# Patient Record
Sex: MALE | Race: WHITE | NOT HISPANIC OR LATINO | Employment: OTHER | URBAN - METROPOLITAN AREA
[De-identification: names, ages, dates, MRNs, and addresses within clinical notes are randomized per-mention and may not be internally consistent; named-entity substitution may affect disease eponyms.]

---

## 2017-01-26 ENCOUNTER — COMMUNICATION - HEALTHEAST (OUTPATIENT)
Dept: LAB | Facility: CLINIC | Age: 57
End: 2017-01-26

## 2017-01-26 DIAGNOSIS — M1A.0791 IDIOPATHIC CHRONIC GOUT OF FOOT WITH TOPHUS, UNSPECIFIED LATERALITY: ICD-10-CM

## 2017-02-10 ENCOUNTER — COMMUNICATION - HEALTHEAST (OUTPATIENT)
Dept: RHEUMATOLOGY | Facility: CLINIC | Age: 57
End: 2017-02-10

## 2017-02-10 DIAGNOSIS — M1A.0791 IDIOPATHIC CHRONIC GOUT OF FOOT WITH TOPHUS, UNSPECIFIED LATERALITY: ICD-10-CM

## 2017-02-17 ENCOUNTER — COMMUNICATION - HEALTHEAST (OUTPATIENT)
Dept: RHEUMATOLOGY | Facility: CLINIC | Age: 57
End: 2017-02-17

## 2017-02-17 DIAGNOSIS — M10.9 GOUT: ICD-10-CM

## 2017-02-24 ENCOUNTER — AMBULATORY - HEALTHEAST (OUTPATIENT)
Dept: LAB | Facility: CLINIC | Age: 57
End: 2017-02-24

## 2017-02-24 DIAGNOSIS — M1A.0791 IDIOPATHIC CHRONIC GOUT OF FOOT WITH TOPHUS, UNSPECIFIED LATERALITY: ICD-10-CM

## 2017-02-24 LAB
ALT SERPL W P-5'-P-CCNC: 35 U/L (ref 0–45)
CREAT SERPL-MCNC: 0.94 MG/DL (ref 0.7–1.3)
GFR SERPL CREATININE-BSD FRML MDRD: >60 ML/MIN/1.73M2

## 2017-02-28 ENCOUNTER — OFFICE VISIT - HEALTHEAST (OUTPATIENT)
Dept: RHEUMATOLOGY | Facility: CLINIC | Age: 57
End: 2017-02-28

## 2017-02-28 DIAGNOSIS — M1A.0791 IDIOPATHIC CHRONIC GOUT OF FOOT WITH TOPHUS, UNSPECIFIED LATERALITY: ICD-10-CM

## 2017-02-28 DIAGNOSIS — Z79.899 HIGH RISK MEDICATION USE: ICD-10-CM

## 2017-04-20 ENCOUNTER — COMMUNICATION - HEALTHEAST (OUTPATIENT)
Dept: RHEUMATOLOGY | Facility: CLINIC | Age: 57
End: 2017-04-20

## 2017-04-20 DIAGNOSIS — M10.9 GOUT: ICD-10-CM

## 2017-05-18 ENCOUNTER — OFFICE VISIT - HEALTHEAST (OUTPATIENT)
Dept: FAMILY MEDICINE | Facility: CLINIC | Age: 57
End: 2017-05-18

## 2017-05-18 DIAGNOSIS — I65.21 ATHEROSCLEROSIS OF RIGHT CAROTID ARTERY: ICD-10-CM

## 2017-05-18 DIAGNOSIS — I63.9 STROKE (H): ICD-10-CM

## 2017-05-18 DIAGNOSIS — E03.9 HYPOTHYROIDISM: ICD-10-CM

## 2017-05-18 DIAGNOSIS — M1A.0791 IDIOPATHIC CHRONIC GOUT OF FOOT WITH TOPHUS, UNSPECIFIED LATERALITY: ICD-10-CM

## 2017-05-18 DIAGNOSIS — Z12.5 SCREENING PSA (PROSTATE SPECIFIC ANTIGEN): ICD-10-CM

## 2017-05-18 DIAGNOSIS — E78.5 HYPERLIPIDEMIA: ICD-10-CM

## 2017-05-18 ASSESSMENT — MIFFLIN-ST. JEOR: SCORE: 1946.58

## 2017-05-26 ENCOUNTER — AMBULATORY - HEALTHEAST (OUTPATIENT)
Dept: LAB | Facility: CLINIC | Age: 57
End: 2017-05-26

## 2017-05-26 ENCOUNTER — AMBULATORY - HEALTHEAST (OUTPATIENT)
Dept: NURSING | Facility: CLINIC | Age: 57
End: 2017-05-26

## 2017-05-26 DIAGNOSIS — Z01.30 BLOOD PRESSURE CHECK: ICD-10-CM

## 2017-05-26 DIAGNOSIS — Z12.5 SCREENING PSA (PROSTATE SPECIFIC ANTIGEN): ICD-10-CM

## 2017-05-26 DIAGNOSIS — M1A.0791 IDIOPATHIC CHRONIC GOUT OF FOOT WITH TOPHUS, UNSPECIFIED LATERALITY: ICD-10-CM

## 2017-05-26 DIAGNOSIS — E03.9 HYPOTHYROIDISM: ICD-10-CM

## 2017-05-26 DIAGNOSIS — E78.5 HYPERLIPIDEMIA: ICD-10-CM

## 2017-05-26 LAB
CHOLEST SERPL-MCNC: 128 MG/DL
FASTING STATUS PATIENT QL REPORTED: YES
HDLC SERPL-MCNC: 29 MG/DL
LDLC SERPL CALC-MCNC: 49 MG/DL
PSA SERPL-MCNC: 1.6 NG/ML (ref 0–3.5)
TRIGL SERPL-MCNC: 248 MG/DL

## 2017-05-29 ENCOUNTER — COMMUNICATION - HEALTHEAST (OUTPATIENT)
Dept: FAMILY MEDICINE | Facility: CLINIC | Age: 57
End: 2017-05-29

## 2017-05-30 ENCOUNTER — AMBULATORY - HEALTHEAST (OUTPATIENT)
Dept: FAMILY MEDICINE | Facility: CLINIC | Age: 57
End: 2017-05-30

## 2017-05-30 DIAGNOSIS — R97.20 RISING PSA LEVEL: ICD-10-CM

## 2017-06-02 ENCOUNTER — AMBULATORY - HEALTHEAST (OUTPATIENT)
Dept: NURSING | Facility: CLINIC | Age: 57
End: 2017-06-02

## 2017-06-02 DIAGNOSIS — R03.0 BLOOD PRESSURE ELEVATED WITHOUT HISTORY OF HTN: ICD-10-CM

## 2017-06-05 ENCOUNTER — COMMUNICATION - HEALTHEAST (OUTPATIENT)
Dept: FAMILY MEDICINE | Facility: CLINIC | Age: 57
End: 2017-06-05

## 2017-06-11 ENCOUNTER — COMMUNICATION - HEALTHEAST (OUTPATIENT)
Dept: FAMILY MEDICINE | Facility: CLINIC | Age: 57
End: 2017-06-11

## 2017-06-30 ENCOUNTER — COMMUNICATION - HEALTHEAST (OUTPATIENT)
Dept: FAMILY MEDICINE | Facility: CLINIC | Age: 57
End: 2017-06-30

## 2017-08-01 ENCOUNTER — OFFICE VISIT - HEALTHEAST (OUTPATIENT)
Dept: FAMILY MEDICINE | Facility: CLINIC | Age: 57
End: 2017-08-01

## 2017-08-01 DIAGNOSIS — I10 HTN (HYPERTENSION): ICD-10-CM

## 2017-08-08 ENCOUNTER — AMBULATORY - HEALTHEAST (OUTPATIENT)
Dept: NURSING | Facility: CLINIC | Age: 57
End: 2017-08-08

## 2017-08-08 DIAGNOSIS — I10 HTN (HYPERTENSION): ICD-10-CM

## 2017-08-15 ENCOUNTER — COMMUNICATION - HEALTHEAST (OUTPATIENT)
Dept: FAMILY MEDICINE | Facility: CLINIC | Age: 57
End: 2017-08-15

## 2017-08-17 ENCOUNTER — COMMUNICATION - HEALTHEAST (OUTPATIENT)
Dept: LAB | Facility: CLINIC | Age: 57
End: 2017-08-17

## 2017-08-17 DIAGNOSIS — M1A.0791 IDIOPATHIC CHRONIC GOUT OF FOOT WITH TOPHUS, UNSPECIFIED LATERALITY: ICD-10-CM

## 2017-08-22 ENCOUNTER — AMBULATORY - HEALTHEAST (OUTPATIENT)
Dept: LAB | Facility: CLINIC | Age: 57
End: 2017-08-22

## 2017-08-22 ENCOUNTER — AMBULATORY - HEALTHEAST (OUTPATIENT)
Dept: FAMILY MEDICINE | Facility: CLINIC | Age: 57
End: 2017-08-22

## 2017-08-22 DIAGNOSIS — M1A.0791 IDIOPATHIC CHRONIC GOUT OF FOOT WITH TOPHUS, UNSPECIFIED LATERALITY: ICD-10-CM

## 2017-08-22 DIAGNOSIS — I10 HTN (HYPERTENSION): ICD-10-CM

## 2017-08-22 LAB
ALT SERPL W P-5'-P-CCNC: 35 U/L (ref 0–45)
CREAT SERPL-MCNC: 1.17 MG/DL (ref 0.7–1.3)
GFR SERPL CREATININE-BSD FRML MDRD: >60 ML/MIN/1.73M2

## 2017-08-23 ENCOUNTER — COMMUNICATION - HEALTHEAST (OUTPATIENT)
Dept: FAMILY MEDICINE | Facility: CLINIC | Age: 57
End: 2017-08-23

## 2017-08-28 ENCOUNTER — COMMUNICATION - HEALTHEAST (OUTPATIENT)
Dept: FAMILY MEDICINE | Facility: CLINIC | Age: 57
End: 2017-08-28

## 2017-08-29 ENCOUNTER — OFFICE VISIT - HEALTHEAST (OUTPATIENT)
Dept: RHEUMATOLOGY | Facility: CLINIC | Age: 57
End: 2017-08-29

## 2017-08-29 DIAGNOSIS — Z79.899 HIGH RISK MEDICATION USE: ICD-10-CM

## 2017-08-29 DIAGNOSIS — M1A.0791 IDIOPATHIC CHRONIC GOUT OF FOOT WITH TOPHUS, UNSPECIFIED LATERALITY: ICD-10-CM

## 2017-08-29 DIAGNOSIS — M77.8 RIGHT SHOULDER TENDINITIS: ICD-10-CM

## 2017-08-29 ASSESSMENT — MIFFLIN-ST. JEOR: SCORE: 1952.02

## 2017-09-15 ENCOUNTER — RECORDS - HEALTHEAST (OUTPATIENT)
Dept: ADMINISTRATIVE | Facility: OTHER | Age: 57
End: 2017-09-15

## 2017-09-15 ENCOUNTER — COMMUNICATION - HEALTHEAST (OUTPATIENT)
Dept: FAMILY MEDICINE | Facility: CLINIC | Age: 57
End: 2017-09-15

## 2017-09-15 ENCOUNTER — OFFICE VISIT - HEALTHEAST (OUTPATIENT)
Dept: FAMILY MEDICINE | Facility: CLINIC | Age: 57
End: 2017-09-15

## 2017-09-15 DIAGNOSIS — Z00.00 HEALTH CARE MAINTENANCE: ICD-10-CM

## 2017-09-15 DIAGNOSIS — M25.511 RIGHT SHOULDER PAIN: ICD-10-CM

## 2017-09-15 DIAGNOSIS — R20.2 ARM PARESTHESIA, RIGHT: ICD-10-CM

## 2017-09-17 ENCOUNTER — COMMUNICATION - HEALTHEAST (OUTPATIENT)
Dept: FAMILY MEDICINE | Facility: CLINIC | Age: 57
End: 2017-09-17

## 2017-09-23 ENCOUNTER — COMMUNICATION - HEALTHEAST (OUTPATIENT)
Dept: FAMILY MEDICINE | Facility: CLINIC | Age: 57
End: 2017-09-23

## 2017-09-23 DIAGNOSIS — I10 HTN (HYPERTENSION): ICD-10-CM

## 2017-09-26 ENCOUNTER — COMMUNICATION - HEALTHEAST (OUTPATIENT)
Dept: FAMILY MEDICINE | Facility: CLINIC | Age: 57
End: 2017-09-26

## 2017-09-26 ENCOUNTER — AMBULATORY - HEALTHEAST (OUTPATIENT)
Dept: FAMILY MEDICINE | Facility: CLINIC | Age: 57
End: 2017-09-26

## 2017-09-26 ENCOUNTER — HOSPITAL ENCOUNTER (OUTPATIENT)
Dept: MRI IMAGING | Facility: HOSPITAL | Age: 57
Discharge: HOME | End: 2017-09-26
Attending: FAMILY MEDICINE

## 2017-09-26 DIAGNOSIS — R20.2 ARM PARESTHESIA, RIGHT: ICD-10-CM

## 2017-09-26 DIAGNOSIS — M54.12 CERVICAL RADICULOPATHY: ICD-10-CM

## 2017-10-03 ENCOUNTER — OFFICE VISIT - HEALTHEAST (OUTPATIENT)
Dept: NEUROSURGERY | Facility: CLINIC | Age: 57
End: 2017-10-03

## 2017-10-03 DIAGNOSIS — M50.20 HERNIATED CERVICAL DISC: ICD-10-CM

## 2017-10-03 ASSESSMENT — MIFFLIN-ST. JEOR: SCORE: 1944.31

## 2017-10-19 ENCOUNTER — HOSPITAL ENCOUNTER (OUTPATIENT)
Dept: PHYSICAL MEDICINE AND REHAB | Facility: CLINIC | Age: 57
Discharge: HOME | End: 2017-10-19
Attending: NEUROLOGICAL SURGERY

## 2017-10-19 DIAGNOSIS — M50.20 CERVICAL DISC HERNIATION: ICD-10-CM

## 2017-10-19 DIAGNOSIS — M54.12 CERVICAL RADICULITIS: ICD-10-CM

## 2017-10-19 DIAGNOSIS — M50.20 HERNIATED CERVICAL DISC: ICD-10-CM

## 2017-10-19 ASSESSMENT — MIFFLIN-ST. JEOR: SCORE: 1939.78

## 2017-10-30 ENCOUNTER — OFFICE VISIT - HEALTHEAST (OUTPATIENT)
Dept: PHYSICAL THERAPY | Facility: REHABILITATION | Age: 57
End: 2017-10-30

## 2017-10-30 DIAGNOSIS — M25.60 JOINT STIFFNESS OF SPINE: ICD-10-CM

## 2017-10-30 DIAGNOSIS — M62.81 MUSCLE WEAKNESS (GENERALIZED): ICD-10-CM

## 2017-10-30 DIAGNOSIS — R29.3 POOR POSTURE: ICD-10-CM

## 2017-10-30 DIAGNOSIS — M54.12 CERVICAL RADICULITIS: ICD-10-CM

## 2017-11-01 ENCOUNTER — OFFICE VISIT - HEALTHEAST (OUTPATIENT)
Dept: PHYSICAL THERAPY | Facility: REHABILITATION | Age: 57
End: 2017-11-01

## 2017-11-01 DIAGNOSIS — M54.12 CERVICAL RADICULITIS: ICD-10-CM

## 2017-11-01 DIAGNOSIS — R29.3 POOR POSTURE: ICD-10-CM

## 2017-11-01 DIAGNOSIS — M25.60 JOINT STIFFNESS OF SPINE: ICD-10-CM

## 2017-11-01 DIAGNOSIS — M62.81 MUSCLE WEAKNESS (GENERALIZED): ICD-10-CM

## 2017-11-06 ENCOUNTER — OFFICE VISIT - HEALTHEAST (OUTPATIENT)
Dept: PHYSICAL THERAPY | Facility: REHABILITATION | Age: 57
End: 2017-11-06

## 2017-11-06 DIAGNOSIS — M25.60 JOINT STIFFNESS OF SPINE: ICD-10-CM

## 2017-11-06 DIAGNOSIS — M62.81 MUSCLE WEAKNESS (GENERALIZED): ICD-10-CM

## 2017-11-06 DIAGNOSIS — R29.3 POOR POSTURE: ICD-10-CM

## 2017-11-06 DIAGNOSIS — M54.12 CERVICAL RADICULITIS: ICD-10-CM

## 2017-11-09 ENCOUNTER — OFFICE VISIT - HEALTHEAST (OUTPATIENT)
Dept: PHYSICAL THERAPY | Facility: REHABILITATION | Age: 57
End: 2017-11-09

## 2017-11-09 DIAGNOSIS — M54.12 CERVICAL RADICULITIS: ICD-10-CM

## 2017-11-09 DIAGNOSIS — M62.81 MUSCLE WEAKNESS (GENERALIZED): ICD-10-CM

## 2017-11-09 DIAGNOSIS — R29.3 POOR POSTURE: ICD-10-CM

## 2017-11-09 DIAGNOSIS — M25.60 JOINT STIFFNESS OF SPINE: ICD-10-CM

## 2017-11-13 ENCOUNTER — OFFICE VISIT - HEALTHEAST (OUTPATIENT)
Dept: PHYSICAL THERAPY | Facility: REHABILITATION | Age: 57
End: 2017-11-13

## 2017-11-13 DIAGNOSIS — M54.12 CERVICAL RADICULITIS: ICD-10-CM

## 2017-11-13 DIAGNOSIS — M62.81 MUSCLE WEAKNESS (GENERALIZED): ICD-10-CM

## 2017-11-13 DIAGNOSIS — R29.3 POOR POSTURE: ICD-10-CM

## 2017-11-13 DIAGNOSIS — M25.60 JOINT STIFFNESS OF SPINE: ICD-10-CM

## 2017-11-14 ENCOUNTER — RECORDS - HEALTHEAST (OUTPATIENT)
Dept: ADMINISTRATIVE | Facility: OTHER | Age: 57
End: 2017-11-14

## 2017-11-14 ENCOUNTER — OFFICE VISIT - HEALTHEAST (OUTPATIENT)
Dept: NEUROSURGERY | Facility: CLINIC | Age: 57
End: 2017-11-14

## 2017-11-14 DIAGNOSIS — M54.12 CERVICAL RADICULOPATHY AT C8: ICD-10-CM

## 2017-11-14 ASSESSMENT — MIFFLIN-ST. JEOR: SCORE: 1939.78

## 2017-11-27 ENCOUNTER — COMMUNICATION - HEALTHEAST (OUTPATIENT)
Dept: FAMILY MEDICINE | Facility: CLINIC | Age: 57
End: 2017-11-27

## 2017-11-28 ENCOUNTER — COMMUNICATION - HEALTHEAST (OUTPATIENT)
Dept: NEUROSURGERY | Facility: CLINIC | Age: 57
End: 2017-11-28

## 2017-11-28 ENCOUNTER — AMBULATORY - HEALTHEAST (OUTPATIENT)
Dept: LAB | Facility: CLINIC | Age: 57
End: 2017-11-28

## 2017-11-28 DIAGNOSIS — M54.12 CERVICAL RADICULOPATHY: ICD-10-CM

## 2017-12-05 ENCOUNTER — COMMUNICATION - HEALTHEAST (OUTPATIENT)
Dept: FAMILY MEDICINE | Facility: CLINIC | Age: 57
End: 2017-12-05

## 2017-12-05 ENCOUNTER — OFFICE VISIT - HEALTHEAST (OUTPATIENT)
Dept: FAMILY MEDICINE | Facility: CLINIC | Age: 57
End: 2017-12-05

## 2017-12-05 DIAGNOSIS — M1A.0791 IDIOPATHIC CHRONIC GOUT OF FOOT WITH TOPHUS, UNSPECIFIED LATERALITY: ICD-10-CM

## 2017-12-05 DIAGNOSIS — I63.9 STROKE (H): ICD-10-CM

## 2017-12-05 DIAGNOSIS — Z01.818 PRE-OP EXAM: ICD-10-CM

## 2017-12-05 DIAGNOSIS — I65.21 STENOSIS OF RIGHT CAROTID ARTERY: ICD-10-CM

## 2017-12-05 DIAGNOSIS — I10 HTN (HYPERTENSION): ICD-10-CM

## 2017-12-05 DIAGNOSIS — M54.12 CERVICAL RADICULOPATHY: ICD-10-CM

## 2017-12-05 DIAGNOSIS — E03.9 HYPOTHYROIDISM: ICD-10-CM

## 2017-12-05 LAB
ATRIAL RATE - MUSE: 70 BPM
DIASTOLIC BLOOD PRESSURE - MUSE: NORMAL MMHG
INTERPRETATION ECG - MUSE: NORMAL
P AXIS - MUSE: 60 DEGREES
PR INTERVAL - MUSE: 162 MS
QRS DURATION - MUSE: 86 MS
QT - MUSE: 402 MS
QTC - MUSE: 434 MS
R AXIS - MUSE: 0 DEGREES
SYSTOLIC BLOOD PRESSURE - MUSE: NORMAL MMHG
T AXIS - MUSE: 27 DEGREES
VENTRICULAR RATE- MUSE: 70 BPM

## 2017-12-05 ASSESSMENT — MIFFLIN-ST. JEOR: SCORE: 1949.3

## 2017-12-06 ENCOUNTER — RECORDS - HEALTHEAST (OUTPATIENT)
Dept: ADMINISTRATIVE | Facility: OTHER | Age: 57
End: 2017-12-06

## 2017-12-06 ENCOUNTER — COMMUNICATION - HEALTHEAST (OUTPATIENT)
Dept: NEUROSURGERY | Facility: CLINIC | Age: 57
End: 2017-12-06

## 2017-12-06 ENCOUNTER — OFFICE VISIT - HEALTHEAST (OUTPATIENT)
Dept: NEUROSURGERY | Facility: CLINIC | Age: 57
End: 2017-12-06

## 2017-12-06 DIAGNOSIS — Z01.818 PRE-OP EVALUATION: ICD-10-CM

## 2017-12-07 ENCOUNTER — COMMUNICATION - HEALTHEAST (OUTPATIENT)
Dept: NEUROSURGERY | Facility: CLINIC | Age: 57
End: 2017-12-07

## 2017-12-07 ENCOUNTER — AMBULATORY - HEALTHEAST (OUTPATIENT)
Dept: NEUROSURGERY | Facility: CLINIC | Age: 57
End: 2017-12-07

## 2017-12-07 DIAGNOSIS — Z01.818 PRE-OP EVALUATION: ICD-10-CM

## 2017-12-08 ENCOUNTER — COMMUNICATION - HEALTHEAST (OUTPATIENT)
Dept: NEUROSURGERY | Facility: CLINIC | Age: 57
End: 2017-12-08

## 2017-12-13 ENCOUNTER — AMBULATORY - HEALTHEAST (OUTPATIENT)
Dept: LAB | Facility: HOSPITAL | Age: 57
End: 2017-12-13

## 2017-12-13 DIAGNOSIS — Z01.818 PRE-OP EVALUATION: ICD-10-CM

## 2017-12-14 ENCOUNTER — SURGERY - HEALTHEAST (OUTPATIENT)
Dept: SURGERY | Facility: CLINIC | Age: 57
End: 2017-12-14

## 2017-12-14 ENCOUNTER — ANESTHESIA - HEALTHEAST (OUTPATIENT)
Dept: SURGERY | Facility: CLINIC | Age: 57
End: 2017-12-14

## 2017-12-14 ASSESSMENT — MIFFLIN-ST. JEOR: SCORE: 1948.85

## 2017-12-15 ENCOUNTER — COMMUNICATION - HEALTHEAST (OUTPATIENT)
Dept: NEUROSURGERY | Facility: CLINIC | Age: 57
End: 2017-12-15

## 2017-12-15 DIAGNOSIS — M54.12 CERVICAL RADICULOPATHY: ICD-10-CM

## 2017-12-21 ENCOUNTER — AMBULATORY - HEALTHEAST (OUTPATIENT)
Dept: NEUROSURGERY | Facility: CLINIC | Age: 57
End: 2017-12-21

## 2017-12-21 DIAGNOSIS — Z51.89 VISIT FOR WOUND CHECK: ICD-10-CM

## 2018-01-06 ENCOUNTER — COMMUNICATION - HEALTHEAST (OUTPATIENT)
Dept: FAMILY MEDICINE | Facility: CLINIC | Age: 58
End: 2018-01-06

## 2018-01-06 DIAGNOSIS — I10 HTN (HYPERTENSION): ICD-10-CM

## 2018-01-09 ENCOUNTER — AMBULATORY - HEALTHEAST (OUTPATIENT)
Dept: RHEUMATOLOGY | Facility: CLINIC | Age: 58
End: 2018-01-09

## 2018-01-09 DIAGNOSIS — M77.8 RIGHT SHOULDER TENDINITIS: ICD-10-CM

## 2018-01-09 DIAGNOSIS — M77.01 MEDIAL EPICONDYLITIS OF RIGHT ELBOW: ICD-10-CM

## 2018-01-12 ENCOUNTER — COMMUNICATION - HEALTHEAST (OUTPATIENT)
Dept: RHEUMATOLOGY | Facility: CLINIC | Age: 58
End: 2018-01-12

## 2018-01-12 DIAGNOSIS — M1A.0791 IDIOPATHIC CHRONIC GOUT OF FOOT WITH TOPHUS, UNSPECIFIED LATERALITY: ICD-10-CM

## 2018-01-23 ENCOUNTER — HOSPITAL ENCOUNTER (OUTPATIENT)
Dept: RADIOLOGY | Facility: CLINIC | Age: 58
Discharge: HOME | End: 2018-01-23
Attending: NEUROLOGICAL SURGERY

## 2018-01-23 ENCOUNTER — OFFICE VISIT - HEALTHEAST (OUTPATIENT)
Dept: NEUROSURGERY | Facility: CLINIC | Age: 58
End: 2018-01-23

## 2018-01-23 DIAGNOSIS — M54.12 CERVICAL RADICULOPATHY: ICD-10-CM

## 2018-01-29 ENCOUNTER — OFFICE VISIT - HEALTHEAST (OUTPATIENT)
Dept: PHYSICAL THERAPY | Facility: REHABILITATION | Age: 58
End: 2018-01-29

## 2018-01-29 DIAGNOSIS — R29.3 POOR POSTURE: ICD-10-CM

## 2018-01-29 DIAGNOSIS — M54.2 NECK PAIN, ACUTE: ICD-10-CM

## 2018-01-29 DIAGNOSIS — M62.81 MUSCLE WEAKNESS (GENERALIZED): ICD-10-CM

## 2018-01-29 DIAGNOSIS — Z98.890 HX OF NECK SURGERY: ICD-10-CM

## 2018-02-08 ENCOUNTER — OFFICE VISIT - HEALTHEAST (OUTPATIENT)
Dept: PHYSICAL THERAPY | Facility: REHABILITATION | Age: 58
End: 2018-02-08

## 2018-02-08 DIAGNOSIS — I65.21 ATHEROSCLEROSIS OF RIGHT CAROTID ARTERY: ICD-10-CM

## 2018-02-08 DIAGNOSIS — M54.2 NECK PAIN, ACUTE: ICD-10-CM

## 2018-02-08 DIAGNOSIS — M62.81 MUSCLE WEAKNESS (GENERALIZED): ICD-10-CM

## 2018-02-08 DIAGNOSIS — R29.3 POOR POSTURE: ICD-10-CM

## 2018-02-08 DIAGNOSIS — Z98.890 HX OF NECK SURGERY: ICD-10-CM

## 2018-02-21 ENCOUNTER — AMBULATORY - HEALTHEAST (OUTPATIENT)
Dept: LAB | Facility: CLINIC | Age: 58
End: 2018-02-21

## 2018-02-21 DIAGNOSIS — M77.01 MEDIAL EPICONDYLITIS OF RIGHT ELBOW: ICD-10-CM

## 2018-02-21 DIAGNOSIS — M77.8 RIGHT SHOULDER TENDINITIS: ICD-10-CM

## 2018-02-21 DIAGNOSIS — R97.20 RISING PSA LEVEL: ICD-10-CM

## 2018-02-21 LAB
ALBUMIN SERPL-MCNC: 3.8 G/DL (ref 3.5–5)
ALT SERPL W P-5'-P-CCNC: 25 U/L (ref 0–45)
CREAT SERPL-MCNC: 0.95 MG/DL (ref 0.7–1.3)
ERYTHROCYTE [DISTWIDTH] IN BLOOD BY AUTOMATED COUNT: 11.9 % (ref 11–14.5)
GFR SERPL CREATININE-BSD FRML MDRD: >60 ML/MIN/1.73M2
HCT VFR BLD AUTO: 43.9 % (ref 40–54)
HGB BLD-MCNC: 15.2 G/DL (ref 14–18)
MCH RBC QN AUTO: 33.9 PG (ref 27–34)
MCHC RBC AUTO-ENTMCNC: 34.5 G/DL (ref 32–36)
MCV RBC AUTO: 98 FL (ref 80–100)
PLATELET # BLD AUTO: 153 THOU/UL (ref 140–440)
PMV BLD AUTO: 7.8 FL (ref 7–10)
PSA SERPL-MCNC: 1.4 NG/ML (ref 0–4.5)
RBC # BLD AUTO: 4.47 MILL/UL (ref 4.4–6.2)
URATE SERPL-MCNC: 5.3 MG/DL (ref 3–8)
WBC: 6.1 THOU/UL (ref 4–11)

## 2018-02-22 ENCOUNTER — COMMUNICATION - HEALTHEAST (OUTPATIENT)
Dept: FAMILY MEDICINE | Facility: CLINIC | Age: 58
End: 2018-02-22

## 2018-02-28 ENCOUNTER — OFFICE VISIT - HEALTHEAST (OUTPATIENT)
Dept: RHEUMATOLOGY | Facility: CLINIC | Age: 58
End: 2018-02-28

## 2018-02-28 DIAGNOSIS — M54.12 CERVICAL RADICULOPATHY: ICD-10-CM

## 2018-02-28 DIAGNOSIS — Z79.899 HIGH RISK MEDICATION USE: ICD-10-CM

## 2018-02-28 DIAGNOSIS — M1A.0791 IDIOPATHIC CHRONIC GOUT OF FOOT WITH TOPHUS, UNSPECIFIED LATERALITY: ICD-10-CM

## 2018-02-28 ASSESSMENT — MIFFLIN-ST. JEOR: SCORE: 1959.74

## 2018-03-08 ENCOUNTER — OFFICE VISIT - HEALTHEAST (OUTPATIENT)
Dept: PHYSICAL THERAPY | Facility: REHABILITATION | Age: 58
End: 2018-03-08

## 2018-03-08 DIAGNOSIS — Z98.890 HX OF NECK SURGERY: ICD-10-CM

## 2018-03-08 DIAGNOSIS — M62.81 MUSCLE WEAKNESS (GENERALIZED): ICD-10-CM

## 2018-03-08 DIAGNOSIS — R29.3 POOR POSTURE: ICD-10-CM

## 2018-03-08 DIAGNOSIS — M54.2 NECK PAIN, ACUTE: ICD-10-CM

## 2018-06-12 ENCOUNTER — OFFICE VISIT - HEALTHEAST (OUTPATIENT)
Dept: FAMILY MEDICINE | Facility: CLINIC | Age: 58
End: 2018-06-12

## 2018-06-12 DIAGNOSIS — R21 RASH: ICD-10-CM

## 2018-06-12 LAB — KOH PREPARATION: NORMAL

## 2018-07-24 ENCOUNTER — OFFICE VISIT - HEALTHEAST (OUTPATIENT)
Dept: FAMILY MEDICINE | Facility: CLINIC | Age: 58
End: 2018-07-24

## 2018-07-24 DIAGNOSIS — T14.8XXA MUSCLE STRAIN: ICD-10-CM

## 2018-10-16 ENCOUNTER — OFFICE VISIT - HEALTHEAST (OUTPATIENT)
Dept: FAMILY MEDICINE | Facility: CLINIC | Age: 58
End: 2018-10-16

## 2018-10-16 DIAGNOSIS — Z86.73 HISTORY OF TIA (TRANSIENT ISCHEMIC ATTACK) AND STROKE: ICD-10-CM

## 2018-10-16 DIAGNOSIS — I10 ESSENTIAL HYPERTENSION: ICD-10-CM

## 2018-10-16 DIAGNOSIS — Z00.00 ANNUAL PHYSICAL EXAM: ICD-10-CM

## 2018-10-16 DIAGNOSIS — R07.89 ATYPICAL CHEST PAIN: ICD-10-CM

## 2018-10-16 DIAGNOSIS — F41.9 ANXIETY: ICD-10-CM

## 2018-10-16 DIAGNOSIS — E78.1 HIGH BLOOD TRIGLYCERIDES: ICD-10-CM

## 2018-10-16 LAB
ALBUMIN SERPL-MCNC: 3.9 G/DL (ref 3.5–5)
ALP SERPL-CCNC: 82 U/L (ref 45–120)
ALT SERPL W P-5'-P-CCNC: 29 U/L (ref 0–45)
ANION GAP SERPL CALCULATED.3IONS-SCNC: 11 MMOL/L (ref 5–18)
AST SERPL W P-5'-P-CCNC: 21 U/L (ref 0–40)
ATRIAL RATE - MUSE: 56 BPM
BILIRUB SERPL-MCNC: 0.8 MG/DL (ref 0–1)
BUN SERPL-MCNC: 20 MG/DL (ref 8–22)
CALCIUM SERPL-MCNC: 8.8 MG/DL (ref 8.5–10.5)
CHLORIDE BLD-SCNC: 105 MMOL/L (ref 98–107)
CHOLEST SERPL-MCNC: 140 MG/DL
CO2 SERPL-SCNC: 26 MMOL/L (ref 22–31)
CREAT SERPL-MCNC: 0.92 MG/DL (ref 0.7–1.3)
DIASTOLIC BLOOD PRESSURE - MUSE: NORMAL MMHG
FASTING STATUS PATIENT QL REPORTED: YES
GFR SERPL CREATININE-BSD FRML MDRD: >60 ML/MIN/1.73M2
GLUCOSE BLD-MCNC: 122 MG/DL (ref 70–125)
HDLC SERPL-MCNC: 26 MG/DL
INTERPRETATION ECG - MUSE: NORMAL
LDLC SERPL CALC-MCNC: 38 MG/DL
LDLC SERPL CALC-MCNC: ABNORMAL MG/DL
P AXIS - MUSE: 46 DEGREES
POTASSIUM BLD-SCNC: 4.2 MMOL/L (ref 3.5–5)
PR INTERVAL - MUSE: 174 MS
PROT SERPL-MCNC: 6.6 G/DL (ref 6–8)
PSA SERPL-MCNC: 1.7 NG/ML (ref 0–4.5)
QRS DURATION - MUSE: 86 MS
QT - MUSE: 422 MS
QTC - MUSE: 407 MS
R AXIS - MUSE: 0 DEGREES
SODIUM SERPL-SCNC: 142 MMOL/L (ref 136–145)
SYSTOLIC BLOOD PRESSURE - MUSE: NORMAL MMHG
T AXIS - MUSE: 29 DEGREES
TRIGL SERPL-MCNC: 563 MG/DL
URATE SERPL-MCNC: 4.7 MG/DL (ref 3–8)
VENTRICULAR RATE- MUSE: 56 BPM

## 2018-10-16 ASSESSMENT — MIFFLIN-ST. JEOR: SCORE: 1980.55

## 2018-10-18 ENCOUNTER — AMBULATORY - HEALTHEAST (OUTPATIENT)
Dept: LAB | Facility: CLINIC | Age: 58
End: 2018-10-18

## 2018-10-18 DIAGNOSIS — E78.1 HIGH BLOOD TRIGLYCERIDES: ICD-10-CM

## 2018-10-18 LAB — HBA1C MFR BLD: 6.4 % (ref 3.5–6)

## 2018-11-06 ENCOUNTER — OFFICE VISIT - HEALTHEAST (OUTPATIENT)
Dept: CARDIOLOGY | Facility: CLINIC | Age: 58
End: 2018-11-06

## 2018-11-06 DIAGNOSIS — R07.2 PRECORDIAL PAIN: ICD-10-CM

## 2018-11-06 ASSESSMENT — MIFFLIN-ST. JEOR: SCORE: 1957.92

## 2018-11-09 ENCOUNTER — OFFICE VISIT - HEALTHEAST (OUTPATIENT)
Dept: BEHAVIORAL HEALTH | Facility: HOSPITAL | Age: 58
End: 2018-11-09

## 2018-11-09 DIAGNOSIS — F41.1 ANXIETY STATE: ICD-10-CM

## 2018-11-13 ENCOUNTER — HOSPITAL ENCOUNTER (OUTPATIENT)
Dept: CARDIOLOGY | Facility: HOSPITAL | Age: 58
Discharge: HOME | End: 2018-11-13
Attending: INTERNAL MEDICINE

## 2018-11-13 DIAGNOSIS — R07.2 PRECORDIAL PAIN: ICD-10-CM

## 2018-11-13 LAB
CV STRESS CURRENT BP HE: NORMAL
CV STRESS CURRENT HR HE: 106
CV STRESS CURRENT HR HE: 107
CV STRESS CURRENT HR HE: 107
CV STRESS CURRENT HR HE: 111
CV STRESS CURRENT HR HE: 112
CV STRESS CURRENT HR HE: 117
CV STRESS CURRENT HR HE: 124
CV STRESS CURRENT HR HE: 126
CV STRESS CURRENT HR HE: 131
CV STRESS CURRENT HR HE: 136
CV STRESS CURRENT HR HE: 136
CV STRESS CURRENT HR HE: 144
CV STRESS CURRENT HR HE: 145
CV STRESS CURRENT HR HE: 146
CV STRESS CURRENT HR HE: 146
CV STRESS CURRENT HR HE: 65
CV STRESS CURRENT HR HE: 66
CV STRESS CURRENT HR HE: 87
CV STRESS CURRENT HR HE: 88
CV STRESS CURRENT HR HE: 88
CV STRESS CURRENT HR HE: 91
CV STRESS CURRENT HR HE: 92
CV STRESS CURRENT HR HE: 92
CV STRESS CURRENT HR HE: 93
CV STRESS CURRENT HR HE: 93
CV STRESS CURRENT HR HE: 94
CV STRESS CURRENT HR HE: 95
CV STRESS DEVIATION TIME HE: NORMAL
CV STRESS ECHO PERCENT HR HE: NORMAL
CV STRESS EXERCISE STAGE HE: NORMAL
CV STRESS FINAL RESTING BP HE: NORMAL
CV STRESS FINAL RESTING HR HE: 91
CV STRESS MAX HR HE: 151
CV STRESS MAX TREADMILL GRADE HE: 14
CV STRESS MAX TREADMILL SPEED HE: 3.4
CV STRESS PEAK DIA BP HE: NORMAL
CV STRESS PEAK SYS BP HE: NORMAL
CV STRESS PHASE HE: NORMAL
CV STRESS PROTOCOL HE: NORMAL
CV STRESS RESTING PT POSITION HE: NORMAL
CV STRESS RESTING PT POSITION HE: NORMAL
CV STRESS ST DEVIATION AMOUNT HE: NORMAL
CV STRESS ST DEVIATION ELEVATION HE: NORMAL
CV STRESS ST EVELATION AMOUNT HE: NORMAL
CV STRESS TEST TYPE HE: NORMAL
CV STRESS TOTAL STAGE TIME MIN 1 HE: NORMAL
STRESS ECHO BASELINE BP: NORMAL
STRESS ECHO BASELINE HR: 65
STRESS ECHO CALCULATED PERCENT HR: 94 %
STRESS ECHO LAST STRESS BP: NORMAL
STRESS ECHO LAST STRESS HR: 146
STRESS ECHO POST ESTIMATED WORKLOAD: 10.3
STRESS ECHO POST EXERCISE DUR MIN: 9
STRESS ECHO POST EXERCISE DUR SEC: 0
STRESS ECHO TARGET HR: 136

## 2018-11-15 ENCOUNTER — OFFICE VISIT - HEALTHEAST (OUTPATIENT)
Dept: BEHAVIORAL HEALTH | Facility: HOSPITAL | Age: 58
End: 2018-11-15

## 2018-11-15 DIAGNOSIS — F41.1 ANXIETY STATE: ICD-10-CM

## 2019-01-01 ENCOUNTER — COMMUNICATION - HEALTHEAST (OUTPATIENT)
Dept: FAMILY MEDICINE | Facility: CLINIC | Age: 59
End: 2019-01-01

## 2019-01-01 DIAGNOSIS — I10 HTN (HYPERTENSION): ICD-10-CM

## 2019-01-04 ENCOUNTER — OFFICE VISIT - HEALTHEAST (OUTPATIENT)
Dept: RHEUMATOLOGY | Facility: CLINIC | Age: 59
End: 2019-01-04

## 2019-01-04 DIAGNOSIS — M1A.0791 IDIOPATHIC CHRONIC GOUT OF FOOT WITH TOPHUS, UNSPECIFIED LATERALITY: ICD-10-CM

## 2019-01-04 DIAGNOSIS — I10 ESSENTIAL HYPERTENSION: ICD-10-CM

## 2019-01-04 ASSESSMENT — MIFFLIN-ST. JEOR: SCORE: 1948.85

## 2019-04-02 ENCOUNTER — COMMUNICATION - HEALTHEAST (OUTPATIENT)
Dept: FAMILY MEDICINE | Facility: CLINIC | Age: 59
End: 2019-04-02

## 2019-04-02 DIAGNOSIS — I10 HTN (HYPERTENSION): ICD-10-CM

## 2019-05-06 ENCOUNTER — COMMUNICATION - HEALTHEAST (OUTPATIENT)
Dept: FAMILY MEDICINE | Facility: CLINIC | Age: 59
End: 2019-05-06

## 2019-06-28 ENCOUNTER — COMMUNICATION - HEALTHEAST (OUTPATIENT)
Dept: LAB | Facility: CLINIC | Age: 59
End: 2019-06-28

## 2019-07-02 ENCOUNTER — COMMUNICATION - HEALTHEAST (OUTPATIENT)
Dept: LAB | Facility: CLINIC | Age: 59
End: 2019-07-02

## 2019-07-02 DIAGNOSIS — M10.9 ACUTE GOUTY ARTHRITIS: ICD-10-CM

## 2019-07-02 DIAGNOSIS — M1A.00X1: ICD-10-CM

## 2019-07-02 DIAGNOSIS — M1A.00X0 IDIOPATHIC CHRONIC GOUT WITHOUT TOPHUS, UNSPECIFIED SITE: ICD-10-CM

## 2019-07-09 ENCOUNTER — AMBULATORY - HEALTHEAST (OUTPATIENT)
Dept: LAB | Facility: CLINIC | Age: 59
End: 2019-07-09

## 2019-07-09 DIAGNOSIS — M1A.00X1: ICD-10-CM

## 2019-07-09 LAB
ALBUMIN SERPL-MCNC: 4.2 G/DL (ref 3.5–5)
ALT SERPL W P-5'-P-CCNC: 44 U/L (ref 0–45)
CREAT SERPL-MCNC: 0.97 MG/DL (ref 0.7–1.3)
ERYTHROCYTE [DISTWIDTH] IN BLOOD BY AUTOMATED COUNT: 11.3 % (ref 11–14.5)
GFR SERPL CREATININE-BSD FRML MDRD: >60 ML/MIN/1.73M2
HCT VFR BLD AUTO: 44.6 % (ref 40–54)
HGB BLD-MCNC: 15.3 G/DL (ref 14–18)
MCH RBC QN AUTO: 34 PG (ref 27–34)
MCHC RBC AUTO-ENTMCNC: 34.4 G/DL (ref 32–36)
MCV RBC AUTO: 99 FL (ref 80–100)
PLATELET # BLD AUTO: 179 THOU/UL (ref 140–440)
PMV BLD AUTO: 7.3 FL (ref 7–10)
RBC # BLD AUTO: 4.5 MILL/UL (ref 4.4–6.2)
URATE SERPL-MCNC: 5 MG/DL (ref 3–8)
WBC: 5.3 THOU/UL (ref 4–11)

## 2019-09-28 ENCOUNTER — COMMUNICATION - HEALTHEAST (OUTPATIENT)
Dept: FAMILY MEDICINE | Facility: CLINIC | Age: 59
End: 2019-09-28

## 2019-09-28 DIAGNOSIS — I10 HTN (HYPERTENSION): ICD-10-CM

## 2019-11-08 ENCOUNTER — COMMUNICATION - HEALTHEAST (OUTPATIENT)
Dept: FAMILY MEDICINE | Facility: CLINIC | Age: 59
End: 2019-11-08

## 2019-11-08 ENCOUNTER — OFFICE VISIT - HEALTHEAST (OUTPATIENT)
Dept: FAMILY MEDICINE | Facility: CLINIC | Age: 59
End: 2019-11-08

## 2019-11-08 DIAGNOSIS — I10 ESSENTIAL HYPERTENSION: ICD-10-CM

## 2019-11-08 DIAGNOSIS — Z12.5 SCREENING PSA (PROSTATE SPECIFIC ANTIGEN): ICD-10-CM

## 2019-11-08 DIAGNOSIS — Z12.11 SPECIAL SCREENING FOR MALIGNANT NEOPLASMS, COLON: ICD-10-CM

## 2019-11-08 DIAGNOSIS — Z86.73 HISTORY OF TIA (TRANSIENT ISCHEMIC ATTACK) AND STROKE: ICD-10-CM

## 2019-11-08 DIAGNOSIS — Z23 FLU VACCINE NEED: ICD-10-CM

## 2019-11-08 DIAGNOSIS — E03.9 ACQUIRED HYPOTHYROIDISM: ICD-10-CM

## 2019-11-08 DIAGNOSIS — E11.9 TYPE 2 DIABETES MELLITUS WITHOUT COMPLICATION, WITHOUT LONG-TERM CURRENT USE OF INSULIN (H): ICD-10-CM

## 2019-11-08 DIAGNOSIS — E78.5 HYPERLIPIDEMIA, UNSPECIFIED HYPERLIPIDEMIA TYPE: ICD-10-CM

## 2019-11-08 DIAGNOSIS — R35.89 POLYURIA: ICD-10-CM

## 2019-11-08 DIAGNOSIS — R63.1 POLYDIPSIA: ICD-10-CM

## 2019-11-08 DIAGNOSIS — M1A.0791 IDIOPATHIC CHRONIC GOUT OF FOOT WITH TOPHUS, UNSPECIFIED LATERALITY: ICD-10-CM

## 2019-11-08 DIAGNOSIS — Z00.00 HEALTH CARE MAINTENANCE: ICD-10-CM

## 2019-11-08 LAB
ALBUMIN SERPL-MCNC: 4.3 G/DL (ref 3.5–5)
ALP SERPL-CCNC: 117 U/L (ref 45–120)
ALT SERPL W P-5'-P-CCNC: 29 U/L (ref 0–45)
ANION GAP SERPL CALCULATED.3IONS-SCNC: 13 MMOL/L (ref 5–18)
AST SERPL W P-5'-P-CCNC: 20 U/L (ref 0–40)
BILIRUB SERPL-MCNC: 1 MG/DL (ref 0–1)
BUN SERPL-MCNC: 15 MG/DL (ref 8–22)
CALCIUM SERPL-MCNC: 9.6 MG/DL (ref 8.5–10.5)
CHLORIDE BLD-SCNC: 98 MMOL/L (ref 98–107)
CHOLEST SERPL-MCNC: 183 MG/DL
CO2 SERPL-SCNC: 27 MMOL/L (ref 22–31)
CREAT SERPL-MCNC: 1.22 MG/DL (ref 0.7–1.3)
FASTING STATUS PATIENT QL REPORTED: YES
GFR SERPL CREATININE-BSD FRML MDRD: 60 ML/MIN/1.73M2
GLUCOSE BLD-MCNC: 345 MG/DL (ref 70–125)
HBA1C MFR BLD: 11.7 % (ref 3.5–6)
HDLC SERPL-MCNC: 24 MG/DL
LDLC SERPL CALC-MCNC: 36 MG/DL
LDLC SERPL CALC-MCNC: ABNORMAL MG/DL
POTASSIUM BLD-SCNC: 3.4 MMOL/L (ref 3.5–5)
PROT SERPL-MCNC: 7.3 G/DL (ref 6–8)
PSA SERPL-MCNC: 2.1 NG/ML (ref 0–4.5)
SODIUM SERPL-SCNC: 138 MMOL/L (ref 136–145)
T4 FREE SERPL-MCNC: 1 NG/DL (ref 0.7–1.8)
TRIGL SERPL-MCNC: 842 MG/DL
TSH SERPL DL<=0.005 MIU/L-ACNC: 8.19 UIU/ML (ref 0.3–5)
URATE SERPL-MCNC: 4 MG/DL (ref 3–8)

## 2019-11-08 ASSESSMENT — MIFFLIN-ST. JEOR: SCORE: 1920.73

## 2019-11-11 ENCOUNTER — AMBULATORY - HEALTHEAST (OUTPATIENT)
Dept: NURSING | Facility: CLINIC | Age: 59
End: 2019-11-11

## 2019-11-11 DIAGNOSIS — I10 ESSENTIAL HYPERTENSION: ICD-10-CM

## 2019-11-11 LAB
25(OH)D3 SERPL-MCNC: 10 NG/ML (ref 30–80)
25(OH)D3 SERPL-MCNC: 10 NG/ML (ref 30–80)

## 2019-11-12 ENCOUNTER — COMMUNICATION - HEALTHEAST (OUTPATIENT)
Dept: FAMILY MEDICINE | Facility: CLINIC | Age: 59
End: 2019-11-12

## 2019-11-18 ENCOUNTER — AMBULATORY - HEALTHEAST (OUTPATIENT)
Dept: EDUCATION SERVICES | Facility: CLINIC | Age: 59
End: 2019-11-18

## 2019-11-18 DIAGNOSIS — E11.9 TYPE 2 DIABETES MELLITUS WITHOUT COMPLICATION, WITHOUT LONG-TERM CURRENT USE OF INSULIN (H): ICD-10-CM

## 2019-11-22 ENCOUNTER — OFFICE VISIT - HEALTHEAST (OUTPATIENT)
Dept: FAMILY MEDICINE | Facility: CLINIC | Age: 59
End: 2019-11-22

## 2019-11-22 DIAGNOSIS — E56.9 VITAMIN DEFICIENCY: ICD-10-CM

## 2019-11-22 DIAGNOSIS — E11.9 TYPE 2 DIABETES MELLITUS WITHOUT COMPLICATION, WITHOUT LONG-TERM CURRENT USE OF INSULIN (H): ICD-10-CM

## 2019-11-22 DIAGNOSIS — E03.9 ACQUIRED HYPOTHYROIDISM: ICD-10-CM

## 2019-11-22 DIAGNOSIS — I10 ESSENTIAL HYPERTENSION: ICD-10-CM

## 2019-11-22 DIAGNOSIS — E87.5 HYPERKALEMIA: ICD-10-CM

## 2019-11-22 LAB
ANION GAP SERPL CALCULATED.3IONS-SCNC: 7 MMOL/L (ref 5–18)
BUN SERPL-MCNC: 17 MG/DL (ref 8–22)
CALCIUM SERPL-MCNC: 9.6 MG/DL (ref 8.5–10.5)
CHLORIDE BLD-SCNC: 104 MMOL/L (ref 98–107)
CO2 SERPL-SCNC: 29 MMOL/L (ref 22–31)
CREAT SERPL-MCNC: 1.08 MG/DL (ref 0.7–1.3)
GFR SERPL CREATININE-BSD FRML MDRD: >60 ML/MIN/1.73M2
GLUCOSE BLD-MCNC: 188 MG/DL (ref 70–125)
POTASSIUM BLD-SCNC: 4.1 MMOL/L (ref 3.5–5)
SODIUM SERPL-SCNC: 140 MMOL/L (ref 136–145)

## 2019-11-24 ENCOUNTER — COMMUNICATION - HEALTHEAST (OUTPATIENT)
Dept: FAMILY MEDICINE | Facility: CLINIC | Age: 59
End: 2019-11-24

## 2019-11-29 ENCOUNTER — COMMUNICATION - HEALTHEAST (OUTPATIENT)
Dept: FAMILY MEDICINE | Facility: CLINIC | Age: 59
End: 2019-11-29

## 2019-11-29 ENCOUNTER — AMBULATORY - HEALTHEAST (OUTPATIENT)
Dept: LAB | Facility: CLINIC | Age: 59
End: 2019-11-29

## 2019-11-29 ENCOUNTER — AMBULATORY - HEALTHEAST (OUTPATIENT)
Dept: FAMILY MEDICINE | Facility: CLINIC | Age: 59
End: 2019-11-29

## 2019-11-29 ENCOUNTER — AMBULATORY - HEALTHEAST (OUTPATIENT)
Dept: NURSING | Facility: CLINIC | Age: 59
End: 2019-11-29

## 2019-11-29 DIAGNOSIS — E11.9 TYPE 2 DIABETES MELLITUS WITHOUT COMPLICATION, WITHOUT LONG-TERM CURRENT USE OF INSULIN (H): ICD-10-CM

## 2019-11-29 DIAGNOSIS — I10 ESSENTIAL HYPERTENSION: ICD-10-CM

## 2019-11-29 LAB
CREAT UR-MCNC: 195.5 MG/DL
MICROALBUMIN UR-MCNC: 1.67 MG/DL (ref 0–1.99)
MICROALBUMIN/CREAT UR: 8.5 MG/G

## 2019-12-03 ENCOUNTER — RECORDS - HEALTHEAST (OUTPATIENT)
Dept: ADMINISTRATIVE | Facility: OTHER | Age: 59
End: 2019-12-03

## 2019-12-03 LAB — RETINOPATHY: NEGATIVE

## 2019-12-11 ENCOUNTER — RECORDS - HEALTHEAST (OUTPATIENT)
Dept: HEALTH INFORMATION MANAGEMENT | Facility: CLINIC | Age: 59
End: 2019-12-11

## 2019-12-13 ENCOUNTER — OFFICE VISIT - HEALTHEAST (OUTPATIENT)
Dept: FAMILY MEDICINE | Facility: CLINIC | Age: 59
End: 2019-12-13

## 2019-12-13 DIAGNOSIS — E11.9 TYPE 2 DIABETES MELLITUS WITHOUT COMPLICATION, WITHOUT LONG-TERM CURRENT USE OF INSULIN (H): ICD-10-CM

## 2019-12-13 DIAGNOSIS — E78.5 HYPERLIPIDEMIA, UNSPECIFIED HYPERLIPIDEMIA TYPE: ICD-10-CM

## 2019-12-13 DIAGNOSIS — E03.9 ACQUIRED HYPOTHYROIDISM: ICD-10-CM

## 2019-12-16 ENCOUNTER — AMBULATORY - HEALTHEAST (OUTPATIENT)
Dept: EDUCATION SERVICES | Facility: CLINIC | Age: 59
End: 2019-12-16

## 2019-12-16 DIAGNOSIS — E11.9 DIABETES MELLITUS TYPE 2, CONTROLLED (H): ICD-10-CM

## 2019-12-30 ENCOUNTER — COMMUNICATION - HEALTHEAST (OUTPATIENT)
Dept: FAMILY MEDICINE | Facility: CLINIC | Age: 59
End: 2019-12-30

## 2019-12-30 DIAGNOSIS — E11.9 DIABETES MELLITUS TYPE 2, CONTROLLED (H): ICD-10-CM

## 2020-01-03 ENCOUNTER — AMBULATORY - HEALTHEAST (OUTPATIENT)
Dept: LAB | Facility: CLINIC | Age: 60
End: 2020-01-03

## 2020-01-03 DIAGNOSIS — E11.9 TYPE 2 DIABETES MELLITUS WITHOUT COMPLICATION, WITHOUT LONG-TERM CURRENT USE OF INSULIN (H): ICD-10-CM

## 2020-01-03 DIAGNOSIS — E78.5 HYPERLIPIDEMIA, UNSPECIFIED HYPERLIPIDEMIA TYPE: ICD-10-CM

## 2020-01-03 DIAGNOSIS — E03.9 ACQUIRED HYPOTHYROIDISM: ICD-10-CM

## 2020-01-03 LAB
ALBUMIN SERPL-MCNC: 4.2 G/DL (ref 3.5–5)
ALP SERPL-CCNC: 79 U/L (ref 45–120)
ALT SERPL W P-5'-P-CCNC: 27 U/L (ref 0–45)
ANION GAP SERPL CALCULATED.3IONS-SCNC: 8 MMOL/L (ref 5–18)
AST SERPL W P-5'-P-CCNC: 20 U/L (ref 0–40)
BILIRUB SERPL-MCNC: 0.5 MG/DL (ref 0–1)
BUN SERPL-MCNC: 16 MG/DL (ref 8–22)
CALCIUM SERPL-MCNC: 9.7 MG/DL (ref 8.5–10.5)
CHLORIDE BLD-SCNC: 106 MMOL/L (ref 98–107)
CHOLEST SERPL-MCNC: 127 MG/DL
CO2 SERPL-SCNC: 28 MMOL/L (ref 22–31)
CREAT SERPL-MCNC: 0.94 MG/DL (ref 0.7–1.3)
FASTING STATUS PATIENT QL REPORTED: ABNORMAL
GFR SERPL CREATININE-BSD FRML MDRD: >60 ML/MIN/1.73M2
GLUCOSE BLD-MCNC: 110 MG/DL (ref 70–125)
HBA1C MFR BLD: 6.9 % (ref 3.5–6)
HDLC SERPL-MCNC: 25 MG/DL
LDLC SERPL CALC-MCNC: ABNORMAL MG/DL
POTASSIUM BLD-SCNC: 4.1 MMOL/L (ref 3.5–5)
PROT SERPL-MCNC: 6.9 G/DL (ref 6–8)
SODIUM SERPL-SCNC: 142 MMOL/L (ref 136–145)
TRIGL SERPL-MCNC: 427 MG/DL
TSH SERPL DL<=0.005 MIU/L-ACNC: 2.45 UIU/ML (ref 0.3–5)

## 2020-01-04 ENCOUNTER — COMMUNICATION - HEALTHEAST (OUTPATIENT)
Dept: FAMILY MEDICINE | Facility: CLINIC | Age: 60
End: 2020-01-04

## 2020-01-06 ENCOUNTER — OFFICE VISIT - HEALTHEAST (OUTPATIENT)
Dept: RHEUMATOLOGY | Facility: CLINIC | Age: 60
End: 2020-01-06

## 2020-01-06 ENCOUNTER — COMMUNICATION - HEALTHEAST (OUTPATIENT)
Dept: FAMILY MEDICINE | Facility: CLINIC | Age: 60
End: 2020-01-06

## 2020-01-06 ENCOUNTER — COMMUNICATION - HEALTHEAST (OUTPATIENT)
Dept: RHEUMATOLOGY | Facility: CLINIC | Age: 60
End: 2020-01-06

## 2020-01-06 DIAGNOSIS — E11.9 DIABETES MELLITUS TYPE 2, CONTROLLED (H): ICD-10-CM

## 2020-01-06 DIAGNOSIS — G89.29 CHRONIC RIGHT SHOULDER PAIN: ICD-10-CM

## 2020-01-06 DIAGNOSIS — M1A.0791 IDIOPATHIC CHRONIC GOUT OF FOOT WITH TOPHUS, UNSPECIFIED LATERALITY: ICD-10-CM

## 2020-01-06 DIAGNOSIS — M25.511 CHRONIC RIGHT SHOULDER PAIN: ICD-10-CM

## 2020-01-06 DIAGNOSIS — Z79.899 HIGH RISK MEDICATION USE: ICD-10-CM

## 2020-01-06 LAB
ERYTHROCYTE [DISTWIDTH] IN BLOOD BY AUTOMATED COUNT: 11 % (ref 11–14.5)
HCT VFR BLD AUTO: 45.6 % (ref 40–54)
HGB BLD-MCNC: 15.6 G/DL (ref 14–18)
MCH RBC QN AUTO: 34.1 PG (ref 27–34)
MCHC RBC AUTO-ENTMCNC: 34.1 G/DL (ref 32–36)
MCV RBC AUTO: 100 FL (ref 80–100)
PLATELET # BLD AUTO: 169 THOU/UL (ref 140–440)
PMV BLD AUTO: 7.4 FL (ref 7–10)
RBC # BLD AUTO: 4.56 MILL/UL (ref 4.4–6.2)
WBC: 6.5 THOU/UL (ref 4–11)

## 2020-01-06 ASSESSMENT — MIFFLIN-ST. JEOR: SCORE: 1906.21

## 2020-01-07 ENCOUNTER — AMBULATORY - HEALTHEAST (OUTPATIENT)
Dept: RHEUMATOLOGY | Facility: CLINIC | Age: 60
End: 2020-01-07

## 2020-01-07 DIAGNOSIS — M1A.9XX0 CHRONIC GOUT: ICD-10-CM

## 2020-01-15 ENCOUNTER — COMMUNICATION - HEALTHEAST (OUTPATIENT)
Dept: FAMILY MEDICINE | Facility: CLINIC | Age: 60
End: 2020-01-15

## 2020-01-15 DIAGNOSIS — I10 ESSENTIAL HYPERTENSION: ICD-10-CM

## 2020-01-22 ENCOUNTER — RECORDS - HEALTHEAST (OUTPATIENT)
Dept: ADMINISTRATIVE | Facility: OTHER | Age: 60
End: 2020-01-22

## 2020-01-23 ENCOUNTER — RECORDS - HEALTHEAST (OUTPATIENT)
Dept: ADMINISTRATIVE | Facility: OTHER | Age: 60
End: 2020-01-23

## 2020-02-15 ENCOUNTER — COMMUNICATION - HEALTHEAST (OUTPATIENT)
Dept: FAMILY MEDICINE | Facility: CLINIC | Age: 60
End: 2020-02-15

## 2020-02-15 DIAGNOSIS — E11.9 DIABETES MELLITUS TYPE 2, CONTROLLED (H): ICD-10-CM

## 2020-06-27 ENCOUNTER — COMMUNICATION - HEALTHEAST (OUTPATIENT)
Dept: FAMILY MEDICINE | Facility: CLINIC | Age: 60
End: 2020-06-27

## 2020-06-27 DIAGNOSIS — I10 HTN (HYPERTENSION): ICD-10-CM

## 2020-07-02 ENCOUNTER — COMMUNICATION - HEALTHEAST (OUTPATIENT)
Dept: FAMILY MEDICINE | Facility: CLINIC | Age: 60
End: 2020-07-02

## 2020-07-02 ENCOUNTER — AMBULATORY - HEALTHEAST (OUTPATIENT)
Dept: FAMILY MEDICINE | Facility: CLINIC | Age: 60
End: 2020-07-02

## 2020-07-02 DIAGNOSIS — M1A.0791 IDIOPATHIC CHRONIC GOUT OF FOOT WITH TOPHUS, UNSPECIFIED LATERALITY: ICD-10-CM

## 2020-07-02 DIAGNOSIS — E11.9 TYPE 2 DIABETES MELLITUS WITHOUT COMPLICATION, WITHOUT LONG-TERM CURRENT USE OF INSULIN (H): ICD-10-CM

## 2020-07-05 ENCOUNTER — COMMUNICATION - HEALTHEAST (OUTPATIENT)
Dept: RHEUMATOLOGY | Facility: CLINIC | Age: 60
End: 2020-07-05

## 2020-07-05 DIAGNOSIS — M1A.0791 IDIOPATHIC CHRONIC GOUT OF FOOT WITH TOPHUS, UNSPECIFIED LATERALITY: ICD-10-CM

## 2020-07-07 ENCOUNTER — AMBULATORY - HEALTHEAST (OUTPATIENT)
Dept: LAB | Facility: CLINIC | Age: 60
End: 2020-07-07

## 2020-07-07 DIAGNOSIS — E11.9 TYPE 2 DIABETES MELLITUS WITHOUT COMPLICATION, WITHOUT LONG-TERM CURRENT USE OF INSULIN (H): ICD-10-CM

## 2020-07-08 ENCOUNTER — COMMUNICATION - HEALTHEAST (OUTPATIENT)
Dept: FAMILY MEDICINE | Facility: CLINIC | Age: 60
End: 2020-07-08

## 2020-07-08 LAB — HBA1C MFR BLD: 5.7 %

## 2020-07-22 ENCOUNTER — AMBULATORY - HEALTHEAST (OUTPATIENT)
Dept: LAB | Facility: CLINIC | Age: 60
End: 2020-07-22

## 2020-07-22 DIAGNOSIS — M1A.9XX0 CHRONIC GOUT: ICD-10-CM

## 2020-07-22 LAB
ALBUMIN SERPL-MCNC: 4.2 G/DL (ref 3.5–5)
ALT SERPL W P-5'-P-CCNC: 31 U/L (ref 0–45)
CREAT SERPL-MCNC: 0.85 MG/DL (ref 0.7–1.3)
ERYTHROCYTE [DISTWIDTH] IN BLOOD BY AUTOMATED COUNT: 11.3 % (ref 11–14.5)
GFR SERPL CREATININE-BSD FRML MDRD: >60 ML/MIN/1.73M2
HCT VFR BLD AUTO: 42.3 % (ref 40–54)
HGB BLD-MCNC: 14.4 G/DL (ref 14–18)
MCH RBC QN AUTO: 33.3 PG (ref 27–34)
MCHC RBC AUTO-ENTMCNC: 34.1 G/DL (ref 32–36)
MCV RBC AUTO: 98 FL (ref 80–100)
PLATELET # BLD AUTO: 186 THOU/UL (ref 140–440)
PMV BLD AUTO: 7.6 FL (ref 7–10)
RBC # BLD AUTO: 4.33 MILL/UL (ref 4.4–6.2)
URATE SERPL-MCNC: 4.5 MG/DL (ref 3–8)
WBC: 4.9 THOU/UL (ref 4–11)

## 2020-09-11 ENCOUNTER — COMMUNICATION - HEALTHEAST (OUTPATIENT)
Dept: FAMILY MEDICINE | Facility: CLINIC | Age: 60
End: 2020-09-11

## 2020-09-11 DIAGNOSIS — E11.9 TYPE 2 DIABETES MELLITUS WITHOUT COMPLICATION, WITHOUT LONG-TERM CURRENT USE OF INSULIN (H): ICD-10-CM

## 2020-10-06 ENCOUNTER — COMMUNICATION - HEALTHEAST (OUTPATIENT)
Dept: FAMILY MEDICINE | Facility: CLINIC | Age: 60
End: 2020-10-06

## 2020-10-07 ENCOUNTER — OFFICE VISIT - HEALTHEAST (OUTPATIENT)
Dept: FAMILY MEDICINE | Facility: CLINIC | Age: 60
End: 2020-10-07

## 2020-10-07 DIAGNOSIS — I65.22 STENOSIS OF LEFT INTERNAL CAROTID ARTERY: ICD-10-CM

## 2020-10-07 DIAGNOSIS — E11.9 TYPE 2 DIABETES MELLITUS WITHOUT COMPLICATION, WITHOUT LONG-TERM CURRENT USE OF INSULIN (H): ICD-10-CM

## 2020-10-07 DIAGNOSIS — I10 SEVERE HYPERTENSION: ICD-10-CM

## 2020-10-07 DIAGNOSIS — I10 ESSENTIAL HYPERTENSION: ICD-10-CM

## 2020-10-07 DIAGNOSIS — M79.629 PAIN IN AXILLA, UNSPECIFIED LATERALITY: ICD-10-CM

## 2020-10-13 ENCOUNTER — COMMUNICATION - HEALTHEAST (OUTPATIENT)
Dept: FAMILY MEDICINE | Facility: CLINIC | Age: 60
End: 2020-10-13

## 2020-10-15 ENCOUNTER — COMMUNICATION - HEALTHEAST (OUTPATIENT)
Dept: FAMILY MEDICINE | Facility: CLINIC | Age: 60
End: 2020-10-15

## 2020-10-16 ENCOUNTER — AMBULATORY - HEALTHEAST (OUTPATIENT)
Dept: CARE COORDINATION | Facility: CLINIC | Age: 60
End: 2020-10-16

## 2020-10-16 ENCOUNTER — COMMUNICATION - HEALTHEAST (OUTPATIENT)
Dept: FAMILY MEDICINE | Facility: CLINIC | Age: 60
End: 2020-10-16

## 2020-10-16 ENCOUNTER — COMMUNICATION - HEALTHEAST (OUTPATIENT)
Dept: SCHEDULING | Facility: CLINIC | Age: 60
End: 2020-10-16

## 2020-10-16 DIAGNOSIS — U07.1 2019 NOVEL CORONAVIRUS DISEASE (COVID-19): ICD-10-CM

## 2020-10-19 ENCOUNTER — COMMUNICATION - HEALTHEAST (OUTPATIENT)
Dept: FAMILY MEDICINE | Facility: CLINIC | Age: 60
End: 2020-10-19

## 2020-10-19 ENCOUNTER — COMMUNICATION - HEALTHEAST (OUTPATIENT)
Dept: NURSING | Facility: CLINIC | Age: 60
End: 2020-10-19

## 2020-10-20 ENCOUNTER — COMMUNICATION - HEALTHEAST (OUTPATIENT)
Dept: SCHEDULING | Facility: CLINIC | Age: 60
End: 2020-10-20

## 2020-10-21 ENCOUNTER — OFFICE VISIT - HEALTHEAST (OUTPATIENT)
Dept: FAMILY MEDICINE | Facility: CLINIC | Age: 60
End: 2020-10-21

## 2020-10-21 DIAGNOSIS — I10 ESSENTIAL HYPERTENSION: ICD-10-CM

## 2020-10-21 DIAGNOSIS — I63.9 ACUTE CVA (CEREBROVASCULAR ACCIDENT) (H): ICD-10-CM

## 2020-10-21 DIAGNOSIS — U07.1 2019 NOVEL CORONAVIRUS DISEASE (COVID-19): ICD-10-CM

## 2020-10-26 ENCOUNTER — COMMUNICATION - HEALTHEAST (OUTPATIENT)
Dept: FAMILY MEDICINE | Facility: CLINIC | Age: 60
End: 2020-10-26

## 2020-11-02 ENCOUNTER — OFFICE VISIT - HEALTHEAST (OUTPATIENT)
Dept: FAMILY MEDICINE | Facility: CLINIC | Age: 60
End: 2020-11-02

## 2020-11-02 ENCOUNTER — AMBULATORY - HEALTHEAST (OUTPATIENT)
Dept: LAB | Facility: CLINIC | Age: 60
End: 2020-11-02

## 2020-11-02 DIAGNOSIS — I65.22 STENOSIS OF LEFT INTERNAL CAROTID ARTERY: ICD-10-CM

## 2020-11-02 DIAGNOSIS — E78.5 HYPERLIPIDEMIA, UNSPECIFIED HYPERLIPIDEMIA TYPE: ICD-10-CM

## 2020-11-02 DIAGNOSIS — I10 ESSENTIAL HYPERTENSION: ICD-10-CM

## 2020-11-02 DIAGNOSIS — I63.9 ACUTE CVA (CEREBROVASCULAR ACCIDENT) (H): ICD-10-CM

## 2020-11-02 DIAGNOSIS — E03.9 ACQUIRED HYPOTHYROIDISM: ICD-10-CM

## 2020-11-02 DIAGNOSIS — E11.9 TYPE 2 DIABETES MELLITUS WITHOUT COMPLICATION, WITHOUT LONG-TERM CURRENT USE OF INSULIN (H): ICD-10-CM

## 2020-11-02 DIAGNOSIS — Z12.5 SCREENING PSA (PROSTATE SPECIFIC ANTIGEN): ICD-10-CM

## 2020-11-02 LAB
CREAT UR-MCNC: 170.3 MG/DL
MICROALBUMIN UR-MCNC: 1.51 MG/DL (ref 0–1.99)
MICROALBUMIN/CREAT UR: 8.9 MG/G
PSA SERPL-MCNC: 2 NG/ML (ref 0–4.5)
TSH SERPL DL<=0.005 MIU/L-ACNC: 3.91 UIU/ML (ref 0.3–5)

## 2020-11-02 ASSESSMENT — MIFFLIN-ST. JEOR: SCORE: 1862.67

## 2020-11-03 ENCOUNTER — OFFICE VISIT - HEALTHEAST (OUTPATIENT)
Dept: PHYSICAL THERAPY | Facility: REHABILITATION | Age: 60
End: 2020-11-03

## 2020-11-03 ENCOUNTER — COMMUNICATION - HEALTHEAST (OUTPATIENT)
Dept: FAMILY MEDICINE | Facility: CLINIC | Age: 60
End: 2020-11-03

## 2020-11-03 DIAGNOSIS — R53.1 RIGHT SIDED WEAKNESS: ICD-10-CM

## 2020-11-03 DIAGNOSIS — R26.81 GAIT INSTABILITY: ICD-10-CM

## 2020-11-05 ENCOUNTER — COMMUNICATION - HEALTHEAST (OUTPATIENT)
Dept: PHYSICAL THERAPY | Facility: REHABILITATION | Age: 60
End: 2020-11-05

## 2020-11-09 ENCOUNTER — OFFICE VISIT - HEALTHEAST (OUTPATIENT)
Dept: SPEECH THERAPY | Facility: REHABILITATION | Age: 60
End: 2020-11-09

## 2020-11-09 DIAGNOSIS — I69.322 DYSARTHRIA, POST-STROKE: ICD-10-CM

## 2020-11-09 DIAGNOSIS — I69.320 APHASIA, POST-STROKE: ICD-10-CM

## 2020-11-12 PROBLEM — I65.22 STENOSIS OF LEFT INTERNAL CAROTID ARTERY: Status: ACTIVE | Noted: 2020-10-07

## 2020-11-12 PROBLEM — M54.12 CERVICAL RADICULOPATHY: Status: ACTIVE | Noted: 2018-01-23

## 2020-11-12 PROBLEM — R03.0 ELEVATED BLOOD PRESSURE READING WITHOUT DIAGNOSIS OF HYPERTENSION: Status: ACTIVE | Noted: 2020-11-12

## 2020-11-12 PROBLEM — E56.9 VITAMIN DEFICIENCY: Status: ACTIVE | Noted: 2019-11-22

## 2020-11-12 PROBLEM — R07.2 PRECORDIAL PAIN: Status: ACTIVE | Noted: 2018-11-06

## 2020-11-12 PROBLEM — M25.619: Status: ACTIVE | Noted: 2020-11-12

## 2020-11-12 PROBLEM — Z86.0100 HISTORY OF COLONIC POLYPS: Status: ACTIVE | Noted: 2020-01-28

## 2020-11-12 PROBLEM — N48.6 PEYRONIE'S DISEASE: Status: ACTIVE | Noted: 2020-11-12

## 2020-11-12 PROBLEM — Z79.899 HIGH RISK MEDICATION USE: Status: ACTIVE | Noted: 2017-02-28

## 2020-11-12 PROBLEM — M77.8 RIGHT SHOULDER TENDINITIS: Status: ACTIVE | Noted: 2017-08-29

## 2020-11-12 PROBLEM — M25.579 PAIN IN JOINT INVOLVING ANKLE AND FOOT: Status: ACTIVE | Noted: 2020-11-12

## 2020-11-12 PROBLEM — M25.549 ARTHRALGIA OF HAND: Status: ACTIVE | Noted: 2020-11-12

## 2020-11-12 PROBLEM — R53.1 RIGHT SIDED WEAKNESS: Status: ACTIVE | Noted: 2020-10-14

## 2020-11-12 PROBLEM — E78.5 HYPERLIPIDEMIA: Status: ACTIVE | Noted: 2018-10-17

## 2020-11-16 ENCOUNTER — VIRTUAL VISIT (OUTPATIENT)
Dept: NEUROLOGY | Facility: CLINIC | Age: 60
End: 2020-11-16
Payer: OTHER GOVERNMENT

## 2020-11-16 ENCOUNTER — RECORDS - HEALTHEAST (OUTPATIENT)
Dept: ADMINISTRATIVE | Facility: OTHER | Age: 60
End: 2020-11-16

## 2020-11-16 VITALS — WEIGHT: 227 LBS | BODY MASS INDEX: 30.75 KG/M2 | HEIGHT: 72 IN

## 2020-11-16 DIAGNOSIS — Z86.73 HISTORY OF COMPLETED STROKE: Primary | ICD-10-CM

## 2020-11-16 DIAGNOSIS — R53.1 RIGHT SIDED WEAKNESS: ICD-10-CM

## 2020-11-16 PROCEDURE — 99214 OFFICE O/P EST MOD 30 MIN: CPT | Mod: 95 | Performed by: PSYCHIATRY & NEUROLOGY

## 2020-11-16 RX ORDER — ROSUVASTATIN CALCIUM 10 MG/1
0.5 TABLET, COATED ORAL DAILY
COMMUNITY
Start: 2020-06-04 | End: 2021-11-03

## 2020-11-16 RX ORDER — CLOPIDOGREL BISULFATE 75 MG/1
75 TABLET ORAL DAILY
COMMUNITY
Start: 2020-10-21 | End: 2021-02-09

## 2020-11-16 RX ORDER — METFORMIN HCL 500 MG
500 TABLET, EXTENDED RELEASE 24 HR ORAL 2 TIMES DAILY WITH MEALS
COMMUNITY
Start: 2019-11-22 | End: 2022-09-21

## 2020-11-16 RX ORDER — LEVOTHYROXINE SODIUM 125 UG/1
125 TABLET ORAL
COMMUNITY

## 2020-11-16 RX ORDER — HYDROCHLOROTHIAZIDE 12.5 MG/1
1 TABLET ORAL DAILY
COMMUNITY
Start: 2020-06-29 | End: 2021-02-09

## 2020-11-16 RX ORDER — ALLOPURINOL 300 MG/1
450 TABLET ORAL
COMMUNITY
End: 2021-08-03

## 2020-11-16 RX ORDER — LISINOPRIL 20 MG/1
1 TABLET ORAL DAILY
COMMUNITY
Start: 2020-10-22 | End: 2022-03-01

## 2020-11-16 ASSESSMENT — MIFFLIN-ST. JEOR: SCORE: 1867.67

## 2020-11-16 NOTE — LETTER
11/16/2020         RE: Raghavendra Kiser  [address] [address]        Dear Colleague,    Thank you for referring your patient, Raghavendra Kiser, to the [facility] NEUROLOGY CLINIC [place]. Please see a copy of my visit note below.    [facility] Neurology  [place]    Raghavendra Kiser MRN# [id number]   Age: 60 year old YOB: 1960               Assessment and Plan:   Assessment:   Small vessel stroke causing right-sided weakness 1 month ago  Clinically improving with therapies        Plan:     As below, he will continue aspirin and Plavix daily for a full 90 days and then stop Plavix and continue aspirin 325 mg daily.  I encouraged him to continue with physical therapy and speech therapy.  I gave him the okay to start driving around his neighborhood, familiar areas to see how it goes.  He works in the [employer] for [employer], he can resume work when he feels confident in his voice.             Chief Complaint/HPI:     I saw Mauri for a video follow-up visit today.  I met him about 1 month ago at [facility] when he presented with right-sided weakness and discoordination.  MRI showed a small left corona radiata stroke.  He was already taking full aspirin daily.  In the hospital, carotids were fine, telemetry showed no atrial fibrillation, echocardiogram was unremarkable, LDL was less than 70.  He continues on the same cholesterol-lowering agent.  We switched him to aspirin 81 mg and Plavix 75 mg daily, he will continue that for a full 90 days and then switch back to aspirin 325 mg daily.  He asks about blood pressure control, driving and work.            Past Medical History:    has a past medical history of Diabetes (H), History of stroke without residual deficits, Hyperlipidemia, and Hypertension.          Past Surgical History:    has no past surgical history on file.          Social History:     Social History     Tobacco Use     Smoking  "status: Former Smoker     Packs/day: 1.50     Years: 20.00     Pack years: 30.00     Types: Cigarettes     Smokeless tobacco: Never Used     Tobacco comment: quit in 1993   Substance Use Topics     Alcohol use: Yes     Comment: socially              Family History:     Family History   Problem Relation Age of Onset     Stomach Cancer Mother      Lung Cancer Mother      Throat cancer Father      Lung Cancer Father                 Allergies:     Allergies   Allergen Reactions     Cats      Other reaction(s): ITCHING,WATERING EYES             Medications:     Current Outpatient Medications:      allopurinol (ZYLOPRIM) 300 MG tablet, Take 450 mg by mouth, Disp: , Rfl:      aspirin (ASA) 81 MG EC tablet, Take 81 mg by mouth daily, Disp: , Rfl:      Cholecalciferol 100 MCG (4000 UT) CAPS, Take 4,000 Units by mouth daily, Disp: , Rfl:      clopidogrel (PLAVIX) 75 MG tablet, Take 75 mg by mouth daily, Disp: , Rfl:      hydrochlorothiazide (HYDRODIURIL) 12.5 MG tablet, Take 1 tablet by mouth daily, Disp: , Rfl:      levothyroxine (SYNTHROID/LEVOTHROID) 125 MCG tablet, Take 125 mcg by mouth daily, Disp: , Rfl:      lisinopril (ZESTRIL) 20 MG tablet, Take 1 tablet by mouth daily, Disp: , Rfl:      metFORMIN (GLUCOPHAGE-XR) 500 MG 24 hr tablet, Take 2 tablets by mouth daily, Disp: , Rfl:      rosuvastatin (CRESTOR) 10 MG tablet, Take 0.5 tablets by mouth daily, Disp: , Rfl:            Review of Systems:   No difficulty breathing or swallowing            Physical Exam:   Awake and alert with no aphasia no dysarthria  Speech is clear and coherent  Cranial nerves are fine  I do not see any focal or lateralized weakness or coordination difficulties in the arms    Voice actually sounds quite good to me during our conversation today.    The patient has been notified of following:     \"This video visit will be conducted via a call between you and your physician/provider. We have found that certain health care needs can be provided " "without the need for an in-person physical exam.  This service lets us provide the care you need with a video conversation.  If a prescription is necessary we can send it directly to your pharmacy.  If lab work is needed we can place an order for that and you can then stop by our lab to have the test done at a later time.    Video visits are billed at different rates depending on your insurance coverage.  Please reach out to your insurance provider with any questions.    If during the course of the call the physician/provider feels a video visit is not appropriate, you will not be charged for this service.\"    Patient has given verbal consent for Video visit? Yes      Video-Visit Details    Type of service:  Video Visit    Video Start Time: 1:09 PM  Video End Time: 1:25 PM    Originating Location (pt. Location): Home  Distant Location (provider location):  [facility] NEUROLOGY [place]   Platform used for Video Visit: [employer]    Results from stroke work-up at [facility] reviewed today.      Mamadou Knutson MD            Again, thank you for allowing me to participate in the care of your patient.        Sincerely,        Mamadou Knutson MD    "

## 2020-11-16 NOTE — PROGRESS NOTES
[facility] Neurology  [place]    Raghavendra Kiser MRN# [id number]   Age: 60 year old YOB: 1960               Assessment and Plan:   Assessment:   Small vessel stroke causing right-sided weakness 1 month ago  Clinically improving with therapies        Plan:     As below, he will continue aspirin and Plavix daily for a full 90 days and then stop Plavix and continue aspirin 325 mg daily.  I encouraged him to continue with physical therapy and speech therapy.  I gave him the okay to start driving around his neighborhood, familiar areas to see how it goes.  He works in the [employer], he can resume work when he feels confident in his voice.             Chief Complaint/HPI:     I saw Mauri for a video follow-up visit today.  I met him about 1 month ago at [facility] when he presented with right-sided weakness and discoordination.  MRI showed a small left corona radiata stroke.  He was already taking full aspirin daily.  In the hospital, carotids were fine, telemetry showed no atrial fibrillation, echocardiogram was unremarkable, LDL was less than 70.  He continues on the same cholesterol-lowering agent.  We switched him to aspirin 81 mg and Plavix 75 mg daily, he will continue that for a full 90 days and then switch back to aspirin 325 mg daily.  He asks about blood pressure control, driving and work.            Past Medical History:    has a past medical history of Diabetes (H), History of stroke without residual deficits, Hyperlipidemia, and Hypertension.          Past Surgical History:    has no past surgical history on file.          Social History:     Social History     Tobacco Use     Smoking status: Former Smoker     Packs/day: 1.50     Years: 20.00     Pack years: 30.00     Types: Cigarettes     Smokeless tobacco: Never Used     Tobacco comment: quit in 1993   Substance Use Topics     Alcohol use: Yes     Comment: socially              Family History:     Family  "History   Problem Relation Age of Onset     Stomach Cancer Mother      Lung Cancer Mother      Throat cancer Father      Lung Cancer Father                 Allergies:     Allergies   Allergen Reactions     Cats      Other reaction(s): ITCHING,WATERING EYES             Medications:     Current Outpatient Medications:      allopurinol (ZYLOPRIM) 300 MG tablet, Take 450 mg by mouth, Disp: , Rfl:      aspirin (ASA) 81 MG EC tablet, Take 81 mg by mouth daily, Disp: , Rfl:      Cholecalciferol 100 MCG (4000 UT) CAPS, Take 4,000 Units by mouth daily, Disp: , Rfl:      clopidogrel (PLAVIX) 75 MG tablet, Take 75 mg by mouth daily, Disp: , Rfl:      hydrochlorothiazide (HYDRODIURIL) 12.5 MG tablet, Take 1 tablet by mouth daily, Disp: , Rfl:      levothyroxine (SYNTHROID/LEVOTHROID) 125 MCG tablet, Take 125 mcg by mouth daily, Disp: , Rfl:      lisinopril (ZESTRIL) 20 MG tablet, Take 1 tablet by mouth daily, Disp: , Rfl:      metFORMIN (GLUCOPHAGE-XR) 500 MG 24 hr tablet, Take 2 tablets by mouth daily, Disp: , Rfl:      rosuvastatin (CRESTOR) 10 MG tablet, Take 0.5 tablets by mouth daily, Disp: , Rfl:            Review of Systems:   No difficulty breathing or swallowing            Physical Exam:   Awake and alert with no aphasia no dysarthria  Speech is clear and coherent  Cranial nerves are fine  I do not see any focal or lateralized weakness or coordination difficulties in the arms    Voice actually sounds quite good to me during our conversation today.    The patient has been notified of following:     \"This video visit will be conducted via a call between you and your physician/provider. We have found that certain health care needs can be provided without the need for an in-person physical exam.  This service lets us provide the care you need with a video conversation.  If a prescription is necessary we can send it directly to your pharmacy.  If lab work is needed we can place an order for that and you can then stop by our " "lab to have the test done at a later time.    Video visits are billed at different rates depending on your insurance coverage.  Please reach out to your insurance provider with any questions.    If during the course of the call the physician/provider feels a video visit is not appropriate, you will not be charged for this service.\"    Patient has given verbal consent for Video visit? Yes      Video-Visit Details    Type of service:  Video Visit    Video Start Time: 1:09 PM  Video End Time: 1:25 PM    Originating Location (pt. Location): Home  Distant Location (provider location):  [facility] NEUROLOGY [place]   Platform used for Video Visit: VSporto    Results from stroke work-up at [facility] reviewed today.      Mamadou Knutson MD        "

## 2020-11-16 NOTE — PATIENT INSTRUCTIONS
"  Patient Education   Taking Aspirin Every Day  The basics  Taking aspirin every day can lower your risk of heart attack and stroke. Ask your doctor about taking aspirin if you:    Are a man age 45 or older    Are a woman age 55 or older    Smoke    Have high blood pressure, high cholesterol or diabetes    Have a family history of heart disease    Have already had a heart attack or stroke  For most people, aspirin is safe. But it's not right for everyone. Talk to your doctor before you start taking aspirin every day.  The benefits  Aspirin can reduce your risk of heart attack or stroke. It can:    Improve the flow of blood to the heart and brain    Help keep your arteries open if you have had a stroke or angioplasty  If you have already had a heart attack or stroke, daily aspirin can lower your risk of having another one.  Take action!  Your doctor can help you decide if aspirin is the right choice for you. Talk to your doctor about:    Your risk of heart attack    What kind of aspirin to take    How much to take    How often to take it  Be sure to tell your doctor about all the other medicines that you take, including vitamins.   For informational purposes only. Not to replace the advice of your health care provider. From \"Talk with Your Doctor about Taking Aspirin Every Day,\" by the U.S. Dept. of Health and Human Services (www.healthfinder.gov). [employer] 212223 - Rev 03/16.       "

## 2020-11-16 NOTE — NURSING NOTE
Chief Complaint   Patient presents with     Hospital F/U     10/14/2020 stroke and was seen at [facility]      Video visit smart phone [phone]  Zander Basilio CMA on 11/16/2020 at 1:01 PM

## 2020-11-18 ENCOUNTER — COMMUNICATION - HEALTHEAST (OUTPATIENT)
Dept: FAMILY MEDICINE | Facility: CLINIC | Age: 60
End: 2020-11-18

## 2020-11-19 ENCOUNTER — OFFICE VISIT - HEALTHEAST (OUTPATIENT)
Dept: PHYSICAL THERAPY | Facility: REHABILITATION | Age: 60
End: 2020-11-19

## 2020-11-19 DIAGNOSIS — R26.81 GAIT INSTABILITY: ICD-10-CM

## 2020-11-19 DIAGNOSIS — R53.1 RIGHT SIDED WEAKNESS: ICD-10-CM

## 2020-11-25 ENCOUNTER — OFFICE VISIT - HEALTHEAST (OUTPATIENT)
Dept: PHYSICAL THERAPY | Facility: REHABILITATION | Age: 60
End: 2020-11-25

## 2020-11-25 DIAGNOSIS — R26.81 GAIT INSTABILITY: ICD-10-CM

## 2020-11-25 DIAGNOSIS — R53.1 RIGHT SIDED WEAKNESS: ICD-10-CM

## 2020-12-01 ENCOUNTER — OFFICE VISIT - HEALTHEAST (OUTPATIENT)
Dept: PHYSICAL THERAPY | Facility: REHABILITATION | Age: 60
End: 2020-12-01

## 2020-12-01 DIAGNOSIS — R53.1 RIGHT SIDED WEAKNESS: ICD-10-CM

## 2020-12-01 DIAGNOSIS — R26.81 GAIT INSTABILITY: ICD-10-CM

## 2020-12-02 ENCOUNTER — OFFICE VISIT - HEALTHEAST (OUTPATIENT)
Dept: SPEECH THERAPY | Facility: REHABILITATION | Age: 60
End: 2020-12-02

## 2020-12-02 DIAGNOSIS — I69.320 APHASIA, POST-STROKE: ICD-10-CM

## 2020-12-02 DIAGNOSIS — I69.319 COGNITIVE DEFICIT DUE TO RECENT STROKE: ICD-10-CM

## 2020-12-03 ENCOUNTER — COMMUNICATION - HEALTHEAST (OUTPATIENT)
Dept: FAMILY MEDICINE | Facility: CLINIC | Age: 60
End: 2020-12-03

## 2020-12-07 ENCOUNTER — OFFICE VISIT - HEALTHEAST (OUTPATIENT)
Dept: SPEECH THERAPY | Facility: REHABILITATION | Age: 60
End: 2020-12-07

## 2020-12-07 DIAGNOSIS — I69.320 APHASIA, POST-STROKE: ICD-10-CM

## 2020-12-07 DIAGNOSIS — I69.319 COGNITIVE DEFICIT DUE TO RECENT STROKE: ICD-10-CM

## 2020-12-07 DIAGNOSIS — I69.322 DYSARTHRIA, POST-STROKE: ICD-10-CM

## 2020-12-11 ENCOUNTER — COMMUNICATION - HEALTHEAST (OUTPATIENT)
Dept: FAMILY MEDICINE | Facility: CLINIC | Age: 60
End: 2020-12-11

## 2020-12-11 DIAGNOSIS — E11.9 TYPE 2 DIABETES MELLITUS WITHOUT COMPLICATION, WITHOUT LONG-TERM CURRENT USE OF INSULIN (H): ICD-10-CM

## 2020-12-16 ENCOUNTER — OFFICE VISIT - HEALTHEAST (OUTPATIENT)
Dept: SPEECH THERAPY | Facility: REHABILITATION | Age: 60
End: 2020-12-16

## 2020-12-16 DIAGNOSIS — I69.319 COGNITIVE DEFICIT DUE TO RECENT STROKE: ICD-10-CM

## 2020-12-22 ENCOUNTER — COMMUNICATION - HEALTHEAST (OUTPATIENT)
Dept: FAMILY MEDICINE | Facility: CLINIC | Age: 60
End: 2020-12-22

## 2020-12-22 DIAGNOSIS — I10 ESSENTIAL HYPERTENSION: ICD-10-CM

## 2020-12-24 ENCOUNTER — OFFICE VISIT - HEALTHEAST (OUTPATIENT)
Dept: FAMILY MEDICINE | Facility: CLINIC | Age: 60
End: 2020-12-24

## 2020-12-24 DIAGNOSIS — I10 ESSENTIAL HYPERTENSION: ICD-10-CM

## 2020-12-24 DIAGNOSIS — E11.9 TYPE 2 DIABETES MELLITUS WITHOUT COMPLICATION, WITHOUT LONG-TERM CURRENT USE OF INSULIN (H): ICD-10-CM

## 2020-12-24 LAB
ANION GAP SERPL CALCULATED.3IONS-SCNC: 9 MMOL/L (ref 5–18)
BUN SERPL-MCNC: 15 MG/DL (ref 8–22)
CALCIUM SERPL-MCNC: 9.3 MG/DL (ref 8.5–10.5)
CHLORIDE BLD-SCNC: 107 MMOL/L (ref 98–107)
CO2 SERPL-SCNC: 25 MMOL/L (ref 22–31)
CREAT SERPL-MCNC: 0.83 MG/DL (ref 0.7–1.3)
GFR SERPL CREATININE-BSD FRML MDRD: >60 ML/MIN/1.73M2
GLUCOSE BLD-MCNC: 164 MG/DL (ref 70–125)
HBA1C MFR BLD: 5.6 %
POTASSIUM BLD-SCNC: 3.9 MMOL/L (ref 3.5–5)
SODIUM SERPL-SCNC: 141 MMOL/L (ref 136–145)

## 2020-12-29 ENCOUNTER — COMMUNICATION - HEALTHEAST (OUTPATIENT)
Dept: FAMILY MEDICINE | Facility: CLINIC | Age: 60
End: 2020-12-29

## 2021-01-01 ENCOUNTER — COMMUNICATION - HEALTHEAST (OUTPATIENT)
Dept: RHEUMATOLOGY | Facility: CLINIC | Age: 61
End: 2021-01-01

## 2021-01-02 ENCOUNTER — HOME CARE/HOSPICE - HEALTHEAST (OUTPATIENT)
Dept: HOME HEALTH SERVICES | Facility: HOME HEALTH | Age: 61
End: 2021-01-02

## 2021-01-04 ENCOUNTER — COMMUNICATION - HEALTHEAST (OUTPATIENT)
Dept: FAMILY MEDICINE | Facility: CLINIC | Age: 61
End: 2021-01-04

## 2021-01-04 ENCOUNTER — OFFICE VISIT - HEALTHEAST (OUTPATIENT)
Dept: RHEUMATOLOGY | Facility: CLINIC | Age: 61
End: 2021-01-04

## 2021-01-04 DIAGNOSIS — M1A.0791 IDIOPATHIC CHRONIC GOUT OF FOOT WITH TOPHUS, UNSPECIFIED LATERALITY: ICD-10-CM

## 2021-01-04 DIAGNOSIS — I10 ESSENTIAL HYPERTENSION: ICD-10-CM

## 2021-01-04 DIAGNOSIS — I63.9 ACUTE CVA (CEREBROVASCULAR ACCIDENT) (H): ICD-10-CM

## 2021-01-05 ENCOUNTER — COMMUNICATION - HEALTHEAST (OUTPATIENT)
Dept: FAMILY MEDICINE | Facility: CLINIC | Age: 61
End: 2021-01-05

## 2021-01-05 ENCOUNTER — OFFICE VISIT - HEALTHEAST (OUTPATIENT)
Dept: SPEECH THERAPY | Facility: REHABILITATION | Age: 61
End: 2021-01-05

## 2021-01-05 ENCOUNTER — AMBULATORY - HEALTHEAST (OUTPATIENT)
Dept: FAMILY MEDICINE | Facility: CLINIC | Age: 61
End: 2021-01-05

## 2021-01-05 DIAGNOSIS — I69.319 COGNITIVE DEFICIT DUE TO RECENT STROKE: ICD-10-CM

## 2021-01-05 DIAGNOSIS — I63.9 ACUTE CVA (CEREBROVASCULAR ACCIDENT) (H): ICD-10-CM

## 2021-01-05 DIAGNOSIS — I69.320 APHASIA, POST-STROKE: ICD-10-CM

## 2021-01-05 DIAGNOSIS — I69.322 DYSARTHRIA, POST-STROKE: ICD-10-CM

## 2021-01-06 ENCOUNTER — COMMUNICATION - HEALTHEAST (OUTPATIENT)
Dept: FAMILY MEDICINE | Facility: CLINIC | Age: 61
End: 2021-01-06

## 2021-01-06 ENCOUNTER — OFFICE VISIT - HEALTHEAST (OUTPATIENT)
Dept: PHARMACY | Facility: CLINIC | Age: 61
End: 2021-01-06

## 2021-01-06 DIAGNOSIS — I10 ESSENTIAL HYPERTENSION: ICD-10-CM

## 2021-01-06 DIAGNOSIS — E56.9 VITAMIN DEFICIENCY: ICD-10-CM

## 2021-01-06 DIAGNOSIS — E03.9 ACQUIRED HYPOTHYROIDISM: ICD-10-CM

## 2021-01-06 DIAGNOSIS — I63.9 ACUTE CVA (CEREBROVASCULAR ACCIDENT) (H): ICD-10-CM

## 2021-01-06 DIAGNOSIS — M1A.0791 IDIOPATHIC CHRONIC GOUT OF FOOT WITH TOPHUS, UNSPECIFIED LATERALITY: ICD-10-CM

## 2021-01-06 DIAGNOSIS — E87.6 HYPOKALEMIA: ICD-10-CM

## 2021-01-06 DIAGNOSIS — E11.9 TYPE 2 DIABETES MELLITUS WITHOUT COMPLICATION, WITHOUT LONG-TERM CURRENT USE OF INSULIN (H): ICD-10-CM

## 2021-01-06 PROCEDURE — 99605 MTMS BY PHARM NP 15 MIN: CPT | Performed by: PHARMACIST

## 2021-01-06 PROCEDURE — 99607 MTMS BY PHARM ADDL 15 MIN: CPT | Performed by: PHARMACIST

## 2021-01-07 ENCOUNTER — OFFICE VISIT - HEALTHEAST (OUTPATIENT)
Dept: PHYSICAL THERAPY | Facility: REHABILITATION | Age: 61
End: 2021-01-07

## 2021-01-07 ENCOUNTER — OFFICE VISIT - HEALTHEAST (OUTPATIENT)
Dept: OCCUPATIONAL THERAPY | Facility: REHABILITATION | Age: 61
End: 2021-01-07

## 2021-01-07 DIAGNOSIS — I63.9 INCOORDINATION DUE TO ACUTE STROKE (H): ICD-10-CM

## 2021-01-07 DIAGNOSIS — Z78.9 IMPAIRED INSTRUMENTAL ACTIVITIES OF DAILY LIVING: ICD-10-CM

## 2021-01-07 DIAGNOSIS — R27.9 INCOORDINATION DUE TO ACUTE STROKE (H): ICD-10-CM

## 2021-01-07 DIAGNOSIS — Z78.9 DECREASED ACTIVITIES OF DAILY LIVING (ADL): ICD-10-CM

## 2021-01-07 DIAGNOSIS — R53.1 RIGHT SIDED WEAKNESS: ICD-10-CM

## 2021-01-07 DIAGNOSIS — R29.898 RIGHT ARM WEAKNESS: ICD-10-CM

## 2021-01-07 DIAGNOSIS — R26.81 GAIT INSTABILITY: ICD-10-CM

## 2021-01-08 ENCOUNTER — OFFICE VISIT - HEALTHEAST (OUTPATIENT)
Dept: FAMILY MEDICINE | Facility: CLINIC | Age: 61
End: 2021-01-08

## 2021-01-08 DIAGNOSIS — I10 ESSENTIAL HYPERTENSION: ICD-10-CM

## 2021-01-08 DIAGNOSIS — Z09 HOSPITAL DISCHARGE FOLLOW-UP: ICD-10-CM

## 2021-01-08 DIAGNOSIS — Z79.899 MEDICATION MANAGEMENT: ICD-10-CM

## 2021-01-08 DIAGNOSIS — G93.40 ACUTE ENCEPHALOPATHY: ICD-10-CM

## 2021-01-08 DIAGNOSIS — I63.9 ACUTE CVA (CEREBROVASCULAR ACCIDENT) (H): ICD-10-CM

## 2021-01-08 LAB
ALBUMIN SERPL-MCNC: 4.1 G/DL (ref 3.5–5)
ALP SERPL-CCNC: 83 U/L (ref 45–120)
ALT SERPL W P-5'-P-CCNC: 45 U/L (ref 0–45)
ANION GAP SERPL CALCULATED.3IONS-SCNC: 10 MMOL/L (ref 5–18)
AST SERPL W P-5'-P-CCNC: 26 U/L (ref 0–40)
BILIRUB SERPL-MCNC: 0.5 MG/DL (ref 0–1)
BUN SERPL-MCNC: 38 MG/DL (ref 8–22)
CALCIUM SERPL-MCNC: 9.8 MG/DL (ref 8.5–10.5)
CHLORIDE BLD-SCNC: 108 MMOL/L (ref 98–107)
CO2 SERPL-SCNC: 21 MMOL/L (ref 22–31)
CREAT SERPL-MCNC: 1.25 MG/DL (ref 0.7–1.3)
ERYTHROCYTE [DISTWIDTH] IN BLOOD BY AUTOMATED COUNT: 12.7 % (ref 11–14.5)
GFR SERPL CREATININE-BSD FRML MDRD: 59 ML/MIN/1.73M2
GLUCOSE BLD-MCNC: 108 MG/DL (ref 70–125)
HCT VFR BLD AUTO: 45.7 % (ref 40–54)
HGB BLD-MCNC: 15.1 G/DL (ref 14–18)
MAGNESIUM SERPL-MCNC: 2.6 MG/DL (ref 1.8–2.6)
MCH RBC QN AUTO: 33 PG (ref 27–34)
MCHC RBC AUTO-ENTMCNC: 33 G/DL (ref 32–36)
MCV RBC AUTO: 100 FL (ref 80–100)
PLATELET # BLD AUTO: 366 THOU/UL (ref 140–440)
PMV BLD AUTO: 7 FL (ref 7–10)
POTASSIUM BLD-SCNC: 5 MMOL/L (ref 3.5–5)
PROT SERPL-MCNC: 7.3 G/DL (ref 6–8)
RBC # BLD AUTO: 4.57 MILL/UL (ref 4.4–6.2)
SODIUM SERPL-SCNC: 139 MMOL/L (ref 136–145)
WBC: 7.8 THOU/UL (ref 4–11)

## 2021-01-11 ENCOUNTER — OFFICE VISIT - HEALTHEAST (OUTPATIENT)
Dept: SPEECH THERAPY | Facility: REHABILITATION | Age: 61
End: 2021-01-11

## 2021-01-11 DIAGNOSIS — I69.319 COGNITIVE DEFICIT DUE TO RECENT STROKE: ICD-10-CM

## 2021-01-11 DIAGNOSIS — I69.320 APHASIA, POST-STROKE: ICD-10-CM

## 2021-01-13 ENCOUNTER — OFFICE VISIT - HEALTHEAST (OUTPATIENT)
Dept: SPEECH THERAPY | Facility: REHABILITATION | Age: 61
End: 2021-01-13

## 2021-01-13 DIAGNOSIS — I69.320 APHASIA, POST-STROKE: ICD-10-CM

## 2021-01-13 DIAGNOSIS — I69.319 COGNITIVE DEFICIT DUE TO RECENT STROKE: ICD-10-CM

## 2021-01-14 ENCOUNTER — COMMUNICATION - HEALTHEAST (OUTPATIENT)
Dept: FAMILY MEDICINE | Facility: CLINIC | Age: 61
End: 2021-01-14

## 2021-01-19 ENCOUNTER — OFFICE VISIT - HEALTHEAST (OUTPATIENT)
Dept: OCCUPATIONAL THERAPY | Facility: REHABILITATION | Age: 61
End: 2021-01-19

## 2021-01-19 DIAGNOSIS — R27.9 INCOORDINATION DUE TO ACUTE STROKE (H): ICD-10-CM

## 2021-01-19 DIAGNOSIS — Z78.9 IMPAIRED INSTRUMENTAL ACTIVITIES OF DAILY LIVING: ICD-10-CM

## 2021-01-19 DIAGNOSIS — I63.9 INCOORDINATION DUE TO ACUTE STROKE (H): ICD-10-CM

## 2021-01-19 DIAGNOSIS — Z78.9 DECREASED ACTIVITIES OF DAILY LIVING (ADL): ICD-10-CM

## 2021-01-19 DIAGNOSIS — R29.898 RIGHT ARM WEAKNESS: ICD-10-CM

## 2021-01-20 ENCOUNTER — OFFICE VISIT - HEALTHEAST (OUTPATIENT)
Dept: SPEECH THERAPY | Facility: REHABILITATION | Age: 61
End: 2021-01-20

## 2021-01-20 DIAGNOSIS — I69.319 COGNITIVE DEFICIT DUE TO RECENT STROKE: ICD-10-CM

## 2021-01-21 ENCOUNTER — OFFICE VISIT - HEALTHEAST (OUTPATIENT)
Dept: FAMILY MEDICINE | Facility: CLINIC | Age: 61
End: 2021-01-21

## 2021-01-21 DIAGNOSIS — I95.1 ORTHOSTATIC HYPOTENSION: ICD-10-CM

## 2021-01-21 DIAGNOSIS — R42 LIGHT HEADED: ICD-10-CM

## 2021-01-21 DIAGNOSIS — I10 ESSENTIAL HYPERTENSION: ICD-10-CM

## 2021-01-21 LAB
BASOPHILS # BLD AUTO: 0 THOU/UL (ref 0–0.2)
BASOPHILS NFR BLD AUTO: 1 % (ref 0–2)
EOSINOPHIL # BLD AUTO: 0.2 THOU/UL (ref 0–0.4)
EOSINOPHIL NFR BLD AUTO: 3 % (ref 0–6)
ERYTHROCYTE [DISTWIDTH] IN BLOOD BY AUTOMATED COUNT: 12.3 % (ref 11–14.5)
HCT VFR BLD AUTO: 42.7 % (ref 40–54)
HGB BLD-MCNC: 14.5 G/DL (ref 14–18)
LYMPHOCYTES # BLD AUTO: 1.6 THOU/UL (ref 0.8–4.4)
LYMPHOCYTES NFR BLD AUTO: 23 % (ref 20–40)
MCH RBC QN AUTO: 33.6 PG (ref 27–34)
MCHC RBC AUTO-ENTMCNC: 34 G/DL (ref 32–36)
MCV RBC AUTO: 99 FL (ref 80–100)
MONOCYTES # BLD AUTO: 0.4 THOU/UL (ref 0–0.9)
MONOCYTES NFR BLD AUTO: 6 % (ref 2–10)
NEUTROPHILS # BLD AUTO: 4.6 THOU/UL (ref 2–7.7)
NEUTROPHILS NFR BLD AUTO: 68 % (ref 50–70)
PLATELET # BLD AUTO: 182 THOU/UL (ref 140–440)
PMV BLD AUTO: 7 FL (ref 7–10)
RBC # BLD AUTO: 4.31 MILL/UL (ref 4.4–6.2)
WBC: 6.8 THOU/UL (ref 4–11)

## 2021-01-22 ENCOUNTER — COMMUNICATION - HEALTHEAST (OUTPATIENT)
Dept: FAMILY MEDICINE | Facility: CLINIC | Age: 61
End: 2021-01-22

## 2021-01-25 ENCOUNTER — OFFICE VISIT - HEALTHEAST (OUTPATIENT)
Dept: SPEECH THERAPY | Facility: REHABILITATION | Age: 61
End: 2021-01-25

## 2021-01-25 DIAGNOSIS — I69.319 COGNITIVE DEFICIT DUE TO RECENT STROKE: ICD-10-CM

## 2021-01-27 ENCOUNTER — OFFICE VISIT - HEALTHEAST (OUTPATIENT)
Dept: SPEECH THERAPY | Facility: REHABILITATION | Age: 61
End: 2021-01-27

## 2021-01-27 DIAGNOSIS — I69.319 COGNITIVE DEFICIT DUE TO RECENT STROKE: ICD-10-CM

## 2021-02-01 ENCOUNTER — OFFICE VISIT - HEALTHEAST (OUTPATIENT)
Dept: SPEECH THERAPY | Facility: REHABILITATION | Age: 61
End: 2021-02-01

## 2021-02-01 DIAGNOSIS — I69.319 COGNITIVE DEFICIT DUE TO RECENT STROKE: ICD-10-CM

## 2021-02-02 ENCOUNTER — OFFICE VISIT - HEALTHEAST (OUTPATIENT)
Dept: OCCUPATIONAL THERAPY | Facility: REHABILITATION | Age: 61
End: 2021-02-02

## 2021-02-02 DIAGNOSIS — R27.9 INCOORDINATION DUE TO ACUTE STROKE (H): ICD-10-CM

## 2021-02-02 DIAGNOSIS — R29.898 RIGHT ARM WEAKNESS: ICD-10-CM

## 2021-02-02 DIAGNOSIS — I63.9 INCOORDINATION DUE TO ACUTE STROKE (H): ICD-10-CM

## 2021-02-02 DIAGNOSIS — Z78.9 DECREASED ACTIVITIES OF DAILY LIVING (ADL): ICD-10-CM

## 2021-02-02 DIAGNOSIS — Z78.9 IMPAIRED INSTRUMENTAL ACTIVITIES OF DAILY LIVING: ICD-10-CM

## 2021-02-03 ENCOUNTER — OFFICE VISIT - HEALTHEAST (OUTPATIENT)
Dept: SPEECH THERAPY | Facility: REHABILITATION | Age: 61
End: 2021-02-03

## 2021-02-03 DIAGNOSIS — I69.319 COGNITIVE DEFICIT DUE TO RECENT STROKE: ICD-10-CM

## 2021-02-08 ENCOUNTER — OFFICE VISIT - HEALTHEAST (OUTPATIENT)
Dept: SPEECH THERAPY | Facility: REHABILITATION | Age: 61
End: 2021-02-08

## 2021-02-08 DIAGNOSIS — I69.319 COGNITIVE DEFICIT DUE TO RECENT STROKE: ICD-10-CM

## 2021-02-08 PROBLEM — G93.40 ACUTE ENCEPHALOPATHY: Status: ACTIVE | Noted: 2020-12-25

## 2021-02-08 PROBLEM — R41.82 AMS (ALTERED MENTAL STATUS): Status: ACTIVE | Noted: 2020-12-25

## 2021-02-08 PROBLEM — R41.89 COGNITIVE AND BEHAVIORAL CHANGES: Status: ACTIVE | Noted: 2021-02-08

## 2021-02-08 PROBLEM — R46.89 COGNITIVE AND BEHAVIORAL CHANGES: Status: ACTIVE | Noted: 2021-02-08

## 2021-02-08 PROBLEM — G47.9 SLEEP DIFFICULTIES: Status: ACTIVE | Noted: 2021-02-08

## 2021-02-08 PROBLEM — J96.01 ACUTE RESPIRATORY FAILURE WITH HYPOXIA (H): Status: ACTIVE | Noted: 2021-02-08

## 2021-02-09 ENCOUNTER — RECORDS - HEALTHEAST (OUTPATIENT)
Dept: ADMINISTRATIVE | Facility: OTHER | Age: 61
End: 2021-02-09

## 2021-02-09 ENCOUNTER — COMMUNICATION - HEALTHEAST (OUTPATIENT)
Dept: FAMILY MEDICINE | Facility: CLINIC | Age: 61
End: 2021-02-09

## 2021-02-09 ENCOUNTER — VIRTUAL VISIT (OUTPATIENT)
Dept: NEUROLOGY | Facility: CLINIC | Age: 61
End: 2021-02-09
Payer: OTHER GOVERNMENT

## 2021-02-09 ENCOUNTER — OFFICE VISIT - HEALTHEAST (OUTPATIENT)
Dept: FAMILY MEDICINE | Facility: CLINIC | Age: 61
End: 2021-02-09

## 2021-02-09 VITALS — WEIGHT: 227 LBS | HEIGHT: 72 IN | BODY MASS INDEX: 30.75 KG/M2

## 2021-02-09 DIAGNOSIS — I10 ESSENTIAL HYPERTENSION: ICD-10-CM

## 2021-02-09 DIAGNOSIS — Z86.73 HISTORY OF COMPLETED STROKE: Primary | ICD-10-CM

## 2021-02-09 PROCEDURE — 99214 OFFICE O/P EST MOD 30 MIN: CPT | Mod: 95 | Performed by: PSYCHIATRY & NEUROLOGY

## 2021-02-09 RX ORDER — ASPIRIN 81 MG/1
81 TABLET, CHEWABLE ORAL DAILY
Status: ON HOLD | COMMUNITY
End: 2022-03-28

## 2021-02-09 RX ORDER — AMLODIPINE BESYLATE 5 MG/1
5 TABLET ORAL DAILY
COMMUNITY
Start: 2021-02-09 | End: 2021-08-06

## 2021-02-09 RX ORDER — CLOPIDOGREL BISULFATE 75 MG/1
75 TABLET ORAL DAILY
Qty: 90 TABLET | Refills: 3 | Status: SHIPPED | OUTPATIENT
Start: 2021-02-09 | End: 2021-05-18

## 2021-02-09 RX ORDER — DIVALPROEX SODIUM 500 MG/1
500 TABLET, DELAYED RELEASE ORAL DAILY
COMMUNITY
End: 2021-11-03

## 2021-02-09 ASSESSMENT — MIFFLIN-ST. JEOR: SCORE: 1862.67

## 2021-02-09 NOTE — LETTER
2/9/2021         RE: Raghavendra Kiser  [address] [address]        Dear Colleague,    Thank you for referring your patient, Raghavendra Kiser, to the [facility] NEUROLOGY CLINIC [place]. Please see a copy of my visit note below.    [facility] Neurology  [place]    Raghavendra Kiser MRN# [id number]   Age: 61 year old YOB: 1960               Assessment and Plan:   Assessment:   History of ischemic strokes        Plan:     Given that his second stroke occurred while on dual antiplatelet therapy we should continue that rather than revert to monotherapy.  I will renew the Plavix prescription for the coming year, hopefully that can be continued by his primary care physician as long as things are stable.  If he notices problems after he stops the divalproex he or his wife can give us a call and I can renew that prescription as well.  There is no clear indication for full anticoagulation on his work-up.             Chief Complaint/HPI:     I saw Mauri accompanied by his wife for a video visit today.  He was admitted to [facility] early on Bobo Day of 2020 with an acute confusional state.  He was agitated and had difficulty speaking.  MRI demonstrated some small areas of ischemia in the left MCA territory.  I did see him on one occasion on December 28, but he was still intubated and sedated at that time.  He was scheduled to see me for follow-up today.  He asks several questions.  He asks what caused the stroke.  He asks about some disability paperwork excusing him from returning to work as he plans to retire in April anyway.  He asks about driving and about drinking an occasional beer.  He asks about having a heart monitor that was mentioned by another physician.  His wife asks if he had the clot Buster drug when he came into the hospital.  He also asks about Depakote that was started while he was in the hospital.  I answered his questions to  the best of my ability by extensively reviewing test results from his hospitalization and hospital notes from my partners as well as notes by the hospitalists.  Also, and Occupational Therapy note from February 2.    It looks like he presented with acute confusion, and it was not immediately clear that this was due to stroke.  He continued to have agitation and a trial of Depakote was started in the hospital, that was continued after discharge.  He was monitored with telemetry throughout his hospitalization, I do not see recommendation for outpatient cardiac monitoring.            Past Medical History:    has a past medical history of Diabetes (H), History of stroke without residual deficits, Hyperlipidemia, and Hypertension.          Past Surgical History:    has no past surgical history on file.          Social History:     Social History     Tobacco Use     Smoking status: Former Smoker     Packs/day: 1.50     Years: 20.00     Pack years: 30.00     Types: Cigarettes     Smokeless tobacco: Never Used     Tobacco comment: quit in 1993   Substance Use Topics     Alcohol use: Yes     Comment: socially              Family History:     Family History   Problem Relation Age of Onset     Stomach Cancer Mother      Lung Cancer Mother      Throat cancer Father      Lung Cancer Father                 Allergies:     Allergies   Allergen Reactions     Cats      Other reaction(s): ITCHING,WATERING EYES             Medications:     Current Outpatient Medications:      allopurinol (ZYLOPRIM) 300 MG tablet, Take 450 mg by mouth, Disp: , Rfl:      amLODIPine (NORVASC) 5 MG tablet, Take 5 mg by mouth daily, Disp: , Rfl:      aspirin (ASA) 81 MG chewable tablet, Take 81 mg by mouth daily, Disp: , Rfl:      Cholecalciferol 100 MCG (4000 UT) CAPS, Take 4,000 Units by mouth daily, Disp: , Rfl:      clopidogrel (PLAVIX) 75 MG tablet, Take 75 mg by mouth daily, Disp: , Rfl:      divalproex sodium delayed-release (DEPAKOTE) 500 MG   tablet, Take 500 mg by mouth daily, Disp: , Rfl:      levothyroxine (SYNTHROID/LEVOTHROID) 125 MCG tablet, Take 125 mcg by mouth daily, Disp: , Rfl:      lisinopril (ZESTRIL) 20 MG tablet, Take 1 tablet by mouth daily, Disp: , Rfl:      metFORMIN (GLUCOPHAGE-XR) 500 MG 24 hr tablet, Take 2 tablets by mouth daily, Disp: , Rfl:      rosuvastatin (CRESTOR) 10 MG tablet, Take 0.5 tablets by mouth daily, Disp: , Rfl:               Physical Exam:   Awake and alert with no dysarthria  Speech is halting at times with some word finding difficulty  Rare paraphasic error  Cranial nerves are fine  I do not see any focal or lateralized weakness or coordination difficulties in the arms               Video-Visit Details    Type of service:  Video Visit    Video Start Time: 11:00 AM  Video End Time: 11:27 AM    Originating Location (pt. Location): Home  Distant Location (provider location):  [facility]   Platform used for Video Visit: [facility]            Mamadou Knutson MD             Again, thank you for allowing me to participate in the care of your patient.        Sincerely,        Mamadou Knutson MD

## 2021-02-09 NOTE — PROGRESS NOTES
[facility] Neurology  [place]    Raghavendra Kiser MRN# [id number]   Age: 61 year old YOB: 1960               Assessment and Plan:   Assessment:   History of ischemic strokes        Plan:     Given that his second stroke occurred while on dual antiplatelet therapy we should continue that rather than revert to monotherapy.  I will renew the Plavix prescription for the coming year, hopefully that can be continued by his primary care physician as long as things are stable.  If he notices problems after he stops the divalproex he or his wife can give us a call and I can renew that prescription as well.  There is no clear indication for full anticoagulation on his work-up.             Chief Complaint/HPI:     I saw Mauri accompanied by his wife for a video visit today.  He was admitted to [facility] early on Bobo Day of 2020 with an acute confusional state.  He was agitated and had difficulty speaking.  MRI demonstrated some small areas of ischemia in the left MCA territory.  I did see him on one occasion on December 28, but he was still intubated and sedated at that time.  He was scheduled to see me for follow-up today.  He asks several questions.  He asks what caused the stroke.  He asks about some disability paperwork excusing him from returning to work as he plans to retire in April anyway.  He asks about driving and about drinking an occasional beer.  He asks about having a heart monitor that was mentioned by another physician.  His wife asks if he had the clot Buster drug when he came into the hospital.  He also asks about Depakote that was started while he was in the hospital.  I answered his questions to the best of my ability by extensively reviewing test results from his hospitalization and hospital notes from my partners as well as notes by the hospitalists.  Also, and Occupational Therapy note from February 2.    It looks like he presented with acute confusion, and it was  not immediately clear that this was due to stroke.  He continued to have agitation and a trial of Depakote was started in the hospital, that was continued after discharge.  He was monitored with telemetry throughout his hospitalization, I do not see recommendation for outpatient cardiac monitoring.            Past Medical History:    has a past medical history of Diabetes (H), History of stroke without residual deficits, Hyperlipidemia, and Hypertension.          Past Surgical History:    has no past surgical history on file.          Social History:     Social History     Tobacco Use     Smoking status: Former Smoker     Packs/day: 1.50     Years: 20.00     Pack years: 30.00     Types: Cigarettes     Smokeless tobacco: Never Used     Tobacco comment: quit in 1993   Substance Use Topics     Alcohol use: Yes     Comment: socially              Family History:     Family History   Problem Relation Age of Onset     Stomach Cancer Mother      Lung Cancer Mother      Throat cancer Father      Lung Cancer Father                 Allergies:     Allergies   Allergen Reactions     Cats      Other reaction(s): ITCHING,WATERING EYES             Medications:     Current Outpatient Medications:      allopurinol (ZYLOPRIM) 300 MG tablet, Take 450 mg by mouth, Disp: , Rfl:      amLODIPine (NORVASC) 5 MG tablet, Take 5 mg by mouth daily, Disp: , Rfl:      aspirin (ASA) 81 MG chewable tablet, Take 81 mg by mouth daily, Disp: , Rfl:      Cholecalciferol 100 MCG (4000 UT) CAPS, Take 4,000 Units by mouth daily, Disp: , Rfl:      clopidogrel (PLAVIX) 75 MG tablet, Take 75 mg by mouth daily, Disp: , Rfl:      divalproex sodium delayed-release (DEPAKOTE) 500 MG DR tablet, Take 500 mg by mouth daily, Disp: , Rfl:      levothyroxine (SYNTHROID/LEVOTHROID) 125 MCG tablet, Take 125 mcg by mouth daily, Disp: , Rfl:      lisinopril (ZESTRIL) 20 MG tablet, Take 1 tablet by mouth daily, Disp: , Rfl:      metFORMIN (GLUCOPHAGE-XR) 500 MG 24 hr  tablet, Take 2 tablets by mouth daily, Disp: , Rfl:      rosuvastatin (CRESTOR) 10 MG tablet, Take 0.5 tablets by mouth daily, Disp: , Rfl:               Physical Exam:   Awake and alert with no dysarthria  Speech is halting at times with some word finding difficulty  Rare paraphasic error  Cranial nerves are fine  I do not see any focal or lateralized weakness or coordination difficulties in the arms               Video-Visit Details    Type of service:  Video Visit    Video Start Time: 11:00 AM  Video End Time: 11:27 AM    Originating Location (pt. Location): Home  Distant Location (provider location):  [facility] NEUROLOGY [place]   Platform used for Video Visit: Ethan Knutson MD

## 2021-02-09 NOTE — NURSING NOTE
Chief Complaint   Patient presents with     Hospital F/U     Post hospital visit 12/25/2020 at [facility]      Video Visit AW touch Point [phone]  Zander Baislio CMA on 2/9/2021 at 10:48 AM

## 2021-02-11 ENCOUNTER — COMMUNICATION - HEALTHEAST (OUTPATIENT)
Dept: FAMILY MEDICINE | Facility: CLINIC | Age: 61
End: 2021-02-11

## 2021-02-12 ENCOUNTER — COMMUNICATION - HEALTHEAST (OUTPATIENT)
Dept: FAMILY MEDICINE | Facility: CLINIC | Age: 61
End: 2021-02-12

## 2021-02-12 DIAGNOSIS — E11.9 TYPE 2 DIABETES MELLITUS WITHOUT COMPLICATION, WITHOUT LONG-TERM CURRENT USE OF INSULIN (H): ICD-10-CM

## 2021-02-15 ENCOUNTER — OFFICE VISIT - HEALTHEAST (OUTPATIENT)
Dept: SPEECH THERAPY | Facility: REHABILITATION | Age: 61
End: 2021-02-15

## 2021-02-15 ENCOUNTER — COMMUNICATION - HEALTHEAST (OUTPATIENT)
Dept: ADMINISTRATIVE | Facility: CLINIC | Age: 61
End: 2021-02-15

## 2021-02-15 DIAGNOSIS — I69.319 COGNITIVE DEFICIT DUE TO RECENT STROKE: ICD-10-CM

## 2021-02-22 ENCOUNTER — OFFICE VISIT - HEALTHEAST (OUTPATIENT)
Dept: SPEECH THERAPY | Facility: REHABILITATION | Age: 61
End: 2021-02-22

## 2021-02-22 DIAGNOSIS — I69.319 COGNITIVE DEFICIT DUE TO RECENT STROKE: ICD-10-CM

## 2021-02-24 NOTE — TELEPHONE ENCOUNTER
allopurinol (ZYLOPRIM) 300 MG tablet  Last Written Prescription Date:  ?  Last Fill Quantity: ?,   # refills: ?  Last Office Visit : 2/9/2021  Future Office visit:  None    Routing refill request to provider for review/approval because:  Medication is reported/historical      Yoly Gil RN  Central Triage Red Flags/Med Refills

## 2021-02-26 ENCOUNTER — COMMUNICATION - HEALTHEAST (OUTPATIENT)
Dept: RHEUMATOLOGY | Facility: CLINIC | Age: 61
End: 2021-02-26

## 2021-02-26 DIAGNOSIS — M1A.0791 IDIOPATHIC CHRONIC GOUT OF FOOT WITH TOPHUS, UNSPECIFIED LATERALITY: ICD-10-CM

## 2021-03-02 ENCOUNTER — COMMUNICATION - HEALTHEAST (OUTPATIENT)
Dept: ADMINISTRATIVE | Facility: CLINIC | Age: 61
End: 2021-03-02

## 2021-05-18 DIAGNOSIS — Z86.73 HISTORY OF COMPLETED STROKE: ICD-10-CM

## 2021-05-18 RX ORDER — CLOPIDOGREL BISULFATE 75 MG/1
75 TABLET ORAL DAILY
Qty: 90 TABLET | Refills: 3 | Status: ON HOLD | OUTPATIENT
Start: 2021-05-18 | End: 2022-04-01

## 2021-05-18 NOTE — TELEPHONE ENCOUNTER
Request for Plavix to go to [facility]  Medication T'd for review and signature  Rody Glez CMA on 5/18/2021 at 11:20 AM

## 2021-05-20 ENCOUNTER — COMMUNICATION - HEALTHEAST (OUTPATIENT)
Dept: FAMILY MEDICINE | Facility: CLINIC | Age: 61
End: 2021-05-20

## 2021-05-20 DIAGNOSIS — E11.9 DIABETES MELLITUS TYPE 2, CONTROLLED (H): ICD-10-CM

## 2021-05-26 VITALS — HEART RATE: 61 BPM | DIASTOLIC BLOOD PRESSURE: 79 MMHG | SYSTOLIC BLOOD PRESSURE: 115 MMHG

## 2021-05-26 VITALS — HEART RATE: 82 BPM | DIASTOLIC BLOOD PRESSURE: 85 MMHG | SYSTOLIC BLOOD PRESSURE: 126 MMHG

## 2021-05-27 ENCOUNTER — COMMUNICATION - HEALTHEAST (OUTPATIENT)
Dept: RHEUMATOLOGY | Facility: CLINIC | Age: 61
End: 2021-05-27

## 2021-05-27 DIAGNOSIS — M1A.0791 IDIOPATHIC CHRONIC GOUT OF FOOT WITH TOPHUS, UNSPECIFIED LATERALITY: ICD-10-CM

## 2021-05-27 NOTE — TELEPHONE ENCOUNTER
Refill Approved    Rx renewed per Medication Renewal Policy. Medication was last renewed on 1/1/19.    Trish Granda, [facility] Triage/Med Refill 4/3/2019     Requested Prescriptions   Pending Prescriptions Disp Refills     hydroCHLOROthiazide (HYDRODIURIL) 12.5 MG tablet [Pharmacy Med Name: HYDROCHLOROTHIAZIDE TABS 12.5MG] 90 tablet 0     Sig: TAKE 1 TABLET DAILY. NO FURTHER REFILLS UNTIL SEEN IN CLINIC. CALL 24/7 TO MAKE APPOINTMENT.    Diuretics/Combination Diuretics Refill Protocol  Passed - 4/2/2019  2:53 AM       Passed - Visit with PCP or prescribing provider visit in past 12 months    Last office visit with prescriber/PCP: 9/15/2017 Luis Daniel Maciel MD OR same dept: 6/12/2018 Karen Sorensen MD OR same specialty: 6/12/2018 Karen Sorensen MD  Last physical: 12/5/2017 Last MTM visit: Visit date not found   Next visit within 3 mo: Visit date not found  Next physical within 3 mo: Visit date not found  Prescriber OR PCP: Luis Daniel Maciel MD  Last diagnosis associated with med order: 1. HTN (hypertension)  - hydroCHLOROthiazide (HYDRODIURIL) 12.5 MG tablet [Pharmacy Med Name: HYDROCHLOROTHIAZIDE TABS 12.5MG]; TAKE 1 TABLET DAILY. NO FURTHER REFILLS UNTIL SEEN IN CLINIC. CALL 24/7 TO MAKE APPOINTMENT.  Dispense: 90 tablet; Refill: 0    If protocol passes may refill for 12 months if within 3 months of last provider visit (or a total of 15 months).            Passed - Serum Potassium in past 12 months     Lab Results   Component Value Date    Potassium 4.2 10/16/2018            Passed - Serum Sodium in past 12 months     Lab Results   Component Value Date    Sodium 142 10/16/2018            Passed - Blood pressure on file in past 12 months    BP Readings from Last 1 Encounters:   01/04/19 128/80            Passed - Serum Creatinine in past 12 months     Creatinine   Date Value Ref Range Status   10/16/2018 0.92 0.70 - 1.30 mg/dL Final

## 2021-05-28 ENCOUNTER — COMMUNICATION - HEALTHEAST (OUTPATIENT)
Dept: FAMILY MEDICINE | Facility: CLINIC | Age: 61
End: 2021-05-28

## 2021-05-28 ENCOUNTER — AMBULATORY - HEALTHEAST (OUTPATIENT)
Dept: LAB | Facility: CLINIC | Age: 61
End: 2021-05-28

## 2021-05-28 DIAGNOSIS — G93.40 ACUTE ENCEPHALOPATHY: ICD-10-CM

## 2021-05-28 DIAGNOSIS — E11.9 TYPE 2 DIABETES MELLITUS WITHOUT COMPLICATION, WITHOUT LONG-TERM CURRENT USE OF INSULIN (H): ICD-10-CM

## 2021-05-28 DIAGNOSIS — M1A.0791 IDIOPATHIC CHRONIC GOUT OF FOOT WITH TOPHUS, UNSPECIFIED LATERALITY: ICD-10-CM

## 2021-05-28 DIAGNOSIS — I10 ESSENTIAL HYPERTENSION: ICD-10-CM

## 2021-05-28 LAB
ALBUMIN SERPL-MCNC: 4 G/DL (ref 3.5–5)
ALT SERPL W P-5'-P-CCNC: 18 U/L (ref 0–45)
CREAT SERPL-MCNC: 1.01 MG/DL (ref 0.7–1.3)
ERYTHROCYTE [DISTWIDTH] IN BLOOD BY AUTOMATED COUNT: 12.8 % (ref 11–14.5)
GFR SERPL CREATININE-BSD FRML MDRD: >60 ML/MIN/1.73M2
HBA1C MFR BLD: 5.7 %
HCT VFR BLD AUTO: 41.5 % (ref 40–54)
HGB BLD-MCNC: 14.2 G/DL (ref 14–18)
MCH RBC QN AUTO: 32.5 PG (ref 27–34)
MCHC RBC AUTO-ENTMCNC: 34.2 G/DL (ref 32–36)
MCV RBC AUTO: 95 FL (ref 80–100)
PLATELET # BLD AUTO: 159 THOU/UL (ref 140–440)
PMV BLD AUTO: 9.2 FL (ref 7–10)
RBC # BLD AUTO: 4.37 MILL/UL (ref 4.4–6.2)
URATE SERPL-MCNC: 4.2 MG/DL (ref 3–8)
WBC: 5.9 THOU/UL (ref 4–11)

## 2021-05-28 RX ORDER — ALLOPURINOL 300 MG/1
450 TABLET ORAL
OUTPATIENT
Start: 2021-05-28

## 2021-05-28 NOTE — TELEPHONE ENCOUNTER
physician not at this facility-  Called and spoke w/pharm- they had received rx from requested MD yesterday- RF not needed.

## 2021-05-30 VITALS — WEIGHT: 245.5 LBS | HEIGHT: 72 IN | BODY MASS INDEX: 33.25 KG/M2

## 2021-05-30 VITALS — WEIGHT: 247.8 LBS | BODY MASS INDEX: 33.84 KG/M2

## 2021-05-30 NOTE — TELEPHONE ENCOUNTER
Patient sees Rheumatology Dr. Potts.  Last appt 01/04/2019 advised to return for labs in 6 mo.    Copy and paste from ov note    Return for follow-up here in 12 months with labs every 6 months.

## 2021-05-30 NOTE — TELEPHONE ENCOUNTER
No known follow up labs per chart review through primary care. Is patient aware of what should be checked? We have no orders available and Dr. Maciel is out this week. Could check with rheumatology

## 2021-05-31 ENCOUNTER — COMMUNICATION - HEALTHEAST (OUTPATIENT)
Dept: FAMILY MEDICINE | Facility: CLINIC | Age: 61
End: 2021-05-31

## 2021-05-31 VITALS — BODY MASS INDEX: 33.05 KG/M2 | WEIGHT: 244 LBS | HEIGHT: 72 IN

## 2021-05-31 VITALS — BODY MASS INDEX: 33.18 KG/M2 | WEIGHT: 245 LBS | HEIGHT: 72 IN

## 2021-05-31 VITALS — BODY MASS INDEX: 33.7 KG/M2 | WEIGHT: 248.5 LBS

## 2021-05-31 VITALS — BODY MASS INDEX: 33.57 KG/M2 | WEIGHT: 247.5 LBS

## 2021-05-31 VITALS — HEIGHT: 72 IN | BODY MASS INDEX: 33.41 KG/M2 | WEIGHT: 246.7 LBS

## 2021-05-31 VITALS — BODY MASS INDEX: 33.32 KG/M2 | WEIGHT: 246 LBS | HEIGHT: 72 IN

## 2021-05-31 VITALS — WEIGHT: 246.1 LBS | HEIGHT: 72 IN | BODY MASS INDEX: 33.33 KG/M2

## 2021-05-31 VITALS — BODY MASS INDEX: 33.64 KG/M2 | WEIGHT: 248.4 LBS | HEIGHT: 72 IN

## 2021-06-01 VITALS — BODY MASS INDEX: 33.63 KG/M2 | WEIGHT: 248 LBS

## 2021-06-01 VITALS — BODY MASS INDEX: 34.13 KG/M2 | HEIGHT: 72 IN | WEIGHT: 252 LBS

## 2021-06-01 VITALS — BODY MASS INDEX: 33.74 KG/M2 | WEIGHT: 248.8 LBS

## 2021-06-01 NOTE — TELEPHONE ENCOUNTER
Refill Approved    Rx renewed per Medication Renewal Policy. Medication was last renewed on 4/3/19.    Alcira Ring, [facility] Triage/Med Refill 9/28/2019     Requested Prescriptions   Pending Prescriptions Disp Refills     hydroCHLOROthiazide (HYDRODIURIL) 12.5 MG tablet [Pharmacy Med Name: HYDROCHLOROTHIAZIDE TABS 12.5MG] 90 tablet 4     Sig: TAKE 1 TABLET DAILY. NO FURTHER REFILLS UNTIL SEEN IN CLINIC. CALL 24/7 TO MAKE APPOINTMENT.       Diuretics/Combination Diuretics Refill Protocol  Passed - 9/28/2019  2:27 AM        Passed - Visit with PCP or prescribing provider visit in past 12 months     Last office visit with prescriber/PCP: 9/15/2017 Luis Daniel Maciel MD OR same dept: Visit date not found OR same specialty: 6/12/2018 Karen Sorensen MD  Last physical: 12/5/2017 Last MTM visit: Visit date not found   Next visit within 3 mo: Visit date not found  Next physical within 3 mo: Visit date not found  Prescriber OR PCP: Luis Daniel Maciel MD  Last diagnosis associated with med order: 1. HTN (hypertension)  - hydroCHLOROthiazide (HYDRODIURIL) 12.5 MG tablet [Pharmacy Med Name: HYDROCHLOROTHIAZIDE TABS 12.5MG]; TAKE 1 TABLET DAILY. NO FURTHER REFILLS UNTIL SEEN IN CLINIC. CALL 24/7 TO MAKE APPOINTMENT.  Dispense: 90 tablet; Refill: 4    If protocol passes may refill for 12 months if within 3 months of last provider visit (or a total of 15 months).             Passed - Serum Potassium in past 12 months      Lab Results   Component Value Date    Potassium 4.2 10/16/2018             Passed - Serum Sodium in past 12 months      Lab Results   Component Value Date    Sodium 142 10/16/2018             Passed - Blood pressure on file in past 12 months     BP Readings from Last 1 Encounters:   01/04/19 128/80             Passed - Serum Creatinine in past 12 months      Creatinine   Date Value Ref Range Status   07/09/2019 0.97 0.70 - 1.30 mg/dL Final

## 2021-06-02 VITALS — HEIGHT: 72 IN | WEIGHT: 248 LBS | BODY MASS INDEX: 33.59 KG/M2

## 2021-06-02 VITALS — HEIGHT: 72 IN | WEIGHT: 246 LBS | BODY MASS INDEX: 33.32 KG/M2

## 2021-06-03 VITALS
RESPIRATION RATE: 20 BRPM | TEMPERATURE: 97.6 F | SYSTOLIC BLOOD PRESSURE: 149 MMHG | HEART RATE: 79 BPM | DIASTOLIC BLOOD PRESSURE: 111 MMHG | BODY MASS INDEX: 32.48 KG/M2 | WEIGHT: 239.8 LBS | HEIGHT: 72 IN

## 2021-06-03 VITALS
SYSTOLIC BLOOD PRESSURE: 133 MMHG | TEMPERATURE: 97 F | HEART RATE: 72 BPM | DIASTOLIC BLOOD PRESSURE: 82 MMHG | WEIGHT: 241.6 LBS | RESPIRATION RATE: 20 BRPM | BODY MASS INDEX: 32.77 KG/M2

## 2021-06-03 VITALS
BODY MASS INDEX: 33.12 KG/M2 | WEIGHT: 244.2 LBS | SYSTOLIC BLOOD PRESSURE: 136 MMHG | DIASTOLIC BLOOD PRESSURE: 85 MMHG | TEMPERATURE: 96.3 F | RESPIRATION RATE: 16 BRPM | HEART RATE: 75 BPM

## 2021-06-03 VITALS
BODY MASS INDEX: 32.05 KG/M2 | SYSTOLIC BLOOD PRESSURE: 124 MMHG | HEART RATE: 64 BPM | DIASTOLIC BLOOD PRESSURE: 74 MMHG | HEIGHT: 72 IN | WEIGHT: 236.6 LBS

## 2021-06-03 NOTE — PROGRESS NOTES
HPI:  Blood sugars running < 140.  Has seen diabetic ed.  Eye exam coming up.   Taking metformin XR 1000 mg twice daily.    MEDS: REVIEWED  ALLERGIES: REVIEWED  PMH: REVIEWED  PSH: REVIEWED    SMOKING: non-smoker    EXAM:  /85   Pulse 75   Temp (!) 96.3  F (35.7  C) (Oral)   Resp 16   Wt (!) 244 lb 3.2 oz (110.8 kg)   BMI 33.12 kg/m    GEN: ALERT, ORIENTED TIMES THREE, NAD  LUNGS: CTA  COR: RRR WITHOUT MURMUR  FEET:  MF TESTING: NL              SKIN EXAM:  NL              VASCULAR:   R DP  PULSE: +                                      L DP  PULSE: +                                      R PT  PULSE: -                                      L PT  PULSE: -    LABS:    Lab Results   Component Value Date    HGBA1C 11.7 (H) 11/08/2019     Lab Results   Component Value Date    CHOL 183 11/08/2019    CHOL 140 10/16/2018    CHOL 128 05/26/2017     Lab Results   Component Value Date    HDL 24 (L) 11/08/2019    HDL 26 (L) 10/16/2018    HDL 29 (L) 05/26/2017     Lab Results   Component Value Date    LDLCALC  11/08/2019      Comment:      Invalid, Triglycerides >400    LDLCALC  10/16/2018      Comment:      Invalid, Triglycerides >400    LDLCALC 49 05/26/2017     Lab Results   Component Value Date    TRIG 842 (H) 11/08/2019    TRIG 563 (H) 10/16/2018    TRIG 248 (H) 05/26/2017     No components found for: CHOLHDL    Chemistry        Component Value Date/Time     11/08/2019 1222    K 3.4 (L) 11/08/2019 1222    CL 98 11/08/2019 1222    CO2 27 11/08/2019 1222    BUN 15 11/08/2019 1222    CREATININE 1.22 11/08/2019 1222     (H) 11/08/2019 1222        Component Value Date/Time    CALCIUM 9.6 11/08/2019 1222    ALKPHOS 117 11/08/2019 1222    AST 20 11/08/2019 1222    ALT 29 11/08/2019 1222    BILITOT 1.0 11/08/2019 1222        Lab Results   Component Value Date    TSH 8.19 (H) 11/08/2019         IMP:    1. Essential hypertension,  Controlled on hctz, plan to switch to a preferable ACE lisinopril  (PRINIVIL,ZESTRIL) 10 MG tablet   2. Type 2 diabetes mellitus without complication, without long-term current use of insulin (H) , MARKEDLY IMPROVED CONTROL OF DAILY BS's Microalbumin, Random Urine    metFORMIN (GLUCOPHAGE XR) 500 MG 24 hr tablet   3. Hypokalemia, mild Basic Metabolic Panel   4. Acquired hypothyroidism, reheck in 3 months, if TSH continues elevated then increase levothyroxine  CANCELED: Thyroid Cascade   5. Vitamin D deficiency         PLAN:        STOP HYDROCHLOROTHIAZIDE    START LISINOPRIL 10 MG  Taken 1/2 pill daily for the first week.  Then if able to tolerate we will increase to 10 mg daily.  NURSE BP CHECK IN ONE WEEK  FOLLOW UP IN 3-4 WEEKS.    CHECK FASTING LIPID WITH NEXT A1C AND THYROID CASCADE IN 3 MONTHS.    +++++++++++++++++++++++++++++++++++++++++++++++++++++++++++++++++++  YOUR VITAMIN D LEVEL IS LOW.    THIS CAN RESULT IN POOR CALCIUM ABSORPTION AND ULTIMATELY IN OSTEOPOROSIS.    PLEASE BEGIN VITAMIN D (OVER THE COUNTER) AT 4000 UNITS DAILY FOR 3  MONTH(S)  AND THEN 2000 UNITS DAILY THEREAFTER.    YOU ALSO SHOULD BE GETTING 1200 TO 1500 MG OF CALCIUM DAILY, THE EQUIVALENT OF 4 SERVINGS FROM THE [employer].    IF YOUR VITAMIN D LEVEL WAS VERY LOW (<20) WE MAY WANT TO CONSIDER A RECHECK LEVEL IN 3 MONTHS.       DIABETES TIPS  Weight control and exercise are important.  Try to check your blood glucose every day.  Fasting blood sugar should be in the range of .  In case blood sugar is too low you should have quick access to a source of sugar such as orange juice or a candy bar.  If low blood sugars are occurring frequently let us know as a  medication adjustment  Is neessary.

## 2021-06-03 NOTE — PROGRESS NOTES
Assessment:      Healthy male exam.    Encounter Diagnoses   Name Primary?     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      History of TIA (transient ischemic attack) and stroke      Screening PSA (prostate specific antigen)      Flu vaccine need      Special screening for malignant neoplasms, colon      Health care maintenance      Polydipsia      Polyuria      Type 2 diabetes mellitus without complication, without long-term current use of insulin (H), NEW DX, UNCONTROLLED, METFORMIN INITIATED ALONG WITH DIABETIC DIET.          Plan:       All questions answered.       Check with insurance on Shingrix coverage    Fasting labs    Flu vaccine    Schedule colonoscopy    Start metformin  mg two times a day for 5 days, then 1000 mg am/500mg pm for 5 days, then 1000 mg twice daily    Referral to Diabetic Ed    Yearly eye exam    Follow up in 2 weeks    BP check in one week.    Nature of diabetes, treatment and follow up reviewed with the pt    At next visit consider stopping HCTZ and starting LISINOPRIL.       Subjective:      Raghavendra Kiser is a 59 y.o. male who presents for an annual exam. The patient reports that there is not domestic violence in his life.     Healthy Habits:   Regular Exercise: Yes  Sunscreen Use: Yes  Healthy Diet: Yes  Dental Visits Regularly: Yes  Seat Belt: Yes  Sexually active: Yes  Monthly Self Testicular Exams:  Yes  Hemoccults: N/A  Flex Sig: N/A  Colonoscopy: Yes and is due as last had at age 48  Lipid Profile: Yes  Glucose Screen: Yes  Prevention of Osteoporosis: Yes  Last Dexa: N/A  Guns at Home:  No      Immunization History   Administered Date(s) Administered     INFLUENZA,RECOMBINANT,INJ,PF QUADRIVALENT 18+YRS 10/16/2018     Influenza,  seasonal,quad inj 6-35 mos 10/20/2014     Influenza,seasonal,quad inj =/> 6months 09/14/2015, 09/15/2017     Tdap 09/14/2015     Immunization status: up to date and documented.    No exam data present    Current Outpatient Medications   Medication Sig Dispense Refill     allopurinol (ZYLOPRIM) 300 MG tablet Take 300 mg by mouth alternating with 450 mg by mouth every other day 135 tablet 3     aspirin 325 MG tablet Take 1 tablet (325 mg total) by mouth daily. Hold x 7 days  0     hydroCHLOROthiazide (HYDRODIURIL) 12.5 MG tablet TAKE 1 TABLET DAILY. NO FURTHER REFILLS UNTIL SEEN IN CLINIC. CALL 24/7 TO MAKE APPOINTMENT. 90 tablet 0     levothyroxine (SYNTHROID, LEVOTHROID) 125 MCG tablet Take 125 mcg by mouth Daily at 6:00 am.        rosuvastatin (CRESTOR) 10 MG tablet Take 5 mg by mouth bedtime.        metFORMIN (GLUCOPHAGE XR) 500 MG 24 hr tablet Then after 5 days increase to two tablet am and one in pm.  And after 5 more days increase to two tablets orally twice daily. 120 tablet 1     No current facility-administered medications for this visit.      Past Medical History:   Diagnosis Date     Atherosclerosis of right carotid artery- ulcerated plaque      Carotid stenosis, bilateral      Cervical radiculopathy      Chronic gout      Fracture of right humerus      Ganglion cyst      High cholesterol      Humerus fracture     Right     Hypertension      Hypothyroidism      Medial epicondylitis      Shoulder tendinitis, right      Stroke (H)      Superficial venous thrombosis of arm     right lateral antecubital fossa     TIA (transient ischemic attack) 5/16/15     Tinnitus      Past Surgical History:   Procedure Laterality Date     CAROTID ENDARTERECTOMY       OK THROMBOENDARTECTMY NECK,NECK INCIS Right 6/8/2015    Procedure: Right Carotid Endarterectomy with Impulse Monitoring;  Surgeon: Marcellus Toth MD;  Location: [facility];  Service: General     TONSILLECTOMY       Patient has no known allergies.  Family  History   Problem Relation Age of Onset     Cancer Mother         Lung     Cancer Father         Lung     Cancer Sister         Llung cancer     Heart disease Brother          of a heart attack.     No Medical Problems Son      No Medical Problems Sister      No Medical Problems Sister      No Medical Problems Sister      No Medical Problems Sister      Diabetes Brother      No Medical Problems Brother      Social History     Socioeconomic History     Marital status:      Spouse name: Not on file     Number of children: Not on file     Years of education: Not on file     Highest education level: Not on file   Occupational History     Not on file   Social Needs     Financial resource strain: Not on file     Food insecurity:     Worry: Not on file     Inability: Not on file     Transportation needs:     Medical: Not on file     Non-medical: Not on file   Tobacco Use     Smoking status: Former Smoker     Packs/day: 2.00     Years: 20.00     Pack years: 40.00     Last attempt to quit: 2/15/1993     Years since quittin.7     Smokeless tobacco: Never Used   Substance and Sexual Activity     Alcohol use: Yes     Alcohol/week: 0.0 standard drinks     Comment: rarely     Drug use: No     Sexual activity: Yes     Partners: Female   Lifestyle     Physical activity:     Days per week: Not on file     Minutes per session: Not on file     Stress: Not on file   Relationships     Social connections:     Talks on phone: Not on file     Gets together: Not on file     Attends Islam service: Not on file     Active member of club or organization: Not on file     Attends meetings of clubs or organizations: Not on file     Relationship status: Not on file     Intimate partner violence:     Fear of current or ex partner: Not on file     Emotionally abused: Not on file     Physically abused: Not on file     Forced sexual activity: Not on file   Other Topics Concern     Not on file   Social History Narrative    The  patient lives with family.    He is      He has 1 son   Who is 23-year-old.  Son is currently in halfway  due to recurrent drug use problem.    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    Maynor Lanza MD  10/16/2018           Review of Systems  General:  dry mouth and more freqeunt urination  Eyes: Denies problem  Ears/Nose/Throat: Denies problem  Cardiovascular: Denies problem  Respiratory:  Denies problem  Gastrointestinal:  Denies problem  Genitourinary: get up 1-2 times at night over the past 2 weeks since increased thirst and since drinking more water  Musculoskeletal:  Denies problem  Skin: Denies problem  Neurologic: Denies problem  Psychiatric: Denies problem  Endocrine: Denies problem  Heme/Lymphatic: Denies problem   Allergic/Immunologic: Denies problem        Objective:     Vitals:    11/08/19 1151 11/08/19 1213   BP: (!) 148/96 (!) 149/111   Pulse: 79    Resp: 20    Temp: 97.6  F (36.4  C)    TempSrc: Oral    Weight: (!) 239 lb 12.8 oz (108.8 kg)    Height: 6' (1.829 m)      Body mass index is 32.52 kg/m .    Physical  General Appearance: Alert, cooperative, no distress, appears stated age  Head: Normocephalic, without obvious abnormality, atraumatic  Eyes: PERRL, conjunctiva/corneas clear, EOM's intact  Ears: Normal TM's and external ear canals, both ears  Nose: Nares normal, septum midline,mucosa normal, no drainage  Throat: Lips, mucosa, and tongue normal; teeth and gums normal  Neck: Supple, symmetrical, trachea midline, no adenopathy;  thyroid: not enlarged, symmetric, no tenderness/mass/nodules; no carotid bruit or JVD  Back: Symmetric, no curvature, ROM normal, no CVA tenderness  Lungs: Clear to auscultation bilaterally, respirations unlabored  Heart: Regular rate and rhythm, S1 and S2 normal, no murmur, rub, or gallop,  Abdomen: Soft, non-tender, bowel sounds active all four quadrants,  no masses, no organomegaly  Genitourinary: Penis normal. Right testis is descended. Left  testis is descended.   PROSTATE SMOOTH SYMMETRIC WITHOUT NODULARITY, TX, OR FIRMNESS  Musculoskeletal: restless legs  Extremities: Extremities normal, atraumatic, no cyanosis or edema  Skin: Skin color, texture, turgor normal, no rashes or lesions  Lymph nodes: Cervical, supraclavicular, and axillary nodes normal  Neurologic: He is alert. He has normal reflexes.   Psychiatric: He has a normal mood and affect.       Results for orders placed or performed in visit on 10/16/18   Comprehensive Metabolic Panel   Result Value Ref Range    Sodium 142 136 - 145 mmol/L    Potassium 4.2 3.5 - 5.0 mmol/L    Chloride 105 98 - 107 mmol/L    CO2 26 22 - 31 mmol/L    Anion Gap, Calculation 11 5 - 18 mmol/L    Glucose 122 70 - 125 mg/dL    BUN 20 8 - 22 mg/dL    Creatinine 0.92 0.70 - 1.30 mg/dL    GFR MDRD Af Amer >60 >60 mL/min/1.73m2    GFR MDRD Non Af Amer >60 >60 mL/min/1.73m2    Bilirubin, Total 0.8 0.0 - 1.0 mg/dL    Calcium 8.8 8.5 - 10.5 mg/dL    Protein, Total 6.6 6.0 - 8.0 g/dL    Albumin 3.9 3.5 - 5.0 g/dL    Alkaline Phosphatase 82 45 - 120 U/L    AST 21 0 - 40 U/L    ALT 29 0 - 45 U/L     Lab Results   Component Value Date    HGBA1C 11.7 (H) 11/08/2019     Lab Results   Component Value Date    PSA 1.7 10/16/2018    PSA 1.4 02/21/2018    PSA 1.6 05/26/2017     Lab Results   Component Value Date    CHOL 140 10/16/2018    CHOL 128 05/26/2017    CHOL 124 10/26/2015     Lab Results   Component Value Date    HDL 26 (L) 10/16/2018    HDL 29 (L) 05/26/2017    HDL 30 (L) 10/26/2015     Lab Results   Component Value Date    LDLCALC  10/16/2018      Comment:      Invalid, Triglycerides >400    LDLCALC 49 05/26/2017    LDLCALC 55 10/26/2015     Lab Results   Component Value Date    TRIG 563 (H) 10/16/2018    TRIG 248 (H) 05/26/2017    TRIG 197 (H) 10/26/2015     No components found for: CHOLHDL  \            No results found for: [id number]

## 2021-06-03 NOTE — TELEPHONE ENCOUNTER
Spoke to patient and he said he is doing well.  Checked his blood sugar on Sunday and it was in the 400s, checked BG this AM and it was 172.  Pt states he is tolerating the Metformin, no concerns/side effects.  Scheduled to see diabetic educator 11/18/19 and follow up with Dr. Maciel 11/22/19.

## 2021-06-03 NOTE — TELEPHONE ENCOUNTER
Unable to reach patient to get him scheduled with provider. Left message for patient to call back so we can get him scheduled.

## 2021-06-03 NOTE — PROGRESS NOTES
Follow Up Blood Pressure Check    Raghavendra Kiser is a 59 y.o. male recommended to follow up for blood pressure check by Luis Daniel Maciel MD. Anihypertensive medications and adherence were verified: Yes.     Reason for visit: Elevated blood pressure    Medication change at last visit: PLAN:          STOP HYDROCHLOROTHIAZIDE     START LISINOPRIL 10 MG  Taken 1/2 pill daily for the first week.  Then if able to tolerate we will increase to 10 mg daily.  NURSE BP CHECK IN ONE WEEK  FOLLOW UP IN 3-4 WEEKS.     CHECK FASTING LIPID WITH NEXT A1C AND THYROID CASCADE IN 3 MONTHS.    Today is the last day of taking 1/2 tablet.  No side effects.  He will increase to a full tablet now.     Today's Vitals:   Vitals:    11/29/19 0807 11/29/19 0808   BP: (!) 149/93 115/79   Pulse:  61       Home blood pressure readings brought in today:     None    Lowest blood pressure today is less than 140/90 and they deny signs or symptoms of new onset  Phyllis Jay    Current Outpatient Medications   Medication Sig Dispense Refill     allopurinol (ZYLOPRIM) 300 MG tablet Take 300 mg by mouth alternating with 450 mg by mouth every other day 135 tablet 3     aspirin 325 MG tablet Take 1 tablet (325 mg total) by mouth daily. Hold x 7 days  0     blood glucose meter (GLUCOMETER) Use 1 each As Directed as needed. Dispense glucometer brand per patient's insurance at pharmacy discretion. 1 each 0     blood glucose test strips Use 1 each As Directed as needed. Dispense brand per patient's insurance at pharmacy discretion. 100 strip 3     generic lancets (FINGERSTIX LANCETS) Dispense brand per patient's insurance at pharmacy discretion. 100 each 3     levothyroxine (SYNTHROID, LEVOTHROID) 125 MCG tablet Take 125 mcg by mouth Daily at 6:00 am.        lisinopril (PRINIVIL,ZESTRIL) 10 MG tablet Take 1 tablet (10 mg total) by mouth daily. 30 tablet 2     metFORMIN (GLUCOPHAGE XR) 500 MG 24 hr tablet Take 2 tablets (1,000 mg total) by mouth 2  (two) times a day. 360 tablet 3     rosuvastatin (CRESTOR) 10 MG tablet Take 5 mg by mouth bedtime.        No current facility-administered medications for this visit.

## 2021-06-03 NOTE — PATIENT INSTRUCTIONS - HE
Goals for Diabetes Care:    1. Eat 3 balanced meals each day - Monitor carb intake and limit to 60-75grams per meal  This would be equal to 4 choices ~  1 choice = 15 grams    Do not wait longer than 4-5 hours to eat something  Snacks limit to no more than 15-30 grams of carbohydrates or 1-2 choices  Make sure you include protein source with each meal and at bedtime - this has been shown to help with blood glucose elevations    2. Check blood sugars at least 1-2 times each day   Blood Glucose Targets:   1. Fasting and before meal target is 80 - 130   2. 2 hours after a meal target is < 180  Always remember to bring meter and log book to all appointments.    3. Activity really helps improve blood sugars. Try to Incorporate 30 minutes activity into each day - does not need to be all at one time & walking counts!    4. Take diabetes medications as prescribed   -Metformin 500 mg three times daily. Increase to 4 daily tomorrow.    Follow up for A1C in 3 months and with your Doctor. Follow up with your Diabetic Educator as needed to assess BG targets and need for modifications to medications and/or lifestyle.    Call with any questions.  Thank you,  Mira Hines RDN, LD, CDE  Certified Diabetes Educator  [facility]  [phone]

## 2021-06-03 NOTE — TELEPHONE ENCOUNTER
----- Message from Luis Daniel Maciel MD sent at 11/12/2019  8:17 AM CST -----  Please see if pt has any questions about newly diagnosed Type 2 diabetes.  He should have a Diabetic Ed appt, a follow up appt with me in the next week or two.   Also how have his blood sugars been doing and is metformin tolerated so far.  thanks

## 2021-06-03 NOTE — PROGRESS NOTES
Follow Up Blood Pressure Check    Raghavendra Kiser is a 59 y.o. male recommended to follow up for blood pressure check by Luis Daniel Maciel MD. Anihypertensive medications and adherence were verified: Yes.     Reason for visit: Elevated BP at last OV on 11/08/19    Medication change at last visit: No medication changes at last visit.  Currently taking HCTZ 12.5 mg daily.    Today's Vitals:   Vitals:    11/11/19 1422 11/11/19 1430   BP: (!) 154/92 126/85   Patient Site: Left Arm Left Arm   Patient Position: Sitting Sitting   Cuff Size: Adult Large Adult Large   Pulse: 82 82         Lowest blood pressure today is less than 140/90 and they deny signs or symptoms of new onset: severe headache, fatigue, confusion, vision changes, chest pain, pounding in the chest, neck, ears, irregular heartbeat, difficulty breathing and blood in the urine.  Please inform patient of his/her blood pressure today.  If they are asymptomatic, the patient is to continue current medications.  This message will be routed to their provider, and they will be notified if a change in medication is recommended.      Current Outpatient Medications   Medication Sig Dispense Refill     allopurinol (ZYLOPRIM) 300 MG tablet Take 300 mg by mouth alternating with 450 mg by mouth every other day 135 tablet 3     aspirin 325 MG tablet Take 1 tablet (325 mg total) by mouth daily. Hold x 7 days  0     blood glucose meter (GLUCOMETER) Use 1 each As Directed as needed. Dispense glucometer brand per patient's insurance at pharmacy discretion. 1 each 0     blood glucose test strips Use 1 each As Directed as needed. Dispense brand per patient's insurance at pharmacy discretion. 100 strip 3     generic lancets (FINGERSTIX LANCETS) Dispense brand per patient's insurance at pharmacy discretion. 100 each 3     hydroCHLOROthiazide (HYDRODIURIL) 12.5 MG tablet TAKE 1 TABLET DAILY. NO FURTHER REFILLS UNTIL SEEN IN CLINIC. CALL 24/7 TO MAKE APPOINTMENT. 90 tablet 0      levothyroxine (SYNTHROID, LEVOTHROID) 125 MCG tablet Take 125 mcg by mouth Daily at 6:00 am.        metFORMIN (GLUCOPHAGE XR) 500 MG 24 hr tablet 1 tablet orally twice a day. After 5 days increase to 2 tabs am and 1 in pm.  And after 5 more days increase to 2 tabs orally twice daily. 120 tablet 1     rosuvastatin (CRESTOR) 10 MG tablet Take 5 mg by mouth bedtime.        No current facility-administered medications for this visit.

## 2021-06-03 NOTE — PROGRESS NOTES
Diabetes Care  Assessment:     Patient is here today for a consult and assessment regarding his diabetes management with  A1c presently not at ADA goal of <7.0 %. New diagnosis this year. Pre-diabetes last year with 6.4 A1C.    Patient's current meal plan:  Breakfast: Yogurt, coffee, fruit cup  Snack: fruit/vegetables  Lunch: sandwich, fruit, water  Snack: celery/peanut butter  Dinner: protein, vegetable, starch    Snack: not typically  Pt reports making big changes to diet since diagnosis. Checking BG 2-3 times daily    Current activity: None -  Hoping to start going to the gym again and walk 30-60 minutes  Discussed importance of checking BG. Patient has Freestyle glucose meter. Reviewed when to check BG, importance of keeping a log and when to call a provider.  Reviewed medications patient is taking. Tolerating Metformin well - on 1500 mg currently and increasing to 4 tabs tomorrow per discussion.  Provided education on diabetes diease, BG goals, A1C, signs/symptoms of hypo/hypoglycemia and how to treat. Also discussed risks of complications with poorly controlled diabetes. Patient is a non smoker.     Pt did bring meter or log book for review.  Blood Glucoses   FBS: 165, 191, 200, 201  Post meals: 183, 142, 120, 125, 184, 121, 118, 173  Before lunch/dinner: 158, 216, 206, 226, 173  Low Blood Sugars: none    Plan/Goals:   1. Monitor po intake limiting carbohydrates to 60-75 grams per meal.   2. Increasing activity to 30 minutes/day 5x weekly.  3. Monitor BG 1-2 times daily  4. Continue Metformin as prescribed    Subjective and Objective:      Raghavendra Kiser is referred by Dr. Maciel for Diabetes Education.     Lab Results   Component Value Date    HGBA1C 11.7 (H) 11/08/2019       Current diabetes medications:    1. Metformin 1500 mg/day     Goals       Activity      Increase activity to 30 minutes/day 5 days per week        Healthy Eating      Eat 3 meals daily aiming for 45-60 grams/meal and add 15 gram +  protein snack daily            Follow up:   CDE (certified diabetic educator) - plans to follow up in [place] as it is more convenient.      Education:     Monitoring   Provided information on how to check BG. Patient able to us glucometer with no issues. Encouraged pt to monitor BG 2x per day. Discussed keeping a log of BG and discussed when and who to call when BG are not in target range. Reviewed FBS goals of  mg/dL and 2 hr post meal BG goal of <180 mg/dL.   Meter (per above goals): Assessed, Discussed and Literature provided  Monitoring: Assessed, Discussed and Literature provided  BG goals: Assessed, Discussed and Literature provided    Nutrition Management/Healthy Eating  Healthy Eating: Reviewed carbohydrates, label reading, importance of making healthy choices, meal planning and eating out. Discussed 1 carbohydrate = 15 grams of carbohydrate on a food label. Discussed appropriate balance of carbohydrates at each meal. 60-75 grams discussed per meal each day. Snacks encouraged 15-30 grams of carbohydrates with protein.   Nutrition Management: Assessed, Discussed and Literature provided  Weight: Assessed, Discussed and Literature provided  Portions/Balance: Assessed, Discussed and Literature provided  Carb ID/Count: Assessed, Discussed and Literature provided  Label Reading: Assessed, Discussed and Literature provided  Heart Healthy Fats: Assessed, Discussed and Literature provided  Menu Planning: Assessed, Discussed and Literature provided  Dining Out: Assessed, Discussed and Literature provided    Being Active:  Reviewed importance of movement for management of diabetes. Discussed ways to increase activity. Pt discussed wanting to incorporate walking more often - has gym membership. Hopes to go 3 times per week to start.  Physical Activity: Assessed, Discussed and Literature provided    Taking Medication:  Discussed medications patient is on. Also discussed potential medications and progression of  the disease. Reviewed side effects of medications and scheduling them into their daily schedule.   Medications: Assessed, Discussed and Literature provided  Orals: Assessed, Discussed and Literature provided  Injected Medications: Not addressed  Storage/Exp:Not addressed   Site Rotation: Not addressed   Sites Assessed: no    Diabetes Disease Process: Assessed, Discussed and Literature provided    Acute Complications: Prevent, Detect, Treat/Reducing Risks:  Reducing Risks: Provided education on diabetes diease, BG goals, A1C, signs/symptoms of hypo/hypoglycemia and how to treat. Also discussed risks of complications with poorly controlled diabetes. Patient is a non smoker. Reviewed once yearly eye doctor visits, twice yearly dentist visits, foot care and risks of complications and how to decrease risks.   Pt reports some blurry vision- discussed eye appointment and likelihood of changing BG effecting vision.  Hypoglycemia: Assessed, Discussed and Literature provided  Hyperglycemia: Assessed, Discussed and Literature provided  Sick Days: Assessed, Discussed and Literature provided  Driving: Not addressed    Chronic Complications/Reducing Risks:  Foot Care:Assessed, Discussed and Literature provided  Skin Care: Assessed, Discussed and Literature provided  Eye: Assessed, Discussed and Literature provided  ABC: Assessed, Discussed and Literature provided  Teeth:Assessed, Discussed and Literature provided    Healthy Coping and Problem Solving:  Problem Solving: Reviewed learning from experiences and discussed importance of analyzing trouble areas and ways to evaluate and improve from those situations   Healthy Coping: Discussed importance of healthy hobbies, reviewed support groups and benefits of activity.   Goal Setting and Problem Solving: Assessed, Discussed and Literature provided  Barriers: Assessed, Discussed and Literature provided  Psychosocial Adjustments: Assessed, Discussed and Literature provided      Time  spent with the patient: 60 minutes for diabetes education and counseling.   Previous Education: no  Visit Type:DSMT  Hours Remaining: DSMT 9 and MNT 3      Thank you,  Mira Hines RDN, BRINDA, CDE  11/18/2019  Any diabetes medication dose changes were made via the CDE Protocol. A copy of this encounter was shared with the provider.

## 2021-06-03 NOTE — PATIENT INSTRUCTIONS - HE
Check with insurance on Shingrix coverage    Fasting labs    Flu vaccine    Schedule colonoscopy    Start metformin  mg two times a day for 5 days, then 1000 mg am/500mg pm for 5 days, then 1000 mg twice daily    Referral to Diabetic Ed    Yearly eye exam    Follow up in 2 weeks.     At next visit consider stopping HCTZ and starting LISINOPRIL.

## 2021-06-03 NOTE — PATIENT INSTRUCTIONS - HE
STOP HYDROCHLOROTHIAZIDE    START LISINOPRIL 10 MG  Taken 1/2 pill daily for the first week.  Then if able to tolerate we will increase to 10 mg daily.  NURSE BP CHECK IN ONE WEEK  FOLLOW UP IN 3-4 WEEKS.    CHECK FASTING LIPID WITH NEXT A1C AND THYROID CASCADE IN 3 MONTHS.    +++++++++++++++++++++++++++++++++++++++++++++++++++++++++++++++++  YOUR VITAMIN D LEVEL IS LOW.    THIS CAN RESULT IN POOR CALCIUM ABSORPTION AND ULTIMATELY IN OSTEOPOROSIS.    PLEASE BEGIN VITAMIN D (OVER THE COUNTER) AT 4000 UNITS DAILY FOR 3  MONTH(S)  AND THEN 2000 UNITS DAILY THEREAFTER.    YOU ALSO SHOULD BE GETTING 1200 TO 1500 MG OF CALCIUM DAILY, THE EQUIVALENT OF 4 SERVINGS FROM THE [facility] [employer].    IF YOUR VITAMIN D LEVEL WAS VERY LOW (<20) WE MAY WANT TO CONSIDER A RECHECK LEVEL IN 3 MONTHS.

## 2021-06-04 ENCOUNTER — COMMUNICATION - HEALTHEAST (OUTPATIENT)
Dept: FAMILY MEDICINE | Facility: CLINIC | Age: 61
End: 2021-06-04

## 2021-06-04 VITALS
WEIGHT: 235.25 LBS | RESPIRATION RATE: 16 BRPM | BODY MASS INDEX: 31.91 KG/M2 | DIASTOLIC BLOOD PRESSURE: 88 MMHG | HEART RATE: 58 BPM | SYSTOLIC BLOOD PRESSURE: 146 MMHG | TEMPERATURE: 97.2 F

## 2021-06-04 VITALS
TEMPERATURE: 96.2 F | HEART RATE: 74 BPM | DIASTOLIC BLOOD PRESSURE: 87 MMHG | WEIGHT: 227 LBS | RESPIRATION RATE: 16 BRPM | SYSTOLIC BLOOD PRESSURE: 142 MMHG | HEIGHT: 72 IN | BODY MASS INDEX: 30.75 KG/M2

## 2021-06-04 DIAGNOSIS — I10 ESSENTIAL HYPERTENSION: ICD-10-CM

## 2021-06-04 NOTE — PROGRESS NOTES
Diabetes Care Follow Up Visit    Assessment: Mauri is here for follow up on BG log and review of goals.   Patient arrived with his glucose log.   Reports checking BG 2 times daily  Blood Glucose Readings (mg/dL):  Fastin, 120, 135, 93, 90, 114, 112, 111, 127, 122, 122, 103, 104  After meals: 118, 86, 91, 91  Before Dinner: 100, 98, 87, 72, 90, 102, 78, 105, 109, 122, 90, 87, 87    Average overall BG based on Numbers above: 104 mg/dL    Low BG: two - 72, 78 mg/dL. Had skipped lunch and went to the Gym. Discussed treatment and how to prevent.  Pt reports having lost 10 lbs since diagnosis with lifestyle changes and adding activity.  Patient has been eating 3 meals a day and monitoring portions. He recently started going to the gym for 30-45 minutes 3x/day.   Recently had an eye exam with no issues. Pt has significant blurry vision at time of diagnosis and reports it has significantly improved.     Current meal plan.:  Breakfast: 3/4 cup cereal (shredded wheat or Kashi) plus 4 ounces of milk with 1 yogurt and a small fruit cup or banana  Lunch: sandwich on 2 slices of wheat bread with ham and cheese with an apple. Drinks water  Dinner: Roast beef, salad, green beans with potato (small amount).   Beverages: Water    Plan:   1. Monitor po intake limiting carbohydrates to 45-60 grams per meal.   2. Increasing activity to 30 minutes/day 5x weekly. Currently active 3 days/week  3. Monitor BG 1-2 times daily  4. Follow up with CDE in 6 to assess BG and adjust medications as needed.    Subjective and Objective:      Raghavendra Kiser is referred by Dr. Maciel for Diabetes Education.     Lab Results   Component Value Date    HGBA1C 11.7 (H) 2019     Hemoglobin A1C: 6.4% (10/18/18)    Current diabetes medications:    1. Metformin 2,000 mg daily (2 500 mg tabs BID)      Goals       Activity      Increase activity to 30 minutes/day 5 days per week        Healthy Eating      Eat 3 meals daily aiming for 45-60  grams/meal and add 15 gram + protein snack daily          Follow up:   Primary care visit for Diabetes check and A1C in February 2020  CDE (certified diabetic educator) in 6 months or before if questions/needs      Education:     Monitoring   Provided information on how to check BG.  Encouraged pt to monitor BG 1-2 per day. Discussed keeping a log of BG and discussed when and who to call when BG are not in target range. Reviewed FBS goals of  mg/dL and 2 hr post meal BG goal of <180 mg/dL.   Meter (per above goals): Discussed and Competent  Monitoring: Discussed and Competent  BG goals: Discussed and Competent    Nutrition Management/Healthy Eating  Healthy Eating: Reviewed carbohydrates, label reading, importance of making healthy choices, meal planning and eating out. Discussed 1 carbohydrate = 15 grams of carbohydrate on a food label. Discussed appropriate balance of carbohydrates at each meal. 45-60 grams discussed per meal each day. Snacks encouraged 15-30 grams of carbohydrates with protein.   Nutrition Management: Discussed and Competent  Weight: Discussed and Competent - has lost 10 lbs since diagnosis  Portions/Balance: Discussed and Competent  Carb ID/Count: Discussed and Competent  Label Reading: Discussed and Competent  Heart Healthy Fats: Discussed and Competent  Menu Planning: Discussed and Competent  Dining Out: Discussed and Competent    Being Active:  Reviewed importance of movement for management of diabetes. Discussed ways to increase activity. Pt discussed wanting to incorporate more days at the gym. Goal is 5 days/week. Currently going 3-4 days/week.  Physical Activity: Discussed and Competent    Taking Medication:  Discussed medications patient is on. Also discussed potential medications and progression of the disease. Reviewed side effects of medications and scheduling them into their daily schedule.   Medications: Discussed and Competent  Orals: Discussed and Competent  Injected  Medications: Not addressed   Storage/Exp:Not addressed   Site Rotation: Not addressed   Sites Assessed: no    Diabetes Disease Process: Discussed and Competent    Acute Complications: Prevent, Detect, Treat/Reducing Risks:  Reducing Risks: Provided education on diabetes diease, BG goals, A1C, signs/symptoms of hypo/hypoglycemia and how to treat. Also discussed risks of complications with poorly controlled diabetes. Patient is a non smoker. Reviewed once yearly eye doctor visits, twice yearly dentist visits, foot care and risks of complications and how to decrease risks.   Hypoglycemia: Discussed and Competent  Hyperglycemia: Discussed and Competent  Sick Days: Competent  Driving: Not addressed    Chronic Complications/Reducing Risks:  Foot Care:Competent  Skin Care: Competent  Eye: Competent  ABC: Competent  Teeth:Competent    Healthy Coping and Problem Solving:  Problem Solving: Reviewed learning from experiences and discussed importance of analyzing trouble areas and ways to evaluate and improve from those situations   Healthy Coping: Discussed importance of healthy hobbies, reviewed support groups and benefits of activity.   Goal Setting and Problem Solving: Discussed and Competent  Barriers: Discussed and Competent  Psychosocial Adjustments: Discussed and Competent      Time spent with the patient: 30 minutes for diabetes education and counseling.   Previous Education: yes  Visit Type:DSMT  Hours Remaining: DSMT 8.5 and MNT 3   Diagnosed Nov. 2019      Thank you,  BRINDA Auguste RDN, CDE  12/16/2019  Any diabetes medication dose changes were made via the CDE Protocol. A copy of this encounter was shared with the provider.

## 2021-06-04 NOTE — PROGRESS NOTES
HPI: feels pretty darn good.  Saw diab ed  Blood sugars have for the most part been < 120  Had a recent eye exam      MEDS: REVIEWED  ALLERGIES: REVIEWED  PMH: REVIEWED  PSH: REVIEWED    SMOKING:  none      EXAM:  /82 (Patient Site: Left Arm, Patient Position: Sitting, Cuff Size: Adult Large)   Pulse 72   Temp 97  F (36.1  C) (Oral)   Resp 20   Wt (!) 241 lb 9.6 oz (109.6 kg)   BMI 32.77 kg/m    GEN: ALERT, ORIENTED TIMES THREE, NAD  LUNGS: CTA  COR: RRR WITHOUT MURMUR  FEET:  Foot exam 11-22-19    LABS:    Lab Results   Component Value Date    HGBA1C 11.7 (H) 11/08/2019     Lab Results   Component Value Date    CHOL 183 11/08/2019    CHOL 140 10/16/2018    CHOL 128 05/26/2017     Lab Results   Component Value Date    HDL 24 (L) 11/08/2019    HDL 26 (L) 10/16/2018    HDL 29 (L) 05/26/2017     Lab Results   Component Value Date    LDLCALC  11/08/2019      Comment:      Invalid, Triglycerides >400    LDLCALC  10/16/2018      Comment:      Invalid, Triglycerides >400    LDLCALC 49 05/26/2017     Lab Results   Component Value Date    TRIG 842 (H) 11/08/2019    TRIG 563 (H) 10/16/2018    TRIG 248 (H) 05/26/2017     Lab Results   Component Value Date    TSH 8.19 (H) 11/08/2019       No components found for: CHOLHDL    Chemistry        Component Value Date/Time     11/22/2019 1050    K 4.1 11/22/2019 1050     11/22/2019 1050    CO2 29 11/22/2019 1050    BUN 17 11/22/2019 1050    CREATININE 1.08 11/22/2019 1050     (H) 11/22/2019 1050        Component Value Date/Time    CALCIUM 9.6 11/22/2019 1050    ALKPHOS 117 11/08/2019 1222    AST 20 11/08/2019 1222    ALT 29 11/08/2019 1222    BILITOT 1.0 11/08/2019 1222            IMP:    1. Hyperlipidemia, unspecified hyperlipidemia type  Comprehensive Metabolic Panel        OUT OF CONTROL WITH NEWLY DIAGNOSED DM.  RECHECK IN FEB 2020.  WILL INCREASE STATIN AT THAT POINT IF NECESSARY. Lipid Profile   2. Acquired hypothyroidism   SLIGHTLY ELEVATED TSH.   WILL RECHECK THYROID CASCADE IN FEB 2020 AND INCREASE LEVOTHYROXINE THEN IF NECESASRY Thyroid Cascade   3. Type 2 diabetes mellitus without complication, without long-term current use of insulin (H)   NEWLY DIAGNOSED TYPE 2 DM.  WELL CONTROLLED WITH DIET AND METFORMIN.   A1C DUE IN FEB 2020 Glycosylated Hemoglobin A1c       PLAN:      Next A1C on or after 2-22-20    Also in Feb check lipids, CMP, and thyroid cascade    Don't take metformin the night prior or the morning of the colonoscopy      DIABETES TIPS  Weight control and exercise are important.  Try to check your blood glucose every day.  Fasting blood sugar should be in the range of .  In case blood sugar is too low you should have quick access to a source of sugar such as orange juice or a candy bar.  If low blood sugars are occurring frequently let us know as a  medication adjustment  Is neessary.

## 2021-06-04 NOTE — PATIENT INSTRUCTIONS - HE
Goals for Diabetes Care:    1. Eat 3 balanced meals each day - Monitor carb intake and limit to 45-60 grams per meal  This would be equal to 4 choices ~  1 choice = 15 grams    Do not wait longer than 4-5 hours to eat something  Snacks limit to no more than 15-30 grams of carbohydrates or 1-2 choices  Make sure you include protein source with each meal and at bedtime - this has been shown to help with blood glucose elevations    2. Check blood sugars at least 1-2 times each day   Blood Glucose Targets:   1. Fasting and before meal target is 80 - 130   2. 2 hours after a meal target is < 180  Always remember to bring meter and log book to all appointments.    3. Activity really helps improve blood sugars. Try to Incorporate 30 minutes activity into each day - does not need to be all at one time & walking counts!    4. Take diabetes medications as prescribed   -Metformin 500 mg 2 tabs twice daily    Keep up the good work! Call to schedule in 6 months or before if needed.     Follow up with your Diabetic Educator as needed to assess BG targets and need for modifications to medications and/or lifestyle.    Call with any questions.  Thank you,  Mira Hines RDN, LD, CDE  Certified Diabetes Educator  [facility]  [phone]

## 2021-06-04 NOTE — TELEPHONE ENCOUNTER
Medication Question or Clarification  Who is calling: Pharmacy: Danny with [facility], [phone]  What medication are you calling about? (include dose and sig)   blood glucose test strips 100 strip 6 12/16/2019     Sig - Route: Use 1 each As Directed 2 (two) times a day. - Miscellaneous    Sent to pharmacy as: blood sugar diagnostic strips    Notes to Pharmacy: Freestyle Lite Test Strips    E-Prescribing Status: Transmission to pharmacy failed (12/16/2019  5:16 PM CST)      generic lancets 100 each 6 12/16/2019     Sig - Route: Use 1 each As Directed 2 (two) times a day. Dispense brand per patient's insurance at pharmacy discretion. - Miscellaneous    Sent to pharmacy as: lancets    Notes to Pharmacy: Freestyle Lancets    E-Prescribing Status: Transmission to pharmacy failed (12/16/2019  2:37 PM CST)        Who prescribed the medication?:   Luis Daniel Maciel MD  What is your question/concern?:   Above scripts failed to send.  Please re-send to local [facility].  Pharmacy:   [facility] #[id number]  Okay to leave a detailed message?: Yes  Site CMT - Please call the pharmacy to obtain any additional needed information.

## 2021-06-04 NOTE — PATIENT INSTRUCTIONS - HE
Next A1C on or after 2-22-20    Also in Feb check lipids, CMP, and thyroid cascade    Don't take metformin the night prior or the morning of the colonoscopy

## 2021-06-05 VITALS
HEART RATE: 66 BPM | BODY MASS INDEX: 30.81 KG/M2 | TEMPERATURE: 96.6 F | RESPIRATION RATE: 16 BRPM | WEIGHT: 227.2 LBS | SYSTOLIC BLOOD PRESSURE: 133 MMHG | DIASTOLIC BLOOD PRESSURE: 75 MMHG

## 2021-06-05 VITALS
TEMPERATURE: 96.6 F | HEART RATE: 76 BPM | WEIGHT: 223 LBS | SYSTOLIC BLOOD PRESSURE: 106 MMHG | RESPIRATION RATE: 16 BRPM | BODY MASS INDEX: 30.24 KG/M2 | DIASTOLIC BLOOD PRESSURE: 65 MMHG

## 2021-06-05 VITALS
WEIGHT: 236.8 LBS | BODY MASS INDEX: 32.12 KG/M2 | SYSTOLIC BLOOD PRESSURE: 152 MMHG | HEART RATE: 78 BPM | RESPIRATION RATE: 20 BRPM | OXYGEN SATURATION: 98 % | DIASTOLIC BLOOD PRESSURE: 95 MMHG

## 2021-06-05 VITALS
BODY MASS INDEX: 30.62 KG/M2 | DIASTOLIC BLOOD PRESSURE: 60 MMHG | SYSTOLIC BLOOD PRESSURE: 91 MMHG | TEMPERATURE: 96.7 F | HEART RATE: 76 BPM | RESPIRATION RATE: 16 BRPM | WEIGHT: 225.8 LBS

## 2021-06-05 NOTE — PROGRESS NOTES
ASSESSMENT AND PLAN:  Raghavendra Kiser 59 y.o. male is here for follow-up.  He has well controlled gout which is erosive clinical and ultrasonographic.  He is done great with allopurinol 300/250 mg alternate days serum urate within target range normal liver and kidney labs recently due for CBC to be done today.  He has noted pain in his right shoulder with features suggestive of tendinopathy, differential diagnosis management principles reviewed x-rays of the shoulders to be taken today.  Unless indicated otherwise we will meet here again in 12 months with labs today every 6 months.         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 is here for follow up of gout, which is diagnosed based on the clinical, ultrasonographic criteria, such as double contour sign.  He has had shoulder, neck pain.  He is on allopurinol 300/450 mg on alternate days.  He has not experienced any fever, rash mouth ulcers with this.  His uric acid is in target..  No recurrence of acute gout.  He has noted discomfort in his right shoulder this is where he had skiing accident nearly 10 years or so ago and had dislocation of the shoulder and what sounds like a fracture of the proximal humerus, it healed well more recently he has noted some discomfort with some of the activities, occasional nocturnal pain, more with activity, no radiation, no history of recurrent fall.  He is not typically woken up from sleep because of this.  The left shoulder does not trouble him.  He noted pain level to be 0/10.  He is able to do all his day-to-day activities without  difficulty.  He has not had stiffness in the morning.  No kidney stones.  Describes no fever or weight loss with elevation eye redness mild nausea cough is no rash.  His neck and shoulder pain have improved since he had disc surgery in 2017.Limitation on activities as noted in the MDHAQ scanned in the EMR.  Further historical information, including ROS as noted in the multidimensional health assessment questionnaire scanned in the EMR and in the assessment and plan section.        ALLERGIES:Patient has no known allergies.    PAST MEDICAL/ACTIVE PROBLEMS/MEDICATION/SOCIAL DATA  Past Medical History:   Diagnosis Date     Atherosclerosis of right carotid artery- ulcerated plaque      Carotid stenosis, bilateral      Cervical radiculopathy      Chronic gout      Fracture of right humerus      Ganglion cyst      High cholesterol      Humerus fracture     Right     Hypertension      Hypothyroidism      Medial epicondylitis      Shoulder tendinitis, right      Stroke (H)      Superficial venous thrombosis of arm     right lateral antecubital fossa     TIA (transient ischemic attack) 5/16/15     Tinnitus      Type 2 diabetes mellitus without complication, without long-term current use of insulin (H) 2019     Social History     Tobacco Use   Smoking Status Former Smoker     Packs/day: 2.00     Years: 20.00     Pack years: 40.00     Last attempt to quit: 2/15/1993     Years since quittin.9   Smokeless Tobacco Never Used     Patient Active Problem List   Diagnosis     Hypothyroidism     Hyperlipidemia     Chronic gout     TIA (transient ischemic attack)     Stroke (H)     Superficial venous thrombosis of arm - right lateral antecubital fossa     Atherosclerosis of right carotid artery - ulcerated plaque     Carotid stenosis     Medial epicondylitis     Ganglion cyst     High risk medication use     HTN (hypertension)     Right shoulder tendinitis     Cervical radiculopathy     History of TIA (transient  ischemic attack) and stroke     High blood triglycerides     Precordial pain     Type 2 diabetes mellitus without complication, without long-term current use of insulin (H)     Vitamin deficiency     Current Outpatient Medications   Medication Sig Dispense Refill     allopurinol (ZYLOPRIM) 300 MG tablet Take 300 mg by mouth alternating with 450 mg by mouth every other day 135 tablet 3     aspirin 325 MG tablet Take 1 tablet (325 mg total) by mouth daily. Hold x 7 days  0     blood glucose meter (GLUCOMETER) Use 1 each As Directed as needed. Dispense glucometer brand per patient's insurance at pharmacy discretion. 1 each 0     blood glucose test strips Use 1 each As Directed as needed. Dispense brand per patient's insurance at pharmacy discretion. 100 strip 3     blood glucose test strips Use 1 each As Directed 2 (two) times a day. 100 strip 6     cholecalciferol, vitamin D3, (VITAMIN D3) 4,000 unit cap Take 1 tablet by mouth daily.       generic lancets (FINGERSTIX LANCETS) Dispense brand per patient's insurance at pharmacy discretion. 100 each 3     generic lancets Use 1 each As Directed 2 (two) times a day. Dispense brand per patient's insurance at pharmacy discretion. 100 each 6     levothyroxine (SYNTHROID, LEVOTHROID) 125 MCG tablet Take 125 mcg by mouth Daily at 6:00 am.        lisinopril (PRINIVIL,ZESTRIL) 10 MG tablet Take 0.5 tablets (5 mg total) by mouth daily. 30 tablet 2     metFORMIN (GLUCOPHAGE XR) 500 MG 24 hr tablet Take 2 tablets (1,000 mg total) by mouth 2 (two) times a day. 360 tablet 3     rosuvastatin (CRESTOR) 10 MG tablet Take 5 mg by mouth bedtime.        No current facility-administered medications for this visit.      DETAILED EXAMINATION  01/06/20  :  Vitals:    01/06/20 1438   BP: 124/74   Pulse: 64   Weight: (!) 236 lb 9.6 oz (107.3 kg)   Height: 6' (1.829 m)     Alert oriented. Head including the face is examined for malar rash, heliotropes, scarring, lupus pernio. Eyes examined for  redness such as in episcleritis/scleritis, periorbital lesions.   Neck/ Face examined for parotid gland swelling, range of motion of neck.  Left upper and lower and right upper and lower extremities examined for tenderness, swelling, warmth of the appendicular joints, range of motion, edema, rash.  Some of the important findings included: He does not have crepitus in the shoulder on either side, he has noted no AC joint tenderness, there is no acutely inflamed joints amongst the palpable upper extremity ones.  Knees without effusion warmth or JLT.   He has [place] nodes.  LAB / IMAGING DATA:  ALT   Date Value Ref Range Status   01/03/2020 27 0 - 45 U/L Final   11/08/2019 29 0 - 45 U/L Final   07/09/2019 44 0 - 45 U/L Final     Albumin   Date Value Ref Range Status   01/03/2020 4.2 3.5 - 5.0 g/dL Final   11/08/2019 4.3 3.5 - 5.0 g/dL Final   07/09/2019 4.2 3.5 - 5.0 g/dL Final     Creatinine   Date Value Ref Range Status   01/03/2020 0.94 0.70 - 1.30 mg/dL Final   11/22/2019 1.08 0.70 - 1.30 mg/dL Final   11/08/2019 1.22 0.70 - 1.30 mg/dL Final       WBC   Date Value Ref Range Status   07/09/2019 5.3 4.0 - 11.0 thou/uL Final   02/21/2018 6.1 4.0 - 11.0 thou/uL Final   08/11/2015 4.1 4.0 - 11.0 thou/uL Final   06/09/2015 8.1 4.0 - 11.0 thou/uL Final     Hemoglobin   Date Value Ref Range Status   07/09/2019 15.3 14.0 - 18.0 g/dL Final   02/21/2018 15.2 14.0 - 18.0 g/dL Final   12/05/2017 15.7 14.0 - 18.0 g/dL Final     Platelets   Date Value Ref Range Status   07/09/2019 179 140 - 440 thou/uL Final   02/21/2018 153 140 - 440 thou/uL Final   12/05/2017 156 140 - 440 thou/uL Final       No results found for: RUSH

## 2021-06-05 NOTE — TELEPHONE ENCOUNTER
Refill Approved    Rx renewed per Medication Renewal Policy. Medication was last renewed on 12/30/19. (Pharmacy change).    Alcira Ring, [facility] Triage/Med Refill 1/6/2020     Requested Prescriptions   Pending Prescriptions Disp Refills     generic lancets 100 each 6     Sig: Use 1 each As Directed 2 (two) times a day. Dispense brand per patient's insurance at pharmacy discretion.       Diabetic Supplies Refill Protocol Passed - 1/6/2020  2:47 PM        Passed - Visit with PCP or prescribing provider visit in last 6 months     Last office visit with prescriber/PCP: 12/13/2019 Luis Daniel Maciel MD OR same dept: 12/13/2019 Luis Daniel Maciel MD OR same specialty: 12/13/2019 Luis Daniel Maciel MD  Last physical: 11/8/2019 Last MTM visit: Visit date not found   Next visit within 3 mo: Visit date not found  Next physical within 3 mo: Visit date not found  Prescriber OR PCP: Luis Daniel Maciel MD  Last diagnosis associated with med order: 1. Diabetes mellitus type 2, controlled (H)  - generic lancets; Use 1 each As Directed 2 (two) times a day. Dispense brand per patient's insurance at pharmacy discretion.  Dispense: 100 each; Refill: 6    If protocol passes may refill for 12 months if within 3 months of last provider visit (or a total of 15 months).             Passed - A1C in last 6 months     Hemoglobin A1c   Date Value Ref Range Status   01/03/2020 6.9 (H) 3.5 - 6.0 % Final

## 2021-06-05 NOTE — TELEPHONE ENCOUNTER
Medication Question or Clarification  Who is calling: fax  What medication are you calling about (include dose and sig)?:   (PRINIVIL,ZESTRIL) 10 MG tablet  5 mg, Oral, DAILY         Summary: Take 0.5 tablets (5 mg total) by mouth daily., Starting Fri 11/29/2019, No Print   Dose, Frequency: 5 mg, DAILY  Start: 11/29/2019  Ord/Sold: 11/29/2019 (O)  Report  Adh:   Taking:   Long-term:   Pharmacy: [facility]  [address] Dose History                        Who prescribed the medication?: Luis Daniel Maciel MD  What is your question/concern?: Last order for lisinopril is set to no print. [facility] asking [facility] 90 day supply.  Please advise.  Requested Pharmacy: [facility]  Okay to leave a detailed message?: Yes

## 2021-06-09 NOTE — TELEPHONE ENCOUNTER
RN cannot approve Refill Request    RN can NOT refill this medication medication not on med list.    Trish Granda, [facility] Triage/Med Refill 6/29/2020    Requested Prescriptions   Pending Prescriptions Disp Refills     hydroCHLOROthiazide (HYDRODIURIL) 12.5 MG tablet [Pharmacy Med Name: HYDROCHLOROTHIAZIDE TABS 12.5MG] 90 tablet 3     Sig: TAKE 1 TABLET DAILY (DUE FOR OFFICE VISIT PRIOR TO FURTHER REFILLS. NO FURTHER REFILLS UNTIL SEEN IN CLINIC. CALL 24/7 TO MAKE APPOINTMENT)       Diuretics/Combination Diuretics Refill Protocol  Passed - 6/27/2020  6:03 PM        Passed - Visit with PCP or prescribing provider visit in past 12 months     Last office visit with prescriber/PCP: 12/13/2019 Luis Daniel Maciel MD OR same dept: 12/13/2019 Luis Daniel Maciel MD OR same specialty: 12/13/2019 Luis Daniel Maciel MD  Last physical: 11/8/2019 Last MTM visit: Visit date not found   Next visit within 3 mo: Visit date not found  Next physical within 3 mo: Visit date not found  Prescriber OR PCP: Luis Daniel Maciel MD  Last diagnosis associated with med order: 1. HTN (hypertension)  - hydroCHLOROthiazide (HYDRODIURIL) 12.5 MG tablet [Pharmacy Med Name: HYDROCHLOROTHIAZIDE TABS 12.5MG]; TAKE 1 TABLET DAILY (DUE FOR OFFICE VISIT PRIOR TO FURTHER REFILLS. NO FURTHER REFILLS UNTIL SEEN IN CLINIC. CALL 24/7 TO MAKE APPOINTMENT)  Dispense: 90 tablet; Refill: 3    If protocol passes may refill for 12 months if within 3 months of last provider visit (or a total of 15 months).             Passed - Serum Potassium in past 12 months      Lab Results   Component Value Date    Potassium 4.1 01/03/2020             Passed - Serum Sodium in past 12 months      Lab Results   Component Value Date    Sodium 142 01/03/2020             Passed - Blood pressure on file in past 12 months     BP Readings from Last 1 Encounters:   01/06/20 124/74             Passed - Serum Creatinine in past 12 months      Creatinine   Date  Value Ref Range Status   01/03/2020 0.94 0.70 - 1.30 mg/dL Final

## 2021-06-09 NOTE — PROGRESS NOTES
ASSESSMENT AND PLAN:  Raghavendra Kiser 57 y.o. male  has erosive, gout and had double contour sign on the ultrasound.  Apart from minimal discomfort he has not had a typical attack of gout over the past sustained a period of time.  He is going to discontinue colchicine after he runs out of his current supply.  Continue allopurinol as now.  His recent labs reviewed serum uric acid was not done this will be requested rest of the labs are within normal range.  We again once again discussed the reason why to take colchicine and advised to taper down and down to 0 and that allopurinol is the long-term treatment for hyperuricemia in his case.  Follow-up in 6 months.  Labs just prior.    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 is here for follow up of gout.  He had an acute onset of pain in his metatarsophalangeal joint on the right side.  This is not several years.  He is tolerated his current medications well.  No side effects.  Recent labs reviewed.  He does not have associated kidney stones.  He has not had acute flareup during the past several months.   . Onset was sudden. The patient reports no acute gout attacks since last clinic visit.. His symptoms are stable.O Limitation on activities as noted in the MDHAQ scanned in the EMR.  Further historical information, including ROS as noted in the multidimensional health assessment questionnaire scanned in the EMR and in the assessment and plan section.  We will go ahead and refill his prednisone in case of an emergency need.  For now continue Colcrys and allopurinol as noted above.   Right elbow pain, medial side, no radiation, 6 month duration, worse with  activity, he does not Isha.  Over-the-counter measures such as bracing has helped.  No symptoms of the left side no history of discrete trauma he recalls.    ALLERGIES:Review of patient's allergies indicates no known allergies.    PAST MEDICAL/ACTIVE PROBLEMS/MEDICATION/SOCIAL DATA  Past Medical History:   Diagnosis Date     Carotid stenosis, bilateral      Chronic gout      High cholesterol      Humerus fracture     Right     Hypothyroidism      TIA (transient ischemic attack) 5/16/15     History   Smoking Status     Former Smoker     Packs/day: 2.00     Years: 20.00     Quit date: 2/15/1993   Smokeless Tobacco     Never Used     Patient Active Problem List   Diagnosis     Hypothyroidism     Hyperlipidemia     Chronic gout     TIA (transient ischemic attack)     Stroke     Superficial venous thrombosis of arm - right lateral antecubital fossa     Atherosclerosis of right carotid artery - ulcerated plaque     Carotid stenosis     Medial epicondylitis     Ganglion cyst     Current Outpatient Prescriptions   Medication Sig Dispense Refill     allopurinol (ZYLOPRIM) 300 MG tablet TAKE 1 TABLET DAILY 30 tablet 0     aspirin 325 MG tablet Take 325 mg by mouth daily.       colchicine (COLCRYS) 0.6 mg tablet Take 1 tablet (0.6 mg total) by mouth daily. 60 tablet 0     levothyroxine (SYNTHROID, LEVOTHROID) 125 MCG tablet Take 125 mcg by mouth daily.       rosuvastatin (CRESTOR) 10 MG tablet Take 5 mg by mouth bedtime.        No current facility-administered medications for this visit.      DETAILED EXAMINATION (six area) :  Vitals:    02/28/17 1254   BP: 130/84   Patient Site: Left Arm   Patient Position: Sitting   Cuff Size: Adult Regular   Pulse: 80   Weight: (!) 247 lb 12.8 oz (112.4 kg)     Alert oriented. Head including the face is examined for malar rash, heliotropes, scarring, lupus pernio. Eyes examined for redness such as in episcleritis/scleritis, periorbital lesions.   Neck examined  for lymph nodes, range of  motion Both upper and lower extremities (all four) examined for swollen, warm &/or  tender joints, range of motion, rash, muscle weakness, edema. The salient normal / abnormal findings are appended. No acute inflammatory changes in the palpable appendicular joints.  No identifiable tophi.    LAB / IMAGING DATA:  ALT   Date Value Ref Range Status   02/24/2017 35 0 - 45 U/L Final   08/08/2016 49 (H) 0 - 45 U/L Final   02/08/2016 35 0 - 45 U/L Final     ALBUMIN   Date Value Ref Range Status   02/24/2017 4.0 3.5 - 5.0 g/dL Final   08/08/2016 3.9 3.5 - 5.0 g/dL Final   02/08/2016 4.1 3.5 - 5.0 g/dL Final     CREATININE   Date Value Ref Range Status   02/24/2017 0.94 0.70 - 1.30 mg/dL Final   08/08/2016 0.85 0.70 - 1.30 mg/dL Final   02/08/2016 0.94 0.70 - 1.30 mg/dL Final       WBC   Date Value Ref Range Status   02/24/2017 5.4 4.0 - 11.0 thou/uL Final   08/08/2016 5.5 4.0 - 11.0 thou/uL Final   08/11/2015 4.1 4.0 - 11.0 thou/uL Final   06/09/2015 8.1 4.0 - 11.0 thou/uL Final     HEMOGLOBIN   Date Value Ref Range Status   02/24/2017 17.1 14.0 - 18.0 g/dL Final   08/08/2016 15.7 14.0 - 18.0 g/dL Final   02/08/2016 16.5 14.0 - 18.0 g/dL Final     PLATELETS   Date Value Ref Range Status   02/24/2017 168 140 - 440 thou/uL Final   08/08/2016 159 140 - 440 thou/uL Final   02/08/2016 188 140 - 440 thou/uL Final       No results found for: RUSH

## 2021-06-10 NOTE — PROGRESS NOTES
Assessment:      Healthy male exam.    Encounter Diagnoses   Name Primary?     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    Patient Instructions   Colonoscopy due in one year    Follow up for Fasting labs    Follow up for 3 BP checks,  1-2 weeks apart  Cut way back on salt intake    The following high BMI interventions were performed this visit: encouragement to exercise and dietary management education, guidance, and counseling         Subjective:      Raghavendra Kiser is a 57 y.o. male who presents for an annual exam. The patient reports that there is not domestic violence in his life.     Healthy Habits:   Regular Exercise: Yes  Sunscreen Use: Yes  Healthy Diet: Yes  Dental Visits Regularly: Yes  Seat Belt: Yes  Sexually active: Yes  Monthly Self Testicular Exams:  Yes  Hemoccults: N/A  Flex Sig: N/A  Colonoscopy: Yes; 4-30-08  Lipid Profile: Yes  Glucose Screen: Yes  Prevention of Osteoporosis: Yes  Last Dexa: N/A  Guns at Home:  No      Immunization History   Administered Date(s) Administered     Influenza, seasonal,quad inj 36+ mos 09/14/2015     Influenza, seasonal,quad inj 6-35 mos 10/20/2014     Tdap 09/14/2015     Immunization status: up to date and documented.    No exam data present    Current Outpatient Prescriptions   Medication Sig Dispense Refill     allopurinol (ZYLOPRIM) 300 MG tablet TAKE 1 TABLET DAILY 90 tablet 0     aspirin 325 MG tablet Take 325 mg by mouth daily.       levothyroxine (SYNTHROID, LEVOTHROID) 125 MCG tablet Take 125 mcg by mouth daily.       rosuvastatin (CRESTOR) 10 MG tablet Take 5 mg by mouth bedtime.        No current facility-administered medications for this visit.      Past Medical History:   Diagnosis Date     Carotid stenosis,  bilateral      Chronic gout      High cholesterol      Humerus fracture     Right     Hypothyroidism      TIA (transient ischemic attack) 5/16/15     Past Surgical History:   Procedure Laterality Date     CAROTID ENDARTERECTOMY       DE THROMBOENDARTECTMY NECK,NECK INCIS Right 2015    Procedure: Right Carotid Endarterectomy with Impulse Monitoring;  Surgeon: Marcellus Toth MD;  Location: [facility] [place];  Service: General     TONSILLECTOMY       Review of patient's allergies indicates no known allergies.  Family History   Problem Relation Age of Onset     Cancer Mother      Lung     Cancer Father      Lung     Cancer Sister      Llung cancer     Heart disease Brother       of a heart attack.     No Medical Problems Son      No Medical Problems Sister      No Medical Problems Sister      No Medical Problems Sister      No Medical Problems Sister      No Medical Problems Brother      No Medical Problems Brother      Social History     Social History     Marital status:      Spouse name: N/A     Number of children: N/A     Years of education: N/A     Occupational History     Not on file.     Social History Main Topics     Smoking status: Former Smoker     Packs/day: 2.00     Years: 20.00     Quit date: 2/15/1993     Smokeless tobacco: Never Used     Alcohol use 0.0 oz/week      Comment: rarely     Drug use: No     Sexual activity: Yes     Partners: Female     Other Topics Concern     Not on file     Social History Narrative    The patient lives with family.       Review of Systems  General:  Denies problem  Eyes: Denies problem  Ears/Nose/Throat: ringing in the ear  Cardiovascular: lightheaded periodically, every now and then feels like a flutter across the chest  Respiratory:  Denies problem  Gastrointestinal:  Denies problem  Genitourinary: Denies problem  Musculoskeletal:  backpain, restless legs  Skin: Denies problem  Neurologic: Denies problem  Psychiatric: Denies problem  Endocrine: Denies  problem  Heme/Lymphatic: Denies problem   Allergic/Immunologic: Denies problem        Objective:     Vitals:    05/18/17 1003   BP: 150/82   Pulse: 76   Resp: 18   Temp: 98.3  F (36.8  C)   TempSrc: Oral   Weight: (!) 245 lb 8 oz (111.4 kg)   Height: 6' (1.829 m)     Body mass index is 33.3 kg/(m^2).    Physical  General Appearance: Alert, cooperative, no distress, appears stated age  Head: Normocephalic, without obvious abnormality, atraumatic  Eyes: PERRL, conjunctiva/corneas clear, EOM's intact  Ears: Normal TM's and external ear canals, both ears  Nose: Nares normal, septum midline,mucosa normal, no drainage  Throat: Lips, mucosa, and tongue normal; teeth and gums normal  Neck: Supple, symmetrical, trachea midline, no adenopathy;  thyroid: not enlarged, symmetric, no tenderness/mass/nodules; no carotid bruit or JVD  Back: Symmetric, no curvature, ROM normal, no CVA tenderness  Lungs: Clear to auscultation bilaterally, respirations unlabored  Heart: Regular rate and rhythm, S1 and S2 normal, no murmur, rub, or gallop,  Abdomen: Soft, non-tender, bowel sounds active all four quadrants,  no masses, no organomegaly  Genitourinary: Penis normal. Right testis is descended. Left testis is descended.   PROSTATE SMOOTH SYMMETRIC WITHOUT NODULARITY, TX, OR FIRMNESS  Musculoskeletal: Normal range of motion. No joint swelling or deformity.   Extremities: Extremities normal, atraumatic, no cyanosis or edema  Skin: Skin color, texture, turgor normal, no rashes or lesions  Lymph nodes: Cervical, supraclavicular, and axillary nodes normal  Neurologic: He is alert. He has normal reflexes.   Psychiatric: He has a normal mood and affect.      Lab Results   Component Value Date    PSA 0.9 10/26/2015    PSA 1.2 10/24/2014     Lab Results   Component Value Date    TSH 1.04 10/26/2015     Lab Results   Component Value Date    CHOL 124 10/26/2015    CHOL 124 05/17/2015    CHOL 124 10/24/2014     Lab Results   Component Value Date     HDL 30 (L) 10/26/2015    HDL 23 (L) 05/17/2015    HDL 29 (L) 10/24/2014     Lab Results   Component Value Date    LDLCALC 55 10/26/2015    LDLCALC 56 05/17/2015    LDLCALC 73 10/24/2014     Lab Results   Component Value Date    TRIG 197 (H) 10/26/2015    TRIG 225 (H) 05/17/2015    TRIG 112 10/24/2014   No results found for this or any previous visit.      No components found for: CHOLHDL

## 2021-06-10 NOTE — PROGRESS NOTES
Patient in for blood pressure check today. His last OV with Dr Maciel was on  where he had a high bp reading. According to patient he states he is doing 3 different bp readings before considering medication therapy. Today is 2nd readng. Patient will be seen again in 1 week.    1st readin/84  2nd readin/ 92

## 2021-06-11 NOTE — TELEPHONE ENCOUNTER
Refill Approved    Rx renewed per Medication Renewal Policy. Medication was last renewed on 11/22/19, last OV 12/13/19.    Catrachita Nichols, [facility] Triage/Med Refill 9/12/2020     Requested Prescriptions   Pending Prescriptions Disp Refills     metFORMIN (GLUCOPHAGE-XR) 500 MG 24 hr tablet [Pharmacy Med Name: METFORMIN HCL ER TABS 500MG] 360 tablet 3     Sig: TAKE 2 TABLETS TWICE A DAY       Metformin Refill Protocol Failed - 9/11/2020  2:22 AM        Failed - Visit with PCP or prescribing provider visit in last 6 months or next 3 months     Last office visit with prescriber/PCP: Visit date not found OR same dept: 12/13/2019 Luis Daniel Maciel MD OR same specialty: 12/13/2019 Luis Daniel Maciel MD Last physical: Visit date not found Last MTM visit: Visit date not found         Next appt within 3 mo: Visit date not found  Next physical within 3 mo: Visit date not found  Prescriber OR PCP: Luis Daniel Maciel MD  Last diagnosis associated with med order: 1. Type 2 diabetes mellitus without complication, without long-term current use of insulin (H)  - metFORMIN (GLUCOPHAGE-XR) 500 MG 24 hr tablet [Pharmacy Med Name: METFORMIN HCL ER TABS 500MG]; TAKE 2 TABLETS TWICE A DAY  Dispense: 360 tablet; Refill: 3     If protocol passes may refill for 12 months if within 3 months of last provider visit (or a total of 15 months).           Passed - Blood pressure in last 12 months     BP Readings from Last 1 Encounters:   01/06/20 124/74             Passed - LFT or AST or ALT in last 12 months     Albumin   Date Value Ref Range Status   07/22/2020 4.2 3.5 - 5.0 g/dL Final     Bilirubin, Total   Date Value Ref Range Status   01/03/2020 0.5 0.0 - 1.0 mg/dL Final     Alkaline Phosphatase   Date Value Ref Range Status   01/03/2020 79 45 - 120 U/L Final     AST   Date Value Ref Range Status   01/03/2020 20 0 - 40 U/L Final     ALT   Date Value Ref Range Status   07/22/2020 31 0 - 45 U/L Final     Protein, Total    Date Value Ref Range Status   01/03/2020 6.9 6.0 - 8.0 g/dL Final                Passed - GFR or Serum Creatinine in last 6 months     GFR MDRD Non Af Amer   Date Value Ref Range Status   07/22/2020 >60 >60 mL/min/1.73m2 Final     GFR MDRD Af Amer   Date Value Ref Range Status   07/22/2020 >60 >60 mL/min/1.73m2 Final             Passed - A1C in last 6 months     Hemoglobin A1c   Date Value Ref Range Status   07/07/2020 5.7 (H) <=5.6 % Final     Comment:     Prediabetes:   HBA1c       5.7 to 6.4%        Diabetes:        HBA1c        >=6.5%   Patients with Hgb F >5%, total bilirubin >10.0 mg/dL, abnormal red cell turnover, severe renal or hepatic disease or malignancy should not have this A1C method used to diagnose or monitor diabetes.                   Passed - Microalbumin in last year      Microalbumin, Random Urine   Date Value Ref Range Status   11/29/2019 1.67 0.00 - 1.99 mg/dL Final

## 2021-06-11 NOTE — PROGRESS NOTES
Dr. Maciel requested patient monitor blood pressure for 3 weeks prior to starting patient on blood pressure meds.  Today is 3 weeks.   Patient blood pressure was 150/84 p 68.

## 2021-06-12 NOTE — PATIENT INSTRUCTIONS - HE
Check with insurance on Shingrix coverage    Colonoscopy due Jan 2023    Work forms completed (FMLA)  Follow up for PT. Speech Therapy and Neurology appts.    Schedule eye exam

## 2021-06-12 NOTE — PROGRESS NOTES
"Raghavendra Kiser is a 60 y.o. male who is being evaluated via a billable video visit.      The patient has been notified of following:     \"This video visit will be conducted via a call between you and your physician/provider. We have found that certain health care needs can be provided without the need for an in-person physical exam.  This service lets us provide the care you need with a video conversation.  If a prescription is necessary we can send it directly to your pharmacy.  If lab work is needed we can place an order for that and you can then stop by our lab to have the test done at a later time.    Video visits are billed at different rates depending on your insurance coverage. Please reach out to your insurance provider with any questions.    If during the course of the call the physician/provider feels a video visit is not appropriate, you will not be charged for this service.\"    Patient has given verbal consent to a Video visit? Yes  How would you like to obtain your AVS? AVS Preference: MyChart.  If dropped by the video visit, the video invitation should be sent to: Text to cell phone: [phone]  Will anyone else be joining your video visit? No        Video Start Time: 8:29 AM    Additional provider notes:     Hospital Follow-up Visit:    Hospital/Nursing Home/IP Rehab Facility: [facility]  Date of Admission: 10-14-20  Date of Discharge: 10-15-20  Reason(s) for Admission:   CVA    Was your hospitalization related to COVID-19? Yes   How are you feeling today? Better  In the past 24 hours have you had shortness of breath when speaking, walking, or climbing stairs? I don't have breathing problems  Do you have a cough? I don't have a cough  When is the last time you had a fever greater than 100?  No fever at all  Are you having any other symptoms? Diarrhea and Confusion   Do you have any other stressors you would like to discuss with your provider? OTHER: son and his " girlfriend are a source of stress      Problems taking medications regularly:  None  Medication changes since discharge: None  Problems adhering to non-medication therapy:  None    Summary of hospitalization:  [facility] hospital discharge summary reviewed  Diagnostic Tests/Treatments reviewed.  Follow up needed: follow up with Dr Knutson for follow up. awaiting a call back  Other Healthcare Providers Involved in Patient s Care:         Dr Knutson  Update since discharge: improved.      Post Discharge Medication Reconciliation: discharge medications reconciled, continue medications without change.  Plan of care communicated with patient and family                Video-Visit Details    Type of service:  Video Visit    HPI: S/P CVA. With slurred speech, right hand incoordination.  Still having word finding problems.  Everything I the right hand is weak and writes smaller  And looks like a 2 yr olds writing.  Speech therapy is on hold due to being Covid positive.  Home BP running high (169/99 this am, yesterday 162/89)  Taking lisinopril but was worried about side effects.    No cough.  No tobacco use.      Lab Results   Component Value Date    HGBA1C 5.5 10/15/2020     Lab Results   Component Value Date    TSH 2.45 01/03/2020     Lab Results   Component Value Date    WBC 4.2 10/15/2020    HGB 14.3 10/15/2020    HCT 41.6 10/15/2020    MCV 97 10/15/2020     (L) 10/15/2020     Results for orders placed or performed during the hospital encounter of 10/14/20   Comprehensive Metabolic Panel   Result Value Ref Range    Sodium 142 136 - 145 mmol/L    Potassium 3.8 3.5 - 5.0 mmol/L    Chloride 109 (H) 98 - 107 mmol/L    CO2 27 22 - 31 mmol/L    Anion Gap, Calculation 6 5 - 18 mmol/L    Glucose 106 70 - 125 mg/dL    BUN 14 8 - 22 mg/dL    Creatinine 0.80 0.70 - 1.30 mg/dL    GFR MDRD Af Amer >60 >60 mL/min/1.73m2    GFR MDRD Non Af Amer >60 >60 mL/min/1.73m2    Bilirubin, Total 0.6 0.0 - 1.0 mg/dL    Calcium 8.7 8.5 -  10.5 mg/dL    Protein, Total 6.7 6.0 - 8.0 g/dL    Albumin 3.6 3.5 - 5.0 g/dL    Alkaline Phosphatase 65 45 - 120 U/L    AST 25 0 - 40 U/L    ALT 27 0 - 45 U/L     Lab Results   Component Value Date    CHOL 91 10/15/2020    CHOL 127 01/03/2020    CHOL 183 11/08/2019     Lab Results   Component Value Date    HDL 20 (L) 10/15/2020    HDL 25 (L) 01/03/2020    HDL 24 (L) 11/08/2019     Lab Results   Component Value Date    LDLCALC 35 10/15/2020    LDLCALC  01/03/2020      Comment:      Invalid, Triglycerides >400    LDLCALC  11/08/2019      Comment:      Invalid, Triglycerides >400     Lab Results   Component Value Date    TRIG 182 (H) 10/15/2020    TRIG 427 (H) 01/03/2020    TRIG 842 (H) 11/08/2019     Encounter Diagnoses   Name Primary?     Acute CVA (cerebrovascular accident) (H), ONGOING SXS, NEEDS PT/OT/SPEECH      Essential hypertension, NOT IDEALLY CONTROLLED      2019 novel coronavirus disease (COVID-19)       PLAN:   Set up Speech Therapy    Set up OT and PT referrals    Increase lisinopril to 20 mg daily    Home quarantine for 10-14 days    Schedule follow up with Dr Knutson    Once no longer on quarantine then can consider ultrasound of the abdominal aorta and the renal arteries.     Follow up in  2-3 weeks (for a BP check and labs)     No components found for: CHOLHDL    Video End Time (time video stopped): 9:07 AM  Originating Location (pt. Location): Home    Distant Location (provider location):  [facility]     Platform used for Video Visit: Angeles Maciel MD

## 2021-06-12 NOTE — TELEPHONE ENCOUNTER
"Coronavirus (COVID-19) Notification    Caller Name (Patient, parent, daughter/sone, grandparent, etc)  Patient     Reason for call  Notify of Positive Coronavirus (COVID-19) lab results, assess symptoms,  review [facility] recommendations    Lab Result    Lab test:  2019-nCoV rRt-PCR or SARS-CoV-2 PCR    Oropharyngeal AND/OR nasopharyngeal swabs is POSITIVE for 2019-nCoV RNA/SARS-COV-2 PCR (COVID-19 virus)    RN Recommendations/Instructions per [facility] Coronavirus COVID-19 recommendations    Brief introduction script  Introduce self and then review script:  \"I am calling on behalf of [employer].  We were notified that your Coronavirus test (COVID-19) for was POSITIVE for the virus.  I have some information to relay to you but first I wanted to mention that the [place] Dept of Health will be contacting you shortly [it's possible MD already called Patient] to talk to you more about how you are feeling and other people you have had contact with who might now also have the virus.  Also, [facility] is Partnering with the [facility] for Covid-19 research, you may be contacted directly by research staff.\"    ssessment (Inquire about Patient's current symptoms)   Assessment   Current Symptoms at time of phone call: (if no symptoms, document No symptoms]  none    Symptom onset (if applicable)  N/A     If at time of call, Patients symptoms hare worsened, the Patient should contact 911 or have someone drive them to Emergency Dept promptly:      If Patient calling 911, inform 911 personal that you have tested positive for the Coronavirus (COVID-19).  Place mask on and await 911 to arrive.    If Emergency Dept, If possible, please have another adult drive you to the Emergency Dept but you need to wear mask when in contact with other people.      Review information with Patient    Your result was positive. This means you have COVID-19 (coronavirus).  We have sent you a letter that reviews the " information that I'll be reviewing with you now.    How can I protect others?    If you have symptoms: stay home and away from others (self-isolate) until:    You've had no fever--and no medicine that reduces fever--for 1 full day (24 hours). And      Your other symptoms have gotten better. For example, your cough or breathing has improved. And     At least 10 days have passed since your symptoms started. (If you ve been told by a doctor that you have a weak immune system, wait 20 days.)     If you don't have symptoms: Stay home and away from others (self-isolate) until at least 10 days have passed since your first positive COVID-19 test. (Date test collected).    During this time:    Stay in your own room, including for meals. Use your own bathroom if you can.    Stay away from others in your home. No hugging, kissing or shaking hands. No visitors.     Don't go to work, school or anywhere else.     Clean  high touch  surfaces often (doorknobs, counters, handles, etc.). Use a household cleaning spray or wipes. You'll find a full list on the EPA website at www.epa.gov/pesticide-registration/list-n-disinfectants-use-against-sars-cov-2.     Cover your mouth and nose with a mask, tissue or other face covering to avoid spreading germs.    Wash your hands and face often with soap and water.    Caregivers in these groups are at risk for severe illness due to COVID-19:  o People 65 years and older  o People who live in a nursing home or long-term care facility  o People with chronic disease (lung, heart, cancer, diabetes, kidney, liver, immunologic)  o People who have a weakened immune system, including those who:  - Are in cancer treatment  - Take medicine that weakens the immune system, such as corticosteroids  - Had a bone marrow or organ transplant  - Have an immune deficiency  - Have poorly controlled HIV or AIDS  - Are obese (body mass index of 40 or higher)  - Smoke regularly    Caregivers should wear gloves while  washing dishes, handling laundry and cleaning bedrooms and bathrooms.    Wash and dry laundry with special caution. Don't shake dirty laundry, and use the warmest water setting you can.    If you have a weakened immune system, ask your doctor about other actions you should take.    For more tips, go to www.cdc.gov/coronavirus/2019-ncov/downloads/10Things.pdf.    You should not go back to work until you meet the guidelines above for ending your home isolation. You don't need to be retested for COVID-19 before going back to work--studies show that you won't spread the virus if it's been at least 10 days since your symptoms started (or 20 days, if you have a weak immune system).    Employers: This document serves as formal notice of your employee's medical guidelines for going back to work. They must meet the above guidelines before going back to work in person.    How can I take care of myself?    1. Get lots of rest. Drink extra fluids (unless a doctor has told you not to).    2. Take Tylenol (acetaminophen) for fever or pain. If you have liver or kidney problems, ask your family doctor if it's okay to take Tylenol.     Take either:     650 mg (two 325 mg pills) every 4 to 6 hours, or     1,000 mg (two 500 mg pills) every 8 hours as needed.     Note: Don't take more than 3,000 mg in one day. Acetaminophen is found in many medicines (both prescribed and over-the-counter medicines). Read all labels to be sure you don't take too much.    For children, check the Tylenol bottle for the right dose (based on their age or weight).    3. If you have other health problems (like cancer, heart failure, an organ transplant or severe kidney disease): Call your specialty clinic if you don't feel better in the next 2 days.    4. Know when to call 911: Emergency warning signs include:    Trouble breathing or shortness of breath    Pain or pressure in the chest that doesn't go away    Feeling confused like you haven't felt before, or  not being able to wake up    Bluish-colored lips or face    5. Sign up for [employer]. We know it's scary to hear that you have COVID-19. We want to track your symptoms to make sure you're okay over the next 2 weeks. Please look for an email from [employer]--this is a free, online program that we'll use to keep in touch. To sign up, follow the link in the email. Learn more at [email or web address].    Where can I get more information?    [facility]: [email or web address]    Coronavirus Basics: www.[email or web address]./diseases/coronavirus/basics.html    What to Do If You're Sick: www.cdc.gov/coronavirus/2019-ncov/about/steps-when-sick.html    Ending Home Isolation: www.cdc.gov/coronavirus/2019-ncov/hcp/disposition-in-home-patients.html     Caring for Someone with COVID-19: www.cdc.gov/coronavirus/2019-ncov/if-you-are-sick/care-for-someone.html     [facility] clinical trials (COVID-19 research studies): [email or web address]-[email or web address]-trials     A Positive COVID-19 letter will be sent via [employer] or the Mail.  (Exception, no letters sent to Presurgerical/Preprocedure Patients)    [Name]  Sandee Hou RN  [facility] - [facility]  COVID19 Results Team RN  Ph# [phone]

## 2021-06-12 NOTE — PROGRESS NOTES
MALE PREVENTATIVE EXAM    Assessment and Plan:       1. Acute CVA (cerebrovascular accident) (H)  Stable, needs to start therapy and follow up with Neuro, FMLA forms completed    2. Essential hypertension  Borderline control, will await further Neuro follow up on BP goals    3. Stenosis of left internal carotid artery-50 % on MRA of the neck October 2020.  stable    4. Type 2 diabetes mellitus without complication, without long-term current use of insulin (H)  Well controlled  - Microalbumin, Random Urine; Future    5. Hyperlipidemia, unspecified hyperlipidemia type  Controlled on a statin    6. Acquired hypothyroidism    - Thyroid [place]    7. Screening PSA (prostate specific antigen)    - PSA, Annual Screen (Prostatic-Specific Antigen)    PLAN:   Check with insurance on Shingrix coverage    Colonoscopy due Jan 2023    Work forms completed (FMLA)  Follow up for PT. Speech Therapy and Neurology (Nov 16th)  appts     Schedule eye exam    Pt to discuss with Neurology blood pressure goals.       Next follow up:  No follow-ups on file.    Immunization Review  Adult Imm Review: No immunizations due today   non-smoker; alcohol very little    I discussed the following with the patient:   Adult Healthy Living: Importance of regular exercise  Healthy nutrition    I have had an Advance Directives discussion with the patient.    Subjective:   Chief Complaint: Raghavendra Kiser is an 60 y.o. male here for a preventative health visit.     HPI:   At home BP running 145/80 on lisinopril 20 mg daily    Healthy Habits  Are you taking a daily aspirin? Yes  Do you typically exercising at least 40 min, 3-4 times per week?  Yes  Do you usually eat at least 4 servings of fruit and vegetables a day, include whole grains and fiber and avoid regularly eating high fat foods? Yes  Have you had an eye exam in the past two years? Yes  Do you see a dentist twice per year? Yes  Do you have any concerns regarding sleep? YES; restless  legs    Safety Screen  If you own firearms, are they secured in a locked gun cabinet or with trigger locks? NO  Do you feel you are safe where you are living?: Yes (11/2/2020  8:15 AM)  Do you feel you are safe in your relationship(s)?: Yes (11/2/2020  8:15 AM)      Review of Systems:   Restless legs  Tinnitus  Slurred speech at times  No cough on the lisinopril  No myalgias on the stsatin     Please see above.  The rest of the review of systems are negative for all systems.     Cancer Screening     Patient has no health maintenance due at this time          Patient Care Team:  Luis Daniel Maciel MD as PCP - General (Family Medicine)  Luis Daniel Maciel MD as Assigned PCP        History     Reviewed By Date/Time Sections Reviewed    Abena Strong LPN 11/2/2020  8:14 AM Tobacco    Abena Strong LPN 11/2/2020  8:11 AM Tobacco            Objective:   Vital Signs:   Visit Vitals  /87   Pulse 74   Temp (!) 96.2  F (35.7  C) (Oral)   Resp 16   Ht 6' (1.829 m)   Wt (!) 227 lb (103 kg)   BMI 30.79 kg/m           PHYSICAL EXAM  /87   Pulse 74   Temp (!) 96.2  F (35.7  C) (Oral)   Resp 16   Ht 6' (1.829 m)   Wt (!) 227 lb (103 kg)   BMI 30.79 kg/m      General Appearance:    Alert, cooperative, no distress, appears stated age   Head:    Normocephalic, without obvious abnormality, atraumatic   Eyes:    PERRL, conjunctiva/corneas clear, EOM's intact, fundi     benign, both eyes        Ears:    Normal TM's and external ear canals, both ears   Nose:   Nares normal, septum midline, mucosa normal, no drainage    or sinus tenderness   Throat:   Lips, mucosa, and tongue normal; teeth and gums normal   Neck:   Supple, symmetrical, trachea midline, no adenopathy;        thyroid:  No enlargement/tenderness/nodules; no carotid    bruit or JVD   Back:     Symmetric, no curvature, ROM normal, no CVA tenderness   Lungs:     Clear to auscultation bilaterally, respirations unlabored   Chest wall:    No  tenderness or deformity   Heart:    Regular rate and rhythm, S1 and S2 normal, no murmur, rub    or gallop   Abdomen:     Soft, non-tender, bowel sounds active all four quadrants,     no masses, no organomegaly   Genitalia:    No exam   Rectal:    Pt declines exam after discussion;  Follow PSAs annually   Extremities:   Extremities normal, atraumatic, no cyanosis or edema       Skin:   Skin color, texture, turgor normal, no rashes or lesions   Lymph nodes:   Cervical, supraclavicular, and axillary nodes normal   FEET:  MF TESTING: NL              SKIN EXAM:  NL              VASCULAR:   R DP  PULSE: +                                      L DP  PULSE: +                                      R PT  PULSE: -                                      L PT  PULSE: -          Lab Results   Component Value Date    PSA 2.1 11/08/2019    PSA 1.7 10/16/2018    PSA 1.4 02/21/2018     Lab Results   Component Value Date    HGBA1C 5.5 10/15/2020     Lab Results   Component Value Date    TSH 2.45 01/03/2020     Results for orders placed or performed during the hospital encounter of 10/14/20   Comprehensive Metabolic Panel   Result Value Ref Range    Sodium 142 136 - 145 mmol/L    Potassium 3.8 3.5 - 5.0 mmol/L    Chloride 109 (H) 98 - 107 mmol/L    CO2 27 22 - 31 mmol/L    Anion Gap, Calculation 6 5 - 18 mmol/L    Glucose 106 70 - 125 mg/dL    BUN 14 8 - 22 mg/dL    Creatinine 0.80 0.70 - 1.30 mg/dL    GFR MDRD Af Amer >60 >60 mL/min/1.73m2    GFR MDRD Non Af Amer >60 >60 mL/min/1.73m2    Bilirubin, Total 0.6 0.0 - 1.0 mg/dL    Calcium 8.7 8.5 - 10.5 mg/dL    Protein, Total 6.7 6.0 - 8.0 g/dL    Albumin 3.6 3.5 - 5.0 g/dL    Alkaline Phosphatase 65 45 - 120 U/L    AST 25 0 - 40 U/L    ALT 27 0 - 45 U/L     Lab Results   Component Value Date    MICROALBUR 1.67 11/29/2019     Lab Results   Component Value Date    URICACID 4.5 07/22/2020     Lab Results   Component Value Date    CHOL 91 10/15/2020    CHOL 127 01/03/2020    CHOL 183  11/08/2019     Lab Results   Component Value Date    HDL 20 (L) 10/15/2020    HDL 25 (L) 01/03/2020    HDL 24 (L) 11/08/2019     Lab Results   Component Value Date    LDLCALC 35 10/15/2020    LDLCALC  01/03/2020      Comment:      Invalid, Triglycerides >400    LDLCALC  11/08/2019      Comment:      Invalid, Triglycerides >400     Lab Results   Component Value Date    TRIG 182 (H) 10/15/2020    TRIG 427 (H) 01/03/2020    TRIG 842 (H) 11/08/2019     No components found for: CHOLHDL            The ASCVD Risk score (Ishakalyan BERRY Jr., et al., 2013) failed to calculate for the following reasons:    The patient has a prior MI or stroke diagnosis        Additional Screenings Completed Today:

## 2021-06-12 NOTE — PROGRESS NOTES
ASSESSMENT AND PLAN:  Raghavendra Kiser 57 y.o. male is here for follow-up of gout, erosive.  He had double contour sign on the ultrasound examination the day he was seen here first with the episode.  He has not had recurrence over the past several months.  His serum urate has gone up a little since he was started on the diuretic for hypertension.  He is going to increase his allopurinol to 300/450 and alternate days.  He has noted discomfort in his right shoulder which has features more in line with the tendinopathy of the rotator cuff.  The options were discussed.  For now he is going to observe and if it does not get better and then you next few weeks may consider corticosteroid injection.  We discussed how intimately the cervical spine and shoulder area etiologies for pain can overlap. Follow-up in 6 months with labs just prior.    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  Dispense: 135 tablet; Refill: 1    Right shoulder tendinitis    High risk medication use             HISTORY OF PRESENTING ILLNESS:  Raghavendra Kiser 57 y.o. is here for follow up of gout.  He is doing great with allopurinol 300 mg daily.  Recently was started on antihypertensive, thiazide diuretic, associated with increased serum uric acid.  He has done so much better now as far as the gout is concerned.  He did notice pain in the shoulder.  This is on the right side.  This is gone over the past couple of weeks.  This is worse with certain movements but other movements he can perform without any difficulty.  There is a remote history of fall.  Recently underwent stretches to help his low back pain which has improved quite a bit of the care of a  in the gym.  He also noted numbness and tingling in the right upper extremity.  He had an acute onset of pain in his metatarsophalangeal  joint on the right side.  This is not several years.  He is tolerated his current medications well.  No side effects.  Recent labs reviewed.  He does not have associated kidney stones.  He has not had acute flareup during the past several months.   . Onset was sudden. The patient reports no acute gout attacks since last clinic visit.. His symptoms are stable.O Limitation on activities as noted in the MDHAQ scanned in the EMR.  Further historical information, including ROS as noted in the multidimensional health assessment questionnaire scanned in the EMR and in the assessment and plan section.  We will go ahead and refill his prednisone in case of an emergency need.  For now continue Colcrys and allopurinol as noted above.    ALLERGIES:Review of patient's allergies indicates no known allergies.    PAST MEDICAL/ACTIVE PROBLEMS/MEDICATION/SOCIAL DATA  Past Medical History:   Diagnosis Date     Carotid stenosis, bilateral      Chronic gout      High cholesterol      Humerus fracture     Right     Hypothyroidism      TIA (transient ischemic attack) 5/16/15     History   Smoking Status     Former Smoker     Packs/day: 2.00     Years: 20.00     Quit date: 2/15/1993   Smokeless Tobacco     Never Used     Patient Active Problem List   Diagnosis     Hypothyroidism     Hyperlipidemia     Chronic gout     TIA (transient ischemic attack)     Stroke     Superficial venous thrombosis of arm - right lateral antecubital fossa     Atherosclerosis of right carotid artery - ulcerated plaque     Carotid stenosis     Medial epicondylitis     Ganglion cyst     High risk medication use     HTN (hypertension)     Current Outpatient Prescriptions   Medication Sig Dispense Refill     allopurinol (ZYLOPRIM) 300 MG tablet TAKE 1 TABLET DAILY 90 tablet 0     aspirin 325 MG tablet Take 325 mg by mouth daily.       hydroCHLOROthiazide (HYDRODIURIL) 12.5 MG tablet Take 1 tablet (12.5 mg total) by mouth daily. 30 tablet 1     levothyroxine  (SYNTHROID, LEVOTHROID) 125 MCG tablet Take 125 mcg by mouth daily.       rosuvastatin (CRESTOR) 10 MG tablet Take 5 mg by mouth bedtime.        No current facility-administered medications for this visit.      DETAILED EXAMINATION (six area) :  Vitals:    08/29/17 1318   BP: 124/80   Patient Site: Left Arm   Patient Position: Sitting   Cuff Size: Adult Regular   Weight: (!) 246 lb 11.2 oz (111.9 kg)   Height: 6' (1.829 m)     Alert oriented. Head including the face is examined for malar rash, heliotropes, scarring, lupus pernio. Eyes examined for redness such as in episcleritis/scleritis, periorbital lesions.   Neck examined  for lymph nodes, range of motion Both upper and lower extremities (all four) examined for swollen, warm &/or  tender joints, range of motion, rash, muscle weakness, edema. The salient normal / abnormal findings are appended. No acute inflammatory changes in the palpable appendicular joints.  No identifiable tophi.    LAB / IMAGING DATA:  ALT   Date Value Ref Range Status   08/22/2017 35 0 - 45 U/L Final   05/26/2017 29 0 - 45 U/L Final   02/24/2017 35 0 - 45 U/L Final     Albumin   Date Value Ref Range Status   08/22/2017 4.0 3.5 - 5.0 g/dL Final   05/26/2017 4.1 3.5 - 5.0 g/dL Final   02/24/2017 4.0 3.5 - 5.0 g/dL Final     Creatinine   Date Value Ref Range Status   08/22/2017 1.17 0.70 - 1.30 mg/dL Final   08/22/2017 1.17 0.70 - 1.30 mg/dL Final   05/26/2017 1.05 0.70 - 1.30 mg/dL Final       WBC   Date Value Ref Range Status   08/22/2017 5.9 4.0 - 11.0 thou/uL Final   02/24/2017 5.4 4.0 - 11.0 thou/uL Final   08/11/2015 4.1 4.0 - 11.0 thou/uL Final   06/09/2015 8.1 4.0 - 11.0 thou/uL Final     Hemoglobin   Date Value Ref Range Status   08/22/2017 15.2 14.0 - 18.0 g/dL Final   02/24/2017 17.1 14.0 - 18.0 g/dL Final   08/08/2016 15.7 14.0 - 18.0 g/dL Final     Platelets   Date Value Ref Range Status   08/22/2017 155 140 - 440 thou/uL Final   02/24/2017 168 140 - 440 thou/uL Final   08/08/2016  159 140 - 440 thou/uL Final       No results found for: RUSH

## 2021-06-12 NOTE — PROGRESS NOTES
Clinic Care Coordination Contact  Community Health Worker Initial Outreach       Patient accepts CC: No, due to the patient alreayd having all of the needed appointments scheudled at thiis time. He is working with his wife regarding any concerns for the provider. The only thing that the patient has is the appointment that is scheduled with his PCP for being in person and if that appointment can be changed to virtual. CHW will route this encounter to the PCP's team regarding that question. The CHW was able to give the patient's family her contact information.

## 2021-06-12 NOTE — PROGRESS NOTES
[facility]   Balance Initial Evaluation      Raghavendra Kiser is a 60 y.o. male who is being seen via a billable video visit.  Patient has given verbal consent for video visit? Yes  Video Start Time: 9:30 AM  Telehealth Visit Details:  Type of service: Telehealth  Video End Time (time video stopped): 10:10 AM  Originating Location (Patient): Home  Additional Participants in Telehealth Visit: none  Distant Location (Provider Location): [facility]  Mode of Communication: Audio Visual    Kay Gregory, PT  11/3/2020      Patient Name: Raghavendra Kiser  Date of evaluation: 11/3/2020  Referral Diagnosis: Acute CVA (cerebrovascular accident) (H)  Referring provider: Luis Daniel Maciel,*  Visit Diagnosis:     ICD-10-CM    1. Gait instability  R26.81    2. Right sided weakness  R53.1        Assessment:        Raghavendra Kiser is a 60 y.o. male who presents to therapy today with chief complaints of weakness of the right side and instability following a CVA 3 weeks ago on 10/14/2020. Pt reported h/o 3 TIAs in the past wthout lingering effects. He also tested positive for COVOD-19 at the hospital. No other family members tested positive and he has been asymptomatic since the CVA.  Functional impairments include instability with walking, no falls, some stumbling at home, difficulty with coordination of dominate right hand and difficulty with speech.  Pt demo's signs and sx consistent with right sided deficits following a CVA. .   Impairments in  pain, posture, ROM, joint mobility, strength, motor function, coordination, gait/locomotion and balance  The POC is dynamic and will be modified on an ongoing basis.  Barriers to achieving goals as noted in the assessment section may affect outcome.  Prognosis to achieve goals is  good   Pt. is appropriate for skilled PT intervention as outlined in the Plan of Care (POC).  Pt. is a good candidate for skilled PT services to  improve pain levels and function.  Based on falls assessment, patient is at an increased risk for falling. Plan of care will address this risk with lower extremity strengthening and balance training.    Goals:  Pt. will be independent with home exercise program in : 6 weeks    Pt will: increase FGA score to >24/30 for decrease risk for falls in 8 weeks  Pt will: be able to heel walk for increas ein functional strength and improved balance to decrease risk for fallsin in 8 weeks      Patient's expectations/goals are realistic.    Barriers to Learning or Achieving Goals:  Chronicity of the problem.  Co-morbidities or other medical factors.  .       Plan / Patient Instructions:        Plan of Care:   Authorization / Certification Start Date: 11/03/20  Communication with: Referral Source  Patient Related Instruction: Nature of Condition;Treatment plan and rationale;Self Care instruction;Basis of treatment;Body mechanics;Posture;Precautions;Next steps;Expected outcome  Times per Week: 1  Number of Weeks: 8-10  Number of Visits: 10  Discharge Planning: when goals have been met or a plateau in progress has been achieved  Precautions / Restrictions : hx of TIA, CVA, COVID positve 10/14/2020 - no symptoms  Therapeutic Exercise: ROM;Stretching;Strengthening  Neuromuscular Reeducation: posture;core;balance/proprioception;stroke rehabilitation  Manual Therapy: soft tissue mobilization;myofascial release  Gait Training: .  Equipment: LearnSomethingd      POC and pathology of condition were reviewed with patient.  Pt. is in agreement with the Plan of Care  A Home Exercise Program (HEP) was initiated today.  Pt. was instructed in exercises by PT and patient was given a handout with detailed instructions.  Treatment techniques, plan of care, and goals were discussed with the patient.  The patient agrees to the plan as outlined.  The plan of care is dynamic and will be modified on an ongoing basis.    Plan for next visit: dynamic balance  testing (FGA, TUG, four square step test) begin LE strengthening and dynamic balance tasks.      Subjective:         Social information:   Living Situation:single family home, lives with others  and has assistance Yes   Equipment Available: None    Pain Ratin  Pain rating at best: 0  Pain rating at worst: 0  Pain description: weakness    Functional limitations are described as occurring with:   balance  walking .    Patient reports benefit from:  movement or exercise        Objective:      Note: Items left blank indicates the item was not performed or not indicated at the time of the evaluation.    Balance Examination  1. Gait instability     2. Right sided weakness       Involved side: Right    Assistive Device:  None    Lower Extremity Strength: good functional squat demonstrated, able to SL stance without hip drop noted, able to toe walker, unable to heel walk.     Lower Extremity ROM: not assessed due to video visit, appears functional     Sensation: denies numbness and tingling in the B LEs     Balance Testing:  Functional test Score / AD if used Previous Score from  Fall risk cutoff score Norms Observations   30 second sit<>stand 14 reps     <8 high fall risk  9-12 mod risk 16 reps     Timed up and go (TUG)  (seconds) Trial 1:   Trial 2:   Trial 3:   Avg:   >13 sec     Cognitive TUG (seconds)     >15 sec 9-10 seconds    Comfortable gait speed 10M     <1.0 m/s     Fast gait speed 10M          4-square step test     >15 sec     2 minute walk test          6 minute walk test                                MiniBest <23/28= falls risk  FGA < or equal to 22/30= high risk of falls  Chin: Fall risk <45    Chin Balance Scale:  1) Sitting to standing (4)   2) Standing unsupported (4)  3) Sitting with back unsupported but feet supported on floor (4)  4) Standing to sitting (4)  5) Transfer(4)  6) Standing unsupported with eyes closed (4)  7) Standing unsupported with feet together (4)  8) Reaching forward with  outstretched arm while standing (4)  9)  object from the floor from a standing position (4)  10) Turning to look behind left and right shoulder while standing (4)  11) Turn 360 degrees (2)  12) Place alternate foot on step/stool while standing unsupported (4)  13) Standing unsupported one foot in front (4)  14) Standing on one leg (4)    Total score: 54/56 (<45/56 falls risk)       Gait observation: unable to fully assessed due to video visit     Balance Assessment:    Rhomberg - Eyes Open:  >30  seconds  Rhomberg - Eyes Closed:  >30 seconds  Sharpened Rhomberg - Eyes Open:  R in front >30 seconds; L in front >30 seconds  Sharpened Rhomberg - Eyes Closed:  R in front >30 seconds; L in front >30 seconds increase in postural sway bilaterally   Chin:  Chin Score : 54   /56  Single Leg Stance:  >20  Seconds each side     Appt time: 9:30 AM - 10:10 AM    Treatment Today   11/3/2020  TREATMENT MINUTES COMMENTS   Evaluation 30  -Patient educated on pathology  -Discussed POC   Self-care/ Home management     Manual therapy     Neuromuscular Re-education     Therapeutic Activity     Therapeutic Exercises 10  -Demo/performance of HEP  Exercise #1: tandem stance eyes open and eyes closed x 3 reps each  Comment #1: sit to stands x 15 reps  Exercise #2: standing heel/toe raises x 15 reps     Gait training     Modality__________________                Total 40    Blank areas are intentional and mean the treatment did not include these items.     PT Evaluation Code: (Please list factors)  Patient History/Comorbidities: as above   Examination: as above   Clinical Presentation: stable   Clinical Decision Making: low     Patient History/  Comorbidities Examination  (body structures and functions, activity limitations, and/or participation restrictions) Clinical Presentation Clinical Decision Making (Complexity)   No documented Comorbidities or personal factors 1-2 Elements Stable and/or uncomplicated Low   1-2 documented  comorbidities or personal factor 3 Elements Evolving clinical presentation with changing characteristics Moderate   3-4 documented comorbidities or personal factors 4 or more Unstable and unpredictable High     Kay Gregory PT, DPT  11/3/2020  9:36 AM

## 2021-06-12 NOTE — PROGRESS NOTES
DIAGNOSIS:  1. HTN (hypertension)  hydroCHLOROthiazide (HYDRODIURIL) 12.5 MG tablet       PLAN:  Patient Instructions   Start hydrochlorothiazide 12.5 mg daily in the am    Eat plenty of veggies.    Nurse BP check in one week    appt in 3 weeks at which time we will recheck the potassium and kidney function.             HPI:   BP  CHECK.          Current Outpatient Prescriptions on File Prior to Visit   Medication Sig Dispense Refill     allopurinol (ZYLOPRIM) 300 MG tablet TAKE 1 TABLET DAILY 90 tablet 0     aspirin 325 MG tablet Take 325 mg by mouth daily.       levothyroxine (SYNTHROID, LEVOTHROID) 125 MCG tablet Take 125 mcg by mouth daily.       rosuvastatin (CRESTOR) 10 MG tablet Take 5 mg by mouth bedtime.        No current facility-administered medications on file prior to visit.        Pmh: reviewed  Psh: reviewed  Allergy:  reviewed      EXAM:    /90  Pulse 70  Temp 98.6  F (37  C) (Oral)   Resp 16  Wt (!) 247 lb 8 oz (112.3 kg)  BMI 33.57 kg/m2  GEN:   ALERT, NAD, ORIENTED TIMES THREER  NECK: SUPPLE WITHOUT ADENOPATHY OR THYROMEGALY  LUNGS: CTA  COR: RRR WITHOUT MURMUR  Skin:  No rash  EXT: WITH TRACE LE EDEMA OR SWELLING    No results found for this or any previous visit (from the past 168 hour(s)).       Results for orders placed or performed in visit on 05/26/17   Comprehensive Metabolic Panel   Result Value Ref Range    Sodium 142 136 - 145 mmol/L    Potassium 4.4 3.5 - 5.0 mmol/L    Chloride 107 98 - 107 mmol/L    CO2 27 22 - 31 mmol/L    Anion Gap, Calculation 8 5 - 18 mmol/L    Glucose 95 70 - 125 mg/dL    BUN 19 8 - 22 mg/dL    Creatinine 1.05 0.70 - 1.30 mg/dL    GFR MDRD Af Amer >60 >60 mL/min/1.73m2    GFR MDRD Non Af Amer >60 >60 mL/min/1.73m2    Bilirubin, Total 0.8 0.0 - 1.0 mg/dL    Calcium 9.2 8.5 - 10.5 mg/dL    Protein, Total 6.8 6.0 - 8.0 g/dL    Albumin 4.1 3.5 - 5.0 g/dL    Alkaline Phosphatase 79 45 - 120 U/L    AST 25 0 - 40 U/L    ALT 29 0 - 45 U/L

## 2021-06-12 NOTE — TELEPHONE ENCOUNTER
[place] paperwork to be filled out and they will pick it up next Monday when they come to clinic for appt.

## 2021-06-12 NOTE — PROGRESS NOTES
Assessment:   1. Stenosis of left internal carotid artery-50 % on MRA of the neck October 2020.     2. Severe hypertension  CTA Abdomen Pelvis   3. Essential hypertension  lisinopriL (PRINIVIL,ZESTRIL) 10 MG tablet   4. Pain in axilla, unspecified laterality  NM Exercise Stress Test   5. Type 2 diabetes mellitus without complication, without long-term current use of insulin (H)  NM Exercise Stress Test    CTA Abdomen Pelvis       Plan:   MR angiogram of the neck 2 days ago showed a 50% narrowing of the left internal carotid artery.  Dr. Reyna would recommend following this likely yearly with a neck MRA.  Please talk to Dr. Maciel about that at your physical.    Increase lisinopril to 10 mg daily.    Nurse apt for BP check in 2 weeks.  Bring your blood pressure in for the nurse appointment to compare your cuff to ours to see if they are similar.    Flu shot today.    Nuclear medicine stress test ordered.      Ordered CT angiogram of abdomen and pelvis to check for aneurysm as well as renal artery stenosis.    Physical set for November.    Continue to check blood pressure at home.  Once blood sugar blood pressure is well controlled on her medication I will be checking once every 1 to 2 weeks is a good idea.    Reviewed ER note and all imaging and test done.    Twenty-five minutes spent with this patient, at least 50% in coordination of care or counseling reguarding the topics discussed in note.      Subjective:  Chief Complaint   Patient presents with     Hypertension     pt seen at [facility] ER 10/05/20 for hypertension      Raghavendra Kiser, a 60 y.o. year old, comes in to clinic for ER follow-up for hypertension.     Patient was in his usual state of health 2 days ago when he started feeling disoriented.  He did check his blood pressure at home it was 192/108.  He did present to the emergency room.  He did also have associated vision change and headache.  He did not have any chest pain or pressure but has had  recurrent pain under his armpits and sometimes pain radiating down from the shoulder to the elbow.  He had shortness of breath a couple of weeks ago.  He wonders if that was related to anxiety.  He does have a history of TIAs.  When he presented to the emergency room he did not have any TIA symptoms such as facial droop slurred speech numbness or tingling or weakness of any extremities.  In the emergency room blood pressure was elevated 1 80-2 100/1 100s.  When he left the emergency room blood pressure was 165/92.  No bed changes were done.  He is only on 5 mg of lisinopril.  He does feel that he is starting to feel better over the last day or 2 but blood pressure home is still been elevated at 180/100.  He wonders why this sudden elevation of blood pressure.  Denies any redness swelling or warmth in the armpits.  He did have a MRI/MRA of the brain that was unremarkable.  He did have an MRA of the neck that showed a 50% stenosis of the left internal carotid artery.  He is currently on 325 mg aspirin a day.  He is also a diabetic.  He did have a normal stress echo 2018.  In the ER he had a normal EKG.      Current Outpatient Medications   Medication Sig     allopurinoL (ZYLOPRIM) 300 MG tablet TAKE 1 TABLET ALTERNATING WITH ONE AND ONE-HALF TABLETS EVERY OTHER DAY     aspirin 325 MG tablet Take 1 tablet (325 mg total) by mouth daily. Hold x 7 days     blood glucose meter (GLUCOMETER) Use 1 each As Directed as needed. Dispense glucometer brand per patient's insurance at pharmacy discretion.     blood glucose test strips Use 1 each As Directed 2 (two) times a day.     cholecalciferol, vitamin D3, (VITAMIN D3) 4,000 unit cap Take 1 tablet by mouth daily.     generic lancets (FINGERSTIX LANCETS) Dispense brand per patient's insurance at pharmacy discretion.     generic lancets Use 1 each As Directed 2 (two) times a day. Dispense brand per patient's insurance at pharmacy discretion.     levothyroxine (SYNTHROID,  LEVOTHROID) 125 MCG tablet Take 125 mcg by mouth Daily at 6:00 am.      metFORMIN (GLUCOPHAGE-XR) 500 MG 24 hr tablet Take 2 tablets (1,000 mg total) by mouth 2 (two) times a day.     rosuvastatin (CRESTOR) 10 MG tablet Take 5 mg by mouth bedtime.      lisinopriL (PRINIVIL,ZESTRIL) 10 MG tablet Take 1 tablet (10 mg total) by mouth daily.       Patient Active Problem List   Diagnosis     Hypothyroidism     Hyperlipidemia     Chronic gout     TIA (transient ischemic attack)     Stroke (H)     Superficial venous thrombosis of arm - right lateral antecubital fossa     Atherosclerosis of right carotid artery - ulcerated plaque     Carotid stenosis     Medial epicondylitis     Ganglion cyst     High risk medication use     HTN (hypertension)     Right shoulder tendinitis     Cervical radiculopathy     History of TIA (transient ischemic attack) and stroke     High blood triglycerides     Precordial pain     Type 2 diabetes mellitus without complication, without long-term current use of insulin (H)     Vitamin deficiency     History of colonic polyps     Stenosis of left internal carotid artery-50 % on MRA of the neck October 2020.              Objective:  /88 (Patient Site: Left Arm, Patient Position: Sitting, Cuff Size: Adult Large)   Pulse (!) 58   Temp 97.2  F (36.2  C) (Oral)   Resp 16   Wt (!) 235 lb 4 oz (106.7 kg)   BMI 31.91 kg/m    General: No apparent distress  Cardiovascular: Regular rate and rhythm without murmurs, rubs, or gallops  Lungs: Clear to auscultation bilaterally, no wheezes, crackles, or rhonchi

## 2021-06-12 NOTE — PATIENT INSTRUCTIONS - HE
MR angiogram of the neck 2 days ago showed a 50% narrowing of the left internal carotid artery.  Dr. Reyna would recommend following this likely yearly with a neck MRA.  Please talk to Dr. Maciel about that at your physical.    Increase lisinopril to 10 mg daily.    Nurse apt for BP check in 2 weeks.  Bring your blood pressure in for the nurse appointment to compare your cuff to ours to see if they are similar.    Flu shot today.    Nuclear medicine stress test ordered.      Ordered CT angiogram of abdomen and pelvis to check for aneurysm as well as renal artery stenosis.    Physical set for November.    Continue to check blood pressure at home.  Once blood sugar blood pressure is well controlled on her medication I will be checking once every 1 to 2 weeks is a good idea.

## 2021-06-12 NOTE — TELEPHONE ENCOUNTER
New Appointment Needed  What is the reason for the visit:    Inpatient/ED Follow Up Appt Request  At what hospital or facility were you [facility]   What is the reason you were seen?: acute CVA  What date were you admitted?: date: 10/14/20  What date were you discharged?: date: 10/15/2020  What was the recommended timeframe for your follow up appointment?: 3-5 days  Provider Preference: PCP only  How soon do you need to be seen?: within 3-5 days per hospital discharge  Waitlist offered?: No  Okay to leave a detailed message:  Yes - please contact patient directly on scheduling of this appointment

## 2021-06-12 NOTE — PROGRESS NOTES
"Raghavendra Kiser is a 60 y.o. male who is being seen via a billable video visit.  Patient has given verbal consent for video visit? Yes  Video Start Time: 1:05pm  Telehealth Visit Details:  Type of service: Telehealth  Video End Time (time video stopped): 1:55pm  Originating Location (Patient): Home  Additional Participants in Telehealth Visit: None  Distant Location (Provider Location): [facility]  Mode of Communication: Audio Visual  Olivia Cannon  11/9/2020        [facility]   Evaluation and Initial Plan of Care    Patient Name: Raghavendra Kiser  Date of evaluation: 11/9/2020  Referral Diagnosis: Aphasia [R47.01], Acute CVA [I63.9]  Referring provider: Dr. Harding  Visit Diagnosis:     ICD-10-CM    1. Dysarthria, post-stroke  I69.322    2. Aphasia, post-stroke  I69.320          Assessment:      Patient presents with at least mild expressive aphasia, word-finding difficulty and verbal fluency with getting thoughts out in conversation, as well as mild difficulties with motor speech and speech intelligibility. Pt has slow rate of speech and reports tongue \"feels thick.\" Simple cognition seems intact, but further assessment is warranted to further assess higher level cognition as well as reading comprehension and written expression. Limited on what tasks we were able to do today due to telehealth visit - will be having in-person visits in future.    Long Term Goals  Patient will demonstrate completion of moderate cognitive-linguistic tasks with >90% accuracy as foundation for functional skills needed for social interaction and participation in ADLs (e.g., making/receiving phone calls, conversing with medical providers, providing personal information, expressing complex thoughts/ideas/opinions in back and forth conversation, work demands for office job).    Short Term Goals  1) Patient will participate in further higher level cognitive assessment to determine if " any therapy goals necessary to support.  2) Patient will participate in further reading comprehension/written expression assessment to determine if any therapy goals necessary to support.  3) Patient will self-monitor speech intelligibility in conversation and utilize louder, exaggerated speech to maintain >95% speech intelligibility during conversation.  4) Patient will demonstrate independence with x3+ word finding strategies in structured tasks and conversation to improve communication success in conversation.  5) Patient will name 15+ items in 3-5 different abstract categories within a minute to improve verbal fluency/word-finding.  6) Patient will communicate mod-complex thoughts/ideas/opinions successfully during structured tasks and conversations with minimal instances of reformations/empty or vague content to improve communication success in conversation.    Patient goal:  To get his words out better and participate more in conversation successfully  Patient's expectations/goals are realistic.    Rehab potential: Good based on prior level of function, evaluation results, recent progress, motivation and cooperation and time post onset.     Plan / Patient Instructions:      Plan of Care:  Authorization / Certification Start Date: 11/09/20  Authorization / Certification End Date: 02/09/21  Authorization / Certification Number of Visits: 25  Times per Week: 2  Number of Weeks: 12  Number of Visits: 25      Plan:   Speech therapy 2 times a week for 12 weeks.    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 male who presents to therapy today with  "complaints of slower/thicker speech, hard time getting words out and explaining his thoughts to participate in conversation regularly. Patient reports talking less and doesn't \"want others to think he's stupid.\" Date of onset/duration onset of symptoms was CVA on 10/14/2020. Pt reports h/o 3 TIAs in the past without lingering effects. He had tested positive for Covid-19 at the hospital. No other family members tested positive and he has been asymptomatic. Now cleared to be off quarantine.     Patient presents as alert, cooperative, motivated and engaged during the session.  Patient reports no pain     Objective:      Examination    Oral motor function was mildly impaired. Mild R facial droop with reduced lingual lateralization bilaterally. Harder to do fully oral [facility] exam over telehealth session.  Motor speech was mildly impaired.   Speech intelligibility was approximately 90-95 % at the conversation level    Voice was soft in volume - patient reports still not at baseline although working on projecting voice more.    Auditory Comprehension was not impaired.  Patient follows complex directions with 100 % accuracy.  Patient answers  moderate yes/no questions with 100 % accuracy.  Patient answers  moderate open-ended questions with 100 % accuracy.  Patient can follow  moderate conversation.     Reading Comprehension/Written Expression: Not assessed due to difficulty with telehealth session. Patient is able to schedule in-person appointments and will further assess. Patient reports no difficulties with reading, but writing feels like he's \"in 5th grade,\" very slow, noting difficulties with spelling but also writing is hard due to decreased hand strength and ability to write. He has been working on writing ABCs and working on sizing them consistently to practice with his hand strength.     Verbal Expression: at least mild impairment.  Patient completes confrontation naming task with 100 % accuracy.  Patient  can form " "sentences in conversation.  Patient can form sentences to describe with 60-70 % accuracy.   -Confrontational naming on [place] Naming Test = 60/60 with one self correction (at first said \"easal\" vs palate for artist equipment). Minimal delays with thinking of the word and responding. x1 hard for him to pronounce a word with articulation (funnel).   -Generative naming: Able to name 11 animals in a minute, increased to 16 with cues/increased time, Able to name 15 foods in a minute with thinking of items in specific food categories to support. Noted some decreased verbal fluency.  -Note intermittent word finding difficulty during conversation and responses when explaining his thoughts.     Cognition: basic cognition fairly intact, but warrants assessment at more complex level.  Oriented x4  Memory: 5/5 immediate word recall, 3/5 5-minute delayed word recall with distractions (increased to 5/5 with category cues)  Problem Solving: math word problems 2/2  Attention: -Able to say 2-3 digits backwards 2/2, 4 digits backwards 0/1. -Able to count backwards from 20 to 1.  Patient reported he was a little disoriented after discharge from hospital and had decreased recall, but this has improved and isn't noticing overt concerns.      TREATMENT  -Instructed on word retrieval strategies including description (function, appearance, location, who uses, category of word), saying first letter/sound, saying associated word, substitution and use of hand gestures. Patient had good understanding and examples of what he's done lately, saying he's started trying this including gestures.  -Practiced description strategy more in detail with words pen/apple/car, and substitution strategy for words pretty/wealthy. Patient able to do strategy with min cues/recommendations to increase.  -Discussed oral reading 5-10 minutes a day to work on over-exaggerating, volume and going at a faster rate and still have intelligibility to work on speech output " and clarity. Patient is concerned of sounding dumb and taking less turns in conversation. Encouraged him to continue taking regular talking concerns and oral reading to work on concerns and improve speech.      Interventions Today  Interventions MINUTES   Speech - Language - Cognitive Evaluation 35   Speech - Language Treatment 15   Dysphagia    Assistive technology    Voice            Total 50          Olivia Cannon  11/9/2020

## 2021-06-12 NOTE — PATIENT INSTRUCTIONS - HE
Set up Speech Therapy    Set up OT and PT referrals    Increase lisinopril to 20 mg daily    Home quarantine for 10-14 days    Schedule follow up with Dr Knutson    Once no longer on quarantine then can consider ultrasound of the abdominal aorta and the renal arteries.     Follow up in  2-3 weeks (for a BP check and labs)

## 2021-06-13 NOTE — PROGRESS NOTES
[facility] Daily Progress     Patient Name: Raghavendra Kiser  Date: 11/6/2017  Visit #: 3  PTA visit #:  NA  Referral Diagnosis: Cervical radiculitis  Referring provider: Mohini De Jesus PA-C  Visit Diagnosis:     ICD-10-CM    1. Cervical radiculitis M54.12    2. Joint stiffness of spine M25.60    3. Poor posture R29.3    4. Muscle weakness (generalized) M62.81      Raghavendra Kiser is a 57 y.o. male who presents to therapy today with chief complaints of tingling in his R forearm and pain in his R shoulder blade. Onset date of sx was 8/2017.  Pt reported h/o hypothyroidism, hyperlipidemia, stroke, HTN, and a R humeral fx with dislocation in 2008.  Pain symptoms are a dull ache with tingling in the UE.  Functional impairments include using a mouse at work and being in a resting upright position.  Pt demo's signs and sx consistent with cervical/thoracic radiculitis with hypomobility throughout spine.     Precautions / Restrictions : None     Assessment:     HEP/POC compliance is  good .  The patient reports no significant improvements since last session.  He was able to tolerate treatment well today.  He is appropriate to continue with PT services at this time.    Goal Status:   Pt. will be independent with home exercise program in : 6 weeks  Pt will: be able to drive without tingling and pain; in 8 weeks  Pt will: be able to sit at his desk at work without tingling and pain; in 8 weeks    Plan / Patient Education:     Continue with initial plan of care.  Progress with home program as tolerated.  Check if traction was helpful.  Continue with thoracic mobs and progress scapular strengthening.    Subjective:     Pain Rating: 3  The patient reports that his neck and arm are no different.  He has been doing his exercises religiously.  His pain was fine until he started work yesterday; took today off because of his symptoms.    Objective:     Decreased R side bending ROM.    Appt time: 2:33PM -  2:58PM    Treatment Today     TREATMENT MINUTES COMMENTS   Evaluation     Self-care/ Home management     Manual therapy     Neuromuscular Re-education     Therapeutic Activity     Therapeutic Exercises 10 NUSTEP x 5 minutes WL 5.0; subjective measures taken  See flowsheet   Gait training     Modality__Mechanical traction__ 15 Saunder's cervical mechanical traction with 20# pressure x 10 minutes treatment and setup              Total 25    Blank areas are intentional and mean the treatment did not include these items.       Mariaelena Toth, PT, DPT  11/6/2017

## 2021-06-13 NOTE — PROGRESS NOTES
[facility] Daily Progress     Patient Name: Raghavendra Kiser  Date: 11/25/2020  Date of Initial Evaluation: 11/3/2020  Visit #: 3/10  PTA visit #:  -  Referral Diagnosis: Acute CVA (cerebrovascular accident) (H)  Referring provider: Luis Daniel Maciel,*  Visit Diagnosis:     ICD-10-CM    1. Gait instability  R26.81    2. Right sided weakness  R53.1      From Initial Evaluation:  Raghavendra Kiser is a 60 y.o. male who presents to therapy today with chief complaints of weakness of the right side and instability following a CVA 3 weeks ago on 10/14/2020. Pt reported h/o 3 TIAs in the past wthout lingering effects. He also tested positive for COVOD-19 at the hospital. No other family members tested positive and he has been asymptomatic since the CVA.  Functional impairments include instability with walking, no falls, some stumbling at home, difficulty with coordination of dominate right hand and difficulty with speech.  Pt demo's signs and sx consistent with right sided deficits following a CVA.    Assessment:     HEP/POC compliance is  good .     Patient seen for follow up. Able to advance dynamic exercise, needed CGA for BOSU balance exercises, improved postural corrections with hip hinges and shoulder taps on wall. Improved hand coordination overall added rapid alternating movements of fingers and foam for pinching strength.   Patient doing well and remains appropriate for skilled therapy at this time.     Goal Status:  Pt. will be independent with home exercise program in : 6 weeks    Pt will: increase FGA score to >24/30 for decrease risk for falls in 8 weeks  Pt will: be able to heel walk for increas ein functional strength and improved balance to decrease risk for fallsin in 8 weeks      Plan / Patient Education:     Continue with initial plan of care.     Plan for next visit: progress LE strengthening and dynamic balance tasks.     Subjective:     Pain Rating: None  He reports he  fell backwards when squatting down. Did not get hurt.   Balance is better slowly, strength about the same.       Objective:     4 square step test.  Trial 1: 11 seconds  Trial 2 :12 seconds      Functional test Score / AD if used Previous Score from  Fall risk cutoff score Norms Observations   30 second sit<>stand 15    <8 high fall risk  9-12 mod risk     Timed up and go (TUG)  (seconds) Trial 1: 8.1  Trial 2: 7.9  Trial 3: 8.0  Av.0  >13 sec     Cognitive TUG (seconds)     >15 sec 9-10 seconds    Comfortable gait speed 10M     <1.0 m/s     Fast gait speed 10M          4-square step test     >15 sec     2 minute walk test          6 minute walk test            FGA 26/30                   MiniBest <23/28= falls risk  FGA < or equal to 22/30= high risk of falls  Chin: Fall risk <45     Exercises: HEP   Exercise #1: tandem stance eyes open and eyes closed x 3 reps each  Comment #1: sit to stands instructed to find lower surface like bed or ottoman, add foam/pillow under feet  Exercise #2: standing heel/toe raises  Comment #2: braiding  Exercise #3: tandem walking  Comment #3: walking with horizontal head turns  Exercise #4: hip hinges  Comment #4: shoulder taps on wall  Exercise #5: dynamic steps on foam/pillow F/B/ lateral  Comment #5: Lateral band walks  Exercise #6: lunges      Treatment Today     TREATMENT MINUTES COMMENTS   Evaluation     Self-care/ Home management     Manual therapy 40 - Hip hinges 4 x 8 reps progressively increased distance from wall   - shoulder taps on wall x 15 reps, decreased base of support x 15 reps   - unilateral shoulder taps on wall alternating sides x 12 reps   - Dynamic Fwd steps standing on green foam alternating steps with SBA from therapist x 20 reps   - Dynamic lateral steps alternating directions x 20 reps   - dynamic backwards steps alternating feet x 20 reps     Walking in hallway   - braiding/grapevine length of lemon x 1 each way   - tandem walk   - backwards walking  -  walking with horizontal and vertical head turns  - high step marching    Neuromuscular Re-education     Therapeutic Activity     Therapeutic Exercises 20  Sit to stands from chair x 5 reps  Added green pad under feet x 5 reps   Added BOSU under feet with CGA from therapist x 10 reps   Sit to stands with green pad under left foot x 10 reps     Fwd lunges alternating sides onto BOSU x 12 reps   Given foam block for pinching and gripping exercises as well as alternating rapid movement finger exercises and ideas to continue to progress at home    Gait training     Modality__________________                Total 60     Blank areas are intentional and mean the treatment did not include these items.       Kay Gregory, PT, DPT, CLT  11/25/2020

## 2021-06-13 NOTE — PROGRESS NOTES
[facility] Daily Progress     Patient Name: Raghavendra Kiser  Date: 11/1/2017  Visit #: 2  PTA visit #:  NA  Referral Diagnosis: Cervical radiculitis  Referring provider: Mohini De Jesus PA-C  Visit Diagnosis:     ICD-10-CM    1. Cervical radiculitis M54.12    2. Joint stiffness of spine M25.60    3. Poor posture R29.3    4. Muscle weakness (generalized) M62.81      Raghavendra Kiser is a 57 y.o. male who presents to therapy today with chief complaints of tingling in his R forearm and pain in his R shoulder blade. Onset date of sx was 8/2017.  Pt reported h/o hypothyroidism, hyperlipidemia, stroke, HTN, and a R humeral fx with dislocation in 2008.  Pain symptoms are a dull ache with tingling in the UE.  Functional impairments include using a mouse at work and being in a resting upright position.  Pt demo's signs and sx consistent with cervical/thoracic radiculitis with hypomobility throughout spine.     Precautions / Restrictions : None     Assessment:     HEP/POC compliance is  good .  The patient demonstrates improvements in his overall pain level.  Mobility in his spine is improving.  He is progressing toward goals and appropriate to continue with PT services at this time.    Goal Status:  Pt. will be independent with home exercise program in : 6 weeks  Pt will: be able to drive without tingling and pain; in 8 weeks  Pt will: be able to sit at his desk at work without tingling and pain; in 8 weeks    Plan / Patient Education:     Continue with initial plan of care.  Progress with home program as tolerated.  Continue with thoracic mobs and progress scapular strengthening.    Subjective:     Pain Rating: 3  The patient reports that he went and worked out and did the foam roller.  Now when he looks up and turns his head to the R, he gets a shot of pain and numbness into his R arm.    Objective:     Decreased R side bending ROM.    Appt time: 2:31PM - 2:56PM    Treatment Today     TREATMENT MINUTES  COMMENTS   Evaluation     Self-care/ Home management     Manual therapy 15 Prone PA mobs to C7-T6 x 30 x 3 grade III   Neuromuscular Re-education     Therapeutic Activity     Therapeutic Exercises 10 NUSTEP x 5 minutes WL 5.0; subjective measures taken  See flowsheet   Gait training     Modality__________________                Total 25    Blank areas are intentional and mean the treatment did not include these items.       Mariaelena Toth, PT, DPT  11/1/2017

## 2021-06-13 NOTE — PROGRESS NOTES
[facility]   Speech Therapy Daily Progress     Patient Name: Raghavendra Kiser  Date: 12/2/2020  Visit #: 2  Referral Diagnosis: Aphasia [R47.01], Acute CVA [163.9]  Referring provider: Dr. Harding  Visit Diagnosis:     ICD-10-CM    1. Aphasia, post-stroke  I69.320    2. Cognitive deficit due to recent stroke  I69.319          Session 2 of 25    Assessment:   Patient is appropriate to continue with skilled speech therapy intervention, as indicated by initial plan of care. Patient scored with mild attention deficits on CLQT, other cognitive domains and overall score within normal limits. Borderline deficits with executive functions. CLQT assesses simple to moderate cognition and does not rule out further higher level cognitive deficits. Patient presents with at least mild cognitive deficits in area of attention. Added diagnosis of cognitive deficit due to recent stroke. Patient had difficulty with writing fluency task at paragraph level, especially formulating his thoughts and ideas, spelling/getting out the words efficiently. Patient did well with reading comprehension task at multi-paragraph level and doesn't seem to have needs in that area.    Goal Status:   Long Term Goal  Patient will demonstrate completion of moderate cognitive-linguistic tasks with >90% accuracy as foundation for functional skills needed for social interaction and participation in ADLs (e.g., making/receiving phone calls, conversing with medical providers, providing personal information, expressing complex thoughts/ideas/opinions in back and forth conversation, work demands for office job).     Short Term Goals  1) Patient will complete moderate attention, deductive reasoning and executive function tasks with 90% accuracy TAMMIE. (NEW)  2) Patient will complete moderate writing fluency tasks with minimal self-corrections, reformulations or delays with 90% accuracy TAMMIE. (NEW)  3) Patient will self-monitor speech intelligibility in  conversation and utilize louder, exaggerated speech to maintain >95% speech intelligibility during conversation.  4) Patient will demonstrate independence with x3+ word finding strategies in structured tasks and conversation to improve communication success in conversation.  5) Patient will name 15+ items in 3-5 different abstract categories within a minute to improve verbal fluency/word-finding.  6) Patient will communicate mod-complex thoughts/ideas/opinions successfully during structured tasks and conversations with minimal instances of reformations/empty or vague content to improve communication success in conversation.    MET GOALS  -Patient will participate in further higher level cognitive assessment to determine if any therapy goals necessary to support. -MET 20  -Patient will participate in further reading comprehension/written expression assessment to determine if any therapy goals necessary to support. -MET 20    Plan / Patient Education:     Continue with initial plan of care. Added/Revised goals slightly as above.    Subjective:     Patient presents as alert, cooperative, motivated and engaged during the session.  Pain Ratin    Patient arrived in good spirits. Things are going better, gradually. Practicing a lot with reading out loud as suggested, occasionally hard with specific words to say out loud (can read the word/but can't verbalize it). Still feels a thick tongue and speak doesn't sound right or at baseline.    Objective:     The Cognitive Linguistic Quick Test (CLQT) is an assessment instrument that provides an overall measure of cognitive-linguistic function and may be used to identify an individual's cognitive strengths and weaknesses.  It assess the relative status of five cognitive domains including attention, memory, language, executive functions, and visuospatial skills.    Subtest Scores:  Personal Facts 8   Symbol Cancellation  7   Confrontation Naming 10   Clock Drawing  13   Story Retelling 8   Symbol Trails  10   Generative Naming 6   Design Memory  6   Mazes  8   Design Generation  2       Cognitive Domain Score Severity Rating   Attention 155 Mild   Memory 170 WNL   Executive Functions 26 WNL   Language 32 WNL   Visuospatial Skills 84 WNL   Clock Drawing 13 WNL   Composite Severity Rating 3.8/4.0 WNL     Written Expression:   -Able to write at sentence level, formulating sentence give specific words to include with 90% accuracy (error: spelling).  -Able to write at paragraph level, with delay to formulate his thoughts/ideas, come up with a story line. Patient reported harder than normal spelling, noted 5+ self-corrections on spelling in his paragraph. Patient reported harder than normal with thinking of and writing occasional words and mixup of words while writing (paraphasic errors). Patient was writing with dominant hand that was affected by stroke/some weakness - some decrease legibility was due to that and some was due to spelling difficulty (legibility 75-80%). Difficulty with writing fluency during the task when observed.    Reading Comprehension:  -R/C of multi-paragraph newspaper article: 6/6 100% answering questions. 4-5 spelling errors on simple words.      Interventions Today   Interventions MINUTES   Speech - Language 20   Cognition 40   Dysphagia    Assistive technology    Voice            Total 60     Olivia Cannon  12/2/2020

## 2021-06-13 NOTE — PROGRESS NOTES
[facility] Rehabilitation Daily Progress     Patient Name: Raghavendra Kiser  Date: 11/19/2020  Date of Initial Evaluation: 11/3/2020  Visit #: 2/10  PTA visit #:  -  Referral Diagnosis: Acute CVA (cerebrovascular accident) (H)  Referring provider: Luis Daniel Maciel,*  Visit Diagnosis:     ICD-10-CM    1. Gait instability  R26.81    2. Right sided weakness  R53.1      From Initial Evaluation:  Raghavendra Kiser is a 60 y.o. male who presents to therapy today with chief complaints of weakness of the right side and instability following a CVA 3 weeks ago on 10/14/2020. Pt reported h/o 3 TIAs in the past wthout lingering effects. He also tested positive for COVOD-19 at the hospital. No other family members tested positive and he has been asymptomatic since the CVA.  Functional impairments include instability with walking, no falls, some stumbling at home, difficulty with coordination of dominate right hand and difficulty with speech.  Pt demo's signs and sx consistent with right sided deficits following a CVA.    Assessment:     HEP/POC compliance is  good .     Patient seen for 1st visit in clinic s/p CVA on 10/14/2020. PT tested dynamic balance today with slight balance impairments with FGA. Normal TUG and APTA score. The patient was given progression of balance and LE strengthening exercises today for HEP. The patient will benefit from continued PT in clinic for high level balance training to return to previous level of function.    Goal Status:  Pt. will be independent with home exercise program in : 6 weeks    Pt will: increase FGA score to >24/30 for decrease risk for falls in 8 weeks  Pt will: be able to heel walk for increas ein functional strength and improved balance to decrease risk for fallsin in 8 weeks      Plan / Patient Education:     Continue with initial plan of care.     Plan for next visit: progress LE strengthening and dynamic balance tasks.     Subjective:     Pain Rating: None      Hard been working on squeezing ball, [employer], walking the dog, right leg does drag a bit.     Objective:     4 square step test.  Trial 1: 11 seconds  Trial 2 :12 seconds      Functional test Score / AD if used Previous Score from  Fall risk cutoff score Norms Observations   30 second sit<>stand 15    <8 high fall risk  9-12 mod risk     Timed up and go (TUG)  (seconds) Trial 1: 8.1  Trial 2: 7.9  Trial 3: 8.0  Av.0  >13 sec     Cognitive TUG (seconds)     >15 sec 9-10 seconds    Comfortable gait speed 10M     <1.0 m/s     Fast gait speed 10M          4-square step test     >15 sec     2 minute walk test          6 minute walk test            FGA 26/30                   MiniBest <23/28= falls risk  FGA < or equal to 22/30= high risk of falls  Chin: Fall risk <45     Exercises:  Exercise #1: tandem stance eyes open and eyes closed x 3 reps each  Comment #1: sit to stands x 15 reps  Exercise #2: standing heel/toe raises x 15 reps  Comment #2: Crossovers  Exercise #3: Heel toe walk fwd/bwds  Comment #3: Gait over hurdles on foam circles fwd/lateral  Exercise #4: Lunges to bosu X 15  Comment #4: squats on bosu X 10  Exercise #5: cone taps while standing on foam x 10  Comment #5: Lateral band walks X 10 orange  Exercise #6: Nustep 4 minutes X level 4      Treatment Today     TREATMENT MINUTES COMMENTS   Evaluation     Self-care/ Home management     Manual therapy     Neuromuscular Re-education 40 See flowsheet and balance testing   Therapeutic Activity     Therapeutic Exercises 15 See flowsheet   Gait training     Modality__________________                Total 55    Blank areas are intentional and mean the treatment did not include these items.       Abilio BOONE, PT  2020

## 2021-06-13 NOTE — PROGRESS NOTES
[facility]   Speech Therapy Daily Progress     Patient Name: Raghavendra Kiser  Date: 12/16/2020  Visit #: 4  Referral Diagnosis: Aphasia [R47.01], Acute CVA [163.9]  Referring provider: Dr. Harding  Visit Diagnosis:     ICD-10-CM    1. Cognitive deficit due to recent stroke  I69.319          Session 4 of 25    Assessment:   Patient is appropriate to continue with skilled speech therapy intervention, as indicated by initial plan of care.   Administered [employer] Behavioural Memory Test - Third Edition (RBMT-3). Patient presented with average overall score in memory with normative data according to age group, however with mild-moderate deficits with route delayed recall, messages delayed recall and novel task delayed recall. Memory areas of most difficulty: names, spatial (delayed), novel learning (delayed). Memory areas of most strengths: visual memory, prospective memory, and orientation/date.    Goal Status:   Long Term Goal  Patient will demonstrate completion of moderate cognitive-linguistic tasks with >90% accuracy as foundation for functional skills needed for social interaction and participation in ADLs (e.g., making/receiving phone calls, conversing with medical providers, providing personal information, expressing complex thoughts/ideas/opinions in back and forth conversation, work demands for office job).     Short Term Goals  1) Patient will complete moderate attention, deductive reasoning and executive function tasks with 90% accuracy TAMMIE.  2) Patient will complete moderate writing fluency tasks with minimal self-corrections, reformulations or delays with 90% accuracy TAMMIE.  3) Patient will self-monitor speech intelligibility in conversation and utilize louder, exaggerated speech to maintain >95% speech intelligibility during conversation.  4) Patient will demonstrate independence with x3+ word finding strategies in structured tasks and conversation to improve communication success in  conversation.  5) Patient will name 15+ items in 3-5 different abstract categories within a minute to improve verbal fluency/word-finding.  6) Patient will communicate mod-complex thoughts/ideas/opinions successfully during structured tasks and conversations with minimal instances of reformations/empty or vague content to improve communication success in conversation.  7) Patient will complete moderate-complex memory tasks with 80% accuracy TAMMIE with use of memory strategies.  8) Patient will demonstrate use of 2-3 novel memory/cognitive compensatory strategies in therapy tasks and/or discuss use in home/work demands.    MET GOALS  -Patient will participate in further higher level cognitive assessment to determine if any therapy goals necessary to support. -MET 20  -Patient will participate in further reading comprehension/written expression assessment to determine if any therapy goals necessary to support. -MET 20    Plan / Patient Education:     Continue with initial plan of care.  Added memory goals to address high-level deficits.    Subjective:     Patient presents as alert, cooperative, motivated and engaged during the session.  Pain Ratin    Patient arrived in good spirits. Reports things have been continuing to improve. More fluent with practicing reading aloud. Tried to work on deductive puzzles, but they were frustrating and he got stuck. He didn't bring them back in, discussed it would be helpful to work on how he got stuck and the strategies for solving and/or compensatory strategies. Said his wife started teaching him sudoku, he's found it hard thus far.    Objective:     Administered [employer] Behavioural Memory Test - Third Edition (RBMT-3) to further assess higher level memory skills to further identify memory strengths/needs in areas of remembering names, prospective memory, verbal memory, visual memory, spatial memory, new learning and orientation/date.      Subtests Raw Score Scaled  Score Percentile Rank   First and Second Names - Delayed Recall 3 7 16   Belongings - Delayed Recall 8 12 75   Appointments - Delayed Recall 3 10 50   Picture Recognition - Delayed Recognition 15 11 63   Story - Immediate Recall 10 11 63   Story - Delayed Recall 7.5 10 50   Face Recognition - Delayed Recognition 14 13 84   Route - Immediate Recall 13 12 75   Route - Delayed Recall 6 5 5   Messages - Immediate Recall 6 11 63   Messages - Delayed Recall 4 6 9   Orientation and Date 14 12 75   Novel Task - Immediate Recall 29 8 25   Novel Task - Delayed Recall 6 5 5   Sum of Scaled Scores N/A 133 N/A     Sum of Scaled Scores 133   General Memory Index 95   Percentile Rank 37     Patient presented with average overall score in memory with normative data according to age group, however with mild-moderate deficits with route delayed recall, messages delayed recall and novel task delayed recall.  Memory areas of most difficulty: names, spatial (delayed), novel learning (delayed)  Memory areas of most strengths: visual memory, prospective memory, and orientation/date.        Interventions Today   Interventions MINUTES   Speech - Language 25   Cognition 35   Dysphagia    Assistive technology    Voice            Total 60     Olivia Cannon  12/16/2020

## 2021-06-13 NOTE — PROGRESS NOTES
[facility]   Speech Therapy Daily Progress     Patient Name: Raghavendra Kiser  Date: 12/7/2020  Visit #: 3  Referral Diagnosis: Aphasia [R47.01], Acute CVA [163.9]  Referring provider: Dr. Harding  Visit Diagnosis:     ICD-10-CM    1. Aphasia, post-stroke  I69.320    2. Cognitive deficit due to recent stroke  I69.319    3. Dysarthria, post-stroke  I69.322          Session 3 of 25    Assessment:   Patient is appropriate to continue with skilled speech therapy intervention, as indicated by initial plan of care. Patient demonstrated high motivation to work on deductive puzzles and improving ability during session (first introduced today) to use strategies such as crossing off/double-checking as well as elimination/deductive reasoning.    Goal Status:   Long Term Goal  Patient will demonstrate completion of moderate cognitive-linguistic tasks with >90% accuracy as foundation for functional skills needed for social interaction and participation in ADLs (e.g., making/receiving phone calls, conversing with medical providers, providing personal information, expressing complex thoughts/ideas/opinions in back and forth conversation, work demands for office job).     Short Term Goals  1) Patient will complete moderate attention, deductive reasoning and executive function tasks with 90% accuracy TAMMIE. (NEW)  2) Patient will complete moderate writing fluency tasks with minimal self-corrections, reformulations or delays with 90% accuracy TAMMIE. (NEW)  3) Patient will self-monitor speech intelligibility in conversation and utilize louder, exaggerated speech to maintain >95% speech intelligibility during conversation.  4) Patient will demonstrate independence with x3+ word finding strategies in structured tasks and conversation to improve communication success in conversation.  5) Patient will name 15+ items in 3-5 different abstract categories within a minute to improve verbal fluency/word-finding.  6) Patient will  communicate mod-complex thoughts/ideas/opinions successfully during structured tasks and conversations with minimal instances of reformations/empty or vague content to improve communication success in conversation.    MET GOALS  -Patient will participate in further higher level cognitive assessment to determine if any therapy goals necessary to support. -MET 20  -Patient will participate in further reading comprehension/written expression assessment to determine if any therapy goals necessary to support. -MET 20    Plan / Patient Education:     Continue with initial plan of care. Check on deductive puzzles done as homework.    Subjective:     Patient presents as alert, cooperative, motivated and engaged during the session.  Pain Ratin    Patient arrived in good spirits. Things slowly are improving. During [employer]il[employer] game yesterday, was struggling to find words to express emotion and yell at the TV - didn't come naturally.    Objective:     Verbal Expression:  -Naming 4th item in category (simple): 10/10 100% with occasional minimal delays  -Naming category 3 items were in (simple): 10/10 100% majority were fast and fluent  -Complex conversation of patient describing job duties/roles/demands: x3 word finding difficulties or said another word instead accidentally, vague description x2 instances. Overall able to describe several job duties in fluent manner and answer questions with further details.  -Word finding strategies: Able to use substitution x1 instance and description in x2 instance - with success 1/2 times with getting out the word and one more time able to communicate enough info to listener to understand what was meant. Min cues for use of strategies.    Cognition:  -Deductive Puzzles (simple level): 1st provided verbal instruction and moderate cues to introduce task and strategies. 2nd & 3rd min-mod cues provided. 4th patient able to do without cues.  -Min cues initially required for crossing  items off routinely.   -Other cues provided more for reasoning techniques, elimination with data given, if/then thinking and paying attention to other details given.      Interventions Today   Interventions MINUTES   Speech - Language 25   Cognition 35   Dysphagia    Assistive technology    Voice            Total 60     Olivia Cannon  12/7/2020

## 2021-06-13 NOTE — TELEPHONE ENCOUNTER
Refill Approved    Rx renewed per Medication Renewal Policy. Medication was last renewed on 9/12/20, last OV 11/2/20.    Catrachita Nichols, [facility] Triage/Med Refill 12/13/2020     Requested Prescriptions   Pending Prescriptions Disp Refills     metFORMIN (GLUCOPHAGE-XR) 500 MG 24 hr tablet [Pharmacy Med Name: METFORMIN HCL ER TABS 500MG] 360 tablet 3     Sig: TAKE 2 TABLETS TWICE A DAY       Metformin Refill Protocol Passed - 12/11/2020  1:13 AM        Passed - Blood pressure in last 12 months     BP Readings from Last 1 Encounters:   11/02/20 142/87             Passed - LFT or AST or ALT in last 12 months     Albumin   Date Value Ref Range Status   10/15/2020 3.6 3.5 - 5.0 g/dL Final     Bilirubin, Total   Date Value Ref Range Status   10/15/2020 0.6 0.0 - 1.0 mg/dL Final     Alkaline Phosphatase   Date Value Ref Range Status   10/15/2020 65 45 - 120 U/L Final     AST   Date Value Ref Range Status   10/15/2020 25 0 - 40 U/L Final     ALT   Date Value Ref Range Status   10/15/2020 27 0 - 45 U/L Final     Protein, Total   Date Value Ref Range Status   10/15/2020 6.7 6.0 - 8.0 g/dL Final                Passed - GFR or Serum Creatinine in last 6 months     GFR MDRD Non Af Amer   Date Value Ref Range Status   10/15/2020 >60 >60 mL/min/1.73m2 Final     GFR MDRD Af Amer   Date Value Ref Range Status   10/15/2020 >60 >60 mL/min/1.73m2 Final             Passed - Visit with PCP or prescribing provider visit in last 6 months or next 3 months     Last office visit with prescriber/PCP: Visit date not found OR same dept: 10/7/2020 Abena Reyna MD OR same specialty: 10/7/2020 Abena Reyna MD Last physical: 11/2/2020 Last MTM visit: Visit date not found         Next appt within 3 mo: Visit date not found  Next physical within 3 mo: Visit date not found  Prescriber OR PCP: Luis Daniel Maciel MD  Last diagnosis associated with med order: 1. Type 2 diabetes mellitus without complication, without long-term  current use of insulin (H)  - metFORMIN (GLUCOPHAGE-XR) 500 MG 24 hr tablet [Pharmacy Med Name: METFORMIN HCL ER TABS 500MG]; TAKE 2 TABLETS TWICE A DAY  Dispense: 360 tablet; Refill: 3     If protocol passes may refill for 12 months if within 3 months of last provider visit (or a total of 15 months).           Passed - A1C in last 6 months     Hemoglobin A1c   Date Value Ref Range Status   10/15/2020 5.5 <=5.6 % Final     Comment:     Prediabetes:   HBA1c       5.7 to 6.4%        Diabetes:        HBA1c        >=6.5%   Patients with Hgb F >5%, total bilirubin >10.0 mg/dL, abnormal red cell turnover, severe renal or hepatic disease or malignancy should not have this A1C method used to diagnose or monitor diabetes.                   Passed - Microalbumin in last year      Microalbumin, Random Urine   Date Value Ref Range Status   11/02/2020 1.51 0.00 - 1.99 mg/dL Final

## 2021-06-13 NOTE — PROGRESS NOTES
Neurosurgery consultation was requested by: Dr. Maciel for evaluation of cervical spinal stenosis  Pain: is absent in the neck  Radicular Pain is present: in the right shoulder blade and forearm - sharp shooting at times  Lhermitte sign: denies  Motor complaints: denies  Sensory complaints: itching on the top of right hand  Gait and balance issues: absent  Bowel or bladder issues: denies  Duration of SX is: for 1 months  The symptoms are worse with: ROM  The symptoms are better with: rest  Injury: denies  Severity is: mild  Patient has tried the following conservative measures: none  NDI score is : 12%  Kate Shelton, RN, CNRN

## 2021-06-13 NOTE — PROGRESS NOTES
Dear Dr. Maciel:  I had the pleasure of seeing Raghavendra Kiser consultation in my neurosurgery clinic for a right C7-T1 disc herniation.  As you well aware Raghavendra is a very pleasant 57-year-old with history of dislocated right humerus 8 years ago.  For about 3 weeks she has been having numbness and tingling in the dorsal aspect of his forearm and his first and second digits.  He denies any numbness or tingling in the middle of the night.  This does also cause him pain.  He has not had any steroid injections or physical therapy.  Have a cervical spine MRI which multilevel degenerative changes without any high-grade central stenosis.  He does have multilevel foraminal stenosis.  On the right C7-T1 he has a right foraminal extrusion causing moderate to severe narrowing.  Physical exam patient is very pleasant and appropriate  Strength is full throughout the upper and lower extremities and symmetric  Negative Delicia's bilaterally  Negative Tinel's sign over the carpal tunnel on the right  Negative Phalen's sign bilaterally    Assessment and plan:  Raghavendra is a very pleasant right-handed 57-year-old with new onset numbness and tingling in the right forearm first and second digits.  This does not correlate very well to a C8 radiculopathy.  I will send him for a intralaminar steroid injection at C7-T1 for both diagnostic and therapeutic purposes.  I will also refer him for physical therapy.  I will see him back in my clinic in 6 weeks.  If his numbness does not improve, I would recommend an EMG to better evaluate his radiculopathy in the level.  I did explain this to Raghavendra in detail. Thank you for giving me the opportunity to see this very pleasant patient.  If you have any questions about him or any othe patients please feel free to contact me.  Of note I spent greater than 30 minutes counseling the patient regarding his pathology and treatment plan.    Francine Lyman MD, FAANS

## 2021-06-13 NOTE — PROGRESS NOTES
ASSESSMENT: Raghavendra Kiser is a 57 y.o. male with past medical history significant for hypothyroidism, hyperlipidemia, stroke, hypertension who presents today for new patient evaluation of a 2 month history of right neck pain with radiation into the right upper extremity with associated numbness and tingling.  MRI of the cervical spine shows a right C7-T1 foraminal disc extrusion which results in moderate to severe foraminal stenosis.  The patient is neurologically intact on exam today.    NDI:  26  WHO 5: 21    PLAN:  A shared decision making model was used.  The patient's values and choices were respected.  The following represents what was discussed and decided upon by the physician assistant and the patient.      1.  DIAGNOSTIC TESTS: I reviewed the MRI cervical spine.  No further diagnostic tests were ordered.  He may need an EMG in the future.    2.  PHYSICAL THERAPY:  Discussed the importance of core strengthening, ROM, stretching exercises with the patient and how each of these entities is important in decreasing pain.  Explained to the patient that the purpose of physical therapy is to teach the patient a home exercise program.  These exercises need to be performed every day in order to decrease pain and prevent future occurrences of pain.   I placed an order for the patient begin physical therapy at the [place] location of [facility] rehab.    3.  MEDICATIONS: No changes are made to the patient's medications.  We briefly discussed a trial of gabapentin.  He would like to hold on that for now.  We could consider it if his symptoms fail to improve.    4.  INTERVENTIONS: Neurosurgery has ordered a C7-T1 interlaminar epidural steroid injection.  I agree with this recommendation.  He will have this injection later this afternoon with Dr. Pham.    5.  PATIENT EDUCATION: The patient was in agreement with the above plan.  All questions were answered    6.  FOLLOW-UP:   The patient is scheduled to follow back  up with Dr. Lyman next month.  I told the patient I am happy to see him back for a two-week post procedure follow-up visit in the meantime.  Otherwise, he can just follow up with Dr. Lyman.      SUBJECTIVE:  Raghavendra Kiser  Is a 57 y.o. male who presents today in consultation at the request of Dr. Lyman for new patient evaluation of neck pain with radiation into the right upper extremity with associated numbness and tingling.  Patient states that he began to have pain in August 2017.  He denies any specific injury or event to cause the pain.  It is getting progressively more severe.  Patient states that first the symptoms are intermittent but now they are constant.  He does not have any previous history of neck pain.    The patient complains of right-sided neck pain.  Pain radiates into the right shoulder blade.  He denies any pain in the upper arm, but he has pain which radiates from the extensor forearm into the dorsal hand.  He has numbness and tingling in the same distribution as his pain in the forearm which extends into the fingers.  He states that primarily the third, fourth, and fifth fingers are affected.  He states that he has an aching discomfort in those fingers.  His pain is present whenever he is upright.  It is alleviated with laying down.  He denies any weakness.  He denies any left-sided symptoms.  He denies any headaches.  He denies any difficulty with balance or fine motor activities.  He denies any loss of bowel or bladder control.  Denies any recent fevers, chills, or sweats.    The patient has not had any treatments for his neck.  He has not had physical therapy.  He does not go to the chiropractor.  He has never had any spine surgeries or spine injections.  The patient is not taking any medications for pain.  He tried ibuprofen but it was not helpful.  He prefers not to take pain relieving medications if possible.    Current Outpatient Prescriptions on File Prior to Encounter    Medication Sig Dispense Refill     allopurinol (ZYLOPRIM) 300 MG tablet Take 300 mg/450 mg on alternate days. 135 tablet 1     aspirin 325 MG tablet Take 325 mg by mouth daily.       hydroCHLOROthiazide (HYDRODIURIL) 12.5 MG tablet Take 1 tablet (12.5 mg total) by mouth daily. 90 tablet 1     levothyroxine (SYNTHROID, LEVOTHROID) 125 MCG tablet Take 125 mcg by mouth daily.       rosuvastatin (CRESTOR) 10 MG tablet Take 5 mg by mouth bedtime.          No Known Allergies    Past Medical History:   Diagnosis Date     Carotid stenosis, bilateral      Chronic gout      High cholesterol      Humerus fracture     Right     Hypothyroidism      TIA (transient ischemic attack) 5/16/15        Patient Active Problem List   Diagnosis     Hypothyroidism     Hyperlipidemia     Chronic gout     TIA (transient ischemic attack)     Stroke     Superficial venous thrombosis of arm - right lateral antecubital fossa     Atherosclerosis of right carotid artery - ulcerated plaque     Carotid stenosis     Medial epicondylitis     Ganglion cyst     High risk medication use     HTN (hypertension)     Right shoulder tendinitis       Past Surgical History:   Procedure Laterality Date     CAROTID ENDARTERECTOMY       [place] THROMBOENDARTECTMY NECK,NECK INCIS Right 2015    Procedure: Right Carotid Endarterectomy with Impulse Monitoring;  Surgeon: Marcellus Toth MD;  Location: [facility];  Service: General     TONSILLECTOMY         Family History   Problem Relation Age of Onset     Cancer Mother      Lung     Cancer Father      Lung     Cancer Sister      Llung cancer     Heart disease Brother       of a heart attack.     No Medical Problems Son      No Medical Problems Sister      No Medical Problems Sister      No Medical Problems Sister      No Medical Problems Sister      No Medical Problems Brother      No Medical Problems Brother      Social history: The patient is .  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  He drinks alcohol socially.  He is a former smoker.  He denies current tobacco use.  He denies.    ROS: Positive for back pain, joint pain.  Specifically negative for dysphagia, imbalance, fine motor skill difficulties, bowel/bladder dysfunction, fevers,chills, appetite changes, unexplained weight loss.   Otherwise 13 systems reviewed are negative.  Please see the patient's intake questionnaire from today for details.      OBJECTIVE:  PHYSICAL EXAMINATION:  CONSTITUTIONAL:  Vital signs as above.  No acute distress.  The patient is well nourished and well groomed.  PSYCHIATRIC:  The patient is awake, alert, oriented to person, place, time and answering questions appropriately with clear speech.    HEENT:  Normocephalic, atraumatic.  Sclera clear.    SKIN:  Skin over the face, bilateral upper extremities, and neck is clean, dry, intact without rashes.  LYMPH NODES:  No palpable or tender anterior/posterior cervical, submandibular, or supraclavicular lymph nodes.    MUSCLE STRENGTH:  5/5 strength for the bilateral shoulder abductors, elbow flexors/extensors, wrist extensors, finger flexors/abductors.  NEURO:  CN III-XII are grossly intact.  2/4 symmetric biceps, brachioradialis, triceps reflexes bilaterally.  Sensation to light touch is intact over bilateral upper extremities throughout.  Negative Perez's bilaterally.  Negative Tinel sign at the right wrist.  Negative Phalen sign at the right wrist.  VASCULAR:  2/4 radial pulses bilaterally.  Warm upper limbs bilaterally.  Capillary refill in the upper extremities is less than 1 second.  MUSCULOSKELETAL: Cervical range of motion is restricted with extension.  Positive Spurling sign on the right.    RESULTS: I reviewed the MRI cervical spine from [facility] dated September 26, 2017.  This shows multilevel degenerative changes.  There is no high-grade central canal stenosis.  There is multilevel mild spinal canal stenosis.  No spinal cord compression or signal  abnormality.  There is a right foraminal disc extrusion at C7-T1 causing moderate to severe right foraminal stenosis.  Multilevel advanced neural foraminal stenosis, severe at C5-6 and moderate to severe at C3-4, C4-5, and on the right at C6-7.  Please see report for further details.

## 2021-06-13 NOTE — PROGRESS NOTES
[facility] Daily Progress     Patient Name: Raghavendra Kiser  Date: 12/1/2020  Date of Initial Evaluation: 11/3/2020  Visit #: 4/10  PTA visit #:  -  Referral Diagnosis: Acute CVA (cerebrovascular accident) (H)  Referring provider: Luis Daniel Maciel,*  Visit Diagnosis:     ICD-10-CM    1. Gait instability  R26.81    2. Right sided weakness  R53.1      From Initial Evaluation:  Raghavendra Kiser is a 60 y.o. male who presents to therapy today with chief complaints of weakness of the right side and instability following a CVA 3 weeks ago on 10/14/2020. Pt reported h/o 3 TIAs in the past wthout lingering effects. He also tested positive for COVOD-19 at the hospital. No other family members tested positive and he has been asymptomatic since the CVA.  Functional impairments include instability with walking, no falls, some stumbling at home, difficulty with coordination of dominate right hand and difficulty with speech.  Pt demo's signs and sx consistent with right sided deficits following a CVA.    Assessment:   Hold chart, prgoressing well, met goals for PT. If he does not return to therapy in >30 days then he will be discharged with I HEP as below.    HEP/POC compliance is  good .     Patient seen for follow up. Able to advance dynamic exercise, needed CGA for BOSU balance exercises, improved postural corrections with hip hinges and shoulder taps on wall. Improved hand coordination overall added rapid alternating movements of fingers and foam for pinching strength.   Patient doing well and remains appropriate for skilled therapy at this time.     Goal Status:  Pt. will be independent with home exercise program in : 6 weeks    Pt will: increase FGA score to >24/30 for decrease risk for falls in 8 weeks  Pt will: be able to heel walk for increas ein functional strength and improved balance to decrease risk for fallsin in 8 weeks      Plan / Patient Education:     Hold chart x 30 days,  discontinue if does not return to therapy    Subjective:     Pain Rating: None  Doing well  Physically progressing well, no falls, no near falls this week  Exercises are going well  Some coordination issues continue between hands and feet when drumming  More concerned about cognitive and word finding issues for return to work.   At this time will trial at home with I HEP for PT and continue with speech therapy.       Objective:     4 square step test.  Trial 1: 11 seconds  Trial 2 :12 seconds      Functional test Score / AD if used Previous Score from  Fall risk cutoff score Norms Observations   30 second sit<>stand 15    <8 high fall risk  9-12 mod risk     Timed up and go (TUG)  (seconds) Trial 1: 8.1  Trial 2: 7.9  Trial 3: 8.0  Av.0  >13 sec     Cognitive TUG (seconds)     >15 sec 9-10 seconds    Comfortable gait speed 10M     <1.0 m/s     Fast gait speed 10M          4-square step test     >15 sec     2 minute walk test          6 minute walk test            FGA 26/30                   MiniBest <23/28= falls risk  FGA < or equal to 22/30= high risk of falls  Chin: Fall risk <45     Exercises: HEP   Exercise #1: tandem stance eyes open and eyes closed x 3 reps each  Comment #1: sit to stands instructed to find lower surface like bed or ottoman, add foam/pillow under feet  Exercise #2: standing heel/toe raises  Comment #2: braiding  Exercise #3: tandem walking  Comment #3: walking with horizontal head turns  Exercise #4: hip hinges  Comment #4: shoulder taps on wall  Exercise #5: dynamic steps on foam/pillow F/B/ lateral  Comment #5: Lateral band walks  Exercise #6: lunges      Treatment Today     TREATMENT MINUTES COMMENTS   Evaluation     Self-care/ Home management     Manual therapy 40 [id number] walking lunges x 6 reps each     - Dynamic Fwd steps standing on green foam alternating steps with SBA from therapist x 20 reps   - Dynamic lateral steps alternating directions x 20 reps   - dynamic backwards steps  alternating feet x 20 reps     Walking in // bars over small hurdles   - braiding/grapevine  X 4 reps each way   -forward steps alternating with backwards steps backwards walking x 6 reps each   - lateral steps over hurdles x 4 reps each way   Reviewed   - walking with horizontal and vertical head turns  - high step marching     - side stepping with L3 band 5 steps each way x 6 reps    Neuromuscular Re-education     Therapeutic Activity     Therapeutic Exercises 13  NuStep WL 7 x 8 min     Reviewed HEP from before   Gait training     Modality__________________                Total 53    Blank areas are intentional and mean the treatment did not include these items.       Kay Gregory, PT, DPT, CLT  12/1/2020

## 2021-06-13 NOTE — PROGRESS NOTES
DIAGNOSIS:  1. Right shoulder pain  XR Shoulder Right 2 or More VWS   2. Arm paresthesia, right  XR Cervical Spine 2 - 3 VWS    MR Cervical Spine Without Contrast   3. Health care maintenance  Influenza, Seasonal,Quad Inj, 36+ MOS       PLAN:  Patient Instructions   MRI OF THE C-SPINE    FOLLOW UP FOR PSA    FLU VACCINE TODAY            HPI:  Right shoulder pain for a couple weeks.  Feels with specific movements and otherwise is painfree.  Right arm goes numb up into the shoulder constant for about 3 weeks.  After awhile it becomes painful. By the end of the day the pain in the shoulder becomes like a knot.  Pain usually at a 3-4/10.   Can still do everything with the right arm.   No change in pain with neck movements.  No head or neck injury.  Did dislocate the right shoulder and fx of humerus about 8 years d/t skiing.    No cp or sob.           Current Outpatient Prescriptions on File Prior to Visit   Medication Sig Dispense Refill     allopurinol (ZYLOPRIM) 300 MG tablet Take 300 mg/450 mg on alternate days. 135 tablet 1     aspirin 325 MG tablet Take 325 mg by mouth daily.       hydroCHLOROthiazide (HYDRODIURIL) 12.5 MG tablet Take 1 tablet (12.5 mg total) by mouth daily. 30 tablet 1     levothyroxine (SYNTHROID, LEVOTHROID) 125 MCG tablet Take 125 mcg by mouth daily.       rosuvastatin (CRESTOR) 10 MG tablet Take 5 mg by mouth bedtime.        No current facility-administered medications on file prior to visit.        Pmh: reviewed  Psh: reviewed  Allergy:  reviewed      EXAM:    /82 (Patient Site: Right Arm, Patient Position: Sitting, Cuff Size: Adult Large)  Pulse 64  Temp 98.3  F (36.8  C) (Oral)   Resp 18  Wt (!) 248 lb 8 oz (112.7 kg)  BMI 33.7 kg/m2  GEN:   ALERT, NAD, ORIENTED TIMES THREE  NECK: SUPPLE WITHOUT ADENOPATHY OR THYROMEGALY  LUNGS: CTA  COR: RRR WITHOUT MURMUR  SKIN: NO RASH , ULCERS OR LESIONS NOTED  MS: full ROM of the shoulders without focal tx; strong hand grasp b/l  NEURO:   diffuesely diminished UE  DTR's  EXT: WITHOUT EDEMA OR SWELLING    No results found for this or any previous visit (from the past 168 hour(s)).

## 2021-06-13 NOTE — PROGRESS NOTES
[facility]   Cervical Thoracic Initial Evaluation    Patient Name: Raghavendra Kiser   Preferred Name: Mauri  Date of evaluation: 10/30/2017  Referral Diagnosis: Cervical radiculitis  Referring provider: Mohini De Jesus PA-C  Visit Diagnosis:     ICD-10-CM    1. Cervical radiculitis M54.12    2. Joint stiffness of spine M25.60    3. Poor posture R29.3    4. Muscle weakness (generalized) M62.81        Assessment:      Raghavendra Kiser is a 57 y.o. male who presents to therapy today with chief complaints of tingling in his R forearm and pain in his R shoulder blade. Onset date of sx was 8/2017.  Pt reported h/o hypothyroidism, hyperlipidemia, stroke, HTN, and a R humeral fx with dislocation in 2008.  Pain symptoms are a dull ache with tingling in the UE.  Functional impairments include using a mouse at work and being in a resting upright position.  Pt demo's signs and sx consistent with cervical/thoracic radiculitis with hypomobility throughout spine.   Pt. is appropriate for skilled PT intervention as outlined in the Plan of Care (POC).  Pt. is a good candidate for skilled PT services to improve pain levels and function.    Goals:  Pt. will be independent with home exercise program in : 6 weeks  Pt will: be able to drive without tingling and pain; in 8 weeks  Pt will: be able to sit at his desk at work without tingling and pain; in 8 weeks    Patient's expectations/goals are realistic.    Barriers to Learning or Achieving Goals:  No Barriers.       Plan / Patient Instructions:        Plan of Care:   Communication with: Referral Source  Patient Related Instruction: Nature of Condition;Treatment plan and rationale;Self Care instruction;Basis of treatment;Body mechanics;Posture  Times per Week: 1-2  Number of Weeks: 6-12  Number of Visits: 12  Precautions / Restrictions : None  Therapeutic Exercise: ROM;Stretching;Strengthening  Neuromuscular Reeducation: kinesio tape;posture;core  Manual Therapy: soft  tissue mobilization;myofascial release;joint mobilization;muscle energy;strain counterstrain  Modalities: electrical stimulation;ultrasound;traction    POC and pathology of condition were reviewed with patient.  Pt. is in agreement with the Plan of Care  A Home Exercise Program (HEP) was initiated today.  Pt. was instructed in exercises by PT and patient was given a handout with detailed instructions.    Plan for next visit: Thoracic mobs.  Progress scapular strengthening.     Subjective:       Fx humerus and dislocated shoulder in .  He received an injection on 10/19/17 with no improvements, possibly worse.  When it started, he would notice his arm would go numb when driving to work in the morning.  The pain is always there when he is in an upright position.  If he is reclined, the pain is less but doesn't go away.  He will be following up at the spine center on 17 with Dr. Lyman.  Starts as a light tingling in the forearm and moves to the hand.  Level of pain associated with it, then after a few minutes, he gets a severe pain between his shoulder blades and hangs with him all day.  Arm pain goes away when in the recliner and the pain in the shoulder blades improves but doesn't go away.    Social information:   Living Situation:single family home   Occupation:   Work Status:Working full time   Equipment Available: None    Pain Ratin  Pain rating at best: 2  Pain rating at worst: 6  Pain description: dull ache in the shoulder blade, tingling in the arm      Functional limitations are described as occurring with:   Using the mouse at work; can do everything but it's bothersome    Patient reports benefit from:  injection:   type cortisone date(s)10/19/17 not helpful, cold         Objective:      Note: Items left blank indicates the item was not performed or not indicated at the time of the evaluation.    Patient Outcome Measures :    Neck Disability Score in %: 10   Scores range from  0-100%, where a score of 0% represents minimal pain and maximal function. The minmal clinically important difference is a score reduction of 10%.    Cervical Thoracic Examination  1. Cervical radiculitis     2. Joint stiffness of spine     3. Poor posture     4. Muscle weakness (generalized)       Involved side: Right  Posture Observation:      General sitting posture is  fair.  Cervical:  Moderate forward head  Shoulder/Thoracic complex: Moderate bilateral scapular protraction     Cervical ROM:    Date: 10/30/17     *Indicate scale AROM AROM AROM   Cervical Flexion 49     Cervical Extension 35      Right Left Right Left Right Left   Cervical Sidebending 26 26 stress R       Cervical Rotation 60 59       Cervical Protraction WNL     Cervical Retraction WNL     Thoracic Flexion WNL     Thoracic Extension WNL     Thoracic Sidebending WNL WNL       Thoracic Rotation WNL WNL         Strength     Date: 10/30/17     Cervical Myotomes/5 Right Left Right Left Right Left   Cervical Flexion (C1-2) 5 5       Cervical Sidebending (C3) 5 5       Shoulder Elevation (C4) 5 5       Shoulder Abduction (C5) 5 5       Elbow Flexion (C6) 5 5       Elbow Extension (C7) 5 5       Wrist Flexion (C7) 5 5       Wrist Extension (C6) 5 5       Thumb abduction (C8) 5 5       Finger Abduction (T1) 5 5       No pain with ER/IR strength testing.    Sensation          Reflex Testing  Cervical Dermatomes Right Left UE Reflexes Right Left   Back of the Head (C2) WNL WNL Biceps (C5-6)     Supraclavicular Fossa (C3) WNL WNL Brachioradialis (C5-6)     AC Joint (C4) WNL WNL Triceps (C7-8)     Lateral Biceps (C5) WNL WNL Delicia s test     Palmar Thumb (C6) WNL WNL LE Reflexes     Palmar 3rd Finger (C7) WNL WNL Patellar (L3-4)     Palmar 5th Finger (C8) WNL WNL Achilles (S1-2)     Ulnar Forearm (T1) WNL WNL Babinski Response       UE Screen: Shoulder ROM WNL    Flexibility:   Cervical: Poor   Thoracic: Poor   Shoulder: Fair    Palpation:   Tenderness:  No palpatory tenderness   Tightness: upper back significant MF tightness and mm tightness    Passive Mobility-Joint Integrity: Significant hypomobility in cervical and thoracic spine, especially in thoracic spine.    Cervical Special Tests     Cervical Special Tests Right Left UE Nerve Mobility Right Left   Cervical compression - - Median nerve - -   Cervical distraction - - Ulnar nerve - -   Spurling s test + mild pain - Radial nerve     Shoulder abduction sign   Thoracic outlet     Deep neck flexor endurance test   Zayra     Upper cervical rotation   Adson s     Sharper-Ana Maria   Cervical rotation lateral flexion     Alar ligament test   Other:     Other: Caty-Sunny - - Other:       Appt time: 3:00PM - 3:50PM    Treatment Today     TREATMENT MINUTES COMMENTS   Evaluation 25 Low complexity cervical evaluation   Self-care/ Home management     Manual therapy 10 Prone PA mobs C7-T6 x 30 x 3 grade III   Neuromuscular Re-education     Therapeutic Activity     Therapeutic Exercises 15 Demo/performance of HEP  Patient educated on pathology  Discussed POC   Gait training     Modality__________________                Total 25    Blank areas are intentional and mean the treatment did not include these items.     PT Evaluation Code: (Please list factors)  Patient History/Comorbidities: hypothyroidism, hyperlipidemia, stroke, HTN, and a R humeral fx with dislocation in 2008  Examination: Significant hypomobility throughout cervical/thoracic spines, + Spurling's test R  Clinical Presentation: Stable  Clinical Decision Making: Low complexity    Patient History/  Comorbidities Examination  (body structures and functions, activity limitations, and/or participation restrictions) Clinical Presentation Clinical Decision Making (Complexity)   No documented Comorbidities or personal factors 1-2 Elements Stable and/or uncomplicated Low   1-2 documented comorbidities or personal factor 3 Elements Evolving clinical presentation with  changing characteristics Moderate   3-4 documented comorbidities or personal factors 4 or more Unstable and unpredictable High            Mariaelena Toth, PT, DPT  10/30/2017  4:05 PM

## 2021-06-14 NOTE — PROGRESS NOTES
Outpatient Physical Therapy Lymphedema Progress Note  [facility]     Patient Name: Demi Morataya  : 1959  MRN: [id number]  Today's Date: 2018        Visit Date: 2018    Visit Dx:    ICD-10-CM ICD-9-CM   1. 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 yes  -HR      Pre-Treatment Pain Level 3  -HR         Subjective Comments    Subjective Comments She had a fun weekend camping [place] [employer] [place] [employer] [place].   -HR         Manual Lymphatic Drainage    Manual Lymphatic Drainage initial sequence;opened regional lymph nodes;opened anastamoses;extremity treatment  -HR      Initial Sequence short neck;abdomen;diaphragmatic breathing  -HR      Supraclavicular left;right  -HR      Abdomen superficial;deep  -HR      Opened Regional Lymph Nodes axillary;inguinal  -HR      Inguinal left;right  -HR      Opened Anastamoses axillo-inguinal  -HR      Axillo-Inguinal left;right  -HR      Extremity Treatment MLD to full limb;extremity treatment focus on   left arm and trunk  -HR      Manual Lymphatic Drainage Comments In R sidelying, I noticed a small tick on the L scapula. Used alcohol and tweezers to remove it and clean the spot;. I also made a [place] in pen around where the tick had been so she and her  can monitor it. Very small deer tick that was still very flat, not engorged at all.   -HR         Compression/Skin Care    Compression/Skin Care wrapping location  -HR      Wrapping Location upper extremity  -HR      Wrapping Location UE left:;hand to axilla  -HR      Bandage Layers cotton liner;soft foam- 1/4 inch;short-stretch bandages (comment size/quantity)  -HR      Bandaging Technique circumferential/spiral;figure-eight;moderate compression;strong compression  -HR      Compression Garment Comments Had her try on a shoulder brace  DIAGNOSIS:  1. Essential hypertension  Basic Metabolic Panel    lisinopriL (PRINIVIL,ZESTRIL) 20 MG tablet       PLAN:     Increase lisinopril to 20 mg twice daily    Check BMP today    Nurse BP check in one week  Follow up appt in 3-4 weeks.     If not improved  Control then go back to lisinopril 20 mg daily and consider addition of amlodipine or metoprolol at next visit.            HPI:  Home BP monitor ?  running higher than our monitor.  No chest pain or shortness of breath.  Still drags right foot a bit since stroke.  Still doing speech therapy.        Current Outpatient Medications on File Prior to Visit   Medication Sig Dispense Refill     allopurinoL (ZYLOPRIM) 300 MG tablet Take 300 mg by mouth every other day. Alternate with 450 mg (1.5 tablet) every other day. Take at bedtime.       allopurinoL (ZYLOPRIM) 300 MG tablet Take 450 mg by mouth every other day. Alternate with 300 mg (1 tablet) every other day. Take at bedtime.       aspirin 81 MG EC tablet Take 1 tablet (81 mg total) by mouth daily.  0     blood glucose meter (GLUCOMETER) Use 1 each As Directed as needed. Dispense glucometer brand per patient's insurance at pharmacy discretion. 1 each 0     blood glucose test strips Use 1 each As Directed 2 (two) times a day. 100 strip 6     cholecalciferol, vitamin D3, (VITAMIN D3) 4,000 unit cap Take 4,000 Units by mouth daily.        clopidogreL (PLAVIX) 75 mg tablet Take 1 tablet (75 mg total) by mouth daily. 30 tablet 1     generic lancets (FINGERSTIX LANCETS) Dispense brand per patient's insurance at pharmacy discretion. 100 each 3     levothyroxine (SYNTHROID, LEVOTHROID) 125 MCG tablet Take 125 mcg by mouth Daily at 6:00 am.        metFORMIN (GLUCOPHAGE-XR) 500 MG 24 hr tablet Take 2 tablets (1,000 mg total) by mouth 2 (two) times a day. 360 tablet 3     rosuvastatin (CRESTOR) 10 MG tablet Take 5 mg by mouth bedtime.        [DISCONTINUED] lisinopriL (PRINIVIL,ZESTRIL) 20 MG tablet Take 1 tablet (20 mg  "that is soft, neoprene material that she really liked. Also had \"toe tubes\" which she placed on her fingers and got a comfortable light compression with.   -HR        User Key  (r) = Recorded By, (t) = Taken By, (c) = Cosigned By    Initials Name Provider Type    HR Kerline Rimma Good, PT, DPT, CLT-SALVADOR Physical Therapist                              PT Assessment/Plan     Row Name 06/04/18 1100          PT Assessment    Assessment Comments She was not as compliant with her wrapping over the weekend as they made a spur of the moment decision to go [place] the lake. She had a tick on her back so she is going to monitor the spot where I removed it. She is also going to look into ordering the shoulder brace and toe tubes to use to assist in keeping the wraps and sleeve up on the arm and give some compression to her fingers without using the gauze.  -HR        PT Plan    PT Plan Comments Cont POC.  -HR       User Key  (r) = Recorded By, (t) = Taken By, (c) = Cosigned By    Initials Name Provider Type    HR Kerline Rimma Good, PT, DPT, CLT-SALVADOR Physical Therapist                     Exercises     Row Name 06/04/18 1100             Subjective Comments    Subjective Comments She [place].   -HR         Subjective Pain    Able to rate subjective pain? yes  -HR      Pre-Treatment Pain Level 3  -HR        User Key  (r) = Recorded By, (t) = Taken By, (c) = Cosigned By    Initials Name Provider Type    HR Kerline Good, PT, DPT, CLT-SALVADOR Physical Therapist                              PT OP Goals     Row Name 06/04/18 1100          PT Short Term Goals    STG Date to Achieve 05/29/18  -HR     STG 1 Patient demonstrate decreased net edema of upper extremity >/= 10% for decreased risk of infection.   -HR     STG 1 Progress Ongoing  -HR     STG 2 Patient independent and compliant with self-massage techniques with spouse as needed for improved lymphatic drainage.  -HR     STG 2 " total) by mouth daily. 90 tablet 1     generic lancets Use 1 each As Directed 2 (two) times a day. Dispense brand per patient's insurance at pharmacy discretion. 200 each 3     No current facility-administered medications on file prior to visit.        Pmh: reviewed  Psh: reviewed  Allergy:  reviewed      EXAM:    BP (!) 152/95   Pulse 78   Resp 20   Wt (!) 236 lb 12.8 oz (107.4 kg)   SpO2 98%   BMI 32.12 kg/m     Wt Readings from Last 3 Encounters:   12/24/20 (!) 236 lb 12.8 oz (107.4 kg)   11/02/20 (!) 227 lb (103 kg)   10/14/20 (!) 227 lb (103 kg)      GEN:   ALERT, NAD, ORIENTED TIMES THREE  NECK: SUPPLE WITHOUT ADENOPATHY OR THYROMEGALY  LUNGS: CTA  COR: RRR WITHOUT MURMUR  SKIN: NO RASH , ULCERS OR LESIONS NOTED  EXT: WITHOUT EDEMA/SWELLING    No results found for this or any previous visit (from the past 168 hour(s)).       Lab Results   Component Value Date    HGBA1C 5.5 10/15/2020     Lab Results   Component Value Date    TSH 3.91 11/02/2020     Lab Results   Component Value Date    PSA 2.0 11/02/2020    PSA 2.1 11/08/2019    PSA 1.7 10/16/2018     Results for orders placed or performed during the hospital encounter of 10/14/20   Comprehensive Metabolic Panel   Result Value Ref Range    Sodium 142 136 - 145 mmol/L    Potassium 3.8 3.5 - 5.0 mmol/L    Chloride 109 (H) 98 - 107 mmol/L    CO2 27 22 - 31 mmol/L    Anion Gap, Calculation 6 5 - 18 mmol/L    Glucose 106 70 - 125 mg/dL    BUN 14 8 - 22 mg/dL    Creatinine 0.80 0.70 - 1.30 mg/dL    GFR MDRD Af Amer >60 >60 mL/min/1.73m2    GFR MDRD Non Af Amer >60 >60 mL/min/1.73m2    Bilirubin, Total 0.6 0.0 - 1.0 mg/dL    Calcium 8.7 8.5 - 10.5 mg/dL    Protein, Total 6.7 6.0 - 8.0 g/dL    Albumin 3.6 3.5 - 5.0 g/dL    Alkaline Phosphatase 65 45 - 120 U/L    AST 25 0 - 40 U/L    ALT 27 0 - 45 U/L        Progress Ongoing  -HR     STG 3 Patient independent and compliant with initial home exercise program focused on deep breathing, range of motion for decreased edema and decreased risk of infection.   -HR     STG 3 Progress Ongoing  -HR     STG 4 Patient independent and compliant with self-wrapping techniques of compression bandages with spouse for self-management of condition.   -HR     STG 4 Progress Ongoing  -HR        Long Term Goals    LTG Date to Achieve 06/26/18  -HR     LTG 1 Patient independent and compliant with compression garments as indicated for self-management of condition.   -HR     LTG 1 Progress Ongoing  -HR     LTG 2 Patient will have no s/s of celluliitis.   -HR     LTG 2 Progress Ongoing  -HR     LTG 2 Progress Comments No s/s infection but I did remove a tick from her L scapula.  -HR        Time Calculation    PT Goal Re-Cert Due Date 07/04/18  -HR       User Key  (r) = Recorded By, (t) = Taken By, (c) = Cosigned By    Initials Name Provider Type    HR Kerline Good, PT, DPT, YOANA Physical Therapist          Therapy Education  Education Details: Options for shoulder and finger support and compression  Given: Edema management  Program: Modified  How Provided: Demonstration, Verbal, Written  Provided to: Patient  Level of Understanding: Verbalized              Time Calculation:         Therapy Charges for Today     Code Description Service Date Service Provider Modifiers Qty    [id number] HC PT MANUAL THERAPY EA 15 MIN 6/4/2018 Kerline Good, PT, DPT, YOANA GP 5                    Kerline Good PT, DPSHIVA, YOANA  6/4/2018

## 2021-06-14 NOTE — PROGRESS NOTES
[facility]   Speech Therapy Daily Progress     Patient Name: Raghavendra Kiser  Date: 1/11/2021  Visit #: 6  Referral Diagnosis: Aphasia [R47.01], Acute CVA [163.9]  Referring provider: Luis Daniel Maciel,*  Visit Diagnosis:     ICD-10-CM    1. Aphasia, post-stroke  I69.320    2. Cognitive deficit due to recent stroke  I69.319          Session 6 of 25    Assessment:   Patient demonstrates understanding/independence with home program.  Patient is appropriate to continue with skilled speech therapy intervention, as indicated by initial plan of care.   Prior to recent stroke had been working on harder deductive puzzles, patient has recall of this and expressed he was glad we started with a simpler level today - he recognizes that the simple level would be easier for him and more manageable at this time.    Goal Status:   Long Term Goal  Patient will demonstrate completion of moderate cognitive-linguistic tasks with >90% accuracy as foundation for functional skills needed for social interaction and participation in ADLs (e.g., making/receiving phone calls, conversing with medical providers, providing personal information, expressing complex thoughts/ideas/opinions in back and forth conversation, work demands for office job).     Short Term Goals  1) Patient will complete moderate attention, deductive reasoning and executive function tasks with 90% accuracy TAMMIE. Goal appropriate to continue, may need to start at simple-moderate level.  2) Patient will complete moderate writing fluency tasks with minimal self-corrections, reformulations or delays with 90% accuracy TAMMIE. Goal appropriate to continue, had made progress prior and now is more of a concern area again. May need to start at simple-moderate level.  3) Patient will self-monitor speech intelligibility in conversation and utilize louder, exaggerated speech to maintain >95% speech intelligibility during conversation. Goal appropriate to continue, had  "made progress prior and now is more of a concern area again.  4) Patient will demonstrate independence with x3+ word finding strategies in structured tasks and conversation to improve communication success in conversation. Goal appropriate to continue, had made progress prior and now is more of a concern area again.  5) Patient will name 10-15 items in 3-5 different simple/common categories within a minute to improve verbal fluency/word-finding. Changed goal to be 10-15 items in simple categories rather than 15 abstract.  6) Patient will communicate mod-complex thoughts/ideas/opinions successfully during structured tasks and conversations with minimal instances of reformations/empty or vague content to improve communication success in conversation. Goal appropriate to continue, had made progress prior and now is more of a concern area again. May need to start at simple-moderate level.  7) Patient will complete moderate-complex memory tasks with 80% accuracy TAMMIE with use of memory strategies. Goal appropriate to continue, may need to start at simple-moderate level.  8) Patient will demonstrate use of 2-3 novel memory/cognitive compensatory strategies in therapy tasks and/or discuss use in home/work demands. Goal appropriate to continue.              Plan / Patient Education:     Continue with initial plan of care.  Progress with home program as tolerated.    Subjective:     Patient presents as cooperative, motivated and engaged during the session.  Pain Ratin    Patient arrived in good spirits, expressed he is working on acceptance. A lot to deal with right now including blood pressure issues and dealing with it being harder to talk since recent stroke. Reports silent reading is fine, including with novel he is finishing. Oral reading is very hard, he has a hard time pronouncing several words \"even ones that are only 6-8 letters or less.\" Frustrating. Issues with recall (e.g., where he left off in book). Slurred " "speech is going better, he has to really work at slowing down and thinking about what he is saying - then comes out fine. Reports repetition strategy to work on remembering words (e.g. Neurologist) we went over last time is helping - still able to get out that specific word better and can apply what he learned to other words/things he wants to recall to support.    Objective:     Verbal Expression:  -Naming category of 3 items: 15/15 TAMMIE  -Naming additional item in category (besides 3 words): 13/14 with delays intermittent min cues - some word finding difficulty  -Describing what people in different occupations do: Able to describe 3 or more details, with min cues fading to no cues - intermittent word finding difficulty  -Word finding strategies: Able to describe in 60% instances TAMMIE, increased with min-mod cues, sometimes this helped word retrieval and other times helped SLP guess (e.g. \"smaller than a lobster\", etc: for crayfish)  -Generative naming: able to name 7, 8, 12, 20 items with use of strategies (sena categorizing) and able to increase to 11, 15, 24, 29 with additional time and more cues on strategies (e.g., thinking of other areas he could name words like for occupations thinking of places and what people work in that type of place)    Deductive Puzzle (addressing reading comprehension + cognition/reasoning/problem solving/etc:  -Simple Level F:10- 100% accuracy with initial instruction/min cues quickly fading to no support  -Simple Level F:12- 100% TAMMIE for accuracy of logic/reasoning  -Minimal letter/spelling errors with writing words, migrographia - but patient aware and constantly attempting to write larger      Interventions Today   Interventions MINUTES   Speech - Language 25   Cognition 25   Dysphagia    Assistive technology    Voice            Total 50     Olivia Cnanon  1/11/2021    "

## 2021-06-14 NOTE — PROGRESS NOTES
Name: Raghavendra Kiser  : 1960   MRN: [id number]    Raghavendra Kiser is a 60 y.o. presenting to discuss the following:     CC:   Chief Complaint   Patient presents with     Hospital Visit Follow Up       Hospital Follow-up Visit:    Hospital/Nursing Home/IP Rehab Facility: [facility]  Date of Admission: 20  Date of Discharge: 21  Reason(s) for Admission: altered mental status, acute ischemic left MCA stroke, acute encephalopathy, acute respiratory failure with hypoxia, cognitive and behavioral changes, sleep difficulties     Was your hospitalization related to COVID-19? No - Patient states Dr. Knutson did not think it was correlated to prior COVID infection.   Problems taking medications regularly:  None  Medication changes since discharge: None  Problems adhering to non-medication therapy:  None    Summary of hospitalization:  [facility] hospital discharge summary reviewed  Diagnostic Tests/Treatments reviewed.  Follow up needed: CBC, CMP, magnesium, blood pressure follow up, possible holter monitor  Other Healthcare Providers Involved in Patient s Care:         PT, OT, and speech  Update since discharge: improved.      Post Discharge Medication Reconciliation: discharge medications reconciled, continue medications without change.  Plan of care communicated with patient and family (wife)              HPI:  Is feeling well today. Has noticed some speech problems. Is working with therapy. Notices some right sided hand weakness. All referrals went through and is set up.     Could come home because wife was able to be there. Wife is wondering if she can go back to work. Does think he is handling himself okay at home. Still required to help with transportation. Feels he is safe at home, able to feed himself, get dressed, no more hallucinations.     On depakote due to hallucinations. Working fine.     Statin - has always been on 5mg strength.     Persistent productive  cough, noticed after the breathing tube and self suctioning.     ROS:   See HPI above. Remaining 10 point ROS negative.     PMH:   Patient Active Problem List   Diagnosis     Hypothyroidism     Hyperlipidemia     Chronic gout     TIA (transient ischemic attack)     Acute ischemic left MCA stroke (H)     Superficial venous thrombosis of arm - right lateral antecubital fossa     Atherosclerosis of right carotid artery - ulcerated plaque     Carotid stenosis     Medial epicondylitis     Ganglion cyst     High risk medication use     HTN (hypertension)     Right shoulder tendinitis     Cervical radiculopathy     History of TIA (transient ischemic attack) and stroke     High blood triglycerides     Precordial pain     Type 2 diabetes mellitus without complication, without long-term current use of insulin (H)     Vitamin deficiency     History of colonic polyps     Stenosis of left internal carotid artery-50 % on MRA of the neck October 2020.     Right sided weakness     Acute CVA (cerebrovascular accident) (H)     AMS (altered mental status)     Acute encephalopathy     Acute respiratory failure with hypoxia (H)     Cognitive and behavioral changes     Sleep difficulties       Past Medical History:   Diagnosis Date     Atherosclerosis of right carotid artery- ulcerated plaque      Carotid stenosis, bilateral      Cervical radiculopathy      Chronic gout      Fracture of right humerus      Ganglion cyst      High cholesterol      Humerus fracture     Right     Hypertension      Hypothyroidism      Medial epicondylitis      Shoulder tendinitis, right      Stroke (H)      Superficial venous thrombosis of arm     right lateral antecubital fossa     TIA (transient ischemic attack) 5/16/15     Tinnitus      Type 2 diabetes mellitus without complication, without long-term current use of insulin (H) 11/22/2019       PSH:   Past Surgical History:   Procedure Laterality Date     CAROTID ENDARTERECTOMY       IR SPINAL PUNCTURE   12/25/2020     PICC INSERTION - TRIPLE LUMEN  12/26/2020          AR THROMBOENDARTECTMY NECK,NECK INCIS Right 6/8/2015    Procedure: Right Carotid Endarterectomy with Impulse Monitoring;  Surgeon: Marcellus Toth MD;  Location: [facility] [place];  Service: General     TONSILLECTOMY           MEDICATIONS:   Current Outpatient Medications on File Prior to Visit   Medication Sig Dispense Refill     allopurinoL (ZYLOPRIM) 300 MG tablet Take 450 mg by mouth every other day. Alternate with 300 mg (1 tablet) every other day. Take at bedtime.       allopurinoL (ZYLOPRIM) 300 MG tablet Take 1.5 tablets (450 mg total) by mouth daily. Alternate with 450 mg (1.5 tablet) every other day. Take at bedtime. 135 tablet 1     amLODIPine (NORVASC) 10 MG tablet Take 1 tablet (10 mg total) by mouth every morning. 30 tablet 0     aspirin 81 MG EC tablet Take 1 tablet (81 mg total) by mouth daily.  0     blood glucose meter (GLUCOMETER) Use 1 each As Directed as needed. Dispense glucometer brand per patient's insurance at pharmacy discretion. 1 each 0     blood glucose test strips Use 1 each As Directed 2 (two) times a day. 100 strip 6     chlorthalidone (HYGROTEN) 25 MG tablet Take 1 tablet (25 mg total) by mouth daily with breakfast. 30 tablet 0     cholecalciferol, vitamin D3, (VITAMIN D3) 4,000 unit cap Take 4,000 Units by mouth daily.        clopidogreL (PLAVIX) 75 mg tablet Take 1 tablet (75 mg total) by mouth daily. 30 tablet 0     divalproex (DEPAKOTE ER) 500 MG 24 hour tablet Take 1 tablet (500 mg total) by mouth at bedtime. 30 tablet 0     generic lancets (FINGERSTIX LANCETS) Dispense brand per patient's insurance at pharmacy discretion. 100 each 3     levothyroxine (SYNTHROID, LEVOTHROID) 125 MCG tablet Take 125 mcg by mouth Daily at 6:00 am.        lisinopriL (PRINIVIL,ZESTRIL) 20 MG tablet Take 1 tablet (20 mg total) by mouth 2 (two) times a day. 180 tablet 1     metFORMIN (GLUCOPHAGE-XR) 500 MG 24 hr tablet Take 1 tablet  (500 mg total) by mouth 2 (two) times a day. 360 tablet 3     potassium chloride (K-DUR,KLOR-CON) 20 MEQ tablet Take 1 tablet (20 mEq total) by mouth every morning for 6 days. 6 tablet 0     rosuvastatin (CRESTOR) 5 MG tablet Take 5 mg by mouth at bedtime.        No current facility-administered medications on file prior to visit.        ALLERGIES:  Allergies   Allergen Reactions     Cat Dander Itching       FAMHx:  Family History   Problem Relation Age of Onset     Cancer Mother         Lung     Cancer Father         Lung     Cancer Sister         Llung cancer     Heart disease Brother          of a heart attack.     Diabetes type I Brother      No Medical Problems Son      No Medical Problems Sister      No Medical Problems Sister      No Medical Problems Sister      No Medical Problems Sister      Diabetes Brother      No Medical Problems Brother        SOCIAL HISTORY:   Social History     Tobacco Use     Smoking status: Former Smoker     Packs/day: 2.00     Years: 20.00     Pack years: 40.00     Quit date: 2/15/1993     Years since quittin.9     Smokeless tobacco: Never Used   Substance Use Topics     Alcohol use: Yes     Alcohol/week: 0.0 standard drinks     Comment: rarely     Drug use: No         PHYSICAL EXAM:   /65 (Patient Site: Left Arm, Patient Position: Sitting, Cuff Size: Adult Large)   Pulse 76   Temp 96.6  F (35.9  C) (Oral)   Resp 16   Wt (!) 223 lb (101.2 kg)   BMI 30.24 kg/m     GENERAL: Mauri is a pleasant, overweight male, in no acute distress. He is accompanied by his wife today.   HEENT: Sclera white, no cervical lymphadenopathy, no thyromegaly.  HEART: Regular rate and rhythm, no murmurs.  LUNGS: Clear to auscultation bilaterally, unlabored.  NEURO: Speech intact during visit today, mild word finding difficulties, no gross deficits present.  Normal rapid alternating movements, normal finger-nose-finger test, no pronator drift.  Moving all extremities without  difficulty.    ASSESSMENT & PLAN:   Raghavendra Kiser is a 60 y.o. male presenting today for hospital follow up.    1. Hospital discharge follow-up  2. Acute CVA (cerebrovascular accident) (H)  3. Essential hypertension  4. Medication management  5. Acute encephalopathy  - clopidogreL (PLAVIX) 75 mg tablet; Take 1 tablet (75 mg total) by mouth daily.  Dispense: 60 tablet; Refill: 0  - amLODIPine (NORVASC) 10 MG tablet; Take 1 tablet (10 mg total) by mouth every morning.  Dispense: 90 tablet; Refill: 1  - chlorthalidone (HYGROTEN) 25 MG tablet; Take 1 tablet (25 mg total) by mouth daily with breakfast.  Dispense: 90 tablet; Refill: 1  - HM2(CBC w/o Differential)  - Comprehensive Metabolic Panel  - Magnesium  - divalproex (DEPAKOTE ER) 500 MG 24 hour tablet; Take 1 tablet (500 mg total) by mouth at bedtime.  Dispense: 90 tablet; Refill: 1    Reviewed hospital discharge summary.   Mauri has connected with the services he needs and feels his symptoms are improving.  Wife feels he is able to be safe at home without direct supervision and plans to start returning part-time to work next week.    Blood pressure is very well controlled.  We will continue current management.  We will recheck kidney function and electrolytes today.  He is just completing his potassium supplementation.  Discussed we may need to consider further potassium supplementation or close follow-up if he discontinues the potassium supplement to make sure his potassium level is not dropping.  He does have follow-up scheduled with Dr. Maciel in approximately 2 weeks, which would be a good time to recheck his level.    He also has close follow-up scheduled with neurology.  He asks about discontinuing Depakote.  Encouraged to continue this for now, reassess with neurology.  His hallucinations are well controlled at this point in time, hopefully were just transient with his acute encephalopathy in the hospital and would be reasonable to trial  discontinuation in the future, but will defer recommendation to neurology.    Regarding Holter monitor, I did not order this today, as reports being on the cardiac monitor for the duration of his hospitalization without identifying any abnormalities.  I will defer consideration for further cardiac monitoring to his PCP and neurology.    Did ask Mauri if he is ever been on a higher dose of statin medication.  He states he is always just taken 5 mg.  Given stroke, would consider increasing dose of his statin.  Continue dual antiplatelet therapy per neurology recommendations.    Post Discharge Medication Reconciliation Status: discharge medications reconciled, continue medications without change.  Requested refill of new medications and these were sent to the pharmacy.    Confirmed MyChart is an acceptable form of communication of lab results.    RTC: Follow-up with neurology and PCP as scheduled.    Cecile Naidu, DO

## 2021-06-14 NOTE — PROGRESS NOTES
Raghavendra is here to discuss results of interlaminar C7-T1 injection and PT. Pt had injection and had immediate 100% relief but only lasted a few hours. Raghavendra also tried PT but did not help. Pt states intensity of symptoms is progressing. He c/o right shoulder and forearm pain and some numbness in right hand.   NDI today is 28%  Lucía,CMA

## 2021-06-14 NOTE — PROGRESS NOTES
Patient updated by Harpal with lab results.  --------------------------  Alison Dotson,  Thanks again for coming into the clinic yesterday. It was nice to meet you.   Your lab results have returned.   1. Your magnesium level is normal.  2. Your potassium has been adequately supplemented. I agree with trial off the potassium supplement with recheck at your next appointment to ensure you don't dip too much.   3. Your creatinine slightly bumped with a correlated slight dip in your GFR (kidney filtration rate). I would recheck this at the same time we recheck potassium levels. We may need to slightly back off on one of the blood pressure medications.  The rest of your labs look good.  Cecile Naidu, DO

## 2021-06-14 NOTE — PROGRESS NOTES
Dear Dr. Maciel:  I had the pleasure of seeing Raghavendra Kiser in follow-up in my neurosurgery clinic for a C8 radiculopathy.  He did have a interlaminar C7-T1 epidural steroid injection which led to complete resolution of his symptoms for 2 hours.  He has tried and failed physical therapy.  His pain is now on the ulnar aspect of his forearm and fourth and fifth digits.  I discussed with him that he would be a good candidate for discectomy and total disc arthroplasty versus anterior cervical discectomy and fusion.  I would recommend disc arthroplasty since that would lower his chance of having adjacent segment degeneration and anatomically he has very mild facet arthropathy and would be a good candidate for disc arthroplasty.  He understood the risks and benefits and alternatives and wishes to proceed with total disc arthroplasty at C7-T1.   Thank you for giving me the opportunity to see this very pleasant patient.  If you have any questions about him or any othe patient's please feel free to contact me.  Of note I spent greater than 15 minutes counseling the patient regarding his pathology and treatment plan, greater than 50 percent of which was spent in direct face to face contact.    Francine Lyman MD, FAANS

## 2021-06-14 NOTE — TELEPHONE ENCOUNTER
**Created new encounter due to no access to note in patient's [employer] message    Reason for Call: Request for orders    Order or referral being requested: PT, OT and Speech    Date needed: as soon as possible - patient would like going on therapies as he had 2 strokes within the last two months.    Has the patient been seen by the PCP for this problem? YES and NO    Additional comments: Patient was discharged from the hospital and was told to reach out to the pcp to place orders. Patient would like to stay close to home - preferably [place] for therapies. Patient is wanting provider to place orders STAT so he can get this going.     Phone number Patient can be reached at:    Cell number on file:    Telephone Information:   Mobile [phone]       Best Time:  Any time    Can we leave a detailed message on this number?  Yes    Call taken on 1/5/2021 at 10:21 AM by Carey Zhang

## 2021-06-14 NOTE — TELEPHONE ENCOUNTER
The BPs look good.  I would recommend a clinic appt for evaluation today or tomorrow.  Please see if there is an available appt for the pt.  If sxs are becoming severe then I would suggest PETRA mahoney.

## 2021-06-14 NOTE — PROGRESS NOTES
Occupational Therapy Daily Progress     Patient Name: Raghavendra Kiser  Date: 1/19/2021  Visit #: 2  Referral Diagnosis: CVA  Referring provider: Luis Daniel Maciel,*  Visit Diagnosis:     ICD-10-CM    1. Right arm weakness  R29.898    2. Incoordination due to acute stroke (H)  I63.9     R27.9    3. Decreased activities of daily living (ADL)  Z78.9    4. Impaired instrumental activities of daily living  Z78.9        Assessment:     Patient is appropriate to continue with skilled occupational therapy intervention, as indicated by initial plan of care. Patient reports that his strength and coordination are improving.    Goal Status:  Patient will be independent with home exercise program in: 2 weeks  Patient will perform: meal preparation;with no difficulty;in 12 weeks  Patient will be able to  & pinch: for household chores;with less difficulty;in 12 weeks  Patient will be able to: lift;carry;for grocery shopping;in 12 weeks  Patient will improve hand/finger coordination for: writing;typing;with less difficulty;in 12 weeks      Plan / Patient Education:     Re-measure and review HEP. Discontinue OT after next visit.    Subjective:     Pain rating at rest: 0  Pain rating with activity: 0      Objective:     Treatment Today: Patient instructed on coordination exercises today including tennis ball bounce, leg slaps and several other exercise ideas. Patient had mild incoordination manipulating super ball, shuffle cards, tying shoelace.  TREATMENT MINUTES COMMENTS   Evaluation     Self-care/ Home management     Manual therapy     Neuromuscular Re-education 26    Therapeutic Exercises     Iontophoresis     Orthotic Fitting     Total 26    Blank areas are intentional and mean the treatment did not include these items.       Debora Salvador  1/19/2021  3:00 PM

## 2021-06-14 NOTE — PROGRESS NOTES
Per discussion with Dr. Lyman, the surgery is canceled due to patient is currently taking  mg.  Kate Shelton RN, CNRN

## 2021-06-14 NOTE — PROGRESS NOTES
MTM Transition of Care Encounter  Assessment & Plan                                                     Post Discharge Medication Reconciliation Status: discharge medications reconciled, continue medications without change    Acute encephalopathy/stroke: Patient is feeling much better since home. Reviewed indication of depakote and encouraged him to continue until he meets with neurology. If he remains on depakote long-term, would recommend monitoring a depakote level. Reviewed to continue DAPT x 3 months and statin -- last LDL very well controlled.     Hypertension: BP at home now at goal <130/90 mmHg. Will recheck at future appointments. Reviewed mechanism of action of his new BP medications.     Hypokalemia: Last K at goal. Encouraged him to complete 6 day course, then we will closely monitor his K when complete. Discussed that likely K will normalized since he is on a higher dose of lisinopril.     Type 2 Diabetes: Last A1c was very well controlled. Sounds like his fasting BG are well controlled and at goal  mg/dL. Encouraged him continue to check his BG at home and monitor for worsening in BG but will recheck his A1c in 3 months to ensure it remains <7%     Hypothyroidism:  Last TSH normal.    Gout: Last uric acid at goal. Symptoms well controlled. Continue current dose and follow up with rheum.     Vitamin D deficiency: Last Vitamin D level is low, but old value. Recommend rechecking in the future to ensure adequate supplementation.       Follow Up  As needed with MTM     Subjective & Objective                                                       Raghavendra Kiser is a 60 y.o. male called for a transitions of care visit. he was discharged from [facility] on 1/2/2021 for stroke.    Patient consented to a telehealth visit: Yes  Chief Complaint: Transitions of care. He has some questions about the timing of when to take his medications and what they are for. Also wonders how long to take K.   Medication  Adherence/Access: Has his medications in front of him. Take 7 pills AM, 3 pill evening, and 3 pills HS.     Acute encephalopathy/stroke: Presented to ED [facility] with altered mental status necessitating emergent intubation.  He remained on mechanical ventilator and required sedation and was extubated on 12/28/2020.  Thought that his acute encephalopathy was likely related to stroke.  Neurology consult.  Confusion and inability to comprehend language could be related to his parietal lobe stroke which could lead to receptive and expressive aphasia.  Lumbar puncture from 12/25/2020 showed no infective etiology.  EEG showed no evidence of seizure-like activity.  MRI brain on 12/25/2020 reported new scattered foci of acute/early subacute ischemic changes involving left precentral gyrus, left parietal rope, parieto-occipital junction.  Repeat MRI brain on 12/27/2020 demonstrated previously seen left MCA infarct, infarct burden is increased and no evidence of hemorrhagic transformation. Patient was reporting intermittent visual type hallucination.  Neurology recommended dual antiplatelet therapy with aspirin 81 mg daily and clopidogrel 75 mg daily for 3 months and to continue rosuvastatin 5 mg.  Thought that previous COVID-19 infection contributed to strokes.  Also recommended outpatient Holter monitor.  Depakote  mg nightly was started for encephalopathy/hallucination. Reports since home he has been feeling much better. Denies any more hallucinations. Confirms that he is taking depakote and wonders what it was for. No side effects. Denies significant bleeding.   Last Depakote level n/a  Last lipids checked 10/15/2020, LDL was 35  COVID-19 test positive on 10/14/2020.  Considered Covid recovered    Hypertension: Currently taking amlodipine 10 mg daily, chlorthalidone 25 mg daily, and lisinopril 20 mg twice daily.  Amlodipine and chlorthalidone started during admission.  Lisinopril increased by PCP to twice daily on  2020.  Patient does monitor BP at home mornings. Home BP =  125/73, 132/74 mmHg. No lightheadedness/dizziness or edema. Has been having increased urination in the morning, but it doesn't bother him since he does nothing all day right now.   BP Readings from Last 3 Encounters:   21 153/82   20 (!) 152/95   20 142/87       Hypokalemia: Patient was discharged on potassium 20 MEQ daily for 6 days. Is taking, but complains about the size of the pill. Wonders how long he needs to take.   Last K = 3.7 on 2021    Type 2 Diabetes: Currently taking metformin  mg twice daily.  Metformin dose was decreased at discharge.  Tests BG once daily fasting. Reports blood sugars the last few days: 94, 94.  Last A1c checked 5.6% on 2020.   Microalbumin checked 2020    Hypothyroidism: Currently taking levothyroxine 125 mcg.  Last TSH checked 2020 and was normal.    Gout: Continues allopurinol 300 mg/450 mg every other day.  Follows with rheumatology and last saw Dr. Potts 2021.  Per consult, no episodes over the past year and no active joint symptoms.  Last uric acid level = 4.5 on 2020    Vitamin D deficiency: Continues vitamin D 4000 units daily  Vitamin D, Total (25-Hydroxy)   Date Value Ref Range Status   2019 10.0 (L) 30.0 - 80.0 ng/mL Final           PMH: reviewed in EPIC   Allergies/ADRs: reviewed in EPIC   Alcohol: reviewed in EPIC  Tobacco:   Social History     Tobacco Use   Smoking Status Former Smoker     Packs/day: 2.00     Years: 20.00     Pack years: 40.00     Quit date: 2/15/1993     Years since quittin.9   Smokeless Tobacco Never Used     Recent Vitals:   BP Readings from Last 3 Encounters:   21 153/82   20 (!) 152/95   20 142/87      Wt Readings from Last 3 Encounters:   20 (!) 233 lb 6.4 oz (105.9 kg)   20 (!) 236 lb 12.8 oz (107.4 kg)   20 (!) 227 lb (103 kg)     ----------------    The patient declined an after  visit summary    I spent 17 minutes with this patient today;  . All changes were made via collaborative practice agreement with Luis Daniel Maciel MD. A copy of the visit note was provided to the patient's provider.     Betina King, Pharm.D., [facility]  Medication Therapy Management Pharmacist  [facility]    Telemedicine Visit Details    Type of service:  Telephone     Start Time: 2:35 PM  End Time (time video/phone call stopped): 2:52 PM    Originating Location (pt. Location): Home    Distant Location (provider location):  [place] MEDICATION THERAPY MANAGEMENT [facility]    Mode of Communication:   Telephone     Current Outpatient Medications   Medication Sig Dispense Refill     allopurinoL (ZYLOPRIM) 300 MG tablet Take 450 mg by mouth every other day. Alternate with 300 mg (1 tablet) every other day. Take at bedtime.       allopurinoL (ZYLOPRIM) 300 MG tablet Take 1.5 tablets (450 mg total) by mouth daily. Alternate with 450 mg (1.5 tablet) every other day. Take at bedtime. 135 tablet 1     amLODIPine (NORVASC) 10 MG tablet Take 1 tablet (10 mg total) by mouth every morning. 30 tablet 0     aspirin 81 MG EC tablet Take 1 tablet (81 mg total) by mouth daily.  0     blood glucose meter (GLUCOMETER) Use 1 each As Directed as needed. Dispense glucometer brand per patient's insurance at pharmacy discretion. 1 each 0     blood glucose test strips Use 1 each As Directed 2 (two) times a day. 100 strip 6     chlorthalidone (HYGROTEN) 25 MG tablet Take 1 tablet (25 mg total) by mouth daily with breakfast. 30 tablet 0     cholecalciferol, vitamin D3, (VITAMIN D3) 4,000 unit cap Take 4,000 Units by mouth daily.        clopidogreL (PLAVIX) 75 mg tablet Take 1 tablet (75 mg total) by mouth daily. For 90 days from 12/25/2020  0     divalproex (DEPAKOTE ER) 500 MG 24 hour tablet Take 1 tablet (500 mg total) by mouth at bedtime. 30 tablet 0     generic lancets (FINGERSTIX LANCETS)  Dispense brand per patient's insurance at pharmacy discretion. 100 each 3     levothyroxine (SYNTHROID, LEVOTHROID) 125 MCG tablet Take 125 mcg by mouth Daily at 6:00 am.        lisinopriL (PRINIVIL,ZESTRIL) 20 MG tablet Take 1 tablet (20 mg total) by mouth 2 (two) times a day. 180 tablet 1     metFORMIN (GLUCOPHAGE-XR) 500 MG 24 hr tablet Take 1 tablet (500 mg total) by mouth 2 (two) times a day. 360 tablet 3     potassium chloride (K-DUR,KLOR-CON) 20 MEQ tablet Take 1 tablet (20 mEq total) by mouth every morning for 6 days. 6 tablet 0     rosuvastatin (CRESTOR) 5 MG tablet Take 5 mg by mouth at bedtime.        No current facility-administered medications for this visit.         Medication Therapy Recommendations  No medication therapy recommendations to display

## 2021-06-14 NOTE — TELEPHONE ENCOUNTER
Left message for Tara asking if verbal orders are ok or if she is needing them entered into epic

## 2021-06-14 NOTE — PROGRESS NOTES
Discharge Summary  Patient Name: Raghavendra Kiser  Date: 3/24/2021  Referral Diagnosis: CVA  Referring provider: Luis Daniel Maciel,*  Visit Diagnosis:   1. Right arm weakness     2. Incoordination due to acute stroke (H)     3. Decreased activities of daily living (ADL)     4. Impaired instrumental activities of daily living         Goal status: met  Patient will be independent with home exercise program in: 2 weeks  Patient will perform: meal preparation;with no difficulty;in 12 weeks  Patient will be able to  & pinch: for household chores;with less difficulty;in 12 weeks  Patient will be able to: lift;carry;for grocery shopping;in 12 weeks  Patient will improve hand/finger coordination for: writing;typing;with less difficulty;in 12 weeks      Patient was seen for 3 visits between 1-7-21 and 2-2-21.    Therapy will be discontinued at this time.  The patient will need a new referral to resume.    Thank you for your referral.  Debora Salvador  3/24/2021   10:42 PM    Occupational Therapy Daily Progress     Patient Name: Raghavendra Kiser  Date: 2/2/2021  Visit #: 3  Referral Diagnosis: CVA  Referring provider: Luis Daniel Maciel,*  Visit Diagnosis:     ICD-10-CM    1. Right arm weakness  R29.898    2. Incoordination due to acute stroke (H)  I63.9     R27.9    3. Decreased activities of daily living (ADL)  Z78.9    4. Impaired instrumental activities of daily living  Z78.9        Assessment:     Patient will be seeing his neurologist on 2-9-21. Patient will be discussing RTW and return to driving at this visit. From an OT standpoint, patient is safe to return to work and driving. He has no impairment in vision or cognition. His strength and coordination are significantly improved(see below).  No need for ongoing OT as goals are met.    Goal Status: met  Patient will be independent with home exercise program in: 2 weeks  Patient will perform: meal preparation;with no difficulty;in 12 weeks  Patient  will be able to  & pinch: for household chores;with less difficulty;in 12 weeks  Patient will be able to: lift;carry;for grocery shopping;in 12 weeks  Patient will improve hand/finger coordination for: writing;typing;with less difficulty;in 12 weeks      Plan / Patient Education:     Discontinue OT. Patient has met goals.    Subjective:     Pain rating at rest: 0  Pain rating with activity: 0      Objective:        1-7-21 2-2-21   9 hole peg test 25.6 sec 22.4 sec    Strength 74# 88#   3 Point Pinch 18# 21#   Lateral Pinch 15# 20.5#       Treatment Today: Patient instructed on coordination exercises today including tweezers activities. He will continue HEP for another month and then gradually discontinue.   TREATMENT MINUTES COMMENTS   Evaluation     Self-care/ Home management     Manual therapy     Neuromuscular Re-education 26    Therapeutic Exercises     Iontophoresis     Orthotic Fitting     Total 26    Blank areas are intentional and mean the treatment did not include these items.       Debora Salvador  2/2/2021  3:05 PM

## 2021-06-14 NOTE — PROGRESS NOTES
Raghavendra Kiser is status post Left C7-T1 disc arthroplasty on 12/14/2017 by Dr. Lyman.  Preoperatively presented with C8 radiculopathy on the left including pain, numbness and weakness.  Today he is here with his SO for a wound check. He is very pleased with his outcome - his arm pain is gone, denies sensory or motor issues in UE. Takes Advil prn for a mild incisional discomfort.      Surgical wound WNL - CDI, no signs of infection or skin breakdown.  Incision well-healed: good skin approximation, no redness or visible/palpable edema, no tenderness to palpation.  PT. AF, denies fever, chills or sweats.  Pt. reports that the symptoms are improved from pre-op.

## 2021-06-14 NOTE — PROGRESS NOTES
[facility]   Speech Therapy Daily Progress     Patient Name: Raghavendra Kiser  Date: 1/20/2021  Visit #: 8  Referral Diagnosis: Aphasia [R47.01], Acute CVA [163.9]  Referring provider: Luis Daniel Maciel,*  Visit Diagnosis:   No diagnosis found.    Session 8 of 25    Assessment:   Patient demonstrates understanding/independence with home program.  Patient is appropriate to continue with skilled speech therapy intervention, as indicated by initial plan of care.   Patient aware of deficits and motivated to continue with progress and strategies.  Patient benefits from use of strategies including making notes, repetition, and double checking work during high level divided attention task, with mild impulsivity.    Goal Status:   Long Term Goal  Patient will demonstrate completion of moderate cognitive-linguistic tasks with >90% accuracy as foundation for functional skills needed for social interaction and participation in ADLs (e.g., making/receiving phone calls, conversing with medical providers, providing personal information, expressing complex thoughts/ideas/opinions in back and forth conversation, work demands for office job).     Short Term Goals  1) Patient will complete moderate attention, deductive reasoning and executive function tasks with 90% accuracy TAMMIE. Goal appropriate to continue, may need to start at simple-moderate level.  2) Patient will complete moderate writing fluency tasks with minimal self-corrections, reformulations or delays with 90% accuracy TAMMIE. Goal appropriate to continue, had made progress prior and now is more of a concern area again. May need to start at simple-moderate level.  3) Patient will self-monitor speech intelligibility in conversation and utilize louder, exaggerated speech to maintain >95% speech intelligibility during conversation. Goal appropriate to continue, had made progress prior and now is more of a concern area again.  4) Patient will demonstrate  "independence with x3+ word finding strategies in structured tasks and conversation to improve communication success in conversation. Goal appropriate to continue, had made progress prior and now is more of a concern area again.  5) Patient will name 10-15 items in 3-5 different simple/common categories within a minute to improve verbal fluency/word-finding. Changed goal to be 10-15 items in simple categories rather than 15 abstract.  6) Patient will communicate mod-complex thoughts/ideas/opinions successfully during structured tasks and conversations with minimal instances of reformations/empty or vague content to improve communication success in conversation. Goal appropriate to continue, had made progress prior and now is more of a concern area again. May need to start at simple-moderate level.  7) Patient will complete moderate-complex memory tasks with 80% accuracy TAMMIE with use of memory strategies. Goal appropriate to continue, may need to start at simple-moderate level.  8) Patient will demonstrate use of 2-3 novel memory/cognitive compensatory strategies in therapy tasks and/or discuss use in home/work demands. Goal appropriate to continue.              Plan / Patient Education:     Continue with initial plan of care.  Progress with home program as tolerated.    Subjective:     Patient presents as cooperative, motivated and engaged during the session.  Pain Ratin     Patient arrived to therapy in good spirits.  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  He stated the \"coordination wasn't there\" even with slower tempo songs.  Patient expressed that reading outloud has been slowly getting better.  He expressed plans to return to work on 20 when his sick leave is out.    Objective:     Home Practice:  -Patient returned x3 home practice activities.  x1 error noted on if/then " "statements.      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  -Provided list of memory/cognitive strategies with verbal emphasis on specific strategies to aid goal of returning to work including organizing work space, using \"to do\" lists- patient reports he does not typically do this but will try to make notes so he does not forget things, making notes of where he left off when interrupted, utilizing routines if able during tasks/daily set up, and planning day as much as possible.    High-level cognitive task:  -Patient taught novel task involving 8+ detailed steps that must be completed correctly and in the accurate order for task to be right to address memory, divided attention, and problem solving.  Patient shown task x1 with verbal/visual instruction at moderately slow rate. Patient able to take sequential and detailed yet concise notes; however, omitted final step, to aid in recall/recreating.  Patient noted to be mildly impulsive/disorganized on initial attempt to recreate task (e.g., counting too fast, placing piles in no order) and was unable to successfully recreate.  On second attempt, patient implemented strategies of increased organization, counting out loud to slow rate, and double checking counting to successfully complete task.  Recommended patient attempt at home for delayed recall, and to teach x1 additional person.    Interventions Today   Interventions MINUTES   Speech - Language 10   Cognition 42   Dysphagia    Assistive technology    Voice            Total 52     Regina Skinner MS, CCC-SLP  1/20/2021  "

## 2021-06-14 NOTE — PROGRESS NOTES
[facility]   Speech Therapy Daily Progress     Patient Name: Raghavendra Kisre  Date: 2/1/2021  Visit #: 11  Referral Diagnosis: Aphasia [R47.01], Acute CVA [163.9]  Referring provider: Luis Daniel Maciel,*  Visit Diagnosis:     ICD-10-CM    1. Cognitive deficit due to recent stroke  I69.319        Session 11 of 25    Assessment:   Patient demonstrates understanding/independence with home program.  Patient is appropriate to continue with skilled speech therapy intervention, as indicated by initial plan of care.   Patient continues to progress with applying strategies to complete moderate-high level tasks; however, continues to require double-checking work and increased attention to detail.    Goal Status:   Long Term Goal  Patient will demonstrate completion of moderate cognitive-linguistic tasks with >90% accuracy as foundation for functional skills needed for social interaction and participation in ADLs (e.g., making/receiving phone calls, conversing with medical providers, providing personal information, expressing complex thoughts/ideas/opinions in back and forth conversation, work demands for office job).     Short Term Goals  1) Patient will complete moderate attention, deductive reasoning and executive function tasks with 90% accuracy TAMMIE. Goal appropriate to continue, may need to start at simple-moderate level.  2) Patient will complete moderate writing fluency tasks with minimal self-corrections, reformulations or delays with 90% accuracy TAMMIE. Goal appropriate to continue, had made progress prior and now is more of a concern area again. May need to start at simple-moderate level.  3) Patient will self-monitor speech intelligibility in conversation and utilize louder, exaggerated speech to maintain >95% speech intelligibility during conversation. Goal appropriate to continue, had made progress prior and now is more of a concern area again.  4) Patient will demonstrate independence with x3+  "word finding strategies in structured tasks and conversation to improve communication success in conversation. Goal appropriate to continue, had made progress prior and now is more of a concern area again.  5) Patient will name 10-15 items in 3-5 different simple/common categories within a minute to improve verbal fluency/word-finding. Changed goal to be 10-15 items in simple categories rather than 15 abstract.  6) Patient will communicate mod-complex thoughts/ideas/opinions successfully during structured tasks and conversations with minimal instances of reformations/empty or vague content to improve communication success in conversation. Goal appropriate to continue, had made progress prior and now is more of a concern area again. May need to start at simple-moderate level.  7) Patient will complete moderate-complex memory tasks with 80% accuracy TAMMIE with use of memory strategies. Goal appropriate to continue, may need to start at simple-moderate level.  8) Patient will demonstrate use of 2-3 novel memory/cognitive compensatory strategies in therapy tasks and/or discuss use in home/work demands. Goal appropriate to continue.              Plan / Patient Education:     Continue with initial plan of care.  Progress with home program as tolerated.    Subjective:     Patient presents as cooperative, motivated and engaged during the session.  Pain Ratin     Patient arrived to therapy in good spirits.  He expressed that he had to complete some snf paperwork and had some difficulty \"keeping it straight\".  He noted this was more challenging that he suspected it would have been prior to his stroke, and that he had to take a break due to frustration.    Objective:     Speech intelligibility strategies:  -Patient reported that he had orally read the last three pages in his chapter book out loud for his wife, where he had previously been self-conscious.  He noted that \"it went alright\" and that he was able to recall " "strategies of slowing down.  Additionally, he noted that if he spoke too fast or slurred, he would repeat himself (e.g., \"What I meant to say was\").    High-level cognitive task:  -Patient completed high level divided attention, memory, and organizational task involving alternating between simple math problems and plotting on a grid.  Prior to completion, SLP discussed cognitive strategies to aid in completion (e.g., drawing/plotting progress to aid in location recall).  Patient able to implement strategies to successfully complete with 100% accuracy on initial trial, 50% accuracy on second trial, and 100% accuracy on third trial.  Noted slow, careful completion with self checking work without verbal cueing.    When unsuccessful with second trial, patient able to implement problem solving strategies of double checking his work.  Patient noted \"First I would check my math\".  He implemented an organization system of crossing of re-checked items to aid in visual organization.  He then checked his plotting with a different color pen and was able to identify x3 errors including x1 math error, x1 plotting error, and x1 tracing error.  On second attempt, patient able to solve with 100% accuracy.  He noted that it was likely related to attention to detail.     Interventions Today   Interventions MINUTES   Speech - Language 55   Cognition    Dysphagia    Assistive technology    Voice            Total 55     Regina Skinner MS, CCC-SLP  2/1/2021  "

## 2021-06-14 NOTE — PROGRESS NOTES
[facility]   Speech Therapy Daily Progress     Patient Name: Raghavendra Kiser  Date: 1/13/2021  Visit #: 7  Referral Diagnosis: Aphasia [R47.01], Acute CVA [163.9]  Referring provider: Luis Daniel Maciel,*  Visit Diagnosis:     ICD-10-CM    1. Aphasia, post-stroke  I69.320    2. Cognitive deficit due to recent stroke  I69.319        Session 7 of 25    Assessment:   Patient demonstrates understanding/independence with home program.  Patient is appropriate to continue with skilled speech therapy intervention, as indicated by initial plan of care.     Goal Status:   Long Term Goal  Patient will demonstrate completion of moderate cognitive-linguistic tasks with >90% accuracy as foundation for functional skills needed for social interaction and participation in ADLs (e.g., making/receiving phone calls, conversing with medical providers, providing personal information, expressing complex thoughts/ideas/opinions in back and forth conversation, work demands for office job).     Short Term Goals  1) Patient will complete moderate attention, deductive reasoning and executive function tasks with 90% accuracy TAMMIE. Goal appropriate to continue, may need to start at simple-moderate level.  2) Patient will complete moderate writing fluency tasks with minimal self-corrections, reformulations or delays with 90% accuracy TAMMIE. Goal appropriate to continue, had made progress prior and now is more of a concern area again. May need to start at simple-moderate level.  3) Patient will self-monitor speech intelligibility in conversation and utilize louder, exaggerated speech to maintain >95% speech intelligibility during conversation. Goal appropriate to continue, had made progress prior and now is more of a concern area again.  4) Patient will demonstrate independence with x3+ word finding strategies in structured tasks and conversation to improve communication success in conversation. Goal appropriate to continue, had  "made progress prior and now is more of a concern area again.  5) Patient will name 10-15 items in 3-5 different simple/common categories within a minute to improve verbal fluency/word-finding. Changed goal to be 10-15 items in simple categories rather than 15 abstract.  6) Patient will communicate mod-complex thoughts/ideas/opinions successfully during structured tasks and conversations with minimal instances of reformations/empty or vague content to improve communication success in conversation. Goal appropriate to continue, had made progress prior and now is more of a concern area again. May need to start at simple-moderate level.  7) Patient will complete moderate-complex memory tasks with 80% accuracy TAMMIE with use of memory strategies. Goal appropriate to continue, may need to start at simple-moderate level.  8) Patient will demonstrate use of 2-3 novel memory/cognitive compensatory strategies in therapy tasks and/or discuss use in home/work demands. Goal appropriate to continue.              Plan / Patient Education:     Continue with initial plan of care.  Progress with home program as tolerated.    Subjective:     Patient presents as cooperative, motivated and engaged during the session.  Pain Ratin     Patient arrived to therapy in good spirits.  He expressed that he had been \"struggling this week\" and has been reading his novel out loud with notable difficulty with recognizing words or \"spitting them out\" than after his first CVA.  Patient stated he is able to read silently at pre-stroke confidence.    Patient also noted difficulty with texting/typing and has been using voice recognition or \"keeping it short\"; however, expressed that he doesn't have to \"look for letters\" as much as before.  Patient also downloaded a typing program for further practice on the computer.     Objective:     Home Practice:  -Patient returned x4 home practice activities.  x1 error noted on simple-moderate sequencing task due " to impulsivity with reading complete sentence.  Patient able to correct given verbal cue, and expressed that he needs to slow down.  Discussed importance of self checking in structured tasks with emphasis on carryover to functional daily tasks.    Verbal Expression:  -Trisyllabic words-oral reading/recognition: 24/25 TAMMIE on initial attempt, x1 mild hesitation.  9/10 with meaningful sentence generation without word finding/sentence structure revisions.  -Multisyllabic words- oral reading/recognition: 23/25 TAMMIE.  9/10 with meaningful sentence generation without word finding/sentence structure revisions.  -Patient with mild dysarthria noted intermittently with multisyllabic words, improved with self correction or x1 cue to re-try.    Cognitive Task  -Patient completed moderate level connect the dots alphabetical task involving memory, problem solving, and alternating attention with 89% accuracy independently; however, errors may have been secondary to misinterpreting instructions.  SLP stopped patient after x2 errors and collaborated on strategies to improve effeciency, in which patient was able to identify x1 strategy to increase accuracy and decrease cognitive demand.  Patient implemented strategy on 1/2 task and was cued to complete on fully remaining task prior to returning to activity.  Notable increased accuracy/speed with task after implementing strategies.  Discussed functional carryover of strategy identification in daily life activities.    Interventions Today   Interventions MINUTES   Speech - Language 35   Cognition 25   Dysphagia    Assistive technology    Voice            Total 60     Regina Skinner MS, CCC-SLP  1/13/2021

## 2021-06-14 NOTE — PATIENT INSTRUCTIONS - HE
Increase lisinopril to 20 mg twice daily    Check BMP today    Nurse BP check in one week  Follow up appt in 3-4 weeks.     If not improved  Control then go back to lisinopril 20 mg daily and consider addition of amlodipine or metoprolol at next visit.

## 2021-06-14 NOTE — TELEPHONE ENCOUNTER
Reason for Call: Request for an orders    Order or referral being requested: PT, OT and Speech    Date needed: as soon as possible    Has the patient been seen by the PCP for this problem? YES    Additional comments:  Tara with [facility] called would like provider to place orders/add on therapy orders. Pt was discharged on Saturday.    Phone number to be reached at:  OK to call Tara if there's any questions - p) [phone]    Best Time:  Any time    Can we leave a detailed message on this number?  No    Call taken on 1/4/2021 at 12:25 PM by Carey Zhang

## 2021-06-14 NOTE — PROGRESS NOTES
[facility]   Speech Therapy Re-Evaluation + Treatment    Patient Name: Raghavendra Kiser  Date: 1/5/2021  Visit #: 5  Referral Diagnosis: Aphasia [R47.01], Acute CVA [163.9]  Referring provider: Luis Daniel Maciel,* (new order today due to new stroke and recent hospitalization) and Dr. Harding from prior  Visit Diagnosis:     ICD-10-CM    1. Cognitive deficit due to recent stroke  I69.319    2. Aphasia, post-stroke  I69.320    3. Dysarthria, post-stroke  I69.322          Session 5 of 25    Assessment:   Patient presents with at least moderate deficits in verbal expression, written expression, cognition as well as mild deficits in motor speech. Declined cognitive-linguistic performance from previous functioning due to new L MCA CVA. Deficits impact his ability to participate in functional daily activities (including being home alone, driving, going back to work) and effectively communicate his wants, needs and states of being across environments and communication partners. Direct 1:1 speech therapy services is further recommended and warranted, with plan of care extension and goal modifications below.    Goal Status:   Long Term Goal  Patient will demonstrate completion of moderate cognitive-linguistic tasks with >90% accuracy as foundation for functional skills needed for social interaction and participation in ADLs (e.g., making/receiving phone calls, conversing with medical providers, providing personal information, expressing complex thoughts/ideas/opinions in back and forth conversation, work demands for office job).     Short Term Goals  1) Patient will complete moderate attention, deductive reasoning and executive function tasks with 90% accuracy TAMMIE. Goal appropriate to continue, may need to start at simple-moderate level.  2) Patient will complete moderate writing fluency tasks with minimal self-corrections, reformulations or delays with 90% accuracy TAMMIE. Goal appropriate to continue, had  made progress prior and now is more of a concern area again. May need to start at simple-moderate level.  3) Patient will self-monitor speech intelligibility in conversation and utilize louder, exaggerated speech to maintain >95% speech intelligibility during conversation. Goal appropriate to continue, had made progress prior and now is more of a concern area again.  4) Patient will demonstrate independence with x3+ word finding strategies in structured tasks and conversation to improve communication success in conversation. Goal appropriate to continue, had made progress prior and now is more of a concern area again.  5) Patient will name 10-15 items in 3-5 different simple/common categories within a minute to improve verbal fluency/word-finding. Changed goal to be 10-15 items in simple categories rather than 15 abstract.  6) Patient will communicate mod-complex thoughts/ideas/opinions successfully during structured tasks and conversations with minimal instances of reformations/empty or vague content to improve communication success in conversation. Goal appropriate to continue, had made progress prior and now is more of a concern area again. May need to start at simple-moderate level.  7) Patient will complete moderate-complex memory tasks with 80% accuracy TAMMIE with use of memory strategies. Goal appropriate to continue, may need to start at simple-moderate level.  8) Patient will demonstrate use of 2-3 novel memory/cognitive compensatory strategies in therapy tasks and/or discuss use in home/work demands. Goal appropriate to continue.         Plan / Patient Education:     Plan:   Speech therapy 2 times a week for 10 weeks. Currently on session 5 of 25 recommended from previously recommended plan of care, with current plan of care until 2021. Plan to extend plan of care, closer to this date.    Subjective:     Patient presents as cooperative, motivated and engaged during the session.  Pain Ratin    Patient  presents to therapy today with complaints of increased difficulty with speaking/slurred speech, word-finding, explaining his train of thought, written expression and cognition. Recent hospitalization due to acute confusion/mental status changes, shortly requiring intubation, new L MCA CVA, scattered in nature. Had ST assessment in hospital especially for speech-language. Today completed re-evaluation for cognition and language to revise goals written prior to hospitalization.    Wife present throughout session and supported providing recent history and symptoms/concerns. Wife is able to be off work 2-3 weeks. 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    Objective:     Cognition:  -Completed SLUMS, patient scored 22/30 in the mild/moderate neurocognitive disorder range.  Difficulties included: only named 5 words in animal category given 1 minute, only able to recall 2/5 objects w/distraction, unable to say 4 digits backwards, incorrect time on clock (short/long hands on incorrect numbers).  -Noted some strengths in some memory areas including recognizing familiar ST in hospital who he had only seen once, fairly good recall on recent hospital events, good recall of previous speech therapy activities and recommendations from prior to new stroke  -Oriented x4  -Problem solvin% for supplying complete/accurate details and appropriate explanation    Mild dysarthria/slurred speech. 100% intelligible. Pt/wife report slurred speech increases with fatigue. Patient is aware and working on over-exaggerating speech and slowing down.    Written Expression: Able to spell 6/8 single dictated words. Able to write 2 dictated sentences with 2 spelling errors. Self generated 2 sentences with 5 spelling errors, with 3 self-corrections. Letter placement issues and spelling errors noted, as well as some perseveration of numbers and  micrographia. Likely some errors are paraphasic.    Verbal Expression:  -Confrontation naming- 16/20 TAMMIE for common objects with intermittent delays  -Generative naming- 5 animals in a minute without cues  -Word-finding issues in general conversation and would look toward wife to say words for him (e.g. TCU, neurologist, etc)  -Able to form sentences in conversation, however difficulty with lengthier responses and explaining thoughts/recent events  -Able to form phrases/sentences to describe with 60% accuracy      TREATMENT  -Discussed oral reading 5-10+ minutes a day to focus on strategies - Patient remembers that helped last time in stroke recovery with the reminder and plans to do.  -Instructed on word retrieval strategies and practiced description of function/appearance/location/category of word with picture cards. Able to do with delays and intermittent min-mod cues across 10 pictures.  -With generative naming task, increased given additional time and cues for animals in other categories (pets, farm). Patient remembered categorization strategy from previous use but getting stuck more and not as able to use it on his own, benefited from cues but would still get stuck again after naming a few more. Able to name 8 additional items with cues.  -Had patient work on repetition strategy and writing a word down to support future recall (word: Neurologist). Patient was able recall/say word 5/5+ times later in the session. Patient reported prior when he gets stuck on a word, tends to get stuck the next time on same word. Talked to him about with important words to try to actively attempt to re-learn/remember as able and might take extra time to re-learn. Patient was more confident by end of the session with saying the specific word worked on.    Interventions Today   Interventions MINUTES   Speech - Language - Cognitive Evaluation 30   Speech - Language - Cognitive Treatment 25   Dysphagia    Assistive technology     Voice            Total 55     Olivia Cannon  1/5/2021

## 2021-06-14 NOTE — TELEPHONE ENCOUNTER
lisinopriL (PRINIVIL,ZESTRIL) 10 MG tablet [[id number]]    Electronically signed by: Abena Reyna MD on 10/07/20 1428 Status: Discontinued   Ordering user: Abena Reyna MD 10/07/20 1428 Authorized by: Abena Reyna MD   Frequency: DAILY 10/07/20 - 10/21/20  Discontinued by: Luis Daniel Maciel MD 10/21/20 0856

## 2021-06-14 NOTE — ANESTHESIA PREPROCEDURE EVALUATION
Anesthesia Evaluation      Patient summary reviewed   No history of anesthetic complications     Airway   Mallampati: III  Neck ROM: full   Pulmonary - normal exam    breath sounds clear to auscultation                         Cardiovascular - normal exam  Exercise tolerance: > or = 4 METS  (+) hypertension, ,     ECG reviewed  Rhythm: regular  Rate: normal,         Neuro/Psych    (+) neuromuscular disease,  CVA (tia) ,     Endo/Other    (+) hypothyroidism, obesity,      GI/Hepatic/Renal - negative ROS           Dental    (+) poor dentition and chipped                       Anesthesia Plan  Planned anesthetic: general endotracheal  -- RSI with Succ  -- neuromonitoring per surgeon. So will adjust anesthetic accordingly  -- Esmolol drawn up and ready for administration  -- PONV prophylaxis with Decadron 10 mg and Zofran 4 mg  -- Equal volume reversal of relaxant (esmolol for iatrogenic tachycardia)  -- neutral neck intubation with VL    ASA 3   Induction: intravenous   Anesthetic plan and risks discussed with: patient and spouse  Anesthesia plan special considerations: video-assisted, rapid sequence induction, increased risk of difficult airway, antiemetics, IV therapy two IVs,   Post-op plan: routine recovery

## 2021-06-14 NOTE — PROGRESS NOTES
[facility]   Speech Therapy Daily Progress     Patient Name: Raghavendra Kiser  Date: 1/27/2021  Visit #: 10  Referral Diagnosis: Aphasia [R47.01], Acute CVA [163.9]  Referring provider: Luis Daniel Maciel,*  Visit Diagnosis:     ICD-10-CM    1. Cognitive deficit due to recent stroke  I69.319        Session 10 of 25    Assessment:   Patient demonstrates understanding/independence with home program.  Patient is appropriate to continue with skilled speech therapy intervention, as indicated by initial plan of care.   Patient expressed concerns of dysarthria.  Education and training provided regarding speech intelligibility strategies.    Goal Status:   Long Term Goal  Patient will demonstrate completion of moderate cognitive-linguistic tasks with >90% accuracy as foundation for functional skills needed for social interaction and participation in ADLs (e.g., making/receiving phone calls, conversing with medical providers, providing personal information, expressing complex thoughts/ideas/opinions in back and forth conversation, work demands for office job).     Short Term Goals  1) Patient will complete moderate attention, deductive reasoning and executive function tasks with 90% accuracy TAMMIE. Goal appropriate to continue, may need to start at simple-moderate level.  2) Patient will complete moderate writing fluency tasks with minimal self-corrections, reformulations or delays with 90% accuracy TAMMIE. Goal appropriate to continue, had made progress prior and now is more of a concern area again. May need to start at simple-moderate level.  3) Patient will self-monitor speech intelligibility in conversation and utilize louder, exaggerated speech to maintain >95% speech intelligibility during conversation. Goal appropriate to continue, had made progress prior and now is more of a concern area again.  4) Patient will demonstrate independence with x3+ word finding strategies in structured tasks and conversation  "to improve communication success in conversation. Goal appropriate to continue, had made progress prior and now is more of a concern area again.  5) Patient will name 10-15 items in 3-5 different simple/common categories within a minute to improve verbal fluency/word-finding. Changed goal to be 10-15 items in simple categories rather than 15 abstract.  6) Patient will communicate mod-complex thoughts/ideas/opinions successfully during structured tasks and conversations with minimal instances of reformations/empty or vague content to improve communication success in conversation. Goal appropriate to continue, had made progress prior and now is more of a concern area again. May need to start at simple-moderate level.  7) Patient will complete moderate-complex memory tasks with 80% accuracy TAMMIE with use of memory strategies. Goal appropriate to continue, may need to start at simple-moderate level.  8) Patient will demonstrate use of 2-3 novel memory/cognitive compensatory strategies in therapy tasks and/or discuss use in home/work demands. Goal appropriate to continue.              Plan / Patient Education:     Continue with initial plan of care.  Progress with home program as tolerated.    Subjective:     Patient presents as cooperative, motivated and engaged during the session.  Pain Ratin     Patient arrived to therapy in good spirits.  He expressed that he could be overwhelmed by the things he needs to do (I.e., apply for social security, change smoke detectors) but he has been taking things one at a time.  Patient noted that \"my voice sounds a lot different to me\", which he felt was from weakness in his face/jaw muscles.  He expressed this is during connected speech and sounds \"choppy\" or \"hesitant\".    Objective:     Speech intelligibility strategies:  -Reviewed prior education with further training on speech intelligibility strategies.  Written hand out provided with emphasis on slowing down rate, " over-exaggerating, and increasing volume.  Patient noted he had been a fast talker prior to his stroke and now feels as though he speaks slower.  -Education provided regarding dysarthria causes, treatment, and expected functional outcome.  -Recommended patient continue to practice in oral reading or to record himself and re-listen to monitor for strategy use.  -Patient 100% intelligible without strategy use; however, does at times demonstrate decreased articulatory precision on multisyllabic words.  -Discussed environmental factors contributing to intelligibility including face to face setting, being in close proximity, and reducing background noise.  -Discussed how other factors can increase dysarthria including fatigue or when talking for a long amount of time.  Reminded patient that in these instances he may need to be more mindful of strategy use.  -Patient demonstrate no oral motor weakness during additional oral motor assessment and during diadochokinetic rates, mildly slowed 'kuh' in repetition.  -Patient noted increased dysarthria on /s/ sound.  Discussed manner/place of sounds and how /s/ can be more challenging to over-articulate.  Patient read high frequency /s/ sentences with 90% accuracy for simple sentences and 80% for challenging sentences with strategy use on initial trial, increased to 100% with either cued or self prompted self-correction.    High-level cognitive task:  -Patient reported that he had been able to successfully recall and complete novel task at home upon retraining in past session.      Interventions Today   Interventions MINUTES   Speech - Language 55   Cognition    Dysphagia    Assistive technology    Voice            Total 55     Regina Skinner MS, CCC-SLP  1/27/2021

## 2021-06-14 NOTE — PROGRESS NOTES
Assessment/Plan:   HPI:  ONSET OF NUMBNESS IN THE RIGHT ARM AUG 2017, PROGRESSIVE, INTERMITTENT ACHING PAIN UP TO 5/10.  IBUPROFEN HELPS SOME.     Visit for Preoperative Exam.    Encounter Diagnoses   Name Primary?     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     Subjective:     Scheduled Procedure: TOTAL DISC ARTHROPLASTY OF C7-T1  Surgery Date:  12-7-17  Surgery Location:  [facility]  Surgeon:  DR CRESPO    Current Outpatient Prescriptions   Medication Sig Dispense Refill     allopurinol (ZYLOPRIM) 300 MG tablet Take 300 mg/450 mg on alternate days. 135 tablet 1     aspirin 325 MG tablet Take 325 mg by mouth daily.       hydroCHLOROthiazide (HYDRODIURIL) 12.5 MG tablet Take 1 tablet (12.5 mg total) by mouth daily. 90 tablet 1     levothyroxine (SYNTHROID, LEVOTHROID) 125 MCG tablet Take 125 mcg by mouth daily.       rosuvastatin (CRESTOR) 10 MG tablet Take 5 mg by mouth bedtime.        No current facility-administered medications for this visit.      Facility-Administered Medications Ordered in Other Visits   Medication Dose Route Frequency Provider Last Rate Last Dose     [START ON 12/7/2017] NaCl 0.9% IR 0.9 % irrigation solution 1,000 mL (NS)  1,000 mL Irrigation Once Karely Edgar CNP           No Known Allergies    Immunization History   Administered Date(s) Administered     Influenza, seasonal,quad inj 36+ mos 09/14/2015, 09/15/2017     Influenza, seasonal,quad inj 6-35 mos 10/20/2014     Tdap 09/14/2015       Patient Active Problem List   Diagnosis     Hypothyroidism     Hyperlipidemia     Chronic gout     TIA (transient ischemic attack)     Stroke     Superficial venous thrombosis of arm -  right lateral antecubital fossa     Atherosclerosis of right carotid artery - ulcerated plaque     Carotid stenosis     Medial epicondylitis     Ganglion cyst     High risk medication use     HTN (hypertension)     Right shoulder tendinitis       Past Medical History:   Diagnosis Date     Carotid stenosis, bilateral      Chronic gout      High cholesterol      Humerus fracture     Right     Hypothyroidism      TIA (transient ischemic attack) 5/16/15       Social History     Social History     Marital status:      Spouse name: N/A     Number of children: N/A     Years of education: N/A     Occupational History     Not on file.     Social History Main Topics     Smoking status: Former Smoker     Packs/day: 2.00     Years: 20.00     Quit date: 2/15/1993     Smokeless tobacco: Never Used     Alcohol use 0.0 oz/week      Comment: rarely     Drug use: No     Sexual activity: Yes     Partners: Female     Other Topics Concern     Not on file     Social History Narrative    The patient lives with family.       Past Surgical History:   Procedure Laterality Date     CAROTID ENDARTERECTOMY       MT THROMBOENDARTECTMY NECK,NECK INCIS Right 6/8/2015    Procedure: Right Carotid Endarterectomy with Impulse Monitoring;  Surgeon: Marcellus Toth MD;  Location: [facility];  Service: General     TONSILLECTOMY         History of Present Illness  Recent Health  Fever: no  Chills: no  Fatigue: no  Chest Pain: no  Cough: no  Dyspnea: no  Urinary Frequency: no  Nausea: no  Vomiting: no  Diarrhea: no  Abdominal Pain: no  Easy Bruising: no  Lower Extremity Swelling: no  Poor Exercise Tolerance: no    Most recent Health Maintenance Visit:  6 month(s) ago    Pertinent History  Prior Anesthesia: yes  Previous Anesthesia Reaction:  no  Diabetes: no  Cardiovascular Disease: no  Pulmonary Disease: no  Renal Disease: no  GI Disease: no  Sleep Apnea: no  Thromboembolic Problems: no  Clotting Disorder: no  Bleeding Disorder:  no  Transfusion Reaction: no  Impaired Immunity: no  Steroid use in the last 6 months: KARON AT THE CERVICAL LEVEL IN OCT 2017  Frequent Aspirin use: yes; DAILY ASPIRIN 325 MG    Family history of UNREMARKABLE    Social history of patient does not wear denture or partial plates, there is no transfusion refusal and there are no concerns regarding care after surgery    After surgery, the patient plans to recover at home with family.    Review of Systems  A 12 point comprehensive review of systems was negative except as noted.    tinnitus    Neck pain    backpain    Right arm arm numbness      Objective:         There were no vitals filed for this visit.    Physical Exam:  Physical Exam:  General Appearance: Alert, cooperative, no distress, appears stated age  Head: Normocephalic, without obvious abnormality, atraumatic  Eyes: PERRL, conjunctiva/corneas clear, EOM's intact  Ears: Normal TM's and external ear canals, both ears  Nose: Nares normal, septum midline,mucosa normal, no drainage  Throat: Lips, mucosa, and tongue normal; teeth and gums normal  Neck: Supple, symmetrical, trachea midline, no adenopathy;  thyroid: not enlarged, symmetric, no tenderness/mass/nodules; no carotid bruit or JVD  Back: Symmetric, no curvature, ROM normal, no CVA tenderness  Lungs: Clear to auscultation bilaterally, respirations unlabored  Heart: Regular rate and rhythm, S1 and S2 normal, no murmur, rub, or gallop,  Abdomen: Soft, non-tender, bowel sounds active all four quadrants,  no masses, no organomegaly  Genitourinary: not examined  Musculoskeletal: Normal range of motion. No joint swelling or deformity.   Extremities: Extremities normal, atraumatic, no cyanosis or edema  Skin: Skin color, texture, turgor normal, no rashes or lesions  Lymph nodes: Cervical, supraclavicular, and axillary nodes normal  Neurologic: He is alert. He has normal reflexes.   Psychiatric: He has a normal mood and affect.       [facility]  MR CERVICAL  SPINE WO CONTRAST  9/26/2017 7:08 AM     INDICATION: Right arm pain and numbness.  TECHNIQUE: Noncontrast MRI images of the cervical spine.  CONTRAST:     None.  COMPARISON: None.     FINDINGS: Slight retrolisthesis of C2 and C3. Benign marrow. No edema. Normal spinal cord signal. Intact vertebral artery flow voids.     Findings at specific levels:     C2-C3: Preserved interspace. Tiny central protrusion. Unremarkable facets. No stenosis.     C3-C4: Mild interspace narrowing. Shallow circumferential disc osteophyte complex. Moderate uncovertebral joint degeneration. Mild facet joint degeneration. Mild spinal canal stenosis. Moderate to severe foraminal narrowing.     C4-C5: Mild degenerative interspace narrowing. Shallow annular bulging. Mild uncovertebral joint degeneration. Moderate left and mild right facet joint degeneration. Mild spinal canal stenosis. Moderate to severe foraminal narrowing.     C5-C6: Moderate degenerative interspace narrowing. Circumferential disc osteophyte complex with marked uncovertebral joint degeneration bilaterally. Unremarkable facets. Mild spinal canal stenosis. Severe foraminal narrowing.     C6-C7: Mild to moderate degenerative interspace narrowing. Shallow circumferential disc osteophyte complex. Moderate right worse than left uncovertebral joint degeneration. Unremarkable facets. Slight spinal canal narrowing. Moderate to severe right and   moderate left foraminal narrowing.     C7-T1: Preserved interspace. Right paracentral and foraminal extrusion. Mild facet degeneration. No central stenosis. Moderate to severe right foraminal narrowing. No left foraminal narrowing.     IMPRESSION:   CONCLUSION:   1.  Multilevel degenerative changes. No high-grade central spinal canal stenosis. Multilevel mild spinal canal narrowing. No spinal cord compression or signal abnormality.  2.    3.  There is a right foraminal extrusion at C7-T1 causing moderate to severe foraminal narrowing.  Correlate for any right C8 symptoms.  4.  Multilevel advanced neural foraminal narrowing is severe at C5-C6 and moderate to severe at C3-C4, C4-C5 and on the right at C6-C7.       Electrocardiogram Perform and Read   Order: [id number]   Status:  In process   Visible to patient:  No (Not Released) Next appt:  12/06/2017 at 11:30 AM in Neurosurgery ([facility] NEUROSURGERY NURSE) Dx:  Pre-op exam; Cervical radiculopathy      Ref Range & Units 12/5/17  3:16 PM     SYSTOLIC BLOOD PRESSURE mmHg    DIASTOLIC BLOOD PRESSURE mmHg    VENTRICULAR RATE BPM 70   ATRIAL RATE BPM 70   P-R INTERVAL ms 162   QRS DURATION ms 86   Q-T INTERVAL ms 402   QTC CALCULATION (BEZET) ms 434   P Axis degrees 60   R AXIS degrees 0   T AXIS degrees 27   MUSE DIAGNOSIS  Normal sinus rhythm   Normal ECG   When compared with ECG of 16-MAY-2015 13:59,   No significant change was found               CXR:  NO INFILTRATE OR MASS.

## 2021-06-14 NOTE — ANESTHESIA POSTPROCEDURE EVALUATION
Patient: Raghavendra Kiser  LEFT C7-T1 DISC ARTHROPLASTY  Anesthesia type: general    Patient location: PACU  Last vitals:   Vitals:    12/14/17 1515   BP: 145/82   Pulse: 80   Resp: 18   Temp:    SpO2: 95%     Post vital signs: stable  Level of consciousness: awake and responds to simple questions  Post-anesthesia pain: pain controlled  Post-anesthesia nausea and vomiting: no  Pulmonary: unassisted, nasal cannula  Cardiovascular: stable and blood pressure at baseline  Hydration: adequate  Anesthetic events: no    QCDR Measures:  ASA# 11 - Abi-op Cardiac Arrest: ASA11B - Patient did NOT experience unanticipated cardiac arrest  ASA# 12 - Abi-op Mortality Rate: ASA12B - Patient did NOT die  ASA# 13 - PACU Re-Intubation Rate: ASA13B - Patient did NOT require a new airway mgmt  ASA# 10 - Composite Anes Safety: ASA10A - No serious adverse event    Additional Notes:

## 2021-06-14 NOTE — PROGRESS NOTES
Raghavendra Kiser is a 60 y.o. male who is being evaluated via a billable video visit.      How would you like to obtain your AVS? MyChart.  If dropped from the video visit, the video invitation should be resent by: Text to cell phone: [phone]   Will anyone else be joining your video visit? No      Video Start Time:       Subjective     Raghavendra Kiser is 60 y.o. and presents to clinic today for the following health issues   HPI      Review of Systems         Objective       Vitals:  No vitals were obtained today due to virtual visit.    Physical Exam              Video-Visit Details    Type of service:  Video Visit    Video End Time (time video stopped):   Originating Location (pt. Location): Home    Distant Location (provider location):  [facility]     Platform used for Video Visit: Brazzlebox  This document was created using a software with less than 100% fidelity, at times resulting in unintended, even erroneous syntax and grammar.  The reader is advised to keep this under consideration while reviewing, interpreting this note.    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 Alternate with 450 mg (1.5 tablet) every other day. Take at bedtime.  Dispense: 135 tablet; Refill: 1    Essential hypertension    Acute CVA (cerebrovascular accident) (H)          HISTORY OF PRESENTING ILLNESS:  Raghavendra Kiser 60 y.o. is evaluated here via video link.  This is for follow-up.  This is for gout.  He has not had a episode over the past year.  He is on allopurinol.  He has had no active ongoing joint symptoms anymore.  Very happy with that situation.  Unfortunately recently he sustained a stroke.  Was admitted, intubated at [facility].  He is making gradual recovery he is home now.  Background of hypertension.   We discussed how to avoid nonsteroidals given his comorbidities now.  He is to continue the current regimen.  We will meet here in 12 months or sooner.       ROS enquiry held for fever, ocular symptoms, rash, headache,  GI issues.  Today we also discussed the issues related to the current pandemic, the pros and cons of the current treatment plan, the CDC guidelines such as social distancing washing the hands covering the cough.  ALLERGIES:Cat dander    PAST MEDICAL/ACTIVE PROBLEMS/MEDICATION/SOCIAL DATA  Past Medical History:   Diagnosis Date     Atherosclerosis of right carotid artery- ulcerated plaque      Carotid stenosis, bilateral      Cervical radiculopathy      Chronic gout      Fracture of right humerus      Ganglion cyst      High cholesterol      Humerus fracture     Right     Hypertension      Hypothyroidism      Medial epicondylitis      Shoulder tendinitis, right      Stroke (H)      Superficial venous thrombosis of arm     right lateral antecubital fossa     TIA (transient ischemic attack) 5/16/15     Tinnitus      Type 2 diabetes mellitus without complication, without long-term current use of insulin (H) 2019     Social History     Tobacco Use   Smoking Status Former Smoker     Packs/day: 2.00     Years: 20.00     Pack years: 40.00     Quit date: 2/15/1993     Years since quittin.9   Smokeless Tobacco Never Used     Patient Active Problem List   Diagnosis     Hypothyroidism     Hyperlipidemia     Chronic gout     TIA (transient ischemic attack)     Acute ischemic left MCA stroke (H)     Superficial venous thrombosis of arm - right lateral antecubital fossa     Atherosclerosis of right carotid artery - ulcerated plaque     Carotid stenosis     Medial epicondylitis     Ganglion cyst     High risk medication use     HTN (hypertension)     Right shoulder tendinitis     Cervical radiculopathy     History of TIA (transient ischemic attack) and stroke     High blood triglycerides     Precordial  pain     Type 2 diabetes mellitus without complication, without long-term current use of insulin (H)     Vitamin deficiency     History of colonic polyps     Stenosis of left internal carotid artery-50 % on MRA of the neck October 2020.     Right sided weakness     Acute CVA (cerebrovascular accident) (H)     AMS (altered mental status)     Acute encephalopathy     Acute respiratory failure with hypoxia (H)     Cognitive and behavioral changes     Sleep difficulties     Current Outpatient Medications   Medication Sig Dispense Refill     allopurinoL (ZYLOPRIM) 300 MG tablet Take 450 mg by mouth every other day. Alternate with 300 mg (1 tablet) every other day. Take at bedtime.       allopurinoL (ZYLOPRIM) 300 MG tablet Take 1.5 tablets (450 mg total) by mouth daily. Alternate with 450 mg (1.5 tablet) every other day. Take at bedtime. 135 tablet 1     amLODIPine (NORVASC) 10 MG tablet Take 1 tablet (10 mg total) by mouth every morning. 30 tablet 0     aspirin 81 MG EC tablet Take 1 tablet (81 mg total) by mouth daily.  0     blood glucose meter (GLUCOMETER) Use 1 each As Directed as needed. Dispense glucometer brand per patient's insurance at pharmacy discretion. 1 each 0     blood glucose test strips Use 1 each As Directed 2 (two) times a day. 100 strip 6     chlorthalidone (HYGROTEN) 25 MG tablet Take 1 tablet (25 mg total) by mouth daily with breakfast. 30 tablet 0     cholecalciferol, vitamin D3, (VITAMIN D3) 4,000 unit cap Take 4,000 Units by mouth daily.        clopidogreL (PLAVIX) 75 mg tablet Take 1 tablet (75 mg total) by mouth daily. For 90 days from 12/25/2020  0     divalproex (DEPAKOTE ER) 500 MG 24 hour tablet Take 1 tablet (500 mg total) by mouth at bedtime. 30 tablet 0     generic lancets (FINGERSTIX LANCETS) Dispense brand per patient's insurance at pharmacy discretion. 100 each 3     levothyroxine (SYNTHROID, LEVOTHROID) 125 MCG tablet Take 125 mcg by mouth Daily at 6:00 am.        lisinopriL  (PRINIVIL,ZESTRIL) 20 MG tablet Take 1 tablet (20 mg total) by mouth 2 (two) times a day. 180 tablet 1     metFORMIN (GLUCOPHAGE-XR) 500 MG 24 hr tablet Take 1 tablet (500 mg total) by mouth 2 (two) times a day. 360 tablet 3     potassium chloride (K-DUR,KLOR-CON) 20 MEQ tablet Take 1 tablet (20 mEq total) by mouth every morning for 6 days. 6 tablet 0     rosuvastatin (CRESTOR) 5 MG tablet Take 5 mg by mouth at bedtime.        No current facility-administered medications for this visit.          EXAMINATION:    Using the audio and video link as best as possible the constitutional, neck, neurologic, psych, skin, both upper extremities areas/organ system were evaluated during this assessment.  Some of the important findings: Alert, oriented, speech fluent.   Able to fully flex the digits, into fists bilaterally, wrist and  elbow range of motion appear normal, abduction of the shoulder is normal.      LAB / IMAGING DATA:  ALT   Date Value Ref Range Status   12/30/2020 66 (H) 0 - 45 U/L Final   12/29/2020 76 (H) 0 - 45 U/L Final   12/28/2020 28 0 - 45 U/L Final     Albumin   Date Value Ref Range Status   12/30/2020 3.2 (L) 3.5 - 5.0 g/dL Final   12/29/2020 2.9 (L) 3.5 - 5.0 g/dL Final   12/28/2020 2.6 (L) 3.5 - 5.0 g/dL Final     Creatinine   Date Value Ref Range Status   01/02/2021 0.92 0.70 - 1.30 mg/dL Final   12/30/2020 0.88 0.70 - 1.30 mg/dL Final   12/29/2020 0.77 0.70 - 1.30 mg/dL Final       WBC   Date Value Ref Range Status   12/30/2020 5.9 4.0 - 11.0 thou/uL Final   12/29/2020 6.5 4.0 - 11.0 thou/uL Final   08/11/2015 4.1 4.0 - 11.0 thou/uL Final   06/09/2015 8.1 4.0 - 11.0 thou/uL Final     Hemoglobin   Date Value Ref Range Status   12/30/2020 12.4 (L) 14.0 - 18.0 g/dL Final   12/29/2020 12.5 (L) 14.0 - 18.0 g/dL Final   12/28/2020 11.7 (L) 14.0 - 18.0 g/dL Final     Platelets   Date Value Ref Range Status   12/30/2020 156 140 - 440 thou/uL Final   12/29/2020 143 140 - 440 thou/uL Final   12/28/2020 118 (L)  140 - 440 thou/uL Final       No results found for: RUSH, RF, SEDRATE  Duration of the call:10  Minutes  Call start: 252  pm

## 2021-06-14 NOTE — PROGRESS NOTES
[facility] Daily Progress     Patient Name: Raghavendra Kiser  Date: 2017  Visit #: 4  PTA visit #:  NA  Referral Diagnosis: Cervical radiculitis  Referring provider: Mohini De Jesus PA-C  Visit Diagnosis:     ICD-10-CM    1. Cervical radiculitis M54.12    2. Joint stiffness of spine M25.60    3. Poor posture R29.3    4. Muscle weakness (generalized) M62.81      Raghavendra Kiser is a 57 y.o. male who presents to therapy today with chief complaints of tingling in his R forearm and pain in his R shoulder blade. Onset date of sx was 2017.  Pt reported h/o hypothyroidism, hyperlipidemia, stroke, HTN, and a R humeral fx with dislocation in .  Pain symptoms are a dull ache with tingling in the UE.  Functional impairments include using a mouse at work and being in a resting upright position.  Pt demo's signs and sx consistent with cervical/thoracic radiculitis with hypomobility throughout spine.     Precautions / Restrictions : None     Assessment:     HEP/POC compliance is  good .  The patient reports no significant improvements since last session.  He was able to tolerate treatment well today.  He is appropriate to continue with PT services at this time.    Goal Status:   Pt. will be independent with home exercise program in : 6 weeks  Pt will: be able to drive without tingling and pain; in 8 weeks  Pt will: be able to sit at his desk at work without tingling and pain; in 8 weeks    Plan / Patient Education:     Continue with initial plan of care.  Progress with home program as tolerated.  Check if traction was helpful.  Continue with thoracic mobs and progress scapular strengthening.  Wagner elevated.    Subjective:     Pain Ratin  The patient reports that his neck felt fine after the mechanical traction with improvements in tingling symptoms, but an hour later, went back to being the same.  He is doing his exercises everyday.  The foam roller seems to help the most.    Objective:      Tightness with bilateral side bending and rotation.    Appt time: 2:36PM - 3:03PM    Treatment Today     TREATMENT MINUTES COMMENTS   Evaluation     Self-care/ Home management     Manual therapy     Neuromuscular Re-education     Therapeutic Activity     Therapeutic Exercises 12 UBE x 4 minutes WL 4.0 FWD/BWD; subjective measures taken  See flowsheet   Gait training     Modality__Mechanical traction__ 15 Saunder's cervical mechanical traction with 20# pressure x 10 minutes treatment and setup              Total 27    Blank areas are intentional and mean the treatment did not include these items.       Mariaelena Toth, PT, DPT  11/9/2017

## 2021-06-14 NOTE — PROGRESS NOTES
DIAGNOSIS:  1. Orthostatic hypotension , do over medication with anti-hypertensivesue t    2. Light headed  HM1(CBC and Differential)   3. Essential hypertension, presently over-medicated amLODIPine (NORVASC) 10 MG tablet       PLAN:     Discontinue chlorthalidone    Do not take lisinopril this evening.    Cut back the amlodipine to 5 mg daily beginning the morning of 1-22-21    Call with BP update tomorrow.    Pt given a couple glasses of water and then a standing BP was repeated and found to have a SBP >90  I spent 40 min  with patient face-to-face, of which 50% or greater was spent in counseling and coordination of care in regards to patient's  Recent hospital stay, stroke, HTN, AND MEDICATION REGIMEN..   Please see plan above           HPI:  S/p stroke on Chrismas day.  Both amlodipine and chlorthalidone were added during that hospital stay.   Started feeling light headed about a week ago.  Gets light headed standing.  Denies melena.  Lab Results   Component Value Date    WBC 6.8 01/21/2021    HGB 14.5 01/21/2021    HCT 42.7 01/21/2021    MCV 99 01/21/2021     01/21/2021       Lab Results   Component Value Date    WBC 6.8 01/21/2021    HGB 14.5 01/21/2021    HCT 42.7 01/21/2021    MCV 99 01/21/2021     01/21/2021     Results for orders placed or performed during the hospital encounter of 12/25/20   Basic Metabolic Panel   Result Value Ref Range    Sodium 142 136 - 145 mmol/L    Potassium 3.6 3.5 - 5.0 mmol/L    Chloride 108 (H) 98 - 107 mmol/L    CO2 24 22 - 31 mmol/L    Anion Gap, Calculation 10 5 - 18 mmol/L    Glucose 152 (H) 70 - 125 mg/dL    Calcium 8.1 (L) 8.5 - 10.5 mg/dL    BUN 14 8 - 22 mg/dL    Creatinine 0.85 0.70 - 1.30 mg/dL    GFR MDRD Af Amer >60 >60 mL/min/1.73m2    GFR MDRD Non Af Amer >60 >60 mL/min/1.73m2             Current Outpatient Medications on File Prior to Visit   Medication Sig Dispense Refill     allopurinoL (ZYLOPRIM) 300 MG tablet Take 450 mg by mouth every other day.  Alternate with 300 mg (1 tablet) every other day. Take at bedtime.       allopurinoL (ZYLOPRIM) 300 MG tablet Take 1.5 tablets (450 mg total) by mouth daily. Alternate with 450 mg (1.5 tablet) every other day. Take at bedtime. 135 tablet 1     blood glucose meter (GLUCOMETER) Use 1 each As Directed as needed. Dispense glucometer brand per patient's insurance at pharmacy discretion. 1 each 0     blood glucose test strips Use 1 each As Directed 2 (two) times a day. 100 strip 6     cholecalciferol, vitamin D3, (VITAMIN D3) 4,000 unit cap Take 4,000 Units by mouth daily.        clopidogreL (PLAVIX) 75 mg tablet Take 1 tablet (75 mg total) by mouth daily. 60 tablet 0     divalproex (DEPAKOTE ER) 500 MG 24 hour tablet Take 1 tablet (500 mg total) by mouth at bedtime. 90 tablet 1     generic lancets (FINGERSTIX LANCETS) Dispense brand per patient's insurance at pharmacy discretion. 100 each 3     levothyroxine (SYNTHROID, LEVOTHROID) 125 MCG tablet Take 125 mcg by mouth Daily at 6:00 am.        lisinopriL (PRINIVIL,ZESTRIL) 20 MG tablet Take 1 tablet (20 mg total) by mouth 2 (two) times a day. 180 tablet 1     metFORMIN (GLUCOPHAGE-XR) 500 MG 24 hr tablet Take 1 tablet (500 mg total) by mouth 2 (two) times a day. 360 tablet 3     rosuvastatin (CRESTOR) 5 MG tablet Take 5 mg by mouth at bedtime.        [DISCONTINUED] amLODIPine (NORVASC) 10 MG tablet Take 1 tablet (10 mg total) by mouth every morning. 90 tablet 1     [DISCONTINUED] chlorthalidone (HYGROTEN) 25 MG tablet Take 1 tablet (25 mg total) by mouth daily with breakfast. 90 tablet 1     No current facility-administered medications on file prior to visit.        Pmh: reviewed  Psh: reviewed  Allergy:  reviewed      EXAM:    BP 91/60 (Patient Position: Standing)   Pulse 76   Temp 96.7  F (35.9  C) (Oral)   Resp 16   Wt (!) 225 lb 12.8 oz (102.4 kg)   BMI 30.62 kg/m    GEN:   ALERT, NAD, ORIENTED TIMES THREE  NECK: SUPPLE WITHOUT ADENOPATHY OR THYROMEGALY  LUNGS:  CTA  COR: RRR WITHOUT MURMUR  SKIN: NO RASH , ULCERS OR LESIONS NOTED  EXT: WITHOUT EDEMA/SWELLING    Recent Results (from the past 168 hour(s))   HM1 (CBC with Diff)    Collection Time: 01/21/21 10:47 AM   Result Value Ref Range    WBC 6.8 4.0 - 11.0 thou/uL    RBC 4.31 (L) 4.40 - 6.20 mill/uL    Hemoglobin 14.5 14.0 - 18.0 g/dL    Hematocrit 42.7 40.0 - 54.0 %    MCV 99 80 - 100 fL    MCH 33.6 27.0 - 34.0 pg    MCHC 34.0 32.0 - 36.0 g/dL    RDW 12.3 11.0 - 14.5 %    Platelets 182 140 - 440 thou/uL    MPV 7.0 7.0 - 10.0 fL    Neutrophils % 68 50 - 70 %    Lymphocytes % 23 20 - 40 %    Monocytes % 6 2 - 10 %    Eosinophils % 3 0 - 6 %    Basophils % 1 0 - 2 %    Neutrophils Absolute 4.6 2.0 - 7.7 thou/uL    Lymphocytes Absolute 1.6 0.8 - 4.4 thou/uL    Monocytes Absolute 0.4 0.0 - 0.9 thou/uL    Eosinophils Absolute 0.2 0.0 - 0.4 thou/uL    Basophils Absolute 0.0 0.0 - 0.2 thou/uL       Lab Results   Component Value Date    WBC 6.8 01/21/2021    HGB 14.5 01/21/2021    HCT 42.7 01/21/2021    MCV 99 01/21/2021     01/21/2021

## 2021-06-14 NOTE — TELEPHONE ENCOUNTER
Reason for call:  Patient reporting a symptom    Symptom or request: Patient's spouse called stating patient has been having light headedness in the last 3-4 days. Patient's BP readings are usually in the 127 to 131 over mid 70s. They are wondering if this is normal and would like a call back.    Duration (how long have symptoms been present): 3-4 days     Have you been treated for this before? No    Additional comments: N/A    Phone Number patient can be reached at:    Cell number on file:    Telephone Information:   Mobile [phone]       Best Time:  Any time    Can we leave a detailed message on this number: Yes    Call taken on 1/14/2021 at 12:28 PM by Carey Zhang

## 2021-06-14 NOTE — PROGRESS NOTES
ADL Initial Evaluation    Patient Name: Raghavendra Kiser  Date of evaluation: 1/7/2021  Referral Diagnosis: CVA  Referring provider: Luis Daniel Maciel,*  Visit Diagnosis:     ICD-10-CM    1. Right arm weakness  R29.898    2. Incoordination due to acute stroke (H)  I63.9     R27.9    3. Decreased activities of daily living (ADL)  Z78.9    4. Impaired instrumental activities of daily living  Z78.9        Assessment:        Pt. is appropriate for skilled OT intervention as outlined in the Plan of Care (POC).    Goals:  Patient will be independent with home exercise program in: 2 weeks  Patient will perform: meal preparation;with no difficulty;in 12 weeks  Patient will be able to  & pinch: for household chores;with less difficulty;in 12 weeks  Patient will be able to: lift;carry;for grocery shopping;in 12 weeks  Patient will improve hand/finger coordination for: writing;typing;with less difficulty;in 12 weeks      Patient's expectations/goals are realistic.    Barriers to Learning or Achieving Goals:  No Barriers.       Plan / Patient Instructions:        Plan of Care:   Authorization / Certification Start Date: 01/07/21  Authorization / Certification End Date: 04/07/21  Authorization / Certification Number of Visits: 12  Communication with: Referral Source;Patient Caregiver  Patient Related Instruction: Nature of Condition;Treatment plan and rationale;Basis of treatment;Expected outcome  Times per Week: 1  Number of Weeks: 12  Number of Visits: 12  Select Plan of Care: Select  Therapeutic Exercise: Strengthening  Neuromuscular Reeducation: stroke rehabilitation  Functional Training (ADL's): ADL's;ergonomics      POC and pathology of condition were reviewed with patient.  Pt. is in agreement with the Plan of Care. Treatment techniques, plan of care, and goals were discussed with the patient.  The patient agrees to the plan as outlined.  The plan of care is dynamic and will be modified on an ongoing  basis.    A Home Exercise Program (HEP) was initiated today. Pt. was instructed in exercises by OT and patient was given a handout with detailed instructions.          Subjective:        Social information:   Living Situation: Patient lives in a single story home with his wife who provides 24 hour supervison   Occupation: Transportation service coordination   Work Status:Unable to work due to symptoms. Patient was scheduled to go back to work on 1-3-21 and then had a second stroke so he did not RTW.     History of Present Illness:    Raghavendra is a 60 y.o. male who presents to therapy today with complaints of right UE weakness and coordination. Date of onset/duration of symptoms is  when he had his first stroke. Patient reports that he fully recovered and had a second stroke on 20. Onset was sudden. Symptoms are getting better. He denies history of similar symptoms. He describes their previous level of function as not limited. Functional limitations are described as occurring with gripping, pinching, lifting, carrying for ADL's and IADL's.    Pain Ratin  Pain description: N/A       Objective:      Note: Items left blank indicates the item was not performed or not indicated at the time of the evaluation.    OUTCOME MEASURE:  QuickDASH Score: 22.5      ADL Examination  1. Right arm weakness     2. Incoordination due to acute stroke (H)     3. Decreased activities of daily living (ADL)     4. Impaired instrumental activities of daily living       Precautions/Restrictions: None  Involved side: Right  Posture Observation:      General standing posture is normal.    Prior Level of Function: independent    Current level of function:   Transfers: Independent   Dressing: Independent   Grooming: Independent   Hygiene: Independent   Meal prep: Independent   Laundry: patient's wife does this   Cleaning: patient's wife does this   Driving: patient is not driving yet   Finances: patient's wife does  this   Grocery Shopping: patient does this with his wife   Medication: Patient's wife sets up medications and patient will start doing this next week   Outdoor chores: Unable                                                      Right                      Left  UE ROM/Strength   AROM   MMT/5   AROM   MMT/5     Shoulder Flexion 180    WNL   4-   WNL   4+     Shoulder Extension  60       5      5     Shoulder Abduction  180       4-      5     Elbow Flexion  150    WNL   5   WNL   5     Elbow Extension 0    WNL   5   WNL   5     Forearm Supination  80    WNL      WNL        Forearm Pronation  80    WNL      WNL        Wrist Flexion  80    WNL   5   WNL   5     Wrist Extension  80    WNL   5   WNL   5      Strength   74#    75#      3 point pinch    18#    22#      Lateral pinch   15#    19#      Full fist         Flat fist         Claw fist                 Sensation:  Patient reports normal sensation                                                     Right                                           Left               Coordination      NT   WNL   Impaired    NT     WNL    Impaired     Gross motor                       Fine motor                       9 hole peg         25.6 sec      23.7 sec        Cognitive Evaluation:  Cognitive evaluation to be completed at future visits. Speech therapy is addressing this at this time.    Visual - perceptual Assessment:  Visual-perceptual assessment not indicated.    Pre-Driving Screening: to be determined    Plan for next visit: coordination exercises including rapid alternating leg slaps, tennis ball bounce, fine motor handout ideas.    Treatment Today: Patient instructed on strengthening for right shoulder in flexion, abduction, horizontal abduction/adduction,  and pinch.  TREATMENT MINUTES COMMENTS   Evaluation 25    Self-care/ Home management     Manual therapy     Neuromuscular Re-education 25    Therapeutic Exercises     Orthotic fitting           Total 50    Blank  areas are intentional and mean the treatment did not include these items.     GOALS AND PLAN OF CARE WERE ESTABLISHED IN COOPERATION WITH THE PATIENT    OT Evaluation Code: (Please list factors)   Comorbidities:   Patient Active Problem List   Diagnosis     Hypothyroidism     Hyperlipidemia     Chronic gout     TIA (transient ischemic attack)     Acute ischemic left MCA stroke (H)     Superficial venous thrombosis of arm - right lateral antecubital fossa     Atherosclerosis of right carotid artery - ulcerated plaque     Carotid stenosis     Medial epicondylitis     Ganglion cyst     High risk medication use     HTN (hypertension)     Right shoulder tendinitis     Cervical radiculopathy     History of TIA (transient ischemic attack) and stroke     High blood triglycerides     Precordial pain     Type 2 diabetes mellitus without complication, without long-term current use of insulin (H)     Vitamin deficiency     History of colonic polyps     Stenosis of left internal carotid artery-50 % on MRA of the neck October 2020.     Right sided weakness     Acute CVA (cerebrovascular accident) (H)     AMS (altered mental status)     Acute encephalopathy     Acute respiratory failure with hypoxia (H)     Cognitive and behavioral changes     Sleep difficulties       Profile/History Review: Brief    Need for eval modification: No     # Treatment options: Limited    Clinical Decision Making:  Low      Occupational Profile/ Medical and Therapy History and Comorbidities Occupational Performance Clinical Decision Making   (Complexity)   brief history with review of medical/therapy records related to the presenting problem.  No comorbidities 1-3 Performance deficits that result in activity limitations and/or participation restrictions.    No Assessment Modification  Low complexity, which includes  problem-focused assessments, and consideration of a limited number of treatment options.      expanded review of medical/therapy records  and additional review of physical, cognitive and psychosocial history.    May have comorbidities 3-5 Performance deficits that result in activity limitations and/or participation restrictions.    Minimal to moderate modification of assessment Moderate complexity, which includes analysis of data from detailed assessments, and consideration of several treatment options.         Review of medical/therapy records and extensive additional review of physical, cognitive and psychosocial history.  Comorbidities affect occupational performance 5 or more Performance deficits that result in activity limitations and/or participation restrictions.    Significant modification of assessment High complexity, analysis of  Occupational profile and data,  Comprehensive assessments, multiple treatment options.          Debora Salvador  1/7/2021  11:07 AM

## 2021-06-14 NOTE — PROGRESS NOTES
[facility]   Speech Therapy Daily Progress     Patient Name: Raghavendra Kiser  Date: 1/25/2021  Visit #: 9  Referral Diagnosis: Aphasia [R47.01], Acute CVA [163.9]  Referring provider: Lusi Daniel Maciel,*  Visit Diagnosis:     ICD-10-CM    1. Cognitive deficit due to recent stroke  I69.319        Session 9 of 25    Assessment:   Patient demonstrates understanding/independence with home program.  Patient is appropriate to continue with skilled speech therapy intervention, as indicated by initial plan of care.   Patient motivated during therapy and receptive to cues/home carryover.    Goal Status:   Long Term Goal  Patient will demonstrate completion of moderate cognitive-linguistic tasks with >90% accuracy as foundation for functional skills needed for social interaction and participation in ADLs (e.g., making/receiving phone calls, conversing with medical providers, providing personal information, expressing complex thoughts/ideas/opinions in back and forth conversation, work demands for office job).     Short Term Goals  1) Patient will complete moderate attention, deductive reasoning and executive function tasks with 90% accuracy TAMMIE. Goal appropriate to continue, may need to start at simple-moderate level.  2) Patient will complete moderate writing fluency tasks with minimal self-corrections, reformulations or delays with 90% accuracy TAMMIE. Goal appropriate to continue, had made progress prior and now is more of a concern area again. May need to start at simple-moderate level.  3) Patient will self-monitor speech intelligibility in conversation and utilize louder, exaggerated speech to maintain >95% speech intelligibility during conversation. Goal appropriate to continue, had made progress prior and now is more of a concern area again.  4) Patient will demonstrate independence with x3+ word finding strategies in structured tasks and conversation to improve communication success in conversation.  "Goal appropriate to continue, had made progress prior and now is more of a concern area again.  5) Patient will name 10-15 items in 3-5 different simple/common categories within a minute to improve verbal fluency/word-finding. Changed goal to be 10-15 items in simple categories rather than 15 abstract.  6) Patient will communicate mod-complex thoughts/ideas/opinions successfully during structured tasks and conversations with minimal instances of reformations/empty or vague content to improve communication success in conversation. Goal appropriate to continue, had made progress prior and now is more of a concern area again. May need to start at simple-moderate level.  7) Patient will complete moderate-complex memory tasks with 80% accuracy TAMMIE with use of memory strategies. Goal appropriate to continue, may need to start at simple-moderate level.  8) Patient will demonstrate use of 2-3 novel memory/cognitive compensatory strategies in therapy tasks and/or discuss use in home/work demands. Goal appropriate to continue.              Plan / Patient Education:     Continue with initial plan of care.  Progress with home program as tolerated.    Subjective:     Patient presents as cooperative, motivated and engaged during the session.  Pain Ratin     Patient arrived to therapy in good spirits.  He expressed that he had a busy day as he had completed a home refinance and paperwork for correction.    Objective:     Cognitive Strategies:  -Discussed morning tasks of completing home refinance and correction paperwork.  Patient noted that he had \"a little trouble keeping up with information\" while discussing details of refinance and writing things down; however, with the strategy of asking for additional time to write he was able to successful write down information and share with his wife.  He stated, \"I don't think I forgot anything because I was able to keep pretty good notes\".      High-level cognitive task:  -Reviewed " "high level task learned in previous session.  Patient expressed he \"That trick doesn't work\" and stated he had been unable to complete at home.  -Patient attempted x1 unsuccessfully.  SLP demonstrated x1 to patient and patient immediately able to identify error.  Following demonstration, patient able to complete ith 100% accuracy TAMMIE.    -Patient did not have his notes from previous session with, but will check them to decide if he had omitted a step or had skipped it when interpreting them.    Generative Naming, abstract:  -/s/: x15 in 1 minute TAMMIE  -Big: x19 in 1 minute TAMMIE  Red: x10 in 1 minute TAMMIE  -Patient able to recall and apply strategies from previous session including narrowing category and shifting category when no longer able to think of further items.    Oral Reading/Memory:  -Patient read 1 page sheet orally with minimal to no dysarthric errors when implementing slow rate.  -Patient able to recall 5/6 details TAMMIE, with x1 detail needing review of page; however, patient received a phone call during this question that made him alternate/divide his attention.    Interventions Today   Interventions MINUTES   Speech - Language 30   Cognition 23   Dysphagia    Assistive technology    Voice            Total 53     Regina Skinner MS, CCC-SLP  1/25/2021  "

## 2021-06-14 NOTE — TELEPHONE ENCOUNTER
"Refill Approved    Rx renewed per Medication Renewal Policy. Medication was last renewed on 1/2/2021. \"no print\". Order was retransmitted.      Rosmery Dupont, [facility] Triage/Med Refill 1/6/2021     Requested Prescriptions   Pending Prescriptions Disp Refills     clopidogreL (PLAVIX) 75 mg tablet [Pharmacy Med Name: Clopidogrel Bisulfate Oral Tablet 75 MG] 30 tablet 0     Sig: Take 1 tablet (75 mg total) by mouth daily.       Clopidogrel/Prasugrel/Ticagrelor Refill Protocol Passed - 1/6/2021  2:00 AM        Passed - PCP or prescribing provider visit in past 6 months       Last office visit with prescriber/PCP: 12/24/2020 OR same dept: 12/24/2020 Luis Daniel Maciel MD OR same specialty: 12/24/2020 Luis Daniel Maciel MD Last physical: 11/2/2020 Last MTM visit: Visit date not found     Next appt within 3 mo: Visit date not found  Next physical within 3 mo: Visit date not found  Prescriber OR PCP: Luis Daniel Maciel MD  Last diagnosis associated with med order: 1. Acute CVA (cerebrovascular accident) (H)  - clopidogreL (PLAVIX) 75 mg tablet [Pharmacy Med Name: Clopidogrel Bisulfate Oral Tablet 75 MG]; Take 1 tablet (75 mg total) by mouth daily.  Dispense: 30 tablet; Refill: 0    If protocol passes may refill for 6 months if within 3 months of last provider visit (or a total of 9 months).              Passed - Hemoglobin in past 12 months     Hemoglobin   Date Value Ref Range Status   12/30/2020 12.4 (L) 14.0 - 18.0 g/dL Final                            "

## 2021-06-14 NOTE — PROGRESS NOTES
[facility] Daily Progress     Patient Name: Raghavendra Kiser  Date: 11/13/2017  Visit #: 5  PTA visit #:  NA  Referral Diagnosis: Cervical radiculitis  Referring provider: Mohini De Jesus PA-C  Visit Diagnosis:     ICD-10-CM    1. Cervical radiculitis M54.12    2. Joint stiffness of spine M25.60    3. Poor posture R29.3    4. Muscle weakness (generalized) M62.81      Raghavendra Kiser is a 57 y.o. male who presents to therapy today with chief complaints of tingling in his R forearm and pain in his R shoulder blade. Onset date of sx was 8/2017.  Pt reported h/o hypothyroidism, hyperlipidemia, stroke, HTN, and a R humeral fx with dislocation in 2008.  Pain symptoms are a dull ache with tingling in the UE.  Functional impairments include using a mouse at work and being in a resting upright position.  Pt demo's signs and sx consistent with cervical/thoracic radiculitis with hypomobility throughout spine.     Precautions / Restrictions : None     Assessment:     HEP/POC compliance is  good .  The patient reports no significant improvements after treatments.  He was able to tolerate treatment well today, but does have pain at R facets C5-C7 and centrally with direct pain into R UE with grade III mobs.  Grade II was performed because of this reaction.  He is appropriate to follow up with surgeon since there have been no significant changes with PT thus far except for 1 hour of relief after traction.    Goal Status:   Pt. will be independent with home exercise program in : 6 weeks;Met  Pt will: be able to drive without tingling and pain; in 8 weeks; No change  Pt will: be able to sit at his desk at work without tingling and pain; in 8 weeks; No change    Plan / Patient Education:     Patient has appointment with surgeon tomorrow.  Will determine POC after appointment with surgeon.  If he returns to PT, continue with facet mobs if helpful, otherwise raise traction angle for lower cervical  spine.    Subjective:     Pain Ratin  The patient reports that when he does the nn glides, they seem to help with the pain.  Everything feels the same overall.      Objective:     No pain with cervical ROM.  Pain with extension/R side bending quadrant.  Pain at C5-C7 centrally and R facets.    Appt time: 2:28PM - 2:54PM    Treatment Today     TREATMENT MINUTES COMMENTS   Evaluation     Self-care/ Home management     Manual therapy 15 Prone PA mobs to C5-C7 grades II-III centrally x 30 x 3  Prone PA mobs to C4/5, C5/6, and C6/7 R facet joints grade II x 30 x 3   Neuromuscular Re-education     Therapeutic Activity     Therapeutic Exercises 11 UBE x 4 minutes WL 4.0 FWD/BWD; subjective measures taken  See flowsheet   Gait training     Modality______                Total 26    Blank areas are intentional and mean the treatment did not include these items.       Mariaelena Toth, PT, DPT  2017

## 2021-06-14 NOTE — PATIENT INSTRUCTIONS - HE
Discontinue chlorthalidone    Do not take lisinopril this evening.    Cut back the amlodipine to 5 mg daily beginning the morning of 1-22-21    Call with BP update tomorrow.

## 2021-06-14 NOTE — ANESTHESIA CARE TRANSFER NOTE
Last vitals:   Vitals:    12/14/17 1502   BP: 129/72   Pulse: 80   Resp: 15   Temp: 37.1  C (98.8  F)   SpO2: 96%     Patient's level of consciousness is drowsy  Spontaneous respirations: yes  Maintains airway independently: yes  Dentition unchanged: yes  Oropharynx: oropharynx clear of all foreign objects    QCDR Measures:  ASA# 20 - Surgical Safety Checklist: WHO surgical safety checklist completed prior to induction  PQRS# 430 - Adult PONV Prevention: 4558F - Pt received => 2 anti-emetic agents (different classes) preop & intraop  ASA# 8 - Peds PONV Prevention: NA - Not pediatric patient, not GA or 2 or more risk factors NOT present  PQRS# 424 - Abi-op Temp Management: 4559F - At least one body temp DOCUMENTED => 35.5C or 95.9F within required timeframe  PQRS# 426 - PACU Transfer Protocol: - Transfer of care checklist used  ASA# 14 - Acute Post-op Pain: ASA14B - Patient did NOT experience pain >= 7 out of 10

## 2021-06-14 NOTE — TELEPHONE ENCOUNTER
Informed patient's wife Loren of Dr. Maciel's message below. Patient is scheduled with Dr. Maciel 1/21/21 to follow up.

## 2021-06-15 NOTE — TELEPHONE ENCOUNTER
Refill Approved    Rx renewed per Medication Renewal Policy. Medication was last renewed on 11/8/2019.    Laquita Celestin, [facility] Triage/Med Refill 2/12/2021     Requested Prescriptions   Pending Prescriptions Disp Refills     FREESTYLE 28 gauge lancets [Pharmacy Med Name: LANCETS FREESTYLE 100'S 28G] 200 each 3     Sig: USE 1 LANCET AS DIRECTED TWICE A DAY       Diabetic Supplies Refill Protocol Passed - 2/12/2021 10:28 AM        Passed - Visit with PCP or prescribing provider visit in last 6 months     Last office visit with prescriber/PCP: 2/9/2021 Luis Daniel Maciel MD OR same dept: 2/9/2021 Luis Daniel Maciel MD OR same specialty: 2/9/2021 Luis Daniel Maciel MD  Last physical: 11/2/2020 Last MTM visit: Visit date not found   Next visit within 3 mo: Visit date not found  Next physical within 3 mo: Visit date not found  Prescriber OR PCP: Luis Daniel Maciel MD  Last diagnosis associated with med order: There are no diagnoses linked to this encounter.  If protocol passes may refill for 12 months if within 3 months of last provider visit (or a total of 15 months).             Passed - A1C in last 6 months     Hemoglobin A1c   Date Value Ref Range Status   12/24/2020 5.6 <=5.6 % Final

## 2021-06-15 NOTE — TELEPHONE ENCOUNTER
[facility] is calling for clarification of the directions of allopurinol tabs. Call back number is [phone]. The reference number is [id number]

## 2021-06-15 NOTE — TELEPHONE ENCOUNTER
Reason for Call:  Other      Detailed comments: Patient's spouse, Loren called stating that they forgot to check with Dr. Maciel today at patient's appt about a form from patient's work that needs to fill out. On the form they are asking about patient's diagnosis, prognosis and duration of patient's illness. Per Loren, she reviewed the AVS from the hospital and it doesn't state much. Stroke was listed, but no further information. She is wondering if Dr. Maciel can write a letter in regards to this and include patient's records to the stroke and fax to supervisor Kevin Alarcon at fax) [phone]. Pt has no more time off and still awhile before he retires and trying to work with pt's employer to see what pt can do. OK to call with any questions.    Phone Number Patient can be reached at:   Cell number on file:    Telephone Information:   Mobile [phone]       Best Time: Anytime    Can we leave a detailed message on this number?: Yes    Call taken on 2/9/2021 at 4:48 PM by Carey Zhang

## 2021-06-15 NOTE — PROGRESS NOTES
[facility]   Speech Therapy Daily Progress     Patient Name: Raghavendra Kiser  Date: 2/15/2021  Visit #: 14  Referral Diagnosis: Aphasia [R47.01], Acute CVA [163.9]  Referring provider: Luis Daniel Maciel,*  Visit Diagnosis:     ICD-10-CM    1. Cognitive deficit due to recent stroke  I69.319        Session 14 of 25    Assessment:   Patient demonstrates understanding/independence with home program.  Patient is appropriate to continue with skilled speech therapy intervention, as indicated by initial plan of care.   Patient has progressed in completion of cognitive-linguistic tasks and self-awareness of need to slow down and recheck his work.    Goal Status:   Long Term Goal  Patient will demonstrate completion of moderate cognitive-linguistic tasks with >90% accuracy as foundation for functional skills needed for social interaction and participation in ADLs (e.g., making/receiving phone calls, conversing with medical providers, providing personal information, expressing complex thoughts/ideas/opinions in back and forth conversation, work demands for office job).     Short Term Goals  1) Patient will complete moderate attention, deductive reasoning and executive function tasks with 90% accuracy TAMMIE.-GOAL PROGRESSING.  Patient completing moderate-high level cognitive tasks with 50%-100% accuracy TAMMIE.  Noted errors with attention to detail; however, he is improving in his self-monitoring and re-checking word.  2) Patient will complete moderate writing fluency tasks with minimal self-corrections, reformulations or delays with 90% accuracy TAMMIE. Goal appropriate to continue, had made progress prior and now is more of a concern area again. May need to start at simple-moderate level.  3) Patient will self-monitor speech intelligibility in conversation and utilize louder, exaggerated speech to maintain >95% speech intelligibility during conversation. -GOAL CONSIDERED MET.  Patient able to recall and implement  strategies of slow rate, increased volume, and over-exaggeration to maintain >95% intelligibility.  Patient has demonstrated increased awareness and ability to self-monitor his speech.  4) Patient will demonstrate independence with x3+ word finding strategies in structured tasks and conversation to improve communication success in conversation. -DISCONTINUE.  Goal minimally addressed due to minimal word finding difficulty noted during therapy session or described outside of therapy environment.  5) Patient will name 10-15 items in 3-5 different simple/common categories within a minute to improve verbal fluency/word-finding. -GOAL MET.  Patient able to name 10-19 items in an abstract category in 1 minutes time, increased with concrete categories.  6) Patient will communicate mod-complex thoughts/ideas/opinions successfully during structured tasks and conversations with minimal instances of reformations/empty or vague content to improve communication success in conversation. Goal appropriate to continue, had made progress prior and now is more of a concern area again. May need to start at simple-moderate level.  7) Patient will complete moderate-complex memory tasks with 80% accuracy TAMMIE with use of memory strategies. -GOAL NEARLY MET, 80% accuracy with recall of details in short paragraph level tasks with divided attention component.    8) Patient will demonstrate use of 2-3 novel memory/cognitive compensatory strategies in therapy tasks and/or discuss use in home/work demands. Goal appropriate to continue. GOAL MET.  Patient demonstrates self-checking, slowing rate of completion, and increased organization during cognitive-linguistic tasks.             Plan / Patient Education:     Continue with initial plan of care.  Progress with home program as tolerated.   Discussed plan for x1 more appointments, followed by transition to home programming.    Subjective:     Patient presents as cooperative, motivated and engaged  "during the session.  Pain Ratin     Patient arrived to therapy in good spirits.  He expressed that he has learned to take things \"one thing at a time\" to help accomplish what he needs to without being overwhelmed.     Objective:     Suduko:  Easy Level: Patient instructed on how to complete Suduko puzzle, with visual model of strategies to use visual organization to aid in problem solving and deductive reasoning.  Patient able to complete with x1 error given moderate cues initially fading to independent.  Provided cues were to \"recheck\" that he had crossed off all numbers to eliminate; however, without cue he would have likely came to the same conclusion independently later.  At times patient noted to write one thing and verbalize another.  Benefited from cues to slow rate of completion and focus on 1 box at a time.  After completing, patient stated he will continue to complete independently as now he is comfortable with how to complete these puzzles and can see how they are beneficial for cognition.    Interventions Today   Interventions MINUTES   Speech - Language 10   Cognition 47   Dysphagia    Assistive technology    Voice            Total 57     Regina Skinner MS, CCC-SLP  2/15/2021  "

## 2021-06-15 NOTE — PROGRESS NOTES
[facility]   Speech Therapy Daily Progress     Patient Name: Raghavendra Kiser  Date: 2/8/2021  Visit #: 13  Referral Diagnosis: Aphasia [R47.01], Acute CVA [163.9]  Referring provider: Luis Daniel Maciel,*  Visit Diagnosis:     ICD-10-CM    1. Cognitive deficit due to recent stroke  I69.319        Session 13 of 25    Assessment:   Patient demonstrates understanding/independence with home program.  Patient is appropriate to continue with skilled speech therapy intervention, as indicated by initial plan of care.     Goal Status:   Long Term Goal  Patient will demonstrate completion of moderate cognitive-linguistic tasks with >90% accuracy as foundation for functional skills needed for social interaction and participation in ADLs (e.g., making/receiving phone calls, conversing with medical providers, providing personal information, expressing complex thoughts/ideas/opinions in back and forth conversation, work demands for office job).     Short Term Goals  1) Patient will complete moderate attention, deductive reasoning and executive function tasks with 90% accuracy TAMMIE.-GOAL PROGRESSING.  Patient completing moderate-high level cognitive tasks with 50%-100% accuracy TAMMIE.  Noted errors with attention to detail; however, he is improving in his self-monitoring and re-checking word.  2) Patient will complete moderate writing fluency tasks with minimal self-corrections, reformulations or delays with 90% accuracy TAMMIE. Goal appropriate to continue, had made progress prior and now is more of a concern area again. May need to start at simple-moderate level.  3) Patient will self-monitor speech intelligibility in conversation and utilize louder, exaggerated speech to maintain >95% speech intelligibility during conversation. -GOAL CONSIDERED MET.  Patient able to recall and implement strategies of slow rate, increased volume, and over-exaggeration to maintain >95% intelligibility.  Patient has demonstrated  increased awareness and ability to self-monitor his speech.  4) Patient will demonstrate independence with x3+ word finding strategies in structured tasks and conversation to improve communication success in conversation. -DISCONTINUE.  Goal minimally addressed due to minimal word finding difficulty noted during therapy session or described outside of therapy environment.  5) Patient will name 10-15 items in 3-5 different simple/common categories within a minute to improve verbal fluency/word-finding. -GOAL MET.  Patient able to name 10-19 items in an abstract category in 1 minutes time, increased with concrete categories.  6) Patient will communicate mod-complex thoughts/ideas/opinions successfully during structured tasks and conversations with minimal instances of reformations/empty or vague content to improve communication success in conversation. Goal appropriate to continue, had made progress prior and now is more of a concern area again. May need to start at simple-moderate level.  7) Patient will complete moderate-complex memory tasks with 80% accuracy TAMMIE with use of memory strategies. -GOAL NEARLY MET, 80% accuracy with recall of details in short paragraph level tasks with divided attention component.    8) Patient will demonstrate use of 2-3 novel memory/cognitive compensatory strategies in therapy tasks and/or discuss use in home/work demands. Goal appropriate to continue. GOAL MET.  Patient demonstrates self-checking, slowing rate of completion, and increased organization during cognitive-linguistic tasks.             Plan / Patient Education:     Continue with initial plan of care.  Progress with home program as tolerated.   Discussed plan for x1-2 more appointments, followed by transition to home programming.    Subjective:     Patient presents as cooperative, motivated and engaged during the session.  Pain Ratin     Patient arrived to therapy in good spirits.  He was motivated throughout the  therapy session and receptive to suggestions.    Objective:     Home Practice:  -Patient attempted to complete high level deductive reasoning puzzle at home.  Per patient, he had made an error and identified it; however, had been unable to effectively erase to continue completing puzzle.      Deductive Reasoning:  -Patient completed expert level deductive reasoning puzzle with 90% accuracy TAMMIE, increased to 100% given intermittent verbal cues as to which clue to address or to reference full puzzle vs clues.  Patient noted to be mildly impulsive x1 resulting in an error that needed to be corrected; however, implemented slow and methodical review in the majority of puzzle completion.  Patient implemented strategies learned from previous session (e.g., crossing out completed clues, visually organizing puzzles/completed items) independently and with increased success.  -Discussed functional implications of therapy tasks and carryover, such as to completing detail oriented paperwork.    Interventions Today   Interventions MINUTES   Speech - Language 15   Cognition 40   Dysphagia    Assistive technology    Voice            Total 55     Regina Skinner MS, CCC-SLP  2/8/2021

## 2021-06-15 NOTE — TELEPHONE ENCOUNTER
I have prepared a letter which will be faxed to Kevin Alarcon.  A copy of the letter should be available on [employer] under the letter tab.

## 2021-06-15 NOTE — TELEPHONE ENCOUNTER
Spoke to [facility] to clarify allopurinol dosing sig- should be to take allopurinol 300mg daily alternating with 450mg every other day.

## 2021-06-15 NOTE — PROGRESS NOTES
DIAGNOSIS:  1. Essential hypertension  amLODIPine (NORVASC) 5 MG tablet    lisinopriL (PRINIVIL,ZESTRIL) 20 MG tablet       PLAN:     Stay well hydrated.    Keep BP meds at present doses.    Pt to ask Dr Knutson regarding driving.             HPI:    follow up on htn.  H/o recent CVA.   His BP meds were lowere on 1-21-20 to amlodipine 5 mg daily and lisinopril 20 mg once daily.    occas lightheaded episodes if not hydrated.   These usually occur with change in position and is real slight.    Home BPs  Mostly with SBP < 140.    Does stairs at home for exercise and does clothes.             Current Outpatient Medications on File Prior to Visit   Medication Sig Dispense Refill     allopurinoL (ZYLOPRIM) 300 MG tablet Take 1.5 tablets (450 mg total) by mouth daily. Alternate with 450 mg (1.5 tablet) every other day. Take at bedtime. 135 tablet 1     blood glucose meter (GLUCOMETER) Use 1 each As Directed as needed. Dispense glucometer brand per patient's insurance at pharmacy discretion. 1 each 0     blood glucose test strips Use 1 each As Directed 2 (two) times a day. 100 strip 6     cholecalciferol, vitamin D3, (VITAMIN D3) 4,000 unit cap Take 4,000 Units by mouth daily.        clopidogreL (PLAVIX) 75 mg tablet Take 1 tablet (75 mg total) by mouth daily. 60 tablet 0     divalproex (DEPAKOTE ER) 500 MG 24 hour tablet Take 1 tablet (500 mg total) by mouth at bedtime. 90 tablet 1     generic lancets (FINGERSTIX LANCETS) Dispense brand per patient's insurance at pharmacy discretion. 100 each 3     levothyroxine (SYNTHROID, LEVOTHROID) 125 MCG tablet Take 125 mcg by mouth Daily at 6:00 am.        metFORMIN (GLUCOPHAGE-XR) 500 MG 24 hr tablet Take 1 tablet (500 mg total) by mouth 2 (two) times a day. 360 tablet 3     rosuvastatin (CRESTOR) 5 MG tablet Take 5 mg by mouth at bedtime.        [DISCONTINUED] amLODIPine (NORVASC) 10 MG tablet Take 0.5 tablets (5 mg total) by mouth every morning. 90 tablet 1     [DISCONTINUED]  lisinopriL (PRINIVIL,ZESTRIL) 20 MG tablet Take 1 tablet (20 mg total) by mouth 2 (two) times a day. 180 tablet 1     [DISCONTINUED] allopurinoL (ZYLOPRIM) 300 MG tablet Take 450 mg by mouth every other day. Alternate with 300 mg (1 tablet) every other day. Take at bedtime.       No current facility-administered medications on file prior to visit.        Pmh: reviewed  Psh: reviewed  Allergy:  reviewed      EXAM:    /75 (Patient Site: Left Arm, Patient Position: Sitting, Cuff Size: Adult Large)   Pulse 66   Temp 96.6  F (35.9  C) (Oral)   Resp 16   Wt (!) 227 lb 3.2 oz (103.1 kg)   BMI 30.81 kg/m    GEN:   ALERT, NAD, ORIENTED TIMES THREE  NECK: SUPPLE WITHOUT ADENOPATHY OR THYROMEGALY  LUNGS: CTA  COR: RRR WITHOUT MURMUR  EXT: WITHOUT EDEMA/SWELLING    No results found for this or any previous visit (from the past 168 hour(s)).    Lab Results   Component Value Date    TSH 3.91 11/02/2020          Results for orders placed or performed during the hospital encounter of 12/25/20   Basic Metabolic Panel   Result Value Ref Range    Sodium 142 136 - 145 mmol/L    Potassium 3.6 3.5 - 5.0 mmol/L    Chloride 108 (H) 98 - 107 mmol/L    CO2 24 22 - 31 mmol/L    Anion Gap, Calculation 10 5 - 18 mmol/L    Glucose 152 (H) 70 - 125 mg/dL    Calcium 8.1 (L) 8.5 - 10.5 mg/dL    BUN 14 8 - 22 mg/dL    Creatinine 0.85 0.70 - 1.30 mg/dL    GFR MDRD Af Amer >60 >60 mL/min/1.73m2    GFR MDRD Non Af Amer >60 >60 mL/min/1.73m2     Lab Results   Component Value Date    HGBA1C 5.6 12/24/2020     Lab Results   Component Value Date    PSA 2.0 11/02/2020    PSA 2.1 11/08/2019    PSA 1.7 10/16/2018           Lab Results   Component Value Date    WBC 6.8 01/21/2021    HGB 14.5 01/21/2021    HCT 42.7 01/21/2021    MCV 99 01/21/2021     01/21/2021

## 2021-06-15 NOTE — PROGRESS NOTES
[facility] Discharge Summary  Patient Name: Raghavendra Kiser  Date: 2/8/2018  Referral Diagnosis: Cervical radiculitis  Referring provider: Mohini De Jesus PA-C  Visit Diagnosis:   1. Cervical radiculitis     2. Joint stiffness of spine     3. Poor posture     4. Muscle weakness (generalized)         Goals:  Pt. will be independent with home exercise program in : 6 weeks;Met  Pt will: be able to drive without tingling and pain; in 8 weeks; No change  Pt will: be able to sit at his desk at work without tingling and pain; in 8 weeks; No change    Patient was seen for 5 visits from 10/30/17 to 11/13/17 with 0 missed appointments.  The patient was instructed to follow up with physician's clinic.  No further therapy is required at this time.  The patient will be undergoing surgery.  Will do PT again after surgery.  Appropriate for DC at this time.    Therapy will be discontinued at this time.  The patient will need a new referral to resume.    Thank you for your referral.  Mariaelena Toth, PT, DPT  2/8/2018  2:15 PM

## 2021-06-15 NOTE — PROGRESS NOTES
[facility]   Speech Therapy Daily Progress     Patient Name: Raghavendra Kiser  Date: 2/3/2021  Visit #: 12  Referral Diagnosis: Aphasia [R47.01], Acute CVA [163.9]  Referring provider: Luis Daniel Maciel,*  Visit Diagnosis:     ICD-10-CM    1. Cognitive deficit due to recent stroke  I69.319        Session 12 of 25    Assessment:   Patient demonstrates understanding/independence with home program.  Patient is appropriate to continue with skilled speech therapy intervention, as indicated by initial plan of care.   Patient continues to progress with applying strategies to complete moderate-high level tasks; however, continues to require double-checking work and increased attention to detail.    Goal Status:   Long Term Goal  Patient will demonstrate completion of moderate cognitive-linguistic tasks with >90% accuracy as foundation for functional skills needed for social interaction and participation in ADLs (e.g., making/receiving phone calls, conversing with medical providers, providing personal information, expressing complex thoughts/ideas/opinions in back and forth conversation, work demands for office job).     Short Term Goals  1) Patient will complete moderate attention, deductive reasoning and executive function tasks with 90% accuracy TAMMIE.-GOAL PROGRESSING.  Patient completing moderate-high level cognitive tasks with 50%-100% accuracy TAMMIE.  Noted errors with attention to detail; however, he is improving in his self-monitoring and re-checking word.  2) Patient will complete moderate writing fluency tasks with minimal self-corrections, reformulations or delays with 90% accuracy TAMMIE. Goal appropriate to continue, had made progress prior and now is more of a concern area again. May need to start at simple-moderate level.  3) Patient will self-monitor speech intelligibility in conversation and utilize louder, exaggerated speech to maintain >95% speech intelligibility during conversation. -GOAL  CONSIDERED MET.  Patient able to recall and implement strategies of slow rate, increased volume, and over-exaggeration to maintain >95% intelligibility.  Patient has demonstrated increased awareness and ability to self-monitor his speech.  4) Patient will demonstrate independence with x3+ word finding strategies in structured tasks and conversation to improve communication success in conversation. -DISCONTINUE.  Goal minimally addressed due to minimal word finding difficulty noted during therapy session or described outside of therapy environment.  5) Patient will name 10-15 items in 3-5 different simple/common categories within a minute to improve verbal fluency/word-finding. -GOAL MET.  Patient able to name 10-19 items in an abstract category in 1 minutes time, increased with concrete categories.  6) Patient will communicate mod-complex thoughts/ideas/opinions successfully during structured tasks and conversations with minimal instances of reformations/empty or vague content to improve communication success in conversation. Goal appropriate to continue, had made progress prior and now is more of a concern area again. May need to start at simple-moderate level.  7) Patient will complete moderate-complex memory tasks with 80% accuracy TAMMIE with use of memory strategies. -GOAL NEARLY MET, 80% accuracy with recall of details in short paragraph level tasks with divided attention component.    8) Patient will demonstrate use of 2-3 novel memory/cognitive compensatory strategies in therapy tasks and/or discuss use in home/work demands. Goal appropriate to continue. GOAL MET.  Patient demonstrates self-checking, slowing rate of completion, and increased organization during cognitive-linguistic tasks.             Plan / Patient Education:     Continue with initial plan of care.  Progress with home program as tolerated.   Patient is appropriate to decrease to 1x per week.    Subjective:     Patient presents as cooperative,  motivated and engaged during the session.  Pain Ratin     Patient arrived to therapy in good spirits.  He expressed he had completed complex paperwork since his past visit and had typed his SSN incorrectly, and had to review several times to identify the problem.  He noted that he likely would have made a similar mistake prior to his stroke; however, was surprised by how quickly he became frustrated.    Objective:     Education provided regarding whole person therapy involving cognitive, physical, fatigue, and social emotional aspects with use of visual aid.  Patient expressed this was useful in understanding how each aspect can impact the others, to understand his frustration.    Flight Itinerary Task:  -Patient given complex functional flight itinerary task involving rating x4 flight options to provided set of preferences.  Given minimal verbal instruction and quick visual model of x1 organizational system, patient was able to TAMMIE organize information from each flight into a grid to visually organize information and identify top 2 flight options.  Patient made x1 self-correction when calculating flight layover times.    Moderate-High Level Deductive Reasoning Puzzle  -Patient completed moderate-high level deductive reasoning puzzle with 100% accuracy given minimal cues intermittently, primarily to visually organize/cross off completed items and to reference full grid vs clues when he became stuck.  Patient noted that he had previously done these in therapy and would want to stop, as they were challenging; however, he felt more confident in being able to problem solve the information provided on today's date.  Home practice provided to be reviewed in upcoming therapy session.    Interventions Today   Interventions MINUTES   Speech - Language 20   Cognition 40   Dysphagia    Assistive technology    Voice            Total 60     Regina Skinner MS, CCC-SLP  2/3/2021

## 2021-06-15 NOTE — PROGRESS NOTES
[facility]   Speech Therapy Daily Progress/Discharge Summary    Patient Name: Raghavendra Kiser  Date: 2/22/2021  Visit #: 15  Referral Diagnosis: Aphasia [R47.01], Acute CVA [163.9]  Referring provider: Luis Daniel Maciel,*  Visit Diagnosis:     ICD-10-CM    1. Cognitive deficit due to recent stroke  I69.319        Session 15    Assessment:   Patient demonstrates understanding/independence with home program.  Patient is appropriate to continue with skilled speech therapy intervention, as indicated by initial plan of care.   Patient was seen for 15 visits from 11/9/20-2/22/21.  He has progressed in all goals relating to cognition and language.  No further therapy recommended at this time, patient is appropriate to continue via home programming.    Goal Status:   Long Term Goal  Patient will demonstrate completion of moderate cognitive-linguistic tasks with >90% accuracy as foundation for functional skills needed for social interaction and participation in ADLs (e.g., making/receiving phone calls, conversing with medical providers, providing personal information, expressing complex thoughts/ideas/opinions in back and forth conversation, work demands for office job).     Short Term Goals  1) Patient will complete moderate attention, deductive reasoning and executive function tasks with 90% accuracy TAMMIE.-GOAL CONSIDERED MET.  Patient has demonstrated completion of moderate-high level cognitive tasks with increased strategy use to attend to details and re-check work TAMMIE.  2) Patient will complete moderate writing fluency tasks with minimal self-corrections, reformulations or delays with 90% accuracy TAMMIE. -GOAL MET.   3) Patient will self-monitor speech intelligibility in conversation and utilize louder, exaggerated speech to maintain >95% speech intelligibility during conversation. -GOAL CONSIDERED MET.  Patient able to recall and implement strategies of slow rate, increased volume, and  "over-exaggeration to maintain >95% intelligibility.  Patient has demonstrated increased awareness and ability to self-monitor his speech.  4) Patient will demonstrate independence with x3+ word finding strategies in structured tasks and conversation to improve communication success in conversation. -DISCONTINUE.  Goal minimally addressed due to minimal word finding difficulty noted during therapy session or described outside of therapy environment.  5) Patient will name 10-15 items in 3-5 different simple/common categories within a minute to improve verbal fluency/word-finding. -GOAL MET.  Patient able to name 10-19 items in an abstract category in 1 minutes time, increased with concrete categories.  6) Patient will communicate mod-complex thoughts/ideas/opinions successfully during structured tasks and conversations with minimal instances of reformations/empty or vague content to improve communication success in conversation. -GOAL MET  7) Patient will complete moderate-complex memory tasks with 80% accuracy TAMMIE with use of memory strategies. -GOAL MET  8) Patient will demonstrate use of 2-3 novel memory/cognitive compensatory strategies in therapy tasks and/or discuss use in home/work demands. Goal appropriate to continue. GOAL MET.  Patient demonstrates self-checking, slowing rate of completion, and increased organization during cognitive-linguistic tasks.             Plan / Patient Education:     Progress with home program as tolerated.  Patient will need a new referral to resume.    Subjective:     Patient presents as cooperative, motivated and engaged during the session.  Pain Ratin     Patient arrived to therapy in good spirits.  He noted that he did a suduko puzzle on his notebook per introduction last session and \"it was easy\".  He noted that overall he feels that he has gotten everything he can out of therapy and feels competent with the supports he has in place for any ongoing cognitive-linguistic " "challenges.    Objective:     Functional Problem Solving:  -1/1 TAMMIE- patient able to complete math in his head. Discontinued exercise as patient reported he had previously completed with prior therapist.      Written Language:  -Patient was able to write a grammatically complete and contextually appropriate paragraph given x10 words to include.  Patient noted to take time prior to beginning to organize his thoughts and double checked for word use TAMMIE after completing.   -Patient noted that he does minimal writing at home other than tracking blood glucose levels etc.  He expressed that \"I've lost all muscle memory\" when it comes to typing/texting and will have his wife do the texting, and has successfully implemented voice to text when able.      Mental Manipulation  -7/8 TAMMIE with F:4 options given x1 verbal presentation.    -Patient expressed that \"I feel pretty comfortable with memory around the house\".    Verbal Language:  -During 5 minutes of unstructured conversation, patient able to fluently and completely organize his thoughts into sentences.  x1 instance of word finding difficulty noted with \"TIA\", in which he was able to describe given verbal cue.  He noted that he had previously had trouble recalling \"neurologist\" and now has no trouble with it has his past therapist had him repeat it, use it in a sentence, and repeat with delay/distraction.  Encouraged patient to further apply these strategies should further words/challenges come up.    Swallowing:  -Patient noted that he has had some difficulty with pocketing foods on his right side and coughing on his spit.    -Discussed how mild oral motor weakness can contribute to decreased mastication/bolus control and lead to buccal pocketing.  Discussed strategies to decrease including softer/moister foods, slower rate of intake, alternating liquids/solids, and overall just being more purposeful with intake/checking for pocketing.  -Patient expressed he has had " "\"coughing attacks\" that can last 4-5 seconds when swallowing spit the wrong way.  Education provided on structure/function of swallow, s/s aspiration, and suspected contributors including small amount of saliva d/t pool in cheek from oral motor weakness may not be enough to trigger swallow or delayed initiation.  Discussed doing more frequent dry swallows during the day or drinking small sips of water frequently to cue himself to swallow saliva more.  -Encouraged patient to follow-up should coughing with intake increase or be more consistent during meals/drinking.      Interventions Today   Interventions MINUTES   Speech - Language 20   Cognition 30   Dysphagia 7   Assistive technology    Voice            Total 57     Regina Skinner MS, CCC-SLP  2/22/2021  " Patient

## 2021-06-15 NOTE — PROGRESS NOTES
CHART NOTE     DATE OF SERVICE:  2018     : 1960   58 y.o.     ASSESSMENT :   Doing well with improvement in symptoms and function.      PLAN: Wean from collar/brace. Loosen restrictions. Refer to PT. RTC prn. Enc to call with any new questions or concerns.     HPI:  Raghavendra Kiser is status post Left C7-T1 disc arthroplasty on 2017 by Dr. Lyman.  Preoperatively presented with C8 radiculopathy on the left including pain, numbness and weakness.     TODAY, Raghavendra Kiser reports still has some neck and across tops of shoulders, residual numbness and tingling down the R arm similar to preop. Does notice that discomfort is provoked with prolonged activity, relieved when at rest. 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 Does notice increased ROM since surgery.      PAST MEDICAL HISTORY, SURGICAL HISTORY, REVIEW OF SYMPTOMS, MEDICATIONS AND ALLERGIES:  Past medical history, surgical history, ROS, medications and allergies reviewed with patient and remain unchanged from previous visit.    Past Medical History:   Diagnosis Date     Atherosclerosis of right carotid artery- ulcerated plaque      Carotid stenosis, bilateral      Cervical radiculopathy      Chronic gout      Fracture of right humerus      Ganglion cyst      High cholesterol      Humerus fracture     Right     Hypertension      Hypothyroidism      Medial epicondylitis      Shoulder tendinitis, right      Stroke      Superficial venous thrombosis of arm     right lateral antecubital fossa     TIA (transient ischemic attack) 5/16/15     Tinnitus        PHYSICAL EXAM:        Neurological exam reveals:  Respirations easy, non-labored.   Skin: W/D/I. No rashes, lesions or breaks in integrity.   Recent and remote memory intact, fund of knowledge wnl.    Alert and oriented x3, speech fluent and appropriate.   PERRL, EOMI, No nystagmus,   Face symmetric, tongue midline, Uvula midline,  palate rises  with phonation   Shoulder shrug equal  Arm strength bilateral grasp, biceps, triceps, and deltoids 5/5   No extremity edema noted.   Muscle Bulk and tone wnl.   Reflexes: No pathological reflexes   Gait and station:Normal  Incision: CDI without erythema or edema  NDI/YOBANY: 28%     RADIOGRAPHIC IMAGING: XR:   Films personally reviewed and interpreted.  Also reviewed and discussed with Dr. Lyman.   Reviewed imaging with patient and family.

## 2021-06-15 NOTE — PROGRESS NOTES
[facility]   Cervical Thoracic Initial Evaluation    Patient Name: Raghavendra Kiser  Date of evaluation: 1/29/2018  Referral Diagnosis: Cervical radiculopathy  Referring provider: Karely Edgar CNP  Visit Diagnosis:     ICD-10-CM    1. Neck pain, acute M54.2    2. Hx of neck surgery Z98.890    3. Muscle weakness (generalized) M62.81    4. Poor posture R29.3        Assessment:      Raghavendra Kiser is a 58 y.o. male who presents to therapy today with chief complaints of neck pain s/p cervical arthroplasty. Onset date of sx was 12/14/17.  Pt reported h/o left C7-T1 disc arthroplasty with Dr. Lyman on 12/14/17, hypothyroidism, hyperlipidemia, TIA< stroke, and HTN.  Pain symptoms are sore, achy, and dull.  Functional impairments include lifting.  Pt demo's signs and sx consistent with post-operative neck pain.   Pt. is appropriate for skilled PT intervention as outlined in the Plan of Care (POC).  Pt. is a good candidate for skilled PT services to improve pain levels and function.    Goals:  Pt. will demonstrate/verbalize independence in self-management of condition in : 6 weeks  Pt. will be independent with home exercise program in : 6 weeks  Pt will: report UE symptoms at least 50% improved; in 6 weeks  Pt will: be able to sit at his desk at work without tingling and pain; in 8 weeks; No change    Patient's expectations/goals are realistic.    Barriers to Learning or Achieving Goals:  No Barriers.       Plan / Patient Instructions:        Plan of Care:   Communication with: Referral Source  Patient Related Instruction: Nature of Condition;Treatment plan and rationale;Self Care instruction;Basis of treatment;Body mechanics;Posture  Times per Week: 1  Number of Weeks: 6-8  Number of Visits: 4-6  Precautions / Restrictions : No lifting >5#  Therapeutic Exercise: ROM;Stretching;Strengthening  Neuromuscular Reeducation: kinesio tape;posture;core  Manual Therapy: soft tissue mobilization;myofascial  release;joint mobilization;muscle energy;strain counterstrain  Modalities: electrical stimulation;ultrasound    POC and pathology of condition were reviewed with patient.  Pt. is in agreement with the Plan of Care  A Home Exercise Program (HEP) was initiated today.  Pt. was instructed in exercises by PT and patient was given a handout with detailed instructions.    Plan for next visit: Recheck exercises, MT as needed, introduce cervical proprioception exercises.     Subjective:       Initially after the surgery, pain was a little worse or about the same.  For the last month, pain has been slowly improving.  Dr. Lyman opened up the joint.  He continues to have numbness/tingling in the R UE.  Sometimes all of a sudden his arm falls asleep and then it releases. The throbbing pain is now gone and the instances of pain down his arm last only a few seconds.  He is maintaining his pain at the base of his neck with ice/heat.      Social information:   Living Situation:single family home   Occupation: Supervisor   Work Status:Working full time   Equipment Available: None    Pain Ratin  Pain rating at best: 0  Pain rating at worst: 4  Pain description: sore, achy, dull      Functional limitations are described as occurring with:   no lifting >5#    Patient reports benefit from:  heat, cold         Objective:      Note: Items left blank indicates the item was not performed or not indicated at the time of the evaluation.    Patient Outcome Measures :    Neck Disability Score in %: 20   Scores range from 0-100%, where a score of 0% represents minimal pain and maximal function. The minmal clinically important difference is a score reduction of 10%.    Cervical Thoracic Examination  1. Neck pain, acute     2. Hx of neck surgery     3. Muscle weakness (generalized)     4. Poor posture       Involved side: Right  Posture Observation:      General sitting posture is  fair.  Cervical:  Moderate forward head  Shoulder/Thoracic  complex: Moderate bilateral scapular protraction   none bilateral scapular winging    Cervical ROM:    Date: 01/29/18     *Indicate scale AROM AROM AROM   Cervical Flexion 45     Cervical Extension 36      Right Left Right Left Right Left   Cervical Sidebending 25 31       Cervical Rotation 58 55       Cervical Protraction      Cervical Retraction      Thoracic Flexion WNL     Thoracic Extension WNL     Thoracic Sidebending WNL WNL       Thoracic Rotation WNL WNL         Strength     Date: 01/29/18     Cervical Myotomes/5 Right Left Right Left Right Left   Cervical Flexion (C1-2) 5 5       Cervical Sidebending (C3) 5 5       Shoulder Elevation (C4) 5 5       Shoulder Abduction (C5) 5 5       Elbow Flexion (C6) 5 5       Elbow Extension (C7) 5 5       Wrist Flexion (C7) 5 5       Wrist Extension (C6) 5 5       Thumb abduction (C8) 5 5       Finger Abduction (T1) 5 5         Sensation          Reflex Testing  Cervical Dermatomes Right Left UE Reflexes Right Left   Back of the Head (C2)   Biceps (C5-6)     Supraclavicular Fossa (C3)   Brachioradialis (C5-6)     AC Joint (C4)   Triceps (C7-8)     Lateral Biceps (C5)   Delicia s test     Palmar Thumb (C6)   LE Reflexes     Palmar 3rd Finger (C7)   Patellar (L3-4)     Palmar 5th Finger (C8)   Achilles (S1-2)     Ulnar Forearm (T1)   Babinski Response       UE Screen: WNL    Flexibility:   Cervical: Fair   Thoracic: Fair   Shoulder: Good    Palpation:   Tenderness: No palpatory tenderness noted   Tightness: Upper back myofascial tightness    Passive Mobility-Joint Integrity: Mild hypomobility, no pain    Cervical Special Tests     Cervical Special Tests Right Left UE Nerve Mobility Right Left   Cervical compression   Median nerve     Cervical distraction   Ulnar nerve     Spurling s test   Radial nerve     Shoulder abduction sign   Thoracic outlet     Deep neck flexor endurance test   Zayra     Upper cervical rotation   Adson s     Sharper-Ana Maria   Cervical rotation  lateral flexion     Alar ligament test   Other:     Other:   Other:       Appt time: 2:02PM - 2:51PM    Treatment Today     TREATMENT MINUTES COMMENTS   Evaluation 24 Low complexity cervical evaluation   Self-care/ Home management     Manual therapy 10 Prone MFR to upper back with pillows under bilateral shoulders anteriorly for positioning   Neuromuscular Re-education     Therapeutic Activity     Therapeutic Exercises 15 Demo/performance of HEP  Patient educated on pathology  Discussed POC   Gait training     Modality__________________                Total 49    Blank areas are intentional and mean the treatment did not include these items.     PT Evaluation Code: (Please list factors)  Patient History/Comorbidities: C7-T1 arthroplasty on 12/14/17, hypothyroidism, hyperlipidemia, TIA< stroke, and HTN  Examination:   Clinical Presentation: Stable  Clinical Decision Making: Low complexity    Patient History/  Comorbidities Examination  (body structures and functions, activity limitations, and/or participation restrictions) Clinical Presentation Clinical Decision Making (Complexity)   No documented Comorbidities or personal factors 1-2 Elements Stable and/or uncomplicated Low   1-2 documented comorbidities or personal factor 3 Elements Evolving clinical presentation with changing characteristics Moderate   3-4 documented comorbidities or personal factors 4 or more Unstable and unpredictable High            Mariaelena Toth, PT, DPT  1/29/2018  4:25 PM

## 2021-06-15 NOTE — TELEPHONE ENCOUNTER
Reason for Call:  Other letter     Detailed comments: pt had letter from 2/11/2021 for work faxed over to employer - employer will not accept letter as it is only electronically signed, they do require Dr Ervin actual signature on letter     Please print letter & have Dr Maciel sign then fax to supervisor Kevin Alarcon at fax) [phone]    Once faxed, please call pt to notify him that this is taken care of.    Phone Number Patient can be reached at: Home number on file [phone] (home)    Best Time: anytime    Can we leave a detailed message on this number?: Yes    Call taken on 2/15/2021 at 9:18 AM by Poppy Gil

## 2021-06-15 NOTE — PATIENT INSTRUCTIONS - HE
Stay well hydrated.    Keep BP meds at present doses.    Pt to ask Dr Knutson regarding driving.

## 2021-06-15 NOTE — PROGRESS NOTES
[facility] Daily Progress     Patient Name: Raghavendra Kiser  Date: 2018  Visit #: 2  PTA visit #:  NA  Referral Diagnosis: Cervical radiculitis  Referring provider: Luis Daniel Maciel,*  Visit Diagnosis:     ICD-10-CM    1. Neck pain, acute M54.2    2. Hx of neck surgery Z98.890    3. Muscle weakness (generalized) M62.81    4. Poor posture R29.3      Raghavendra Kiser is a 58 y.o. male who presents to therapy today with chief complaints of neck pain s/p cervical arthroplasty. Onset date of sx was 17.  Pt reported h/o left C7-T1 disc arthroplasty with Dr. Lyman on 17, hypothyroidism, hyperlipidemia, TIA< stroke, and HTN.  Pain symptoms are sore, achy, and dull.  Functional impairments include lifting.  Pt demo's signs and sx consistent with post-operative neck pain.     Precautions / Restrictions : No lifting >5#    Assessment:     HEP/POC compliance is  good .  The patient demonstrates significant improvements in his pain level and neuro symptoms since beginning PT.  He is appropriate to follow up in 3 weeks for final check.    Goal Status:  Pt. will demonstrate/verbalize independence in self-management of condition in : 6 weeks  Pt. will be independent with home exercise program in : 6 weeks  Pt will: report UE symptoms at least 50% improved; in 6 weeks  Pt will: be able to sit at his desk at work without tingling and pain; in 8 weeks; No change    Plan / Patient Education:     Continue with initial plan of care.  Progress with home program as tolerated.  Recheck cervical proprioception and ROM.  DC if doing well.    Subjective:     Pain Ratin  The patient reports that his pain is a lot better since his initial evaluation.  When he is driving, he no longer gets any symptoms into his neck, shoulder, or arm.  He gets numbness every once in a while, but it doesn't last more than 30-40 seconds.  He is able to work better without symptoms as well.    Objective:     Tightness  "with bilateral side bending.    Appt time: 2:00PM - 2:27PM    Exercise #1: Scapular retraction  Comment #1: HEP  Exercise #2: Cervical retraction  Comment #2: HEP  Exercise #3: Cervical isometrics  Comment #3: flexion, extension, side bending, and rotation x 3 each  Exercise #4: Ulnar nn sliders  Comment #4: HEP    Treatment Today     TREATMENT MINUTES COMMENTS   Evaluation     Self-care/ Home management     Manual therapy     Neuromuscular Re-education 19 Cervical proprioception figure 8 x 3 CW/CCW x 3 sets  Cervical proprioception number finding 1-8; patient consistently to L of number 4\"   Therapeutic Activity     Therapeutic Exercises 8 UBE x 4 minutes WL 5.0 FWD/BWD; subjective measures taken  See flowsheet   Gait training     Modality__________________                Total 27    Blank areas are intentional and mean the treatment did not include these items.       Mariaelena Toth, PT, DPT  2/8/2018      "

## 2021-06-16 NOTE — PROGRESS NOTES
ASSESSMENT AND PLAN:  Raghavendra Kiser 58 y.o. male is here for follow-up.  He has erosive gout.  In the past he has had double contour sign on ultrasound examination.  He is doing great.  He has not had a flareup in the past 6 months.  He is on allopurinol 300/450 mg alternating days.  His recent labs are within acceptable range.  His kidney function is normal.  He has cervical spondylosis, history of shoulder tendinitis he is status post discectomy, December last year.  He has done well from that perspective.  As well as he is doing will not meet here in once a year basis unless needed otherwise and labs every 6 months.      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 is here for follow up of gout, which is diagnosed based on the clinical, ultrasonographic criteria.  He has had shoulder, neck pain.  He is on allopurinol 300/450 mg on alternate days.  He has not experienced any fever, rash mouth ulcers with this.  His uric acid is in target..  No recurrence of acute gout.  He noted pain level to be 0/10.  He is able to do all his day-to-day activities without difficulty.  He has not had stiffness in the morning.  No kidney stones.  Describes no fever or weight loss with elevation eye redness mild nausea cough is no rash.  His neck and shoulder pain have improved since he had disc surgery in December 2017.Limitation on activities as noted in the MDHAQ scanned in the EMR.  Further historical information, including ROS as noted in the multidimensional health assessment questionnaire scanned in the EMR and in the assessment and plan section.        ALLERGIES:Review of patient's  allergies indicates no known allergies.    PAST MEDICAL/ACTIVE PROBLEMS/MEDICATION/SOCIAL DATA  Past Medical History:   Diagnosis Date     Atherosclerosis of right carotid artery- ulcerated plaque      Carotid stenosis, bilateral      Cervical radiculopathy      Chronic gout      Fracture of right humerus      Ganglion cyst      High cholesterol      Humerus fracture     Right     Hypertension      Hypothyroidism      Medial epicondylitis      Shoulder tendinitis, right      Stroke      Superficial venous thrombosis of arm     right lateral antecubital fossa     TIA (transient ischemic attack) 5/16/15     Tinnitus      History   Smoking Status     Former Smoker     Packs/day: 2.00     Years: 20.00     Quit date: 2/15/1993   Smokeless Tobacco     Never Used     Patient Active Problem List   Diagnosis     Hypothyroidism     Hyperlipidemia     Chronic gout     TIA (transient ischemic attack)     Stroke     Superficial venous thrombosis of arm - right lateral antecubital fossa     Atherosclerosis of right carotid artery - ulcerated plaque     Carotid stenosis     Medial epicondylitis     Ganglion cyst     High risk medication use     HTN (hypertension)     Right shoulder tendinitis     Cervical radiculopathy     Current Outpatient Prescriptions   Medication Sig Dispense Refill     allopurinol (ZYLOPRIM) 300 MG tablet Take 300 mg by mouth alternating with 450 mg by mouth every other day 135 tablet 3     aspirin 325 MG tablet Take 1 tablet (325 mg total) by mouth daily. Hold x 7 days  0     hydroCHLOROthiazide (HYDRODIURIL) 12.5 MG tablet TAKE 1 TABLET DAILY 90 tablet 3     levothyroxine (SYNTHROID, LEVOTHROID) 125 MCG tablet Take 125 mcg by mouth Daily at 6:00 am.        rosuvastatin (CRESTOR) 10 MG tablet Take 5 mg by mouth bedtime.        No current facility-administered medications for this visit.      DETAILED EXAMINATION  02/28/18  :  Vitals:    02/28/18 1242   BP: 126/80   Weight: (!) 248 lb 6.4 oz (112.7 kg)    Height: 6' (1.829 m)     Alert oriented. Head including the face is examined for malar rash, heliotropes, scarring, lupus pernio. Eyes examined for redness such as in episcleritis/scleritis, periorbital lesions.   Neck/ Face examined for parotid gland swelling, range of motion of neck.  Left upper and lower and right upper and lower extremities examined for tenderness, swelling, warmth of the appendicular joints, range of motion, edema, rash.  Some of the important findings included: None of the palpable appendical joints are acutely inflamed.  No identifiable tophi.    LAB / IMAGING DATA:  ALT   Date Value Ref Range Status   02/21/2018 25 0 - 45 U/L Final   12/05/2017 25 0 - 45 U/L Final   08/22/2017 35 0 - 45 U/L Final     Albumin   Date Value Ref Range Status   02/21/2018 3.8 3.5 - 5.0 g/dL Final   12/05/2017 3.8 3.5 - 5.0 g/dL Final   08/22/2017 4.0 3.5 - 5.0 g/dL Final     Creatinine   Date Value Ref Range Status   02/21/2018 0.95 0.70 - 1.30 mg/dL Final   12/05/2017 0.87 0.70 - 1.30 mg/dL Final   08/22/2017 1.17 0.70 - 1.30 mg/dL Final   08/22/2017 1.17 0.70 - 1.30 mg/dL Final       WBC   Date Value Ref Range Status   02/21/2018 6.1 4.0 - 11.0 thou/uL Final   12/05/2017 4.9 4.0 - 11.0 thou/uL Final   08/11/2015 4.1 4.0 - 11.0 thou/uL Final   06/09/2015 8.1 4.0 - 11.0 thou/uL Final     Hemoglobin   Date Value Ref Range Status   02/21/2018 15.2 14.0 - 18.0 g/dL Final   12/05/2017 15.7 14.0 - 18.0 g/dL Final   08/22/2017 15.2 14.0 - 18.0 g/dL Final     Platelets   Date Value Ref Range Status   02/21/2018 153 140 - 440 thou/uL Final   12/05/2017 156 140 - 440 thou/uL Final   08/22/2017 155 140 - 440 ou/uL Final       No results found for: RUSH

## 2021-06-16 NOTE — PROGRESS NOTES
[facility] Daily Progress / Discharge Summary    Patient Name: Raghavendra Kiser  Date: 3/8/2018  Visit #: 3  PTA visit #:  NA  Referral Diagnosis: Cervical radiculopathy  Referring provider: Karely Edgar CNP  Visit Diagnosis:     ICD-10-CM    1. Neck pain, acute M54.2    2. Hx of neck surgery Z98.890    3. Muscle weakness (generalized) M62.81    4. Poor posture R29.3          Assessment:   HEP/POC compliance is  good .  Patient has benefitted from skilled physical therapy and is making steady progress toward functional goals.    Goal Status:  Pt. will demonstrate/verbalize independence in self-management of condition in : 6 weeks;Met  Pt. will be independent with home exercise program in : 6 weeks;Met  Pt will: report UE symptoms at least 50% improved; in 6 weeks; Met    Plan / Patient Education:     Initial plan of care has been completed. Patient has responded appropriately to skilled PT intervention.  The patient met goals and has demonstrated understanding of/independence in the home program for self-care and progression to next steps.  No further therapy is required at this time.  The patient will initiate contact if questions or concerns arise.    Subjective:     Pain Ratin  The patient reports that he still gets some tingling a few times a day that lasts less than a minute.  Driving has been much better and is not having any pain with it.  The tingling does not distract him from doing what he is doing.    Patient Outcome Measures  Neck Disability Score in %: 6   Scores range from 0-100%, where a score of 0% represents minimal pain and maximal function. The minmal clinically important difference is a score reduction of 10%.      Objective:     Cervical ROM:             Date: 01/29/18  3/8/18     *Indicate scale AROM AROM AROM   Cervical Flexion 45  50     Cervical Extension 36  34       Right Left Right Left Right Left   Cervical Sidebending 25 31  24 33        Cervical Rotation 58 55   60 54        Cervical Protraction         Cervical Retraction         Thoracic Flexion WNL       Thoracic Extension WNL       Thoracic Sidebending WNL WNL           Thoracic Rotation WNL WNL             Exercise #1: Scapular retraction  Comment #1: HEP  Exercise #2: Cervical retraction  Comment #2: HEP  Exercise #3: Cervical isometrics  Comment #3: flexion, extension, side bending, and rotation HEP  Exercise #4: Ulnar nn sliders  Comment #4: HEP    Appt time: 2:30PM - 2:58PM    Treatment Today  TREATMENT MINUTES COMMENTS   Evaluation     Self-care/ Home management     Manual therapy     Neuromuscular Re-education 10 Cervical proprioception figure 8 x 3 CW/CCW x 3 sets  Cervical proprioception number finding x 6   Therapeutic Activity     Therapeutic Exercises 18 UBE x 4 minutes WL 5.0 FWD/BWD; subjective measures taken  Objective measures taken  Discussed POC   Gait training     Modality__________________                Total 28    Blank areas are intentional and mean the treatment did not include these items.     Mariaelena Toth, PT, DPT  3/8/2018

## 2021-06-17 NOTE — TELEPHONE ENCOUNTER
Refill Approved    Rx renewed per Medication Renewal Policy. Medication was last renewed on 2/17/20.    Luis Daniel Daly, [facility] Triage/Med Refill 5/21/2021     Requested Prescriptions   Pending Prescriptions Disp Refills     FREESTYLE LITE STRIPS strips [Pharmacy Med Name: FREESTYLE LITE STRIPS 50'S]  6     Sig: USE 1 STRIP TWICE A DAY AS DIRECTED       Diabetic Supplies Refill Protocol Passed - 5/20/2021  1:03 PM        Passed - Visit with PCP or prescribing provider visit in last 6 months     Last office visit with prescriber/PCP: 2/9/2021 Luis Daniel Maciel MD OR same dept: 2/9/2021 Luis Daniel Maciel MD OR same specialty: 2/9/2021 Luis Daniel Maciel MD  Last physical: 11/2/2020 Last MTM visit: Visit date not found   Next visit within 3 mo: Visit date not found  Next physical within 3 mo: Visit date not found  Prescriber OR PCP: Luis Daniel Maciel MD  Last diagnosis associated with med order: 1. Diabetes mellitus type 2, controlled (H)  - FREESTYLE LITE STRIPS strips [Pharmacy Med Name: FREESTYLE LITE STRIPS 50'S]; USE 1 STRIP TWICE A DAY AS DIRECTED; Refill: 6    If protocol passes may refill for 12 months if within 3 months of last provider visit (or a total of 15 months).             Passed - A1C in last 6 months     Hemoglobin A1c   Date Value Ref Range Status   12/24/2020 5.6 <=5.6 % Final

## 2021-06-18 NOTE — PROGRESS NOTES
ASSESSMENT & PLAN:  1. Rash  LOS prep negative for yeast or fungal elements.  Suspect a mild dermatitis, unclear trigger.  Triamcinolone twice daily, with care not to get too close to his eyes.  He can use this for up to 1 week.  If not starting to see some improvement in the next 2 days he will call me, especially with his plans to leave town in several days.  If not improving with triamcinolone, could consider topical or oral antibiotic, although appearance today does not appear convincing for impetigo or other bacterial infection.  - KOH Prep      There are no Patient Instructions on file for this visit.    Orders Placed This Encounter   Procedures     LOS Prep     Forehead, rash     There are no discontinued medications.    No Follow-up on file.    CHIEF COMPLAINT:  Chief Complaint   Patient presents with     Rash     Pt here today to check rash located on face, x 5 d       HISTORY OF PRESENT ILLNESS:  Raghavendra is a 58 y.o. male presenting to the clinic today for facial rash. It is present on both sides of his face, but most notable above his left eye and behind his left ear. The rash is not painful or itchy. He has had the rash for about 5 days. He denies using any new lotions or soaps. No new oral medications. He notes that this weekend, he was cleaning out his gutters and splashed some dirty water on his face. Has not had a rash like this in the past.    REVIEW OF SYSTEMS:   All other systems are negative.    PFSH:  Surgical: Disc replacement and carotid surgery 3 years ago.  Social: He is going on vacation on Monday.  Medical: History of shingles, stroke 3 years ago.     TOBACCO USE:  History   Smoking Status     Former Smoker     Packs/day: 2.00     Years: 20.00     Quit date: 2/15/1993   Smokeless Tobacco     Never Used       VITALS:  Vitals:    06/12/18 1415   BP: 138/82   Patient Site: Left Arm   Patient Position: Sitting   Cuff Size: Adult Large   Pulse: 64   Resp: 16   Temp: 97.9  F (36.6  C)   TempSrc:  Oral   Weight: (!) 248 lb 12.8 oz (112.9 kg)     Wt Readings from Last 3 Encounters:   06/12/18 (!) 248 lb 12.8 oz (112.9 kg)   02/28/18 (!) 248 lb 6.4 oz (112.7 kg)   12/14/17 (!) 246 lb (111.6 kg)     Body mass index is 33.74 kg/(m^2).    PHYSICAL EXAM:  GENERAL: Pleasant, well-appearing patient in no acute distress.   NEURO: Alert and oriented. Grossly nonfocal.   PSYCHIATRIC: Presents on time and well groomed. Normal speech and thought content. Full affect. No abnormal movements or behaviors noted.  SKIN: In the area of the bilateral eyebrows, left worse than right he has an area of confluent erythema with small erythematous papules throughout.  No vesicles.  It is also on his posterior left ear.  No evidence of excoriation.  Nontender to palpation.    ADDITIONAL HISTORY SUMMARIZED (2): None.  DECISION TO OBTAIN EXTRA INFORMATION (1): None.   RADIOLOGY TESTS (1): None.  LABS (1): Ordered labs today.   MEDICINE TESTS (1): None.  INDEPENDENT REVIEW (2 each): None.       The visit lasted a total of 15 minutes face to face with the patient. Over 50% of the time was spent counseling and educating the patient about rash.    IGrazyna, am scribing for and in the presence of, Dr. Sorensen.    I, Dr. Sorensen, personally performed the services described in this documentation, as scribed by Grazyna Marie in my presence, and it is both accurate and complete.    MEDICATIONS:  Current Outpatient Prescriptions   Medication Sig Dispense Refill     allopurinol (ZYLOPRIM) 300 MG tablet Take 300 mg by mouth alternating with 450 mg by mouth every other day 135 tablet 3     aspirin 325 MG tablet Take 1 tablet (325 mg total) by mouth daily. Hold x 7 days  0     hydroCHLOROthiazide (HYDRODIURIL) 12.5 MG tablet TAKE 1 TABLET DAILY 90 tablet 3     levothyroxine (SYNTHROID, LEVOTHROID) 125 MCG tablet Take 125 mcg by mouth Daily at 6:00 am.        rosuvastatin (CRESTOR) 10 MG tablet Take 5 mg by mouth bedtime.        triamcinolone (KENALOG) 0.1  % cream Apply to affected skin twice daily for up to 7 days 30 g 0     No current facility-administered medications for this visit.        Total data points: 1

## 2021-06-18 NOTE — PATIENT INSTRUCTIONS - HE
Patient Instructions by Abilio Barnes, PT at 11/19/2020  2:30 PM     Author: Abilio aBrnes, PT Service: -- Author Type: Physical Therapist    Filed: 11/19/2020  3:21 PM Encounter Date: 11/19/2020 Status: Signed    : Abilio Barnes, PT (Physical Therapist)        HEEL-TOE WALK    With arms outstretched/at sides/across chest, walk a straight line bringing one foot directly in front of the other.    Forward and backwards.    2-3 minutes  2-3X/day      BRAIDING    Move to side: cross right leg in front of left, bring left out to side, then cross right leg behind left leg and so on.    Repeat toward opposite direction.    2-3 minutes  2-3X/day      ELASTIC BAND LATERAL WALKS     With an elastic band around both ankles, walk to the side while keeping your feet spread apart. Keep your knees bent the entire time.    To fatigue    5X/week         LUNGE    Step forward into a lunge, keeping knee directly over toes.  Maintain upright posture, shoulders over hips.  Push off of front leg and step forward into opposite leg lunge.    10-20X  5X/week
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
## 2021-06-19 NOTE — LETTER
Letter by Luis Daniel Maciel MD at      Author: Luis Daniel Maciel MD Service: -- Author Type: --    Filed:  Encounter Date: 11/29/2019 Status: Signed         Raghavendra Kiser  [address]  [facility] [address]             November 29, 2019         Dear Mr. Kiser,    Below are the results from your recent visit:    Resulted Orders   Microalbumin, Random Urine   Result Value Ref Range    Microalbumin, Random Urine 1.67 0.00 - 1.99 mg/dL    Creatinine, Urine 195.5 mg/dL    Microalbumin/Creatinine Ratio Random Urine 8.5 <=19.9 mg/g    Narrative    Microalbumin, Random Urine  <2.0 mg/dL . . . . . . . . Normal  3.0-30.0 mg/dL . . . . . . Microalbuminuria  >30.0 mg/dL . . . . . .  . Clinical Proteinuria    Microalbumin/Creatinine Ratio, Random Urine  <20 mg/g . . . . .. . . . Normal   mg/g . . . . . . . Microalbuminuria  >300 mg/g . . . . . . . . Clinical Proteinuria           Hi Mauri:  Your urine was negative for any traces of protein.   This should be checked yearly.    Please call with questions or contact us using [id number].    Sincerely,        Electronically signed by Luis Daniel Maciel MD

## 2021-06-19 NOTE — LETTER
Letter by Luis Daniel Maciel MD at      Author: Luis Daniel Maciel MD Service: -- Author Type: --    Filed:  Encounter Date: 11/24/2019 Status: Signed         Raghavendra Kiser  [address] [facility] [address]             November 24, 2019         Dear Mr. Kiser,    Below are the results from your recent visit:    Resulted Orders   Basic Metabolic Panel   Result Value Ref Range    Sodium 140 136 - 145 mmol/L    Potassium 4.1 3.5 - 5.0 mmol/L    Chloride 104 98 - 107 mmol/L    CO2 29 22 - 31 mmol/L    Anion Gap, Calculation 7 5 - 18 mmol/L    Glucose 188 (H) 70 - 125 mg/dL    Calcium 9.6 8.5 - 10.5 mg/dL    BUN 17 8 - 22 mg/dL    Creatinine 1.08 0.70 - 1.30 mg/dL    GFR MDRD Af Amer >60 >60 mL/min/1.73m2    GFR MDRD Non Af Amer >60 >60 mL/min/1.73m2    Narrative    Fasting Glucose reference range is 70-99 mg/dL per  American Diabetes Association (ADA) guidelines.       Hi Mauri:  Your potassium and kidney function are normal.  These should be rechecked at your appt 3 weeks after starting the lisinopril.  The blood sugar was only mild to moderately  Elevated.  I will see you at your follow up appt.    Please call with questions or contact us using [employer].    Sincerely,        Electronically signed by Luis Daniel Maciel MD

## 2021-06-19 NOTE — PROGRESS NOTES
"Assessment:       Muscle strain      Plan:       Muscle relaxer per orders  OTC analgesics discussed  Heat PRN  Rest, with occasional light stretches  Discussed signs of worsening symptoms and when to follow-up with PCP if no symptom improvement.    Patient Instructions   You were seen today for a muscle strain.    Symptom management:  - Take the flexeril to relax the muscle, start with just 5 mg, if you tolerate that alright may take up to 10mg at a time up to 3 times a day as needed.  - May use Tylenol 500-1000mg for first 3 hours, then ibuprofen 400-600mg with food at a time for the next 3 hours, and alternate  - Heat pads as tolerated  - Rest with occasional light stretching    Reasons to be seen immediately in the emergency room:  - Develop numbness or tingling in the groin or legs  - Sharp shooting pain down to your legs  - Loss of bowel or bladder function    Otherwise, if pain is not improving after 5 days, return for re-evaluation or see your primary care provider.          Subjective:       Raghavendra Kiser is a 58 y.o. male who presents for evaluation of low back pain. The patient has had no prior back problems. Symptoms have been present for 5 days and are gradually worsening.  Onset was related to / precipitated by exercising at the gym; did not notice an injury at that time. The pain is located in the left lumbar area and does not radiate. The pain is described as soreness and occurs all day. Symptoms are exacerbated by bending over. Symptoms are improved by heat and NSAIDs. He has no other symptoms associated with the back pain. The patient has no \"red flag\" history indicative of complicated back pain.    The following portions of the patient's history were reviewed and updated as appropriate: allergies, current medications and problem list.    Review of Systems  Pertinent items are noted in HPI.    Allergies  No Known Allergies      Objective:       /80  Pulse 73  Temp 98.2  F (36.8  C) " (Oral)   Resp 14  Wt (!) 248 lb (112.5 kg)  SpO2 97%  BMI 33.63 kg/m2  General appearance: alert, appears stated age, cooperative, no distress and non-toxic  Back: no midline tenderness, no tenderness to palpation of paraspinal muscles  Extremities: extremities normal, atraumatic, no cyanosis or edema; FROM without pain, no active spasms noted  Skin: Skin color, texture, turgor normal. No rashes or lesions  Neurologic: sensation to light touch intact, negative straight leg raise test bilaterally

## 2021-06-20 NOTE — LETTER
Letter by Luis Daniel Maciel MD at      Author: Luis Daniel Maciel MD Service: -- Author Type: --    Filed:  Encounter Date: 1/4/2020 Status: Signed         Raghavendra Kiser  [address] [facility] [address]             January 4, 2020         Dear Mr. Kiser,    Below are the results from your recent visit:    Resulted Orders   Glycosylated Hemoglobin A1c   Result Value Ref Range    Hemoglobin A1c 6.9 (H) 3.5 - 6.0 %   Comprehensive Metabolic Panel   Result Value Ref Range    Sodium 142 136 - 145 mmol/L    Potassium 4.1 3.5 - 5.0 mmol/L    Chloride 106 98 - 107 mmol/L    CO2 28 22 - 31 mmol/L    Anion Gap, Calculation 8 5 - 18 mmol/L    Glucose 110 70 - 125 mg/dL    BUN 16 8 - 22 mg/dL    Creatinine 0.94 0.70 - 1.30 mg/dL    GFR MDRD Af Amer >60 >60 mL/min/1.73m2    GFR MDRD Non Af Amer >60 >60 mL/min/1.73m2    Bilirubin, Total 0.5 0.0 - 1.0 mg/dL    Calcium 9.7 8.5 - 10.5 mg/dL    Protein, Total 6.9 6.0 - 8.0 g/dL    Albumin 4.2 3.5 - 5.0 g/dL    Alkaline Phosphatase 79 45 - 120 U/L    AST 20 0 - 40 U/L    ALT 27 0 - 45 U/L    Narrative    Fasting Glucose reference range is 70-99 mg/dL per  American Diabetes Association (ADA) guidelines.   Lipid Profile   Result Value Ref Range    Triglycerides 427 (H) <=149 mg/dL    Cholesterol 127 <=199 mg/dL    LDL Calculated        Comment:      Invalid, Triglycerides >400    HDL Cholesterol 25 (L) >=40 mg/dL    Patient Fasting > 8hrs? Unknown    Thyroid Cascade   Result Value Ref Range    TSH 2.45 0.30 - 5.00 uIU/mL     Hi Mauri:  Your A1C is excellent! It is now at goal of <7.0   It should be rechecked in 3 months.  Your thyroid is well controlled.  The triglycerides have improved though remain mildly elevated.   Are you taking your rosuvastatin daily?  We may consider an increase in dose.   Let me know.  The potassium, liver and kidney function are normal.  So everything is improving.    Please call with questions or contact us using  [employer].    Sincerely,        Electronically signed by Luis Daniel Maciel MD

## 2021-06-20 NOTE — LETTER
Letter by Luis Daniel Maciel MD at      Author: Luis Daniel Maciel MD Service: -- Author Type: --    Filed:  Encounter Date: 7/8/2020 Status: (Other)         Raghavendra Kiser  [address]  [facility] [address]             July 8, 2020         Dear Mr. Kiser,    Below are the results from your recent visit:    Resulted Orders   Glycosylated Hemoglobin A1c   Result Value Ref Range    Hemoglobin A1c 5.7 (H) <=5.6 %      Comment:      Prediabetes:   HBA1c       5.7 to 6.4%        Diabetes:        HBA1c        >=6.5%   Patients with Hgb F >5%, total bilirubin >10.0 mg/dL, abnormal red cell turnover, severe renal or hepatic disease or malignancy should not have this A1C method used to diagnose or monitor diabetes.           Hi Mauri:  Your diabetic control is excellent.  I would recommend a physical in Nov 2020.    Please call with questions or contact us using [employer].    Sincerely,        Electronically signed by Luis Daniel Maciel MD

## 2021-06-21 NOTE — LETTER
Letter by Luis Daniel Maciel MD at      Author: Luis Daniel Maciel MD Service: -- Author Type: --    Filed:  Encounter Date: 12/29/2020 Status: (Other)         Raghavendra Kiser  [address] [facility] [address]             December 29, 2020         Dear Mr. Kiser,    Below are the results from your recent visit:    Resulted Orders   Basic Metabolic Panel   Result Value Ref Range    Sodium 141 136 - 145 mmol/L    Potassium 3.9 3.5 - 5.0 mmol/L    Chloride 107 98 - 107 mmol/L    CO2 25 22 - 31 mmol/L    Anion Gap, Calculation 9 5 - 18 mmol/L    Glucose 164 (H) 70 - 125 mg/dL    Calcium 9.3 8.5 - 10.5 mg/dL    BUN 15 8 - 22 mg/dL    Creatinine 0.83 0.70 - 1.30 mg/dL    GFR MDRD Af Amer >60 >60 mL/min/1.73m2    GFR MDRD Non Af Amer >60 >60 mL/min/1.73m2    Narrative    Fasting Glucose reference range is 70-99 mg/dL per  American Diabetes Association (ADA) guidelines.   Glycosylated Hemoglobin A1c   Result Value Ref Range    Hemoglobin A1c 5.6 <=5.6 %       Normal  potassium and kidney function.  The diabetes is under good control.    Please call with questions or contact us using "[employer].    Sincerely,        Electronically signed by Luis Daniel Maciel MD

## 2021-06-21 NOTE — LETTER
Letter by Luis Daniel Maciel MD at      Author: Luis Daniel Maciel MD Service: -- Author Type: --    Filed:  Encounter Date: 11/3/2020 Status: (Other)         Raghavendra Kiser  [address]  [facility] [address]             November 3, 2020         Dear Mr. Kiser,    Below are the results from your recent visit:    Resulted Orders   Microalbumin, Random Urine   Result Value Ref Range    Microalbumin, Random Urine 1.51 0.00 - 1.99 mg/dL    Creatinine, Urine 170.3 mg/dL    Microalbumin/Creatinine Ratio Random Urine 8.9 <=19.9 mg/g    Narrative    Microalbumin, Random Urine  <2.0 mg/dL . . . . . . . . Normal  3.0-30.0 mg/dL . . . . . . Microalbuminuria  >30.0 mg/dL . . . . . .  . Clinical Proteinuria    Microalbumin/Creatinine Ratio, Random Urine  <20 mg/g . . . . .. . . . Normal   mg/g . . . . . . . Microalbuminuria  >300 mg/g . . . . . . . . Clinical Proteinuria         Hi Mauri:  There was no significant protein in your urine.  This should be checked annually.    Please call with questions or contact us using [employer].    Sincerely,        Electronically signed by Luis Daniel Maciel MD

## 2021-06-21 NOTE — PROGRESS NOTES
"Diagnostic Assessment  [] Brief  [x] Standard  [] Updated  Standard Diagnostic Assessment  Date(s): 11/15/2018  Start Time: 9:00  Stop Time: 10:00  Patient Name: Raghavendra Kiser   Age: 58 .y.o   : 1960    Referral Source:Maynor Lanza MD with [facility]     Therapist[facility]       Persons Present:  Patient and Therapist  Session Type: Patient is presenting for an Individual session.     Chief Complaint (in the patients words; reason patient believes they have been referred): \"  Stress related to Chest pain\" Has  issues with heart.  Reports that only in the last several months, he noted he was experiencing these issues.   Patient s expectation for treatment (patient stated initial goal; i.e.:  I want to let go of my worries , Medication treatment if indicated): \" I was recommended by Dr. Lanza for therapy to learn how to cope with stress\"  Presenting Problem/History:  Issues/Stressors: Grand son will be adopted by the parent's of son's girl friend. Son and girl friend lost their parental rights due to drug use. Patient is not sure about his son's future. He is concerned he and wife might not be allowed to see their grand son as he has noted some resistance.   PLEASE CHEK ALL THAT APPLY  Physical Problems    [] Dizziness  [] Dry mouth  [] Flushes or chills  [] Sweating  [x] Chest pains  [] Rapid heart pounding  [] Abdominal pain  [] Diarrhea  [] Trembling  [] Disturbing body sensations  [] Incontinence of feces  [] Incontinence of urine  [] Constipation  [] Nausea/vomiting  [] Swallowing problems  [] Blurred vision  [] Headaches  [] Numbness  [] Weight loss  [] Double vision  [] Skin rash  [] Shortness of breath  [] Weight gain  [] Inability to sleep  [] Sleeping too much  [] Decreased energy  [] Increased energy  [] Decreased appetite  [] Increased appetite  [] Other______________    Social Problems  [] Job problems  [] Problems at school  [] Alevism " problems  [] Communication problems  [] Distrust of others  [] No close friends  [] Unstable relationships  [] Uncomfortable when alone  [] Need for excessive advice  [] Need for excessive praise  [] Problems with mother  [] Problems with father  [] Problems with relatives  [] Increased social activity  [] Decreased social activity  [] Loss of interest in activities  []  Sexual difficulties  [x] Other ___None reported___________    Behavioral Problems  [] Reckless  [] Self-injurious behavior  [] Substance abuse  [] Restricted travel from home  [] Restricted eating  [] Self-induced vomiting  [] Suicidal attempts  [] Binge eating  [] Temper outbursts  [] Gambling  []  Excessive work  [x] Other: None reported ______________    Cognitive Problems  [] Distractibility  [] Poor attention  [] Indecisiveness  [] Poor memory  [] Forgetful  [] Perfectionism  [] Disordered thinking  [] Racing thoughts  [] Disturbing sexual fantasies  [] Bothersome thoughts  [] Special Gonzalez  [] Procrastination  [] Learning disability_____  [] Recurrent bad memories  []  Hallucinations  []  Paranoia  []  Worries  [] Other_______________    Emotional Problems  [x] Anxious  [x] Angry  [] Rageful  [] Nervous   [] Apathetic   [] Irritable  [] Emptiness  [] Boredom  [] Excessive fears  [] Restricted emotion  [] Depressed mood  [] Elevated mood  [] Mood swings  [] Feelings of shame  [] Feelings of guilt  [] Lack of self-confidence  [] Inferiority feelings  Other _________  Functional Impairments:  Personal:3 /4  Family:3 /4   Work: 3 /4  Social: 3 /4  How does the presenting problem affect patients daily functioning:  Patient reports he believes his anxiety and chest pain are rlated to current son's situation.   Onset/Frequency/Duration presenting problem symptoms:  Patient reports he believes these issues started when he son was about to be released from the [facility] because he didn't know what the next step would be.   How does the patient perceive  his/her problem? Patient believes his current problems are temporarily and feels once son gets his feet on the ground, patient's problems will also go away.  He notes wife knows what is going on more than other family members. She is affected as he is. Both support each other.   Family/Social History:   Current living situation (Household members, housing status, stability, multiple moves, potential eviction): Lives with wife and their son since his release from longterm. Son is in transition to MI/CD treatment. He reports it is a safe environment.   Marriages/Significant other (including patients evaluation of the relationship quality): Has been  for the last 36 years. Strong marriage.   Children (sex and ages, any significant issues): Son: 23 year old. The only child. Son has been struggling with  Addiction. He and his girl friend lost parental right of their 1 year old son.  Their son is under the girl friend's parents' care. Patient is concerned about not seeing his grand child.  Parents (ages, living or , how many years ): Parents passed away. Mother passed in . She was 64.  of lungcancer. Father passed in  at age 62. He too  of lung cancer.  They were heavy smokers.    Siblings (birth order, ages, significant issues): 5 sisters and 3 brothers. He is in the middle by birth. One sister and one brother passed away. He is in touch with most of the siblings. He has a close relationship with a couple of them. Has wanted to bring family together on some occasions like Holidays. Though not easy due to having different priorities. Since parents' passed, it has not been easy to come together.   Climate in family of origin (how does the patient perceive their childhood experience): Grew up in a family of 10 kids. Was raised by both parents. Felt needs were met. Mother never worked so took care of him and siblings.  Had a good time with other kids; felt he had a good time; had enough time  to be a kid, made mistakes and grew out of them.  Education (type and level of education: High School- Then joined [employer]  Problems with Learning or School (developmental issues, learning disabilities, behavioral concerns in school): None reported  Developmental factors (developmental milestones, head injuries, CVA s, etc. that may have impeded milestones): None reported  Work History (current employment situation and any past employment history): [employer] for 21 years. Now [employer] for the last 15 years- His job include gathering data and providing report. Over all loves his job.  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   Financial Concerns (basic status, housing, food, clothing are they on any assistance including SSI/SSDI): [facility]. Works at [employer]. No financial issues .  Significant life events (what does the patient identify as a personal life changing/influencing event): Son's incarceration.   Sexual/physical/emotional/financial abuse/traumatic event. (any child protection involvement; who reported, Impact on patient/family/other):  None reported  PTSD Symptoms:     [] YesCriteria     [x] No  Contextual Non-personal factors contributing to the patients concerns (divorce in family, nation/natural disasters):None reported  Significant personal relationships including patient s evaluation of the relationship quality (Co-worker s, neighbor s, AA groups, Jewish peers, etc.):  None reported  Support network(s)/Resources (including strength and quality of social networks, who does the client consider supportive, other agencies or services patient uses):  Wife, boys in the band, small group of friends for the last 40 years.   Belief system:  Raised Oriental orthodox. Not active any longer.   Cultural influences and impact on patient (ask about all aspects of culture and ask which are relevant to the patient. Go beyond nationality and ethnicity. Consider biases, life style, community  "style, i.e.: urban, poverty, abuse, etc). See page 5 Diagnostic Assessment, Clinical Training for descriptors):Was born and grew up in [place] to middle class family. It was not as diverse as it is now. Mother never worked.  Father worked for the [employer]. He had completed 5th grade. Mother completed HS. Patient notes all his needs were met.   Cultural impact on health and health care (how does patient s culture influence how the patient receives health care):  Navigates the system on his own. Uses western medicine. [facility].  Legal Problems (DUI S, divorce, law suits, etc.): None reported  Strengths/personal resources (what does the patient do well, what is going well in life, positive personality characteristics): Honest, Hard working, does not quit what his started, cares about others.  Weaknesses (what does patient identify as a weakness): \"Quick to anger these days mainly due to things I can not control\" ; like son's addiction and all that goes with it.   Hobbies/Interests: Music including Drums; Art, Motorcycles.  Assessment of client needs (based on baseline measurements, symptoms, behaviors, skills, abilities, resources, vulnerabilities, safety needs):     1:1Psychotherapy- patient indicated that since he feels better he won't need 1:1 at this time.      Family counseling    Family Mental Health/Medical History  Family Mental Health: None reported   Family history of Suicide: Aunt. 15 years ago due to addiction, gambling. Shot herself at age 64.  Uncle: had terminal brain cancer. Shot herself 10 years ago. He had lost wife( the above Aunt) and had parkinson disease.   Family history Chemical Dependency:  Younger sister( meth); has been in recovery.    Family Medical history: Lung cancer( parents) due to heavy smoking. HBP: sister. Brother: diabetes and Pancreatic diseases; Sister: diabetes. Sister: lung cancer.  Patient notes that most of theses deaths " could have been prevented with quitting smoking sooner.  Patient Medical History  Hospitalizations (When/Where): Mini stroke. Admitted at  in 2015 for 3 days.   Medical diagnoses/concerns: (i.e.: Heart disease, thyroid problems,  Bld. Pressure,  seizures,  head Inj., Other): Patient reports having  Hypothyroidism; gout, high cholesterol. A list of his medical conditions is listed in his chart in Epic.  Current physician/other non psychiatric medical provider s: Luis Daniel Maciel MD with  [facility].    Date of last medical exam: Annual exam completed by Maynor Lanza MD on 10/16/2018  Current Medications:allopurinol;hydroCHLOROthiazide;rosuvastatin a complete list of medications is located in patient's chart.     Past Mental Health History:  Previous mental health diagnosis & Date of Diagnosis: Reports no previous mental health diagnosis.   Hx of Mental Health Treatment or Services: None reported  JOSEFINA Received:      [] Yes   [x] No    Hx of MH Tx/Hospitalizations (When/Where: must include a review of patient s record.  If not available, why, what if anything are you doing to obtain a record?):  None reported  Hx of Psychiatric Medications: None reported  Suicidal/Homicidal Risk Assessment:  Suicidal: None reported  Ideation:None reported  History of Past Attempt(s): description: None reported  Crisis Plan: Call [facility] crisis line at ; go to the nearest ED, or call 911  [place] Suicide Severity Risk Screen: 0  Homicidal: None reported   Ideation:none reported  History of Aggression towards others: None reported  Crisis Plan: Call [facility] crisis line at ; go to the nearest ED, or call 911  History of destruction to property: None reported  Chemical Use/Abuse History  Alcohol:   [x] None Reported    [] Yes   [] No  Street Drugs:   [x] None Reported    [] Yes   [] No    Prescription Drugs:   [x] None Reported    [] Yes   [] No  Tobacco:   [x] None Reported     "[] Yes   [] No  Caffeine:   [x] None Reported    [] Yes   [] No  Currently in a treatment program:   [] Yes   [x] No    History of CD Treatment:      [x] None Reported             CAGE-AID (screening to determine a patients use/abuse/dependency): 0/4    Non- Substance Abuse addictive Behaviors/Compulsive Behaviors:  [] Gambling     [] Sex     [] Pornography    [] Shopping     [] Eating     [] Self-Injury  [] Other           [x] None Reported    [] Hoarding  MENTAL STATUS EVALUATION  Grooming: Well groomed  Attire: Appropriate  Age: Appears Stated  Behavior Towards Examiner: Cooperative  Motor Activity: Within normal   Eye Contact: Appropriate  Mood: Anxious  Affect: Congruent w/content of speech  Speech/Language: Within normal  Attention: Within normal  Concentration: Within normal  Thought Process: Within normal  Thought Content: Hallucinations: No hallucination reported, nor noted  Delusions: no delusion reported, nor noted  Orientation: Time, Person, Place and Situations  Memory: No Evidence of Impairment  Judgement: No Evidence of Impairment  Estimated Intelligence: Average  Demonstrated Insight: Adequate  Fund of Knowledge: adequate  Clinical Impressions/Assessment/Recommendations:   Clinical summary: Cause, prognosis, likely consequences of symptoms. Strengths, Cultural influences, Life situations, relationships, health concern, and how Dx interacts or impacts with client s life: This 58 year old  male reported to his second intake interview today. His first interview was on 11/09/2018. These two interview sessions allow this writer to complete this assessment. I've re-evaluated the following information gathered on 11/09/2018 with the patient and no changes were noted between 11/09/2018 and 11/15/2018:  \"Patient was referred by Maynor Lanza MD with [facility]. He said he had been seen by Dr. Maciel for years.  Patient shared he was referred after reporting chest pain and high " "stress. He reports he was also referred to cardiology due to EKG results. He notes he never had any cardiac issues before. He feels he starts to point the source of his stress as stemming from ongoing struggle with his 23 year old son who is dealing with addiction. Son and his girl friend have been using drugs. They have a child who is now 1 year old.  Son has been in care home for drug related incidents and PO violation. Son has been in many programs including [facility], [facility], [place], and  [facility]. He continues to go back to the use. He just got out of the care home due to what was described as child endangerment because he and girl friend were found using drugs. Family is trying to have him re-nter MI/CD treatment. Son's girl friend has been a big user and has been a big supplier to patient's son.  Due to all this, son's girl friend(22) is now in CD treatment. Their child has been watched by the son's girl friend's parents. They are even preparing to adopt this child. Patient is concerned that  his family won't be able to see their grand son.\"  Today, patient notes no new symptoms. He shared the cardiologist has cleared him for heart problem. He notes no anxiety feelings. He notes things have been better and he does not think he needs 1:1 therapy this time. Though in favor for a family counseling. Family( patient, wife and son) have had one some 5 years ago due to son's issues. Patient continues to work at [employer]. He loves his job of the last 15 years.  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 He also rides motor cycle  interstates with his friends. He would like to resume his fishing hobby with son.   Patient is a retire from [employer] where he served for 21 years. He made it clear that he was never exposed to any combat and never had any symptoms that would mimic PTSD. He notes " "he was the chino one, \" all I had was just fun\" .  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  He has a strong support from his wife and friends. He reports being a healthy person. Has had mini stroke in 2015 but notes no residue from it. He described himself as a hard working person with honesty and care about others. He notes his current limitation being some anger usually stemming from things he can not control like son's current situation.  Patient grew up in a family of 10 children from a middle class family.  He is in the middle. Parents were hard working; mother stayed home to raise the children and father worked for the [employer]. Though they  in their 50s and 70 due to their heavy smoking. He lost 2 siblings. He is in touch with most of surviving siblings. His intension has been to bring everyone together which has been difficult since parents' death.   Prioritization of needed mental Health ancillary or other services: Patient was referred initially for psychotherapy for what seemed to be anxiety. Today, he noted that he no longer has physiological reaction to his family issues. No chest pain, no anxious feelings. He notes he feels better than he was last week. He has shared he now could pin point the reasons he had been feeling anxious and had chest pain. He notes he now has high hopes about his son's future. He has a strong support from wife and friends. He concluded that at this point, he feels a family counseling would be the best option. Writer supported this option. Patient will wait to see where his son goes for MI/CD treatment before he could start family counseling.  How Diagnostic criteria is met: (Include symptoms, frequency, duration, functional impairments): Patient's intake questionnaire, patient's mental status, 1:1 interviews( 2 sessions); chart review including care everywhere. Patient reports a history of stroke. Has felt he was about to have heart attack a " couple weeks ago. He reported chest pains, anxious, and angry to due inability to control son's situation make it better for him. He reports he continues to function as usual. Has his job and does his music aside with his friends.  Reports he had EKG and results was negative. Patient's score on TRINH-7 was 6 or moderate anxiety as of 11/09/2018. Today, he denied any anxiety symptoms. Her PHQ-9 was 2 on 11/09/2018 or insignificant to mild depression. Patient denied any suicidality. He scored 0 on C-SSR measure. Patient has 98 % level functioning as noted on WHODAS. He also denied any mood altering substance. With this, he still best fits with Anxiety state.   Explanation for any provisional diagnosis. Hypothesis why alternative diagnosis was considered and ruled out: none identified a this time.   Recommendations (treatment, referrals, services needed): Family counseling. Patient is not interested in individual counseling at this time.   Diagnosis (non-Axial as defined in DSM-5): Anxiety state  Provisional Diagnosis (list only- no explanation needed): None identified this time.   WHODAS 2.0 12-item version: 1  H1=0   H2=0  H3= 0  Scores presented in qualifiers to represent level of disability.  NO problem - (none, absent, negligible,  ) - 0-4 %   Sources/references used in completing this assessment:   Will be a drop down menu-   -Face to face interview  -Patient Chart  -Adult Intake Questionnaire  -Measures completed:TRINH-7:6; PHQ-9:2; CAGE-AID:0/4;; C-SSRS: 0; WHODAS 2:0: 2 % - as reported on intake session #1  -Other__Mental status__________  Assessment of client resolving presenting mental health concerns:  Ability  [] low     [] average     [x] high  Motivation   [] low     [] average     [x] high  Willingness [] low     [] average     [x] high  Initial Assessment Objectives (ex: Refer to psychiatry/psych testing, Return for follow up psychotherapy, Refer to, Obtain, Administer measures, etc.):  1. Patient will  follow up on the recommendation for family counseling with son  2. Patient will return for individual therapy should he feel it is needed  Is patient's family involved in the treatment?  [] No     [x] Yes Wife is very supportive in many ways.   If yes, How?  Therapist s Signature/Supervision/co-signature statement:   Performed and documented by MARVA Irby- ESTUARDO; [facility] 11/15/2018

## 2021-06-21 NOTE — LETTER
Letter by Luis Daniel Maciel MD at      Author: Luis Daniel Maciel MD Service: -- Author Type: --    Filed:  Encounter Date: 2/11/2021 Status: (Other)         February 11, 2021     Patient: Raghavendra Kiser   YOB: 1960   Date of Visit: 2/09/2021     Fax to attn of Kevin Alarcon at [phone]    To Whom It May Concern:    It is my medical opinion that Raghavendra Kiser is unable to work at the current time and for the foreseeable future.  Mr Kiser had a stroke on 10-14-20 for which he was hospitalized.   Since that time he has had occupation, physical and speech therapy.   He continues to require speech therapy and it is a major limiting factor in his returning to work.  I anticipate that it would be at least 3-6 months before there was a chance of any return to productive employment.       If you have any questions or concerns, please don't hesitate to call.    Sincerely,      Electronically signed by Luis Daniel Maciel MD

## 2021-06-21 NOTE — PROGRESS NOTES
Mental Health Visit Note    11/9/2018    Start time:10:05    Stop Time: 10:55   Intake Session # 1    Session Type: Patient is presenting for an Individual session.    Raghavendra Kiser is a 58 y.o. male is being seen today for    Chief Complaint   Patient presents with     Intake session # 1     New symptoms or complaints Raghavendra Kiser was referred by Maynor Lanza MD with [facility]. He said he had been seen by Dr. Maciel for years.  Patient shared he was referred after reporting chest pain and high stress. He reports he was also referred to cardiology due to EKG results. He notes he never had any cardiac issues before. He feels he starts to point point the source of his stress as stemming from ongoing struggle with his 23 year old son who is dealing with addiction. Son and his girl friend have been using drugs. They have a child who is now 1 year old.  Son has been in [facility] for drug related incidents and PO violation. He has been in many programs including [facility], [facility] and [facility], [place], and  [facility]. He continues to go back to the use. He just got out of the [facility] due to what was described as child endangerment because he and girl friend were found using drugs. Family is trying to have him re-nter MI/CD treatment. Son's girl friend has been a big user and has been a big supplier to patient's son.  Due to all this, son's girl friend(22) is now in CD treatment. Their child has been watched by the son's girl friend's parents. They are even preparing to adopt this child. Patient is concerned about his family won't be able to see their grand son. Patient's GAD7 was 6 and his PHQ-9 was 4 at his recent visit for annual exam on 10/16. Today, his GAD7 was 6 and his PHQ-9 was 2.  He denied any SI/HI and scored 0 on C-SSRS. He denied any mood altering substance and scored 0/4 on CAGE AID. His Whodas was 2 % today.  Writer explained to the patient the  informed consent today which then was signed by the patient at this visit.      Functional Impairment:   Personal: 4  Family: 4  Social: 4  Work: 2    Clinical assessment of mental status:   Raghavendra Kiser presented on time.   He was oriented x3, open and cooperative, and dressed appropriately for this session and weather. His memory was Normal cognitive functioning .  His speech was  Within normal.  Language was appropriate.  Concentration and focus is Within normal. Psychosis is not noted or reported. He reports his mood is Anxious.  Affect is congruent with speech and is Anxious.  Fund of knowledge is adequate. Insight is adequate for therapy.    Suicidal/Homicidal Ideation present: Patient denies suicidal and homicidal ideations/means or plans.     Patient's impression of their current status: Patient expressed his concerns about the well being of his son and the effect on his entire family. He notes son's current situation has impacted patient's health. He notes high anxiety, chest pain. He notes no other issues that he can think of that would be a reason of his current anxiety feelings. He had his annual visit on 10/16. Reports no major issues except his concerns around chest pain. Per chart review, their some family history for heart attack ( brother). Patient himself has a history of stroke. With current son's issues, patient is concerned by his own health. He declined to see a psychiatrist this time. He would like to try some therapy first. He has done family therapy with his son some years ago. He is still open to family therapy as well. Patient has been practicing some deep breathing and physical exercise. He was encouraged to keep these healthy coping skills.  Patient reports no issue in his marriage. Patient will return next week for intake session 2.    Therapist impression of patients current state: This 58 y.o. White or  male presents on time. He appears anxious but with full insight and  good judgment. He was a good historian and very assertive. Using AIDET format, Writer welcomed the patient and oriented him to the intake process. Assessed his safety and encouraged him to use safety measure discussed with the patient. He appeared to be very committed to making the necessary changes and is good candidate for OP psychotherapy.  Today, writer and patient discussed the practical tools to keep his anxiety down. It appears that patient has already been using some of these skills( going to the gym and deep breathing).     Assessment tools used today include: TRINH-7:6; PHQ-9:2; CAGE-AID:0/4;; C-SSRS: 0; WHODAS 2:0: 2 %    Type of psychotherapeutic technique provided: Client centered and Rapport building, intake    Progress toward short term goals:unable to assess progress at this first meeting with this patient    Review of long term goals: Not done at today's visit     Diagnosis:   1. Anxiety state       Improving, worsening, no change: Unable to identify the progress at this first meeting.     Plan and Follow-up:  1. Patient will return to therapy for intake session 2 on 11/15  2.Patient will practice deep breathing exercise and remain physically active to alleviate the symptoms of anxiety  3. Writer will continue to develop therapeutic relationship with patient    Discharge Criteria/Planning: No treatment plan developed at this first intake session     Performed and documented by ANDREIA Irby; [facility] 11/9/2018

## 2021-06-21 NOTE — LETTER
Letter by Luis Daniel Maciel MD at      Author: Luis Daniel Maciel MD Service: -- Author Type: --    Filed:  Encounter Date: 10/16/2020 Status: (Other)         Raghavendra Kiser  [address] [facility] [address]             October 16, 2020         Dear Mr. Kiser,    Below are the results from your recent visit:    Resulted Orders   Echo Complete   Result Value Ref Range    LV volume diastolic 117 62 - 150 cm3    LV volume systolic 40.3 21 - 61 cm3    HR 61 bpm    IVSd 1.3 (!) 0.6 - 1.0 cm    LVIDd 4.17 (!) 4.2 - 5.8 cm    LVIDs 2.82 2.5 - 4.0 cm    LVOT diam 2.2 cm    LVOT mean gradient 2 mmHg    LVOT peak VTI 20.9 cm    LVOT mean laura 65.6 cm/s    LVOT peak laura 105 cm/s    LVOT peak gradient 4 mmHg    LV PWd 1.23 (!) 0.6 - 1.0 cm    MV E' lat laura 9.57 cm/s    MV E' med laura 7.45 cm/s    AV mean laura 75.1 cm/s    AV mean gradient 3 mmHg    AV VTI 23.5 cm    AV peak laura 120 cm/s    AO root 3.6 cm    LA size 3.1 cm    LA length 4.7 cm    MV decel slope 3,540 mm/s2    MV decel time 236 ms    MV P 1/2 time 69 ms    MV peak A laura 84.4 cm/s    MV peak E laura 83.4 cm/s    LA area 2 18.1 cm2    LA area 1 17.2 cm2    BSA 2.28 m2    Hieght 72 in    Weight 3,632 lbs    /85 mmHg    IVS/PW ratio 1.1     LV FS 32.4 28 - 44 %    Echo LVEF calculated 66 55 - 75 %    LA volume 56.3 mL    LV mass 190.5 g    AV area 3.4 cm2    AV DIM IND laura 0.9     MV area p 1/2 time 3.2 cm2    MV E/A Ratio 1.0     LVOT area 3.80 cm2    LVOT SV 79.4 cm3    AV peak gradient 5.8 mmHg    LV systolic volume index 17.7 11 - 31 cm3/m2    LV diastolic volume index 51.3 34 - 74 cm3/m2    LA volume index 24.7 mL/m2    LV mass index 83.6 g/m2    LV SVi 34.8 ml/m2    TAPSE 2.3 cm    MV med E/e' ratio 11.2     MV lat E/e' ratio 8.7     LV CO 4.8 l/min    LV Ci 2.1 l/min/m2    Height 72.0 in    Weight 227 lbs    MV Avg E/e' Ratio 9.8 cm/s    AV DIM IND VTI 0.9     Narrative      Definity contrast utilized    Normal left ventricular size.  Mild left ventricular hypertrophy    Left ventricle ejection fraction is normal. The calculated left   ventricular ejection fraction is 66%.    Normal right ventricular size and systolic function.    Normal chamber dimensions.    No significant valve abnormality.    The prior study November 2018 is a stress echocardiogram. Resting left   ventricular systolic function appears similar.          Hi Mauri:  Your ECHO looked normal other than for some mild thickening (hypertrophy) of the left lower heart wall (the left ventricle).    Please call with questions or contact us using [employer].    Sincerely,        Electronically signed by Luis Daniel Maciel MD

## 2021-06-21 NOTE — PROGRESS NOTES
ASSESSMENT:   1. Annual physical exam  Lipid Cascade    PSA, Annual Screen (Prostatic-Specific Antigen)    Uric Acid    Influenza, Recombinant, Inj, Quadrivalent, PF, 18+YRS    Comprehensive Metabolic Panel    Ambulatory referral for Colonoscopy   2. Atypical chest pain  XR Chest 2 Views    Electrocardiogram Perform and Read    Ambulatory referral to Cardiology   3. Anxiety  Ambulatory referral to Psychology   4. History of TIA (transient ischemic attack) and stroke  Ambulatory referral to Cardiology   5. Essential hypertension       It is possible that anxiety is contributing to his symptoms of chest pain.  He is high risk hence a cardiology referral is being done for further  Evaluation of chest pain or stress testing  Discussed for the management of anxiety symptoms.  Patient declines medication.  He is agreeable for counseling.  A referral to psychology is done.    EKG and x-ray reviewed by me did not reveal acute changes.    Discussed elevated blood pressure.  He will monitor it at home and  if it is consistently greater than 140/80 , he will inform via my chart.  Consider increasing HCTZ to 25 mg daily    An additional greater than 15 minutes was spent today in interview and examination  with more than 50% of that time in counseling and coordination of care for  chest pain, anxiety and hypertension.      PLAN:       follow a low fat, low cholesterol diet, attempt to lose weight, continue current medications, continue current healthy lifestyle patterns and return for routine annual checkups      SUBJECTIVE:   Chief Complaint   Patient presents with     Annual Exam     Fasting labs- would like to discuss chest pains, believes may be contributed from anxiety       patient  is a 58 y.o.  male  presenting for his annual checkup.    He has been having chest pain that are intermittent.  Mostly when thinking about son ( drug abuse and in [facility]).   This is very distressing to the patient.  His son has failed  7.treatment programs.  He feels that his chest is coming on a close and feels some fluttering.  He denies fatigue or shortness of breath.  He continues to exercise daily.  He walks a mile at his work break.    No Known Allergies    Current Outpatient Prescriptions on File Prior to Visit   Medication Sig Dispense Refill     allopurinol (ZYLOPRIM) 300 MG tablet Take 300 mg by mouth alternating with 450 mg by mouth every other day 135 tablet 3     aspirin 325 MG tablet Take 1 tablet (325 mg total) by mouth daily. Hold x 7 days  0     hydroCHLOROthiazide (HYDRODIURIL) 12.5 MG tablet TAKE 1 TABLET DAILY 90 tablet 3     levothyroxine (SYNTHROID, LEVOTHROID) 125 MCG tablet Take 125 mcg by mouth Daily at 6:00 am.        rosuvastatin (CRESTOR) 10 MG tablet Take 5 mg by mouth bedtime.        triamcinolone (KENALOG) 0.1 % cream Apply to affected skin twice daily for up to 7 days 30 g 0     No current facility-administered medications on file prior to visit.        Past Medical History:   Diagnosis Date     Atherosclerosis of right carotid artery- ulcerated plaque      Carotid stenosis, bilateral      Cervical radiculopathy      Chronic gout      Fracture of right humerus      Ganglion cyst      High cholesterol      Humerus fracture     Right     Hypertension      Hypothyroidism      Medial epicondylitis      Shoulder tendinitis, right      Stroke (H)      Superficial venous thrombosis of arm     right lateral antecubital fossa     TIA (transient ischemic attack) 5/16/15     Tinnitus        Past Surgical History:   Procedure Laterality Date     CAROTID ENDARTERECTOMY       OK THROMBOENDARTECTMY NECK,NECK INCIS Right 6/8/2015    Procedure: Right Carotid Endarterectomy with Impulse Monitoring;  Surgeon: Marcellus Toth MD;  Location: [facility] - [place], [place];  Service: General     TONSILLECTOMY         Family History   Problem Relation Age of Onset     Cancer Mother      Lung     Cancer Father      Lung     Cancer Sister      Darrin  "cancer     Heart disease Brother       of a heart attack.     No Medical Problems Son      No Medical Problems Sister      No Medical Problems Sister      No Medical Problems Sister      No Medical Problems Sister      No Medical Problems Brother      No Medical Problems Brother        Social History     Social History     Marital status:      Spouse name: N/A     Number of children: N/A     Years of education: N/A     Social History Main Topics     Smoking status: Former Smoker     Packs/day: 2.00     Years: 20.00     Quit date: 2/15/1993     Smokeless tobacco: Never Used     Alcohol use 0.0 oz/week      Comment: rarely     Drug use: No     Sexual activity: Yes     Partners: Female     Other Topics Concern     None     Social History Narrative    The patient lives with family.       Healthy Habits:   Regular Exercise: Yes  Sunscreen Use: Yes  Healthy Diet: Yes and No  Dental Visits Regularly: Yes  Seat Belt: Yes  Sexually active: Yes  Monthly Self Testicular Exams:  Yes  Hemoccults: No  Flex Sig: No  Colonoscopy: Yes  Lipid Profile: Yes  Glucose Screen: Yes  Prevention of Osteoporosis: N/A  Last Dexa: N/A  Guns at Home:  Yes  Guns Safety Locks:  Yes        ROS:  Feeling well. No dyspnea or chest pain on exertion. No abdominal pain, change in bowel habits, black or bloody stools. No urinary tract or prostatic symptoms. No neurological complaints.    OBJECTIVE:  BP (!) 151/95 (Patient Site: Left Arm, Patient Position: Sitting, Cuff Size: Adult Large)  Pulse 65  Temp (!) 96.4  F (35.8  C) (Oral)   Resp 16  Ht 6' 0.28\" (1.836 m)  Wt (!) 252 lb (114.3 kg)  SpO2 98%  BMI 33.91 kg/m2    GENERAL APPEARANCE:  Appearing stated age, smiling, alert, cooperative, and in no acute distress.   HEENT: Pupils equal, regular, react to light and accommodation. Extraocular muscles intact, fundi benign. Ear canals and tympanic membranes are normal. Lips, mouth, and throat are unremarkable.   NECK: Neck supple without " adenopathy, thyromegaly or masses.   LYMPH: No anterior cervical or supraclavicular LN enlargement   PULMONARY: Normal respiratory effort. Chest is clear.   CARDIOVASCULAR: Heart auscultation: rhythm regular, heart sounds normal S1 and S2, bruit carotid artery absent.  Noted  scar on the right CEA   SKIN: Warm and well perfused..   ABDOMEN: Abdomen soft, non-tender. BS normal. No masses or organomegaly.   MUSCULOSKELETAL: No obvious joint swelling, deformity or limitation in range of motion, full range of motion of the back and neck without pain, strength normal and symmetric in all muscle groups.   EXTREMITIES: Peripheral pulses are full. Extremities with no edema.   MENTAL STATUS: Alert, oriented and thought content appropriate   NEUROLOGIC: Station and gait normal, strength and movement normal, reflexes are normal and symmetric

## 2021-06-21 NOTE — LETTER
Letter by Luis Daniel Maciel MD at      Author: Luis Daniel Mcaiel MD Service: -- Author Type: --    Filed:  Encounter Date: 2/11/2021 Status: (Other)         February 11, 2021     Patient: Raghavendra Kiser   YOB: 1960   Date of Visit: 2/11/2021     Fax to attn of Kevin Alarcon at [phone]    To Whom It May Concern:    It is my medical opinion that Raghavendra Kiser is unable to work at the current time and for the foreseeable future.  Mr Kiser had a stroke on 10-14-20 for which he was hospitalized.   Since that time he has had occupation, physical and speech therapy.   He continues to require speech therapy and it is a major limiting factor in his returning to work.  I anticipate that it would be at least 3-6 months before there was a chance of any return to productive employment.       If you have any questions or concerns, please don't hesitate to call.    Sincerely,        Electronically signed by Luis Daniel Maciel MD

## 2021-06-21 NOTE — LETTER
Letter by Luis Daniel Maciel MD at      Author: Luis Daniel Maciel MD Service: -- Author Type: --    Filed:  Encounter Date: 11/3/2020 Status: (Other)         Raghavendra Kiser  [address] [facility] [address]             November 3, 2020         Dear Mr. Kiser,    Below are the results from your recent visit:    Resulted Orders   Thyroid Cascade   Result Value Ref Range    TSH 3.91 0.30 - 5.00 uIU/mL   PSA, Annual Screen (Prostatic-Specific Antigen)   Result Value Ref Range    PSA 2.0 0.0 - 4.5 ng/mL    Narrative    Method is Abbott Prostate-Specific Antigen (PSA)  Standard-WHO 1st International (90:10)     Hi Mauri:  Your PSA is normal and stable.  You should continue to check this annually.  The thyroid is well controlled on your present replacement therapy (levothyroxine).    Please call with questions or contact us using [employer].    Sincerely,        Electronically signed by Luis Daniel Maciel MD

## 2021-06-22 NOTE — PROGRESS NOTES
ASSESSMENT AND PLAN:  Raghavendra Kiser 58 y.o. male is here for follow-up.  He has erosive gout based on clinical and ultrasonographic criteria including double contour sign.  He is doing great with allopurinol 300/450 mg on alternate days.  Recent labs are within target range.  He has osteoarthritis management principles reviewed.  Avoid nonsteroidals given the comorbidities such as hypertension.  Return for follow-up here in 12 months with labs every 6 months.       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 is here for follow up of gout, which is diagnosed based on the clinical, ultrasonographic criteria, such as double contour sign.  He has had shoulder, neck pain.  He is on allopurinol 300/450 mg on alternate days.  He has not experienced any fever, rash mouth ulcers with this.  His uric acid is in target..  No recurrence of acute gout.  He noted pain level to be 0/10.  He is able to do all his day-to-day activities without difficulty.  He has not had stiffness in the morning.  No kidney stones.  Describes no fever or weight loss with elevation eye redness mild nausea cough is no rash.  His neck and shoulder pain have improved since he had disc surgery in December 2017.Limitation on activities as noted in the MDHAQ scanned in the EMR.  Further historical information, including ROS as noted in the multidimensional health assessment questionnaire scanned in the EMR and in the assessment and plan section.        ALLERGIES:Patient has no known allergies.    PAST MEDICAL/ACTIVE PROBLEMS/MEDICATION/SOCIAL DATA  Past Medical History:   Diagnosis Date     Atherosclerosis of right carotid artery- ulcerated plaque      Carotid stenosis, bilateral      Cervical radiculopathy      Chronic gout      Fracture  of right humerus      Ganglion cyst      High cholesterol      Humerus fracture     Right     Hypertension      Hypothyroidism      Medial epicondylitis      Shoulder tendinitis, right      Stroke (H)      Superficial venous thrombosis of arm     right lateral antecubital fossa     TIA (transient ischemic attack) 5/16/15     Tinnitus      Social History     Tobacco Use   Smoking Status Former Smoker     Packs/day: 2.00     Years: 20.00     Pack years: 40.00     Last attempt to quit: 2/15/1993     Years since quittin.9   Smokeless Tobacco Never Used     Patient Active Problem List   Diagnosis     Hypothyroidism     Hyperlipidemia     Chronic gout     TIA (transient ischemic attack)     Stroke (H)     Superficial venous thrombosis of arm - right lateral antecubital fossa     Atherosclerosis of right carotid artery - ulcerated plaque     Carotid stenosis     Medial epicondylitis     Ganglion cyst     High risk medication use     HTN (hypertension)     Right shoulder tendinitis     Cervical radiculopathy     History of TIA (transient ischemic attack) and stroke     High blood triglycerides     Precordial pain     Current Outpatient Medications   Medication Sig Dispense Refill     allopurinol (ZYLOPRIM) 300 MG tablet Take 300 mg by mouth alternating with 450 mg by mouth every other day 135 tablet 3     aspirin 325 MG tablet Take 1 tablet (325 mg total) by mouth daily. Hold x 7 days  0     hydroCHLOROthiazide (HYDRODIURIL) 12.5 MG tablet Take 1 tablet (12.5 mg total) by mouth daily. No further refills until seen in clinic; call  to make appointment 90 tablet 0     levothyroxine (SYNTHROID, LEVOTHROID) 125 MCG tablet Take 125 mcg by mouth Daily at 6:00 am.        rosuvastatin (CRESTOR) 10 MG tablet Take 5 mg by mouth bedtime.        No current facility-administered medications for this visit.      DETAILED EXAMINATION  19  :  Vitals:    19 1205   BP: 128/80   Weight: (!) 246 lb (111.6 kg)   Height: 6'  (1.829 m)     Alert oriented. Head including the face is examined for malar rash, heliotropes, scarring, lupus pernio. Eyes examined for redness such as in episcleritis/scleritis, periorbital lesions.   Neck/ Face examined for parotid gland swelling, range of motion of neck.  Left upper and lower and right upper and lower extremities examined for tenderness, swelling, warmth of the appendicular joints, range of motion, edema, rash.  Some of the important findings included: No joints amongst the upper or lower extremities which are palpable are tender swollen.  Including metatarsophalangeal joints.  He has [place]berden nodes.  LAB / IMAGING DATA:  ALT   Date Value Ref Range Status   10/16/2018 29 0 - 45 U/L Final   02/21/2018 25 0 - 45 U/L Final   12/05/2017 25 0 - 45 U/L Final     Albumin   Date Value Ref Range Status   10/16/2018 3.9 3.5 - 5.0 g/dL Final   02/21/2018 3.8 3.5 - 5.0 g/dL Final   12/05/2017 3.8 3.5 - 5.0 g/dL Final     Creatinine   Date Value Ref Range Status   10/16/2018 0.92 0.70 - 1.30 mg/dL Final   02/21/2018 0.95 0.70 - 1.30 mg/dL Final   12/05/2017 0.87 0.70 - 1.30 mg/dL Final       WBC   Date Value Ref Range Status   02/21/2018 6.1 4.0 - 11.0 thou/uL Final   12/05/2017 4.9 4.0 - 11.0 thou/uL Final   08/11/2015 4.1 4.0 - 11.0 thou/uL Final   06/09/2015 8.1 4.0 - 11.0 thou/uL Final     Hemoglobin   Date Value Ref Range Status   02/21/2018 15.2 14.0 - 18.0 g/dL Final   12/05/2017 15.7 14.0 - 18.0 g/dL Final   08/22/2017 15.2 14.0 - 18.0 g/dL Final     Platelets   Date Value Ref Range Status   02/21/2018 153 140 - 440 thou/uL Final   12/05/2017 156 140 - 440 thou/uL Final   08/22/2017 155 140 - 440 thou/uL Final       No results found for: RUSH

## 2021-06-22 NOTE — TELEPHONE ENCOUNTER
RN cannot approve Refill Request    RN can NOT refill this medication Protocol failed and RN gave three month refill. Last office visit: 9/15/2017 Luis Daniel Maciel MD Last Physical: 12/5/2017 Last MTM visit: Visit date not found Last visit same specialty: 6/12/2018 Karen Sorensen MD.  Next visit within 3 mo: Visit date not found  Next physical within 3 mo: Visit date not found  Last OV 10/16/2018  Last refill 1/7/2018 for 90/3    Joyce YAW Santillan, [facility] Triage/Med Refill 1/1/2019    Requested Prescriptions   Pending Prescriptions Disp Refills     hydroCHLOROthiazide (HYDRODIURIL) 12.5 MG tablet [Pharmacy Med Name: HYDROCHLOROTHIAZIDE TABS 12.5MG] 90 tablet 3     Sig: TAKE 1 TABLET DAILY    Diuretics/Combination Diuretics Refill Protocol  Failed - 1/1/2019  1:10 AM       Failed - Visit with PCP or prescribing provider visit in past 12 months    Last office visit with prescriber/PCP: 9/15/2017 Luis Daniel Maciel MD OR same dept: 6/12/2018 Karen Sorensen MD OR same specialty: 6/12/2018 Karen Sorensen MD  Last physical: 12/5/2017 Last MTM visit: Visit date not found   Next visit within 3 mo: Visit date not found  Next physical within 3 mo: Visit date not found  Prescriber OR PCP: Luis Daniel Maciel MD  Last diagnosis associated with med order: 1. HTN (hypertension)  - hydroCHLOROthiazide (HYDRODIURIL) 12.5 MG tablet [Pharmacy Med Name: HYDROCHLOROTHIAZIDE TABS 12.5MG]; TAKE 1 TABLET DAILY  Dispense: 90 tablet; Refill: 3    If protocol passes may refill for 12 months if within 3 months of last provider visit (or a total of 15 months).            Passed - Serum Potassium in past 12 months     Lab Results   Component Value Date    Potassium 4.2 10/16/2018            Passed - Serum Sodium in past 12 months     Lab Results   Component Value Date    Sodium 142 10/16/2018            Passed - Blood pressure on file in past 12 months    BP Readings from Last 1 Encounters:   11/06/18  132/80            Passed - Serum Creatinine in past 12 months     Creatinine   Date Value Ref Range Status   10/16/2018 0.92 0.70 - 1.30 mg/dL Final

## 2021-06-25 NOTE — TELEPHONE ENCOUNTER
Refill Approved    Rx renewed per Medication Renewal Policy. Medication was last renewed on 2/9/21, last OV 2/9/21 .    Catrachita Nichols, [facility] Triage/Med Refill 6/5/2021     Requested Prescriptions   Pending Prescriptions Disp Refills     lisinopriL (PRINIVIL,ZESTRIL) 20 MG tablet [Pharmacy Med Name: LISINOPRIL TABS 20MG] 180 tablet 3     Sig: TAKE 1 TABLET TWICE A DAY       Ace Inhibitors Refill Protocol Passed - 6/4/2021  2:51 AM        Passed - PCP or prescribing provider visit in past 12 months       Last office visit with prescriber/PCP: 2/9/2021 Luis Daniel Maciel MD OR same dept: 2/9/2021 Luis Daniel Maciel MD OR same specialty: 2/9/2021 Luis Daniel Maciel MD  Last physical: 11/2/2020 Last MTM visit: Visit date not found   Next visit within 3 mo: Visit date not found  Next physical within 3 mo: Visit date not found  Prescriber OR PCP: Luis Daniel Maciel MD  Last diagnosis associated with med order: 1. Essential hypertension  - lisinopriL (PRINIVIL,ZESTRIL) 20 MG tablet [Pharmacy Med Name: LISINOPRIL TABS 20MG]; TAKE 1 TABLET TWICE A DAY  Dispense: 180 tablet; Refill: 3    If protocol passes may refill for 12 months if within 3 months of last provider visit (or a total of 15 months).             Passed - Serum Potassium in past 12 months     Lab Results   Component Value Date    Potassium 5.0 01/08/2021             Passed - Blood pressure filed in past 12 months     BP Readings from Last 1 Encounters:   02/09/21 133/75             Passed - Serum Creatinine in past 12 months     Creatinine   Date Value Ref Range Status   05/28/2021 1.01 0.70 - 1.30 mg/dL Final

## 2021-06-25 NOTE — TELEPHONE ENCOUNTER
RN cannot approve Refill Request    RN can NOT refill this medication   Patient request early refill. Medication last filled 1/8/21 for qty 90 refill 1. Provider to advise on request. . Last office visit: 1/8/2021 Cecile Naidu DO Last Physical: Visit date not found Last MTM visit: Visit date not found Last visit same specialty: 2/9/2021 Luis Daniel Maciel MD.  Next visit within 3 mo: Visit date not found  Next physical within 3 mo: Visit date not found      Luis Daniel Daly, [facility] Triage/Med Refill 5/28/2021    Requested Prescriptions   Pending Prescriptions Disp Refills     divalproex (DEPAKOTE ER) 500 MG 24 hour tablet [Pharmacy Med Name: DIVALPROEX SODIUM ER TABS 500MG] 90 tablet 3     Sig: TAKE 1 TABLET AT BEDTIME       Divalproex/Valproic Acid Refill Protocol Passed - 5/28/2021  2:33 AM        Passed - LFT or AST or ALT in last 12 months     Albumin   Date Value Ref Range Status   01/08/2021 4.1 3.5 - 5.0 g/dL Final     Bilirubin, Total   Date Value Ref Range Status   01/08/2021 0.5 0.0 - 1.0 mg/dL Final     Alkaline Phosphatase   Date Value Ref Range Status   01/08/2021 83 45 - 120 U/L Final     AST   Date Value Ref Range Status   01/08/2021 26 0 - 40 U/L Final     ALT   Date Value Ref Range Status   01/08/2021 45 0 - 45 U/L Final     Protein, Total   Date Value Ref Range Status   01/08/2021 7.3 6.0 - 8.0 g/dL Final                Passed - CBC w/ plts (Hm2) in last 12 months      WBC   Date Value Ref Range Status   05/28/2021 5.9 4.0 - 11.0 thou/uL Final     Hemoglobin   Date Value Ref Range Status   05/28/2021 14.2 14.0 - 18.0 g/dL Final     Hematocrit   Date Value Ref Range Status   05/28/2021 41.5 40.0 - 54.0 % Final     Platelets   Date Value Ref Range Status   05/28/2021 159 140 - 440 thou/uL Final             Passed - PCP or prescribing provider visit in last 12 months       Last office visit with prescriber/PCP: 1/8/2021 Cecile Naidu DO OR same dept: 2/9/2021 Luis Daniel Maciel MD  OR same specialty: 2/9/2021 Luis Daniel Maciel MD  Last physical: Visit date not found Last MTM visit: Visit date not found   Next visit within 3 mo: Visit date not found  Next physical within 3 mo: Visit date not found  Prescriber OR PCP: Cecile Naidu DO  Last diagnosis associated with med order: 1. Acute encephalopathy  - divalproex (DEPAKOTE ER) 500 MG 24 hour tablet [Pharmacy Med Name: DIVALPROEX SODIUM ER TABS 500MG]; TAKE 1 TABLET AT BEDTIME  Dispense: 90 tablet; Refill: 3    2. Essential hypertension  - amLODIPine (NORVASC) 10 MG tablet [Pharmacy Med Name: AMLODIPINE BESYLATE TABS 10MG]; TAKE 1 TABLET EVERY MORNING  Dispense: 90 tablet; Refill: 3    If protocol passes may refill for 12 months if within 3 months of last provider visit (or a total of 15 months).              Refused Prescriptions Disp Refills     amLODIPine (NORVASC) 10 MG tablet [Pharmacy Med Name: AMLODIPINE BESYLATE TABS 10MG] 90 tablet 3     Sig: TAKE 1 TABLET EVERY MORNING       Calcium-Channel Blockers Protocol Passed - 5/28/2021  2:33 AM        Passed - PCP or prescribing provider visit in past 12 months or next 3 months     Last office visit with prescriber/PCP: 1/8/2021 Cecile Naidu DO OR same dept: 2/9/2021 Luis Daniel Maciel MD OR same specialty: 2/9/2021 Luis Daniel Maciel MD  Last physical: Visit date not found Last MTM visit: Visit date not found   Next visit within 3 mo: Visit date not found  Next physical within 3 mo: Visit date not found  Prescriber OR PCP: Cecile Naidu DO  Last diagnosis associated with med order: 1. Acute encephalopathy  - divalproex (DEPAKOTE ER) 500 MG 24 hour tablet [Pharmacy Med Name: DIVALPROEX SODIUM ER TABS 500MG]; TAKE 1 TABLET AT BEDTIME  Dispense: 90 tablet; Refill: 3    2. Essential hypertension  - amLODIPine (NORVASC) 10 MG tablet [Pharmacy Med Name: AMLODIPINE BESYLATE TABS 10MG]; TAKE 1 TABLET EVERY MORNING  Dispense: 90 tablet; Refill: 3    If protocol passes may refill  for 12 months if within 3 months of last provider visit (or a total of 15 months).             Passed - Blood pressure filed in past 12 months     BP Readings from Last 1 Encounters:   02/09/21 133/75

## 2021-06-25 NOTE — TELEPHONE ENCOUNTER
amLODIPine (NORVASC) 10 MG tablet [[id number]]    Electronically signed by: Luis Daniel Maciel MD on 01/21/21 1105 Status: Discontinued   Ordering user: Luis Daniel Maciel MD 01/21/21 1105 Authorized by: Luis Daniel Maciel MD   Frequency: QAM 01/21/21 - 02/09/21  Discontinued by: Luis Daniel Maciel MD 02/09/21 0713 [Reorder]   Diagnoses   Essential hypertension [I10]

## 2021-06-26 NOTE — PROGRESS NOTES
Progress Notes by Sen Burger MD (Ted) at 11/6/2018  3:10 PM     Author: Sen Burger MD (Ted) Service: -- Author Type: Physician    Filed: 11/6/2018  3:57 PM Encounter Date: 11/6/2018 Status: Signed    : Sen Burger MD (Ted) (Physician)           Click to link to [facility] Heart [facility] HEART [facility] NOTE    Thank you, Dr. Lanza, for asking the [facility] to evaluate Mr. Raghavendra Kiser.      Assessment/Recommendations   Assessment:    Chest discomfort, atypical  Cerebrovascular disease status post right carotid endarterectomy in 2015  Hypercholesterolemia, good control  Hypertension, good control    Plan:  Stress echo  He admits that he has been under a lot of stress lately.  I suspect some of the symptoms could be precipitated by situational anxiety.       History of Present Illness    Mr. Raghavendra Kiser is a 58 y.o. male who comes in for initial cardiac evaluation.  He been having nonexertional chest tightness and fluttering on and off for last 2 months.  Those episodes are short-lived, lasting less than 2 minutes at a time.  He does not relate that discomfort to physical activities.  The chest discomfort resolves spontaneously.  He is not known to have coronary artery disease, valvular heart disease, cardiomyopathy.  In 2015 he had TIA and he was discovered to have ulcerated 40% right  carotid plaque.  He underwent right carotid endarterectomy.  He has not had any recurrent events.   He has no PND, orthopnea, pedal edema.  He denies prolonged heart racing or syncope.  He admits that he has been under a lot of stress lately.  His son is a drug addict.  He is being released from correction tomorrow.    ECG: Personally reviewed.  Sinus rhythm within normal limits    Echocardiogram: 2015  Normal left ventricular wall thickness.   Left ventricular size is normal.   No regional wall motion abnormalities.   Left ventricular ejection  fraction is visually estimated at 60%.   Normal right ventricular size with preserved systolic function.   No obvious valvular disease.       Physical Examination Review of Systems   Vitals:    11/06/18 1504   BP: 132/80   Pulse: 68   Resp: 14     Body mass index is 33.63 kg/(m^2).  Wt Readings from Last 3 Encounters:   11/06/18 (!) 248 lb (112.5 kg)   10/16/18 (!) 252 lb (114.3 kg)   07/24/18 (!) 248 lb (112.5 kg)     General Appearance:   Alert, cooperative, no distress, appears stated age   Head/ENT: Normocephalic, without obvious abnormality. Membranes moist      EYES:  no scleral icterus, normal conjunctivae   Neck: Supple, symmetrical, trachea midline, no adenopathy, thyroid: not enlarged, symmetric, no carotid bruit or JVD; right CEA scar   Chest/Lungs:   Lungs are clear to auscultation, respirations unlabored. No tenderness or deformity    Cardiovascular:   Regular rhythm, S1, S2 normal, no murmur, rub or gallop.   Abdomen:  Soft, non-tender, bowel sounds active all four quadrants,  no masses, no organomegaly   Extremities: no cyanosis or clubbing. No edema   Skin: Skin color, texture, turgor normal, no rashes or lesions.    Psychiatric: Normal affect, calm   Neurologic: Alert and oriented x 3, moving all four extremities.     General: WNL  Eyes: WNL  Ears/Nose/Throat: WNL  Lungs: Cough  Heart: Chest Pain, Shortness of Breath with activity  Stomach: WNL  Bladder: WNL  Muscle/Joints: Joint Pain  Skin: WNL  Nervous System: WNL  Mental Health: Anxiety     Blood: WNL     Medical History  Surgical History Family History Social History   Past Medical History:   Diagnosis Date   ? Atherosclerosis of right carotid artery- ulcerated plaque    ? Carotid stenosis, bilateral    ? Cervical radiculopathy    ? Chronic gout    ? Fracture of right humerus    ? Ganglion cyst    ? High cholesterol    ? Humerus fracture     Right   ? Hypertension    ? Hypothyroidism    ? Medial epicondylitis    ? Shoulder tendinitis, right    ?  Stroke (H)    ? Superficial venous thrombosis of arm     right lateral antecubital fossa   ? TIA (transient ischemic attack) 5/16/15   ? Tinnitus     Past Surgical History:   Procedure Laterality Date   ? CAROTID ENDARTERECTOMY     ? CA THROMBOENDARTECTMY NECK,NECK INCIS Right 6/8/2015    Procedure: Right Carotid Endarterectomy with Impulse Monitoring;  Surgeon: Marcellus Toth MD;  Location: [facility] [place];  Service: General   ? TONSILLECTOMY      no family history of premature coronary artery disease Social History     Social History   ? Marital status:      Spouse name: N/A   ? Number of children: N/A   ? Years of education: N/A     Occupational History   ? Not on file.     Social History Main Topics   ? Smoking status: Former Smoker     Packs/day: 2.00     Years: 20.00     Quit date: 2/15/1993   ? Smokeless tobacco: Never Used   ? Alcohol use 0.0 oz/week      Comment: rarely   ? Drug use: No   ? Sexual activity: Yes     Partners: Female     Other Topics Concern   ? Not on file     Social History Narrative    The patient lives with family.    He is      He has 1 son   Who is 23-year-old.  Son is currently in skilled nursing  due to recurrent drug use problem.    He works for the [employer] in [employer].    Maynor Lanza MD  10/16/2018                  Medications  Allergies   Current Outpatient Prescriptions   Medication Sig Dispense Refill   ? allopurinol (ZYLOPRIM) 300 MG tablet Take 300 mg by mouth alternating with 450 mg by mouth every other day 135 tablet 3   ? aspirin 325 MG tablet Take 1 tablet (325 mg total) by mouth daily. Hold x 7 days  0   ? hydroCHLOROthiazide (HYDRODIURIL) 12.5 MG tablet TAKE 1 TABLET DAILY 90 tablet 3   ? rosuvastatin (CRESTOR) 10 MG tablet Take 5 mg by mouth bedtime.      ? levothyroxine (SYNTHROID, LEVOTHROID) 125 MCG tablet Take 125 mcg by mouth Daily at 6:00 am.        No current facility-administered medications for this visit.       No Known Allergies       Lab Results    Chemistry/lipid CBC Cardiac Enzymes/BNP/TSH/INR   Lab Results   Component Value Date    CHOL 140 10/16/2018    HDL 26 (L) 10/16/2018    LDLCALC  10/16/2018      Comment:      Invalid, Triglycerides >400    TRIG 563 (H) 10/16/2018    CREATININE 0.92 10/16/2018    BUN 20 10/16/2018    K 4.2 10/16/2018     10/16/2018     10/16/2018    CO2 26 10/16/2018    Lab Results   Component Value Date    WBC 6.1 02/21/2018    HGB 15.2 02/21/2018    HCT 43.9 02/21/2018    MCV 98 02/21/2018     02/21/2018    Lab Results   Component Value Date    TSH 2.13 05/26/2017    INR 1.05 12/05/2017

## 2021-07-03 NOTE — ADDENDUM NOTE
Addendum Note by Oleg Lanza MD at 10/17/2018 10:56 AM     Author: Oleg Lanza MD Service: -- Author Type: Physician    Filed: 10/17/2018 10:56 AM Encounter Date: 10/16/2018 Status: Signed    : Oleg Lanza MD (Physician)    Addended by: OLEG LANZA on: 10/17/2018 10:56 AM        Modules accepted: Orders

## 2021-07-04 NOTE — LETTER
Letter by Luis Daniel Maciel MD at      Author: Luis Daniel Maciel MD Service: -- Author Type: --    Filed:  Encounter Date: 5/31/2021 Status: (Other)         Raghavendra Kiser  [address]  [facility] [address]             May 31, 2021         Dear Mr. Kiser,    Below are the results from your recent visit:    Resulted Orders   Glycosylated Hemoglobin A1c   Result Value Ref Range    Hemoglobin A1c 5.7 (H) <=5.6 %       Hi Mauri:  Your diabetes remains very well controlled.  Recheck A1C in 4-6 months.    Please call with questions or contact us using Recommend.    Sincerely,        Electronically signed by Luis Daniel Maciel MD

## 2021-07-06 ENCOUNTER — COMMUNICATION - HEALTHEAST (OUTPATIENT)
Dept: FAMILY MEDICINE | Facility: CLINIC | Age: 61
End: 2021-07-06

## 2021-07-06 DIAGNOSIS — E78.5 HYPERLIPIDEMIA, UNSPECIFIED HYPERLIPIDEMIA TYPE: ICD-10-CM

## 2021-07-06 NOTE — TELEPHONE ENCOUNTER
Telephone Encounter by Abena Quinn LPN at 7/6/2021 11:53 AM     Author: Abena Quinn LPN Service: -- Author Type: Licensed Nurse    Filed: 7/6/2021 11:54 AM Encounter Date: 7/6/2021 Status: Signed    : Abena Quinn LPN (Licensed Nurse)       Requested Prescriptions     Pending Prescriptions Disp Refills   ? rosuvastatin (CRESTOR) 5 MG tablet 90 tablet 3     Sig: Take 1 tablet (5 mg total) by mouth at bedtime.     Component      Latest Ref Rng & Units 10/15/2020   Triglycerides      <=149 mg/dL 182 (H)   Cholesterol      <=199 mg/dL 91   LDL Calculated      <=129 mg/dL 35   HDL Cholesterol      >=40 mg/dL 20 (L)   Patient Fasting > 8hrs?       Unknown

## 2021-08-02 DIAGNOSIS — M1A.0791 IDIOPATHIC CHRONIC GOUT OF FOOT WITH TOPHUS, UNSPECIFIED LATERALITY: Primary | ICD-10-CM

## 2021-08-03 RX ORDER — ALLOPURINOL 300 MG/1
TABLET ORAL
Qty: 113 TABLET | Refills: 3 | Status: SHIPPED | OUTPATIENT
Start: 2021-08-03 | End: 2022-04-11

## 2021-08-06 DIAGNOSIS — I10 ESSENTIAL HYPERTENSION: Primary | ICD-10-CM

## 2021-08-06 RX ORDER — AMLODIPINE BESYLATE 5 MG/1
5 TABLET ORAL DAILY
Qty: 30 TABLET | Refills: 0 | Status: SHIPPED | OUTPATIENT
Start: 2021-08-06 | End: 2021-08-17

## 2021-08-06 NOTE — TELEPHONE ENCOUNTER
Patient would like 30 day supply to go to local pharmacy - [facility]. And the regular rx for 90 days to [facility].     Patient would like a call back once this is sent to the pharmacy so he can head to the pharmacy.

## 2021-08-06 NOTE — TELEPHONE ENCOUNTER
Reason for Call:  Medication or medication refill:    Do you use a [facility]?  Name of the pharmacy and phone number for the current request:  [facility] on [address], [place]    Name of the medication requested: amLODIPine (NORVASC) 5 MG tablet      Other request: pt presented to pharmacy as he is out of medication & usually gets via mail order- is requesting a 30 or 90 supply be called in or sent to local [facility] pharmacy asap    Can we leave a detailed message on this number? YES    Phone number patient can be reached at: Other phone number:  [phone] for pharmacy*    Best Time: today    Call taken on 8/6/2021 at 12:34 PM by Poppy Gil

## 2021-08-06 NOTE — TELEPHONE ENCOUNTER
Incoming call from patient following up on refill request. Patient states he leaves tmrw out of town at 6 am. Patient would like if provider or covering can send this asap to the pharmacy today so he can . Please call patient to let him know once sent so he can go to the pharmacy. OK to leave detailed msg if he doesn't .

## 2021-08-16 NOTE — TELEPHONE ENCOUNTER
All our records indicate the pt takes amlodipine 5 mg daily.  Please ask pt when the dose was changed and also please verify with the pharmacy.  My noes and Neurology notes from Feb 2021 all indicate pt takes amlodipine 5 mg daily.

## 2021-08-16 NOTE — TELEPHONE ENCOUNTER
Pt calling back & states that he takes 10mg of the Amlodipine     Please advise & send new Rx if appropriate

## 2021-08-17 RX ORDER — AMLODIPINE BESYLATE 5 MG/1
5 TABLET ORAL DAILY
Qty: 90 TABLET | Refills: 3 | Status: SHIPPED | OUTPATIENT
Start: 2021-08-17 | End: 2022-07-26

## 2021-08-17 NOTE — TELEPHONE ENCOUNTER
Spoke to patient and he rechecked his prescription and he is currently taking Amlodipine 5 mg daily. Please send refill to [facility].    Pending Prescriptions:                       Disp   Refills    amLODIPine (NORVASC) 5 MG tablet          90 tab*3            Sig: Take 1 tablet (5 mg) by mouth daily    Signed Prescriptions:                        Disp   Refills    amLODIPine (NORVASC) 5 MG tablet           30 tab*0        Sig: Take 1 tablet (5 mg) by mouth daily  Authorizing Provider: CLARITZA PARRA

## 2021-09-07 NOTE — PROGRESS NOTES
I met with Raghavendra Kiser at the request of Dr. Maciel to recheck his blood pressure.  Blood pressure medications on the MAR were reviewed with patient.    Patient has taken all medications as per usual regimen: Yes  Patient reports tolerating them without any issues or concerns: Yes    Vitals:    08/08/17 1457   BP: 138/76   Patient Site: Left Arm   Patient Position: Sitting   Cuff Size: Adult Large   Pulse: 72           
show

## 2021-11-03 ENCOUNTER — OFFICE VISIT (OUTPATIENT)
Dept: FAMILY MEDICINE | Facility: CLINIC | Age: 61
End: 2021-11-03
Payer: OTHER GOVERNMENT

## 2021-11-03 VITALS
BODY MASS INDEX: 30.48 KG/M2 | WEIGHT: 225 LBS | OXYGEN SATURATION: 97 % | HEIGHT: 72 IN | SYSTOLIC BLOOD PRESSURE: 118 MMHG | HEART RATE: 59 BPM | DIASTOLIC BLOOD PRESSURE: 80 MMHG

## 2021-11-03 DIAGNOSIS — Z76.89 HEALTH CARE HOME: ICD-10-CM

## 2021-11-03 DIAGNOSIS — E03.9 HYPOTHYROIDISM, UNSPECIFIED TYPE: ICD-10-CM

## 2021-11-03 DIAGNOSIS — H93.13 TINNITUS, BILATERAL: Primary | ICD-10-CM

## 2021-11-03 DIAGNOSIS — E11.9 TYPE 2 DIABETES MELLITUS WITHOUT COMPLICATION, WITHOUT LONG-TERM CURRENT USE OF INSULIN (H): ICD-10-CM

## 2021-11-03 DIAGNOSIS — I63.9 CEREBROVASCULAR ACCIDENT (CVA), UNSPECIFIED MECHANISM (H): ICD-10-CM

## 2021-11-03 DIAGNOSIS — I10 PRIMARY HYPERTENSION: ICD-10-CM

## 2021-11-03 DIAGNOSIS — M1A.9XX0 CHRONIC GOUT WITHOUT TOPHUS, UNSPECIFIED CAUSE, UNSPECIFIED SITE: ICD-10-CM

## 2021-11-03 DIAGNOSIS — R97.20 RISING PSA LEVEL: ICD-10-CM

## 2021-11-03 DIAGNOSIS — Z12.5 SCREENING PSA (PROSTATE SPECIFIC ANTIGEN): ICD-10-CM

## 2021-11-03 DIAGNOSIS — Z13.220 SCREENING FOR HYPERLIPIDEMIA: ICD-10-CM

## 2021-11-03 LAB
ALBUMIN SERPL-MCNC: 4 G/DL (ref 3.5–5)
ALP SERPL-CCNC: 67 U/L (ref 45–120)
ALT SERPL W P-5'-P-CCNC: 15 U/L (ref 0–45)
ANION GAP SERPL CALCULATED.3IONS-SCNC: 7 MMOL/L (ref 5–18)
AST SERPL W P-5'-P-CCNC: 15 U/L (ref 0–40)
BILIRUB SERPL-MCNC: 0.5 MG/DL (ref 0–1)
BUN SERPL-MCNC: 12 MG/DL (ref 8–22)
CALCIUM SERPL-MCNC: 9.6 MG/DL (ref 8.5–10.5)
CHLORIDE BLD-SCNC: 108 MMOL/L (ref 98–107)
CHOLEST SERPL-MCNC: 131 MG/DL
CO2 SERPL-SCNC: 29 MMOL/L (ref 22–31)
CREAT SERPL-MCNC: 0.88 MG/DL (ref 0.7–1.3)
CREAT UR-MCNC: 242 MG/DL
FASTING STATUS PATIENT QL REPORTED: YES
GFR SERPL CREATININE-BSD FRML MDRD: >90 ML/MIN/1.73M2
GLUCOSE BLD-MCNC: 101 MG/DL (ref 70–125)
HBA1C MFR BLD: 5.4 % (ref 0–5.6)
HDLC SERPL-MCNC: 25 MG/DL
LDLC SERPL CALC-MCNC: 38 MG/DL
MICROALBUMIN UR-MCNC: 1.87 MG/DL (ref 0–1.99)
MICROALBUMIN/CREAT UR: 7.7 MG/G CR
POTASSIUM BLD-SCNC: 4.3 MMOL/L (ref 3.5–5)
PROT SERPL-MCNC: 6.8 G/DL (ref 6–8)
PSA SERPL-MCNC: 2.63 UG/L (ref 0–4.5)
SODIUM SERPL-SCNC: 144 MMOL/L (ref 136–145)
T4 FREE SERPL-MCNC: 1 NG/DL (ref 0.7–1.8)
TRIGL SERPL-MCNC: 342 MG/DL
TSH SERPL DL<=0.005 MIU/L-ACNC: 5.26 UIU/ML (ref 0.3–5)
URATE SERPL-MCNC: 4.2 MG/DL (ref 3–8)

## 2021-11-03 PROCEDURE — 36415 COLL VENOUS BLD VENIPUNCTURE: CPT | Performed by: FAMILY MEDICINE

## 2021-11-03 PROCEDURE — 82043 UR ALBUMIN QUANTITATIVE: CPT | Performed by: FAMILY MEDICINE

## 2021-11-03 PROCEDURE — 99214 OFFICE O/P EST MOD 30 MIN: CPT | Mod: 25 | Performed by: FAMILY MEDICINE

## 2021-11-03 PROCEDURE — G0103 PSA SCREENING: HCPCS | Performed by: FAMILY MEDICINE

## 2021-11-03 PROCEDURE — 84550 ASSAY OF BLOOD/URIC ACID: CPT | Performed by: FAMILY MEDICINE

## 2021-11-03 PROCEDURE — 84439 ASSAY OF FREE THYROXINE: CPT | Performed by: FAMILY MEDICINE

## 2021-11-03 PROCEDURE — 80053 COMPREHEN METABOLIC PANEL: CPT | Performed by: FAMILY MEDICINE

## 2021-11-03 PROCEDURE — 83036 HEMOGLOBIN GLYCOSYLATED A1C: CPT | Performed by: FAMILY MEDICINE

## 2021-11-03 PROCEDURE — 99396 PREV VISIT EST AGE 40-64: CPT | Performed by: FAMILY MEDICINE

## 2021-11-03 PROCEDURE — 84443 ASSAY THYROID STIM HORMONE: CPT | Performed by: FAMILY MEDICINE

## 2021-11-03 PROCEDURE — 80061 LIPID PANEL: CPT | Performed by: FAMILY MEDICINE

## 2021-11-03 RX ORDER — DIVALPROEX SODIUM 500 MG/1
TABLET, DELAYED RELEASE ORAL
Start: 2021-11-03 | End: 2022-01-25

## 2021-11-03 RX ORDER — CHLORTHALIDONE 25 MG/1
TABLET ORAL
COMMUNITY
Start: 2021-01-02 | End: 2021-11-03

## 2021-11-03 RX ORDER — BLOOD-GLUCOSE METER
KIT MISCELLANEOUS
COMMUNITY
Start: 2021-10-20 | End: 2022-06-02

## 2021-11-03 RX ORDER — LANCETS 28 GAUGE
EACH MISCELLANEOUS
COMMUNITY
Start: 2021-10-20 | End: 2022-12-19

## 2021-11-03 RX ORDER — ROSUVASTATIN CALCIUM 5 MG/1
5 TABLET, COATED ORAL DAILY
Qty: 90 TABLET | Refills: 3
Start: 2021-11-03 | End: 2021-12-16

## 2021-11-03 RX ORDER — POTASSIUM CHLORIDE 1500 MG/1
TABLET, EXTENDED RELEASE ORAL
COMMUNITY
Start: 2021-01-02 | End: 2021-11-03

## 2021-11-03 ASSESSMENT — MIFFLIN-ST. JEOR: SCORE: 1853.59

## 2021-11-03 NOTE — PATIENT INSTRUCTIONS
Colonoscopy due in Jan 2023 which would be 3 years as he was told (notes say 5 years).     Fasting labs.    Change depakote to every other day for a month.  If doing well then may stop this.     Referral for an audiology exam    Call me if interested in an ENT referral or Orthopedic referral are desired.

## 2021-11-03 NOTE — LETTER
November 5, 2021     Raghavendramarissa Kiser  [address] [place] [address]        Dear ,  We are writing to inform you of your test results.    Joe Mathurt:  (left a voicemail message regarding need to view lab letter)    Your diabetes is well controlled.  There is no significant urine protein and your kidney function is normal.     Your PSA is up about  0.63 from last time.  I would like you to see Urology in consultation for this. (Dr Marshall or Dr De Paz at [facility] [phone])    Your elevated triglycerides and low HDL would be helped by weight loss via an exercise program combined with a low carb diet.  Otherwise your cholesterol and LDL are very good!    Your TSH is elevated but the free T4 is normal.   If you have been on the same dose of thyorid med for awhile then you may benefit from a slight increase in the dose.    The remaining labs are normal.     Resulted Orders   Lipid panel reflex to direct LDL Fasting   Result Value Ref Range    Cholesterol 131 <=199 mg/dL    Triglycerides 342 (H) <=149 mg/dL    Direct Measure HDL 25 (L) >=40 mg/dL      Comment:      HDL Cholesterol Reference Range:     0-2 years:   No reference ranges established for patients under 2 years old  [facility] for lipid analytes.    2-8 years:  Greater than 45 mg/dL     18 years and older:   Female: Greater than or equal to 50 mg/dL   Male:   Greater than or equal to 40 mg/dL    LDL Cholesterol Calculated 38 <=129 mg/dL    Patient Fasting > 8hrs? Yes    Hemoglobin A1c   Result Value Ref Range    Hemoglobin A1C 5.4 0.0 - 5.6 %      Comment:      Normal <5.7%   Prediabetes 5.7-6.4%    Diabetes 6.5% or higher     Note: Adopted from ADA consensus guidelines.   PSA, screen   Result Value Ref Range    Prostate Specific Antigen Screen 2.63 0.00 - 4.50 ug/L    Narrative    Assay Method is Abbott Prostate-Specific Antigen (PSA)  Standard-WHO 1st International (90:10)   TSH with free T4 reflex   Result Value  Ref Range    TSH 5.26 (H) 0.30 - 5.00 uIU/mL   Comprehensive metabolic panel (BMP + Alb, Alk Phos, ALT, AST, Total. Bili, TP)   Result Value Ref Range    Sodium 144 136 - 145 mmol/L    Potassium 4.3 3.5 - 5.0 mmol/L    Chloride 108 (H) 98 - 107 mmol/L    Carbon Dioxide (CO2) 29 22 - 31 mmol/L    Anion Gap 7 5 - 18 mmol/L    Urea Nitrogen 12 8 - 22 mg/dL    Creatinine 0.88 0.70 - 1.30 mg/dL    Calcium 9.6 8.5 - 10.5 mg/dL    Glucose 101 70 - 125 mg/dL    Alkaline Phosphatase 67 45 - 120 U/L    AST 15 0 - 40 U/L    ALT 15 0 - 45 U/L    Protein Total 6.8 6.0 - 8.0 g/dL    Albumin 4.0 3.5 - 5.0 g/dL    Bilirubin Total 0.5 0.0 - 1.0 mg/dL    GFR Estimate >90 >60 mL/min/1.73m2      Comment:      As of 2021, eGFR is calculated by the CKD-EPI creatinine equation, without race adjustment. eGFR can be influenced by muscle mass, exercise, and diet. The reported eGFR is an estimation only and is only applicable if the renal function is stable.   Uric acid   Result Value Ref Range    Uric Acid 4.2 3.0 - 8.0 mg/dL   Albumin Random Urine Quantitative with Creat Ratio   Result Value Ref Range    Microalbumin Urine mg/dL 1.87 0.00 - 1.99 mg/dL    Creatinine Urine mg/dL 242 mg/dL    Microalbumin Urine mg/g Cr 7.7 <=19.9 mg/g Cr    Narrative    Microalbumin, Random Urine   <2.0 mg/dL . . . . . . . . Normal   3.0-30.0 mg/dL . . . . . . Microalbuminuria   >30.0 mg/dL . . . . . .  . Clinical Proteinuria     Microalbumin/Creatinine Ratio, Random Urine   <20 mg/g . . . . .. . . . Normal    mg/g . . . . . . . Microalbuminuria   >300 mg/g . . . . . . . . Clinical Proteinuria   T4 free   Result Value Ref Range    Free T4 1.00 0.70 - 1.80 ng/dL      Comment:      Performance of the Free T4 test has not been established with  specimens (<= 2 months of age).         If you have any questions or concerns, please call the clinic at the number listed above.       Sincerely,      Luis Daniel Maciel MD

## 2021-11-03 NOTE — PROGRESS NOTES
SUBJECTIVE:   CC: Raghavendra Kiser is an 61 year old male who presents for preventative health visit.       Patient has been advised of split billing requirements and indicates understanding: Yes  Healthy Habits:     Getting at least 3 servings of Calcium per day:  NO    Bi-annual eye exam:  Yes    Dental care twice a year:  Yes    Sleep apnea or symptoms of sleep apnea:  None    Diet:  Low salt    Frequency of exercise:  1 day/week    Duration of exercise:  Less than 15 minutes    Taking medications regularly:  Yes    Barriers to taking medications:  None    Medication side effects:  None    PHQ-2 Total Score: 0    Additional concerns today:  No        Today's PHQ-2 Score:   PHQ-2 (  [employer]) 11/3/2021   Q1: Little interest or pleasure in doing things 0   Q2: Feeling down, depressed or hopeless 0   PHQ-2 Score 0   Q1: Little interest or pleasure in doing things Not at all   Q2: Feeling down, depressed or hopeless Not at all   PHQ-2 Score 0       Abuse: Current or Past(Physical, Sexual or Emotional)- No  Do you feel safe in your environment? Yes        Social History     Tobacco Use     Smoking status: Former Smoker     Packs/day: 2.00     Years: 20.00     Pack years: 40.00     Types: Cigarettes     Quit date: 2/15/1993     Years since quittin.7     Smokeless tobacco: Never Used     Tobacco comment: quit in    Substance Use Topics     Alcohol use: Yes     Alcohol/week: 0.0 standard drinks     Comment: Alcoholic Drinks/day: rarely     If you drink alcohol do you typically have >3 drinks per day or >7 drinks per week? No    Alcohol Use 11/3/2021   Prescreen: >3 drinks/day or >7 drinks/week? No   Prescreen: >3 drinks/day or >7 drinks/week? -   No flowsheet data found.    Last PSA:   Prostate Specific Antigen Screen   Date Value Ref Range Status   2020 2.0 0.0 - 4.5 ng/mL Final       Reviewed and updated as needed this visit by clinical staff  Tobacco  Allergies  Meds          quit tobacco  1993  Alcohol is very little  (<1 drink per week)    Reviewed and updated as needed this visit by Provider                Review of Systems  CONSTITUTIONAL: NEGATIVE for fever, chills, change in weight  INTEGUMENTARY/SKIN: NEGATIVE for worrisome rashes, moles or lesions  EYES: NEGATIVE for vision changes or irritation  ENT: ringing in both ears  RESP: NEGATIVE for significant cough or SOB  CV: NEGATIVE for chest pain, palpitations or peripheral edema  GI: NEGATIVE for nausea, abdominal pain, heartburn, or change in bowel habits   male: negative for dysuria, hematuria, decreased urinary stream, erectile dysfunction, urethral discharge  MUSCULOSKELETAL:  About 2 months ago left arm hyperextended by his dog.    NEURO: NEGATIVE for weakness, dizziness or paresthesias  PSYCHIATRIC: NEGATIVE for changes in mood or affect    OBJECTIVE:   /80   Pulse 59   Ht 1.829 m (6')   Wt 102.1 kg (225 lb)   SpO2 97%   BMI 30.52 kg/m    Wt Readings from Last 4 Encounters:   11/03/21 102.1 kg (225 lb)   02/09/21 103.1 kg (227 lb 3.2 oz)   02/09/21 103 kg (227 lb)   01/21/21 102.4 kg (225 lb 12.8 oz)       Physical Exam  GENERAL: healthy, alert and no distress  EYES: Eyes grossly normal to inspection, PERRL and conjunctivae and sclerae normal  HENT: ear canals and TM's normal, nose and mouth without ulcers or lesions  NECK: no adenopathy, no asymmetry, masses, or scars and thyroid normal to palpation  RESP: lungs clear to auscultation - no rales, rhonchi or wheezes  CV: regular rate and rhythm, normal S1 S2, no S3 or S4, no murmur, click or rub, no peripheral edema and peripheral pulses strong  ABDOMEN: soft, nontender, no hepatosplenomegaly, no masses and bowel sounds normal  RECTAL: exam declined in lieu of PSA  MS: no gross musculoskeletal defects noted, no edema  SKIN: no suspicious lesions or rashes  NEURO: Normal strength and tone, mentation intact and speech normal  PSYCH: mentation appears normal, affect  normal/bright  FEET:  MF TESTING: NL              SKIN EXAM:  NL              VASCULAR:   R DP  PULSE: +                                      L DP  PULSE: +                                      R PT  PULSE: -                                      L PT  PULSE: -      Prostate Specific Antigen Screen   Date Value Ref Range Status   11/02/2020 2.0 0.0 - 4.5 ng/mL Final     Lab Results   Component Value Date    A1C 5.7 05/28/2021    A1C 5.6 12/24/2020    A1C 5.5 10/15/2020    A1C 5.7 07/07/2020    A1C 6.9 01/03/2020     TSH   Date Value Ref Range Status   11/02/2020 3.91 0.30 - 5.00 uIU/mL Final         ASSESSMENT/PLAN:       ICD-10-CM    1. Screening PSA (prostate specific antigen)  Z12.5 PSA, screen   2. Health Care Home  Z76.89 REVIEW OF HEALTH MAINTENANCE PROTOCOL ORDERS     Z11.59      Z11.4    5. Screening for hyperlipidemia, stable on statin Z13.220 Lipid panel reflex to direct LDL Fasting   6. Hypothyroidism, unspecified type, stable on levothyroxine E03.9 TSH with free T4 reflex   7. Type 2 diabetes mellitus without, at goal on metformin complication, without long-term current use of insulin (H)  E11.9 Albumin Random Urine Quantitative with Creat Ratio     Hemoglobin A1c   8. Primary hypertension, controlled I10 Comprehensive metabolic panel (BMP + Alb, Alk Phos, ALT, AST, Total. Bili, TP)   9. Cerebrovascular accident (CVA), unspecified mechanism (H), statble on statin and dual anti-platelet therapy I63.9 rosuvastatin (CRESTOR) 5 MG tablet     divalproex sodium delayed-release (DEPAKOTE) 500 MG DR tablet   10. Chronic gout without tophus, unspecified cause, unspecified site, stable on allopurinol M1A.9XX0 Uric acid     PLAN:     Colonoscopy due in Jan 2023 which would be 3 years as he was told (notes say 5 years).     Fasting labs.    Change depakote to every other day for a month.  If doing well then may stop this.     Referral for an audiology exam    Call me if interested in an ENT referral or Orthopedic  referral are desired.   Patient has been advised of split billing requirements and indicates understanding: Yes       COUNSELING:   Reviewed preventive health counseling, as reflected in patient instructions       Regular exercise       Healthy diet/nutrition       Colon cancer screening       Prostate cancer screening    Estimated body mass index is 30.52 kg/m  as calculated from the following:    Height as of this encounter: 1.829 m (6').    Weight as of this encounter: 102.1 kg (225 lb).         He reports that he quit smoking about 28 years ago. His smoking use included cigarettes. He has a 40.00 pack-year smoking history. He has never used smokeless tobacco.        Luis Daniel Maciel MD  [facility]

## 2021-12-16 DIAGNOSIS — I63.9 CEREBROVASCULAR ACCIDENT (CVA), UNSPECIFIED MECHANISM (H): ICD-10-CM

## 2021-12-16 RX ORDER — ROSUVASTATIN CALCIUM 5 MG/1
5 TABLET, COATED ORAL DAILY
Qty: 30 TABLET | Refills: 3 | Status: SHIPPED | OUTPATIENT
Start: 2021-12-16 | End: 2022-01-10

## 2021-12-16 NOTE — TELEPHONE ENCOUNTER
"Incoming call from patient needing Dr. Maciel to send a refill to [facility] for the rosuvastatin.     I looked at it looks like it was \"no print out\" instead. Pt has been out for 2 weeks now. Pt is wondering if provider can resend the rx to [facility] and send a week or so rx to [facility] in [place] on [address] [facility] for him until he [facility] mail out the rx to him.     Patient would like to pick this up ASAP today. PLEASE call patient to let him know once rx is sent to [facility] so he can head to pick it up.   "

## 2021-12-29 ENCOUNTER — TRANSFERRED RECORDS (OUTPATIENT)
Dept: HEALTH INFORMATION MANAGEMENT | Facility: CLINIC | Age: 61
End: 2021-12-29
Payer: OTHER GOVERNMENT

## 2022-01-10 ENCOUNTER — MYC MEDICAL ADVICE (OUTPATIENT)
Dept: FAMILY MEDICINE | Facility: CLINIC | Age: 62
End: 2022-01-10
Payer: OTHER GOVERNMENT

## 2022-01-10 DIAGNOSIS — I63.9 CEREBROVASCULAR ACCIDENT (CVA), UNSPECIFIED MECHANISM (H): ICD-10-CM

## 2022-01-10 RX ORDER — ROSUVASTATIN CALCIUM 5 MG/1
5 TABLET, COATED ORAL DAILY
Qty: 90 TABLET | Refills: 3 | Status: SHIPPED | OUTPATIENT
Start: 2022-01-10 | End: 2022-12-19

## 2022-01-10 NOTE — TELEPHONE ENCOUNTER
Last Rx for [facility] was done as no print. I pended the long term Rx for [facility] to be sent.    Please also send another 2 week supply to [facility] once the first one is sent to [facility]

## 2022-01-14 ENCOUNTER — APPOINTMENT (OUTPATIENT)
Dept: MRI IMAGING | Facility: HOSPITAL | Age: 62
End: 2022-01-14
Attending: EMERGENCY MEDICINE
Payer: OTHER GOVERNMENT

## 2022-01-14 ENCOUNTER — HOSPITAL ENCOUNTER (EMERGENCY)
Facility: HOSPITAL | Age: 62
Discharge: HOME | End: 2022-01-15
Attending: EMERGENCY MEDICINE | Admitting: EMERGENCY MEDICINE
Payer: OTHER GOVERNMENT

## 2022-01-14 DIAGNOSIS — R53.1 WEAKNESS: ICD-10-CM

## 2022-01-14 DIAGNOSIS — R11.0 NAUSEA: ICD-10-CM

## 2022-01-14 LAB
ANION GAP SERPL CALCULATED.3IONS-SCNC: 14 MMOL/L (ref 5–18)
APTT PPP: 23 SECONDS (ref 22–38)
BUN SERPL-MCNC: 16 MG/DL (ref 8–22)
CALCIUM SERPL-MCNC: 9.3 MG/DL (ref 8.5–10.5)
CHLORIDE BLD-SCNC: 105 MMOL/L (ref 98–107)
CO2 SERPL-SCNC: 22 MMOL/L (ref 22–31)
CREAT SERPL-MCNC: 1.1 MG/DL (ref 0.7–1.3)
ERYTHROCYTE [DISTWIDTH] IN BLOOD BY AUTOMATED COUNT: 13.2 % (ref 10–15)
GFR SERPL CREATININE-BSD FRML MDRD: 75 ML/MIN/1.73M2
GLUCOSE BLD-MCNC: 178 MG/DL (ref 70–125)
HCT VFR BLD AUTO: 42.2 % (ref 40–53)
HGB BLD-MCNC: 14.6 G/DL (ref 13.3–17.7)
INR PPP: 1.02 (ref 0.9–1.15)
MCH RBC QN AUTO: 33.3 PG (ref 26.5–33)
MCHC RBC AUTO-ENTMCNC: 34.6 G/DL (ref 31.5–36.5)
MCV RBC AUTO: 96 FL (ref 78–100)
PLATELET # BLD AUTO: 197 10E3/UL (ref 150–450)
POTASSIUM BLD-SCNC: 3.6 MMOL/L (ref 3.5–5)
RBC # BLD AUTO: 4.38 10E6/UL (ref 4.4–5.9)
SODIUM SERPL-SCNC: 141 MMOL/L (ref 136–145)
TROPONIN I SERPL-MCNC: <0.01 NG/ML (ref 0–0.29)
WBC # BLD AUTO: 10.7 10E3/UL (ref 4–11)

## 2022-01-14 PROCEDURE — 85730 THROMBOPLASTIN TIME PARTIAL: CPT | Performed by: EMERGENCY MEDICINE

## 2022-01-14 PROCEDURE — 36415 COLL VENOUS BLD VENIPUNCTURE: CPT | Performed by: EMERGENCY MEDICINE

## 2022-01-14 PROCEDURE — 70544 MR ANGIOGRAPHY HEAD W/O DYE: CPT | Mod: XS

## 2022-01-14 PROCEDURE — 85610 PROTHROMBIN TIME: CPT | Performed by: EMERGENCY MEDICINE

## 2022-01-14 PROCEDURE — 70549 MR ANGIOGRAPH NECK W/O&W/DYE: CPT

## 2022-01-14 PROCEDURE — 85027 COMPLETE CBC AUTOMATED: CPT | Performed by: EMERGENCY MEDICINE

## 2022-01-14 PROCEDURE — A9585 GADOBUTROL INJECTION: HCPCS | Performed by: EMERGENCY MEDICINE

## 2022-01-14 PROCEDURE — 93005 ELECTROCARDIOGRAM TRACING: CPT | Performed by: EMERGENCY MEDICINE

## 2022-01-14 PROCEDURE — 255N000002 HC RX 255 OP 636: Performed by: EMERGENCY MEDICINE

## 2022-01-14 PROCEDURE — 80048 BASIC METABOLIC PNL TOTAL CA: CPT | Performed by: EMERGENCY MEDICINE

## 2022-01-14 PROCEDURE — 70553 MRI BRAIN STEM W/O & W/DYE: CPT

## 2022-01-14 PROCEDURE — 99285 EMERGENCY DEPT VISIT HI MDM: CPT | Mod: 25

## 2022-01-14 PROCEDURE — 84484 ASSAY OF TROPONIN QUANT: CPT | Performed by: EMERGENCY MEDICINE

## 2022-01-14 RX ORDER — GADOBUTROL 604.72 MG/ML
10 INJECTION INTRAVENOUS ONCE
Status: COMPLETED | OUTPATIENT
Start: 2022-01-14 | End: 2022-01-14

## 2022-01-14 RX ADMIN — GADOBUTROL 10 ML: 604.72 INJECTION INTRAVENOUS at 22:28

## 2022-01-14 ASSESSMENT — MIFFLIN-ST. JEOR: SCORE: 1871.27

## 2022-01-15 VITALS
HEART RATE: 77 BPM | WEIGHT: 230 LBS | HEIGHT: 72 IN | BODY MASS INDEX: 31.15 KG/M2 | SYSTOLIC BLOOD PRESSURE: 140 MMHG | TEMPERATURE: 98.3 F | DIASTOLIC BLOOD PRESSURE: 76 MMHG | RESPIRATION RATE: 14 BRPM | OXYGEN SATURATION: 97 %

## 2022-01-15 LAB
ATRIAL RATE - MUSE: 74 BPM
ATRIAL RATE - MUSE: 80 BPM
DIASTOLIC BLOOD PRESSURE - MUSE: NORMAL MMHG
DIASTOLIC BLOOD PRESSURE - MUSE: NORMAL MMHG
INTERPRETATION ECG - MUSE: NORMAL
INTERPRETATION ECG - MUSE: NORMAL
P AXIS - MUSE: 62 DEGREES
P AXIS - MUSE: 63 DEGREES
PR INTERVAL - MUSE: 168 MS
PR INTERVAL - MUSE: 184 MS
QRS DURATION - MUSE: 78 MS
QRS DURATION - MUSE: 92 MS
QT - MUSE: 384 MS
QT - MUSE: 394 MS
QTC - MUSE: 426 MS
QTC - MUSE: 454 MS
R AXIS - MUSE: -2 DEGREES
R AXIS - MUSE: 11 DEGREES
SYSTOLIC BLOOD PRESSURE - MUSE: NORMAL MMHG
SYSTOLIC BLOOD PRESSURE - MUSE: NORMAL MMHG
T AXIS - MUSE: 27 DEGREES
T AXIS - MUSE: 37 DEGREES
TROPONIN I SERPL-MCNC: 0.01 NG/ML (ref 0–0.29)
VENTRICULAR RATE- MUSE: 74 BPM
VENTRICULAR RATE- MUSE: 80 BPM

## 2022-01-15 PROCEDURE — 93005 ELECTROCARDIOGRAM TRACING: CPT | Performed by: EMERGENCY MEDICINE

## 2022-01-15 PROCEDURE — 36415 COLL VENOUS BLD VENIPUNCTURE: CPT | Performed by: EMERGENCY MEDICINE

## 2022-01-15 PROCEDURE — 84484 ASSAY OF TROPONIN QUANT: CPT | Performed by: EMERGENCY MEDICINE

## 2022-01-15 RX ORDER — ONDANSETRON 4 MG/1
4 TABLET, ORALLY DISINTEGRATING ORAL EVERY 8 HOURS PRN
Qty: 10 TABLET | Refills: 0 | Status: SHIPPED | OUTPATIENT
Start: 2022-01-15 | End: 2022-01-18

## 2022-01-15 NOTE — CONSULTS
"    [facility]    Stroke Telephone Note    I was called by Kylie Ribeiro on 01/14/22 regarding patient Raghavendra Kiser. The patient is a 62 year old male with past medical hx of L MCA stroke on plavix. Pt had drinks with dinner and came back home and felt that he was going to collapse and die so called EMS. Exam shows no new focal deficits. Old R sided weakness from previous stroke and trouble thinking.    Imaging Findings   Pending    Impression  Weakness    Recommendations   MRI brain without contrast  MRA head and neck   If negative then no further work up needed.    My recommendations are based on the information provided over the phone by Raghavendra Kiser's in-person providers. They are not intended to replace the clinical judgment of his in-person providers. I was not requested to personally see or examine the patient at this time.    Az Edmond MD  Vascular Neurology  To page me or covering stroke neurology team member, click here: [facility]   Choose \"On Call\" tab at top, then search dropdown box for \"Neurology Adult\", select location, press Enter, then look for stroke/neuro ICU/telestroke.           "

## 2022-01-15 NOTE — ED TRIAGE NOTES
"ER MD evaluating pt. Medics called, pt \"felt like I was going to collapse\". Pt stated started at 1930. Pt complains of some increased weakness in right arm and leg. Pt has history of DM, blood sugar per medics= 185. Pt also has history of stroke 1 year ago with residual with droopy right eye and slight weakness in right arm and leg. Pt states \"had 3 drinks when out to eat with my friend\". Pt also states \"did smoke some marijuana when I usually take CBD gummies occasionally\". Medics state \"negative Cinninati\". Medics gave pt zofran for nausea/ vomiiting which suddenly came on. Medics concerned because blood pressure was elevated--171/76. First impression of pt, having some concentration, word-finding and c/o imbalance and dizziness. Pt's code for phone--[id number]. Pt is on plavix, denies any injury. Pt had some global amnesia when he first arrived--resolved. Pt is just sleepy now.  "

## 2022-01-15 NOTE — ED PROVIDER NOTES
ED SIGNOUT  Date/Time:1/14/2022 9:36 PM    Patient signed out to me by my colleague, Dr. Ribeiro.  Please see their note for complete history and physical. Plan to follow up on MRI brain, MRA head and neck.    The creation of this record is based on the scribe s observations of the work being performed by Carol Roy DO and the provider s statements to them. It was created on their behalf by Fouzia Fleming, a trained medical scribe. This document has been checked and approved by the attending provider.      REMAINING ED WORKUP:    Vitals:  /73   Pulse 81   Temp 98.3  F (36.8  C) (Oral)   Resp 17   Ht 1.829 m (6')   Wt 104.3 kg (230 lb)   SpO2 95%   BMI 31.19 kg/m        Pertinent labs results reviewed   Results for orders placed or performed during the hospital encounter of 01/14/22   MRA Neck (Carotids) wo & w Contrast    Impression    IMPRESSION:  HEAD MRI:   1.  No recent infarct, intracranial mass, abnormal enhancement or evidence of intracranial hemorrhage.  2.  There are several small chronic infarcts in the left parietal lobe, posterior left frontal lobe and upper left basal ganglia.  3.  Underlying mild volume loss and presumed chronic small vessel ischemic changes.    HEAD MRA:   1.  Normal MRA [place].    NECK MRA:  1.  No hemodynamically significant stenosis in either proximal internal carotid artery.  2.  Vertebral arteries are patent through the neck and into the head.   MRA Brain ([place]) wo Contrast    Impression    IMPRESSION:  HEAD MRI:   1.  No recent infarct, intracranial mass, abnormal enhancement or evidence of intracranial hemorrhage.  2.  There are several small chronic infarcts in the left parietal lobe, posterior left frontal lobe and upper left basal ganglia.  3.  Underlying mild volume loss and presumed chronic small vessel ischemic changes.    HEAD MRA:   1.  Normal MRA [place].    NECK MRA:  1.  No hemodynamically significant stenosis in  either proximal internal carotid artery.  2.  Vertebral arteries are patent through the neck and into the head.   MR Brain w/o & w Contrast    Impression    IMPRESSION:  HEAD MRI:   1.  No recent infarct, intracranial mass, abnormal enhancement or evidence of intracranial hemorrhage.  2.  There are several small chronic infarcts in the left parietal lobe, posterior left frontal lobe and upper left basal ganglia.  3.  Underlying mild volume loss and presumed chronic small vessel ischemic changes.    HEAD MRA:   1.  Normal MRA [place].    NECK MRA:  1.  No hemodynamically significant stenosis in either proximal internal carotid artery.  2.  Vertebral arteries are patent through the neck and into the head.   CBC (+ platelets, no diff)   Result Value Ref Range    WBC Count 10.7 4.0 - 11.0 10e3/uL    RBC Count 4.38 (L) 4.40 - 5.90 10e6/uL    Hemoglobin 14.6 13.3 - 17.7 g/dL    Hematocrit 42.2 40.0 - 53.0 %    MCV 96 78 - 100 fL    MCH 33.3 (H) 26.5 - 33.0 pg    MCHC 34.6 31.5 - 36.5 g/dL    RDW 13.2 10.0 - 15.0 %    Platelet Count 197 150 - 450 10e3/uL   Basic metabolic panel   Result Value Ref Range    Sodium 141 136 - 145 mmol/L    Potassium 3.6 3.5 - 5.0 mmol/L    Chloride 105 98 - 107 mmol/L    Carbon Dioxide (CO2) 22 22 - 31 mmol/L    Anion Gap 14 5 - 18 mmol/L    Urea Nitrogen 16 8 - 22 mg/dL    Creatinine 1.10 0.70 - 1.30 mg/dL    Calcium 9.3 8.5 - 10.5 mg/dL    Glucose 178 (H) 70 - 125 mg/dL    GFR Estimate 75 >60 mL/min/1.73m2   Troponin I (now)   Result Value Ref Range    Troponin I <0.01 0.00 - 0.29 ng/mL   Result Value Ref Range    INR 1.02 0.90 - 1.15   Result Value Ref Range    aPTT 23 22 - 38 Seconds       Pertinent imaging reviewed   Please see official radiology report.  MRA Neck (Carotids) wo & w Contrast   Preliminary Result   IMPRESSION:   HEAD MRI:    1.  No recent infarct, intracranial mass, abnormal enhancement or evidence of intracranial hemorrhage.   2.  There are several small chronic  infarcts in the left parietal lobe, posterior left frontal lobe and upper left basal ganglia.   3.  Underlying mild volume loss and presumed chronic small vessel ischemic changes.      HEAD MRA:    1.  Normal MRA [place].      NECK MRA:   1.  No hemodynamically significant stenosis in either proximal internal carotid artery.   2.  Vertebral arteries are patent through the neck and into the head.      MRA Brain ([place]) wo Contrast   Preliminary Result   IMPRESSION:   HEAD MRI:    1.  No recent infarct, intracranial mass, abnormal enhancement or evidence of intracranial hemorrhage.   2.  There are several small chronic infarcts in the left parietal lobe, posterior left frontal lobe and upper left basal ganglia.   3.  Underlying mild volume loss and presumed chronic small vessel ischemic changes.      HEAD MRA:    1.  Normal MRA [place].      NECK MRA:   1.  No hemodynamically significant stenosis in either proximal internal carotid artery.   2.  Vertebral arteries are patent through the neck and into the head.      MR Brain w/o & w Contrast   Preliminary Result   IMPRESSION:   HEAD MRI:    1.  No recent infarct, intracranial mass, abnormal enhancement or evidence of intracranial hemorrhage.   2.  There are several small chronic infarcts in the left parietal lobe, posterior left frontal lobe and upper left basal ganglia.   3.  Underlying mild volume loss and presumed chronic small vessel ischemic changes.      HEAD MRA:    1.  Normal MRA [place].      NECK MRA:   1.  No hemodynamically significant stenosis in either proximal internal carotid artery.   2.  Vertebral arteries are patent through the neck and into the head.           Interventions  Medications   gadobutrol (GADAVIST) injection 10 mL (10 mLs Intravenous Given 1/14/22 5196)      EKG  00:30 normal sinus rhythm.  Rate of 74.  Normal QRS.  Normal QT.  No ST or T wave changes.  Compared to the previous EKG no significant  changes are noted      ED Course/MDM:  62-year-old male with a history of CVA with resulting right-sided weakness, diabetes, hyperlipidemia, and hypertension who presented to the ED for evaluation following an an episode where he felt that he was going to pass out was checked out to me by Dr. Ribeiro.  Because there was a concern for a possible stroke and MRI was obtained after discussed the case with the neurologist.  At the time of checkout the MRI/MRI was still pending    The patient's EKG, troponin, CBC and BMP were all reassuring.    The MRI/MRA of the head and neck did not show evidence of an acute infarction, hemorrhage, mass, or any other new or concerning findings.  There were no areas of significant stenosis noted on the MRA.     The patient was reevaluated and informed of the reassuring MRI/MRI results.  At the time of reevaluation the patient stated that his nausea and vomiting and the feeling that he was going to pass out have all resolved.      A 3-hour repeat EKG and troponin were obtained and both were unchanged.  The patient was again reevaluated and informed of the reassuring EKG and troponin results.  Patient still remained asymptomatic at the time of reevaluation.  Although the exact cause of the patient's symptoms remains unclear, a vasovagal episode secondary to nausea and vomiting is suspected at this time.    After reassuring the patient he felt comfortable returning home.  The patient was sent home with Zofran to use as needed for any further episodes of nausea vomiting.  He was instructed to follow-up with his primary care physician for reevaluation or to return back to ED sooner for any worsening nausea and vomiting, near-syncope or syncope, or any other new or concerning symptoms.       9:35PM Signout accepted from Dr. Ribeiro.  Prior records were reviewed.  Diagnostics from this visit are reviewed.        IMPRESSION  1. Weakness    2. Nausea          DO Chanel Dubon  [facility] Emergency Department        Carol Roy DO  01/15/22 0053

## 2022-01-15 NOTE — DISCHARGE INSTRUCTIONS
The MRI of your head and neck appear reassuring without evidence of a stroke.  The laboratory tests and EKG also appear reassuring.  Take the prescribed Zofran as needed for any further episodes of nausea or vomiting.  Please follow-up with your primary care physician for routine reevaluation or return back to ED sooner for any worsening nausea and vomiting, near syncope or syncope, dizziness, chest pain, shortness of breath, or any other new or concerning symptoms.

## 2022-01-15 NOTE — ED PROVIDER NOTES
"  Emergency Department Encounter     Evaluation Date & Time:   No admission date for patient encounter.    CHIEF COMPLAINT:  Stroke-Like Symptoms and Nausea & Vomiting      Triage Note:No notes on file      Impression and Plan       FINAL IMPRESSION:    ICD-10-CM    1. Weakness  R53.1    2. Nausea  R11.0          ED COURSE & MEDICAL DECISION MAKIN:06 PM I met with the patient, obtained history, performed an initial exam, and discussed options and plan for diagnostics and treatment here in the ED.   8:22 PM I spoke with Dr. Edmond, neurology  9:30 PM I signed out the patient to Dr. Roy, follow-up MRI.    62 year old male, history of previous stroke (L MCA one year ago, on Plavix), DM2, HTN, HLD, hypothyroidism and gout, who presents for evaluation of stroke-like symptoms and nausea. He had just returned home from dinner ~45 minutes when he felt \"like I was going to die\". He thought that he was going to collapse and called EMS as he did not want \"someone to find me at home dead\".     He has chronic right-sided weakness from stroke and weakness feels a little worse than baseline. He has mild dizziness; denies headache, vision changes, extremity paresthesias, difficulty with speech or swallowing. Reports some nausea without vomiting. No chest pain, SOB, recent illness.     On exam, he has mild right-sided facial droop (from previous stroke); cranial nerves III-XII otherwise intact.  Strength 5/5 BL upper and lower extremities with sensation to light touch grossly intact. Slight right arm drift, however he quickly corrects. Normal finger-nose-finger testing without dysmetria or overshooting. No ataxia on heel-shin testing.    Given no apparent acute deficits, I consulted with the stroke neurologist who agreed with plan to obtain limited brain MRI with MRA head and neck.    EKG performed and demonstrated NSR with no ischemic changes; troponin WNL (<0.01).  I do not have high suspicion for cardiac etiology of " "symptoms; could consider repeat EKG / troponin if patient still not feeling well.    Labs otherwise remarkable for no leukocytosis, anemia, significant electrolyte derangements or renal impairment.  He has mild hyperglycemia (glucose 178) with no acidosis or elevated anion gap to suggest DKA. Coags WNL.    MRI / MRA pending at time of shift change.  Patient signed out to Dr. Roy at time of shift change. If imaging is unremarkable, anticipate discharge to home. Patient stable thus far in ED course.        At the conclusion of the encounter I discussed the results of all the tests and the disposition. The questions were answered. The patient acknowledged understanding and was agreeable with the care plan.      MEDICATIONS GIVEN IN THE EMERGENCY DEPARTMENT:  Medications - No data to display    NEW PRESCRIPTIONS STARTED AT TODAY'S ED VISIT:  New Prescriptions    No medications on file       HPI     HPI     Raghavendra Kiser is a 62 year old male, history of previous stroke (L MCA one year ago, on Plavix), DM2, HTN, HLD, hypothyroidism and gout, who presents to this ED via EMS for evaluation of stroke-like symptoms and nausea.    Patient had been out to dinner and, upon his return home ~7:30pm (45 minutes ago), he felt \"like I was going to die\". He states that he felt as though he might collapse and called medics as he did not want someone to find him dead 2 days from now. He reports chronic right-sided facial droop and right-sided weakness due to his previous stroke; states that right arm weakness has felt somewhat worse than baseline. He reports mild dizziness. No headache, vision changes, paresthesias, difficulty with speech or swallowing.    He reports some nausea, without vomiting.  He otherwise denies chest pain, SOB, abdominal pain, diarrhea, cough, fever or other concerns.    Does report that he had 2 old-fashioned drinks and a beer.     REVIEW OF SYSTEMS:  All other systems reviewed and are " negative.      Medical History     Past Medical History:   Diagnosis Date     Atherosclerosis of right carotid artery      Carotid stenosis, bilateral      Cervical radiculopathy      Chronic gout      Diabetes (H)      Fracture of right humerus      Ganglion cyst      High cholesterol      History of stroke without residual deficits      Humerus fracture      Hyperlipidemia      Hypertension      Hypertension      Hypothyroidism      Medial epicondylitis      Shoulder tendinitis, right      Stroke (H)      Superficial venous thrombosis of arm      TIA (transient ischemic attack) 5/16/15     Tinnitus      Type 2 diabetes mellitus without complication, without long-term current use of insulin (H) 2019       Past Surgical History:   Procedure Laterality Date     CAROTID ENDARTERECTOMY       IR LUMBAR PUNCTURE  2020     IR SPINAL PUNCTURE  2020     PICC INSERTION - TRIPLE LUMEN  2020          TONSILLECTOMY       ZZC THROMBOENDARTECTMY NECK,NECK INCIS Right 2015    Procedure: Right Carotid Endarterectomy with Impulse Monitoring;  Surgeon: Marcellus Toth MD;  Location: [facility];  Service: General       Family History   Problem Relation Age of Onset     Stomach Cancer Mother      Lung Cancer Mother      Throat cancer Father      Lung Cancer Father      Cancer Mother         Lung     Cancer Father         Lung     Cancer Sister         Llung cancer     Heart Disease Brother          of a heart attack.     Diabetes Type 1 Brother      No Known Problems Son      No Known Problems Sister      No Known Problems Sister      No Known Problems Sister      No Known Problems Sister      Diabetes Brother      No Known Problems Brother        Social History     Tobacco Use     Smoking status: Former Smoker     Packs/day: 2.00     Years: 20.00     Pack years: 40.00     Types: Cigarettes     Quit date: 2/15/1993     Years since quittin.9     Smokeless tobacco: Never Used     Tobacco  comment: quit in 1993   Substance Use Topics     Alcohol use: Yes     Alcohol/week: 0.0 standard drinks     Comment: Alcoholic Drinks/day: rarely     Drug use: No       allopurinol (ZYLOPRIM) 300 MG tablet  amLODIPine (NORVASC) 5 MG tablet  aspirin (ASA) 81 MG chewable tablet  blood glucose monitoring (FREESTYLE) lancets  Cholecalciferol 100 MCG (4000 UT) CAPS  clopidogrel (PLAVIX) 75 MG tablet  divalproex sodium delayed-release (DEPAKOTE) 500 MG DR tablet  divalproex sodium extended-release (DEPAKOTE ER) 500 MG 24 hr tablet  FREESTYLE LITE test strip  levothyroxine (SYNTHROID/LEVOTHROID) 125 MCG tablet  lisinopril (ZESTRIL) 20 MG tablet  metFORMIN (GLUCOPHAGE-XR) 500 MG 24 hr tablet  rosuvastatin (CRESTOR) 5 MG tablet        Physical Exam     First Vitals:  Patient Vitals for the past 24 hrs:   BP Temp Temp src Pulse Resp SpO2 Height Weight   01/14/22 2009 135/69 98.3  F (36.8  C) Oral 92 16 96 % 1.829 m (6') 104.3 kg (230 lb)       PHYSICAL EXAM:   Physical Exam    GENERAL: Awake, alert.  In mild acute distress; patient is holding emesis bag.   HEENT: Normocephalic, atraumatic. Pupils equal, round and reactive. Conjunctiva normal. EOMI without nystagmus. Tongue is midline.  NECK: No stridor.  PULMONARY: Symmetrical breath sounds without distress.  Lungs clear to auscultation bilaterally without wheezes, rhonchi or rales.  CARDIO: Regular rate and rhythm.  No significant murmur, rub or gallop.  Radial pulses strong and symmetrical.  ABDOMINAL: Abdomen soft, non-distended and non-tender to palpation.    EXTREMITIES: No lower extremity swelling or edema.      NEURO: Alert and oriented to person, place and time.  Mild right-sided facial droop (from previous stroke); cranial nerves III-XII otherwise intact.  Strength 5/5 BL upper and lower extremities with sensation to light touch grossly intact. Slight right arm drift, however he quickly corrects. Normal finger-nose-finger testing without dysmetria or overshooting. No  ataxia on heel-shin testing.   PSYCH: Normal mood and affect.  SKIN: No rashes.     Results     LAB:  All pertinent labs reviewed and interpreted  Labs Ordered and Resulted from Time of ED Arrival to Time of ED Departure   CBC WITH PLATELETS - Abnormal       Result Value    WBC Count 10.7      RBC Count 4.38 (*)     Hemoglobin 14.6      Hematocrit 42.2      MCV 96      MCH 33.3 (*)     MCHC 34.6      RDW 13.2      Platelet Count 197     BASIC METABOLIC PANEL - Abnormal    Sodium 141      Potassium 3.6      Chloride 105      Carbon Dioxide (CO2) 22      Anion Gap 14      Urea Nitrogen 16      Creatinine 1.10      Calcium 9.3      Glucose 178 (*)     GFR Estimate 75     TROPONIN I - Normal    Troponin I <0.01     INR - Normal    INR 1.02     PARTIAL THROMBOPLASTIN TIME - Normal    aPTT 23         RADIOLOGY:  MRA Neck (Carotids) wo & w Contrast    (Results Pending)   MRA Brain ([place]) wo Contrast    (Results Pending)   MR Brain w/o & w Contrast    (Results Pending)       EC2022, 21:09; NSR with rate of 80 bpm; normal intervals; normal conduction; no ST-T wave changes consistent with ACS or pericarditis; compared to previous EKG dated 2020, there are no significant changes    EKG independently reviewed and interpreted by MD Kylie Caldwell MD  Emergency Medicine  [facility] EMERGENCY DEPARTMENT         Kylie Ribeiro MD  22 [id number]

## 2022-01-25 ENCOUNTER — OFFICE VISIT (OUTPATIENT)
Dept: FAMILY MEDICINE | Facility: CLINIC | Age: 62
End: 2022-01-25
Payer: OTHER GOVERNMENT

## 2022-01-25 VITALS
DIASTOLIC BLOOD PRESSURE: 74 MMHG | RESPIRATION RATE: 16 BRPM | WEIGHT: 230 LBS | BODY MASS INDEX: 31.19 KG/M2 | HEART RATE: 62 BPM | SYSTOLIC BLOOD PRESSURE: 128 MMHG

## 2022-01-25 DIAGNOSIS — Z13.220 SCREENING FOR HYPERLIPIDEMIA: ICD-10-CM

## 2022-01-25 DIAGNOSIS — E11.9 TYPE 2 DIABETES MELLITUS WITHOUT COMPLICATION, WITHOUT LONG-TERM CURRENT USE OF INSULIN (H): ICD-10-CM

## 2022-01-25 DIAGNOSIS — I10 ESSENTIAL HYPERTENSION: ICD-10-CM

## 2022-01-25 PROCEDURE — 99214 OFFICE O/P EST MOD 30 MIN: CPT | Performed by: FAMILY MEDICINE

## 2022-01-25 NOTE — PROGRESS NOTES
DIAGNOSIS:  Encounter Diagnoses   Name Primary?     Essential hypertension, WELL CONTROLLED Yes     Type 2 diabetes mellitus without complication, without long-term current use of insulin (H), WELL CONTROLLED      Screening for hyperlipidemia, WELL CONTROLLED ON STATIN         PLAN:       Check an A1C in 3 months.    Continue present meds       HPI:    BP ave about 140/70        Current Outpatient Medications   Medication     allopurinol (ZYLOPRIM) 300 MG tablet     amLODIPine (NORVASC) 5 MG tablet     aspirin (ASA) 81 MG chewable tablet     blood glucose monitoring (FREESTYLE) lancets     Cholecalciferol 100 MCG (4000 UT) CAPS     clopidogrel (PLAVIX) 75 MG tablet     divalproex sodium extended-release (DEPAKOTE ER) 500 MG 24 hr tablet     FREESTYLE LITE test strip     levothyroxine (SYNTHROID/LEVOTHROID) 125 MCG tablet     lisinopril (ZESTRIL) 20 MG tablet     metFORMIN (GLUCOPHAGE-XR) 500 MG 24 hr tablet     rosuvastatin (CRESTOR) 5 MG tablet     divalproex sodium delayed-release (DEPAKOTE) 500 MG DR tablet     No current facility-administered medications for this visit.       Pmh: reviewed  Psh: reviewed  Allergy:  reviewed      EXAM:    /74   Pulse 62   Resp 16   Wt 104.3 kg (230 lb)   BMI 31.19 kg/m       Wt Readings from Last 4 Encounters:   01/25/22 104.3 kg (230 lb)   01/14/22 104.3 kg (230 lb)   11/03/21 102.1 kg (225 lb)   02/09/21 103.1 kg (227 lb 3.2 oz)     GEN:   ALERT, NAD, ORIENTED TIMES THREE  NECK: SUPPLE WITHOUT ADENOPATHY OR THYROMEGALY  LUNGS: CTA  COR: RRR WITHOUT MURMUR  EXT: WITHOUT EDEMA/SWELLING    Lab Results   Component Value Date    A1C 5.4 11/03/2021    A1C 5.7 05/28/2021    A1C 5.6 12/24/2020    A1C 5.5 10/15/2020    A1C 5.7 07/07/2020     Lab Results   Component Value Date    CHOL 131 11/03/2021     Lab Results   Component Value Date    HDL 25 11/03/2021     Lab Results   Component Value Date    LDL 38 11/03/2021    LDL 36 11/08/2019     Lab Results   Component Value Date     TRIG 342 11/03/2021     No results found for: CARMEN

## 2022-01-26 ENCOUNTER — OFFICE VISIT (OUTPATIENT)
Dept: AUDIOLOGY | Facility: CLINIC | Age: 62
End: 2022-01-26
Attending: FAMILY MEDICINE
Payer: OTHER GOVERNMENT

## 2022-01-26 ENCOUNTER — OFFICE VISIT (OUTPATIENT)
Dept: OTOLARYNGOLOGY | Facility: CLINIC | Age: 62
End: 2022-01-26
Attending: FAMILY MEDICINE
Payer: OTHER GOVERNMENT

## 2022-01-26 DIAGNOSIS — H90.3 SENSORINEURAL HEARING LOSS (SNHL) OF BOTH EARS: Primary | ICD-10-CM

## 2022-01-26 DIAGNOSIS — H93.13 TINNITUS, BILATERAL: ICD-10-CM

## 2022-01-26 DIAGNOSIS — H90.3 SENSORINEURAL HEARING LOSS, BILATERAL: Primary | ICD-10-CM

## 2022-01-26 DIAGNOSIS — H93.13 TINNITUS OF BOTH EARS: ICD-10-CM

## 2022-01-26 PROCEDURE — 92550 TYMPANOMETRY & REFLEX THRESH: CPT | Mod: 52 | Performed by: AUDIOLOGIST

## 2022-01-26 PROCEDURE — 99203 OFFICE O/P NEW LOW 30 MIN: CPT | Mod: 25 | Performed by: OTOLARYNGOLOGY

## 2022-01-26 PROCEDURE — 99207 PR NO CHARGE LOS: CPT | Performed by: AUDIOLOGIST

## 2022-01-26 PROCEDURE — 92557 COMPREHENSIVE HEARING TEST: CPT | Performed by: AUDIOLOGIST

## 2022-01-26 NOTE — PROGRESS NOTES
AUDIOLOGY REPORT    SUMMARY: Audiology visit completed. See audiogram for results.     RECOMMENDATIONS: Follow-up with ENT.    Teodora Stephen, [facility]  [place] Licensed Audiologist #[id number]

## 2022-01-26 NOTE — PROGRESS NOTES
HPI: This patient is a 61yo M who presents for evaluation of hearing at the request of Dr. Maciel. There is bilateral, non-pulsatile tinnitus, most noticeable when it is quiet. This has been present for 25 years, but a little over a year ago he suffered a stroke. Since then has been pretty involved in the medical system and has had quite a bit more stress. During that time, the volume of his tinnitus went up quite a bit and occasionally takes on other sound characteristics, non pulsatile. There has been a gradual loss of hearing over the past several years in both ears, most noticeable when in background noise. Denies otalgia, otorrhea, vertigo, and other major medical issues. Has been around significant noise ([employer] for 20yrs) and has no family history.     Past medical history, surgical history, social history, family history, medications, and allergies have been reviewed with the patient and are documented above.    Review of Systems: a 10-system review was performed. Pertinent positives are noted in the HPI and on a separate scanned document in the chart.    PHYSICAL EXAMINATION:  GEN: no acute distress, normocephalic  EYES: extraocular movements are intact, pupils are equal and round. Sclera clear.   EARS: auricles are normally formed. The external auditory canals are clear with minimal to no cerumen. Tympanic membranes are intact bilaterally with no signs of infection, effusion, retractions, or perforations.  NOSE: anterior nares are patent. There are no masses or lesions. The septum is non-obstructing.  OC/OP: clear, dentition is in good repair. The tongue and palate are fully mobile and symmetric. No masses or lesions.  NECK: soft and supple. No lymphadenopathy or masses. Airway is midline.  NEURO: CN VII and XII symmetric. alert and oriented. No spontaneous nystagmus. Gait is normal.  PULM: breathing comfortably on room air, normal chest expansion with respiration  CARDS: no cyanosis or clubbing,  normal carotid pulses    AUDIOGRAM: mild to moderate SNHL with symmetric WRS and Type A tymps    MEDICAL DECISION-MAKING: This patient is a 63yo M with sensorineural hearing loss and resultant tinnitus. Discussed hearing protection and masking techniques. The increase in tinnitus volume since his stroke is most likely attributable to the considerable increase in stress and medications that came along with that event.  Medically clear for hearing aids should the patient desire them.

## 2022-02-28 ENCOUNTER — TELEPHONE (OUTPATIENT)
Dept: FAMILY MEDICINE | Facility: CLINIC | Age: 62
End: 2022-02-28
Payer: OTHER GOVERNMENT

## 2022-02-28 NOTE — TELEPHONE ENCOUNTER
Reason for call:  Patient reporting a symptom    Symptom or request: sciatic pain, rt foot numbess, left shin numbness    Duration (how long have symptoms been present):   1 month    Have you been treated for this before? Only by chiropractor    Additional comments: patient is seeing chiropractor for this but patient doesn't think its helping.  He would like appt with doctor    Phone Number patient can be reached at:  Home number on file [phone] (home)    Best Time:  any    Can we leave a detailed message on this number:  YES    Call taken on 2/28/2022 at 10:35 AM by BECKY CLARK

## 2022-03-01 DIAGNOSIS — I10 PRIMARY HYPERTENSION: Primary | ICD-10-CM

## 2022-03-01 RX ORDER — LISINOPRIL 20 MG/1
TABLET ORAL
Qty: 180 TABLET | Refills: 3 | Status: SHIPPED | OUTPATIENT
Start: 2022-03-01 | End: 2022-07-14

## 2022-03-04 ENCOUNTER — OFFICE VISIT (OUTPATIENT)
Dept: FAMILY MEDICINE | Facility: CLINIC | Age: 62
End: 2022-03-04
Payer: OTHER GOVERNMENT

## 2022-03-04 ENCOUNTER — HOSPITAL ENCOUNTER (OUTPATIENT)
Dept: MRI IMAGING | Facility: HOSPITAL | Age: 62
Discharge: HOME | End: 2022-03-04
Attending: FAMILY MEDICINE | Admitting: FAMILY MEDICINE
Payer: OTHER GOVERNMENT

## 2022-03-04 VITALS
SYSTOLIC BLOOD PRESSURE: 131 MMHG | WEIGHT: 234.6 LBS | RESPIRATION RATE: 16 BRPM | BODY MASS INDEX: 31.82 KG/M2 | DIASTOLIC BLOOD PRESSURE: 71 MMHG | HEART RATE: 63 BPM

## 2022-03-04 DIAGNOSIS — R20.2 BILATERAL LEG PARESTHESIA: ICD-10-CM

## 2022-03-04 PROCEDURE — 99213 OFFICE O/P EST LOW 20 MIN: CPT | Performed by: FAMILY MEDICINE

## 2022-03-04 PROCEDURE — 72148 MRI LUMBAR SPINE W/O DYE: CPT

## 2022-03-07 DIAGNOSIS — M54.16 LUMBAR RADICULOPATHY: Primary | ICD-10-CM

## 2022-03-07 DIAGNOSIS — M48.061 SPINAL STENOSIS OF LUMBAR REGION, UNSPECIFIED WHETHER NEUROGENIC CLAUDICATION PRESENT: ICD-10-CM

## 2022-03-08 ENCOUNTER — HOSPITAL ENCOUNTER (OUTPATIENT)
Dept: RADIOLOGY | Facility: HOSPITAL | Age: 62
Discharge: HOME | End: 2022-03-08
Attending: PHYSICIAN ASSISTANT | Admitting: PHYSICIAN ASSISTANT
Payer: OTHER GOVERNMENT

## 2022-03-08 ENCOUNTER — OFFICE VISIT (OUTPATIENT)
Dept: NEUROSURGERY | Facility: CLINIC | Age: 62
End: 2022-03-08
Payer: OTHER GOVERNMENT

## 2022-03-08 ENCOUNTER — OFFICE VISIT (OUTPATIENT)
Dept: PHYSICAL MEDICINE AND REHAB | Facility: CLINIC | Age: 62
End: 2022-03-08
Attending: FAMILY MEDICINE
Payer: OTHER GOVERNMENT

## 2022-03-08 ENCOUNTER — PREP FOR PROCEDURE (OUTPATIENT)
Dept: NEUROSURGERY | Facility: CLINIC | Age: 62
End: 2022-03-08

## 2022-03-08 VITALS
OXYGEN SATURATION: 98 % | WEIGHT: 232 LBS | HEART RATE: 64 BPM | HEIGHT: 72 IN | SYSTOLIC BLOOD PRESSURE: 163 MMHG | DIASTOLIC BLOOD PRESSURE: 78 MMHG | BODY MASS INDEX: 31.42 KG/M2

## 2022-03-08 VITALS
HEIGHT: 72 IN | WEIGHT: 232.9 LBS | BODY MASS INDEX: 31.54 KG/M2 | HEART RATE: 74 BPM | SYSTOLIC BLOOD PRESSURE: 156 MMHG | TEMPERATURE: 98.2 F | DIASTOLIC BLOOD PRESSURE: 86 MMHG

## 2022-03-08 DIAGNOSIS — M51.26 LUMBAR DISC HERNIATION: ICD-10-CM

## 2022-03-08 DIAGNOSIS — M48.061 SPINAL STENOSIS OF LUMBAR REGION, UNSPECIFIED WHETHER NEUROGENIC CLAUDICATION PRESENT: ICD-10-CM

## 2022-03-08 DIAGNOSIS — M54.16 LUMBAR RADICULOPATHY: ICD-10-CM

## 2022-03-08 DIAGNOSIS — M54.16 LUMBAR RADICULOPATHY: Primary | ICD-10-CM

## 2022-03-08 DIAGNOSIS — Z11.59 ENCOUNTER FOR SCREENING FOR OTHER VIRAL DISEASES: Primary | ICD-10-CM

## 2022-03-08 PROCEDURE — 99204 OFFICE O/P NEW MOD 45 MIN: CPT | Performed by: SURGERY

## 2022-03-08 PROCEDURE — 72120 X-RAY BEND ONLY L-S SPINE: CPT

## 2022-03-08 PROCEDURE — 99204 OFFICE O/P NEW MOD 45 MIN: CPT | Performed by: PHYSICIAN ASSISTANT

## 2022-03-08 RX ORDER — CYCLOBENZAPRINE HCL 10 MG
10 TABLET ORAL 3 TIMES DAILY PRN
Qty: 30 TABLET | Refills: 0 | Status: ON HOLD | OUTPATIENT
Start: 2022-03-08 | End: 2022-03-22

## 2022-03-08 RX ORDER — METHYLPREDNISOLONE 4 MG
TABLET, DOSE PACK ORAL
Qty: 21 TABLET | Refills: 0 | Status: ON HOLD | OUTPATIENT
Start: 2022-03-08 | End: 2022-03-21

## 2022-03-08 RX ORDER — GABAPENTIN 300 MG/1
CAPSULE ORAL
Qty: 90 CAPSULE | Refills: 0 | Status: ON HOLD | OUTPATIENT
Start: 2022-03-08 | End: 2022-03-28

## 2022-03-08 ASSESSMENT — PAIN SCALES - GENERAL: PAINLEVEL: EXTREME PAIN (8)

## 2022-03-08 NOTE — PROGRESS NOTES
NEUROSURGERY CONSULTATION NOTE      Neurosurgery was asked to see this patient by Mohini De Jesus PA-C for evaluation of lumbar radiculopathy, weakness.       CONSULTATION ASSESSMENT AND PLAN:    62-year-old male who presents with bilateral lower extremity pain, numbness tingling, weakness.  Lumbar x-ray shows retrolisthesis of lumbar 1 on 2 and lumbar 23 without any overt instability.  MRI of his lumbar spine shows a broad-based disc herniation at the lumbar 3-4 level with a superimposed left paracentral disc protrusion resulting in severe spinal canal stenosis with left greater than right right lateral recess stenosis.  He also has mild to moderate canal narrowing at the lumbar 4-5 level as well as mild right lateral recess stenosis with foraminal narrowing at the lumbar 5-sacral 1 level.  Lastly at the T11-T12 level he has mild to moderate central canal narrowing with mild cord flattening.  Patient is on Plavix.  Discussed lumbar 3-4 hemilaminectomies with microdiscectomies but will need to be delayed for Plavix discontinuation.  In the meantime recommend Flexeril, Medrol Dosepak, and lumbar 3-4 lumbar 4-5 bilateral transforaminal epidural steroid injection.     I spent more than 45 minutes in this apt, examining the pt, reviewing the scans, reviewing notes from chart, discussing treatment options with risks and benefits and coordinating care.     Abena Duque MD      HPI: 62-year-old male who presents with bilateral lower extremity pain, numbness tingling, weakness.  Patient symptoms began about 1 month ago.  He has bilateral lower extremity pain which goes from his buttocks to his posterior lateral legs.  At times the symptoms remain more proximal and other times can shoot down the legs.  He has constant numbness and tingling in his right foot and shin.  Symptoms improved with sitting. Stretching can also improve. Sitting to standing will worsen symptoms. He at first did not notice much hawa weakness but  for the last several weeks he is noticed to have right foot weakness. Denies any bowel or bladder dysfunction.    Chiropractor care without improvement after multiple sessions.       Past Medical History:   Diagnosis Date     Atherosclerosis of right carotid artery      Carotid stenosis, bilateral      Cervical radiculopathy      Chronic gout      Diabetes (H)      Fracture of right humerus      Ganglion cyst      High cholesterol      History of stroke without residual deficits      Humerus fracture     Right     Hyperlipidemia      Hypertension      Hypertension      Hypothyroidism      Medial epicondylitis      Shoulder tendinitis, right      Stroke (H)      Superficial venous thrombosis of arm     right lateral antecubital fossa     TIA (transient ischemic attack) 5/16/15     Tinnitus      Type 2 diabetes mellitus without complication, without long-term current use of insulin (H) 11/22/2019       Past Surgical History:   Procedure Laterality Date     CAROTID ENDARTERECTOMY       IR LUMBAR PUNCTURE  12/25/2020     IR SPINAL PUNCTURE  12/25/2020     PICC INSERTION - TRIPLE LUMEN  12/26/2020          TONSILLECTOMY       ZZC THROMBOENDARTECTMY NECK,NECK INCIS Right 6/8/2015    Procedure: Right Carotid Endarterectomy with Impulse Monitoring;  Surgeon: Marcellus Toth MD;  Location: [facility];  Service: General       REVIEW OF SYSTEMS:  Denies history of anesthetic reactions.     MEDICATIONS:  Current Outpatient Medications   Medication Sig Dispense Refill     allopurinol (ZYLOPRIM) 300 MG tablet TAKE 1 TABLET  (300 MG) EVERY OTHER DAY ALTERNATING WITH ONE AND ONE-HALF TABLETS  (450 MG) EVERY OTHER DAY AT BEDTIME 113 tablet 3     amLODIPine (NORVASC) 5 MG tablet Take 1 tablet (5 mg) by mouth daily 90 tablet 3     aspirin (ASA) 81 MG chewable tablet Take 81 mg by mouth daily       blood glucose monitoring (FREESTYLE) lancets        Cholecalciferol 100 MCG (4000 UT) CAPS Take 4,000 Units by mouth daily        clopidogrel (PLAVIX) 75 MG tablet Take 1 tablet (75 mg) by mouth daily 90 tablet 3     divalproex sodium extended-release (DEPAKOTE ER) 500 MG 24 hr tablet TAKE 1 TABLET AT BEDTIME 90 tablet 0     FREESTYLE LITE test strip        gabapentin (NEURONTIN) 300 MG capsule Take 1 capsule (300 mg) by mouth At Bedtime for 3 days, THEN 1 capsule (300 mg) 2 times daily for 3 days, THEN 1 capsule (300 mg) 3 times daily. 90 capsule 0     levothyroxine (SYNTHROID/LEVOTHROID) 125 MCG tablet Take 125 mcg by mouth daily       lisinopril (ZESTRIL) 20 MG tablet TAKE 1 TABLET TWICE A  tablet 3     metFORMIN (GLUCOPHAGE-XR) 500 MG 24 hr tablet Take 2 tablets by mouth daily       rosuvastatin (CRESTOR) 5 MG tablet Take 1 tablet (5 mg) by mouth daily 90 tablet 3         ALLERGIES/SENSITIVITIES:     Allergies   Allergen Reactions     Cats      Other reaction(s): ITCHING,WATERING EYES       PERTINENT SOCIAL HISTORY:   Social History     Socioeconomic History     Marital status:      Spouse name: None     Number of children: None     Years of education: None     Highest education level: None   Occupational History     None   Tobacco Use     Smoking status: Former Smoker     Packs/day: 2.00     Years: 20.00     Pack years: 40.00     Types: Cigarettes     Quit date: 2/15/1993     Years since quittin.0     Smokeless tobacco: Never Used     Tobacco comment: quit in    Substance and Sexual Activity     Alcohol use: Yes     Alcohol/week: 0.0 standard drinks     Comment: Alcoholic Drinks/day: rarely     Drug use: No     Sexual activity: Yes     Partners: Female   Other Topics Concern     Parent/sibling w/ CABG, MI or angioplasty before 65F 55M? No   Social History Narrative    The patient lives with family.  He is    He has 1 son   Who is 23-year-old.  Son is currently in skilled nursing  due to recurrent drug use problem.  He works for the [email or web address] in administration.  Maynor Lanza MD  10/16/2018         Social  Determinants of Health     Financial Resource Strain: Not on file   Food Insecurity: Not on file   Transportation Needs: Not on file   Physical Activity: Not on file   Stress: Not on file   Social Connections: Not on file   Intimate Partner Violence: Not on file   Housing Stability: Not on file         FAMILY HISTORY:  Family History   Problem Relation Age of Onset     Stomach Cancer Mother      Lung Cancer Mother      Throat cancer Father      Lung Cancer Father      Cancer Mother         Lung     Cancer Father         Lung     Cancer Sister         Llung cancer     Heart Disease Brother          of a heart attack.     Diabetes Type 1 Brother      No Known Problems Son      No Known Problems Sister      No Known Problems Sister      No Known Problems Sister      No Known Problems Sister      Diabetes Brother      No Known Problems Brother         PHYSICAL EXAM:   Constitutional: BP (!) 163/78   Pulse 64   Ht 6' (1.829 m)   Wt 232 lb (105.2 kg)   SpO2 98%   BMI 31.46 kg/m       Mental Status: A & O in no acute distress.  Affect is appropriate.  Speech is fluent.  Recent and remote memory are intact.  Attention span and concentration are normal.    Motor:  Normal bulk and tone all muscle groups of upper and lower extremities.    Strength:   Right DF and EHL 4/5 otherwise 5/5      Sensory: Sensation intact bilaterally to light touch.     Coordination; unable to lift onto heels. Toe gait intact. Antalgic gait      Reflexes; supinator, biceps, triceps, knee/ ankle jerk intact. No parra's/babinski/ clonus.    IMAGING:  I personally reviewed all radiographic images         Cc:   Luis Daniel Maciel

## 2022-03-08 NOTE — NURSING NOTE
Neurosurgery consultation was requested by: TODD Vu  Pain: denies back pain   Radicular Pain is present: pain in buttock on both sides and down posterior legs   Lhermitte sign: no   Motor complaints: weakness in both legs , right worse than left   Sensory complaints: numbness in bilateral whole foot   Gait and balance issues: yes   Bowel or bladder issues: denies   Duration of SX is: 3-4 weeks   The symptoms are worse with: everything but sitting   The symptoms are better with: sitting   Injury: denies   Severity is:   Patient has tried the following conservative measures: none   YOBANY score is:  [id number] fisher

## 2022-03-08 NOTE — PATIENT INSTRUCTIONS
Please review COMPLETE information about your proposed surgery, pre-operative requirements, post-operative course and expectations - available in a folder provided to you in clinic!    Your surgery scheduler will call you to begin the process of scheduling your surgery and appointments.     Pre-Operative      Pre-operative physical / Lab work with primary care physician within 30 days of surgical date. We will assist you in scheduling this.    o If all pre-op appointments/test are not completed prior to your surgery date, you will be asked to reschedule your surgery.           As part of preparation for your upcoming procedure you are required to have a test for the novel Coronavirus/COVID-19.  o The test needs to be completed within 4 days (96) hours of surgery.   o We will assist you in scheduling this.   o You may NOT receive the COVID-19 vaccine or booster 2 weeks before or after surgery.      Readiness Visits    o Prior to surgery, you may have a telephone or in person readiness visit with our RN team to discuss your upcomming surgery, results of your pre-op physical, and lab work.   o If you will require a collar/neck brace after surgery, you will be fitted for one at your readiness visit prior to surgery (scheduled by the surgery scheduler).       Shower procedure    o Hibiclens shower: Use one packet the night before surgery and one packet the morning of surgery for a whole body shower. Avoid face, hair, and genitals.        Eating/Drinking    o Stop all solid foods 8 hours before surgery.  o Keep drinking clear liquids until 4 hours before surgery  - Clear liquids include water, clear juice, black coffee, or clear tea without milk, Gatorade, clear soda.       Medications - please refer to the pre-operative medication instructions sheet in your folder    o Hold Aspirin, NSAIDs (Advil/Ibuprofen, Indocin, Naproxen,Nuprin,Relafen/Nabumetone, Diclofenac,Meloxicam, Aleve, Celebrex) and all vitamins and  supplements x 7-10 days prior to surgical date  o You can take Tylenol (Acetaminophen) for pain up until the date of your surgery   - Do not exceed 3,000 mg per day   o Any other medications prescribed, please discuss with your primary care provider at your pre-operative physical. Please discuss when/if it is safe for you to stop taking blood thinners with your primary care provider.   o We will NOT provide pain medications prior to surgery. We will prescribe post-op pain medications for up to 6 weeks after surgery.       FMLA/Short-term disability      If you are currently employed, you will likely need to be off work for 4-6 weeks for post-op recovery and healing.    Please fax any FMLA/short term disability paperwork to [phone], mail it into the clinic, drop it off in person, or send via a [employer] message.     You may also call our clinic with the date in which you'd like to return to work, and we can provide a work letter to your employer    We will support Short-Term Disability up to 12 weeks, beginning the date of your surgery. We do NOT support Long-Term Disability/Social Security Benefits.     Pain Management after surgery      Dealing with pain    o As your body heals, you might feel a stabbing, burning, or aching pain. You may also have some numbness.  o Everyone feels pain differently, we may ask you to rate your pain using a pain scale. This will let us know how much pain you feel.   o Keep in mind that medicine won't take away all of your pain. It helps to try other ways to relax and ease pain.       Things to help with pain    o After surgery, we will give you medicine for your pain. These medications work well, but they can make you drowsy, itchy, or sick to your stomach, and constipated. Try to take narcotics with food if they cause nausea.   o For mild to moderate pain, you can take medication such as Tylenol or Ibuprofen. These can be used with narcotics and muscle relaxants. *If you have had  a fusion: do NOT use NSAIDs for 6 months after surgery.   - Do NOT drive while taking narcotic pain medication  - Do NOT drink alcohol while using narcotic pain medication  o You can utilize ice as needed (no longer than 20 minutes at one time) you may apply this over your covered incision  o Utilize heat for muscle spasms, do not apply heat over your incision  o If a muscle relaxer is prescribed, please do NOT take it at the same time as your narcotic pain medication. Take them at least 90 minutes apart.   o You may also use pain cream/patches on sore muscles. Do NOT apply these on your incision. Patches may be cut in 1/2 if needed.     *After your surgery, if you will be staying in-patient, a nursing team will be monitoring you closely throughout your stay and communicate your health status to your surgeon and other providers.  You will be seen by Advanced Practice Providers (e.g., nurse practitioners, clinic nurse specialists, and physician assistants) who will check on you regularly to assess the status of your surgical recovery.     Incision Care      Look at your incision site every day. You  may need a mirror, or family member to help you.     Do not submerge your incision in water such as pools, hot tubs, baths for at least 6 weeks or until incision is healed    You may get your incision wet in the shower. Allow water and soap to run over incision, and gently pat dry.     Remove the dressing the day after you are discharged from the hospital. Keep the incision clean and dry at all times. This may require several bandage changes.     Contact us right away if you have:   o Fever over 101 degrees farenheit  o Green or yellow drainage (pus) from your incision or increased bloody drainage   o Redness, swelling, or warmth at your surgery site   o Notify the clinic [phone].    Activity Restrictions      For the first 6 weeks, no lifting,pushing, or pulling > 5-10 pounds, no bending, twisting.    Use the stairs  in moderation     Walking: Walking is the best way to start exercise after surgery. Take short frequent walks. You may gradually increase the distance as tolerated. If you feel pain, decrease your activity, but DO NOT stop walking. Walking will help you regain strength.    Avoid prolonged positioning for longer than 30 minutes (change positions frequently while awake)    No contact sports until after follow up visit    No high impact activities such as; running/jogging, snowmobile or 4 carver riding or any other recreational vehicles until deemed safe by your surgeon/care team.     Please call the clinic if you develop any of the following symptoms:  o Swelling and/or warmth in one or both legs  o Pain or tenderness in your leg, ankle, foot, or arm   o Red or discolored/pale skin     Post-Op Follow Up Appointments      We will call you to schedule these appointments after your discharge from the hospital.     Incision assessment within 2 weeks with a Registered Nurse     6 week post-op with a Nurse Practitioner/Physician Assistant. Your surgeon will be available on this day.

## 2022-03-08 NOTE — LETTER
3/8/2022         RE: Raghavendra Ksier  [address]  [facility] [address]        Dear Colleague,    Thank you for referring your patient, Raghavendra Kiser, to the [facility]. Please see a copy of my visit note below.    NEUROSURGERY CONSULTATION NOTE      Neurosurgery was asked to see this patient by Mohini De Jesus PA-C for evaluation of lumbar radiculopathy, weakness.       CONSULTATION ASSESSMENT AND PLAN:    62-year-old male who presents with bilateral lower extremity pain, numbness tingling, weakness.  Lumbar x-ray shows retrolisthesis of lumbar 1 on 2 and lumbar 23 without any overt instability.  MRI of his lumbar spine shows a broad-based disc herniation at the lumbar 3-4 level with a superimposed left paracentral disc protrusion resulting in severe spinal canal stenosis with left greater than right right lateral recess stenosis.  He also has mild to moderate canal narrowing at the lumbar 4-5 level as well as mild right lateral recess stenosis with foraminal narrowing at the lumbar 5-sacral 1 level.  Lastly at the T11-T12 level he has mild to moderate central canal narrowing with mild cord flattening.  Patient is on Plavix.  Discussed lumbar 3-4 hemilaminectomies with microdiscectomies but will need to be delayed for Plavix discontinuation.  In the meantime recommend Flexeril, Medrol Dosepak, and lumbar 3-4 lumbar 4-5 bilateral transforaminal epidural steroid injection.     I spent more than 45 minutes in this apt, examining the pt, reviewing the scans, reviewing notes from chart, discussing treatment options with risks and benefits and coordinating care.     Abena Duque MD      HPI: 62-year-old male who presents with bilateral lower extremity pain, numbness tingling, weakness.  Patient symptoms began about 1 month ago.  He has bilateral lower extremity pain which goes from his buttocks to his posterior lateral legs.  At times the symptoms remain more  proximal and other times can shoot down the legs.  He has constant numbness and tingling in his right foot and shin.  Symptoms improved with sitting. Stretching can also improve. Sitting to standing will worsen symptoms. He at first did not notice much hawa weakness but for the last several weeks he is noticed to have right foot weakness. Denies any bowel or bladder dysfunction.    Chiropractor care without improvement after multiple sessions.       Past Medical History:   Diagnosis Date     Atherosclerosis of right carotid artery      Carotid stenosis, bilateral      Cervical radiculopathy      Chronic gout      Diabetes (H)      Fracture of right humerus      Ganglion cyst      High cholesterol      History of stroke without residual deficits      Humerus fracture     Right     Hyperlipidemia      Hypertension      Hypertension      Hypothyroidism      Medial epicondylitis      Shoulder tendinitis, right      Stroke (H)      Superficial venous thrombosis of arm     right lateral antecubital fossa     TIA (transient ischemic attack) 5/16/15     Tinnitus      Type 2 diabetes mellitus without complication, without long-term current use of insulin (H) 11/22/2019       Past Surgical History:   Procedure Laterality Date     CAROTID ENDARTERECTOMY       IR LUMBAR PUNCTURE  12/25/2020     IR SPINAL PUNCTURE  12/25/2020     PICC INSERTION - TRIPLE LUMEN  12/26/2020          TONSILLECTOMY       ZZC THROMBOENDARTECTMY NECK,NECK INCIS Right 6/8/2015    Procedure: Right Carotid Endarterectomy with Impulse Monitoring;  Surgeon: Marcellus Toth MD;  Location: [facility];  Service: General       REVIEW OF SYSTEMS:  Denies history of anesthetic reactions.     MEDICATIONS:  Current Outpatient Medications   Medication Sig Dispense Refill     allopurinol (ZYLOPRIM) 300 MG tablet TAKE 1 TABLET  (300 MG) EVERY OTHER DAY ALTERNATING WITH ONE AND ONE-HALF TABLETS  (450 MG) EVERY OTHER DAY AT BEDTIME 113 tablet 3     amLODIPine  (NORVASC) 5 MG tablet Take 1 tablet (5 mg) by mouth daily 90 tablet 3     aspirin (ASA) 81 MG chewable tablet Take 81 mg by mouth daily       blood glucose monitoring (FREESTYLE) lancets        Cholecalciferol 100 MCG (4000 UT) CAPS Take 4,000 Units by mouth daily       clopidogrel (PLAVIX) 75 MG tablet Take 1 tablet (75 mg) by mouth daily 90 tablet 3     divalproex sodium extended-release (DEPAKOTE ER) 500 MG 24 hr tablet TAKE 1 TABLET AT BEDTIME 90 tablet 0     FREESTYLE LITE test strip        gabapentin (NEURONTIN) 300 MG capsule Take 1 capsule (300 mg) by mouth At Bedtime for 3 days, THEN 1 capsule (300 mg) 2 times daily for 3 days, THEN 1 capsule (300 mg) 3 times daily. 90 capsule 0     levothyroxine (SYNTHROID/LEVOTHROID) 125 MCG tablet Take 125 mcg by mouth daily       lisinopril (ZESTRIL) 20 MG tablet TAKE 1 TABLET TWICE A  tablet 3     metFORMIN (GLUCOPHAGE-XR) 500 MG 24 hr tablet Take 2 tablets by mouth daily       rosuvastatin (CRESTOR) 5 MG tablet Take 1 tablet (5 mg) by mouth daily 90 tablet 3         ALLERGIES/SENSITIVITIES:     Allergies   Allergen Reactions     Cats      Other reaction(s): ITCHING,WATERING EYES       PERTINENT SOCIAL HISTORY:   Social History     Socioeconomic History     Marital status:      Spouse name: None     Number of children: None     Years of education: None     Highest education level: None   Occupational History     None   Tobacco Use     Smoking status: Former Smoker     Packs/day: 2.00     Years: 20.00     Pack years: 40.00     Types: Cigarettes     Quit date: 2/15/1993     Years since quittin.0     Smokeless tobacco: Never Used     Tobacco comment: quit in    Substance and Sexual Activity     Alcohol use: Yes     Alcohol/week: 0.0 standard drinks     Comment: Alcoholic Drinks/day: rarely     Drug use: No     Sexual activity: Yes     Partners: Female   Other Topics Concern     Parent/sibling w/ CABG, MI or angioplasty before 65F 55M? No   Social  History Narrative    The patient lives with family.  He is    He has 1 son   Who is 23-year-old.  Son is currently in skilled nursing  due to recurrent drug use problem.  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  Maynor Lanza MD  10/16/2018         Social Determinants of Health     Financial Resource Strain: Not on file   Food Insecurity: Not on file   Transportation Needs: Not on file   Physical Activity: Not on file   Stress: Not on file   Social Connections: Not on file   Intimate Partner Violence: Not on file   Housing Stability: Not on file         FAMILY HISTORY:  Family History   Problem Relation Age of Onset     Stomach Cancer Mother      Lung Cancer Mother      Throat cancer Father      Lung Cancer Father      Cancer Mother         Lung     Cancer Father         Lung     Cancer Sister         Llung cancer     Heart Disease Brother          of a heart attack.     Diabetes Type 1 Brother      No Known Problems Son      No Known Problems Sister      No Known Problems Sister      No Known Problems Sister      No Known Problems Sister      Diabetes Brother      No Known Problems Brother         PHYSICAL EXAM:   Constitutional: BP (!) 163/78   Pulse 64   Ht 6' (1.829 m)   Wt 232 lb (105.2 kg)   SpO2 98%   BMI 31.46 kg/m       Mental Status: A & O in no acute distress.  Affect is appropriate.  Speech is fluent.  Recent and remote memory are intact.  Attention span and concentration are normal.    Motor:  Normal bulk and tone all muscle groups of upper and lower extremities.    Strength:   Right DF and EHL 4/5 otherwise 5/5      Sensory: Sensation intact bilaterally to light touch.     Coordination; unable to lift onto heels. Toe gait intact. Antalgic gait      Reflexes; supinator, biceps, triceps, knee/ ankle jerk intact. No parra's/babinski/ clonus.    IMAGING:  I personally reviewed all radiographic images         Cc:   Luis Daniel Maciel, thank you for allowing me to  participate in the care of your patient.        Sincerely,        Abena Duque MD

## 2022-03-08 NOTE — LETTER
3/8/2022         RE: Raghavendra Kiser  [address]  [facility] [address]        Dear Colleague,    Thank you for referring your patient, Raghavendra Kiser, to the [facility] [place]. Please see a copy of my visit note below.    ASSESSMENT: Raghavendra Kiser is a 62 year old male with past medical history significant for history of CVA (on Plavix), chronic gout, hyperlipidemia, hypothyroidism, type 2 diabetes mellitus, hypertension who presents today for new patient evaluation of a 1 month history of severe pain radiating into the lower extremities with associated numbness and weakness.  My review of an MRI lumbar spine shows multilevel lumbar spondylosis superimposed upon a congenitally narrow canal.  At L3-4 there is a left paracentral disc protrusion causing relatively severe central canal stenosis with severe left greater than right lateral recess stenosis.  There is also chronic appearing moderate to severe right foraminal stenosis L5-S1.  On exam patient demonstrated significant weakness right ankle dorsiflexors.  He also had weakness bilateral EHL.  He had a sensory deficit both feet plantar and dorsal aspects and left L4 dermatome.  He had absent bilateral lower extremity reflexes.    PLAN:  A shared decision making model was used.  The patient's values and choices were respected.  The following represents what was discussed and decided upon by the physician assistant and the patient.      1.  DIAGNOSTIC TESTS: I reviewed the MRI lumbar spine.  Lumbar spine flexion-extension x-rays have been ordered.    2.  PHYSICAL THERAPY: No physical therapy was ordered.  Due to his significant weakness I think it is most appropriate that he see neurosurgery.    3.  MEDICATIONS:  -I did prescribe gabapentin 300 mg.  He can titrate his dose up to 300 mg 3 times daily.    4.  INTERVENTIONS: No interventions were ordered.  Again, I think it is most appropriate that he see neurosurgery given  his significant weakness.    5.  REFERRALS: Entered a referral to neurosurgery.  We have arranged for him to see Dr. Duque this afternoon.    6.  FOLLOW-UP:   Patient will follow up with me as needed.  We will await recommendations from neurosurgery.  If he has questions or concerns, he should not hesitate to call.      SUBJECTIVE:  Raghavendra Kiser  Is a 62 year old male who presents today in consultation at the request of Dr. Maciel for new patient evaluation a 1 month history of severe pain radiating into the bilateral lower extremities with associated numbness and weakness.  Patient denies any injury or event to cause the pain.  At first he tried a massage which was not helpful.  Pain became more severe and he saw a chiropractor for 2 weeks.  This was not helpful so he saw his primary care provider.  An MRI lumbar spine was obtained which will be described below.  He feels in his symptoms are getting progressively worse.  Patient does have a history of cervical spine surgery with Dr. Lyman.  He has never had problems with his lower back before.    Patient complains of pain which begins in the bilateral buttocks and lateral hips.  Pain radiates down the anterior and posterior aspects of the thighs to the knees.  He then feels pain radiate into the anterior shins.  He has numbness and tingling in the left anterior shin and in the right greater than left foot affecting primarily the plantar aspect of the foot.  He states that his right leg feels very weak.  His foot drags when he walks.  He has had to adjust his gait to compensate for this which is causing significant cramping in the right calf.  Pain is aggravated with standing, walking, rolling over in bed.  Pain is alleviated with sitting.  He states that he has essentially no pain when sitting.  He denies loss of bowel or bladder control.  Denies recent fevers, chills, sweats.    As mentioned above, patient tried 2 weeks of chiropractic treatment with  no help.  He tried massage with no help.  He has not had physical therapy.  He has not had any lumbar spine injections or lumbar spine surgery.  He is not taking any pain relief medications.    Current Outpatient Medications   Medication     allopurinol (ZYLOPRIM) 300 MG tablet     amLODIPine (NORVASC) 5 MG tablet     aspirin (ASA) 81 MG chewable tablet     blood glucose monitoring (FREESTYLE) lancets     Cholecalciferol 100 MCG (4000 UT) CAPS     clopidogrel (PLAVIX) 75 MG tablet     divalproex sodium extended-release (DEPAKOTE ER) 500 MG 24 hr tablet     FREESTYLE LITE test strip     levothyroxine (SYNTHROID/LEVOTHROID) 125 MCG tablet     lisinopril (ZESTRIL) 20 MG tablet     metFORMIN (GLUCOPHAGE-XR) 500 MG 24 hr tablet     rosuvastatin (CRESTOR) 5 MG tablet         Allergies   Allergen Reactions     Cats      Other reaction(s): ITCHING,WATERING EYES       Past Medical History:   Diagnosis Date     Atherosclerosis of right carotid artery      Carotid stenosis, bilateral      Cervical radiculopathy      Chronic gout      Diabetes (H)      Fracture of right humerus      Ganglion cyst      High cholesterol      History of stroke without residual deficits      Humerus fracture     Right     Hyperlipidemia      Hypertension      Hypertension      Hypothyroidism      Medial epicondylitis      Shoulder tendinitis, right      Stroke (H)      Superficial venous thrombosis of arm     right lateral antecubital fossa     TIA (transient ischemic attack) 5/16/15     Tinnitus      Type 2 diabetes mellitus without complication, without long-term current use of insulin (H) 11/22/2019        Patient Active Problem List   Diagnosis     Atherosclerosis of right carotid artery     Stenosis of left internal carotid artery     Cervical radiculopathy     Chronic gout     Elevated blood pressure reading without diagnosis of hypertension     Ganglion cyst     High risk medication use     History of colonic polyps     HTN (hypertension)      History of TIA (transient ischemic attack) and stroke     Hyperlipidemia     Hypothyroidism     Medial epicondylitis     Other stiff joint of shoulder region     Pain in joint involving ankle and foot     Arthralgia of hand     Peyronie's disease     Precordial pain     Right shoulder tendinitis     Right sided weakness     Superficial venous thrombosis of arm     Stroke (H)     TIA (transient ischemic attack)     Type 2 diabetes mellitus without complication, without long-term current use of insulin (H)     Vitamin deficiency     Acute encephalopathy     Acute respiratory failure with hypoxia (H)     AMS (altered mental status)     Cognitive and behavioral changes     Sleep difficulties     Acute ischemic left MCA stroke (H)       Past Surgical History:   Procedure Laterality Date     CAROTID ENDARTERECTOMY       IR LUMBAR PUNCTURE  2020     IR SPINAL PUNCTURE  2020     PICC INSERTION - TRIPLE LUMEN  2020          TONSILLECTOMY       ZZC THROMBOENDARTECTMY NECK,NECK INCIS Right 2015    Procedure: Right Carotid Endarterectomy with Impulse Monitoring;  Surgeon: Marcellus Toth MD;  Location: [facility];  Service: General       Family History   Problem Relation Age of Onset     Stomach Cancer Mother      Lung Cancer Mother      Throat cancer Father      Lung Cancer Father      Cancer Mother         Lung     Cancer Father         Lung     Cancer Sister         Llung cancer     Heart Disease Brother          of a heart attack.     Diabetes Type 1 Brother      No Known Problems Son      No Known Problems Sister      No Known Problems Sister      No Known Problems Sister      No Known Problems Sister      Diabetes Brother      No Known Problems Brother        Social history: Patient is .  He is retired.  He drinks alcohol occasionally.  Denies tobacco use.  Denies illicit drug use.      ROS: Positive for ringing in ears, sciatica.  Specifically negative for bowel/bladder  dysfunction, fevers,chills, appetite changes, unexplained weight loss.   Otherwise 13 systems reviewed are negative.  Please see the patient's intake questionnaire from today for details.      OBJECTIVE:  PHYSICAL EXAMINATION:    CONSTITUTIONAL:  Vital signs as above.  No acute distress.  The patient is well nourished and well groomed.  PSYCHIATRIC:  The patient is awake, alert, oriented to person, place, time and answering questions appropriately with clear speech.    HEENT: Normocephalic, atraumatic.  Sclera clear.  Neck is supple.  SKIN:  Skin over the face, bilateral lower extremities, and posterior torso is clean, dry, intact without rashes.    GAIT: Patient demonstrates mild steppage gait on the right.  He is unable to ambulate on his heels on the right.  Able to ambulate on his toes bilaterally.    STANDING EXAMINATION: No significant tenderness palpation bilateral lower lumbar paraspinous muscles.  Lumbar flexion intact.  Lumbar extension moderately restricted with increased pain.  MUSCLE STRENGTH:  The patient has 4/5 strength right ankle dorsiflexors and 4/5 strength bilateral EHL.  5/5 strength for the bilateral hip flexors, knee flexors/extensors, left ankle dorsiflexors, bilateral ankle plantar flexors.  NEUROLOGICAL: Absent patellar, and achilles reflexes bilaterally.  Negative Babinski's bilaterally.  No ankle clonus bilaterally.  Diminished sensation right greater than left plantar and dorsal foot and left medial shin.  VASCULAR:  2/4 dorsalis pedis pulses bilaterally.  Bilateral lower extremities are warm.  There is no pitting edema of the bilateral lower extremities.  ABDOMINAL:  Soft, non-distended, non-tender throughout all quadrants.  No pulsatile mass palpated in the left lower quadrant.  LYMPH NODES:  No palpable or tender inguinal lymph nodes.  MUSCULOSKELETAL: Straight leg raise on the right causes left hip pain.  Straight leg raise negative left.    RESULTS: I reviewed an MRI lumbar spine  from [facility] dated March 4, 2022.  This shows multilevel disc degeneration and facet arthropathy superimposed upon a congenitally narrow canal.  At L2-3 there is mild spinal canal stenosis with mild left ruminal stenosis.  At L3-4 there is a left paracentral disc protrusion with relatively severe spinal canal stenosis and mild bilateral foraminal stenosis.  At L4-5 there is mild to moderate central canal stenosis with mild to moderate bilateral foraminal stenosis.  At L5-S1 there is moderate to severe right and mild left foraminal stenosis.  Please see report for further details.        Again, thank you for allowing me to participate in the care of your patient.        Sincerely,        Mohini De Jesus PA-C

## 2022-03-08 NOTE — PATIENT INSTRUCTIONS
Prescribed Gabapentin today, 300 mg tablets, to be titrated up to 1 tablets 3 times a day as tolerated for your nerve pain. Please follow Gabapentin dosing chart below.    Gabapentin 300mg Dosing Chart    DATE  MORNING AFTERNOON BEDTIME    Day 1 0 0 1    Day 2 0 0 1    Day 3 0 0 1    Day 4 1 0 1    Day 5 1 0 1    Day 6 1 0 1    Day 7 1 1 1    Day 8 1 1 1    Day 9 1 1 1     Continue medication, taking 1 capsules three times daily  Please call if you have any questions regarding how to take your medication          A referral was entered to see [facility]surgery at [facility].  They will call you to schedule an appointment.  Ifyou do not hear from them within 72 hrs, please call [phone] to schedule an appointment.    [facility] Scheduling    Please call [phone] to schedule your image(s) (select option#1). There are 3 different locations, see below. You can do walk-in visits for xray only images if you want.     [place] [id number]    Be back here at the spine center at 2:45 this afternoon 3/8/22 to see Dr. Duque.

## 2022-03-08 NOTE — PROGRESS NOTES
ASSESSMENT: Raghavendra Kiser is a 62 year old male with past medical history significant for history of CVA (on Plavix), chronic gout, hyperlipidemia, hypothyroidism, type 2 diabetes mellitus, hypertension who presents today for new patient evaluation of a 1 month history of severe pain radiating into the lower extremities with associated numbness and weakness.  My review of an MRI lumbar spine shows multilevel lumbar spondylosis superimposed upon a congenitally narrow canal.  At L3-4 there is a left paracentral disc protrusion causing relatively severe central canal stenosis with severe left greater than right lateral recess stenosis.  There is also chronic appearing moderate to severe right foraminal stenosis L5-S1.  On exam patient demonstrated significant weakness right ankle dorsiflexors.  He also had weakness bilateral EHL.  He had a sensory deficit both feet plantar and dorsal aspects and left L4 dermatome.  He had absent bilateral lower extremity reflexes.    PLAN:  A shared decision making model was used.  The patient's values and choices were respected.  The following represents what was discussed and decided upon by the physician assistant and the patient.      1.  DIAGNOSTIC TESTS: I reviewed the MRI lumbar spine.  Lumbar spine flexion-extension x-rays have been ordered.    2.  PHYSICAL THERAPY: No physical therapy was ordered.  Due to his significant weakness I think it is most appropriate that he see neurosurgery.    3.  MEDICATIONS:  -I did prescribe gabapentin 300 mg.  He can titrate his dose up to 300 mg 3 times daily.    4.  INTERVENTIONS: No interventions were ordered.  Again, I think it is most appropriate that he see neurosurgery given his significant weakness.    5.  REFERRALS: Entered a referral to neurosurgery.  We have arranged for him to see Dr. Duque this afternoon.    6.  FOLLOW-UP:   Patient will follow up with me as needed.  We will await recommendations from neurosurgery.  If he  has questions or concerns, he should not hesitate to call.      SUBJECTIVE:  Raghavendra Kiser  Is a 62 year old male who presents today in consultation at the request of Dr. Maciel for new patient evaluation a 1 month history of severe pain radiating into the bilateral lower extremities with associated numbness and weakness.  Patient denies any injury or event to cause the pain.  At first he tried a massage which was not helpful.  Pain became more severe and he saw a chiropractor for 2 weeks.  This was not helpful so he saw his primary care provider.  An MRI lumbar spine was obtained which will be described below.  He feels in his symptoms are getting progressively worse.  Patient does have a history of cervical spine surgery with Dr. Lyman.  He has never had problems with his lower back before.    Patient complains of pain which begins in the bilateral buttocks and lateral hips.  Pain radiates down the anterior and posterior aspects of the thighs to the knees.  He then feels pain radiate into the anterior shins.  He has numbness and tingling in the left anterior shin and in the right greater than left foot affecting primarily the plantar aspect of the foot.  He states that his right leg feels very weak.  His foot drags when he walks.  He has had to adjust his gait to compensate for this which is causing significant cramping in the right calf.  Pain is aggravated with standing, walking, rolling over in bed.  Pain is alleviated with sitting.  He states that he has essentially no pain when sitting.  He denies loss of bowel or bladder control.  Denies recent fevers, chills, sweats.    As mentioned above, patient tried 2 weeks of chiropractic treatment with no help.  He tried massage with no help.  He has not had physical therapy.  He has not had any lumbar spine injections or lumbar spine surgery.  He is not taking any pain relief medications.    Current Outpatient Medications   Medication     allopurinol  (ZYLOPRIM) 300 MG tablet     amLODIPine (NORVASC) 5 MG tablet     aspirin (ASA) 81 MG chewable tablet     blood glucose monitoring (FREESTYLE) lancets     Cholecalciferol 100 MCG (4000 UT) CAPS     clopidogrel (PLAVIX) 75 MG tablet     divalproex sodium extended-release (DEPAKOTE ER) 500 MG 24 hr tablet     FREESTYLE LITE test strip     levothyroxine (SYNTHROID/LEVOTHROID) 125 MCG tablet     lisinopril (ZESTRIL) 20 MG tablet     metFORMIN (GLUCOPHAGE-XR) 500 MG 24 hr tablet     rosuvastatin (CRESTOR) 5 MG tablet         Allergies   Allergen Reactions     Cats      Other reaction(s): ITCHING,WATERING EYES       Past Medical History:   Diagnosis Date     Atherosclerosis of right carotid artery      Carotid stenosis, bilateral      Cervical radiculopathy      Chronic gout      Diabetes (H)      Fracture of right humerus      Ganglion cyst      High cholesterol      History of stroke without residual deficits      Humerus fracture     Right     Hyperlipidemia      Hypertension      Hypertension      Hypothyroidism      Medial epicondylitis      Shoulder tendinitis, right      Stroke (H)      Superficial venous thrombosis of arm     right lateral antecubital fossa     TIA (transient ischemic attack) 5/16/15     Tinnitus      Type 2 diabetes mellitus without complication, without long-term current use of insulin (H) 11/22/2019        Patient Active Problem List   Diagnosis     Atherosclerosis of right carotid artery     Stenosis of left internal carotid artery     Cervical radiculopathy     Chronic gout     Elevated blood pressure reading without diagnosis of hypertension     Ganglion cyst     High risk medication use     History of colonic polyps     HTN (hypertension)     History of TIA (transient ischemic attack) and stroke     Hyperlipidemia     Hypothyroidism     Medial epicondylitis     Other stiff joint of shoulder region     Pain in joint involving ankle and foot     Arthralgia of hand     Peyronie's disease      Precordial pain     Right shoulder tendinitis     Right sided weakness     Superficial venous thrombosis of arm     Stroke (H)     TIA (transient ischemic attack)     Type 2 diabetes mellitus without complication, without long-term current use of insulin (H)     Vitamin deficiency     Acute encephalopathy     Acute respiratory failure with hypoxia (H)     AMS (altered mental status)     Cognitive and behavioral changes     Sleep difficulties     Acute ischemic left MCA stroke (H)       Past Surgical History:   Procedure Laterality Date     CAROTID ENDARTERECTOMY       IR LUMBAR PUNCTURE  2020     IR SPINAL PUNCTURE  2020     PICC INSERTION - TRIPLE LUMEN  2020          TONSILLECTOMY       ZZC THROMBOENDARTECTMY NECK,NECK INCIS Right 2015    Procedure: Right Carotid Endarterectomy with Impulse Monitoring;  Surgeon: Marcellus Toth MD;  Location: [facility] OR;  Service: General       Family History   Problem Relation Age of Onset     Stomach Cancer Mother      Lung Cancer Mother      Throat cancer Father      Lung Cancer Father      Cancer Mother         Lung     Cancer Father         Lung     Cancer Sister         Llung cancer     Heart Disease Brother          of a heart attack.     Diabetes Type 1 Brother      No Known Problems Son      No Known Problems Sister      No Known Problems Sister      No Known Problems Sister      No Known Problems Sister      Diabetes Brother      No Known Problems Brother        Social history: Patient is .  He is retired.  He drinks alcohol occasionally.  Denies tobacco use.  Denies illicit drug use.      ROS: Positive for ringing in ears, sciatica.  Specifically negative for bowel/bladder dysfunction, fevers,chills, appetite changes, unexplained weight loss.   Otherwise 13 systems reviewed are negative.  Please see the patient's intake questionnaire from today for details.      OBJECTIVE:  PHYSICAL EXAMINATION:    CONSTITUTIONAL:  Vital signs as  above.  No acute distress.  The patient is well nourished and well groomed.  PSYCHIATRIC:  The patient is awake, alert, oriented to person, place, time and answering questions appropriately with clear speech.    HEENT: Normocephalic, atraumatic.  Sclera clear.  Neck is supple.  SKIN:  Skin over the face, bilateral lower extremities, and posterior torso is clean, dry, intact without rashes.    GAIT: Patient demonstrates mild steppage gait on the right.  He is unable to ambulate on his heels on the right.  Able to ambulate on his toes bilaterally.    STANDING EXAMINATION: No significant tenderness palpation bilateral lower lumbar paraspinous muscles.  Lumbar flexion intact.  Lumbar extension moderately restricted with increased pain.  MUSCLE STRENGTH:  The patient has 4/5 strength right ankle dorsiflexors and 4/5 strength bilateral EHL.  5/5 strength for the bilateral hip flexors, knee flexors/extensors, left ankle dorsiflexors, bilateral ankle plantar flexors.  NEUROLOGICAL: Absent patellar, and achilles reflexes bilaterally.  Negative Babinski's bilaterally.  No ankle clonus bilaterally.  Diminished sensation right greater than left plantar and dorsal foot and left medial shin.  VASCULAR:  2/4 dorsalis pedis pulses bilaterally.  Bilateral lower extremities are warm.  There is no pitting edema of the bilateral lower extremities.  ABDOMINAL:  Soft, non-distended, non-tender throughout all quadrants.  No pulsatile mass palpated in the left lower quadrant.  LYMPH NODES:  No palpable or tender inguinal lymph nodes.  MUSCULOSKELETAL: Straight leg raise on the right causes left hip pain.  Straight leg raise negative left.    RESULTS: I reviewed an MRI lumbar spine from [facility] dated March 4, 2022.  This shows multilevel disc degeneration and facet arthropathy superimposed upon a congenitally narrow canal.  At L2-3 there is mild spinal canal stenosis with mild left ruminal stenosis.  At L3-4 there is a left paracentral  disc protrusion with relatively severe spinal canal stenosis and mild bilateral foraminal stenosis.  At L4-5 there is mild to moderate central canal stenosis with mild to moderate bilateral foraminal stenosis.  At L5-S1 there is moderate to severe right and mild left foraminal stenosis.  Please see report for further details.

## 2022-03-09 ENCOUNTER — TELEPHONE (OUTPATIENT)
Dept: NEUROSURGERY | Facility: CLINIC | Age: 62
End: 2022-03-09
Payer: OTHER GOVERNMENT

## 2022-03-09 DIAGNOSIS — Z98.2 PRESENCE OF CEREBROSPINAL FLUID DRAINAGE DEVICE: Primary | ICD-10-CM

## 2022-03-09 DIAGNOSIS — Z01.818 PRE-OP TESTING: ICD-10-CM

## 2022-03-09 NOTE — LETTER
Dear Mr. Kiser,    This letter will help in preparation for your upcoming surgery. Please contact us with any additional questions you may have regarding your surgery. Contact information for your surgery scheduler:   Dr. Duque [phone] ~ Mya     You are scheduled for: Lumbar Laminectomy   With: Abena Duque M.D.   Date/Time: Monday, March 21, 2022 at 5:35pm (time subject to change)  Location: [place] [address]    Check in at the Welcome Desk inside the main doors of the hospital. They will direct you to the surgery waiting area. Please arrive at 3:30pm.     In the event of an emergency surgery case, there may be an adjustment to your start time for surgery.     PREPARING FOR YOUR SURGERY    *Pre-op Physical: 3/16/2022 at 9:30am with Dr[place] [address].     *Please discuss the necessity of receiving a pneumococcal vaccine prior to surgery at your pre-op physical. Recommended for all patients over the age of 65 or based on certain medical conditions.     *After the pre-op physical is complete, please have your clinic fax the visit note to your surgery scheduler at [phone].    *Covid-19 Pre-procedure Test: 3/17/2022 at 10:30am [place] [address].     *Pre-op Lab Work: 3/14/2022 at 10:45am [place] [address].    *Readiness Visit: Dr. Duque's nurse will call you prior to the day of surgery to go over the results of your pre-op physical/lab results/covid result, along with additional information you will need for the day of surgery.    *Ensure that you have completed your pre-op physical, along with any other necessary tests/appointments (listed above), prior to your Readiness Visit.                               ADDITIONAL INFORMATION REGARDING  YOUR SURGERY    Medications    Bring a list ALL of your medications, including any over-the-counter vitamins and herbal supplements to your Readiness Visit, and on the day of surgery.    DO NOT bring your medications with you the day of surgery.    Please see attached third sheet for more details on medications/vitamins/herbal supplements that should be discontinued prior to your surgery date.     If you are unsure if you should discontinue a medication/ vitamin/herbal supplement, please call our office and discuss with a nurse.    Continue taking your medications/vitamins/herbal supplements unless they are on the attached list.     Failure to follow the instructions regarding medications/vitamins/herbal supplements will result in cancellation of your surgery.    Day BEFORE Surgery    DO NOT shave near your surgical site. This can cause irritation of the skin    Using a washcloth and provided bottle of Hibiclens, shower the night BEFORE surgery, using a half bottle of Hibiclens to wash your body, avoiding face and genitals. The morning OF surgery, shower and use the second half of the bottle to wash your body, avoiding face and genitals. If you are unable to take a shower the morning of surgery, please discuss your options with the nurse at your readiness visit.     NOTHING  to eat after 11:00 p.m. the night prior to your procedure    CLEAR LIQUIDS: May have the following liquids up to two (2) hours before your arrival time at the hospital: water, plain black coffee (no cream or milk), plain black tea or plain green tea (no cream or milk), Gatorade or Propel Water.    SMOKING: Stop smoking as far before surgery as possible, or as directed by your surgeon. NO tobacco products of any kind (cigarettes, e-cigarettes, chewing tobacco) beginning at 11:00 p.m. the night prior to your procedure.     ALCOHOL: You should stop drinking alcohol beginning at 11:00 p.m. the night prior to your procedure    Contact our office if  you have symptoms of illness such as a fever of 101 or greater, chills, cough, sore throat, or if you develop a rash or any open sore    Day OF Surgery    If you ve been instructed to take a medication(s) on the morning of surgery, please take with a very small sip of water.    Wear loose & comfortable clothing and flat shoes, Leave jewelry/valuables at home. If you wear contact lenses, remove them at home and wear glasses. Remove any body piercings. Remove nail polish.     Planning for Discharge    Start planning for your care after discharge as soon as you receive this letter.    If you have not made arrangements for someone to take you home and stay with you for the first 48 hours after discharge, your surgery may be cancelled.                        PRE-OPERATIVE MEDICATION INSTRUCTIONS    Review this information with your primary care physician prior to discontinuing any of the medications listed below.  Notify your primary care physician that you have been instructed to discontinue these medications.    TEN (10) Days Prior to Surgery, STOP the Following Medications   [place]  Anacin  Aspirin  Excedrin  Pepto Bismol    **Before taking ANY over-the-counter medications, check the label for Aspirin   Non-steroidal   Anti-inflammatory Medications (NSAIDS)    Celebrex  Diclofenac (Cataflam)  Etodolac (Iodine)  Fenoprofen (Nalfon)  Ibuprofen (Advil, Motrin, Nuprin)  Indomethacin (Indocin)  Ketoprofen  Ketorolac (Toradol)  Meloxicam (Mobic)  Naproxen (AnaProx, Aleve, Naprosyn)  Relafen (Nabumetone)   Herbal Supplements (this is a partial list of herbals to be discontinued)    Joycelyn Galaviz  Ephedra  Feverfew  Fish Oil  Flaxseed Oil  Garlic  Yesi  Gingko  Ginseng  Goldenseal  Imitrex (Sumatriptan)  Kava  Krill Oil  Licorice  Multi Vitamins  Amberley s Wort  Valerian  Vitamin E  Yohimbe   CHECK WITH YOUR PRESCRIBING DOCTOR BEFORE STOPPING ANY BLOOD THINNERS (approximately 7 days prior to surgery)  (Coumadin,  Plavix, Platel, Aggrenox, Effient (Prasugrel), Ticlid), Xarelto, and Pradaxa      ALWAYS CHECK WITH YOUR PRESCRIBING DOCTOR REGARDING THE MEDICATIONS LISTED BELOW; RECOMMENDED STOP TIME IS ALSO LISTED      If you are taking Lovenox, discontinue 24 HOURS prior to surgery    If you are taking weight loss medication, discontinue 7 days prior to surgery    If you are taking Metformin or Simvastatin, check with your primary care physician (or whoever has prescribed you this medication) regarding when to discontinue prior to surgery

## 2022-03-09 NOTE — TELEPHONE ENCOUNTER
ORDER FROM: Dr. Duque     PRE AUTHORIZATION: [place] Pending     METHOD OF PATIENT CONTACT: Spoke to patient over the phone, best number to contact patient #[phone]    PROCEDURE: Bilateral L3-L4 hemilaminectomies, medial facetectomies, foraminotomies, microdiscectomies     SURGICAL DATE: 3/21/22 at 5:35pm at [facility]     COVID TEST: 3/17/22 at 10:30     READINESS VISIT: Please call     PCP, CLINIC, PHONE#: Dr. Maciel [facility] #[phone]. Pre-op physical 3/16/22 at 9:30am     FILM INFO: XR Lumbar 3/8/22 at [facility] and MRI Lumbar 3/4/22 at [facility]     SURGICAL LETTER: Emailed 3/9/22

## 2022-03-10 ENCOUNTER — TELEPHONE (OUTPATIENT)
Dept: FAMILY MEDICINE | Facility: CLINIC | Age: 62
End: 2022-03-10
Payer: OTHER GOVERNMENT

## 2022-03-10 NOTE — TELEPHONE ENCOUNTER
Incoming call from patient stating he was told to reach out to provider - pt is having surgery Monday 03/21 and needs to hold his plavix 7 days before surgery. Pt isnt sure what else what to do but was told to let provider know. OK to call patient back. OK to leave detailed message.

## 2022-03-10 NOTE — TELEPHONE ENCOUNTER
Pt needs a pre-op at least one week before surgery so that we can discuss his plavix and possible need for bridging with lovenox while off plavix.  Ok to use saved slots. Tomorrow or Monday.

## 2022-03-10 NOTE — TELEPHONE ENCOUNTER
Patient is currently scheduled on Tuesday 03/16. You have no opening tomorrow or Monday.  Please advise

## 2022-03-11 NOTE — TELEPHONE ENCOUNTER
Pt calling to follow up and let provider know that he was going over the to do instructions before his surgery and it mentioned about check with provider on metformin. Pt is wondering if this needs to be stopped as well before surgery. I let him know I will check with the provider and we will reach back out to him. Pt is available today and Monday any time if provider would like him to come in for his preop and discuss.     Dr. Maciel, are you OK to double book him on your schedule for his preop today or Monday?

## 2022-03-11 NOTE — TELEPHONE ENCOUNTER
I spoke with Mauri today.  He will take take his last dose of Plavix and aspirin (before surgery) on 3-14-21.  I will see him for a pre-op 3-16-21.  On the evening of 3-16-21 we will start a bridge with lovenox, the last dose of lovenox before surgery to be given the evening of 3-20-21.  He will also hold the metformin on 3-20 and 3-21-22.  Pt understands instructions.

## 2022-03-16 ENCOUNTER — OFFICE VISIT (OUTPATIENT)
Dept: FAMILY MEDICINE | Facility: CLINIC | Age: 62
End: 2022-03-16
Payer: OTHER GOVERNMENT

## 2022-03-16 VITALS
HEIGHT: 72 IN | SYSTOLIC BLOOD PRESSURE: 135 MMHG | OXYGEN SATURATION: 98 % | WEIGHT: 237.4 LBS | DIASTOLIC BLOOD PRESSURE: 81 MMHG | HEART RATE: 90 BPM | BODY MASS INDEX: 32.15 KG/M2

## 2022-03-16 DIAGNOSIS — M54.16 LUMBAR RADICULOPATHY: ICD-10-CM

## 2022-03-16 DIAGNOSIS — Z01.818 PRE-OP TESTING: ICD-10-CM

## 2022-03-16 DIAGNOSIS — M51.26 HERNIATED NUCLEUS PULPOSUS, LUMBAR: ICD-10-CM

## 2022-03-16 DIAGNOSIS — E03.9 HYPOTHYROIDISM, UNSPECIFIED TYPE: ICD-10-CM

## 2022-03-16 DIAGNOSIS — Z01.818 PRE-OP EXAM: Primary | ICD-10-CM

## 2022-03-16 DIAGNOSIS — I10 PRIMARY HYPERTENSION: ICD-10-CM

## 2022-03-16 DIAGNOSIS — I63.9 CEREBROVASCULAR ACCIDENT (CVA), UNSPECIFIED MECHANISM (H): ICD-10-CM

## 2022-03-16 DIAGNOSIS — E11.9 TYPE 2 DIABETES MELLITUS WITHOUT COMPLICATION, WITHOUT LONG-TERM CURRENT USE OF INSULIN (H): ICD-10-CM

## 2022-03-16 LAB
ANION GAP SERPL CALCULATED.3IONS-SCNC: 13 MMOL/L (ref 5–18)
APTT PPP: 23 SECONDS (ref 22–38)
BUN SERPL-MCNC: 16 MG/DL (ref 8–22)
CALCIUM SERPL-MCNC: 9.3 MG/DL (ref 8.5–10.5)
CHLORIDE BLD-SCNC: 105 MMOL/L (ref 98–107)
CO2 SERPL-SCNC: 25 MMOL/L (ref 22–31)
CREAT SERPL-MCNC: 0.92 MG/DL (ref 0.7–1.3)
ERYTHROCYTE [DISTWIDTH] IN BLOOD BY AUTOMATED COUNT: 13.5 % (ref 10–15)
GFR SERPL CREATININE-BSD FRML MDRD: >90 ML/MIN/1.73M2
GLUCOSE BLD-MCNC: 182 MG/DL (ref 70–125)
HBA1C MFR BLD: 5.9 % (ref 0–5.6)
HCT VFR BLD AUTO: 44 % (ref 40–53)
HGB BLD-MCNC: 15.4 G/DL (ref 13.3–17.7)
INR PPP: 0.9 (ref 0.85–1.15)
MCH RBC QN AUTO: 33.2 PG (ref 26.5–33)
MCHC RBC AUTO-ENTMCNC: 35 G/DL (ref 31.5–36.5)
MCV RBC AUTO: 95 FL (ref 78–100)
PLATELET # BLD AUTO: 170 10E3/UL (ref 150–450)
POTASSIUM BLD-SCNC: 3.7 MMOL/L (ref 3.5–5)
RBC # BLD AUTO: 4.64 10E6/UL (ref 4.4–5.9)
SODIUM SERPL-SCNC: 143 MMOL/L (ref 136–145)
WBC # BLD AUTO: 8.3 10E3/UL (ref 4–11)

## 2022-03-16 PROCEDURE — 85027 COMPLETE CBC AUTOMATED: CPT | Performed by: FAMILY MEDICINE

## 2022-03-16 PROCEDURE — 99215 OFFICE O/P EST HI 40 MIN: CPT | Performed by: FAMILY MEDICINE

## 2022-03-16 PROCEDURE — 36415 COLL VENOUS BLD VENIPUNCTURE: CPT | Performed by: FAMILY MEDICINE

## 2022-03-16 PROCEDURE — 85610 PROTHROMBIN TIME: CPT | Performed by: FAMILY MEDICINE

## 2022-03-16 PROCEDURE — 85730 THROMBOPLASTIN TIME PARTIAL: CPT | Performed by: FAMILY MEDICINE

## 2022-03-16 PROCEDURE — 80048 BASIC METABOLIC PNL TOTAL CA: CPT | Performed by: FAMILY MEDICINE

## 2022-03-16 PROCEDURE — 83036 HEMOGLOBIN GLYCOSYLATED A1C: CPT | Performed by: FAMILY MEDICINE

## 2022-03-16 NOTE — LETTER
March 17, 2022      Mauri Kiser  [address]  [facility] [address]        Dear ,  We are writing to inform you of your test results.    Joe Dotson:  Your diabetes is under good control.  Your other pre-op labs ordered by Dr Duque looked normal as well.    Resulted Orders   Hemoglobin A1c   Result Value Ref Range    Hemoglobin A1C 5.9 (H) 0.0 - 5.6 %      Comment:      Normal <5.7%   Prediabetes 5.7-6.4%    Diabetes 6.5% or higher     Note: Adopted from ADA consensus guidelines.       If you have any questions or concerns, please call the clinic at the number listed above.       Sincerely,      Luis Daniel Maciel MD

## 2022-03-16 NOTE — PATIENT INSTRUCTIONS
Last dose of plavix and aspirin was on Rafita 3-13-22    After surgery resume your plavix and aspirin    Covid screen 3-17-22    Hold metformin on day of and day prior to surgery.

## 2022-03-16 NOTE — PROGRESS NOTES
[facility]  [address] [facility] [address]  Phone: [phone]  Fax: [phone]  Primary Provider: Luis Daniel Maciel        PREOPERATIVE EVALUATION:  Today's date: 3/16/2022    Raghavendra Kiser is a 62 year old male who presents for a preoperative evaluation.    Surgical Information:  Surgery/Procedure: Disc repair on back   Surgery Location: [facility]   Surgeon: Dr. Duque   Surgery Date: 03/21/2022  Time of Surgery: 5:30PM  Where patient plans to recover: At home with family  Fax number for surgical facility: Note does not need to be faxed, will be available electronically in Epic.    Type of Anesthesia Anticipated: to be determined    Assessment & Plan     The proposed surgical procedure is considered INTERMEDIATE risk.    Pre-op exam      Herniated nucleus pulposus, lumbar  L3-4 to the left    Lumbar radiculopathy      Primary hypertension      Type 2 diabetes mellitus without complication, without long-term current use of insulin (H)  controlled    Cerebrovascular accident (CVA), unspecified mechanism (H)  STABLE ON PLAVIX AND ASA,  WILL RESUME IMMEDIATELY POST OP (FOLLOWING A ONE WEEK HOLD)      Hypothyroidism, unspecified type  REPLACE ON LEVOTHYROXINE      PLAN:  APPROVAL GIVEN to proceed with proposed procedure, without further diagnostic evaluation.       Last dose of plavix and aspirin was on Rafita 3-13-22    After surgery resume your plavix and aspirin.    (I  discussed the anticoagulation plan with our PharmD  who was in agreement that a lovenox bridge would not be necessary or recommended, as the effects of the plavix and aspirin will slowly wane pre-op and then be initiated immediately post op.)    Covid screen 3-17-22    Hold metformin on day of and day prior to surgery.     48 min spent in completing the pre-op hx and px, including discussion of anticoagulation plan with our PharmD, Betina King.  Subjective     HPI related to upcoming  procedure:   Onset 4-5 weeks ago of right leg pain.   The pain is constant, aching, now about 5/10     Preop Questions 3/10/2022   1. Have you ever had a heart attack or stroke? YES;  [place] 2020   2. Have you ever had surgery on your heart or blood vessels, such as a stent placement, a coronary artery bypass, or surgery on an artery in your head, neck, heart, or legs? YES:  Right CEA approx 2016   3. Do you have chest pain with activity? No   4. Do you have a history of  heart failure? No   5. Do you currently have a cold, bronchitis or symptoms of other infection? No   6. Do you have a cough, shortness of breath, or wheezing? No   7. Do you or anyone in your family have previous history of blood clots? No   8. Do you or does anyone in your family have a serious bleeding problem such as prolonged bleeding following surgeries or cuts? No   9. Have you ever had problems with anemia or been told to take iron pills? No   10. Have you had any abnormal blood loss such as black, tarry or bloody stools? No   11. Have you ever had a blood transfusion? No   12. Are you willing to have a blood transfusion if it is medically needed before, during, or after your surgery? Yes   13. Have you or any of your relatives ever had problems with anesthesia? No   14. Do you have sleep apnea, excessive snoring or daytime drowsiness? No   15. Do you have any artifical heart valves or other implanted medical devices like a pacemaker, defibrillator, or continuous glucose monitor? No   16. Do you have artificial joints? No   17. Are you allergic to latex? No       Health Care Directive:  Patient does not have a Health Care Directive or Living Will      Review of Systems  CONSTITUTIONAL: NEGATIVE for fever, chills, change in weight  INTEGUMENTARY/SKIN: NEGATIVE for worrisome rashes, moles or lesions  EYES: NEGATIVE for vision changes or irritation  ENT/MOUTH: NEGATIVE for ear, mouth and throat problems  RESP: NEGATIVE for significant  cough or SOB  CV: NEGATIVE for chest pain, palpitations or peripheral edema  GI: NEGATIVE for nausea, abdominal pain, heartburn, or change in bowel habits  : NEGATIVE for frequency, dysuria, or hematuria  MUSCULOSKELETAL: NEGATIVE for significant arthralgias or myalgia  NEURO: NEGATIVE for weakness, dizziness or paresthesias  ENDOCRINE: NEGATIVE for temperature intolerance, skin/hair changes  HEME: NEGATIVE for bleeding problems  PSYCHIATRIC: NEGATIVE for changes in mood or affect    Weakness and muscle cramps in the legs,  R>L  Restless legs  Tingling in both feet, R>L    Patient Active Problem List    Diagnosis Date Noted     Acute respiratory failure with hypoxia (H) 02/08/2021     Priority: Medium     Cognitive and behavioral changes 02/08/2021     Priority: Medium     Sleep difficulties 02/08/2021     Priority: Medium     Acute encephalopathy 12/25/2020     Priority: Medium     AMS (altered mental status) 12/25/2020     Priority: Medium     Elevated blood pressure reading without diagnosis of hypertension 11/12/2020     Priority: Medium     Hypothyroidism 11/12/2020     Priority: Medium     Created by Conversion    Replacement Utility updated for latest IMO load       Other stiff joint of shoulder region 11/12/2020     Priority: Medium     Pain in joint involving ankle and foot 11/12/2020     Priority: Medium     Arthralgia of hand 11/12/2020     Priority: Medium     Peyronie's disease 11/12/2020     Priority: Medium     Right sided weakness 10/14/2020     Priority: Medium     Stenosis of left internal carotid artery 10/07/2020     Priority: Medium     History of colonic polyps 01/28/2020     Priority: Medium     Sessile serrated adenoma on colonoscopy 1-22-20.   Repeat in 3 years.       Type 2 diabetes mellitus without complication, without long-term current use of insulin (H) 11/22/2019     Priority: Medium     Vitamin deficiency 11/22/2019     Priority: Medium     Precordial pain 11/06/2018     Priority:  Medium     Hyperlipidemia 10/17/2018     Priority: Medium     Created by Conversion       Cervical radiculopathy 01/23/2018     Priority: Medium     Right shoulder tendinitis 08/29/2017     Priority: Medium     HTN (hypertension) 08/01/2017     Priority: Medium     High risk medication use 02/28/2017     Priority: Medium     Ganglion cyst 04/11/2016     Priority: Medium     Medial epicondylitis 02/10/2016     Priority: Medium     Atherosclerosis of right carotid artery 05/22/2015     Priority: Medium     Superficial venous thrombosis of arm 05/22/2015     Priority: Medium     Stroke (H) Oct and Dec 2020     Priority: Medium     Small stroke in the left corona radiata affecting right side.       TIA (transient ischemic attack) 05/16/2015     Priority: Medium     Acute ischemic left MCA stroke (H) 05/16/2015     Priority: Medium     History of TIA (transient ischemic attack) and stroke 05/01/2015     Priority: Medium     Apr 21, 2016 Entered By: CAROLEE GONZALEZ Comment: Carotid Artery Surgery - June 2015, F/U scheduled for Aug 16       Chronic gout 12/09/2014     Priority: Medium      Past Medical History:   Diagnosis Date     Atherosclerosis of right carotid artery      Carotid stenosis, bilateral      Cervical radiculopathy      Chronic gout      Diabetes (H)      Fracture of right humerus      Ganglion cyst      High cholesterol      History of stroke without residual deficits  Oct 2020, Dec 2020     Humerus fracture     Right     Hyperlipidemia      Hypertension      Hypertension      Hypothyroidism      Medial epicondylitis      Shoulder tendinitis, right      Superficial venous thrombosis of arm     right lateral antecubital fossa     TIA (transient ischemic attack) 5/16/15     Tinnitus      Type 2 diabetes mellitus without complication, without long-term current use of insulin (H) 11/22/2019     Past Surgical History:   Procedure Laterality Date     CAROTID ENDARTERECTOMY       IR LUMBAR PUNCTURE  12/25/2020      IR SPINAL PUNCTURE  12/25/2020     PICC INSERTION - TRIPLE LUMEN  12/26/2020          TONSILLECTOMY       ZZC THROMBOENDARTECTMY NECK,NECK INCIS Right 6/8/2015    Procedure: Right Carotid Endarterectomy with Impulse Monitoring;  Surgeon: Marcellus Toth MD;  Location: [facility] - [place], [place];  Service: General     Current Outpatient Medications   Medication Sig Dispense Refill     allopurinol (ZYLOPRIM) 300 MG tablet TAKE 1 TABLET  (300 MG) EVERY OTHER DAY ALTERNATING WITH ONE AND ONE-HALF TABLETS  (450 MG) EVERY OTHER DAY AT BEDTIME 113 tablet 3     amLODIPine (NORVASC) 5 MG tablet Take 1 tablet (5 mg) by mouth daily 90 tablet 3     aspirin (ASA) 81 MG chewable tablet Take 81 mg by mouth daily       blood glucose monitoring (FREESTYLE) lancets        Cholecalciferol 100 MCG (4000 UT) CAPS Take 4,000 Units by mouth daily       clopidogrel (PLAVIX) 75 MG tablet Take 1 tablet (75 mg) by mouth daily 90 tablet 3     cyclobenzaprine (FLEXERIL) 10 MG tablet Take 1 tablet (10 mg) by mouth 3 times daily as needed for muscle spasms 30 tablet 0     divalproex sodium extended-release (DEPAKOTE ER) 500 MG 24 hr tablet TAKE 1 TABLET AT BEDTIME 90 tablet 0     FREESTYLE LITE test strip        gabapentin (NEURONTIN) 300 MG capsule Take 1 capsule (300 mg) by mouth At Bedtime for 3 days, THEN 1 capsule (300 mg) 2 times daily for 3 days, THEN 1 capsule (300 mg) 3 times daily. 90 capsule 0     levothyroxine (SYNTHROID/LEVOTHROID) 125 MCG tablet Take 125 mcg by mouth daily       lisinopril (ZESTRIL) 20 MG tablet TAKE 1 TABLET TWICE A  tablet 3     metFORMIN (GLUCOPHAGE-XR) 500 MG 24 hr tablet Take 2 tablets by mouth daily       methylPREDNISolone (MEDROL DOSEPAK) 4 MG tablet therapy pack Follow Package Directions 21 tablet 0     rosuvastatin (CRESTOR) 5 MG tablet Take 1 tablet (5 mg) by mouth daily 90 tablet 3       Allergies   Allergen Reactions     Cats      Other reaction(s): ITCHING,WATERING EYES        Social History     Tobacco  Use     Smoking status: Former Smoker     Packs/day: 2.00     Years: 20.00     Pack years: 40.00     Types: Cigarettes     Quit date: 2/15/1993     Years since quittin.0     Smokeless tobacco: Never Used     Tobacco comment: quit in    Substance Use Topics     Alcohol use: Yes     Alcohol/week: 0.0 standard drinks     Comment: Alcoholic Drinks/day: rarely     History   Drug Use No         Objective     /81   Pulse 90   Ht 1.829 m (6')   Wt 107.7 kg (237 lb 6.4 oz)   SpO2 98%   BMI 32.20 kg/m      Physical Exam    GENERAL APPEARANCE: healthy, alert and no distress     EYES: EOMI,  PERRL     HENT: ear canals and TM's normal and nose and mouth without ulcers or lesions     NECK: no adenopathy, no asymmetry, masses, or scars and thyroid normal to palpation     RESP: lungs clear to auscultation - no rales, rhonchi or wheezes     CV: regular rates and rhythm, normal S1 S2, no S3 or S4 and no murmur, click or rub     ABDOMEN:  soft, nontender, no HSM or masses and bowel sounds normal     MS: extremities normal- no gross deformities noted, no evidence of inflammation in joints, FROM in all extremities.     SKIN: no suspicious lesions or rashes     NEURO: Normal strength and tone, sensory exam grossly normal, mentation intact and speech normal     PSYCH: mentation appears normal. and affect normal/bright     LYMPHATICS: No cervical adenopathy    Recent Labs   Lab Test 22  1030 21  1325 20  0509 20  0903   HGB 14.6  --  14.2   < >  --      --  159   < >  --    INR 1.02  --   --   --  1.05    144  --    < >  --    POTASSIUM 3.6 4.3  --    < >  --    CR 1.10 0.88 1.01   < >  --    A1C  --  5.4 5.7*  --   --     < > = values in this interval not displayed.          Diagnostics:  Labs pending at this time.  Results will be reviewed when available.       ECG 12-LEAD WITH MUSE (E)  Order: [id number]   Status: Edited Result - FINAL     Visible to patient: Yes  (seen)     Next appt: 03/17/2022 at 10:30 AM in Lab ([facility])     0 Result Notes      Component Ref Range & Units 1/15/22 12:30 AM 1/14/22  9:09 PM    Systolic Blood Pressure mmHg       Diastolic Blood Pressure mmHg       Ventricular Rate BPM 74  80     Atrial Rate BPM 74  80     ID Interval ms 184  168     QRS Duration ms 78  92     QT ms 384  394     QTc ms 426  454     P Axis degrees 62  63     R AXIS degrees -2  11     T Axis degrees 27  37     Interpretation ECG  Sinus rhythm   Normal ECG   When compared with ECG of 14-JAN-2022 21:09,   No significant change was found   Confirmed by SEE ED PROVIDER NOTE FOR, ECG INTERPRETATION (4000),  OMID OWENS ([id number]) on 1/15/2022 11:30:45 PM  Sinus rhythm   Normal ECG   When compared with ECG of 25-DEC-2020 08:48,   Criteria for Septal infarct are no longer Present   Confirmed by SEE ED PROVIDER NOTE FOR, ECG INTERPRETATION (4000),  OMID OWENS ([id number]) on 1/15/2022 11:30:09 PM           Revised Cardiac Risk Index (RCRI):  The patient has the following serious cardiovascular risks for perioperative complications:   - Cerebrovascular Disease (TIA or CVA) = 1 point     RCRI Interpretation: 1 point: Class II (low risk - 0.9% complication rate)    EXAM: MR LUMBAR SPINE W/O CONTRAST  LOCATION: [facility]  DATE/TIME: 3/4/2022 7:31 PM     INDICATION: Lumbar radiculopathy, no red flags, bilateral leg paresthesias.  COMPARISON: None.  TECHNIQUE: Routine Lumbar Spine MRI without IV contrast.     FINDINGS:   Nomenclature is based on 5 lumbar type vertebral bodies. Normal vertebral body heights. Subacute to chronic Schmorl's node deformities in the bony endplates at the L1-L2, L2-L3 and L4-L5 levels. Developmentally narrow central canal. Marrow pattern within   normal limits. Normal distal spinal cord and cauda equina with conus medullaris at L1-L2. No extraspinal abnormality. Unremarkable visualized bony pelvis.     T11-T12: Moderate  loss of disc height and signal with chronic endplate changes. Broad-based disc and osteophyte complex at this level results in mild to moderate central canal narrowing, slightly greater on the left with mild cord deformity. Mild   narrowing of both T11-T12 foramen.     T12-L1: Mild loss of disc height and signal. Shallow disc and osteophyte complex. Normal facets. No spinal canal or neural foraminal stenosis.      L1-L2: Moderate loss of disc height and signal. Shallow disc and osteophyte complex. Mild facet arthropathy. No spinal canal or neural foraminal stenosis.     L2-L3: Mild loss of disc height and signal. Shallow disc and osteophyte complex with mild to moderate facet arthropathy. Mild central canal narrowing. Mild left lateral recess narrowing. No right foraminal narrowing. Mild left foraminal narrowing.      L3-L4: Mild loss of disc height and signal. Broad-based disc and osteophyte complex with a superimposed left paracentral disc protrusion. Mild to moderate facet arthropathy. Relatively severe central canal narrowing and severe narrowing left greater than   right lateral recesses. Redundant nerve roots below this level due to the spinal stenosis. Mild right foraminal narrowing. Mild left foraminal narrowing.     L4-L5: Advanced loss of disc height and signal. Shallow disc and osteophyte complex. Moderate facet arthropathy. Mild to moderate central canal narrowing with narrowing of both lateral recesses. Mild to moderate bilateral foraminal narrowing.     L5-S1: Moderate to advanced loss of disc height and signal. Shallow disc and osteophyte complex. This is slightly greater on the right. Moderate facet arthropathy. Central canal adequate with mild right lateral recess narrowing. Moderate to severe right   foraminal narrowing. Mild left foraminal narrowing.                                                                      IMPRESSION:  1.  Developmentally narrow central canal.  2.  At the L3-L4 level,  there is a broad-based disc and osteophyte complex with a superimposed left paracentral disc protrusion. Relatively severe central canal narrowing and severe narrowing of the left greater than right lateral recesses. Mild right   foraminal narrowing and left foraminal narrowing.  3.  At the L4-L5 level, there is a shallow disc and osteophyte complex with moderate facet arthropathy. Mild to moderate central canal narrowing with narrowing of both lateral recesses. Mild to moderate bilateral foraminal narrowing.  4.  At the L5-S1 level, the central canal is adequate with mild right lateral recess narrowing. Moderate to severe right foraminal narrowing and mild left foraminal narrowing.  5.  At the T11-T12 level, there is a broad-based disc and osteophyte complex resulting in mild to moderate central canal narrowing slightly greater on the left with mild cord deformity. Mild narrowing of both T11-T12 foramen.       Signed Electronically by: Luis Daniel Maciel MD  Copy of this evaluation report is provided to requesting physician.

## 2022-03-16 NOTE — H&P (VIEW-ONLY)
[facility]  [address] [facility] [address]  Phone: [phone]  Fax: [phone]  Primary Provider: Luis Daniel Maciel        PREOPERATIVE EVALUATION:  Today's date: 3/16/2022    Raghavendra Kiser is a 62 year old male who presents for a preoperative evaluation.    Surgical Information:  Surgery/Procedure: Disc repair on back   Surgery Location: [facility]   Surgeon: Dr. Duque   Surgery Date: 03/21/2022  Time of Surgery: 5:30PM  Where patient plans to recover: At home with family  Fax number for surgical facility: Note does not need to be faxed, will be available electronically in Epic.    Type of Anesthesia Anticipated: to be determined    Assessment & Plan     The proposed surgical procedure is considered INTERMEDIATE risk.    Pre-op exam      Herniated nucleus pulposus, lumbar  L3-4 to the left    Lumbar radiculopathy      Primary hypertension      Type 2 diabetes mellitus without complication, without long-term current use of insulin (H)  controlled    Cerebrovascular accident (CVA), unspecified mechanism (H)  STABLE ON PLAVIX AND ASA,  WILL RESUME IMMEDIATELY POST OP (FOLLOWING A ONE WEEK HOLD)      Hypothyroidism, unspecified type  REPLACE ON LEVOTHYROXINE      PLAN:  APPROVAL GIVEN to proceed with proposed procedure, without further diagnostic evaluation.       Last dose of plavix and aspirin was on Rafita 3-13-22    After surgery resume your plavix and aspirin.    (I  discussed the anticoagulation plan with our PharmD  who was in agreement that a lovenox bridge would not be necessary or recommended, as the effects of the plavix and aspirin will slowly wane pre-op and then be initiated immediately post op.)    Covid screen 3-17-22    Hold metformin on day of and day prior to surgery.     48 min spent in completing the pre-op hx and px, including discussion of anticoagulation plan with our PharmD, Betina King.  Subjective     HPI related to upcoming  procedure:   Onset 4-5 weeks ago of right leg pain.   The pain is constant, aching, now about 5/10     Preop Questions 3/10/2022   1. Have you ever had a heart attack or stroke? YES;  [place] Day 2020   2. Have you ever had surgery on your heart or blood vessels, such as a stent placement, a coronary artery bypass, or surgery on an artery in your head, neck, heart, or legs? YES:  Right CEA approx 2016   3. Do you have chest pain with activity? No   4. Do you have a history of  heart failure? No   5. Do you currently have a cold, bronchitis or symptoms of other infection? No   6. Do you have a cough, shortness of breath, or wheezing? No   7. Do you or anyone in your family have previous history of blood clots? No   8. Do you or does anyone in your family have a serious bleeding problem such as prolonged bleeding following surgeries or cuts? No   9. Have you ever had problems with anemia or been told to take iron pills? No   10. Have you had any abnormal blood loss such as black, tarry or bloody stools? No   11. Have you ever had a blood transfusion? No   12. Are you willing to have a blood transfusion if it is medically needed before, during, or after your surgery? Yes   13. Have you or any of your relatives ever had problems with anesthesia? No   14. Do you have sleep apnea, excessive snoring or daytime drowsiness? No   15. Do you have any artifical heart valves or other implanted medical devices like a pacemaker, defibrillator, or continuous glucose monitor? No   16. Do you have artificial joints? No   17. Are you allergic to latex? No       Health Care Directive:  Patient does not have a Health Care Directive or Living Will      Review of Systems  CONSTITUTIONAL: NEGATIVE for fever, chills, change in weight  INTEGUMENTARY/SKIN: NEGATIVE for worrisome rashes, moles or lesions  EYES: NEGATIVE for vision changes or irritation  ENT/MOUTH: NEGATIVE for ear, mouth and throat problems  RESP: NEGATIVE for significant  cough or SOB  CV: NEGATIVE for chest pain, palpitations or peripheral edema  GI: NEGATIVE for nausea, abdominal pain, heartburn, or change in bowel habits  : NEGATIVE for frequency, dysuria, or hematuria  MUSCULOSKELETAL: NEGATIVE for significant arthralgias or myalgia  NEURO: NEGATIVE for weakness, dizziness or paresthesias  ENDOCRINE: NEGATIVE for temperature intolerance, skin/hair changes  HEME: NEGATIVE for bleeding problems  PSYCHIATRIC: NEGATIVE for changes in mood or affect    Weakness and muscle cramps in the legs,  R>L  Restless legs  Tingling in both feet, R>L    Patient Active Problem List    Diagnosis Date Noted     Acute respiratory failure with hypoxia (H) 02/08/2021     Priority: Medium     Cognitive and behavioral changes 02/08/2021     Priority: Medium     Sleep difficulties 02/08/2021     Priority: Medium     Acute encephalopathy 12/25/2020     Priority: Medium     AMS (altered mental status) 12/25/2020     Priority: Medium     Elevated blood pressure reading without diagnosis of hypertension 11/12/2020     Priority: Medium     Hypothyroidism 11/12/2020     Priority: Medium     Created by Conversion    Replacement Utility updated for latest IMO load       Other stiff joint of shoulder region 11/12/2020     Priority: Medium     Pain in joint involving ankle and foot 11/12/2020     Priority: Medium     Arthralgia of hand 11/12/2020     Priority: Medium     Peyronie's disease 11/12/2020     Priority: Medium     Right sided weakness 10/14/2020     Priority: Medium     Stenosis of left internal carotid artery 10/07/2020     Priority: Medium     History of colonic polyps 01/28/2020     Priority: Medium     Sessile serrated adenoma on colonoscopy 1-22-20.   Repeat in 3 years.       Type 2 diabetes mellitus without complication, without long-term current use of insulin (H) 11/22/2019     Priority: Medium     Vitamin deficiency 11/22/2019     Priority: Medium     Precordial pain 11/06/2018     Priority:  Medium     Hyperlipidemia 10/17/2018     Priority: Medium     Created by Conversion       Cervical radiculopathy 01/23/2018     Priority: Medium     Right shoulder tendinitis 08/29/2017     Priority: Medium     HTN (hypertension) 08/01/2017     Priority: Medium     High risk medication use 02/28/2017     Priority: Medium     Ganglion cyst 04/11/2016     Priority: Medium     Medial epicondylitis 02/10/2016     Priority: Medium     Atherosclerosis of right carotid artery 05/22/2015     Priority: Medium     Superficial venous thrombosis of arm 05/22/2015     Priority: Medium     Stroke (H) Oct and Dec 2020     Priority: Medium     Small stroke in the left corona radiata affecting right side.       TIA (transient ischemic attack) 05/16/2015     Priority: Medium     Acute ischemic left MCA stroke (H) 05/16/2015     Priority: Medium     History of TIA (transient ischemic attack) and stroke 05/01/2015     Priority: Medium     Apr 21, 2016 Entered By: CAROLEE GONZALEZ Comment: Carotid Artery Surgery - June 2015, F/U scheduled for Aug 16       Chronic gout 12/09/2014     Priority: Medium      Past Medical History:   Diagnosis Date     Atherosclerosis of right carotid artery      Carotid stenosis, bilateral      Cervical radiculopathy      Chronic gout      Diabetes (H)      Fracture of right humerus      Ganglion cyst      High cholesterol      History of stroke without residual deficits  Oct 2020, Dec 2020     Humerus fracture     Right     Hyperlipidemia      Hypertension      Hypertension      Hypothyroidism      Medial epicondylitis      Shoulder tendinitis, right      Superficial venous thrombosis of arm     right lateral antecubital fossa     TIA (transient ischemic attack) 5/16/15     Tinnitus      Type 2 diabetes mellitus without complication, without long-term current use of insulin (H) 11/22/2019     Past Surgical History:   Procedure Laterality Date     CAROTID ENDARTERECTOMY       IR LUMBAR PUNCTURE  12/25/2020      IR SPINAL PUNCTURE  12/25/2020     PICC INSERTION - TRIPLE LUMEN  12/26/2020          TONSILLECTOMY       ZZC THROMBOENDARTECTMY NECK,NECK INCIS Right 6/8/2015    Procedure: Right Carotid Endarterectomy with Impulse Monitoring;  Surgeon: Marcellus Toth MD;  [facility]: [facility];  Service: General     Current Outpatient Medications   Medication Sig Dispense Refill     allopurinol (ZYLOPRIM) 300 MG tablet TAKE 1 TABLET  (300 MG) EVERY OTHER DAY ALTERNATING WITH ONE AND ONE-HALF TABLETS  (450 MG) EVERY OTHER DAY AT BEDTIME 113 tablet 3     amLODIPine (NORVASC) 5 MG tablet Take 1 tablet (5 mg) by mouth daily 90 tablet 3     aspirin (ASA) 81 MG chewable tablet Take 81 mg by mouth daily       blood glucose monitoring (FREESTYLE) lancets        Cholecalciferol 100 MCG (4000 UT) CAPS Take 4,000 Units by mouth daily       clopidogrel (PLAVIX) 75 MG tablet Take 1 tablet (75 mg) by mouth daily 90 tablet 3     cyclobenzaprine (FLEXERIL) 10 MG tablet Take 1 tablet (10 mg) by mouth 3 times daily as needed for muscle spasms 30 tablet 0     divalproex sodium extended-release (DEPAKOTE ER) 500 MG 24 hr tablet TAKE 1 TABLET AT BEDTIME 90 tablet 0     FREESTYLE LITE test strip        gabapentin (NEURONTIN) 300 MG capsule Take 1 capsule (300 mg) by mouth At Bedtime for 3 days, THEN 1 capsule (300 mg) 2 times daily for 3 days, THEN 1 capsule (300 mg) 3 times daily. 90 capsule 0     levothyroxine (SYNTHROID/LEVOTHROID) 125 MCG tablet Take 125 mcg by mouth daily       lisinopril (ZESTRIL) 20 MG tablet TAKE 1 TABLET TWICE A  tablet 3     metFORMIN (GLUCOPHAGE-XR) 500 MG 24 hr tablet Take 2 tablets by mouth daily       methylPREDNISolone (MEDROL DOSEPAK) 4 MG tablet therapy pack Follow Package Directions 21 tablet 0     rosuvastatin (CRESTOR) 5 MG tablet Take 1 tablet (5 mg) by mouth daily 90 tablet 3       Allergies   Allergen Reactions     Cats      Other reaction(s): ITCHING,WATERING EYES        Social History     Tobacco  Use     Smoking status: Former Smoker     Packs/day: 2.00     Years: 20.00     Pack years: 40.00     Types: Cigarettes     Quit date: 2/15/1993     Years since quittin.0     Smokeless tobacco: Never Used     Tobacco comment: quit in    Substance Use Topics     Alcohol use: Yes     Alcohol/week: 0.0 standard drinks     Comment: Alcoholic Drinks/day: rarely     History   Drug Use No         Objective     /81   Pulse 90   Ht 1.829 m (6')   Wt 107.7 kg (237 lb 6.4 oz)   SpO2 98%   BMI 32.20 kg/m      Physical Exam    GENERAL APPEARANCE: healthy, alert and no distress     EYES: EOMI,  PERRL     HENT: ear canals and TM's normal and nose and mouth without ulcers or lesions     NECK: no adenopathy, no asymmetry, masses, or scars and thyroid normal to palpation     RESP: lungs clear to auscultation - no rales, rhonchi or wheezes     CV: regular rates and rhythm, normal S1 S2, no S3 or S4 and no murmur, click or rub     ABDOMEN:  soft, nontender, no HSM or masses and bowel sounds normal     MS: extremities normal- no gross deformities noted, no evidence of inflammation in joints, FROM in all extremities.     SKIN: no suspicious lesions or rashes     NEURO: Normal strength and tone, sensory exam grossly normal, mentation intact and speech normal     PSYCH: mentation appears normal. and affect normal/bright     LYMPHATICS: No cervical adenopathy    Recent Labs   Lab Test 22  1030 21  1325 20  0509 20  0903   HGB 14.6  --  14.2   < >  --      --  159   < >  --    INR 1.02  --   --   --  1.05    144  --    < >  --    POTASSIUM 3.6 4.3  --    < >  --    CR 1.10 0.88 1.01   < >  --    A1C  --  5.4 5.7*  --   --     < > = values in this interval not displayed.          Diagnostics:  Labs pending at this time.  Results will be reviewed when available.       ECG 12-LEAD WITH MUSE (E)  Order: [id number]   Status: Edited Result - FINAL     Visible to patient: Yes  (seen)     Next appt: 03/17/2022 at 10:30 AM in Lab ([facility])     0 Result Notes      Component Ref Range & Units 1/15/22 12:30 AM 1/14/22  9:09 PM    Systolic Blood Pressure mmHg       Diastolic Blood Pressure mmHg       Ventricular Rate BPM 74  80     Atrial Rate BPM 74  80     AZ Interval ms 184  168     QRS Duration ms 78  92     QT ms 384  394     QTc ms 426  454     P Axis degrees 62  63     R AXIS degrees -2  11     T Axis degrees 27  37     Interpretation ECG  Sinus rhythm   Normal ECG   When compared with ECG of 14-JAN-2022 21:09,   No significant change was found   Confirmed by SEE ED PROVIDER NOTE FOR, ECG INTERPRETATION (4000),  OMID OWENS ([id number]) on 1/15/2022 11:30:45 PM  Sinus rhythm   Normal ECG   When compared with ECG of 25-DEC-2020 08:48,   Criteria for Septal infarct are no longer Present   Confirmed by SEE ED PROVIDER NOTE FOR, ECG INTERPRETATION (4000),  OMID OWENS ([id number]) on 1/15/2022 11:30:09 PM           Revised Cardiac Risk Index (RCRI):  The patient has the following serious cardiovascular risks for perioperative complications:   - Cerebrovascular Disease (TIA or CVA) = 1 point     RCRI Interpretation: 1 point: Class II (low risk - 0.9% complication rate)    EXAM: MR LUMBAR SPINE W/O CONTRAST  LOCATION: [facility]  DATE/TIME: 3/4/2022 7:31 PM     INDICATION: Lumbar radiculopathy, no red flags, bilateral leg paresthesias.  COMPARISON: None.  TECHNIQUE: Routine Lumbar Spine MRI without IV contrast.     FINDINGS:   Nomenclature is based on 5 lumbar type vertebral bodies. Normal vertebral body heights. Subacute to chronic Schmorl's node deformities in the bony endplates at the L1-L2, L2-L3 and L4-L5 levels. Developmentally narrow central canal. Marrow pattern within   normal limits. Normal distal spinal cord and cauda equina with conus medullaris at L1-L2. No extraspinal abnormality. Unremarkable visualized bony pelvis.     T11-T12: Moderate  loss of disc height and signal with chronic endplate changes. Broad-based disc and osteophyte complex at this level results in mild to moderate central canal narrowing, slightly greater on the left with mild cord deformity. Mild   narrowing of both T11-T12 foramen.     T12-L1: Mild loss of disc height and signal. Shallow disc and osteophyte complex. Normal facets. No spinal canal or neural foraminal stenosis.      L1-L2: Moderate loss of disc height and signal. Shallow disc and osteophyte complex. Mild facet arthropathy. No spinal canal or neural foraminal stenosis.     L2-L3: Mild loss of disc height and signal. Shallow disc and osteophyte complex with mild to moderate facet arthropathy. Mild central canal narrowing. Mild left lateral recess narrowing. No right foraminal narrowing. Mild left foraminal narrowing.      L3-L4: Mild loss of disc height and signal. Broad-based disc and osteophyte complex with a superimposed left paracentral disc protrusion. Mild to moderate facet arthropathy. Relatively severe central canal narrowing and severe narrowing left greater than   right lateral recesses. Redundant nerve roots below this level due to the spinal stenosis. Mild right foraminal narrowing. Mild left foraminal narrowing.     L4-L5: Advanced loss of disc height and signal. Shallow disc and osteophyte complex. Moderate facet arthropathy. Mild to moderate central canal narrowing with narrowing of both lateral recesses. Mild to moderate bilateral foraminal narrowing.     L5-S1: Moderate to advanced loss of disc height and signal. Shallow disc and osteophyte complex. This is slightly greater on the right. Moderate facet arthropathy. Central canal adequate with mild right lateral recess narrowing. Moderate to severe right   foraminal narrowing. Mild left foraminal narrowing.                                                                      IMPRESSION:  1.  Developmentally narrow central canal.  2.  At the L3-L4 level,  there is a broad-based disc and osteophyte complex with a superimposed left paracentral disc protrusion. Relatively severe central canal narrowing and severe narrowing of the left greater than right lateral recesses. Mild right   foraminal narrowing and left foraminal narrowing.  3.  At the L4-L5 level, there is a shallow disc and osteophyte complex with moderate facet arthropathy. Mild to moderate central canal narrowing with narrowing of both lateral recesses. Mild to moderate bilateral foraminal narrowing.  4.  At the L5-S1 level, the central canal is adequate with mild right lateral recess narrowing. Moderate to severe right foraminal narrowing and mild left foraminal narrowing.  5.  At the T11-T12 level, there is a broad-based disc and osteophyte complex resulting in mild to moderate central canal narrowing slightly greater on the left with mild cord deformity. Mild narrowing of both T11-T12 foramen.       Signed Electronically by: Luis Daniel Maciel MD  Copy of this evaluation report is provided to requesting physician.

## 2022-03-17 ENCOUNTER — LAB (OUTPATIENT)
Dept: LAB | Facility: CLINIC | Age: 62
End: 2022-03-17
Attending: SURGERY
Payer: OTHER GOVERNMENT

## 2022-03-17 DIAGNOSIS — Z01.818 PRE-OP TESTING: ICD-10-CM

## 2022-03-17 DIAGNOSIS — Z11.59 ENCOUNTER FOR SCREENING FOR OTHER VIRAL DISEASES: ICD-10-CM

## 2022-03-17 PROCEDURE — U0005 INFEC AGEN DETEC AMPLI PROBE: HCPCS

## 2022-03-17 PROCEDURE — U0003 INFECTIOUS AGENT DETECTION BY NUCLEIC ACID (DNA OR RNA); SEVERE ACUTE RESPIRATORY SYNDROME CORONAVIRUS 2 (SARS-COV-2) (CORONAVIRUS DISEASE [COVID-19]), AMPLIFIED PROBE TECHNIQUE, MAKING USE OF HIGH THROUGHPUT TECHNOLOGIES AS DESCRIBED BY CMS-2020-01-R: HCPCS

## 2022-03-18 ENCOUNTER — TELEPHONE (OUTPATIENT)
Dept: NEUROSURGERY | Facility: CLINIC | Age: 62
End: 2022-03-18
Payer: OTHER GOVERNMENT

## 2022-03-18 LAB — SARS-COV-2 RNA RESP QL NAA+PROBE: NEGATIVE

## 2022-03-18 RX ORDER — CHLORTHALIDONE 25 MG/1
25 TABLET ORAL DAILY
Status: ON HOLD | COMMUNITY
End: 2022-03-21

## 2022-03-21 ENCOUNTER — HOSPITAL ENCOUNTER (INPATIENT)
Facility: HOSPITAL | Age: 62
LOS: 2 days | Discharge: HOME | DRG: 519 | End: 2022-03-23
Attending: SURGERY | Admitting: SURGERY
Payer: OTHER GOVERNMENT

## 2022-03-21 ENCOUNTER — ANESTHESIA EVENT (OUTPATIENT)
Dept: SURGERY | Facility: HOSPITAL | Age: 62
DRG: 519 | End: 2022-03-21
Payer: OTHER GOVERNMENT

## 2022-03-21 ENCOUNTER — APPOINTMENT (OUTPATIENT)
Dept: RADIOLOGY | Facility: HOSPITAL | Age: 62
DRG: 519 | End: 2022-03-21
Attending: PHYSICIAN ASSISTANT
Payer: OTHER GOVERNMENT

## 2022-03-21 ENCOUNTER — ANESTHESIA (OUTPATIENT)
Dept: SURGERY | Facility: HOSPITAL | Age: 62
DRG: 519 | End: 2022-03-21
Payer: OTHER GOVERNMENT

## 2022-03-21 DIAGNOSIS — M54.16 LUMBAR RADICULOPATHY: Primary | ICD-10-CM

## 2022-03-21 LAB
GLUCOSE BLDC GLUCOMTR-MCNC: 104 MG/DL (ref 70–99)
GLUCOSE BLDC GLUCOMTR-MCNC: 121 MG/DL (ref 70–99)

## 2022-03-21 PROCEDURE — 999N000141 HC STATISTIC PRE-PROCEDURE NURSING ASSESSMENT: Performed by: SURGERY

## 2022-03-21 PROCEDURE — 250N000011 HC RX IP 250 OP 636: Performed by: SURGERY

## 2022-03-21 PROCEDURE — 272N000001 HC OR GENERAL SUPPLY STERILE: Performed by: SURGERY

## 2022-03-21 PROCEDURE — 360N000077 HC SURGERY LEVEL 4, PER MIN: Performed by: SURGERY

## 2022-03-21 PROCEDURE — 69990 MICROSURGERY ADD-ON: CPT | Mod: AS | Performed by: PHYSICIAN ASSISTANT

## 2022-03-21 PROCEDURE — 120N000001 HC R&B MED SURG/OB

## 2022-03-21 PROCEDURE — 250N000011 HC RX IP 250 OP 636: Performed by: PHYSICIAN ASSISTANT

## 2022-03-21 PROCEDURE — 250N000009 HC RX 250: Performed by: NURSE ANESTHETIST, CERTIFIED REGISTERED

## 2022-03-21 PROCEDURE — 250N000011 HC RX IP 250 OP 636: Performed by: NURSE ANESTHETIST, CERTIFIED REGISTERED

## 2022-03-21 PROCEDURE — 370N000017 HC ANESTHESIA TECHNICAL FEE, PER MIN: Performed by: SURGERY

## 2022-03-21 PROCEDURE — C1763 CONN TISS, NON-HUMAN: HCPCS | Performed by: SURGERY

## 2022-03-21 PROCEDURE — 63030 LAMOT DCMPRN NRV RT 1 LMBR: CPT | Mod: 50 | Performed by: SURGERY

## 2022-03-21 PROCEDURE — 82962 GLUCOSE BLOOD TEST: CPT

## 2022-03-21 PROCEDURE — 250N000011 HC RX IP 250 OP 636: Performed by: ANESTHESIOLOGY

## 2022-03-21 PROCEDURE — 258N000003 HC RX IP 258 OP 636: Performed by: PHYSICIAN ASSISTANT

## 2022-03-21 PROCEDURE — 258N000003 HC RX IP 258 OP 636: Performed by: ANESTHESIOLOGY

## 2022-03-21 PROCEDURE — 99232 SBSQ HOSP IP/OBS MODERATE 35: CPT | Performed by: INTERNAL MEDICINE

## 2022-03-21 PROCEDURE — 250N000013 HC RX MED GY IP 250 OP 250 PS 637: Performed by: PHYSICIAN ASSISTANT

## 2022-03-21 PROCEDURE — 258N000003 HC RX IP 258 OP 636: Performed by: NURSE ANESTHETIST, CERTIFIED REGISTERED

## 2022-03-21 PROCEDURE — 72020 X-RAY EXAM OF SPINE 1 VIEW: CPT

## 2022-03-21 PROCEDURE — 250N000025 HC SEVOFLURANE, PER MIN: Performed by: SURGERY

## 2022-03-21 PROCEDURE — 63030 LAMOT DCMPRN NRV RT 1 LMBR: CPT | Mod: AS | Performed by: PHYSICIAN ASSISTANT

## 2022-03-21 PROCEDURE — 710N000009 HC RECOVERY PHASE 1, LEVEL 1, PER MIN: Performed by: SURGERY

## 2022-03-21 PROCEDURE — 250N000009 HC RX 250: Performed by: SURGERY

## 2022-03-21 PROCEDURE — 69990 MICROSURGERY ADD-ON: CPT | Mod: 59 | Performed by: SURGERY

## 2022-03-21 DEVICE — ALLOGRAFT DURAGEN PLUS 2 X 2 DP5022: Type: IMPLANTABLE DEVICE | Site: BACK | Status: FUNCTIONAL

## 2022-03-21 RX ORDER — PROPOFOL 10 MG/ML
INJECTION, EMULSION INTRAVENOUS PRN
Status: DISCONTINUED | OUTPATIENT
Start: 2022-03-21 | End: 2022-03-21

## 2022-03-21 RX ORDER — HYDROMORPHONE HYDROCHLORIDE 1 MG/ML
0.2 INJECTION, SOLUTION INTRAMUSCULAR; INTRAVENOUS; SUBCUTANEOUS EVERY 5 MIN PRN
Status: DISCONTINUED | OUTPATIENT
Start: 2022-03-21 | End: 2022-03-21 | Stop reason: HOSPADM

## 2022-03-21 RX ORDER — NALOXONE HYDROCHLORIDE 0.4 MG/ML
0.4 INJECTION, SOLUTION INTRAMUSCULAR; INTRAVENOUS; SUBCUTANEOUS
Status: DISCONTINUED | OUTPATIENT
Start: 2022-03-21 | End: 2022-03-23 | Stop reason: HOSPADM

## 2022-03-21 RX ORDER — OXYCODONE HYDROCHLORIDE 5 MG/1
5 TABLET ORAL EVERY 4 HOURS PRN
Status: DISCONTINUED | OUTPATIENT
Start: 2022-03-21 | End: 2022-03-21 | Stop reason: HOSPADM

## 2022-03-21 RX ORDER — OXYCODONE HYDROCHLORIDE 5 MG/1
10 TABLET ORAL EVERY 4 HOURS PRN
Status: DISCONTINUED | OUTPATIENT
Start: 2022-03-21 | End: 2022-03-23 | Stop reason: HOSPADM

## 2022-03-21 RX ORDER — LIDOCAINE HYDROCHLORIDE AND EPINEPHRINE 10; 10 MG/ML; UG/ML
INJECTION, SOLUTION INFILTRATION; PERINEURAL PRN
Status: DISCONTINUED | OUTPATIENT
Start: 2022-03-21 | End: 2022-03-21 | Stop reason: HOSPADM

## 2022-03-21 RX ORDER — AMLODIPINE BESYLATE 5 MG/1
5 TABLET ORAL DAILY
Status: DISCONTINUED | OUTPATIENT
Start: 2022-03-22 | End: 2022-03-23 | Stop reason: HOSPADM

## 2022-03-21 RX ORDER — ONDANSETRON 2 MG/ML
4 INJECTION INTRAMUSCULAR; INTRAVENOUS EVERY 6 HOURS PRN
Status: DISCONTINUED | OUTPATIENT
Start: 2022-03-21 | End: 2022-03-23 | Stop reason: HOSPADM

## 2022-03-21 RX ORDER — VITAMIN B COMPLEX
100 TABLET ORAL DAILY
Status: DISCONTINUED | OUTPATIENT
Start: 2022-03-22 | End: 2022-03-23 | Stop reason: HOSPADM

## 2022-03-21 RX ORDER — METHOCARBAMOL 500 MG/1
500 TABLET, FILM COATED ORAL EVERY 6 HOURS
Status: DISCONTINUED | OUTPATIENT
Start: 2022-03-22 | End: 2022-03-23 | Stop reason: HOSPADM

## 2022-03-21 RX ORDER — POLYETHYLENE GLYCOL 3350 17 G/17G
17 POWDER, FOR SOLUTION ORAL DAILY
Status: DISCONTINUED | OUTPATIENT
Start: 2022-03-22 | End: 2022-03-23 | Stop reason: HOSPADM

## 2022-03-21 RX ORDER — METFORMIN HCL 500 MG
1000 TABLET, EXTENDED RELEASE 24 HR ORAL DAILY
Status: DISCONTINUED | OUTPATIENT
Start: 2022-03-22 | End: 2022-03-23 | Stop reason: HOSPADM

## 2022-03-21 RX ORDER — MAGNESIUM SULFATE 4 G/50ML
4 INJECTION INTRAVENOUS ONCE
Status: COMPLETED | OUTPATIENT
Start: 2022-03-21 | End: 2022-03-21

## 2022-03-21 RX ORDER — FENTANYL CITRATE 50 UG/ML
50 INJECTION, SOLUTION INTRAMUSCULAR; INTRAVENOUS
Status: DISCONTINUED | OUTPATIENT
Start: 2022-03-21 | End: 2022-03-21 | Stop reason: HOSPADM

## 2022-03-21 RX ORDER — BISACODYL 10 MG
10 SUPPOSITORY, RECTAL RECTAL DAILY PRN
Status: DISCONTINUED | OUTPATIENT
Start: 2022-03-21 | End: 2022-03-23 | Stop reason: HOSPADM

## 2022-03-21 RX ORDER — MAGNESIUM SULFATE 4 G/50ML
4 INJECTION INTRAVENOUS ONCE
Status: DISCONTINUED | OUTPATIENT
Start: 2022-03-21 | End: 2022-03-23 | Stop reason: HOSPADM

## 2022-03-21 RX ORDER — ACETAMINOPHEN 325 MG/1
975 TABLET ORAL EVERY 8 HOURS
Status: DISCONTINUED | OUTPATIENT
Start: 2022-03-21 | End: 2022-03-23 | Stop reason: HOSPADM

## 2022-03-21 RX ORDER — LIDOCAINE 40 MG/G
CREAM TOPICAL
Status: DISCONTINUED | OUTPATIENT
Start: 2022-03-21 | End: 2022-03-21 | Stop reason: HOSPADM

## 2022-03-21 RX ORDER — SODIUM CHLORIDE 9 MG/ML
INJECTION, SOLUTION INTRAVENOUS CONTINUOUS
Status: DISCONTINUED | OUTPATIENT
Start: 2022-03-21 | End: 2022-03-22

## 2022-03-21 RX ORDER — LORATADINE 10 MG/1
10 TABLET ORAL DAILY PRN
Status: DISCONTINUED | OUTPATIENT
Start: 2022-03-21 | End: 2022-03-23 | Stop reason: HOSPADM

## 2022-03-21 RX ORDER — DIVALPROEX SODIUM 500 MG/1
500 TABLET, EXTENDED RELEASE ORAL AT BEDTIME
Status: DISCONTINUED | OUTPATIENT
Start: 2022-03-21 | End: 2022-03-23 | Stop reason: HOSPADM

## 2022-03-21 RX ORDER — LIDOCAINE HYDROCHLORIDE 20 MG/ML
INJECTION, SOLUTION INFILTRATION; PERINEURAL PRN
Status: DISCONTINUED | OUTPATIENT
Start: 2022-03-21 | End: 2022-03-21

## 2022-03-21 RX ORDER — FENTANYL CITRATE 50 UG/ML
25 INJECTION, SOLUTION INTRAMUSCULAR; INTRAVENOUS EVERY 5 MIN PRN
Status: DISCONTINUED | OUTPATIENT
Start: 2022-03-21 | End: 2022-03-21 | Stop reason: HOSPADM

## 2022-03-21 RX ORDER — ONDANSETRON 4 MG/1
4 TABLET, ORALLY DISINTEGRATING ORAL EVERY 30 MIN PRN
Status: DISCONTINUED | OUTPATIENT
Start: 2022-03-21 | End: 2022-03-21 | Stop reason: HOSPADM

## 2022-03-21 RX ORDER — SODIUM CHLORIDE, SODIUM LACTATE, POTASSIUM CHLORIDE, CALCIUM CHLORIDE 600; 310; 30; 20 MG/100ML; MG/100ML; MG/100ML; MG/100ML
INJECTION, SOLUTION INTRAVENOUS CONTINUOUS
Status: DISCONTINUED | OUTPATIENT
Start: 2022-03-21 | End: 2022-03-21 | Stop reason: HOSPADM

## 2022-03-21 RX ORDER — ALLOPURINOL 300 MG/1
300 TABLET ORAL EVERY OTHER DAY
Status: DISCONTINUED | OUTPATIENT
Start: 2022-03-21 | End: 2022-03-23 | Stop reason: HOSPADM

## 2022-03-21 RX ORDER — LISINOPRIL 20 MG/1
20 TABLET ORAL 2 TIMES DAILY
Status: DISCONTINUED | OUTPATIENT
Start: 2022-03-21 | End: 2022-03-23 | Stop reason: HOSPADM

## 2022-03-21 RX ORDER — MULTIVITAMIN,THERAPEUTIC
1 TABLET ORAL DAILY
Status: DISCONTINUED | OUTPATIENT
Start: 2022-03-22 | End: 2022-03-23 | Stop reason: HOSPADM

## 2022-03-21 RX ORDER — AMOXICILLIN 250 MG
1 CAPSULE ORAL 2 TIMES DAILY
Status: DISCONTINUED | OUTPATIENT
Start: 2022-03-21 | End: 2022-03-23 | Stop reason: HOSPADM

## 2022-03-21 RX ORDER — FENTANYL CITRATE 50 UG/ML
INJECTION, SOLUTION INTRAMUSCULAR; INTRAVENOUS PRN
Status: DISCONTINUED | OUTPATIENT
Start: 2022-03-21 | End: 2022-03-21

## 2022-03-21 RX ORDER — HYDROMORPHONE HCL IN WATER/PF 6 MG/30 ML
0.2 PATIENT CONTROLLED ANALGESIA SYRINGE INTRAVENOUS
Status: DISCONTINUED | OUTPATIENT
Start: 2022-03-21 | End: 2022-03-23 | Stop reason: HOSPADM

## 2022-03-21 RX ORDER — PROCHLORPERAZINE MALEATE 10 MG
10 TABLET ORAL EVERY 6 HOURS PRN
Status: DISCONTINUED | OUTPATIENT
Start: 2022-03-21 | End: 2022-03-23 | Stop reason: HOSPADM

## 2022-03-21 RX ORDER — DEXAMETHASONE SODIUM PHOSPHATE 10 MG/ML
10 INJECTION, SOLUTION INTRAMUSCULAR; INTRAVENOUS ONCE
Status: COMPLETED | OUTPATIENT
Start: 2022-03-21 | End: 2022-03-21

## 2022-03-21 RX ORDER — GABAPENTIN 300 MG/1
300 CAPSULE ORAL 3 TIMES DAILY
Status: DISCONTINUED | OUTPATIENT
Start: 2022-03-21 | End: 2022-03-23 | Stop reason: HOSPADM

## 2022-03-21 RX ORDER — ONDANSETRON 2 MG/ML
INJECTION INTRAMUSCULAR; INTRAVENOUS PRN
Status: DISCONTINUED | OUTPATIENT
Start: 2022-03-21 | End: 2022-03-21

## 2022-03-21 RX ORDER — OXYCODONE HYDROCHLORIDE 5 MG/1
5 TABLET ORAL EVERY 4 HOURS PRN
Status: DISCONTINUED | OUTPATIENT
Start: 2022-03-21 | End: 2022-03-23 | Stop reason: HOSPADM

## 2022-03-21 RX ORDER — HYDROMORPHONE HCL IN WATER/PF 6 MG/30 ML
0.4 PATIENT CONTROLLED ANALGESIA SYRINGE INTRAVENOUS
Status: DISCONTINUED | OUTPATIENT
Start: 2022-03-21 | End: 2022-03-23 | Stop reason: HOSPADM

## 2022-03-21 RX ORDER — CEFAZOLIN SODIUM/WATER 2 G/20 ML
2 SYRINGE (ML) INTRAVENOUS
Status: DISCONTINUED | OUTPATIENT
Start: 2022-03-21 | End: 2022-03-21 | Stop reason: HOSPADM

## 2022-03-21 RX ORDER — GLYCOPYRROLATE 0.2 MG/ML
INJECTION, SOLUTION INTRAMUSCULAR; INTRAVENOUS PRN
Status: DISCONTINUED | OUTPATIENT
Start: 2022-03-21 | End: 2022-03-21

## 2022-03-21 RX ORDER — FAMOTIDINE 20 MG/1
20 TABLET, FILM COATED ORAL ONCE
Status: COMPLETED | OUTPATIENT
Start: 2022-03-21 | End: 2022-03-21

## 2022-03-21 RX ORDER — ACETAMINOPHEN 325 MG/1
650 TABLET ORAL EVERY 4 HOURS PRN
Status: DISCONTINUED | OUTPATIENT
Start: 2022-03-24 | End: 2022-03-23 | Stop reason: HOSPADM

## 2022-03-21 RX ORDER — KETAMINE HYDROCHLORIDE 50 MG/ML
INJECTION, SOLUTION INTRAMUSCULAR; INTRAVENOUS PRN
Status: DISCONTINUED | OUTPATIENT
Start: 2022-03-21 | End: 2022-03-21

## 2022-03-21 RX ORDER — CEFAZOLIN SODIUM/WATER 2 G/20 ML
2 SYRINGE (ML) INTRAVENOUS SEE ADMIN INSTRUCTIONS
Status: DISCONTINUED | OUTPATIENT
Start: 2022-03-21 | End: 2022-03-21 | Stop reason: HOSPADM

## 2022-03-21 RX ORDER — ROSUVASTATIN CALCIUM 5 MG/1
5 TABLET, COATED ORAL DAILY
Status: DISCONTINUED | OUTPATIENT
Start: 2022-03-21 | End: 2022-03-23 | Stop reason: HOSPADM

## 2022-03-21 RX ORDER — NALOXONE HYDROCHLORIDE 0.4 MG/ML
0.2 INJECTION, SOLUTION INTRAMUSCULAR; INTRAVENOUS; SUBCUTANEOUS
Status: DISCONTINUED | OUTPATIENT
Start: 2022-03-21 | End: 2022-03-23 | Stop reason: HOSPADM

## 2022-03-21 RX ORDER — ONDANSETRON 4 MG/1
4 TABLET, ORALLY DISINTEGRATING ORAL EVERY 6 HOURS PRN
Status: DISCONTINUED | OUTPATIENT
Start: 2022-03-21 | End: 2022-03-23 | Stop reason: HOSPADM

## 2022-03-21 RX ORDER — ONDANSETRON 2 MG/ML
4 INJECTION INTRAMUSCULAR; INTRAVENOUS EVERY 30 MIN PRN
Status: DISCONTINUED | OUTPATIENT
Start: 2022-03-21 | End: 2022-03-21 | Stop reason: HOSPADM

## 2022-03-21 RX ADMIN — ROCURONIUM BROMIDE 50 MG: 50 INJECTION, SOLUTION INTRAVENOUS at 16:48

## 2022-03-21 RX ADMIN — ACETAMINOPHEN 975 MG: 325 TABLET ORAL at 22:19

## 2022-03-21 RX ADMIN — KETAMINE HYDROCHLORIDE 50 MG: 50 INJECTION, SOLUTION INTRAMUSCULAR; INTRAVENOUS at 17:17

## 2022-03-21 RX ADMIN — SODIUM CHLORIDE, POTASSIUM CHLORIDE, SODIUM LACTATE AND CALCIUM CHLORIDE: 600; 310; 30; 20 INJECTION, SOLUTION INTRAVENOUS at 17:24

## 2022-03-21 RX ADMIN — FENTANYL CITRATE 100 MCG: 50 INJECTION, SOLUTION INTRAMUSCULAR; INTRAVENOUS at 16:48

## 2022-03-21 RX ADMIN — DEXAMETHASONE SODIUM PHOSPHATE 10 MG: 10 INJECTION, SOLUTION INTRAMUSCULAR; INTRAVENOUS at 17:12

## 2022-03-21 RX ADMIN — ROCURONIUM BROMIDE 20 MG: 50 INJECTION, SOLUTION INTRAVENOUS at 17:17

## 2022-03-21 RX ADMIN — SODIUM CHLORIDE, POTASSIUM CHLORIDE, SODIUM LACTATE AND CALCIUM CHLORIDE: 600; 310; 30; 20 INJECTION, SOLUTION INTRAVENOUS at 16:43

## 2022-03-21 RX ADMIN — PHENYLEPHRINE HYDROCHLORIDE 0.2 MCG/KG/MIN: 10 INJECTION INTRAVENOUS at 17:29

## 2022-03-21 RX ADMIN — LIDOCAINE HYDROCHLORIDE 100 MG: 20 INJECTION, SOLUTION INFILTRATION; PERINEURAL at 19:23

## 2022-03-21 RX ADMIN — SODIUM CHLORIDE: 9 INJECTION, SOLUTION INTRAVENOUS at 21:37

## 2022-03-21 RX ADMIN — GABAPENTIN 300 MG: 300 CAPSULE ORAL at 22:19

## 2022-03-21 RX ADMIN — SUGAMMADEX 210 MG: 100 INJECTION, SOLUTION INTRAVENOUS at 19:21

## 2022-03-21 RX ADMIN — ROSUVASTATIN CALCIUM 5 MG: 5 TABLET, FILM COATED ORAL at 22:19

## 2022-03-21 RX ADMIN — LISINOPRIL 20 MG: 20 TABLET ORAL at 22:19

## 2022-03-21 RX ADMIN — ONDANSETRON 4 MG: 2 INJECTION INTRAMUSCULAR; INTRAVENOUS at 19:11

## 2022-03-21 RX ADMIN — ROCURONIUM BROMIDE 10 MG: 50 INJECTION, SOLUTION INTRAVENOUS at 18:18

## 2022-03-21 RX ADMIN — ALLOPURINOL 300 MG: 300 TABLET ORAL at 22:18

## 2022-03-21 RX ADMIN — PHENYLEPHRINE HYDROCHLORIDE 100 MCG: 10 INJECTION INTRAVENOUS at 17:22

## 2022-03-21 RX ADMIN — Medication 2 G: at 16:48

## 2022-03-21 RX ADMIN — PROPOFOL 180 MG: 10 INJECTION, EMULSION INTRAVENOUS at 16:48

## 2022-03-21 RX ADMIN — ROCURONIUM BROMIDE 20 MG: 50 INJECTION, SOLUTION INTRAVENOUS at 17:57

## 2022-03-21 RX ADMIN — MIDAZOLAM 2 MG: 1 INJECTION INTRAMUSCULAR; INTRAVENOUS at 16:41

## 2022-03-21 RX ADMIN — OXYCODONE HYDROCHLORIDE 5 MG: 5 TABLET ORAL at 22:19

## 2022-03-21 RX ADMIN — GLYCOPYRROLATE 0.2 MG: 0.2 INJECTION, SOLUTION INTRAMUSCULAR; INTRAVENOUS at 16:48

## 2022-03-21 RX ADMIN — LIDOCAINE HYDROCHLORIDE 60 MG: 20 INJECTION, SOLUTION INFILTRATION; PERINEURAL at 16:48

## 2022-03-21 RX ADMIN — PHENYLEPHRINE HYDROCHLORIDE 100 MCG: 10 INJECTION INTRAVENOUS at 17:42

## 2022-03-21 RX ADMIN — MAGNESIUM SULFATE HEPTAHYDRATE 4 G: 80 INJECTION, SOLUTION INTRAVENOUS at 16:18

## 2022-03-21 RX ADMIN — FAMOTIDINE 20 MG: 20 TABLET, FILM COATED ORAL at 16:25

## 2022-03-21 RX ADMIN — DOCUSATE SODIUM 50 MG AND SENNOSIDES 8.6 MG 1 TABLET: 8.6; 5 TABLET, FILM COATED ORAL at 22:19

## 2022-03-21 RX ADMIN — ROCURONIUM BROMIDE 10 MG: 50 INJECTION, SOLUTION INTRAVENOUS at 18:54

## 2022-03-21 RX ADMIN — DIVALPROEX SODIUM 500 MG: 500 TABLET, EXTENDED RELEASE ORAL at 22:18

## 2022-03-21 ASSESSMENT — ACTIVITIES OF DAILY LIVING (ADL)
ADLS_ACUITY_SCORE: 6
ADLS_ACUITY_SCORE: 4
ADLS_ACUITY_SCORE: 6
ADLS_ACUITY_SCORE: 4

## 2022-03-21 NOTE — INTERVAL H&P NOTE
I have reviewed the surgical (or preoperative) H&P that is linked to this encounter, and examined the patient. There are no significant changes    Clinical Conditions Present on Arrival:  SECTIONPRESENTONADMISSIONBEGIN@                 # Platelet Defect: home medication list includes an antiplatelet medication   # Obesity: Estimated body mass index is 32.2 kg/m  as calculated from the following:    Height as of 3/16/22: 6' (1.829 m).    Weight as of 3/16/22: 237 lb 6.4 oz (107.7 kg).

## 2022-03-21 NOTE — PHARMACY-ADMISSION MEDICATION HISTORY
Pharmacy Note - Admission Medication History     ______________________________________________________________________    Prior To Admission (PTA) med list completed and updated in EMR.       PTA Med List   Medication Sig Note Last Dose     allopurinol (ZYLOPRIM) 300 MG tablet TAKE 1 TABLET  (300 MG) EVERY OTHER DAY ALTERNATING WITH ONE AND ONE-HALF TABLETS  (450 MG) EVERY OTHER DAY AT BEDTIME  3/20/2022 at pm - unsure of dose     amLODIPine (NORVASC) 5 MG tablet Take 1 tablet (5 mg) by mouth daily  3/21/2022 at am     aspirin (ASA) 81 MG chewable tablet Take 81 mg by mouth daily  3/13/2022     Cholecalciferol 100 MCG (4000 UT) CAPS Take 4,000 Units by mouth daily  3/13/2022     clopidogrel (PLAVIX) 75 MG tablet Take 1 tablet (75 mg) by mouth daily  3/13/2022     cyclobenzaprine (FLEXERIL) 10 MG tablet Take 1 tablet (10 mg) by mouth 3 times daily as needed for muscle spasms  3/20/2022     divalproex sodium extended-release (DEPAKOTE ER) 500 MG 24 hr tablet TAKE 1 TABLET AT BEDTIME  3/20/2022 at pm     gabapentin (NEURONTIN) 300 MG capsule Take 1 capsule (300 mg) by mouth At Bedtime for 3 days, THEN 1 capsule (300 mg) 2 times daily for 3 days, THEN 1 capsule (300 mg) 3 times daily.  3/21/2022 at am x 1 dose     levothyroxine (SYNTHROID/LEVOTHROID) 125 MCG tablet Take 125 mcg by mouth daily  3/21/2022 at am     lisinopril (ZESTRIL) 20 MG tablet TAKE 1 TABLET TWICE A DAY  3/21/2022 at am     metFORMIN (GLUCOPHAGE-XR) 500 MG 24 hr tablet Take 2 tablets by mouth daily 3/18/2022: Last filled 6/21 3/20/2022 at pm     rosuvastatin (CRESTOR) 5 MG tablet Take 1 tablet (5 mg) by mouth daily  3/20/2022 at pm       Information source(s): Patient, Clinic records and [facility]  Method of interview communication: N/A    In the past week, patient estimated taking medication this percent of the time:  greater than 90%.    Allergies were reviewed, assessed, and updated with the patient.      Patient does not use any  multi-dose medications prior to admission.    The information provided in this note is only as accurate as the sources available at the time of the update(s).    Thank you for the opportunity to participate in the care of this patient.    Portia Garcia [facility]  3/21/2022 4:53 PM

## 2022-03-21 NOTE — ANESTHESIA PREPROCEDURE EVALUATION
Anesthesia Pre-Procedure Evaluation    Patient: Raghavendra Kiser   MRN: [id number] : 1960        Procedure : Procedure(s):  Bilateral lumbar 3- lumbar 4 hemilaminectomies, medial facetectomies, foraminotomies, microdiscectomies          Past Medical History:   Diagnosis Date     Atherosclerosis of right carotid artery      Carotid stenosis, bilateral      Cervical radiculopathy      Chronic gout      Diabetes (H)      Fracture of right humerus      Ganglion cyst      High cholesterol      History of stroke without residual deficits  Oct 2020, Dec 2020     Humerus fracture     Right     Hyperlipidemia      Hypertension      Hypertension      Hypothyroidism      Medial epicondylitis      Shoulder tendinitis, right      Superficial venous thrombosis of arm     right lateral antecubital fossa     TIA (transient ischemic attack) 5/16/15     Tinnitus      Type 2 diabetes mellitus without complication, without long-term current use of insulin (H) 2019      Past Surgical History:   Procedure Laterality Date     CAROTID ENDARTERECTOMY       IR LUMBAR PUNCTURE  2020     IR SPINAL PUNCTURE  2020     PICC INSERTION - TRIPLE LUMEN  2020          TONSILLECTOMY       ZZC THROMBOENDARTECTMY NECK,NECK INCIS Right 2015    Procedure: Right Carotid Endarterectomy with Impulse Monitoring;  Surgeon: Marcellus Toth MD;  Location: [facility] OR;  Service: General      Allergies   Allergen Reactions     Cats      Other reaction(s): ITCHING,WATERING EYES      Social History     Tobacco Use     Smoking status: Former Smoker     Packs/day: 2.00     Years: 20.00     Pack years: 40.00     Types: Cigarettes     Quit date: 2/15/1993     Years since quittin.1     Smokeless tobacco: Never Used     Tobacco comment: quit in    Substance Use Topics     Alcohol use: Yes     Alcohol/week: 0.0 standard drinks     Comment: Alcoholic Drinks/day: rarely      Wt Readings from Last 1 Encounters:   22  105.2 kg (232 lb)        Anesthesia Evaluation            ROS/MED HX  ENT/Pulmonary:       Neurologic:     (+) CVA, TIA,     Cardiovascular:     (+) hypertension-----    METS/Exercise Tolerance:     Hematologic:       Musculoskeletal:       GI/Hepatic:       Renal/Genitourinary:       Endo:     (+) type I DM, thyroid problem,     Psychiatric/Substance Use:       Infectious Disease:       Malignancy:       Other:            Physical Exam    Airway        Mallampati: II    Neck ROM: full     Respiratory Devices and Support         Dental           Cardiovascular   cardiovascular exam normal          Pulmonary   pulmonary exam normal                OUTSIDE LABS:  CBC:   Lab Results   Component Value Date    WBC 8.3 03/16/2022    WBC 10.7 01/14/2022    HGB 15.4 03/16/2022    HGB 14.6 01/14/2022    HCT 44.0 03/16/2022    HCT 42.2 01/14/2022     03/16/2022     01/14/2022     BMP:   Lab Results   Component Value Date     03/16/2022     01/14/2022    POTASSIUM 3.7 03/16/2022    POTASSIUM 3.6 01/14/2022    CHLORIDE 105 03/16/2022    CHLORIDE 105 01/14/2022    CO2 25 03/16/2022    CO2 22 01/14/2022    BUN 16 03/16/2022    BUN 16 01/14/2022    CR 0.92 03/16/2022    CR 1.10 01/14/2022     (H) 03/21/2022     (H) 03/16/2022     COAGS:   Lab Results   Component Value Date    PTT 23 03/16/2022    INR 0.90 03/16/2022     POC: No results found for: BGM, HCG, HCGS  HEPATIC:   Lab Results   Component Value Date    ALBUMIN 4.0 11/03/2021    PROTTOTAL 6.8 11/03/2021    ALT 15 11/03/2021    AST 15 11/03/2021    ALKPHOS 67 11/03/2021    BILITOTAL 0.5 11/03/2021     OTHER:   Lab Results   Component Value Date    PH 7.37 12/25/2020    LACT 0.5 (L) 12/27/2020    A1C 5.9 (H) 03/16/2022    CHARAN 9.3 03/16/2022    MAG 2.6 01/08/2021    TSH 5.26 (H) 11/03/2021    T4 1.00 11/03/2021       Anesthesia Plan    ASA Status:  3      Anesthesia Type: General.     - Airway: ETT   Induction: Intravenous.            Consents    Anesthesia Plan(s) and associated risks, benefits, and realistic alternatives discussed. Questions answered and patient/representative(s) expressed understanding.    - Discussed:     - Discussed with:  Patient         Postoperative Care       PONV prophylaxis: Ondansetron (or other 5HT-3), Dexamethasone or Solumedrol     Comments:                Philipp Seay MD

## 2022-03-21 NOTE — ANESTHESIA PROCEDURE NOTES
Airway         Procedure Start/Stop Times: 3/21/2022 4:53 PM  Staff -        Anesthesiologist:  Philipp Seay MD       CRNA: Clary Angulo APRN CRNA       Performed By: CRNAIndications and Patient Condition       Indications for airway management: xiomara-procedural       Induction type:intravenous       Mask difficulty assessment: 2 - vent by mask + OA or adjuvant +/- NMBA    Final Airway Details       Final airway type: endotracheal airway       Successful airway: ETT - single and Oral  Endotracheal Airway Details        ETT size (mm): 7.5       Cuffed: yes       Successful intubation technique: direct laryngoscopy       DL Blade Type: Toth 2       Grade View of Cords: 1       Adjucts: stylet and tooth guard       Position: Right       Measured from: lips       Secured at (cm): 21       Bite block used: None    Post intubation assessment        Placement verified by: capnometry, equal breath sounds and chest rise        Number of attempts at approach: 1       Number of other approaches attempted: 0       Secured with: silk tape       Ease of procedure: easy       Dentition: Intact and Unchanged

## 2022-03-22 ENCOUNTER — TELEPHONE (OUTPATIENT)
Dept: NEUROSURGERY | Facility: CLINIC | Age: 62
End: 2022-03-22
Payer: OTHER GOVERNMENT

## 2022-03-22 PROBLEM — E11.9 TYPE 2 DIABETES MELLITUS WITHOUT COMPLICATION, WITHOUT LONG-TERM CURRENT USE OF INSULIN (H): Status: ACTIVE | Noted: 2019-11-22

## 2022-03-22 PROBLEM — E03.9 HYPOTHYROIDISM: Status: ACTIVE | Noted: 2020-11-12

## 2022-03-22 PROBLEM — I10 HTN (HYPERTENSION): Status: ACTIVE | Noted: 2017-08-01

## 2022-03-22 LAB
ANION GAP SERPL CALCULATED.3IONS-SCNC: 8 MMOL/L (ref 5–18)
BASOPHILS # BLD AUTO: 0 10E3/UL (ref 0–0.2)
BASOPHILS NFR BLD AUTO: 0 %
BUN SERPL-MCNC: 11 MG/DL (ref 8–22)
CALCIUM SERPL-MCNC: 8.6 MG/DL (ref 8.5–10.5)
CHLORIDE BLD-SCNC: 108 MMOL/L (ref 98–107)
CO2 SERPL-SCNC: 25 MMOL/L (ref 22–31)
CREAT SERPL-MCNC: 0.93 MG/DL (ref 0.7–1.3)
EOSINOPHIL # BLD AUTO: 0 10E3/UL (ref 0–0.7)
EOSINOPHIL NFR BLD AUTO: 0 %
ERYTHROCYTE [DISTWIDTH] IN BLOOD BY AUTOMATED COUNT: 13.2 % (ref 10–15)
GFR SERPL CREATININE-BSD FRML MDRD: >90 ML/MIN/1.73M2
GLUCOSE BLD-MCNC: 178 MG/DL (ref 70–125)
GLUCOSE BLDC GLUCOMTR-MCNC: 135 MG/DL (ref 70–99)
GLUCOSE BLDC GLUCOMTR-MCNC: 175 MG/DL (ref 70–99)
GLUCOSE BLDC GLUCOMTR-MCNC: 185 MG/DL (ref 70–99)
HCT VFR BLD AUTO: 39.9 % (ref 40–53)
HGB BLD-MCNC: 13.7 G/DL (ref 13.3–17.7)
IMM GRANULOCYTES # BLD: 0.1 10E3/UL
IMM GRANULOCYTES NFR BLD: 0 %
LYMPHOCYTES # BLD AUTO: 0.7 10E3/UL (ref 0.8–5.3)
LYMPHOCYTES NFR BLD AUTO: 6 %
MCH RBC QN AUTO: 33.5 PG (ref 26.5–33)
MCHC RBC AUTO-ENTMCNC: 34.3 G/DL (ref 31.5–36.5)
MCV RBC AUTO: 98 FL (ref 78–100)
MONOCYTES # BLD AUTO: 0.4 10E3/UL (ref 0–1.3)
MONOCYTES NFR BLD AUTO: 3 %
NEUTROPHILS # BLD AUTO: 11 10E3/UL (ref 1.6–8.3)
NEUTROPHILS NFR BLD AUTO: 91 %
NRBC # BLD AUTO: 0 10E3/UL
NRBC BLD AUTO-RTO: 0 /100
PLATELET # BLD AUTO: 167 10E3/UL (ref 150–450)
POTASSIUM BLD-SCNC: 4.8 MMOL/L (ref 3.5–5)
RBC # BLD AUTO: 4.09 10E6/UL (ref 4.4–5.9)
SODIUM SERPL-SCNC: 141 MMOL/L (ref 136–145)
WBC # BLD AUTO: 12.2 10E3/UL (ref 4–11)

## 2022-03-22 PROCEDURE — 00UT0KZ SUPPLEMENT SPINAL MENINGES WITH NONAUTOLOGOUS TISSUE SUBSTITUTE, OPEN APPROACH: ICD-10-PCS | Performed by: SURGERY

## 2022-03-22 PROCEDURE — 01NB0ZZ RELEASE LUMBAR NERVE, OPEN APPROACH: ICD-10-PCS | Performed by: SURGERY

## 2022-03-22 PROCEDURE — 85025 COMPLETE CBC W/AUTO DIFF WBC: CPT | Performed by: INTERNAL MEDICINE

## 2022-03-22 PROCEDURE — 99232 SBSQ HOSP IP/OBS MODERATE 35: CPT | Performed by: INTERNAL MEDICINE

## 2022-03-22 PROCEDURE — 120N000001 HC R&B MED SURG/OB

## 2022-03-22 PROCEDURE — 36415 COLL VENOUS BLD VENIPUNCTURE: CPT | Performed by: INTERNAL MEDICINE

## 2022-03-22 PROCEDURE — 250N000013 HC RX MED GY IP 250 OP 250 PS 637: Performed by: PHYSICIAN ASSISTANT

## 2022-03-22 PROCEDURE — 82310 ASSAY OF CALCIUM: CPT | Performed by: INTERNAL MEDICINE

## 2022-03-22 PROCEDURE — 250N000013 HC RX MED GY IP 250 OP 250 PS 637: Performed by: SURGERY

## 2022-03-22 RX ORDER — DEXTROSE MONOHYDRATE 25 G/50ML
25-50 INJECTION, SOLUTION INTRAVENOUS
Status: DISCONTINUED | OUTPATIENT
Start: 2022-03-22 | End: 2022-03-23 | Stop reason: HOSPADM

## 2022-03-22 RX ORDER — METHOCARBAMOL 500 MG/1
500 TABLET, FILM COATED ORAL EVERY 6 HOURS
Qty: 42 TABLET | Refills: 1 | Status: SHIPPED | OUTPATIENT
Start: 2022-03-22 | End: 2022-04-11

## 2022-03-22 RX ORDER — OXYCODONE HYDROCHLORIDE 5 MG/1
5 TABLET ORAL EVERY 4 HOURS PRN
Qty: 42 TABLET | Refills: 0 | Status: SHIPPED | OUTPATIENT
Start: 2022-03-22 | End: 2022-03-23

## 2022-03-22 RX ORDER — NICOTINE POLACRILEX 4 MG
15-30 LOZENGE BUCCAL
Status: DISCONTINUED | OUTPATIENT
Start: 2022-03-22 | End: 2022-03-23 | Stop reason: HOSPADM

## 2022-03-22 RX ADMIN — Medication 100 MCG: at 09:21

## 2022-03-22 RX ADMIN — LISINOPRIL 20 MG: 20 TABLET ORAL at 09:21

## 2022-03-22 RX ADMIN — OXYCODONE HYDROCHLORIDE 5 MG: 5 TABLET ORAL at 02:17

## 2022-03-22 RX ADMIN — ROSUVASTATIN CALCIUM 5 MG: 5 TABLET, FILM COATED ORAL at 21:51

## 2022-03-22 RX ADMIN — GABAPENTIN 300 MG: 300 CAPSULE ORAL at 13:53

## 2022-03-22 RX ADMIN — ALLOPURINOL 450 MG: 300 TABLET ORAL at 21:51

## 2022-03-22 RX ADMIN — METFORMIN HYDROCHLORIDE 1000 MG: 500 TABLET, EXTENDED RELEASE ORAL at 16:08

## 2022-03-22 RX ADMIN — OXYCODONE HYDROCHLORIDE 5 MG: 5 TABLET ORAL at 16:08

## 2022-03-22 RX ADMIN — GABAPENTIN 300 MG: 300 CAPSULE ORAL at 21:51

## 2022-03-22 RX ADMIN — METHOCARBAMOL 500 MG: 500 TABLET, FILM COATED ORAL at 17:05

## 2022-03-22 RX ADMIN — OXYCODONE HYDROCHLORIDE 5 MG: 5 TABLET ORAL at 06:35

## 2022-03-22 RX ADMIN — METHOCARBAMOL 500 MG: 500 TABLET, FILM COATED ORAL at 11:52

## 2022-03-22 RX ADMIN — OXYCODONE HYDROCHLORIDE 5 MG: 5 TABLET ORAL at 11:52

## 2022-03-22 RX ADMIN — OXYCODONE HYDROCHLORIDE 10 MG: 5 TABLET ORAL at 21:49

## 2022-03-22 RX ADMIN — LISINOPRIL 20 MG: 20 TABLET ORAL at 21:51

## 2022-03-22 RX ADMIN — METHOCARBAMOL 500 MG: 500 TABLET, FILM COATED ORAL at 00:10

## 2022-03-22 RX ADMIN — LEVOTHYROXINE SODIUM 125 MCG: 0.03 TABLET ORAL at 06:08

## 2022-03-22 RX ADMIN — AMLODIPINE BESYLATE 5 MG: 5 TABLET ORAL at 09:22

## 2022-03-22 RX ADMIN — GABAPENTIN 300 MG: 300 CAPSULE ORAL at 09:21

## 2022-03-22 RX ADMIN — THERA TABS 1 TABLET: TAB at 09:21

## 2022-03-22 RX ADMIN — ACETAMINOPHEN 975 MG: 325 TABLET ORAL at 06:07

## 2022-03-22 RX ADMIN — DIVALPROEX SODIUM 500 MG: 500 TABLET, EXTENDED RELEASE ORAL at 21:51

## 2022-03-22 RX ADMIN — ACETAMINOPHEN 975 MG: 325 TABLET ORAL at 13:53

## 2022-03-22 RX ADMIN — DOCUSATE SODIUM 50 MG AND SENNOSIDES 8.6 MG 1 TABLET: 8.6; 5 TABLET, FILM COATED ORAL at 21:51

## 2022-03-22 RX ADMIN — ACETAMINOPHEN 975 MG: 325 TABLET ORAL at 21:51

## 2022-03-22 RX ADMIN — METHOCARBAMOL 500 MG: 500 TABLET, FILM COATED ORAL at 06:08

## 2022-03-22 ASSESSMENT — ACTIVITIES OF DAILY LIVING (ADL)
ADLS_ACUITY_SCORE: 8
ADLS_ACUITY_SCORE: 8
DEPENDENT_IADLS:: INDEPENDENT
ADLS_ACUITY_SCORE: 6
ADLS_ACUITY_SCORE: 10
ADLS_ACUITY_SCORE: 6
ADLS_ACUITY_SCORE: 8
ADLS_ACUITY_SCORE: 6
ADLS_ACUITY_SCORE: 8
ADLS_ACUITY_SCORE: 6
ADLS_ACUITY_SCORE: 8
ADLS_ACUITY_SCORE: 6
ADLS_ACUITY_SCORE: 6
ADLS_ACUITY_SCORE: 8
ADLS_ACUITY_SCORE: 6
ADLS_ACUITY_SCORE: 6

## 2022-03-22 NOTE — PROGRESS NOTES
Progress Note      Assessment & Plan   Herniated nucleus pulposus, lumbar,L3-4 to the left  Status post lumbar hemilaminectomy/foraminotomy  Neurosurgery following  Discharge plan, analgesia, DVT prophylaxis per neurosurgery    Lumbar radiculopathy post above procedure, pain is tolerable right now, continue as needed meds     Primary hypertension stable postop, resume home meds, hydralazine ordered.     Type 2 diabetes mellitus without complication, without long-term hold oral diabetic meds for now, will control.  A1c 5.9 last week and over last 2 years historically has been in the mid 5's.   -Accu-Cheks, SSI NovoLog, home Metformin.     Cerebrovascular accident (CVA), unspecified mechanism (H)  STABLE ON PLAVIX AND ASA, to be resumed according to surgery recommendation      Hypothyroidism, unspecified type  REPLACE ON LEVOTHYROXINE     DVT PPX Lovenox subacute per surgery    Barriers to discharge postop pain control    Anticipated Discharge date multiple days    Subjective  Back pain improved.  Increasing mobility today, per neurosurgery.  No cough, chest pain, abdominal pain, nausea, dysuria.    Objective  Vital signs in last 24 hours  Temp:  [97.5  F (36.4  C)-99.8  F (37.7  C)] 98.2  F (36.8  C)  Pulse:  [] 86  Resp:  [12-21] 16  BP: (120-169)/(68-91) 134/73  SpO2:  [92 %-97 %] 95 %    Input and Output in 24 hrs     Intake/Output Summary (Last 24 hours) at 3/21/2022 2155  Last data filed at 3/21/2022 1935  Gross per 24 hour   Intake 1550 ml   Output 45 ml   Net 1505 ml       GEN: Alert and oriented. Not in acute distress  HEENT: Atraumatic    Pupils- round and reactive to light bilaterally   Neck- supple, no JVP elevation, no lymphadenopathy or thyromegaly   Sclera- anicteric   Mucous membrane- moist and pink  CHEST: Clear to auscultation bilaterally  HEART: S1S2 regular. No murmurs, rubs or gallops  ABDOMEN: Soft. Non-tender, non-distended. No organomegaly. No guarding or rigidity. Bowel sounds-  active  Extremities: No pedal oedema  CNS: No focal neurological deficit. No involuntary movements  SKIN: No skin rash, no cyanosis or clubbing    Pertinent Labs     Recent Results (from the past 24 hour(s))   Glucose by meter    Collection Time: 03/21/22  7:33 PM   Result Value Ref Range    GLUCOSE BY METER POCT 121 (H) 70 - 99 mg/dL   Glucose by meter    Collection Time: 03/22/22  1:26 AM   Result Value Ref Range    GLUCOSE BY METER POCT 185 (H) 70 - 99 mg/dL   Basic metabolic panel    Collection Time: 03/22/22  6:02 AM   Result Value Ref Range    Sodium 141 136 - 145 mmol/L    Potassium 4.8 3.5 - 5.0 mmol/L    Chloride 108 (H) 98 - 107 mmol/L    Carbon Dioxide (CO2) 25 22 - 31 mmol/L    Anion Gap 8 5 - 18 mmol/L    Urea Nitrogen 11 8 - 22 mg/dL    Creatinine 0.93 0.70 - 1.30 mg/dL    Calcium 8.6 8.5 - 10.5 mg/dL    Glucose 178 (H) 70 - 125 mg/dL    GFR Estimate >90 >60 mL/min/1.73m2   CBC with platelets and differential    Collection Time: 03/22/22  6:02 AM   Result Value Ref Range    WBC Count 12.2 (H) 4.0 - 11.0 10e3/uL    RBC Count 4.09 (L) 4.40 - 5.90 10e6/uL    Hemoglobin 13.7 13.3 - 17.7 g/dL    Hematocrit 39.9 (L) 40.0 - 53.0 %    MCV 98 78 - 100 fL    MCH 33.5 (H) 26.5 - 33.0 pg    MCHC 34.3 31.5 - 36.5 g/dL    RDW 13.2 10.0 - 15.0 %    Platelet Count 167 150 - 450 10e3/uL    % Neutrophils 91 %    % Lymphocytes 6 %    % Monocytes 3 %    % Eosinophils 0 %    % Basophils 0 %    % Immature Granulocytes 0 %    NRBCs per 100 WBC 0 <1 /100    Absolute Neutrophils 11.0 (H) 1.6 - 8.3 10e3/uL    Absolute Lymphocytes 0.7 (L) 0.8 - 5.3 10e3/uL    Absolute Monocytes 0.4 0.0 - 1.3 10e3/uL    Absolute Eosinophils 0.0 0.0 - 0.7 10e3/uL    Absolute Basophils 0.0 0.0 - 0.2 10e3/uL    Absolute Immature Granulocytes 0.1 <=0.4 10e3/uL    Absolute NRBCs 0.0 10e3/uL   Glucose by meter    Collection Time: 03/22/22  4:27 PM   Result Value Ref Range    GLUCOSE BY METER POCT 175 (H) 70 - 99 mg/dL     EKG Results reviewed      Advanced Care Planning  Discharge per neurosurgery.    Eloisa Lowe MD

## 2022-03-22 NOTE — TELEPHONE ENCOUNTER
PATIENT NAME:  Raghavendra Kiser  YOB: 1960  MRN: [id number]  SURGEON: Dr. Duque  DATE of SURGERY: 03/21/2022  PROCEDURE: Bilateral L3-4 HLMD    FOLLOW-UP:  Wound Check:  n/a  Staples/Sutures Out: 2 weeks  Post Op Visit: 6 weeks  Post-op Provider: INO on Dr. Duque clinic day  DIAGNOSTICS: n/a  DISPOSITION: Home 03/22/2022    ADDITIONAL INSTRUCTIONS FOR MEDICAL STAFF:        Kate Shelton RN, CNRN

## 2022-03-22 NOTE — PROGRESS NOTES
Progress Note      Assessment & Plan   Herniated nucleus pulposus, lumbar,L3-4 to the left  Status post lumbar hemilaminectomy/foraminotomy  Neurosurgery following  Discharge plan, analgesia, DVT prophylaxis per neurosurgery    Lumbar radiculopathy post above procedure, pain is tolerable right now, continue as needed meds        Primary hypertension stable postop, resume home meds, hydralazine ordered.        Type 2 diabetes mellitus without complication, without long-term hold oral diabetic meds for now, begin sliding scale insulin     Cerebrovascular accident (CVA), unspecified mechanism (H)  STABLE ON PLAVIX AND ASA, to be resumed according to surgery recommendation        Hypothyroidism, unspecified type  REPLACE ON LEVOTHYROXINE       DVT PPX Lovenox subacute per surgery    Barriers to discharge postop pain control    Anticipated Discharge date multiple days        Subjective  Back pain still present but tolerable, no leg weakness, no nausea vomiting    Objective  Vital signs in last 24 hours  Temp:  [97.7  F (36.5  C)-99.8  F (37.7  C)] 97.9  F (36.6  C)  Pulse:  [69-84] 73  Resp:  [12-21] 14  BP: (133-169)/(80-91) 133/83  SpO2:  [92 %-97 %] 93 %    Input and Output in 24 hrs     Intake/Output Summary (Last 24 hours) at 3/21/2022 2155  Last data filed at 3/21/2022 1935  Gross per 24 hour   Intake 1550 ml   Output 45 ml   Net 1505 ml       GEN: Alert and oriented. Not in acute distress  HEENT: Atraumatic    Pupils- round and reactive to light bilaterally   Neck- supple, no JVP elevation, no lymphadenopathy or thyromegaly   Sclera- anicteric   Mucous membrane- moist and pink  CHEST: Clear to auscultation bilaterally  HEART: S1S2 regular. No murmurs, rubs or gallops  ABDOMEN: Soft. Non-tender, non-distended. No organomegaly. No guarding or rigidity. Bowel sounds- active  Extremities: No pedal oedema  CNS: No focal neurological deficit. No involuntary movements  SKIN: No skin rash, no cyanosis or  clubbing      Pertinent Labs         Recent Results (from the past 24 hour(s))   Glucose by meter    Collection Time: 03/21/22  3:39 PM   Result Value Ref Range    GLUCOSE BY METER POCT 104 (H) 70 - 99 mg/dL   Glucose by meter    Collection Time: 03/21/22  7:33 PM   Result Value Ref Range    GLUCOSE BY METER POCT 121 (H) 70 - 99 mg/dL       EKG Results reviewed       Advanced Care Planning      MD SAMIR CabezasD

## 2022-03-22 NOTE — BRIEF OP NOTE
[facility] Brief Operative Note    Pre-operative diagnosis: Lumbar radiculopathy [M54.16]  Lumbar disc herniation [M51.26]   Post-operative diagnosis same   Procedure: Procedure(s):  Bilateral lumbar 3- lumbar 4 hemilaminectomies, medial facetectomies, foraminotomies, microdiscectomies   Surgeon(s): Surgeon(s) and Role:     * Abena Duque MD - Primary     * Bella Kong PA-C - Assisting   Estimated blood loss: 45 mL    Specimens: * No specimens in log *   Findings: Small bleb on right lateral ventral dura. Duragen and duracel placed. Facet hypertrophy right> left. Disc herniation left> right

## 2022-03-22 NOTE — CONSULTS
Care Management Initial Consult    General Information  Assessment completed with: Patient, Mauri  Type of CM/SW Visit: Initial Assessment    Primary Care Provider verified and updated as needed: Yes   Readmission within the last 30 days: no previous admission in last 30 days      Reason for Consult: discharge planning  Advance Care Planning: Advance Care Planning Reviewed: no concerns identified          Communication Assessment  Patient's communication style: spoken language (English or Bilingual)    Hearing Difficulty or Deaf: no   Wear Glasses or Blind: yes    Cognitive  Cognitive/Neuro/Behavioral: WDL  Level of Consciousness: alert  Arousal Level: opens eyes spontaneously     Mood/Behavior: calm, cooperative          Living Environment:   People in home: spouse     Current living Arrangements: house (2 steps in)      Able to return to prior arrangements: yes       Family/Social Support:  Care provided by: self  Provides care for: no one  Marital Status:   Wife          Description of Support System: Supportive, Involved    Support Assessment: Adequate family and caregiver support    Current Resources:   Patient receiving home care services: No     Community Resources: None  Equipment currently used at home: none  Supplies currently used at home: None    Employment/Financial:  Employment Status: retired     Employment/ Comments: Yes  Financial Concerns: No concerns identified   Referral to Financial Worker: No       Lifestyle & Psychosocial Needs:  Social Determinants of Health     Tobacco Use: Medium Risk     Smoking Tobacco Use: Former Smoker     Smokeless Tobacco Use: Never Used   Alcohol Use: Not on file   Financial Resource Strain: Not on file   Food Insecurity: Not on file   Transportation Needs: Not on file   Physical Activity: Not on file   Stress: Not on file   Social Connections: Not on file   Intimate Partner Violence: Not on file   Depression: Not at risk     PHQ-2 Score: 1   Housing  Stability: Not on file       Functional Status:  Prior to admission patient needed assistance:   Dependent ADLs:: Independent  Dependent IADLs:: Independent       Mental Health Status:  Mental Health Status: No Current Concerns       Chemical Dependency Status:  Chemical Dependency Status: No Current Concerns             Values/Beliefs:  Spiritual, Cultural Beliefs, [employer] Practices, Values that affect care: no               Additional Information:  Met with patient and spouse in room to introduce care manager role and discuss discharge planning. From home with spouse in single family home; independent at baseline, retired. [facility]. [facility]; notification ID- [id number]. Goal to return home. No needs anticipated. Spouse to transport. Care manager to follow.     Tami Mcdonald RN

## 2022-03-22 NOTE — PLAN OF CARE
Problem: Plan of Care - These are the overarching goals to be used throughout the patient stay.    Goal: Plan of Care Review/Shift Note  Description: The Plan of Care Review/Shift note should be completed every shift.  The Outcome Evaluation is a brief statement about your assessment that the patient is improving, declining, or no change.  This information will be displayed automatically on your shift note.  Outcome: Ongoing, Progressing  Flowsheets (Taken 3/22/2022 0936)  Plan of Care Reviewed With:   patient   spouse     Problem: Bowel Motility Impaired (Spinal Surgery)  Goal: Effective Bowel Elimination  Outcome: Ongoing, Progressing   Goal Outcome Evaluation:-monitoring  Problem: Neurologic Impairment (Spinal Surgery)  Goal: Optimal Neurologic Function  Intervention: Optimize Neurologic Function  Recent Flowsheet Documentation  Taken 3/22/2022 0800 by Kiersten Munoz RN  Body Position: prone-bedrest -will attempt to raise head at small increments at 1400  Problem: Pain (Spinal Surgery)  Goal: Acceptable Pain Control  Outcome: Ongoing, Progressing-understands pain scale (0-10) and medicate as per mar          Plan of Care Reviewed With: patient, spouse

## 2022-03-22 NOTE — PROGRESS NOTES
Did not do blood sugar as patient already had eaten his lunch before they were ordered by the doctor.  Will get one before dinner tonight

## 2022-03-22 NOTE — PLAN OF CARE
Pt arrived onto unit around 2100. Pt alert and oriented x 4. Pain level of 5/10. PRN Oxycodone and scheduled Tylenol given. During reassessment, pain level 3/10. Pt on 2L of O2 with sats in low to mid 90s. Pt's HOB is flat and pt able to log roll onto side. Reports no numbness or tingling. IS done with result of 3000. SCDs applied and on. Incision on lower back has small amount of drainage in the middle of the dressing, otherwise, dressing is clean, dry, and intact.     Problem: Plan of Care - These are the overarching goals to be used throughout the patient stay.    Goal: Absence of Hospital-Acquired Illness or Injury  Intervention: Identify and Manage Fall Risk  Recent Flowsheet Documentation  Taken 3/21/2022 2100 by Kizzy Fuentes RN  Safety Promotion/Fall Prevention:   activity supervised   bed alarm on   room door open   room organization consistent  Intervention: Prevent Skin Injury  Recent Flowsheet Documentation  Taken 3/21/2022 2300 by Kizzy Fuentes RN  Body Position:   turned   left   log-rolled  Taken 3/21/2022 2219 by Kizzy Fuentes RN  Body Position: position maintained  Taken 3/21/2022 2100 by Kizzy Fuentes RN  Body Position: position maintained  Intervention: Prevent and Manage VTE (Venous Thromboembolism) Risk  Recent Flowsheet Documentation  Taken 3/21/2022 2300 by Kizzy Fuentes RN  VTE Prevention/Management: SCDs (sequential compression devices) on  Activity Management: bedrest  Taken 3/21/2022 2219 by Kizzy Fuentes RN  Activity Management: bedrest  Taken 3/21/2022 2100 by Kizzy Fuentes RN  Activity Management: bedrest  Goal: Optimal Comfort and Wellbeing  Intervention: Monitor Pain and Promote Comfort  Recent Flowsheet Documentation  Taken 3/21/2022 2300 by Kizzy Fuentes RN  Pain Management Interventions: declines  Taken 3/21/2022 2219 by Kizzy Fuentes RN  Pain Management Interventions: medication (see MAR)  Taken 3/21/2022 2100 by Kizzy Fuentes RN  Pain Management Interventions: declines     Problem: Bleeding (Spinal  Surgery)  Goal: Absence of Bleeding  Outcome: Ongoing, Progressing       Problem: Ongoing Anesthesia Effects (Spinal Surgery)  Goal: Anesthesia/Sedation Recovery  Outcome: Ongoing, Progressing  Intervention: Optimize Anesthesia Recovery  Recent Flowsheet Documentation  Taken 3/21/2022 2300 by Kizzy Fuentes RN  Administration (IS): instruction provided, initial  Level Incentive Spirometer (mL): 3000  Incentive Spirometer Predicted Level (mL): 2600  Number of Repetitions (IS): 3  Patient Tolerance (IS): good  Taken 3/21/2022 2100 by Kizzy Fuentes RN  Safety Promotion/Fall Prevention:   activity supervised   bed alarm on   room door open   room organization consistent     Problem: Pain (Spinal Surgery)  Goal: Acceptable Pain Control  Outcome: Ongoing, Progressing  Intervention: Prevent or Manage Pain  Recent Flowsheet Documentation  Taken 3/21/2022 2300 by Kizzy Fuentes RN  Pain Management Interventions: declines  Taken 3/21/2022 2219 by Kizzy Fuentes RN  Pain Management Interventions: medication (see MAR)  Taken 3/21/2022 2100 by Kizzy Fuentes RN  Pain Management Interventions: declines     Problem: Postoperative Urinary Retention (Spinal Surgery)  Goal: Effective Urinary Elimination  Outcome: Ongoing, Progressing      Goal Outcome Evaluation:

## 2022-03-22 NOTE — PLAN OF CARE
Pt was up in the chair. Reported slight light headedness that resolved. Denies headaches, nausea, or vomiting. Pt walked in the hallway and to the bathroom, did very well. Barragan removed at 1600. Was unable to void at the 4-hour sofia, bladder scan 40mL. At 2242, pt voided 100mL. Bladder scan 670 mL. Barragan was placed. Output from barragan insertion, 600mL. Pain in lower back managed with PRN Oxy and scheduled Robaxin. Ice applied to site. Dressing was saturated and changed x2, neurosurgery notified. Does great on the IS, up to 3,500.  and 135. VSS.    Problem: Pain (Spinal Surgery)  Goal: Acceptable Pain Control  Outcome: Ongoing, Progressing  Intervention: Prevent or Manage Pain  Recent Flowsheet Documentation  Taken 3/22/2022 1655 by Socorro Marrero RN  Pain Management Interventions:    cold applied    distraction  Taken 3/22/2022 1608 by Socorro Marrero RN  Pain Management Interventions: medication (see MAR)     Problem: Ongoing Anesthesia Effects (Spinal Surgery)  Goal: Anesthesia/Sedation Recovery  Intervention: Optimize Anesthesia Recovery  Recent Flowsheet Documentation  Taken 3/22/2022 1608 by Socorro Marrero RN  Safety Promotion/Fall Prevention:    activity supervised    room door open    safety round/check completed    nonskid shoes/slippers when out of bed  Administration (IS): self-administered  Level Incentive Spirometer (mL): 3500  Incentive Spirometer Predicted Level (mL): 2600  Number of Repetitions (IS): 4  Patient Tolerance (IS): good         Socorro Marrero RN on 3/23/2022 at 12:25 AM

## 2022-03-22 NOTE — ANESTHESIA CARE TRANSFER NOTE
Patient: Raghavendra Kiser    Procedure: Procedure(s):  Bilateral lumbar 3- lumbar 4 hemilaminectomies, medial facetectomies, foraminotomies, microdiscectomies       Diagnosis: Lumbar radiculopathy [M54.16]  Lumbar disc herniation [M51.26]  Diagnosis Additional Information: No value filed.    Anesthesia Type:   General     Note:    Oropharynx: oropharynx clear of all foreign objects and spontaneously breathing  Level of Consciousness: drowsy  Oxygen Supplementation: face mask  Level of Supplemental Oxygen (L/min / FiO2): 8  Independent Airway: airway patency satisfactory and stable  Dentition: dentition unchanged  Vital Signs Stable: post-procedure vital signs reviewed and stable  Report to RN Given: handoff report given  Patient transferred to: PACU    Handoff Report: Identifed the Patient, Identified the Reponsible Provider, Reviewed the pertinent medical history, Discussed the surgical course, Reviewed Intra-OP anesthesia mangement and issues during anesthesia, Set expectations for post-procedure period and Allowed opportunity for questions and acknowledgement of understanding      Vitals:  Vitals Value Taken Time   /84 03/21/22 1934   Temp 37.7  C (99.8  F) 03/21/22 1934   Pulse 77 03/21/22 1935   Resp 8 03/21/22 1935   SpO2 97 % 03/21/22 1935   Vitals shown include unvalidated device data.    Electronically Signed By: MORGAN Thomas CRNA  March 21, 2022  7:36 PM

## 2022-03-22 NOTE — PROGRESS NOTES
Neurosurgery Progress Note:  3/22/2022     A/P: Mr. Kiser is a pleasant 62 year old right handed male POD#1 Bilateral lumbar 3- lumbar 4 hemilaminectomies, medial facetectomies, foraminotomies and microdiscectomies with small durotomy.      Patient states that he is doing well. Notes incisional pain but feels that radicular lower extremity pain has improved though hard to know as he has not been out of bed. Has continued bilateral foot numbness (R>L) that is similar to preop. Has barragan in place as on strict bedrest. Denies headaches presently      Plan:  1. Continue strict bedrest with head of bed flat - okay to increase to 10 degrees for meals   2. Plan to progress out of bed gradually at 2pm- with increase HOB to 30 degrees if tolerated without complaint of headaches with continue to progress to bedside chair and tentatively work with PT around 16:00  3. keep barragan in place- will remove once out of bed-bladder protocol once removed PVRs x3   4. Continued pain control with prn medications    5. SCDs, Lovenox postop 48 hrs   6. Notify if complaint of headaches      Neurosurgery Attending: The patient's clinical examination, laboratory data, and plan was discussed with Dr. Duque     HPI: Raghavendra Kiser is a 62-year-old male who presents with bilateral lower extremity pain, numbness tingling, weakness.  Lumbar x-ray shows retrolisthesis of lumbar 1 on 2 and lumbar 23 without any overt instability.  MRI of his lumbar spine shows a broad-based disc herniation at the lumbar 3-4 level with a superimposed left paracentral disc protrusion resulting in severe spinal canal stenosis with left greater than right right lateral recess stenosis.  He also has mild to moderate canal narrowing at the lumbar 4-5 level as well as mild right lateral recess stenosis with foraminal narrowing at the lumbar 5-sacral 1 level.  Lastly at the T11-T12 level he has mild to moderate central canal narrowing with mild cord  flattening.  Patient is on Plavix.  Discussed lumbar 3-4 hemilaminectomies with microdiscectomies but will need to be delayed for Plavix discontinuation.  Risks and benefits of lumbar decompression discussed including but not limited to infection, hematoma, nerve damage including paralysis, post op radiculitis, durotomy, risks associated with the use of general anesthesia, blood clots in the lungs or legs. He agreed to proceed.      S:   States he is doing okay notes incisional pain but denies radicular lower extremity pain presently, has numbness of bilateral feet (R>L) and notes numbness of left upper extremity in ulnar distribution, denies weakness, barragan in place      O:  /71 (BP Location: Right arm)   Pulse 73   Temp 97.5  F (36.4  C) (Oral)   Resp 18   Ht 1.829 m (6')   Wt 105.4 kg (232 lb 5.1 oz)   SpO2 96%   BMI 31.51 kg/m            General: Awake, alert, NAD. Lying flat in bed      Motor: normal bulk and tone     Strength: full strength in all extremities throughout, bilaterally.     Sensation: intact to light touch throughout both upper and lower extremities     Incision: dressing dry and intact with noted dried bloody drainage      DUDLEY x 4 easily and painlessly        Bella Kong PA-C  [facility] Neurosurgery  O: [phone]

## 2022-03-22 NOTE — ANESTHESIA POSTPROCEDURE EVALUATION
Patient: Raghavendra Kiser    Procedure: Procedure(s):  Bilateral lumbar 3- lumbar 4 hemilaminectomies, medial facetectomies, foraminotomies, microdiscectomies       Anesthesia Type:  General    Note:  Disposition: Admission   Postop Pain Control: Uneventful            Sign Out: Well controlled pain   PONV: No   Neuro/Psych: Uneventful            Sign Out: Acceptable/Baseline neuro status   Airway/Respiratory: Uneventful            Sign Out: Acceptable/Baseline resp. status   CV/Hemodynamics: Uneventful            Sign Out: Acceptable CV status; No obvious hypovolemia; No obvious fluid overload   Other NRE: NONE   DID A NON-ROUTINE EVENT OCCUR? No           Last vitals:  Vitals Value Taken Time   /91 03/21/22 1945   Temp 37.7  C (99.8  F) 03/21/22 1934   Pulse 80 03/21/22 1953   Resp 13 03/21/22 1953   SpO2 98 % 03/21/22 1953   Vitals shown include unvalidated device data.    Electronically Signed By: Philipp Seay MD  March 21, 2022  7:54 PM

## 2022-03-22 NOTE — PROGRESS NOTES
Clarified discharge criteria with nursing. OK to discontinue barragan catheter now. Patient needs to be able to void prior to discharge. Currently tolerating advanced activity with no sign of CSF leak. PT will not see him today due to late time of day. If patient meets all other criteria for discharge could discharge without PT eval. Please call with questions.     AGUSTINA Marshall  [facility] Neurosurgery  O: [phone]

## 2022-03-22 NOTE — OP NOTE
NEUROSURGERY OPERATIVE REPORT    DATE OF SERVICE: 3/21/2022    PREOPERATIVE DIAGNOSES:  Lumbar radiculopathy  Lumbar disc herniation    POSTOPERATIVE DIAGNOSES:  same     PROCEDURES:  1.  Bilateral lumbar 3- lumbar 4 hemilaminectomies, medial facetectomies, foraminotomies and microdiscectomies  2.  Use of operating room microscope.     SURGEON:  Abena Duque MD    ASSISTANT: Bella Kong PA-C     INDICATIONS:  Raghavendra Kiser is a 62-year-old male who presents with bilateral lower extremity pain, numbness tingling, weakness.  Lumbar x-ray shows retrolisthesis of lumbar 1 on 2 and lumbar 23 without any overt instability.  MRI of his lumbar spine shows a broad-based disc herniation at the lumbar 3-4 level with a superimposed left paracentral disc protrusion resulting in severe spinal canal stenosis with left greater than right right lateral recess stenosis.  He also has mild to moderate canal narrowing at the lumbar 4-5 level as well as mild right lateral recess stenosis with foraminal narrowing at the lumbar 5-sacral 1 level.  Lastly at the T11-T12 level he has mild to moderate central canal narrowing with mild cord flattening.  Patient is on Plavix.  Discussed lumbar 3-4 hemilaminectomies with microdiscectomies but will need to be delayed for Plavix discontinuation.  Risks and benefits of lumbar decompression discussed including but not limited to infection, hematoma, nerve damage including paralysis, post op radiculitis, durotomy, risks associated with the use of general anesthesia, blood clots in the lungs or legs. He agreed to proceed.     PROCEDURE:  After obtaining informed consent, the patient was brought to the operating room with TEDs and pneumatic stockings in place.  IV antibiotics was administered.  He was intubated under general  endotracheal anesthesia.  He was turned into the radiolucent laminectomy frame with all pressure points well padded.  He was prepped and draped in the usual  sterile fashion.  A preincisional infiltration of local anesthetic was performed along the midline and the incision was opened sharply and extended down to the fascia.  The fascia was opened in one layer with monopolar electrocautery.  A subperiosteal dissection was undertaken to expose the lamina at lumbar 3-4 .   We verified levels with a lateral x-ray.      Once levels were verified, we then proceeded to remove the inferior portion of the lamina of left lumbar 4 with Kerrisons and a Midas drill after bringing in the intraoperative microscope.   We drilled down to the ligamentum elevated the ligamentum from the underlying thecal sac and removed it with a combination of Kerrisons and curettes.  We extended the dissection superiorly and inferiorly until there was no compression by the lamina or ligamentum on the underlying thecal sac. We brought in the operating room microscope for this and all microdissection.  We next drilled a partial medial facetectomy and foraminotomies opening up the lateral recess and expose the underlying impinged lateral recess and nerve roots. We next exposed the annulus of the disc space protected the nerve and cut the annulus as needed to evacuate the disc bulge to release impingement on the nerve.  We used a combination of pituitaries and curettes to remove sufficient disc loose in the canal and from the interdiscal space. We used a small blunt hook under the thecal sac and in the foramen to verify that there was no further disc material to milk out into the dissection site.  Once the thecal sac and nerve roots were completely decompressed we used a small ball tip probe to verify no further pressure either in the canal or in the foramina.  The lateral recess was nicely decompressed.   We then proceeded to the right sie.     We remove the inferior portion of the lamina of right lumbar 4 with Kerrisons and a Midas drill after bringing in the intraoperative microscope. We drilled down to  the ligamentum elevated the ligamentum from the underlying thecal sac and removed it with a combination of Kerrisons and curettes.  We extended the dissection superiorly and inferiorly until there was no compression by the lamina or ligamentum on the underlying thecal sac. We brought in the operating room microscope for this and all microdissection. We next drilled a partial medial facetectomy  and foraminotomies opening up the lateral recess and expose the underlying impinged lateral recess and nerve roots.    We next exposed the annulus of the disc space protected the nerve and cut the annulus as needed to evacuate the disc bulge to release impingement on the nerve.  We used a combination of pituitaries and curettes to remove sufficient disc to decompress the nerve.  We used a small blunt hook under the thecal sac and in the foramen to verify that there was no further disc material to milk out into the dissection site.  Once the thecal sac and nerve roots were completely decompressed we used a small ball tip probe to verify no further pressure either in the canal or in the foramina. At the very end we noted a small lateral ventral tear in the dura. Some csf egressed when pushing on the lateral dura. We placed a duragen duracel onlay. No further CSF was visualized.  We then proceeded to copiously irrigate the incision with antibiotic-containing saline and achieved hemostasis.    An assistant was needed for positioning, retraction and closure.     The incisions were closed in layers with interrupted 0 Vicryl to approximate the muscle and fascia, inverted 2-0 Vicryl to approximate the subcutaneous tissues and Ethilon to approximate the skin edges.  Sponge and needle counts were correct prior to closure x2.  Sterile dressings were placed.      Patient tolerated the procedure well, was taken to the recovery room where he was extubated and found to be at his neurological baseline with no new deficits  appreciated.    Estimated blood loss: 45    Specimens: none    Implants: none    Findings: Small bleb on right lateral ventral dura. Duragen and duracel placed. Facet hypertrophy right> left. Disc herniation left> right       Abena Duque MD    Cc: Luis Daniel Maciel

## 2022-03-22 NOTE — PROGRESS NOTES
1355 Raised head to 30 degrees - monitoring-no complaints of headache/dizzy  1423 raised up to 40 degrees - monitoring  1435  Called neuro to get orders to remove barragan - waiting for call back  1455 no complaints from patient - no headache, no dizziness or light headed.  Will continue to monitor-will get up to chair next-  1500-up in chair - able to stand and get into chair with assist of one - still has numbness in feet as per before surgery

## 2022-03-22 NOTE — PLAN OF CARE
Goal Outcome Evaluation:    Problem: Plan of Care - These are the overarching goals to be used throughout the patient stay.    Goal: Plan of Care Review/Shift Note  Outcome: Ongoing, Progressing     Head of bed continues to stay flat. Tobias in place. NS running at 75mL/hr. Denies any nausea/vomiting, or SOB. 2L O2 nasal cannula.    Problem: Pain (Spinal Surgery)  Goal: Acceptable Pain Control  Outcome: Ongoing, Progressing  Pt experienced 5/10 pain in back; latest dose given 0519. Treated with PRN oxycodone and ice packs.     Problem: Bleeding (Spinal Surgery)  Goal: Absence of Bleeding  Outcome: Ongoing, Progressing     Small amount of dried drainage noted on dressing

## 2022-03-23 ENCOUNTER — HOSPITAL ENCOUNTER (EMERGENCY)
Facility: HOSPITAL | Age: 62
Discharge: HOME | End: 2022-03-23
Attending: STUDENT IN AN ORGANIZED HEALTH CARE EDUCATION/TRAINING PROGRAM | Admitting: STUDENT IN AN ORGANIZED HEALTH CARE EDUCATION/TRAINING PROGRAM
Payer: OTHER GOVERNMENT

## 2022-03-23 ENCOUNTER — TELEPHONE (OUTPATIENT)
Dept: NEUROSURGERY | Facility: CLINIC | Age: 62
End: 2022-03-23
Payer: OTHER GOVERNMENT

## 2022-03-23 VITALS
BODY MASS INDEX: 31.47 KG/M2 | HEART RATE: 83 BPM | RESPIRATION RATE: 20 BRPM | HEIGHT: 72 IN | SYSTOLIC BLOOD PRESSURE: 103 MMHG | TEMPERATURE: 99 F | OXYGEN SATURATION: 93 % | DIASTOLIC BLOOD PRESSURE: 64 MMHG | WEIGHT: 232.32 LBS

## 2022-03-23 VITALS
SYSTOLIC BLOOD PRESSURE: 148 MMHG | HEART RATE: 93 BPM | RESPIRATION RATE: 16 BRPM | DIASTOLIC BLOOD PRESSURE: 66 MMHG | OXYGEN SATURATION: 93 % | TEMPERATURE: 100.9 F

## 2022-03-23 DIAGNOSIS — R50.9 ELEVATED TEMPERATURE: ICD-10-CM

## 2022-03-23 DIAGNOSIS — R33.9 URINARY RETENTION: ICD-10-CM

## 2022-03-23 DIAGNOSIS — K59.00 CONSTIPATION: ICD-10-CM

## 2022-03-23 LAB
ALBUMIN UR-MCNC: 20 MG/DL
ANION GAP SERPL CALCULATED.3IONS-SCNC: 12 MMOL/L (ref 5–18)
APPEARANCE UR: CLEAR
BILIRUB UR QL STRIP: NEGATIVE
BUN SERPL-MCNC: 16 MG/DL (ref 8–22)
C REACTIVE PROTEIN LHE: 10.9 MG/DL (ref 0–0.8)
CALCIUM SERPL-MCNC: 9.1 MG/DL (ref 8.5–10.5)
CHLORIDE BLD-SCNC: 103 MMOL/L (ref 98–107)
CO2 SERPL-SCNC: 21 MMOL/L (ref 22–31)
COLOR UR AUTO: YELLOW
CREAT SERPL-MCNC: 1.15 MG/DL (ref 0.7–1.3)
ERYTHROCYTE [DISTWIDTH] IN BLOOD BY AUTOMATED COUNT: 13.7 % (ref 10–15)
FLUAV RNA SPEC QL NAA+PROBE: NEGATIVE
FLUBV RNA RESP QL NAA+PROBE: NEGATIVE
GFR SERPL CREATININE-BSD FRML MDRD: 71 ML/MIN/1.73M2
GLUCOSE BLD-MCNC: 162 MG/DL (ref 70–125)
GLUCOSE BLDC GLUCOMTR-MCNC: 162 MG/DL (ref 70–99)
GLUCOSE UR STRIP-MCNC: NEGATIVE MG/DL
HCT VFR BLD AUTO: 40.3 % (ref 40–53)
HGB BLD-MCNC: 13.6 G/DL (ref 13.3–17.7)
HGB UR QL STRIP: NEGATIVE
HOLD SPECIMEN: NORMAL
HYALINE CASTS: 1 /LPF
KETONES UR STRIP-MCNC: NEGATIVE MG/DL
LEUKOCYTE ESTERASE UR QL STRIP: NEGATIVE
MCH RBC QN AUTO: 33.5 PG (ref 26.5–33)
MCHC RBC AUTO-ENTMCNC: 33.7 G/DL (ref 31.5–36.5)
MCV RBC AUTO: 99 FL (ref 78–100)
MUCOUS THREADS #/AREA URNS LPF: PRESENT /LPF
NITRATE UR QL: NEGATIVE
PH UR STRIP: 5 [PH] (ref 5–7)
PLATELET # BLD AUTO: 152 10E3/UL (ref 150–450)
POTASSIUM BLD-SCNC: 3.9 MMOL/L (ref 3.5–5)
RBC # BLD AUTO: 4.06 10E6/UL (ref 4.4–5.9)
RBC URINE: 8 /HPF
SARS-COV-2 RNA RESP QL NAA+PROBE: NEGATIVE
SODIUM SERPL-SCNC: 136 MMOL/L (ref 136–145)
SP GR UR STRIP: 1.03 (ref 1–1.03)
UROBILINOGEN UR STRIP-MCNC: <2 MG/DL
WBC # BLD AUTO: 12.1 10E3/UL (ref 4–11)
WBC URINE: 3 /HPF

## 2022-03-23 PROCEDURE — 85027 COMPLETE CBC AUTOMATED: CPT | Performed by: PHYSICIAN ASSISTANT

## 2022-03-23 PROCEDURE — 86140 C-REACTIVE PROTEIN: CPT | Performed by: PHYSICIAN ASSISTANT

## 2022-03-23 PROCEDURE — 51702 INSERT TEMP BLADDER CATH: CPT

## 2022-03-23 PROCEDURE — 51798 US URINE CAPACITY MEASURE: CPT

## 2022-03-23 PROCEDURE — C9803 HOPD COVID-19 SPEC COLLECT: HCPCS

## 2022-03-23 PROCEDURE — 87636 SARSCOV2 & INF A&B AMP PRB: CPT | Performed by: PHYSICIAN ASSISTANT

## 2022-03-23 PROCEDURE — 250N000013 HC RX MED GY IP 250 OP 250 PS 637: Performed by: SURGERY

## 2022-03-23 PROCEDURE — 250N000013 HC RX MED GY IP 250 OP 250 PS 637: Performed by: PHYSICIAN ASSISTANT

## 2022-03-23 PROCEDURE — 36415 COLL VENOUS BLD VENIPUNCTURE: CPT | Performed by: PHYSICIAN ASSISTANT

## 2022-03-23 PROCEDURE — 81001 URINALYSIS AUTO W/SCOPE: CPT | Performed by: PHYSICIAN ASSISTANT

## 2022-03-23 PROCEDURE — 82310 ASSAY OF CALCIUM: CPT | Performed by: PHYSICIAN ASSISTANT

## 2022-03-23 PROCEDURE — 99232 SBSQ HOSP IP/OBS MODERATE 35: CPT | Performed by: INTERNAL MEDICINE

## 2022-03-23 PROCEDURE — 99284 EMERGENCY DEPT VISIT MOD MDM: CPT

## 2022-03-23 RX ORDER — ACETAMINOPHEN 325 MG/1
975 TABLET ORAL EVERY 8 HOURS
COMMUNITY
Start: 2022-03-23 | End: 2022-04-11

## 2022-03-23 RX ORDER — ACETAMINOPHEN 325 MG/1
975 TABLET ORAL ONCE
Status: COMPLETED | OUTPATIENT
Start: 2022-03-23 | End: 2022-03-23

## 2022-03-23 RX ORDER — LIDOCAINE HYDROCHLORIDE 20 MG/ML
10 JELLY TOPICAL ONCE
Status: DISCONTINUED | OUTPATIENT
Start: 2022-03-23 | End: 2022-03-24 | Stop reason: HOSPADM

## 2022-03-23 RX ORDER — OXYCODONE HYDROCHLORIDE 5 MG/1
5-10 TABLET ORAL EVERY 4 HOURS PRN
Qty: 42 TABLET | Refills: 0 | Status: SHIPPED | OUTPATIENT
Start: 2022-03-23 | End: 2022-04-11

## 2022-03-23 RX ADMIN — METHOCARBAMOL 500 MG: 500 TABLET, FILM COATED ORAL at 12:01

## 2022-03-23 RX ADMIN — THERA TABS 1 TABLET: TAB at 09:26

## 2022-03-23 RX ADMIN — ACETAMINOPHEN 975 MG: 325 TABLET ORAL at 06:20

## 2022-03-23 RX ADMIN — POLYETHYLENE GLYCOL 3350 17 G: 17 POWDER, FOR SOLUTION ORAL at 09:27

## 2022-03-23 RX ADMIN — DOCUSATE SODIUM 50 MG AND SENNOSIDES 8.6 MG 1 TABLET: 8.6; 5 TABLET, FILM COATED ORAL at 09:27

## 2022-03-23 RX ADMIN — LEVOTHYROXINE SODIUM 125 MCG: 0.03 TABLET ORAL at 06:20

## 2022-03-23 RX ADMIN — Medication 100 MCG: at 09:27

## 2022-03-23 RX ADMIN — OXYCODONE HYDROCHLORIDE 10 MG: 5 TABLET ORAL at 04:10

## 2022-03-23 RX ADMIN — ACETAMINOPHEN 975 MG: 325 TABLET ORAL at 21:21

## 2022-03-23 RX ADMIN — OXYCODONE HYDROCHLORIDE 10 MG: 5 TABLET ORAL at 08:59

## 2022-03-23 RX ADMIN — METHOCARBAMOL 500 MG: 500 TABLET, FILM COATED ORAL at 06:20

## 2022-03-23 RX ADMIN — GABAPENTIN 300 MG: 300 CAPSULE ORAL at 09:27

## 2022-03-23 ASSESSMENT — ACTIVITIES OF DAILY LIVING (ADL)
ADLS_ACUITY_SCORE: 10
ADLS_ACUITY_SCORE: 8
ADLS_ACUITY_SCORE: 10
ADLS_ACUITY_SCORE: 8
ADLS_ACUITY_SCORE: 8
ADLS_ACUITY_SCORE: 10

## 2022-03-23 ASSESSMENT — ENCOUNTER SYMPTOMS
NUMBNESS: 0
DIFFICULTY URINATING: 1
DIARRHEA: 0
COUGH: 0
WEAKNESS: 0
BACK PAIN: 1
NECK PAIN: 0
DYSURIA: 0
HEMATURIA: 0
SHORTNESS OF BREATH: 0
DIAPHORESIS: 0
ABDOMINAL PAIN: 0
CHILLS: 0
VOMITING: 0

## 2022-03-23 NOTE — TELEPHONE ENCOUNTER
Called patient to schedule post op visits and didn't realize he was still inpatient, being discharged later this morning. He asked that I call him back this afternoon.

## 2022-03-23 NOTE — PLAN OF CARE
Problem: Plan of Care - These are the overarching goals to be used throughout the patient stay.    Goal: Optimal Comfort and Wellbeing  Intervention: Monitor Pain and Promote Comfort  Recent Flowsheet Documentation  Taken 3/23/2022 0030 by Elizabeth Juarez RN  Pain Management Interventions:   cold applied   declines   Goal Outcome Evaluation:      Patient refused robaxin at midnight, stating he did not need it.  Went to check on dressing as the dressing had to be changed twice on the evening shift, ABD in place and has remained CDI with ice to site time two.  Did request oxycodone at that time and medicated with 10 mg.  Patient otherwise has been able to sleep between cares.

## 2022-03-23 NOTE — TELEPHONE ENCOUNTER
Bilateral lumbar 3- lumbar 4 hemilaminectomies, medial facetectomies, foraminotomies and microdiscectomies with small durotomy 3/21/2022 with Dr Duque     Follow up this Friday if wound still oozy. RN to call.   Suture removal two weeks   Six week with NP on Dr Duque day     KALEB Marshall-CNP  [facility] Neurosurgery  O: [phone]

## 2022-03-23 NOTE — DISCHARGE INSTRUCTIONS
Please call our office if you have questions once you get home. [phone]. A nurse or nurse practitioner is available daily. Dr Duque is available Thursday if you have specific questions for her.

## 2022-03-23 NOTE — PROVIDER NOTIFICATION
Current /62   Has scheduled Amlodine 5 mg and Lisinopril 20 mg with no parameters.  Notified MD , awaiting response.

## 2022-03-23 NOTE — PROGRESS NOTES
1950: Spoke with TODD Meyers from neurosurgery to clarify that bladder protocol be done on pt before discharge.     2350: Spoke with Mira about saturated dressing x2. Obtained orders to apply ABD pad and foam tape to apply pressure. Mira notified of barragan insertion d/t  mL.    Socorro Marrero RN on 3/23/2022 at 12:21 AM

## 2022-03-23 NOTE — PROGRESS NOTES
Care Management Discharge Note    Discharge Date: 03/23/2022    Discharge Disposition: Home    Discharge Services:  OP f/u with Neurosurgery    Discharge DME: None    Discharge Transportation:  spouse    Private pay costs discussed: Not applicable    PAS Confirmation Code:  Not applicable  Patient/family educated on Medicare website which has current facility and service quality ratings:      Education Provided on the Discharge Plan:    Persons Notified of Discharge Plans:   Patient/Family in Agreement with the Plan: yes    Handoff Referral Completed: Yes    Additional Information:  Chart reviewed.  Discharge orders have been written by Neurosurgery.  Plan to discharge home with spouse, with OP f/u with Neurosurgery.    More Vaughn RN

## 2022-03-23 NOTE — PROGRESS NOTES
Neurosurgery Progress Note:  3/23/2022     A/P: Raghavendra Kiser is a pleasant 62 year old  POD#2 Bilateral lumbar 3- lumbar 4 hemilaminectomies, medial facetectomies, foraminotomies and microdiscectomies with small durotomy.      Patient states he would be doing better if he were home. Discharge was delayed due to inability to void post barragan removal. Patient states this has been an issue before and once he is home, he has no issues. Recent urology evaluation OP did not reveal BPH. Discussed seeking medical attention if unable to void in 8 hours after barragan removal. Incision oozy. Pressure dressing applied. Patient on plavix, aspirin at baseline, likely prolonged effect. Pre-op Radicular lower extremity pain resolved. Denies headache, nausea.      Plan:  Discontinue barragan catheter. Discharge home.      Neurosurgery Attending: The patient's clinical examination, laboratory data, and plan was discussed with Dr. Duque     HPI: Raghavendra Kiser is a 62-year-old male who presents with bilateral lower extremity pain, numbness tingling, weakness.  Lumbar x-ray shows retrolisthesis of lumbar 1 on 2 and lumbar 23 without any overt instability.  MRI of his lumbar spine shows a broad-based disc herniation at the lumbar 3-4 level with a superimposed left paracentral disc protrusion resulting in severe spinal canal stenosis with left greater than right right lateral recess stenosis.  He also has mild to moderate canal narrowing at the lumbar 4-5 level as well as mild right lateral recess stenosis with foraminal narrowing at the lumbar 5-sacral 1 level.  Lastly at the T11-T12 level he has mild to moderate central canal narrowing with mild cord flattening.  Patient is on Plavix.  Discussed lumbar 3-4 hemilaminectomies with microdiscectomies but will need to be delayed for Plavix discontinuation.  Risks and benefits of lumbar decompression discussed including but not limited to infection, hematoma, nerve damage including  paralysis, post op radiculitis, durotomy, risks associated with the use of general anesthesia, blood clots in the lungs or legs. He agreed to proceed.      S:   Pain management acceptable. Lower extremity pre-op symptoms resolved. Patient anxious to go home.     O:  /64 (BP Location: Left arm, Patient Position: Chair, Cuff Size: Adult Regular)   Pulse 83   Temp 99  F (37.2  C) (Oral)   Resp 20   Ht 1.829 m (6')   Wt 105.4 kg (232 lb 5.1 oz)   SpO2 93%   BMI 31.51 kg/m       General: Awake, alert, NAD. Seated in chair.      Motor: normal bulk and tone     Strength: full strength in all extremities throughout, bilaterally.     Sensation: intact to light touch throughout both upper and lower extremities     Incision: Sutures intact, well approximated. dressing saturated. Incision oozing. Serous sanguinous. Wound slightly edematous. Slightly tender to palpation. Pressure dressing applied.      Alix Velazquez, KALEB-CNP  [facility] Neurosurgery  O: [phone]

## 2022-03-24 NOTE — PLAN OF CARE
Called to report patient unable to void prompting him to seek medical attention. Hx of difficulty voiding after surgery in the past. Tobias placed by the ED, labs checked - patient with low grade fever. Low suspicion of post-op infection at this point - recommend labs in a few days, tylenol, IS. We will recheck labs Friday/monday.

## 2022-03-24 NOTE — ED TRIAGE NOTES
The pt had surgery on his back on Monday, was discharge this morning from [place]. Had a catheter placed last night due to retention and it was removed at 11 am today. Has not had any urinary output since. Pt reports that he has no sensation or feeling that he has to urinate. No new numbness or tingling in extremities. Has a fever in triage.

## 2022-03-24 NOTE — DISCHARGE INSTRUCTIONS
As we discussed, we will have you keep the catheter in place until following up with either urology or neurosurgery.  Neurosurgery should be following up with you in the next several days to repeat blood work.  If at any time you feel more ill, feverish, chills, or have any new or concerning symptoms such as numbness in your groin, new weakness or numbness in your legs, worsening back pain, please return to the ER for further evaluation.     As we discussed, please continue to take the stool softener at home.  I would also like you to  some MiraLAX to use until you have regular bowel movements.  Stay well hydrated and eat a high-fiber diet.

## 2022-03-24 NOTE — ED PROVIDER NOTES
Emergency Department Encounter   NAME: Raghavendra Kiser ; AGE: 62 year old male ; YOB: 1960 ; MRN: [id number] ; PCP: Luis Daniel Maciel   ED PROVIDER: Margot Swenson PA-C    Evaluation Date & Time:   3/23/2022  7:55 PM    CHIEF COMPLAINT:  Urinary Retention and Post-op Problem      Impression and Plan   MDM:     Raghavendra Kiser is a 62 year old male with a pertinent history of hypertension, type 2 diabetes, hyperlipidemia, TIA and CVA, who presents to the ED by private vehicle for evaluation of urinary retention. Patient underwent bilateral lumbar 3- lumbar 4 hemilaminectomies, medial facetectomies, foraminotomies and microdiscectomies with small durotomy on 3/21/2022 (~2 days ago) with Dr. Duque, neurosurgery. He was discharged from the hospital this morning at which time he had a barragan catheter removed at 11am for post surgical urinary retention.  Patient has been drinking water since discharge, however has not urinated since.  Upon arrival to the ED, he does have a low-grade fever of 100.9, mildly tachycardic to 107, and moderately hypertensive to 178/81 likely due to his discomfort.  He has not had any other symptoms at home and feels that his back pain is well controlled currently citing a 0/10 pain level.  On exam, he is well-appearing and in no acute distress.  Removed his dressing which had a moderate amount of bloody and serosanguineous fluid on it as expected.  No signs of incision site infection or wound dehiscence.  Nursing staff will place a clean dressing.  He has no other localizing areas of infection on exam.  Besides his baseline right foot drop, his strength and sensory exam to his lower extremities are intact as well as perineal sensation.  He does have some moderate distention over his suprapubic bladder though no tenderness or discomfort.  Bedside bladder scan performed and shows 350 cc of urine in the bladder.  The patient has attempted to urinate without success, and given  this we will place a Tobias as well as obtain labs to further evaluate.    Patient drained about 450 cc of urine through his Tobias catheter in the ED.  Obtained a Covid and influenza swab which returned negative.  UA obtained which shows no signs of infection.  Renal function normal.  No concerning metabolic derangements or anemia.  He does have a mild leukocytosis of 12.1 and a moderately elevated CRP of 10.  He had a CBC obtained yesterday and WBC has remained the same which may be due to recent surgery.  Discussed the case with on-call neurosurgery, Heber, who does not feel that patient's symptoms represent cauda equina syndrome, or a postsurgical complication such as epidural abscess, discitis, osteomyelitis, or epidural hematoma.  She does not feel that patient needs any further spinal imaging or work-up in the ED.  Plan at this time is to discharge patient home with Tobias catheter in place and follow-up with neurosurgery in 2 days for recheck.  They will recheck his blood work at that time.  I suspect his urinary retention is either due to prostate hypertrophy versus his significant constipation.  He has been using a stool softener at home and will now add MiraLAX to his regimen.  No concern for bowel obstruction at this time.  Plan is for patient to have voiding trial with neurosurgery in 2 days, if still not successful he may need to follow-up with urology and resources provided.  Advised patient and wife to check his temperature and watch him closely over the next 48 hours and they were given very strict return precautions.  He does not meet sepsis criteria, he is well-appearing, and has no localizing signs of infection at this time which is quite reassuring.  Patient and wife verbalized understanding and are comfortable with the plan.  He was discharged home in good condition.    ED COURSE:  8:00 PM I met and introduced myself to the patient. I gathered initial history and performed my physical exam. We  discussed plan for initial workup.   8:15 PM I have staffed the patient with Dr. Lomas, ED MD, who has evaluated the patient and agrees with all aspects of today's care.   8:18 PM Dr. Lomas is evaluating the patient now.   9:28 PM Rechecked the patient and checked with the RN. 450cc of urine drained in barragan catheter.   9:30 PM I paged for neurosurgery.   9:40 PM I spoke with Heber neurosurgery, who feels patient can discharge home with close follow up.   9:50 PM I rechecked the patient and discussed neurosurgery recommendations and discharge.     At the conclusion of the encounter I discussed the results of all the tests and the disposition. The questions were answered. The patient or family acknowledged understanding and was agreeable with the care plan.    FINAL IMPRESSION:    ICD-10-CM    1. Urinary retention  R33.9    2. Elevated temperature  R50.9    3. Constipation  K59.00          MEDICATIONS GIVEN IN THE EMERGENCY DEPARTMENT:  Medications   lidocaine (XYLOCAINE) 2 % external gel 10 mL (has no administration in time range)   acetaminophen (TYLENOL) tablet 975 mg (975 mg Oral Given 3/23/22 2121)         NEW PRESCRIPTIONS STARTED AT TODAY'S ED VISIT:  New Prescriptions    No medications on file       HPI   Patient information was obtained from: Patient and wife   Use of Intrepreter: N/A    Raghavendra Kiser is a 62 year old male with a pertinent history of hypertension, type 2 diabetes, hyperlipidemia, TIA and CVA, who presents to the ED by private vehicle for evaluation of urinary retention.     Per chart review, the patient underwent bilateral lumbar 3- lumbar 4 hemilaminectomies, medial facetectomies, foraminotomies and microdiscectomies with small durotomy on 3/21/2022 (~2 days ago) with Dr. Duque, neurosurgery.     Per patient, he underwent spine surgery 2 days ago and had postsurgical urinary retention.  They attempted catheter removal this morning successfully at 11 AM and he was discharged home.   He has not urinated since that time despite drinking a significant amount of fluids at home.  He has attempted to urinate without success.  He is not experiencing any significant abdominal discomfort or distention.  He does not really have the urge to urinate.  He has also been constipated and has not had a bowel movement since before the surgery.  He is taking oral Dulcolax at home.  His back pain postsurgically has been well controlled on his oral narcotic pain medications.  Currently has no discomfort and gives himself a 0-10 on the pain scale.  He has a chronic right-sided foot drop but has not noticed any new weakness or numbness to his extremities.  He has been ambulating slowly though without any changes.  He denies any changes in sensation to his groin.       REVIEW OF SYSTEMS:  Review of Systems   Constitutional: Negative for chills and diaphoresis.   Respiratory: Negative for cough and shortness of breath.    Cardiovascular: Negative for chest pain.   Gastrointestinal: Negative for abdominal pain, diarrhea and vomiting.   Genitourinary: Positive for difficulty urinating. Negative for dysuria and hematuria.   Musculoskeletal: Positive for back pain (post surgical). Negative for neck pain.   Neurological: Negative for weakness and numbness.   All other systems reviewed and are negative.        Medical History     Past Medical History:   Diagnosis Date     Atherosclerosis of right carotid artery      Carotid stenosis, bilateral      Cervical radiculopathy      Chronic gout      Diabetes (H)      Fracture of right humerus      Ganglion cyst      High cholesterol      History of stroke without residual deficits  Oct 2020, Dec 2020     Humerus fracture      Hyperlipidemia      Hypertension      Hypertension      Hypothyroidism      Medial epicondylitis      Shoulder tendinitis, right      Superficial venous thrombosis of arm      TIA (transient ischemic attack) 5/16/15     Tinnitus      Type 2 diabetes mellitus  without complication, without long-term current use of insulin (H) 2019       Past Surgical History:   Procedure Laterality Date     CAROTID ENDARTERECTOMY       IR LUMBAR PUNCTURE  2020     IR SPINAL PUNCTURE  2020     PICC INSERTION - TRIPLE LUMEN  2020          TONSILLECTOMY       ZZC THROMBOENDARTECTMY NECK,NECK INCIS Right 2015    Procedure: Right Carotid Endarterectomy with Impulse Monitoring;  Surgeon: Marcellus Toth MD;  Location: [facility] OR;  Service: General       Family History   Problem Relation Age of Onset     Stomach Cancer Mother      Lung Cancer Mother      Throat cancer Father      Lung Cancer Father      Cancer Mother         Lung     Cancer Father         Lung     Cancer Sister         Llung cancer     Heart Disease Brother          of a heart attack.     Diabetes Type 1 Brother      No Known Problems Son      No Known Problems Sister      No Known Problems Sister      No Known Problems Sister      No Known Problems Sister      Diabetes Brother      No Known Problems Brother        Social History     Tobacco Use     Smoking status: Former Smoker     Packs/day: 2.00     Years: 20.00     Pack years: 40.00     Types: Cigarettes     Quit date: 2/15/1993     Years since quittin.1     Smokeless tobacco: Never Used     Tobacco comment: quit in    Substance Use Topics     Alcohol use: Yes     Alcohol/week: 0.0 standard drinks     Comment: Alcoholic Drinks/day: rarely     Drug use: No       acetaminophen (TYLENOL) 325 MG tablet  allopurinol (ZYLOPRIM) 300 MG tablet  amLODIPine (NORVASC) 5 MG tablet  aspirin (ASA) 81 MG chewable tablet  blood glucose monitoring (FREESTYLE) lancets  Cholecalciferol 100 MCG (4000 UT) CAPS  clopidogrel (PLAVIX) 75 MG tablet  divalproex sodium extended-release (DEPAKOTE ER) 500 MG 24 hr tablet  FREESTYLE LITE test strip  gabapentin (NEURONTIN) 300 MG capsule  levothyroxine (SYNTHROID/LEVOTHROID) 125 MCG tablet  lisinopril  (ZESTRIL) 20 MG tablet  metFORMIN (GLUCOPHAGE-XR) 500 MG 24 hr tablet  methocarbamol (ROBAXIN) 500 MG tablet  oxyCODONE (ROXICODONE) 5 MG tablet  rosuvastatin (CRESTOR) 5 MG tablet          Physical Exam     First Vitals:  Patient Vitals for the past 24 hrs:   BP Temp Temp src Pulse Resp SpO2   03/23/22 2200 (!) 148/66 -- -- 93 -- 93 %   03/23/22 1952 (!) 178/81 (!) 100.9  F (38.3  C) Oral 107 16 97 %         PHYSICAL EXAM:   Physical Exam  Vitals and nursing note reviewed.   Constitutional:       General: He is not in acute distress.     Appearance: Normal appearance. He is not toxic-appearing or diaphoretic.   HENT:      Head: Normocephalic.      Mouth/Throat:      Mouth: Mucous membranes are moist.      Pharynx: Oropharynx is clear.   Eyes:      Conjunctiva/sclera: Conjunctivae normal.      Pupils: Pupils are equal, round, and reactive to light.   Cardiovascular:      Rate and Rhythm: Normal rate and regular rhythm.      Heart sounds: Normal heart sounds.   Pulmonary:      Effort: Pulmonary effort is normal.      Breath sounds: Normal breath sounds.   Abdominal:      General: Abdomen is flat. Bowel sounds are normal. There is distension (over suprapubic bladder).      Palpations: Abdomen is soft.      Tenderness: There is no abdominal tenderness. There is no right CVA tenderness, left CVA tenderness, guarding or rebound.   Musculoskeletal:      Cervical back: Normal range of motion and neck supple.      Comments: Dressing removed from spinal incision with a moderate amount of bloody serosanguinous drainage. No purulent drainage or significant surrounding erythema.  No edema.  No wound dehiscence.     5 out of 5 strength bilaterally with hip flexion, knee flexion and extension against resistance.  5 out of 5 strength with plantar flexion bilaterally though 4 out of 5 weakness with right dorsiflexion (baseline per patient).  Sensory exam intact.  1+ Achilles and patellar reflexes bilaterally.  Perineal and scrotal  sensation intact.   Skin:     General: Skin is warm and dry.      Capillary Refill: Capillary refill takes less than 2 seconds.   Neurological:      Mental Status: He is alert.             Results     LAB:  All pertinent labs reviewed and interpreted  Labs Ordered and Resulted from Time of ED Arrival to Time of ED Departure   CRP INFLAMMATION - Abnormal       Result Value    CRP 10.9 (*)    CBC WITH PLATELETS - Abnormal    WBC Count 12.1 (*)     RBC Count 4.06 (*)     Hemoglobin 13.6      Hematocrit 40.3      MCV 99      MCH 33.5 (*)     MCHC 33.7      RDW 13.7      Platelet Count 152     BASIC METABOLIC PANEL - Abnormal    Sodium 136      Potassium 3.9      Chloride 103      Carbon Dioxide (CO2) 21 (*)     Anion Gap 12      Urea Nitrogen 16      Creatinine 1.15      Calcium 9.1      Glucose 162 (*)     GFR Estimate 71     UA MACROSCOPIC WITH REFLEX TO MICRO AND CULTURE - Abnormal    Color Urine Yellow      Appearance Urine Clear      Glucose Urine Negative      Bilirubin Urine Negative      Ketones Urine Negative      Specific Gravity Urine 1.027      Blood Urine Negative      pH Urine 5.0      Protein Albumin Urine 20  (*)     Urobilinogen Urine <2.0      Nitrite Urine Negative      Leukocyte Esterase Urine Negative      Mucus Urine Present (*)     RBC Urine 8 (*)     WBC Urine 3      Hyaline Casts Urine 1     INFLUENZA A/B & SARS-COV2 PCR MULTIPLEX - Normal    Influenza A PCR Negative      Influenza B PCR Negative      SARS CoV2 PCR Negative         RADIOLOGY:  No orders to display         Margot Swenson PA-C   Emergency Medicine   [facility] EMERGENCY DEPARTMENT       Margot Swenson PA-C  03/23/22 6112

## 2022-03-24 NOTE — ED PROVIDER NOTES
Emergency Department Midlevel Supervisory Note     I personally saw the patient and performed a substantive portion of the visit including all aspects of the medical decision making.    ED Course:  8:13 PM Mya Swenson PA-C staffed patient with me. I agree with their assessment and plan of management, and I will see the patient.  8:16 PM I met with the patient to introduce myself, gather additional history, perform my initial exam, and discuss the plan.     Brief HPI:     Raghavendra Kiser is a 62 year old male with a past medical history of s/p bilateral L3-L4 hemilaminectomies, medial facetectomies, foraminotomies and microdiscectomies with small durotomy on 3/21/2022 with Dr Duque who presents for evaluation of urinary retention.     I, Raghavendra Lopes, am serving as a scribe to document services personally performed by Cheli Lomas MD, based on my observations and the provider's statements to me. I, Cheli Lomas MD, attest that Raghavendra Lopes was acting in a scribe capacity, has observed my performance of the services and has documented them in accordance with my direction.    Brief Physical Exam:  Constitutional:  Alert, in no acute distress  EYES: Conjunctivae clear  HENT:  Atraumatic, normocephalic  Respiratory:  Respirations even, unlabored, in no acute respiratory distress  Cardiovascular:  Regular rate and rhythm, good peripheral perfusion  GI: Suprapubic tenderness present. Soft, nondistended, no palpable masses, no rebound, no guarding   Back: Lumbar incision present without significant erythema. No drainage.   Musculoskeletal:  No edema. No cyanosis. Range of motion major extremities intact.    Integument: Warm, Dry, No erythema, No rash.   Neurologic:  Alert & oriented, no focal deficits noted. Sensation of bilateral lower extremities intact. Strength 5/5 in lower extremities  Psych: Normal mood and affect     MDM:  ED Course as of 03/23/22 2141   Wed Mar 23, 2022   2039 63 y/o M s/p bilateral  L3-L4 hemilaminectomies, medial facetectomies, foraminotomies and microdiscectomies with small durotomy on 3/21/2022 with Dr Duque who presents with urinary retention. Patient was just discharged from hospital. Had barragan during admission, hasn't been able to urinate for last 8 hours so was told to come back. He is also constipated. He was found to be febrile here to 100.9. He has no focal infectious symptoms and is nontoxic appearing on exam with otherwise reassuring vitals, lower suspicion for sepsis. Incision looks good and no new neurologic deficits, no saddle anesthesia, lower suspicion for epidural abscess or other spinal infectious process but plan for labs and discussion with his neurosurgeon for further recommendations. Bladder scan here shows 350 ml, barragan will be placed.    2130 Labs show WBC 12.1 (near level at discharge). BMP reassuring. UA is not infecitous appearing. CRP is up at 10.9. TASNEEM has page out to neurosurgery.   2138 Neurosurgery did not recommend any imaging. And plan for discharge with catheter in place, will also advance bowel regimen. Plan for follow up with neurosurgery in couple of days.       1. Urinary retention        Labs and Imaging:  Results for orders placed or performed during the hospital encounter of 03/23/22   CRP inflammation   Result Value Ref Range    CRP 10.9 (H) 0.0-<0.8 mg/dL   CBC (+ platelets, no diff)   Result Value Ref Range    WBC Count 12.1 (H) 4.0 - 11.0 10e3/uL    RBC Count 4.06 (L) 4.40 - 5.90 10e6/uL    Hemoglobin 13.6 13.3 - 17.7 g/dL    Hematocrit 40.3 40.0 - 53.0 %    MCV 99 78 - 100 fL    MCH 33.5 (H) 26.5 - 33.0 pg    MCHC 33.7 31.5 - 36.5 g/dL    RDW 13.7 10.0 - 15.0 %    Platelet Count 152 150 - 450 10e3/uL   Basic metabolic panel   Result Value Ref Range    Sodium 136 136 - 145 mmol/L    Potassium 3.9 3.5 - 5.0 mmol/L    Chloride 103 98 - 107 mmol/L    Carbon Dioxide (CO2) 21 (L) 22 - 31 mmol/L    Anion Gap 12 5 - 18 mmol/L    Urea Nitrogen 16 8 - 22  mg/dL    Creatinine 1.15 0.70 - 1.30 mg/dL    Calcium 9.1 8.5 - 10.5 mg/dL    Glucose 162 (H) 70 - 125 mg/dL    GFR Estimate 71 >60 mL/min/1.73m2   UA reflex to Microscopic and Culture    Specimen: Urine, Tobias Catheter   Result Value Ref Range    Color Urine Yellow Colorless, Straw, Light Yellow, Yellow    Appearance Urine Clear Clear    Glucose Urine Negative Negative mg/dL    Bilirubin Urine Negative Negative    Ketones Urine Negative Negative mg/dL    Specific Gravity Urine 1.027 1.001 - 1.030    Blood Urine Negative Negative    pH Urine 5.0 5.0 - 7.0    Protein Albumin Urine 20  (A) Negative mg/dL    Urobilinogen Urine <2.0 <2.0 mg/dL    Nitrite Urine Negative Negative    Leukocyte Esterase Urine Negative Negative    Mucus Urine Present (A) None Seen /LPF    RBC Urine 8 (H) <=2 /HPF    WBC Urine 3 <=5 /HPF    Hyaline Casts Urine 1 <=2 /LPF     I have reviewed the relevant laboratory and radiology studies    Procedures:  I was present for the key portions of this procedure: none    Cheli Lomas MD  [facility] EMERGENCY DEPARTMENT  [address] [id number] [phone]       Cheli Lomas MD  03/23/22 4731

## 2022-03-25 ENCOUNTER — TELEPHONE (OUTPATIENT)
Dept: NEUROSURGERY | Facility: CLINIC | Age: 62
End: 2022-03-25
Payer: OTHER GOVERNMENT

## 2022-03-25 DIAGNOSIS — M54.16 LUMBAR RADICULOPATHY: Primary | ICD-10-CM

## 2022-03-25 RX ORDER — SENNA AND DOCUSATE SODIUM 50; 8.6 MG/1; MG/1
1 TABLET, FILM COATED ORAL 2 TIMES DAILY
Qty: 10 TABLET | Refills: 0 | Status: SHIPPED | OUTPATIENT
Start: 2022-03-25 | End: 2022-04-11

## 2022-03-25 RX ORDER — LIDOCAINE 4 G/G
1 PATCH TOPICAL EVERY 24 HOURS
Qty: 10 PATCH | Refills: 0 | Status: SHIPPED | OUTPATIENT
Start: 2022-03-25 | End: 2022-04-11

## 2022-03-25 NOTE — TELEPHONE ENCOUNTER
Patient's wife, Loren called and patient is not able to use the restroom and has tried over the counter medication and has also tried two enema's. All that came out was the liquid from the enema. Patient is still in a lot of pain and would like to know what else to do.

## 2022-03-25 NOTE — TELEPHONE ENCOUNTER
Called and spoke with Mauri who c/o ongoing constipation despite using fleet enema and drinking MOM and prune juice.   eRx Lidocaine patches, recommended IcyHot, Biofreeze.  eRx Senna.  Kate Shelton RN, CNRN

## 2022-03-25 NOTE — TELEPHONE ENCOUNTER
"Called Mauri to see how he is doing today - \"I feel better\", he said. He denied fever/chills, NS. Reported no concerns about his wound appearance. Noted no red flag symptoms.  Follow up 04/04/2022.  Kate Shelton RN, CNRN    "

## 2022-03-28 ENCOUNTER — ANESTHESIA EVENT (OUTPATIENT)
Dept: SURGERY | Facility: HOSPITAL | Age: 62
DRG: 856 | End: 2022-03-28
Payer: OTHER GOVERNMENT

## 2022-03-28 ENCOUNTER — OFFICE VISIT (OUTPATIENT)
Dept: NEUROSURGERY | Facility: CLINIC | Age: 62
End: 2022-03-28
Payer: OTHER GOVERNMENT

## 2022-03-28 ENCOUNTER — MEDICAL CORRESPONDENCE (OUTPATIENT)
Dept: RADIOLOGY | Facility: CLINIC | Age: 62
End: 2022-03-28

## 2022-03-28 ENCOUNTER — HOSPITAL ENCOUNTER (INPATIENT)
Facility: HOSPITAL | Age: 62
LOS: 5 days | Discharge: HOME HEALTH | DRG: 856 | End: 2022-04-02
Attending: HOSPITALIST | Admitting: SURGERY
Payer: OTHER GOVERNMENT

## 2022-03-28 VITALS
HEART RATE: 87 BPM | DIASTOLIC BLOOD PRESSURE: 75 MMHG | BODY MASS INDEX: 31.42 KG/M2 | WEIGHT: 232 LBS | HEIGHT: 72 IN | OXYGEN SATURATION: 98 % | SYSTOLIC BLOOD PRESSURE: 131 MMHG

## 2022-03-28 DIAGNOSIS — M54.16 LUMBAR RADICULOPATHY: ICD-10-CM

## 2022-03-28 DIAGNOSIS — Z86.73 HISTORY OF COMPLETED STROKE: ICD-10-CM

## 2022-03-28 DIAGNOSIS — G06.2 EPIDURAL ABSCESS: Primary | ICD-10-CM

## 2022-03-28 DIAGNOSIS — M48.061 SPINAL STENOSIS OF LUMBAR REGION, UNSPECIFIED WHETHER NEUROGENIC CLAUDICATION PRESENT: ICD-10-CM

## 2022-03-28 DIAGNOSIS — M54.42 ACUTE LOW BACK PAIN WITH LEFT-SIDED SCIATICA, UNSPECIFIED BACK PAIN LATERALITY: Primary | ICD-10-CM

## 2022-03-28 DIAGNOSIS — M79.89 LEG SWELLING: ICD-10-CM

## 2022-03-28 DIAGNOSIS — I63.512 ACUTE ISCHEMIC LEFT MCA STROKE (H): ICD-10-CM

## 2022-03-28 PROBLEM — R33.9 URINE RETENTION: Status: ACTIVE | Noted: 2022-03-28

## 2022-03-28 PROBLEM — G93.40 ACUTE ENCEPHALOPATHY: Status: RESOLVED | Noted: 2020-12-25 | Resolved: 2022-03-28

## 2022-03-28 PROBLEM — K59.03 THERAPEUTIC OPIOID INDUCED CONSTIPATION: Status: ACTIVE | Noted: 2022-03-28

## 2022-03-28 PROBLEM — T40.2X5A THERAPEUTIC OPIOID INDUCED CONSTIPATION: Status: ACTIVE | Noted: 2022-03-28

## 2022-03-28 LAB
ANION GAP SERPL CALCULATED.3IONS-SCNC: 12 MMOL/L (ref 5–18)
APTT PPP: 25 SECONDS (ref 22–38)
BASOPHILS # BLD AUTO: 0 10E3/UL (ref 0–0.2)
BASOPHILS NFR BLD AUTO: 0 %
BUN SERPL-MCNC: 17 MG/DL (ref 8–22)
C REACTIVE PROTEIN LHE: 4.3 MG/DL (ref 0–0.8)
CALCIUM SERPL-MCNC: 9.1 MG/DL (ref 8.5–10.5)
CHLORIDE BLD-SCNC: 100 MMOL/L (ref 98–107)
CO2 SERPL-SCNC: 28 MMOL/L (ref 22–31)
CREAT SERPL-MCNC: 0.95 MG/DL (ref 0.7–1.3)
EOSINOPHIL # BLD AUTO: 0.1 10E3/UL (ref 0–0.7)
EOSINOPHIL NFR BLD AUTO: 2 %
ERYTHROCYTE [DISTWIDTH] IN BLOOD BY AUTOMATED COUNT: 13.5 % (ref 10–15)
ERYTHROCYTE [SEDIMENTATION RATE] IN BLOOD BY WESTERGREN METHOD: 73 MM/HR (ref 0–15)
GFR SERPL CREATININE-BSD FRML MDRD: 89 ML/MIN/1.73M2
GLUCOSE BLD-MCNC: 113 MG/DL (ref 70–125)
GLUCOSE BLDC GLUCOMTR-MCNC: 162 MG/DL (ref 70–99)
HCT VFR BLD AUTO: 35.5 % (ref 40–53)
HGB BLD-MCNC: 11.9 G/DL (ref 13.3–17.7)
IMM GRANULOCYTES # BLD: 0.1 10E3/UL
IMM GRANULOCYTES NFR BLD: 1 %
INR PPP: 1.01 (ref 0.85–1.15)
LYMPHOCYTES # BLD AUTO: 1.2 10E3/UL (ref 0.8–5.3)
LYMPHOCYTES NFR BLD AUTO: 19 %
MCH RBC QN AUTO: 33.6 PG (ref 26.5–33)
MCHC RBC AUTO-ENTMCNC: 33.5 G/DL (ref 31.5–36.5)
MCV RBC AUTO: 100 FL (ref 78–100)
MONOCYTES # BLD AUTO: 0.6 10E3/UL (ref 0–1.3)
MONOCYTES NFR BLD AUTO: 9 %
NEUTROPHILS # BLD AUTO: 4.3 10E3/UL (ref 1.6–8.3)
NEUTROPHILS NFR BLD AUTO: 69 %
NRBC # BLD AUTO: 0 10E3/UL
NRBC BLD AUTO-RTO: 0 /100
PLATELET # BLD AUTO: 245 10E3/UL (ref 150–450)
POTASSIUM BLD-SCNC: 3.5 MMOL/L (ref 3.5–5)
RBC # BLD AUTO: 3.54 10E6/UL (ref 4.4–5.9)
SARS-COV-2 RNA RESP QL NAA+PROBE: NEGATIVE
SODIUM SERPL-SCNC: 140 MMOL/L (ref 136–145)
WBC # BLD AUTO: 6.2 10E3/UL (ref 4–11)

## 2022-03-28 PROCEDURE — 250N000013 HC RX MED GY IP 250 OP 250 PS 637: Performed by: HOSPITALIST

## 2022-03-28 PROCEDURE — 87149 DNA/RNA DIRECT PROBE: CPT | Performed by: HOSPITALIST

## 2022-03-28 PROCEDURE — 85730 THROMBOPLASTIN TIME PARTIAL: CPT | Performed by: NURSE PRACTITIONER

## 2022-03-28 PROCEDURE — 99024 POSTOP FOLLOW-UP VISIT: CPT | Performed by: NURSE PRACTITIONER

## 2022-03-28 PROCEDURE — 85025 COMPLETE CBC W/AUTO DIFF WBC: CPT | Performed by: HOSPITALIST

## 2022-03-28 PROCEDURE — 36415 COLL VENOUS BLD VENIPUNCTURE: CPT | Performed by: HOSPITALIST

## 2022-03-28 PROCEDURE — 80048 BASIC METABOLIC PNL TOTAL CA: CPT | Performed by: HOSPITALIST

## 2022-03-28 PROCEDURE — 86140 C-REACTIVE PROTEIN: CPT | Performed by: HOSPITALIST

## 2022-03-28 PROCEDURE — 85652 RBC SED RATE AUTOMATED: CPT | Performed by: NURSE PRACTITIONER

## 2022-03-28 PROCEDURE — 120N000001 HC R&B MED SURG/OB

## 2022-03-28 PROCEDURE — 85610 PROTHROMBIN TIME: CPT | Performed by: HOSPITALIST

## 2022-03-28 PROCEDURE — 87040 BLOOD CULTURE FOR BACTERIA: CPT | Performed by: HOSPITALIST

## 2022-03-28 PROCEDURE — 87635 SARS-COV-2 COVID-19 AMP PRB: CPT | Performed by: HOSPITALIST

## 2022-03-28 PROCEDURE — 99223 1ST HOSP IP/OBS HIGH 75: CPT | Performed by: HOSPITALIST

## 2022-03-28 RX ORDER — NICOTINE POLACRILEX 4 MG
15-30 LOZENGE BUCCAL
Status: DISCONTINUED | OUTPATIENT
Start: 2022-03-28 | End: 2022-04-02 | Stop reason: HOSPADM

## 2022-03-28 RX ORDER — METHYLPREDNISOLONE 4 MG
TABLET, DOSE PACK ORAL
Qty: 21 TABLET | Refills: 0 | Status: ON HOLD | OUTPATIENT
Start: 2022-03-28 | End: 2022-03-28

## 2022-03-28 RX ORDER — ACETAMINOPHEN 325 MG/1
650 TABLET ORAL EVERY 6 HOURS PRN
Status: DISCONTINUED | OUTPATIENT
Start: 2022-03-28 | End: 2022-04-02 | Stop reason: HOSPADM

## 2022-03-28 RX ORDER — GABAPENTIN 300 MG/1
300 CAPSULE ORAL 3 TIMES DAILY
Status: DISCONTINUED | OUTPATIENT
Start: 2022-03-28 | End: 2022-04-02 | Stop reason: HOSPADM

## 2022-03-28 RX ORDER — AMOXICILLIN 250 MG
1 CAPSULE ORAL 2 TIMES DAILY
Status: DISCONTINUED | OUTPATIENT
Start: 2022-03-28 | End: 2022-03-29

## 2022-03-28 RX ORDER — LIDOCAINE 40 MG/G
CREAM TOPICAL
Status: DISCONTINUED | OUTPATIENT
Start: 2022-03-28 | End: 2022-04-02 | Stop reason: HOSPADM

## 2022-03-28 RX ORDER — GABAPENTIN 300 MG/1
300 CAPSULE ORAL 3 TIMES DAILY
COMMUNITY
End: 2022-05-18

## 2022-03-28 RX ORDER — ASPIRIN 81 MG/1
81 TABLET ORAL DAILY
Status: ON HOLD | COMMUNITY
End: 2022-04-01

## 2022-03-28 RX ORDER — DEXTROSE MONOHYDRATE 25 G/50ML
25-50 INJECTION, SOLUTION INTRAVENOUS
Status: DISCONTINUED | OUTPATIENT
Start: 2022-03-28 | End: 2022-04-02 | Stop reason: HOSPADM

## 2022-03-28 RX ORDER — DIVALPROEX SODIUM 500 MG/1
500 TABLET, EXTENDED RELEASE ORAL AT BEDTIME
Status: DISCONTINUED | OUTPATIENT
Start: 2022-03-28 | End: 2022-04-02 | Stop reason: HOSPADM

## 2022-03-28 RX ORDER — NALOXONE HYDROCHLORIDE 0.4 MG/ML
0.2 INJECTION, SOLUTION INTRAMUSCULAR; INTRAVENOUS; SUBCUTANEOUS
Status: DISCONTINUED | OUTPATIENT
Start: 2022-03-28 | End: 2022-04-02 | Stop reason: HOSPADM

## 2022-03-28 RX ORDER — FAMOTIDINE 20 MG/1
20 TABLET, FILM COATED ORAL 2 TIMES DAILY PRN
Status: DISCONTINUED | OUTPATIENT
Start: 2022-03-28 | End: 2022-04-02 | Stop reason: HOSPADM

## 2022-03-28 RX ORDER — ONDANSETRON 4 MG/1
4 TABLET, ORALLY DISINTEGRATING ORAL EVERY 6 HOURS PRN
Status: DISCONTINUED | OUTPATIENT
Start: 2022-03-28 | End: 2022-04-02 | Stop reason: HOSPADM

## 2022-03-28 RX ORDER — ONDANSETRON 2 MG/ML
4 INJECTION INTRAMUSCULAR; INTRAVENOUS EVERY 6 HOURS PRN
Status: DISCONTINUED | OUTPATIENT
Start: 2022-03-28 | End: 2022-04-02 | Stop reason: HOSPADM

## 2022-03-28 RX ORDER — ALLOPURINOL 300 MG/1
450 TABLET ORAL
COMMUNITY
End: 2022-09-15

## 2022-03-28 RX ORDER — BISACODYL 10 MG
10 SUPPOSITORY, RECTAL RECTAL DAILY PRN
Status: DISCONTINUED | OUTPATIENT
Start: 2022-03-28 | End: 2022-04-02 | Stop reason: HOSPADM

## 2022-03-28 RX ORDER — AMLODIPINE BESYLATE 5 MG/1
5 TABLET ORAL DAILY
Status: DISCONTINUED | OUTPATIENT
Start: 2022-03-29 | End: 2022-04-02 | Stop reason: HOSPADM

## 2022-03-28 RX ORDER — CYCLOBENZAPRINE HCL 10 MG
10 TABLET ORAL 3 TIMES DAILY PRN
COMMUNITY
End: 2022-06-22

## 2022-03-28 RX ORDER — ROSUVASTATIN CALCIUM 5 MG/1
5 TABLET, COATED ORAL DAILY
Status: DISCONTINUED | OUTPATIENT
Start: 2022-03-29 | End: 2022-04-02 | Stop reason: HOSPADM

## 2022-03-28 RX ORDER — LIDOCAINE 4 G/G
1 PATCH TOPICAL DAILY PRN
Status: DISCONTINUED | OUTPATIENT
Start: 2022-03-28 | End: 2022-04-02 | Stop reason: HOSPADM

## 2022-03-28 RX ORDER — CALCIUM CARBONATE 500 MG/1
1000 TABLET, CHEWABLE ORAL 4 TIMES DAILY PRN
Status: DISCONTINUED | OUTPATIENT
Start: 2022-03-28 | End: 2022-04-02 | Stop reason: HOSPADM

## 2022-03-28 RX ORDER — CYCLOBENZAPRINE HCL 10 MG
10 TABLET ORAL 3 TIMES DAILY PRN
Status: DISCONTINUED | OUTPATIENT
Start: 2022-03-28 | End: 2022-03-31

## 2022-03-28 RX ORDER — NALOXONE HYDROCHLORIDE 0.4 MG/ML
0.4 INJECTION, SOLUTION INTRAMUSCULAR; INTRAVENOUS; SUBCUTANEOUS
Status: DISCONTINUED | OUTPATIENT
Start: 2022-03-28 | End: 2022-04-02 | Stop reason: HOSPADM

## 2022-03-28 RX ORDER — POLYETHYLENE GLYCOL 3350 17 G/17G
17 POWDER, FOR SOLUTION ORAL DAILY
Status: DISCONTINUED | OUTPATIENT
Start: 2022-03-28 | End: 2022-03-29

## 2022-03-28 RX ORDER — SODIUM CHLORIDE 9 MG/ML
INJECTION, SOLUTION INTRAVENOUS CONTINUOUS
Status: DISCONTINUED | OUTPATIENT
Start: 2022-03-28 | End: 2022-03-29

## 2022-03-28 RX ORDER — OXYCODONE HYDROCHLORIDE 5 MG/1
5-10 TABLET ORAL EVERY 4 HOURS PRN
Status: DISCONTINUED | OUTPATIENT
Start: 2022-03-28 | End: 2022-03-29

## 2022-03-28 RX ORDER — ALLOPURINOL 300 MG/1
300 TABLET ORAL DAILY
Status: DISCONTINUED | OUTPATIENT
Start: 2022-03-29 | End: 2022-04-01

## 2022-03-28 RX ORDER — LISINOPRIL 20 MG/1
20 TABLET ORAL 2 TIMES DAILY
Status: DISCONTINUED | OUTPATIENT
Start: 2022-03-28 | End: 2022-04-02 | Stop reason: HOSPADM

## 2022-03-28 RX ORDER — ACETAMINOPHEN 650 MG/1
650 SUPPOSITORY RECTAL EVERY 6 HOURS PRN
Status: DISCONTINUED | OUTPATIENT
Start: 2022-03-28 | End: 2022-04-02 | Stop reason: HOSPADM

## 2022-03-28 RX ADMIN — LISINOPRIL 20 MG: 20 TABLET ORAL at 22:18

## 2022-03-28 RX ADMIN — GABAPENTIN 300 MG: 300 CAPSULE ORAL at 22:18

## 2022-03-28 RX ADMIN — DOCUSATE SODIUM 50 MG AND SENNOSIDES 8.6 MG 1 TABLET: 8.6; 5 TABLET, FILM COATED ORAL at 22:18

## 2022-03-28 RX ADMIN — DIVALPROEX SODIUM 500 MG: 500 TABLET, EXTENDED RELEASE ORAL at 22:18

## 2022-03-28 RX ADMIN — POLYETHYLENE GLYCOL 3350 17 G: 17 POWDER, FOR SOLUTION ORAL at 20:34

## 2022-03-28 ASSESSMENT — ACTIVITIES OF DAILY LIVING (ADL)
CONCENTRATING,_REMEMBERING_OR_MAKING_DECISIONS_DIFFICULTY: NO
DOING_ERRANDS_INDEPENDENTLY_DIFFICULTY: NO
ADLS_ACUITY_SCORE: 4
ADLS_ACUITY_SCORE: 10
DIFFICULTY_COMMUNICATING: NO
WALKING_OR_CLIMBING_STAIRS_DIFFICULTY: NO
FALL_HISTORY_WITHIN_LAST_SIX_MONTHS: NO
TOILETING_ISSUES: NO
ADLS_ACUITY_SCORE: 4
WEAR_GLASSES_OR_BLIND: YES
HEARING_DIFFICULTY_OR_DEAF: NO
DRESSING/BATHING_DIFFICULTY: NO
DIFFICULTY_EATING/SWALLOWING: NO
CHANGE_IN_FUNCTIONAL_STATUS_SINCE_ONSET_OF_CURRENT_ILLNESS/INJURY: NO
ADLS_ACUITY_SCORE: 4

## 2022-03-28 ASSESSMENT — COLUMBIA-SUICIDE SEVERITY RATING SCALE - C-SSRS
4. HAVE YOU HAD THESE THOUGHTS AND HAD SOME INTENTION OF ACTING ON THEM?: NO
2. HAVE YOU ACTUALLY HAD ANY THOUGHTS OF KILLING YOURSELF IN THE PAST MONTH?: NO
5. HAVE YOU STARTED TO WORK OUT OR WORKED OUT THE DETAILS OF HOW TO KILL YOURSELF? DO YOU INTEND TO CARRY OUT THIS PLAN?: NO
1. IN THE PAST MONTH, HAVE YOU WISHED YOU WERE DEAD OR WISHED YOU COULD GO TO SLEEP AND NOT WAKE UP?: NO
3. HAVE YOU BEEN THINKING ABOUT HOW YOU MIGHT KILL YOURSELF?: NO
6. HAVE YOU EVER DONE ANYTHING, STARTED TO DO ANYTHING, OR PREPARED TO DO ANYTHING TO END YOUR LIFE?: NO

## 2022-03-28 NOTE — PROGRESS NOTES
ADDENDUM 1600: MRI results discussed with Dr Duque and Mauri. Possible CSF leak vs displacement of duraseal vs potential infection. Recommend exploration in the OR 3/29/2022. Arrangements being made for direct admit, lab work up. NPO after midnight. Confirmed patient has not resumed his aspirin or plavix. Will need COVID test tonight. US leg negative for DVT. Report given to Dr Tidwell, hospitalist.     Neurosurgery Follow UP  March 28, 2022    A/P: Mauri Kiser is a 62 year old S/P Bilateral lumbar 3- lumbar 4 hemilaminectomies, medial facetectomies, foraminotomies and microdiscectomies with small durotomy with Dr Duque 3/21/2022. Pre-op had more right leg symptoms than left.     Mauri comes in today with many concerns.   1. Urinary retention. Tobias placed post discharge in the ED last Wednesday. Urology appt next week seems appropriate. Primary care provider can also manage.   2. Constipation  Has had what he calls diarrhea following medications to treat constipation but no solid BM since the Sunday before surgery. He is passing flatus. He has tried a suppository and an enema. He is taking one over the counter med once daily. MOM and prune juice have not helped. He denies nausea. He continues to able to take oral food, fluid. He has no appetite. Drinking some water.   PLAN: magnesium citrate today. Continue over the counter medications for constipation. I gave patient a list of options and recommend doubling up. Call us if bowel function does not return by Wednesday. Seek medical attention if nausea and vomiting occur.   3. Pain control - left hip, left leg painful 1-2 days. Back is tolerably painful. Left leg numb with standing. Right baby toe numbness same as pre-op. Pain is limiting his mobility which has been minimal since he has been home. He has not been taking oxycodone - with concern its exacerbating his constipation. He does not think tylenol helps so he has not been taking it. Unable to take NSAIDS with  PLAVIX. Taking methocarbamol three times a day. Home dose gabapentin three times a day. Ice.   PLAN: hold off on oxycodone while we try to improve constipation. Take tylenol and methocarbamol regular basis. Continue ice. Will order medrol dose pack. Monitor blood sugars. MRI lumbar spine today to assure no cord compression. Hold off resuming plavix until we see MRI.   4. Right foot/leg swelling. Potentially related to immobility. Patient feels he has experienced this in the past. Resolved on its own. No history of DVT. Not on any diuretics. No calf pain. On plavix at baseline for history of stroke.   PLAN: Rule out DVT with US today   5. Wound   Oozing continues daily. Firm fluid collection surrounds incision. Seroma? No sign of infection. Sutures well approximated.     Discussed clinical exam and plan of care with Dr Duque.   I will call patient with test results once completed.     HPI: Raghavendra Kiser is a 62-year-old male who presents with bilateral lower extremity pain, numbness tingling, weakness.  Lumbar x-ray shows retrolisthesis of lumbar 1 on 2 and lumbar 2 3 without any overt instability.  MRI of his lumbar spine shows a broad-based disc herniation at the lumbar 3-4 level with a superimposed left paracentral disc protrusion resulting in severe spinal canal stenosis with left greater than right right lateral recess stenosis.  He also has mild to moderate canal narrowing at the lumbar 4-5 level as well as mild right lateral recess stenosis with foraminal narrowing at the lumbar 5-sacral 1 level.  Lastly at the T11-T12 level he has mild to moderate central canal narrowing with mild cord flattening.  Patient is on Plavix.      Physical Exam  /75   Pulse 87   Ht 6' (1.829 m)   Wt 232 lb (105.2 kg)   SpO2 98%   BMI 31.46 kg/m      General: looks uncomfortable.     Motor: Normal bulk and tone     Strength:   No focal weakness. Limited movement bilateral LE, left worse than right due to pain,  particularly knee, hip flexion/extension. DF/PF intact.     Sensation: normal sensation to touch, temperature LE    Abdomen: slightly distended, firm right greater than left. Non tender.     Reflexes: no clonus    Coordination: slow steady gait     Incision: edematous. Firm. Sutures well approximated. Oozing scant to small amount serosanguinous fluid.     Imaging: none at this time     AGUSTINA Marshall  [facility]  O: [phone]

## 2022-03-28 NOTE — LETTER
3/28/2022         RE: Raghavendra Kiser  [address]  [facility] [address]        Dear Colleague,    Thank you for referring your patient, Raghavendra Kiser, to the [facility]. Please see a copy of my visit note below.    ADDENDUM 1600: MRI results discussed with Dr Duque and Mauri. Possible CSF leak vs displacement of duraseal vs potential infection. Recommend exploration in the OR 3/29/2022. Arrangements being made for direct admit, lab work up. NPO after midnight. Confirmed patient has not resumed his aspirin or plavix. Will need COVID test tonight. US leg negative for DVT.     Neurosurgery Follow UP  March 28, 2022    A/P: Mauri Kiser is a 62 year old S/P Bilateral lumbar 3- lumbar 4 hemilaminectomies, medial facetectomies, foraminotomies and microdiscectomies with small durotomy with Dr Duque 3/21/2022. Pre-op had more right leg symptoms than left.     Mauri comes in today with many concerns.   1. Urinary retention. Tobias placed post discharge in the ED last Wednesday. Urology appt next week seems appropriate. Primary care provider can also manage.   2. Constipation  Has had what he calls diarrhea following medications to treat constipation but no solid BM since the Sunday before surgery. He is passing flatus. He has tried a suppository and an enema. He is taking one over the counter med once daily. MOM and prune juice have not helped. He denies nausea. He continues to able to take oral food, fluid. He has no appetite. Drinking some water.   PLAN: magnesium citrate today. Continue over the counter medications for constipation. I gave patient a list of options and recommend doubling up. Call us if bowel function does not return by Wednesday. Seek medical attention if nausea and vomiting occur.   3. Pain control - left hip, left leg painful 1-2 days. Back is tolerably painful. Left leg numb with standing. Right baby toe numbness same as pre-op. Pain is limiting  his mobility which has been minimal since he has been home. He has not been taking oxycodone - with concern its exacerbating his constipation. He does not think tylenol helps so he has not been taking it. Unable to take NSAIDS with PLAVIX. Taking methocarbamol three times a day. Home dose gabapentin three times a day. Ice.   PLAN: hold off on oxycodone while we try to improve constipation. Take tylenol and methocarbamol regular basis. Continue ice. Will order medrol dose pack. Monitor blood sugars. MRI lumbar spine today to assure no cord compression. Hold off resuming plavix until we see MRI.   4. Right foot/leg swelling. Potentially related to immobility. Patient feels he has experienced this in the past. Resolved on its own. No history of DVT. Not on any diuretics. No calf pain. On plavix at baseline for history of stroke.   PLAN: Rule out DVT with US today   5. Wound   Oozing continues daily. Firm fluid collection surrounds incision. Seroma? No sign of infection. Sutures well approximated.     Discussed clinical exam and plan of care with Dr Duque.   I will call patient with test results once completed.     HPI: Raghavendra Kiser is a 62-year-old male who presents with bilateral lower extremity pain, numbness tingling, weakness.  Lumbar x-ray shows retrolisthesis of lumbar 1 on 2 and lumbar 2 3 without any overt instability.  MRI of his lumbar spine shows a broad-based disc herniation at the lumbar 3-4 level with a superimposed left paracentral disc protrusion resulting in severe spinal canal stenosis with left greater than right right lateral recess stenosis.  He also has mild to moderate canal narrowing at the lumbar 4-5 level as well as mild right lateral recess stenosis with foraminal narrowing at the lumbar 5-sacral 1 level.  Lastly at the T11-T12 level he has mild to moderate central canal narrowing with mild cord flattening.  Patient is on Plavix.      Physical Exam  /75   Pulse 87   Ht 6'  (1.829 m)   Wt 232 lb (105.2 kg)   SpO2 98%   BMI 31.46 kg/m      General: looks uncomfortable.     Motor: Normal bulk and tone     Strength:   No focal weakness. Limited movement bilateral LE, left worse than right due to pain, particularly knee, hip flexion/extension. DF/PF intact.     Sensation: normal sensation to touch, temperature LE    Abdomen: slightly distended, firm right greater than left. Non tender.     Reflexes: no clonus    Coordination: slow steady gait     Incision: edematous. Firm. Sutures well approximated. Oozing scant to small amount serosanguinous fluid.     Imaging: none at this time     KALEB Marshall-CNP  [facility]  O: [phone]            Again, thank you for allowing me to participate in the care of your patient.        Sincerely,        MORGAN Scott CNP

## 2022-03-28 NOTE — PATIENT INSTRUCTIONS
Constipation - If your bowel do not return to normal function by Wednesday, call the office or your primary care. Watch for signs of obstruction (nausea, vomiting, intolerance to food, fluid)   Try magnesium citrate today   Daily or twice daily senna, colace, miralax or Citrucel    Suppository as needed - can be daily  Enema as needed- can be daily   Drink plenty of water every day     Pain control   Tylenol 1000 mg three times per day   Muscle relaxer - continue with 500 mg three times per day - can take 4 times per day   Oxycodone once we get your constipation under control. We may consider steroids once MRI is completed   Ice or heat     Try to stay as mobile as you can. Mobility will help with pain, constipation, leg swelling, recovery in general.     PLAN:   MRI for wound swelling, leg pain.   US to rule out DVT

## 2022-03-29 ENCOUNTER — ANESTHESIA (OUTPATIENT)
Dept: SURGERY | Facility: HOSPITAL | Age: 62
DRG: 856 | End: 2022-03-29
Payer: OTHER GOVERNMENT

## 2022-03-29 ENCOUNTER — APPOINTMENT (OUTPATIENT)
Dept: RADIOLOGY | Facility: HOSPITAL | Age: 62
DRG: 856 | End: 2022-03-29
Attending: PHYSICIAN ASSISTANT
Payer: OTHER GOVERNMENT

## 2022-03-29 LAB
ALBUMIN SERPL-MCNC: 3.1 G/DL (ref 3.5–5)
ALBUMIN UR-MCNC: 50 MG/DL
ALP SERPL-CCNC: 73 U/L (ref 45–120)
ALT SERPL W P-5'-P-CCNC: 78 U/L (ref 0–45)
APPEARANCE UR: CLEAR
AST SERPL W P-5'-P-CCNC: 33 U/L (ref 0–40)
BACTERIA #/AREA URNS HPF: ABNORMAL /HPF
BILIRUB DIRECT SERPL-MCNC: 0.1 MG/DL
BILIRUB SERPL-MCNC: 0.4 MG/DL (ref 0–1)
BILIRUB UR QL STRIP: NEGATIVE
COLOR UR AUTO: YELLOW
GLUCOSE BLDC GLUCOMTR-MCNC: 103 MG/DL (ref 70–99)
GLUCOSE BLDC GLUCOMTR-MCNC: 115 MG/DL (ref 70–99)
GLUCOSE BLDC GLUCOMTR-MCNC: 115 MG/DL (ref 70–99)
GLUCOSE BLDC GLUCOMTR-MCNC: 150 MG/DL (ref 70–99)
GLUCOSE BLDC GLUCOMTR-MCNC: 157 MG/DL (ref 70–99)
GLUCOSE BLDC GLUCOMTR-MCNC: 159 MG/DL (ref 70–99)
GLUCOSE BLDC GLUCOMTR-MCNC: 161 MG/DL (ref 70–99)
GLUCOSE UR STRIP-MCNC: NEGATIVE MG/DL
GRAM STAIN RESULT: NORMAL
HBV SURFACE AG SERPL QL IA: NONREACTIVE
HGB UR QL STRIP: ABNORMAL
HIV 1+2 AB+HIV1 P24 AG SERPL QL IA: NEGATIVE
HIV 1+2 AB+HIV1P24 AG SERPLBLD IA.RAPID: NON REACTIVE
HIV 1+2 AB+HIV1P24 AG SERPLBLD IA.RAPID: NON REACTIVE
HIV INTERPRETATION: NORMAL
HYALINE CASTS: 96 /LPF
KETONES UR STRIP-MCNC: ABNORMAL MG/DL
LEUKOCYTE ESTERASE UR QL STRIP: ABNORMAL
MUCOUS THREADS #/AREA URNS LPF: PRESENT /LPF
NITRATE UR QL: NEGATIVE
PH UR STRIP: 6.5 [PH] (ref 5–7)
PROT SERPL-MCNC: 6.4 G/DL (ref 6–8)
RBC URINE: 138 /HPF
SP GR UR STRIP: 1.03 (ref 1–1.03)
SQUAMOUS EPITHELIAL: <1 /HPF
UROBILINOGEN UR STRIP-MCNC: <2 MG/DL
WBC URINE: 7 /HPF

## 2022-03-29 PROCEDURE — 250N000013 HC RX MED GY IP 250 OP 250 PS 637: Performed by: PHYSICIAN ASSISTANT

## 2022-03-29 PROCEDURE — 80076 HEPATIC FUNCTION PANEL: CPT | Performed by: INTERNAL MEDICINE

## 2022-03-29 PROCEDURE — 87205 SMEAR GRAM STAIN: CPT | Performed by: SURGERY

## 2022-03-29 PROCEDURE — 250N000011 HC RX IP 250 OP 636: Performed by: ANESTHESIOLOGY

## 2022-03-29 PROCEDURE — 250N000011 HC RX IP 250 OP 636: Performed by: NURSE PRACTITIONER

## 2022-03-29 PROCEDURE — 710N000009 HC RECOVERY PHASE 1, LEVEL 1, PER MIN: Performed by: SURGERY

## 2022-03-29 PROCEDURE — 22015 I&D ABSCESS P-SPINE L/S/LS: CPT | Mod: 78 | Performed by: SURGERY

## 2022-03-29 PROCEDURE — 009U0ZZ DRAINAGE OF SPINAL CANAL, OPEN APPROACH: ICD-10-PCS | Performed by: SURGERY

## 2022-03-29 PROCEDURE — 258N000003 HC RX IP 258 OP 636: Performed by: INTERNAL MEDICINE

## 2022-03-29 PROCEDURE — 370N000017 HC ANESTHESIA TECHNICAL FEE, PER MIN: Performed by: SURGERY

## 2022-03-29 PROCEDURE — 258N000003 HC RX IP 258 OP 636: Performed by: HOSPITALIST

## 2022-03-29 PROCEDURE — P9041 ALBUMIN (HUMAN),5%, 50ML: HCPCS | Performed by: ANESTHESIOLOGY

## 2022-03-29 PROCEDURE — 36415 COLL VENOUS BLD VENIPUNCTURE: CPT

## 2022-03-29 PROCEDURE — 360N000077 HC SURGERY LEVEL 4, PER MIN: Performed by: SURGERY

## 2022-03-29 PROCEDURE — 250N000009 HC RX 250: Performed by: SURGERY

## 2022-03-29 PROCEDURE — 99223 1ST HOSP IP/OBS HIGH 75: CPT | Performed by: INTERNAL MEDICINE

## 2022-03-29 PROCEDURE — 99233 SBSQ HOSP IP/OBS HIGH 50: CPT | Performed by: FAMILY MEDICINE

## 2022-03-29 PROCEDURE — 258N000003 HC RX IP 258 OP 636: Performed by: ANESTHESIOLOGY

## 2022-03-29 PROCEDURE — 81001 URINALYSIS AUTO W/SCOPE: CPT | Performed by: PHYSICIAN ASSISTANT

## 2022-03-29 PROCEDURE — 250N000011 HC RX IP 250 OP 636: Performed by: SURGERY

## 2022-03-29 PROCEDURE — 250N000011 HC RX IP 250 OP 636: Performed by: INTERNAL MEDICINE

## 2022-03-29 PROCEDURE — 250N000009 HC RX 250: Performed by: ANESTHESIOLOGY

## 2022-03-29 PROCEDURE — 69990 MICROSURGERY ADD-ON: CPT | Mod: 59 | Performed by: SURGERY

## 2022-03-29 PROCEDURE — 999N000141 HC STATISTIC PRE-PROCEDURE NURSING ASSESSMENT: Performed by: SURGERY

## 2022-03-29 PROCEDURE — 250N000013 HC RX MED GY IP 250 OP 250 PS 637: Performed by: HOSPITALIST

## 2022-03-29 PROCEDURE — 999N000063 XR CROSSTABLE LATERAL LUMBAR SPINE PORTABLE

## 2022-03-29 PROCEDURE — 36415 COLL VENOUS BLD VENIPUNCTURE: CPT | Performed by: INTERNAL MEDICINE

## 2022-03-29 PROCEDURE — 87077 CULTURE AEROBIC IDENTIFY: CPT | Performed by: SURGERY

## 2022-03-29 PROCEDURE — 87070 CULTURE OTHR SPECIMN AEROBIC: CPT | Performed by: SURGERY

## 2022-03-29 PROCEDURE — 250N000025 HC SEVOFLURANE, PER MIN: Performed by: SURGERY

## 2022-03-29 PROCEDURE — 87075 CULTR BACTERIA EXCEPT BLOOD: CPT | Performed by: SURGERY

## 2022-03-29 PROCEDURE — 272N000001 HC OR GENERAL SUPPLY STERILE: Performed by: SURGERY

## 2022-03-29 PROCEDURE — 120N000001 HC R&B MED SURG/OB

## 2022-03-29 PROCEDURE — 258N000003 HC RX IP 258 OP 636: Performed by: PHYSICIAN ASSISTANT

## 2022-03-29 RX ORDER — ONDANSETRON 2 MG/ML
4 INJECTION INTRAMUSCULAR; INTRAVENOUS EVERY 6 HOURS PRN
Status: DISCONTINUED | OUTPATIENT
Start: 2022-03-29 | End: 2022-04-02 | Stop reason: HOSPADM

## 2022-03-29 RX ORDER — METHOCARBAMOL 500 MG/1
500 TABLET, FILM COATED ORAL EVERY 6 HOURS
Status: DISCONTINUED | OUTPATIENT
Start: 2022-03-29 | End: 2022-03-31

## 2022-03-29 RX ORDER — OXYCODONE HYDROCHLORIDE 5 MG/1
10 TABLET ORAL EVERY 4 HOURS PRN
Status: DISCONTINUED | OUTPATIENT
Start: 2022-03-29 | End: 2022-04-02 | Stop reason: HOSPADM

## 2022-03-29 RX ORDER — ALBUMIN, HUMAN INJ 5% 5 %
SOLUTION INTRAVENOUS CONTINUOUS PRN
Status: DISCONTINUED | OUTPATIENT
Start: 2022-03-29 | End: 2022-03-29

## 2022-03-29 RX ORDER — ACETAMINOPHEN 325 MG/1
650 TABLET ORAL EVERY 4 HOURS PRN
Status: DISCONTINUED | OUTPATIENT
Start: 2022-04-01 | End: 2022-04-02 | Stop reason: HOSPADM

## 2022-03-29 RX ORDER — SODIUM CHLORIDE, SODIUM LACTATE, POTASSIUM CHLORIDE, CALCIUM CHLORIDE 600; 310; 30; 20 MG/100ML; MG/100ML; MG/100ML; MG/100ML
INJECTION, SOLUTION INTRAVENOUS CONTINUOUS
Status: DISCONTINUED | OUTPATIENT
Start: 2022-03-29 | End: 2022-03-29 | Stop reason: HOSPADM

## 2022-03-29 RX ORDER — HYDROMORPHONE HYDROCHLORIDE 1 MG/ML
0.2 INJECTION, SOLUTION INTRAMUSCULAR; INTRAVENOUS; SUBCUTANEOUS EVERY 5 MIN PRN
Status: DISCONTINUED | OUTPATIENT
Start: 2022-03-29 | End: 2022-03-29 | Stop reason: HOSPADM

## 2022-03-29 RX ORDER — PROCHLORPERAZINE MALEATE 10 MG
10 TABLET ORAL EVERY 6 HOURS PRN
Status: DISCONTINUED | OUTPATIENT
Start: 2022-03-29 | End: 2022-04-02 | Stop reason: HOSPADM

## 2022-03-29 RX ORDER — ACETAMINOPHEN 325 MG/1
975 TABLET ORAL EVERY 8 HOURS
Status: DISPENSED | OUTPATIENT
Start: 2022-03-29 | End: 2022-04-01

## 2022-03-29 RX ORDER — CEFAZOLIN SODIUM/WATER 2 G/20 ML
2 SYRINGE (ML) INTRAVENOUS SEE ADMIN INSTRUCTIONS
Status: DISCONTINUED | OUTPATIENT
Start: 2022-03-29 | End: 2022-03-29

## 2022-03-29 RX ORDER — DEXMEDETOMIDINE HYDROCHLORIDE 4 UG/ML
.2-.7 INJECTION, SOLUTION INTRAVENOUS CONTINUOUS
Status: DISCONTINUED | OUTPATIENT
Start: 2022-03-29 | End: 2022-03-29 | Stop reason: HOSPADM

## 2022-03-29 RX ORDER — ONDANSETRON 2 MG/ML
INJECTION INTRAMUSCULAR; INTRAVENOUS PRN
Status: DISCONTINUED | OUTPATIENT
Start: 2022-03-29 | End: 2022-03-29

## 2022-03-29 RX ORDER — OXYCODONE HYDROCHLORIDE 5 MG/1
5 TABLET ORAL EVERY 4 HOURS PRN
Status: DISCONTINUED | OUTPATIENT
Start: 2022-03-29 | End: 2022-04-02 | Stop reason: HOSPADM

## 2022-03-29 RX ORDER — MULTIVITAMIN,THERAPEUTIC
1 TABLET ORAL DAILY
Status: DISCONTINUED | OUTPATIENT
Start: 2022-03-30 | End: 2022-04-02 | Stop reason: HOSPADM

## 2022-03-29 RX ORDER — ONDANSETRON 2 MG/ML
4 INJECTION INTRAMUSCULAR; INTRAVENOUS EVERY 30 MIN PRN
Status: DISCONTINUED | OUTPATIENT
Start: 2022-03-29 | End: 2022-03-29 | Stop reason: HOSPADM

## 2022-03-29 RX ORDER — CEFAZOLIN SODIUM/WATER 2 G/20 ML
2 SYRINGE (ML) INTRAVENOUS
Status: DISCONTINUED | OUTPATIENT
Start: 2022-03-29 | End: 2022-03-29

## 2022-03-29 RX ORDER — POLYETHYLENE GLYCOL 3350 17 G/17G
17 POWDER, FOR SOLUTION ORAL DAILY
Status: DISCONTINUED | OUTPATIENT
Start: 2022-03-30 | End: 2022-04-02 | Stop reason: HOSPADM

## 2022-03-29 RX ORDER — KETAMINE HYDROCHLORIDE 50 MG/ML
INJECTION, SOLUTION INTRAMUSCULAR; INTRAVENOUS PRN
Status: DISCONTINUED | OUTPATIENT
Start: 2022-03-29 | End: 2022-03-29

## 2022-03-29 RX ORDER — BISACODYL 10 MG
10 SUPPOSITORY, RECTAL RECTAL DAILY PRN
Status: DISCONTINUED | OUTPATIENT
Start: 2022-03-29 | End: 2022-04-02 | Stop reason: HOSPADM

## 2022-03-29 RX ORDER — ONDANSETRON 4 MG/1
4 TABLET, ORALLY DISINTEGRATING ORAL EVERY 6 HOURS PRN
Status: DISCONTINUED | OUTPATIENT
Start: 2022-03-29 | End: 2022-04-02 | Stop reason: HOSPADM

## 2022-03-29 RX ORDER — NICOTINE POLACRILEX 4 MG
15-30 LOZENGE BUCCAL
Status: DISCONTINUED | OUTPATIENT
Start: 2022-03-29 | End: 2022-04-02 | Stop reason: HOSPADM

## 2022-03-29 RX ORDER — OXYCODONE HYDROCHLORIDE 5 MG/1
5 TABLET ORAL EVERY 4 HOURS PRN
Status: DISCONTINUED | OUTPATIENT
Start: 2022-03-29 | End: 2022-03-29 | Stop reason: HOSPADM

## 2022-03-29 RX ORDER — CEFAZOLIN SODIUM 1 G/50ML
1250 SOLUTION INTRAVENOUS EVERY 12 HOURS
Status: DISCONTINUED | OUTPATIENT
Start: 2022-03-30 | End: 2022-03-31 | Stop reason: DRUGHIGH

## 2022-03-29 RX ORDER — CEFAZOLIN SODIUM/WATER 2 G/20 ML
2 SYRINGE (ML) INTRAVENOUS SEE ADMIN INSTRUCTIONS
Status: DISCONTINUED | OUTPATIENT
Start: 2022-03-29 | End: 2022-03-29 | Stop reason: HOSPADM

## 2022-03-29 RX ORDER — BACITRACIN ZINC 500 [USP'U]/G
OINTMENT TOPICAL PRN
Status: DISCONTINUED | OUTPATIENT
Start: 2022-03-29 | End: 2022-03-29 | Stop reason: HOSPADM

## 2022-03-29 RX ORDER — FENTANYL CITRATE 50 UG/ML
25 INJECTION, SOLUTION INTRAMUSCULAR; INTRAVENOUS EVERY 5 MIN PRN
Status: DISCONTINUED | OUTPATIENT
Start: 2022-03-29 | End: 2022-03-29 | Stop reason: HOSPADM

## 2022-03-29 RX ORDER — EPHEDRINE SULFATE 50 MG/ML
INJECTION, SOLUTION INTRAMUSCULAR; INTRAVENOUS; SUBCUTANEOUS PRN
Status: DISCONTINUED | OUTPATIENT
Start: 2022-03-29 | End: 2022-03-29

## 2022-03-29 RX ORDER — LIDOCAINE HYDROCHLORIDE 20 MG/ML
INJECTION, SOLUTION INFILTRATION; PERINEURAL PRN
Status: DISCONTINUED | OUTPATIENT
Start: 2022-03-29 | End: 2022-03-29

## 2022-03-29 RX ORDER — HYDROMORPHONE HYDROCHLORIDE 1 MG/ML
0.2 INJECTION, SOLUTION INTRAMUSCULAR; INTRAVENOUS; SUBCUTANEOUS
Status: DISCONTINUED | OUTPATIENT
Start: 2022-03-29 | End: 2022-04-02 | Stop reason: HOSPADM

## 2022-03-29 RX ORDER — HYDROMORPHONE HYDROCHLORIDE 1 MG/ML
0.4 INJECTION, SOLUTION INTRAMUSCULAR; INTRAVENOUS; SUBCUTANEOUS
Status: DISCONTINUED | OUTPATIENT
Start: 2022-03-29 | End: 2022-04-02 | Stop reason: HOSPADM

## 2022-03-29 RX ORDER — DEXTROSE MONOHYDRATE 25 G/50ML
25-50 INJECTION, SOLUTION INTRAVENOUS
Status: DISCONTINUED | OUTPATIENT
Start: 2022-03-29 | End: 2022-04-02 | Stop reason: HOSPADM

## 2022-03-29 RX ORDER — PROPOFOL 10 MG/ML
INJECTION, EMULSION INTRAVENOUS PRN
Status: DISCONTINUED | OUTPATIENT
Start: 2022-03-29 | End: 2022-03-29

## 2022-03-29 RX ORDER — SODIUM CHLORIDE 9 MG/ML
INJECTION, SOLUTION INTRAVENOUS CONTINUOUS
Status: DISCONTINUED | OUTPATIENT
Start: 2022-03-29 | End: 2022-03-30

## 2022-03-29 RX ORDER — AMOXICILLIN 250 MG
1 CAPSULE ORAL 2 TIMES DAILY
Status: DISCONTINUED | OUTPATIENT
Start: 2022-03-29 | End: 2022-04-02 | Stop reason: HOSPADM

## 2022-03-29 RX ORDER — FENTANYL CITRATE 50 UG/ML
INJECTION, SOLUTION INTRAMUSCULAR; INTRAVENOUS PRN
Status: DISCONTINUED | OUTPATIENT
Start: 2022-03-29 | End: 2022-03-29

## 2022-03-29 RX ORDER — CEFAZOLIN SODIUM 1 G/50ML
1750 SOLUTION INTRAVENOUS ONCE
Status: COMPLETED | OUTPATIENT
Start: 2022-03-29 | End: 2022-03-29

## 2022-03-29 RX ORDER — DEXAMETHASONE SODIUM PHOSPHATE 10 MG/ML
INJECTION, SOLUTION INTRAMUSCULAR; INTRAVENOUS PRN
Status: DISCONTINUED | OUTPATIENT
Start: 2022-03-29 | End: 2022-03-29

## 2022-03-29 RX ORDER — MAGNESIUM SULFATE 4 G/50ML
4 INJECTION INTRAVENOUS ONCE
Status: COMPLETED | OUTPATIENT
Start: 2022-03-29 | End: 2022-03-29

## 2022-03-29 RX ORDER — LORATADINE 10 MG/1
10 TABLET ORAL DAILY PRN
Status: DISCONTINUED | OUTPATIENT
Start: 2022-03-29 | End: 2022-04-02 | Stop reason: HOSPADM

## 2022-03-29 RX ORDER — CEFAZOLIN SODIUM/WATER 2 G/20 ML
2 SYRINGE (ML) INTRAVENOUS
Status: DISCONTINUED | OUTPATIENT
Start: 2022-03-29 | End: 2022-03-29 | Stop reason: HOSPADM

## 2022-03-29 RX ORDER — ONDANSETRON 4 MG/1
4 TABLET, ORALLY DISINTEGRATING ORAL EVERY 30 MIN PRN
Status: DISCONTINUED | OUTPATIENT
Start: 2022-03-29 | End: 2022-03-29 | Stop reason: HOSPADM

## 2022-03-29 RX ORDER — LIDOCAINE 40 MG/G
CREAM TOPICAL
Status: DISCONTINUED | OUTPATIENT
Start: 2022-03-29 | End: 2022-03-29 | Stop reason: HOSPADM

## 2022-03-29 RX ADMIN — Medication 5 MG: at 08:19

## 2022-03-29 RX ADMIN — DEXMEDETOMIDINE HYDROCHLORIDE 0.2 MCG/KG/HR: 400 INJECTION INTRAVENOUS at 08:00

## 2022-03-29 RX ADMIN — HYDROMORPHONE HYDROCHLORIDE 0.5 MG: 1 INJECTION, SOLUTION INTRAMUSCULAR; INTRAVENOUS; SUBCUTANEOUS at 09:38

## 2022-03-29 RX ADMIN — PHENYLEPHRINE HYDROCHLORIDE 150 MCG: 10 INJECTION INTRAVENOUS at 07:52

## 2022-03-29 RX ADMIN — SODIUM CHLORIDE: 9 INJECTION, SOLUTION INTRAVENOUS at 11:48

## 2022-03-29 RX ADMIN — PHENYLEPHRINE HYDROCHLORIDE 100 MCG: 10 INJECTION INTRAVENOUS at 08:04

## 2022-03-29 RX ADMIN — METHOCARBAMOL 500 MG: 500 TABLET, FILM COATED ORAL at 12:05

## 2022-03-29 RX ADMIN — DEXAMETHASONE SODIUM PHOSPHATE 10 MG: 10 INJECTION, SOLUTION INTRAMUSCULAR; INTRAVENOUS at 07:34

## 2022-03-29 RX ADMIN — SODIUM CHLORIDE, POTASSIUM CHLORIDE, SODIUM LACTATE AND CALCIUM CHLORIDE: 600; 310; 30; 20 INJECTION, SOLUTION INTRAVENOUS at 06:36

## 2022-03-29 RX ADMIN — LIDOCAINE HYDROCHLORIDE 60 MG: 20 INJECTION, SOLUTION INFILTRATION; PERINEURAL at 07:34

## 2022-03-29 RX ADMIN — PHENYLEPHRINE HYDROCHLORIDE 100 MCG: 10 INJECTION INTRAVENOUS at 07:51

## 2022-03-29 RX ADMIN — FENTANYL CITRATE 100 MCG: 50 INJECTION, SOLUTION INTRAMUSCULAR; INTRAVENOUS at 07:34

## 2022-03-29 RX ADMIN — FENTANYL CITRATE 25 MCG: 50 INJECTION, SOLUTION INTRAMUSCULAR; INTRAVENOUS at 10:32

## 2022-03-29 RX ADMIN — PHENYLEPHRINE HYDROCHLORIDE 0.3 MCG/KG/MIN: 10 INJECTION INTRAVENOUS at 08:25

## 2022-03-29 RX ADMIN — ROCURONIUM BROMIDE 50 MG: 50 INJECTION, SOLUTION INTRAVENOUS at 08:04

## 2022-03-29 RX ADMIN — METHOCARBAMOL 500 MG: 500 TABLET, FILM COATED ORAL at 17:33

## 2022-03-29 RX ADMIN — ROCURONIUM BROMIDE 50 MG: 50 INJECTION, SOLUTION INTRAVENOUS at 07:34

## 2022-03-29 RX ADMIN — MIDAZOLAM 2 MG: 1 INJECTION INTRAMUSCULAR; INTRAVENOUS at 07:29

## 2022-03-29 RX ADMIN — DOCUSATE SODIUM 50 MG AND SENNOSIDES 8.6 MG 1 TABLET: 8.6; 5 TABLET, FILM COATED ORAL at 12:09

## 2022-03-29 RX ADMIN — GABAPENTIN 300 MG: 300 CAPSULE ORAL at 13:47

## 2022-03-29 RX ADMIN — LISINOPRIL 20 MG: 20 TABLET ORAL at 21:05

## 2022-03-29 RX ADMIN — PROPOFOL 200 MG: 10 INJECTION, EMULSION INTRAVENOUS at 07:34

## 2022-03-29 RX ADMIN — Medication 2 G: at 07:47

## 2022-03-29 RX ADMIN — FENTANYL CITRATE 25 MCG: 50 INJECTION, SOLUTION INTRAMUSCULAR; INTRAVENOUS at 10:46

## 2022-03-29 RX ADMIN — PHENYLEPHRINE HYDROCHLORIDE 100 MCG: 10 INJECTION INTRAVENOUS at 08:17

## 2022-03-29 RX ADMIN — HYDROMORPHONE HYDROCHLORIDE 0.2 MG: 1 INJECTION, SOLUTION INTRAMUSCULAR; INTRAVENOUS; SUBCUTANEOUS at 11:01

## 2022-03-29 RX ADMIN — FENTANYL CITRATE 25 MCG: 50 INJECTION, SOLUTION INTRAMUSCULAR; INTRAVENOUS at 10:25

## 2022-03-29 RX ADMIN — PHENYLEPHRINE HYDROCHLORIDE 100 MCG: 10 INJECTION INTRAVENOUS at 08:27

## 2022-03-29 RX ADMIN — GABAPENTIN 300 MG: 300 CAPSULE ORAL at 21:05

## 2022-03-29 RX ADMIN — DIVALPROEX SODIUM 500 MG: 500 TABLET, EXTENDED RELEASE ORAL at 21:05

## 2022-03-29 RX ADMIN — ACETAMINOPHEN 975 MG: 325 TABLET ORAL at 21:04

## 2022-03-29 RX ADMIN — ALBUMIN HUMAN: 0.05 INJECTION, SOLUTION INTRAVENOUS at 08:07

## 2022-03-29 RX ADMIN — SUGAMMADEX 250 MG: 100 INJECTION, SOLUTION INTRAVENOUS at 10:08

## 2022-03-29 RX ADMIN — VANCOMYCIN HYDROCHLORIDE 1750 MG: 5 INJECTION, POWDER, LYOPHILIZED, FOR SOLUTION INTRAVENOUS at 17:48

## 2022-03-29 RX ADMIN — MAGNESIUM SULFATE HEPTAHYDRATE 4 G: 80 INJECTION, SOLUTION INTRAVENOUS at 06:40

## 2022-03-29 RX ADMIN — PHENYLEPHRINE HYDROCHLORIDE 100 MCG: 10 INJECTION INTRAVENOUS at 07:34

## 2022-03-29 RX ADMIN — SODIUM CHLORIDE: 9 INJECTION, SOLUTION INTRAVENOUS at 00:14

## 2022-03-29 RX ADMIN — PHENYLEPHRINE HYDROCHLORIDE 100 MCG: 10 INJECTION INTRAVENOUS at 07:47

## 2022-03-29 RX ADMIN — ALBUMIN HUMAN: 0.05 INJECTION, SOLUTION INTRAVENOUS at 08:30

## 2022-03-29 RX ADMIN — HYDROMORPHONE HYDROCHLORIDE 0.5 MG: 1 INJECTION, SOLUTION INTRAMUSCULAR; INTRAVENOUS; SUBCUTANEOUS at 09:43

## 2022-03-29 RX ADMIN — KETAMINE HYDROCHLORIDE 50 MG: 50 INJECTION, SOLUTION INTRAMUSCULAR; INTRAVENOUS at 07:34

## 2022-03-29 RX ADMIN — FENTANYL CITRATE 25 MCG: 50 INJECTION, SOLUTION INTRAMUSCULAR; INTRAVENOUS at 10:40

## 2022-03-29 RX ADMIN — CEFEPIME HYDROCHLORIDE 2 G: 2 INJECTION, POWDER, FOR SOLUTION INTRAVENOUS at 17:49

## 2022-03-29 RX ADMIN — ONDANSETRON 4 MG: 2 INJECTION INTRAMUSCULAR; INTRAVENOUS at 09:36

## 2022-03-29 RX ADMIN — DOCUSATE SODIUM 50 MG AND SENNOSIDES 8.6 MG 1 TABLET: 8.6; 5 TABLET, FILM COATED ORAL at 21:05

## 2022-03-29 RX ADMIN — Medication 5 MG: at 08:27

## 2022-03-29 ASSESSMENT — ACTIVITIES OF DAILY LIVING (ADL)
ADLS_ACUITY_SCORE: 9
ADLS_ACUITY_SCORE: 7
ADLS_ACUITY_SCORE: 9
ADLS_ACUITY_SCORE: 8
ADLS_ACUITY_SCORE: 9
ADLS_ACUITY_SCORE: 9
ADLS_ACUITY_SCORE: 4
ADLS_ACUITY_SCORE: 9
ADLS_ACUITY_SCORE: 8
ADLS_ACUITY_SCORE: 9
ADLS_ACUITY_SCORE: 8
ADLS_ACUITY_SCORE: 4
ADLS_ACUITY_SCORE: 8
ADLS_ACUITY_SCORE: 9
ADLS_ACUITY_SCORE: 7
ADLS_ACUITY_SCORE: 9
ADLS_ACUITY_SCORE: 8
ADLS_ACUITY_SCORE: 9
ADLS_ACUITY_SCORE: 9
ADLS_ACUITY_SCORE: 8

## 2022-03-29 NOTE — PLAN OF CARE
Problem: Bowel Motility Impaired (Spinal Surgery)  Goal: Effective Bowel Elimination  Outcome: Ongoing, Progressing  Pt without bm this shift.  Reports he is passing flatus and has active bowel sounds.     Problem: Pain Acute  Goal: Acceptable Pain Control and Functional Ability  Outcome: Ongoing, Progressing  Intervention: Prevent or Manage Pain  Recent Flowsheet Documentation  Taken 3/29/2022 0400 by Tami Cerna RN  Medication Review/Management: medications reviewed  Taken 3/29/2022 0014 by Tami Cerna RN  Medication Review/Management: medications reviewed  Pt has denied pain this shift.  Reports pain mostly with weight bearing.  Pt NPO for surgery.  Normal saline infusing at 50ml/hr.     Goal Outcome Evaluation:

## 2022-03-29 NOTE — PHARMACY-VANCOMYCIN DOSING SERVICE
Pharmacy Vancomycin Initial Note  Date of Service 2022  Patient's  1960  62 year old, male    Indication: Epidural Abscess    Current estimated CrCl = Estimated Creatinine Clearance: 98.4 mL/min (based on SCr of 0.95 mg/dL).    Creatinine for last 3 days  3/28/2022:  7:48 PM Creatinine 0.95 mg/dL    Recent Vancomycin Level(s) for last 3 days  No results found for requested labs within last 72 hours.      Vancomycin IV Administrations (past 72 hours)      No vancomycin orders with administrations in past 72 hours.                Nephrotoxins and other renal medications (From now, onward)    Start     Dose/Rate Route Frequency Ordered Stop    22 0500  vancomycin (VANCOCIN) 1,250 mg in sodium chloride 0.9 % 250 mL intermittent infusion         1,250 mg  over 90 Minutes Intravenous EVERY 12 HOURS 22 1631      22 1700  vancomycin (VANCOCIN) 1,750 mg in sodium chloride 0.9 % 500 mL intermittent infusion         1,750 mg  over 2 Hours Intravenous ONCE 22 1631      22 2200  lisinopril (ZESTRIL) tablet 20 mg         20 mg Oral 2 TIMES DAILY 22 2130            Contrast Orders - past 72 hours (72h ago, onward)            None          [employer] Prediction of Planned Initial Vancomycin Regimen  Loading dose: 1750 mg at 00:00 2022.  Regimen: 1250 mg IV every 12 hours.  Start time: 16:32 on 2022  Exposure target: AUC24 (range)400-600 mg/L.hr   AUC24,ss: 556 mg/L.hr  Probability of AUC24 > 400: 82 %  Ctrough,ss: 18.2 mg/L  Probability of Ctrough,ss > 20: 42 %  Probability of nephrotoxicity (Lodise JEREMI ): 14 %        Plan:  1. Start vancomycin  1750 mg IV once, then 1250 mg IV q12h.   2. Vancomycin monitoring method: AUC  3. Vancomycin therapeutic monitoring goal: 400-600 mg*h/L  4. Pharmacy will check vancomycin levels as appropriate in 1-3 Days.    5. Serum creatinine levels will be ordered daily for the first week of therapy and at least twice weekly for  subsequent weeks.      Erick Francis [facility]

## 2022-03-29 NOTE — PHARMACY-ADMISSION MEDICATION HISTORY
Pharmacy Note - Admission Medication History    Pertinent Provider Information: ASA and Plavix remain on hold since ~1 week prior to past surgery   ______________________________________________________________________    Prior To Admission (PTA) med list completed and updated in EMR.       PTA Med List   Medication Sig Note Last Dose     acetaminophen (TYLENOL) 325 MG tablet Take 3 tablets (975 mg) by mouth every 8 hours (Patient taking differently: Take 325-650 mg by mouth every 8 hours as needed )  Unknown at Unknown time     allopurinol (ZYLOPRIM) 300 MG tablet Take 450 mg by mouth every 48 hours Every other evening  3/27/2022 at Unknown time     allopurinol (ZYLOPRIM) 300 MG tablet TAKE 1 TABLET  (300 MG) EVERY OTHER DAY ALTERNATING WITH ONE AND ONE-HALF TABLETS  (450 MG) EVERY OTHER DAY AT BEDTIME (Patient taking differently: Take 300 mg by mouth every 48 hours Every other evening)  3/26/2022 at Unknown time     amLODIPine (NORVASC) 5 MG tablet Take 1 tablet (5 mg) by mouth daily  3/28/2022 at Unknown time     aspirin 81 MG EC tablet Take 81 mg by mouth daily 3/28/2022: Asa 81 mg and Plavix 75 mg remain on hold for ~2 weeks on hold     clopidogrel (PLAVIX) 75 MG tablet Take 1 tablet (75 mg) by mouth daily 3/28/2022: Asa 81 mg and Plavix 75 mg remain on hold for ~2 weeks on hold     cyclobenzaprine (FLEXERIL) 10 MG tablet Take 10 mg by mouth 3 times daily as needed for muscle spasms  Unknown at Unknown time     divalproex sodium extended-release (DEPAKOTE ER) 500 MG 24 hr tablet TAKE 1 TABLET AT BEDTIME (Patient taking differently: Take 500 mg by mouth At Bedtime )  3/27/2022 at Unknown time     gabapentin (NEURONTIN) 300 MG capsule Take 300 mg by mouth 3 times daily  3/28/2022 at 1-2x     levothyroxine (SYNTHROID/LEVOTHROID) 125 MCG tablet Take 125 mcg by mouth daily before breakfast   3/28/2022 at Unknown time     Lidocaine (LIDOCARE) 4 % Patch Place 1 patch onto the skin every 24 hours To prevent lidocaine  toxicity, patient should be patch free for 12 hrs daily. (Patient taking differently: Place 1 patch onto the skin daily as needed To prevent lidocaine toxicity, patient should be patch free for 12 hrs daily.)  Unknown at Unknown time     lisinopril (ZESTRIL) 20 MG tablet TAKE 1 TABLET TWICE A DAY (Patient taking differently: Take 20 mg by mouth 2 times daily )  3/28/2022 at am     metFORMIN (GLUCOPHAGE-XR) 500 MG 24 hr tablet Take 500 mg by mouth 2 times daily (with meals)   3/28/2022 at Unknown time     methocarbamol (ROBAXIN) 500 MG tablet Take 1 tablet (500 mg) by mouth every 6 hours  3/28/2022 at Unknown time     oxyCODONE (ROXICODONE) 5 MG tablet Take 1-2 tablets (5-10 mg) by mouth every 4 hours as needed for moderate to severe pain  3/28/2022 at Unknown time     rosuvastatin (CRESTOR) 5 MG tablet Take 1 tablet (5 mg) by mouth daily  3/28/2022 at Unknown time     SENNA-docusate sodium (SENNA S) 8.6-50 MG tablet Take 1 tablet by mouth 2 times daily  3/28/2022 at am       Information source(s): Patient and CareEverywhere/[facility]  Method of interview communication: phone    Summary of Changes to PTA Med List  New: Flexeril  Discontinued: none  Changed: apap to prn, lidocaine to prn, metformin     Patient was asked about OTC/herbal products specifically.  PTA med list reflects this.    In the past week, patient estimated taking medication this percent of the time:  greater than 90%.    Allergies were reviewed, assessed, and updated with the patient.      Patient does not anticipate needing any multi-use medications during admission.    The information provided in this note is only as accurate as the sources available at the time of the update(s).    Thank you for the opportunity to participate in the care of this patient.    Cindy Ruvalcaba RP  3/28/2022 7:42 PM

## 2022-03-29 NOTE — BRIEF OP NOTE
[facility] Brief Operative Note    Pre-operative diagnosis: Lumbar radiculopathy [M54.16]  Spinal stenosis of lumbar region, unspecified whether neurogenic claudication present [M48.061]  Epidural abscess    Post-operative diagnosis same   Procedure: Procedure(s):  EXPLORATION OF LEFT LUMBAR 3-LUMBAR 4 HEMILAMINECTOMY SPACE WITH LUMBAR 4-LUMBAR 5 LEFT HEMILAMINECTOMY FOR EPIDURAL ABSCESS EVACUATION.   Surgeon(s): Surgeon(s) and Role:     * Abena Duque MD - Primary     * Bella Kong PA-C - Assisting   Estimated blood loss: 50 mL    Specimens: ID Type Source Tests Collected by Time Destination   A : SUPERFICIAL WOUND ON THE BACK Wound Back, Lower ANAEROBIC BACTERIAL CULTURE ROUTINE, GRAM STAIN, AEROBIC BACTERIAL CULTURE ROUTINE Abena Duque MD 3/29/2022  8:18 AM    B : LEFT LUMBAR 5 EPIDURAL SPACE Wound Back, Lower ANAEROBIC BACTERIAL CULTURE ROUTINE, GRAM STAIN, AEROBIC BACTERIAL CULTURE ROUTINE Abena Duque MD 3/29/2022  8:37 AM    C : LEFT LUMBAR 5 EPIDURAL SPACE Wound Back, Lower ANAEROBIC BACTERIAL CULTURE ROUTINE, GRAM STAIN, AEROBIC BACTERIAL CULTURE ROUTINE Abena Duque MD 3/29/2022  8:41 AM       Findings: Large epidural abscess L4- L5/1 interspace. No csf leak observed at right L3-4 with valsalva. Area was unhealed but no abscess or csf leak was observed at side

## 2022-03-29 NOTE — ANESTHESIA CARE TRANSFER NOTE
Patient: Raghavendra Kiser    Procedure: Procedure(s):  LUMBAR 4-LUMBAR 5 LEFT HEMILAMINECTOMY WITH EPIDURAL ABSCESS EVACUATION       Diagnosis: Lumbar radiculopathy [M54.16]  Spinal stenosis of lumbar region, unspecified whether neurogenic claudication present [M48.061]  Diagnosis Additional Information: No value filed.    Anesthesia Type:   General     Note:    Oropharynx: oropharynx clear of all foreign objects  Level of Consciousness: awake  Oxygen Supplementation: face mask  Level of Supplemental Oxygen (L/min / FiO2): 10  Independent Airway: airway patency satisfactory and stable  Dentition: dentition unchanged  Vital Signs Stable: post-procedure vital signs reviewed and stable  Report to RN Given: handoff report given  Patient transferred to: PACU    Handoff Report: Identifed the Patient, Identified the Reponsible Provider, Reviewed the pertinent medical history, Discussed the surgical course, Reviewed Intra-OP anesthesia mangement and issues during anesthesia, Set expectations for post-procedure period and Allowed opportunity for questions and acknowledgement of understanding      Vitals:  Vitals Value Taken Time   /78 03/29/22 1015   Temp 36.3  C (97.3  F) 03/29/22 1014   Pulse 68 03/29/22 1017   Resp 15 03/29/22 1017   SpO2 100 % 03/29/22 1017   Vitals shown include unvalidated device data.    Electronically Signed By: MORGAN Greenfield CRNA  March 29, 2022  10:19 AM

## 2022-03-29 NOTE — ANESTHESIA PREPROCEDURE EVALUATION
Anesthesia Pre-Procedure Evaluation    Patient: Raghavendra Kiser   MRN: [id number] : 1960        Procedure : Procedure(s):  Bilateral lumbar 3- lumbar 4 hemilaminectomies, medial facetectomies, foraminotomies, microdiscectomies          Past Medical History:   Diagnosis Date     Atherosclerosis of right carotid artery      Carotid stenosis, bilateral      Cervical radiculopathy      Chronic gout      Fracture of right humerus      Ganglion cyst      History of stroke without residual deficits  Oct 2020, Dec 2020     Humerus fracture     Right     Hyperlipidemia      Hypertension      Hypothyroidism      Medial epicondylitis      Shoulder tendinitis, right      Superficial venous thrombosis of arm     right lateral antecubital fossa     TIA (transient ischemic attack) 2015     Tinnitus      Type 2 diabetes mellitus without complication, without long-term current use of insulin (H) 2019      Past Surgical History:   Procedure Laterality Date     CERVICAL DISC SURGERY       IR LUMBAR PUNCTURE  2020     IR SPINAL PUNCTURE  2020     LAMINECTOMY LUMBAR ONE LEVEL Bilateral 2022    Procedure: Bilateral lumbar 3- lumbar 4 hemilaminectomies, medial facetectomies, foraminotomies, microdiscectomies;  Surgeon: Abena Duque MD;  Location: [facility] [place], [place]     PICC INSERTION - TRIPLE LUMEN  2020          TONSILLECTOMY       ZZC THROMBOENDARTECTMY NECK,NECK INCIS Right 2015    Procedure: Right Carotid Endarterectomy with Impulse Monitoring;  Surgeon: Marcellus Toth MD;  Location: [facility];  Service: General      Allergies   Allergen Reactions     Cats      Other reaction(s): ITCHING,WATERING EYES      Social History     Tobacco Use     Smoking status: Former Smoker     Packs/day: 2.00     Years: 20.00     Pack years: 40.00     Types: Cigarettes     Quit date: 2/15/1993     Years since quittin.1     Smokeless tobacco: Never Used     Tobacco comment: quit in  1993   Substance Use Topics     Alcohol use: Yes     Alcohol/week: 0.0 standard drinks     Comment: Alcoholic Drinks/day: rarely      Wt Readings from Last 1 Encounters:   03/28/22 105.2 kg (232 lb)        Anesthesia Evaluation   Pt has had prior anesthetic.     No history of anesthetic complications       ROS/MED HX  ENT/Pulmonary:  - neg pulmonary ROS     Neurologic:     (+) CVA, with deficits, - right sided weakness. TIA,     Cardiovascular:     (+) Dyslipidemia hypertension-Peripheral Vascular Disease (s/p right CEA)-- Non Symptomatic and Carotid Stenosis. ---    METS/Exercise Tolerance: >4 METS    Hematologic:  - neg hematologic  ROS     Musculoskeletal:   (+) arthritis,     GI/Hepatic:  - neg GI/hepatic ROS     Renal/Genitourinary:  - neg Renal ROS     Endo:     (+) type II DM, thyroid problem, hypothyroidism,  (-) Type I DM   Psychiatric/Substance Use:  - neg psychiatric ROS     Infectious Disease:     (+) Recent Fever,     Malignancy:  - neg malignancy ROS     Other:  - neg other ROS          Physical Exam    Airway        Mallampati: II   TM distance: > 3 FB   Neck ROM: full   Mouth opening: > 3 cm    Respiratory Devices and Support         Dental  no notable dental history         Cardiovascular   cardiovascular exam normal          Pulmonary   pulmonary exam normal                OUTSIDE LABS:  CBC:   Lab Results   Component Value Date    WBC 6.2 03/28/2022    WBC 12.1 (H) 03/23/2022    HGB 11.9 (L) 03/28/2022    HGB 13.6 03/23/2022    HCT 35.5 (L) 03/28/2022    HCT 40.3 03/23/2022     03/28/2022     03/23/2022     BMP:   Lab Results   Component Value Date     03/28/2022     03/23/2022    POTASSIUM 3.5 03/28/2022    POTASSIUM 3.9 03/23/2022    CHLORIDE 100 03/28/2022    CHLORIDE 103 03/23/2022    CO2 28 03/28/2022    CO2 21 (L) 03/23/2022    BUN 17 03/28/2022    BUN 16 03/23/2022    CR 0.95 03/28/2022    CR 1.15 03/23/2022     (H) 03/29/2022     (H) 03/29/2022      COAGS:   Lab Results   Component Value Date    PTT 25 03/28/2022    INR 1.01 03/28/2022     POC: No results found for: BGM, HCG, HCGS  HEPATIC:   Lab Results   Component Value Date    ALBUMIN 4.0 11/03/2021    PROTTOTAL 6.8 11/03/2021    ALT 15 11/03/2021    AST 15 11/03/2021    ALKPHOS 67 11/03/2021    BILITOTAL 0.5 11/03/2021     OTHER:   Lab Results   Component Value Date    PH 7.37 12/25/2020    LACT 0.5 (L) 12/27/2020    A1C 5.9 (H) 03/16/2022    CHARAN 9.1 03/28/2022    MAG 2.6 01/08/2021    TSH 5.26 (H) 11/03/2021    T4 1.00 11/03/2021    CRP 4.3 (H) 03/28/2022    SED 73 (H) 03/28/2022       Anesthesia Plan    ASA Status:  3   NPO Status:  NPO Appropriate    Anesthesia Type: General.     - Airway: ETT   Induction: Intravenous.   Maintenance: Balanced.        Consents    Anesthesia Plan(s) and associated risks, benefits, and realistic alternatives discussed. Questions answered and patient/representative(s) expressed understanding.    - Discussed:     - Discussed with:  Patient      - Extended Intubation/Ventilatory Support Discussed: No.      - Patient is DNR/DNI Status: No    Use of blood products discussed: No .     Postoperative Care    Pain management: IV analgesics, Multi-modal analgesia.   PONV prophylaxis: Ondansetron (or other 5HT-3), Dexamethasone or Solumedrol     Comments:    Other Comments: 50 mg ketamine IV on induction.  4 grams magnesium IV.                Raghavendra Paz MD

## 2022-03-29 NOTE — ANESTHESIA PROCEDURE NOTES
Airway       Patient location during procedure: OR       Procedure Start/Stop Times: 3/29/2022 7:37 AM  Staff -        Anesthesiologist:  Raghavendra Paz MD       CRNA: Joaquín Gee APRN CRNA       Other Anesthesia Staff: Abelardo Dyer       Performed By: ANITA  Consent for Airway        Urgency: elective  Indications and Patient Condition       Indications for airway management: xiomara-procedural       Induction type:intravenous       Mask difficulty assessment: 2 - vent by mask + OA or adjuvant +/- NMBA    Final Airway Details       Final airway type: endotracheal airway       Successful airway: ETT - single  Endotracheal Airway Details        ETT size (mm): 8.0       Cuffed: yes       Successful intubation technique: direct laryngoscopy       DL Blade Type: Toth 2       Grade View of Cords: 1       Adjucts: stylet and tooth guard       Position: Right       Measured from: lips       Secured at (cm): 24       Bite block used: None    Post intubation assessment        Placement verified by: capnometry, equal breath sounds and chest rise        Number of attempts at approach: 1       Number of other approaches attempted: 0       Secured with: silk tape       Ease of procedure: easy       Dentition: Intact and Unchanged

## 2022-03-29 NOTE — CONSULTS
Consultation - INFECTIOUS DISEASE CONSULTATION  Raghavendra Kiser ,  1960, MRN [id number]      Spinal stenosis of lumbar region, unspecified whether neurogenic claudication present [M48.061]    PCP: Luis Daniel Maciel, [phone]   Code status:  Full Code               Assessment:  1. Epidural abscess: following L3-L4 hemilaminectomy and durotomy repair with DuraSeal 3/21. There was concern for CSF leak however OR notes epidural abscess, GS with WBC, cultures are pending. CRP elevated although improved from surgery.   2. Comorbid conditions affecting immune system: DM2 on metformin, hx CVA    Active Problems:    HTN (hypertension)    Hypothyroidism    History of stroke    Type 2 diabetes mellitus without complication, without long-term current use of insulin (H)    Lumbar radiculopathy    Spinal stenosis of lumbar region, unspecified whether neurogenic claudication present    Urine retention    Therapeutic opioid induced constipation        Recommendations:   - cefepime IV  - vancomycin IV  - am labs, check LFTs  - follow OR cultures  - further recommendations to follow clinical course  - discussed with patient, family, hospitalist  - ID will follow, thanks    Mary Ellen Taylor MD  [facility]  [phone]       HPI:    Raghavendra Kiser is a 62 year old male. History is provided by patient, wife, chart.  Had surgery last week. Didn't feel like his symptoms were getting better then had issues walking which prompted him coming in. Wife did dressing changes and states there was reddish fluid on dressings, didn't change in quantity. There was ongoing swelling at the incision but not really erythema or pain. No fevers or chills. Had difficulty sleeping. No night sweats. Seen in ED for urinary retention with barragan placed. Was constipated but that's better now. There were concerns for CSF leak, however in OR noted to have epidural abscess s/p I&D. Cultures are pending. He is feeling ok  after OR. Has pet dog and a long haired cat that lives in basement, did not come in contact with the incision. Noticed R leg swelling but that's better today.     Chief complaint: Active Problems:    HTN (hypertension)    Hypothyroidism    History of stroke    Type 2 diabetes mellitus without complication, without long-term current use of insulin (H)    Lumbar radiculopathy    Spinal stenosis of lumbar region, unspecified whether neurogenic claudication present    Urine retention    Therapeutic opioid induced constipation      Medical History  Active Ambulatory Problems     Diagnosis Date Noted     Atherosclerosis of right carotid artery 05/22/2015     Stenosis of left internal carotid artery 10/07/2020     Cervical radiculopathy 01/23/2018     Chronic gout 12/09/2014     Elevated blood pressure reading without diagnosis of hypertension 11/12/2020     Ganglion cyst 04/11/2016     High risk medication use 02/28/2017     History of colonic polyps 01/28/2020     HTN (hypertension) 08/01/2017     History of TIA (transient ischemic attack) and stroke 05/01/2015     Hyperlipidemia 10/17/2018     Hypothyroidism 11/12/2020     Medial epicondylitis 02/10/2016     Other stiff joint of shoulder region 11/12/2020     Pain in joint involving ankle and foot 11/12/2020     Arthralgia of hand 11/12/2020     Peyronie's disease 11/12/2020     Precordial pain 11/06/2018     Right shoulder tendinitis 08/29/2017     Right sided weakness 10/14/2020     Superficial venous thrombosis of arm 05/22/2015     History of stroke 05/16/2015     TIA (transient ischemic attack) 05/16/2015     Type 2 diabetes mellitus without complication, without long-term current use of insulin (H) 11/22/2019     Vitamin deficiency 11/22/2019     Acute respiratory failure with hypoxia (H) 02/08/2021     AMS (altered mental status) 12/25/2020     Cognitive and behavioral changes 02/08/2021     Sleep difficulties 02/08/2021     Acute ischemic left MCA stroke (H)  05/16/2015     Lumbar radiculopathy 03/21/2022     Resolved Ambulatory Problems     Diagnosis Date Noted     Acute encephalopathy 12/25/2020     Past Medical History:   Diagnosis Date     Carotid stenosis, bilateral      Fracture of right humerus      History of stroke without residual deficits  Oct 2020, Dec 2020     Humerus fracture      Hypertension      Shoulder tendinitis, right      Tinnitus          Surgical History  He  has a past surgical history that includes IR Lumbar Puncture (12/25/2020); Tonsillectomy; THROMBOENDARTECTMY NECK,NECK INCIS (Right, 06/08/2015); Ir Spinal Puncture (12/25/2020); Picc Insertion - Triple Lumen (12/26/2020); Laminectomy lumbar one level (Bilateral, 03/21/2022); and Cervical Disc Surgery.       Social History  Reviewed, and he  reports that he quit smoking about 29 years ago. His smoking use included cigarettes. He has a 40.00 pack-year smoking history. He has never used smokeless tobacco. He reports current alcohol use. He reports that he does not use drugs.        Family History  Reviewed and noncontributory to present problem, and family history includes Cancer in his father and mother; Diabetes in his brother; Diabetes Type 1 in his brother; Heart Disease in his brother; Lung Cancer in his father, mother, and sister; No Known Problems in his brother, sister, sister, sister, sister, and son; Stomach Cancer in his mother; Throat cancer in his father.   No FH frequent infections.    Psychosocial Needs  Social History     Social History Narrative    The patient lives with family.  He is    He has 1 son   Who is 23-year-old.  Son is currently in long term  due to recurrent drug use problem.  He works for the [employer] in administration.  Maynor Lanza MD  10/16/2018         Additional psychosocial needs reviewed per nursing assessment.       Allergies   Allergen Reactions     Cats      Other reaction(s): ITCHING,WATERING EYES      Medications Prior to Admission    Medication Sig Dispense Refill Last Dose     acetaminophen (TYLENOL) 325 MG tablet Take 3 tablets (975 mg) by mouth every 8 hours (Patient taking differently: Take 325-650 mg by mouth every 8 hours as needed )   Unknown at Unknown time     allopurinol (ZYLOPRIM) 300 MG tablet Take 450 mg by mouth every 48 hours Every other evening   3/27/2022 at Unknown time     allopurinol (ZYLOPRIM) 300 MG tablet TAKE 1 TABLET  (300 MG) EVERY OTHER DAY ALTERNATING WITH ONE AND ONE-HALF TABLETS  (450 MG) EVERY OTHER DAY AT BEDTIME (Patient taking differently: Take 300 mg by mouth every 48 hours Every other evening) 113 tablet 3 3/26/2022 at Unknown time     amLODIPine (NORVASC) 5 MG tablet Take 1 tablet (5 mg) by mouth daily 90 tablet 3 3/28/2022 at Unknown time     aspirin 81 MG EC tablet Take 81 mg by mouth daily   on hold     clopidogrel (PLAVIX) 75 MG tablet Take 1 tablet (75 mg) by mouth daily 90 tablet 3 on hold     cyclobenzaprine (FLEXERIL) 10 MG tablet Take 10 mg by mouth 3 times daily as needed for muscle spasms   Unknown at Unknown time     divalproex sodium extended-release (DEPAKOTE ER) 500 MG 24 hr tablet TAKE 1 TABLET AT BEDTIME (Patient taking differently: Take 500 mg by mouth At Bedtime ) 90 tablet 0 3/27/2022 at Unknown time     gabapentin (NEURONTIN) 300 MG capsule Take 300 mg by mouth 3 times daily   3/28/2022 at 1-2x     levothyroxine (SYNTHROID/LEVOTHROID) 125 MCG tablet Take 125 mcg by mouth daily before breakfast    3/28/2022 at Unknown time     Lidocaine (LIDOCARE) 4 % Patch Place 1 patch onto the skin every 24 hours To prevent lidocaine toxicity, patient should be patch free for 12 hrs daily. (Patient taking differently: Place 1 patch onto the skin daily as needed To prevent lidocaine toxicity, patient should be patch free for 12 hrs daily.) 10 patch 0 Unknown at Unknown time     lisinopril (ZESTRIL) 20 MG tablet TAKE 1 TABLET TWICE A DAY (Patient taking differently: Take 20 mg by mouth 2 times daily )  180 tablet 3 3/28/2022 at am     metFORMIN (GLUCOPHAGE-XR) 500 MG 24 hr tablet Take 500 mg by mouth 2 times daily (with meals)    3/28/2022 at Unknown time     methocarbamol (ROBAXIN) 500 MG tablet Take 1 tablet (500 mg) by mouth every 6 hours 42 tablet 1 3/28/2022 at Unknown time     oxyCODONE (ROXICODONE) 5 MG tablet Take 1-2 tablets (5-10 mg) by mouth every 4 hours as needed for moderate to severe pain 42 tablet 0 3/28/2022 at Unknown time     rosuvastatin (CRESTOR) 5 MG tablet Take 1 tablet (5 mg) by mouth daily 90 tablet 3 3/28/2022 at Unknown time     SENNA-docusate sodium (SENNA S) 8.6-50 MG tablet Take 1 tablet by mouth 2 times daily 10 tablet 0 3/28/2022 at am     blood glucose monitoring (FREESTYLE) lancets         FREESTYLE LITE test strip            Review of Systems:  A 12 point comprehensive review of systems was negative except as noted. Physical Exam:  Temp:  [97.3  F (36.3  C)-99.8  F (37.7  C)] 98  F (36.7  C)  Pulse:  [68-76] 76  Resp:  [16-18] 18  BP: (126-171)/(65-80) 132/74  SpO2:  [92 %-97 %] 94 %    GEN: alert and oriented x3, NAD  HEAD: atraumatic  ENT: moist membranes, no thrush, anicteric sclera   NECK: supple, no nuchal rigidity  CARDIOVASCULAR: regular rate and rhythm, no murmurs, rubs, or gallops  PULMONARY: lungs clear to ausculation bilaterally  ABDOMEN: soft, nontender, nondistended. Normal bowel sounds  SKIN: no rashes or lesions. No stigma of endocarditis  LYMPHADENOPATHY: no cervical, supraclavicular inguinal lymphadenopathy  PSYCH: grossly intact  MUSCULOSKELETAL: no synovitis  Did not take down dressing             Pertinent Labs  personally reviewed.   CBC RESULTS:   Recent Labs   Lab Test 03/28/22 1948   WBC 6.2   RBC 3.54*   HGB 11.9*   HCT 35.5*      MCH 33.6*   MCHC 33.5   RDW 13.5           Last Comprehensive Metabolic Panel:  Sodium   Date Value Ref Range Status   03/28/2022 140 136 - 145 mmol/L Final     Potassium   Date Value Ref Range Status   03/28/2022  3.5 3.5 - 5.0 mmol/L Final     Chloride   Date Value Ref Range Status   03/28/2022 100 98 - 107 mmol/L Final     Carbon Dioxide (CO2)   Date Value Ref Range Status   03/28/2022 28 22 - 31 mmol/L Final     Anion Gap   Date Value Ref Range Status   03/28/2022 12 5 - 18 mmol/L Final     Glucose   Date Value Ref Range Status   03/28/2022 113 70 - 125 mg/dL Final     GLUCOSE BY METER POCT   Date Value Ref Range Status   03/29/2022 161 (H) 70 - 99 mg/dL Final     Urea Nitrogen   Date Value Ref Range Status   03/28/2022 17 8 - 22 mg/dL Final     Creatinine   Date Value Ref Range Status   03/28/2022 0.95 0.70 - 1.30 mg/dL Final     GFR Estimate   Date Value Ref Range Status   03/28/2022 89 >60 mL/min/1.73m2 Final     Comment:     Effective December 21, 2021 eGFRcr in adults is calculated using the 2021 CKD-EPI creatinine equation which includes age and gender (Cherri et al., [place], DOI: 10.1056/ZFEQuw9023519)   05/28/2021 >60 >60 mL/min/1.73m2 Final     Calcium   Date Value Ref Range Status   03/28/2022 9.1 8.5 - 10.5 mg/dL Final       The following microbiology studies were personally reviewed:  No results found for: CULT    Urine Studies    Recent Labs   Lab Test 03/29/22  1355 03/23/22  2059   LEUKEST 25 Christian/uL* Negative   WBCU 7* 3       Vancomycin Levels  No lab results found.    Invalid input(s): VANCO    MICROBIOLOGY DATA:  3/28 BC pending  3/29 OR cultures pending, GS with WBC    Pertinent Radiology  personally reviewed.     Recent Results (from the past 24 hour(s))   Lateral Lumbar Spine for Level Confirmation [XR LUMBAR SPINE PORT 1  VIEW]    Narrative    EXAM: XR CROSSTABLE LATERAL LUMBAR SPINE PORTABLE  LOCATION: [facility]  DATE/TIME: 3/29/2022 8:08 AM    INDICATION: In OR for confirmation of spine level by C arm or hard plate.  COMPARISON: Lumbar spine MRI 03/28/2022  TECHNIQUE: CR Lumbar Spine.      Impression    IMPRESSION: Single crosstable lateral radiograph of the lumbar spine. 0810  hours. 03/29/2022.    Assuming 5 lumbar type vertebrae. Skin retractors in place.     The more superior surgical probe tip projects over the posterior elements corresponding to the level of the L4-L5 intervertebral disc space.     The more caudal surgical probe tip projects over the posterior elements corresponding to the L5 pedicle level.

## 2022-03-29 NOTE — PLAN OF CARE
"  Problem: Plan of Care - These are the overarching goals to be used throughout the patient stay.    Goal: Plan of Care Review/Shift Note  Description: The Plan of Care Review/Shift note should be completed every shift.  The Outcome Evaluation is a brief statement about your assessment that the patient is improving, declining, or no change.  This information will be displayed automatically on your shift note.  Outcome: Ongoing, Progressing  Flowsheets (Taken 3/29/2022 1726)  Plan of Care Reviewed With:   patient   spouse  Goal: Patient-Specific Goal (Individualized)  Description: You can add care plan individualizations to a care plan. Examples of Individualization might be:  \"Parent requests to be called daily at 9am for status\", \"I have a hard time hearing out of my right ear\", or \"Do not touch me to wake me up as it startles me\".  Outcome: Ongoing, Progressing  Goal: Absence of Hospital-Acquired Illness or Injury  Outcome: Ongoing, Progressing  Intervention: Identify and Manage Fall Risk  Recent Flowsheet Documentation  Taken 3/29/2022 1600 by Barak Patel RN  Safety Promotion/Fall Prevention: bed alarm on  Intervention: Prevent Skin Injury  Recent Flowsheet Documentation  Taken 3/29/2022 1600 by Barak Patel RN  Body Position: position maintained  Intervention: Prevent and Manage VTE (Venous Thromboembolism) Risk  Recent Flowsheet Documentation  Taken 3/29/2022 1600 by Barak Patel RN  Activity Management: bedrest  Goal: Optimal Comfort and Wellbeing  Outcome: Ongoing, Progressing  Intervention: Monitor Pain and Promote Comfort  Recent Flowsheet Documentation  Taken 3/29/2022 1600 by Barak Patel RN  Pain Management Interventions: medication (see MAR)  Goal: Readiness for Transition of Care  Outcome: Ongoing, Progressing     Problem: Bowel Motility Impaired (Spinal Surgery)  Goal: Effective Bowel Elimination  Outcome: Ongoing, Progressing     Problem: Fluid and Electrolyte Imbalance (Spinal " Surgery)  Goal: Fluid and Electrolyte Balance  Outcome: Ongoing, Progressing     Problem: Functional Ability Impaired (Spinal Surgery)  Goal: Optimal Functional Ability  Outcome: Ongoing, Progressing  Intervention: Optimize Functional Status  Recent Flowsheet Documentation  Taken 3/29/2022 1600 by Barak Patel RN  Activity Management: bedrest     Problem: Pain Acute  Goal: Acceptable Pain Control and Functional Ability  Outcome: Ongoing, Progressing  Intervention: Develop Pain Management Plan  Recent Flowsheet Documentation  Taken 3/29/2022 1600 by Barak Patel RN  Pain Management Interventions: medication (see MAR)  Intervention: Prevent or Manage Pain  Recent Flowsheet Documentation  Taken 3/29/2022 1600 by Barak Patel RN  Medication Review/Management: medications reviewed   Goal Outcome Evaluation:    Plan of Care Reviewed With: patient, spouse

## 2022-03-29 NOTE — H&P
[facility]    History and Physical - Hospitalist Service       Date of Admission:  3/28/2022  Raghavendra Kiser,  1960, MRN [id number]  PCP: Luis Daniel Maciel    Assessment & Plan      Raghavendra Kiser is a 62 year old male admitted on 3/28/2022. He has history of diabetes, hypertension, gout, stroke, hyperlipidemia, recent L3-L4 hemilaminectomy, and durotomy repair with DuraSeal on 3/21 by Dr. Duque directly admitted on recommendation of neurosurgery for evaluation of suspected CSF leak    #Suspected CSF leak  Patient with worsening left-sided leg pain onset a few days postop, MRI today showed CSF leak versus infection or DuraSeal displacement and patient is requested to be directly admitted by neurosurgery in anticipation of surgical exploration tomorrow.  No antibiotics at this time unless patient becomes septic or clinically destabilizes.  Neurosurgeons are planning to take cultures tomorrow.  N.p.o. after midnight  No blood thinners or antiplatelets.  Patient was previously on aspirin and Plavix but has continued to hold these in the postoperative interval.  Check CBC, CRP, blood cultures    #Postoperative urine retention  Patient passed void trial prior to previous discharge however return to the ER a few hours after discharge with urine retention, had Tobias placed  Has urology visit scheduled for next week, Tobias to remain in place until that time    #Opioid-induced constipation  No bowel movement in 8 days.  Has tried numerous things including suppository and enema without significant output.  Today he took a bottle of mag citrate, awaiting results.  Opioid use and limited ambulation likely contributing to constipation.  Continue good bowel regimen with twice daily senna and will add MiraLAX  Can/should get additional suppository and/or enema if still no bowel movement    #History of stroke  With mild residual left leg weakness and some word finding difficulty  Holding  "home aspirin and Plavix as above  Continue home Crestor.  He was started on Depakote for altered mental status at the time of his stroke and has continued on it since then, can continue home Depakote here    #Diabetes  Home regimen as metformin 1000 mg daily  Hold Metformin, sliding scale standard    #Hypertension  Well-controlled  Continue home lisinopril  Hold home amlodipine in anticipation of surgery tomorrow    #Hypothyroidism, home Synthroid       Diet: Regular Diet Adult  NPO per Anesthesia Guidelines for Procedure/Surgery Except for: Meds    DVT Prophylaxis: Moderate risk. SCDs    Tobias Catheter: Not present  Central Lines: None  Code Status: Full Code    Clinically Significant Risk Factors Present on Admission                # Platelet Defect: home medication list includes an antiplatelet medication   # Obesity: Estimated body mass index is 31.46 kg/m  as calculated from the following:    Height as of an earlier encounter on 3/28/22: 1.829 m (6').    Weight as of an earlier encounter on 3/28/22: 105.2 kg (232 lb).      Disposition Plan   Expected Discharge: 03/31/2022   Anticipated discharge location: home       The patient's care was discussed with the Patient and Patient's Family.    Anabel Tidwell MD  [facility]  Text page via [facility] Paging/Directory      ______________________________________________________________________    Chief Complaint   Raghavendra Kiser is a 62 year old male who presents for possible CSF leak    History of Present Illness   Patient is directly admitted at request of neurosurgery team with concern of possible CSF leak.  Patient was seen in their clinic today for concerns of worsening left lower extremity pain over the last few days.  He describes it as a severe, tearing pain extending from his left hip joint (\"feels like the tendons are ripping apart\") and radiating down his leg.  Describes it as a painful numbness.  Pain tends to be improved when he is " sitting and worse when he is standing up, sometimes better when he is standing still and sometimes better with movement.  He has not been able to get up and move around much secondary to the pain.  Has mostly been spending his days sitting.  His right leg has been more swollen than usual.  He has had a reduced appetite, has been trying to drink plenty of fluids however.  Denies any nausea, vomiting, chest pain, shortness of breath, diarrhea.  Has been extremely constipated and has not had a bowel movement in the last 8 days.  He did drink a bottle of mag citrate today.  Prior to that had tried senna, MiraLAX, suppository, enema.  Did not have any significant output with any of these.  He had a fever when he was seen in the ER on the day of his previous discharge and they have been noting low-grade temps at home never exceeding 101F.     Review of Systems    The 10 point Review of Systems is negative other than noted in the HPI or here.    Past Medical History    I have reviewed this patient's medical history and updated it with pertinent information if needed.   Past Medical History:   Diagnosis Date     Atherosclerosis of right carotid artery      Carotid stenosis, bilateral      Cervical radiculopathy      Chronic gout      Fracture of right humerus      Ganglion cyst      History of stroke without residual deficits  Oct 2020, Dec 2020     Humerus fracture     Right     Hyperlipidemia      Hypertension      Hypothyroidism      Medial epicondylitis      Shoulder tendinitis, right      Superficial venous thrombosis of arm     right lateral antecubital fossa     TIA (transient ischemic attack) 05/16/2015     Tinnitus      Type 2 diabetes mellitus without complication, without long-term current use of insulin (H) 11/22/2019       Past Surgical History   I have reviewed this patient's surgical history and updated it with pertinent information if needed.  Past Surgical History:   Procedure Laterality Date     CERVICAL  DISC SURGERY       IR LUMBAR PUNCTURE  2020     IR SPINAL PUNCTURE  2020     LAMINECTOMY LUMBAR ONE LEVEL Bilateral 2022    Procedure: Bilateral lumbar 3- lumbar 4 hemilaminectomies, medial facetectomies, foraminotomies, microdiscectomies;  Surgeon: Abena Duque MD;  Location: [facility]     PICC INSERTION - TRIPLE LUMEN  2020          TONSILLECTOMY       ZZC THROMBOENDARTECTMY NECK,NECK INCIS Right 2015    Procedure: Right Carotid Endarterectomy with Impulse Monitoring;  Surgeon: Marcellus Toth MD;  Location: [facility] OR;  Service: General       Social History   I have reviewed this patient's social history and updated it with pertinent information if needed.  Social History     Tobacco Use     Smoking status: Former Smoker     Packs/day: 2.00     Years: 20.00     Pack years: 40.00     Types: Cigarettes     Quit date: 2/15/1993     Years since quittin.1     Smokeless tobacco: Never Used     Tobacco comment: quit in    Substance Use Topics     Alcohol use: Yes     Alcohol/week: 0.0 standard drinks     Comment: Alcoholic Drinks/day: rarely     Drug use: No       Family History   I have reviewed this patient's family history and updated it with pertinent information if needed.  Family History   Problem Relation Age of Onset     Stomach Cancer Mother      Lung Cancer Mother      Cancer Mother         Lung     Throat cancer Father      Lung Cancer Father      Cancer Father         Lung     Lung Cancer Sister      No Known Problems Sister      No Known Problems Sister      No Known Problems Sister      No Known Problems Sister      Heart Disease Brother          of a heart attack.     Diabetes Type 1 Brother      Diabetes Brother      No Known Problems Brother      No Known Problems Son        Prior to Admission Medications   Prior to Admission Medications   Prescriptions Last Dose Informant Patient Reported? Taking?   FREESTYLE LITE test strip   Yes No    Lidocaine (LIDOCARE) 4 % Patch   No No   Sig: Place 1 patch onto the skin every 24 hours To prevent lidocaine toxicity, patient should be patch free for 12 hrs daily.   SENNA-docusate sodium (SENNA S) 8.6-50 MG tablet   No No   Sig: Take 1 tablet by mouth 2 times daily   acetaminophen (TYLENOL) 325 MG tablet   No No   Sig: Take 3 tablets (975 mg) by mouth every 8 hours   allopurinol (ZYLOPRIM) 300 MG tablet   No No   Sig: TAKE 1 TABLET  (300 MG) EVERY OTHER DAY ALTERNATING WITH ONE AND ONE-HALF TABLETS  (450 MG) EVERY OTHER DAY AT BEDTIME   amLODIPine (NORVASC) 5 MG tablet   No No   Sig: Take 1 tablet (5 mg) by mouth daily   aspirin (ASA) 81 MG chewable tablet   Yes No   Sig: Take 81 mg by mouth daily   blood glucose monitoring (FREESTYLE) lancets   Yes No   clopidogrel (PLAVIX) 75 MG tablet   No No   Sig: Take 1 tablet (75 mg) by mouth daily   divalproex sodium extended-release (DEPAKOTE ER) 500 MG 24 hr tablet   No No   Sig: TAKE 1 TABLET AT BEDTIME   gabapentin (NEURONTIN) 300 MG capsule   No No   Sig: Take 1 capsule (300 mg) by mouth At Bedtime for 3 days, THEN 1 capsule (300 mg) 2 times daily for 3 days, THEN 1 capsule (300 mg) 3 times daily.   levothyroxine (SYNTHROID/LEVOTHROID) 125 MCG tablet   Yes No   Sig: Take 125 mcg by mouth daily   lisinopril (ZESTRIL) 20 MG tablet   No No   Sig: TAKE 1 TABLET TWICE A DAY   metFORMIN (GLUCOPHAGE-XR) 500 MG 24 hr tablet   Yes No   Sig: Take 2 tablets by mouth daily   methocarbamol (ROBAXIN) 500 MG tablet   No No   Sig: Take 1 tablet (500 mg) by mouth every 6 hours   methylPREDNISolone (MEDROL DOSEPAK) 4 MG tablet therapy pack   No No   Sig: Follow Package Directions   oxyCODONE (ROXICODONE) 5 MG tablet   No No   Sig: Take 1-2 tablets (5-10 mg) by mouth every 4 hours as needed for moderate to severe pain   rosuvastatin (CRESTOR) 5 MG tablet   No No   Sig: Take 1 tablet (5 mg) by mouth daily      Facility-Administered Medications: None     Allergies   Allergies    Allergen Reactions     Cats      Other reaction(s): ITCHING,WATERING EYES       Physical Exam   Vital Signs: Temp: 99.8  F (37.7  C) Temp src: Oral BP: (!) 145/78 Pulse: 74   Resp: 18 SpO2: 97 % O2 Device: None (Room air)    Weight: 0 lbs 0 oz    General: in no apparent distress and alert obese somewhat lethargic appearing male sitting on edge of bed oriented x3  HEENT: Head normocephalic atraumatic, oral mucosa moist. Sclerae anicteric  CV: Regular rhythm, normal rate, no murmurs  Resp: No wheezes, no rales or rhonchi, no focal consolidations  GI: Belly soft, nondistended, nontender, bowel sounds present  Skin: No rashes or lesions  Extremities: 1+ pitting edema bilateral ankles  Psych: Normal affect, mood dysthymic  Neuro: CNII-XII grossly intact, moving all 4 extremities   : Tobias in place has just been emptied, no urine in bag    Data   Data reviewed today: I reviewed all medications, new labs and imaging results over the last 24 hours. Outside MRI report is not available. Labs in process.

## 2022-03-29 NOTE — ANESTHESIA POSTPROCEDURE EVALUATION
Patient: Raghavendra Escamillashaila    Procedure: Procedure(s):  LUMBAR 4-LUMBAR 5 LEFT HEMILAMINECTOMY WITH EPIDURAL ABSCESS EVACUATION       Anesthesia Type:  General    Note:  Disposition: Inpatient   Postop Pain Control: Uneventful            Sign Out: Well controlled pain   PONV: No   Neuro/Psych: Uneventful            Sign Out: Acceptable/Baseline neuro status   Airway/Respiratory: Uneventful            Sign Out: Acceptable/Baseline resp. status   CV/Hemodynamics: Uneventful            Sign Out: Acceptable CV status; No obvious hypovolemia; No obvious fluid overload   Other NRE: NONE   DID A NON-ROUTINE EVENT OCCUR?            Last vitals:  Vitals Value Taken Time   /76 03/29/22 1103   Temp 36.3  C (97.3  F) 03/29/22 1014   Pulse 69 03/29/22 1106   Resp 12 03/29/22 1106   SpO2 97 % 03/29/22 1106   Vitals shown include unvalidated device data.    Electronically Signed By: Raghavendra Paz MD  March 29, 2022  11:08 AM

## 2022-03-29 NOTE — INTERVAL H&P NOTE
I have reviewed the surgical (or preoperative) H&P that is linked to this encounter, and examined the patient. There are no significant changes    Clinical Conditions Present on Arrival:  Clinically Significant Risk Factors Present on Admission                  # Platelet Defect: home medication list includes an antiplatelet medication   # Obesity: Estimated body mass index is 31.46 kg/m  as calculated from the following:    Height as of an earlier encounter on 3/28/22: 6' (1.829 m).    Weight as of an earlier encounter on 3/28/22: 232 lb (105.2 kg).

## 2022-03-29 NOTE — PLAN OF CARE
Problem: Plan of Care - These are the overarching goals to be used throughout the patient stay.    Goal: Plan of Care Review/Shift Note  Description: The Plan of Care Review/Shift note should be completed every shift.  The Outcome Evaluation is a brief statement about your assessment that the patient is improving, declining, or no change.  This information will be displayed automatically on your shift note.  Outcome: Ongoing, Progressing   Goal Outcome Evaluation:        Pt back from OR with drain in place. Pt has barragan to be left in. Pt getting iv fluids

## 2022-03-30 ENCOUNTER — APPOINTMENT (OUTPATIENT)
Dept: PHYSICAL THERAPY | Facility: HOSPITAL | Age: 62
DRG: 856 | End: 2022-03-30
Attending: HOSPITALIST
Payer: OTHER GOVERNMENT

## 2022-03-30 ENCOUNTER — APPOINTMENT (OUTPATIENT)
Dept: OCCUPATIONAL THERAPY | Facility: HOSPITAL | Age: 62
DRG: 856 | End: 2022-03-30
Attending: HOSPITALIST
Payer: OTHER GOVERNMENT

## 2022-03-30 LAB
ANION GAP SERPL CALCULATED.3IONS-SCNC: 6 MMOL/L (ref 5–18)
BUN SERPL-MCNC: 16 MG/DL (ref 8–22)
CALCIUM SERPL-MCNC: 7.9 MG/DL (ref 8.5–10.5)
CHLORIDE BLD-SCNC: 110 MMOL/L (ref 98–107)
CO2 SERPL-SCNC: 24 MMOL/L (ref 22–31)
CREAT SERPL-MCNC: 0.81 MG/DL (ref 0.7–1.3)
ERYTHROCYTE [DISTWIDTH] IN BLOOD BY AUTOMATED COUNT: 13.2 % (ref 10–15)
GFR SERPL CREATININE-BSD FRML MDRD: >90 ML/MIN/1.73M2
GLUCOSE BLD-MCNC: 111 MG/DL (ref 70–125)
GLUCOSE BLDC GLUCOMTR-MCNC: 134 MG/DL (ref 70–99)
GLUCOSE BLDC GLUCOMTR-MCNC: 83 MG/DL (ref 70–99)
GLUCOSE BLDC GLUCOMTR-MCNC: 88 MG/DL (ref 70–99)
GLUCOSE BLDC GLUCOMTR-MCNC: 90 MG/DL (ref 70–99)
HCT VFR BLD AUTO: 30.9 % (ref 40–53)
HGB BLD-MCNC: 10.2 G/DL (ref 13.3–17.7)
MCH RBC QN AUTO: 33.7 PG (ref 26.5–33)
MCHC RBC AUTO-ENTMCNC: 33 G/DL (ref 31.5–36.5)
MCV RBC AUTO: 102 FL (ref 78–100)
PLATELET # BLD AUTO: 230 10E3/UL (ref 150–450)
POTASSIUM BLD-SCNC: 4.1 MMOL/L (ref 3.5–5)
RBC # BLD AUTO: 3.03 10E6/UL (ref 4.4–5.9)
SODIUM SERPL-SCNC: 140 MMOL/L (ref 136–145)
WBC # BLD AUTO: 6 10E3/UL (ref 4–11)

## 2022-03-30 PROCEDURE — 85027 COMPLETE CBC AUTOMATED: CPT | Performed by: INTERNAL MEDICINE

## 2022-03-30 PROCEDURE — 97161 PT EVAL LOW COMPLEX 20 MIN: CPT | Mod: GP

## 2022-03-30 PROCEDURE — 82310 ASSAY OF CALCIUM: CPT | Performed by: FAMILY MEDICINE

## 2022-03-30 PROCEDURE — 250N000011 HC RX IP 250 OP 636: Performed by: INTERNAL MEDICINE

## 2022-03-30 PROCEDURE — 36415 COLL VENOUS BLD VENIPUNCTURE: CPT | Performed by: INTERNAL MEDICINE

## 2022-03-30 PROCEDURE — 97530 THERAPEUTIC ACTIVITIES: CPT | Mod: GP

## 2022-03-30 PROCEDURE — 250N000013 HC RX MED GY IP 250 OP 250 PS 637: Performed by: HOSPITALIST

## 2022-03-30 PROCEDURE — 99233 SBSQ HOSP IP/OBS HIGH 50: CPT | Performed by: FAMILY MEDICINE

## 2022-03-30 PROCEDURE — 250N000013 HC RX MED GY IP 250 OP 250 PS 637: Performed by: PHYSICIAN ASSISTANT

## 2022-03-30 PROCEDURE — 97535 SELF CARE MNGMENT TRAINING: CPT | Mod: GO

## 2022-03-30 PROCEDURE — 97166 OT EVAL MOD COMPLEX 45 MIN: CPT | Mod: GO

## 2022-03-30 PROCEDURE — 97116 GAIT TRAINING THERAPY: CPT | Mod: GP

## 2022-03-30 PROCEDURE — 258N000003 HC RX IP 258 OP 636: Performed by: INTERNAL MEDICINE

## 2022-03-30 PROCEDURE — 99232 SBSQ HOSP IP/OBS MODERATE 35: CPT | Performed by: INTERNAL MEDICINE

## 2022-03-30 PROCEDURE — 120N000001 HC R&B MED SURG/OB

## 2022-03-30 RX ADMIN — GABAPENTIN 300 MG: 300 CAPSULE ORAL at 13:40

## 2022-03-30 RX ADMIN — VANCOMYCIN HYDROCHLORIDE 1250 MG: 5 INJECTION, POWDER, LYOPHILIZED, FOR SOLUTION INTRAVENOUS at 17:13

## 2022-03-30 RX ADMIN — GABAPENTIN 300 MG: 300 CAPSULE ORAL at 08:21

## 2022-03-30 RX ADMIN — CEFEPIME HYDROCHLORIDE 2 G: 2 INJECTION, POWDER, FOR SOLUTION INTRAVENOUS at 04:23

## 2022-03-30 RX ADMIN — ROSUVASTATIN CALCIUM 5 MG: 5 TABLET, FILM COATED ORAL at 08:22

## 2022-03-30 RX ADMIN — VANCOMYCIN HYDROCHLORIDE 1250 MG: 5 INJECTION, POWDER, LYOPHILIZED, FOR SOLUTION INTRAVENOUS at 05:44

## 2022-03-30 RX ADMIN — LISINOPRIL 20 MG: 20 TABLET ORAL at 08:21

## 2022-03-30 RX ADMIN — ACETAMINOPHEN 975 MG: 325 TABLET ORAL at 20:20

## 2022-03-30 RX ADMIN — GABAPENTIN 300 MG: 300 CAPSULE ORAL at 20:21

## 2022-03-30 RX ADMIN — DIVALPROEX SODIUM 500 MG: 500 TABLET, EXTENDED RELEASE ORAL at 20:21

## 2022-03-30 RX ADMIN — ACETAMINOPHEN 650 MG: 325 TABLET ORAL at 08:26

## 2022-03-30 RX ADMIN — DOCUSATE SODIUM 50 MG AND SENNOSIDES 8.6 MG 1 TABLET: 8.6; 5 TABLET, FILM COATED ORAL at 08:22

## 2022-03-30 RX ADMIN — LEVOTHYROXINE SODIUM 125 MCG: 0.03 TABLET ORAL at 06:39

## 2022-03-30 RX ADMIN — METHOCARBAMOL 500 MG: 500 TABLET, FILM COATED ORAL at 06:39

## 2022-03-30 RX ADMIN — DOCUSATE SODIUM 50 MG AND SENNOSIDES 8.6 MG 1 TABLET: 8.6; 5 TABLET, FILM COATED ORAL at 20:21

## 2022-03-30 RX ADMIN — THERA TABS 1 TABLET: TAB at 08:21

## 2022-03-30 RX ADMIN — METHOCARBAMOL 500 MG: 500 TABLET, FILM COATED ORAL at 17:12

## 2022-03-30 RX ADMIN — POLYETHYLENE GLYCOL 3350 17 G: 17 POWDER, FOR SOLUTION ORAL at 08:21

## 2022-03-30 RX ADMIN — LISINOPRIL 20 MG: 20 TABLET ORAL at 20:21

## 2022-03-30 RX ADMIN — ACETAMINOPHEN 975 MG: 325 TABLET ORAL at 04:22

## 2022-03-30 RX ADMIN — ALLOPURINOL 300 MG: 300 TABLET ORAL at 08:21

## 2022-03-30 RX ADMIN — METHOCARBAMOL 500 MG: 500 TABLET, FILM COATED ORAL at 12:01

## 2022-03-30 RX ADMIN — CEFEPIME HYDROCHLORIDE 2 G: 2 INJECTION, POWDER, FOR SOLUTION INTRAVENOUS at 17:12

## 2022-03-30 RX ADMIN — ACETAMINOPHEN 975 MG: 325 TABLET ORAL at 12:00

## 2022-03-30 RX ADMIN — METHOCARBAMOL 500 MG: 500 TABLET, FILM COATED ORAL at 01:04

## 2022-03-30 ASSESSMENT — ACTIVITIES OF DAILY LIVING (ADL)
ADLS_ACUITY_SCORE: 8
PREVIOUS_RESPONSIBILITIES: MEAL PREP;HOUSEKEEPING;LAUNDRY;YARDWORK;MEDICATION MANAGEMENT;FINANCES;DRIVING
ADLS_ACUITY_SCORE: 8

## 2022-03-30 NOTE — PLAN OF CARE
Problem: Plan of Care - These are the overarching goals to be used throughout the patient stay.    Goal: Optimal Comfort and Wellbeing  Intervention: Monitor Pain and Promote Comfort  Recent Flowsheet Documentation  Taken 3/30/2022 0101 by Tami Cerna RN  Pain Management Interventions: (took scheduled Robaxin) declines     Problem: Bowel Motility Impaired (Spinal Surgery)  Goal: Effective Bowel Elimination  Outcome: Ongoing, Progressing  Pt reports he has had 3 loose bowel movements since surgery yesterday.  Abdomen still distended.  Pt reports passing flatus and bowel sounds hyperactive.  Denied nausea and in fact states he feels better though stools have not been formed.     Problem: Functional Ability Impaired (Spinal Surgery)  Goal: Optimal Functional Ability  Outcome: Ongoing, Progressing  Intervention: Optimize Functional Status  Recent Flowsheet Documentation  Taken 3/30/2022 0101 by Tami Cerna RN  Activity Management: activity adjusted per tolerance  Positioning/Transfer Devices:    pillows    in use  Pt reports he has noticed a significant improvement in mobility since surgery.  Reports he pain and weakness have improved.     Problem: Pain Acute  Goal: Acceptable Pain Control and Functional Ability  Outcome: Ongoing, Progressing  Intervention: Develop Pain Management Plan  Recent Flowsheet Documentation  Taken 3/30/2022 0101 by Tami Cerna RN  Pain Management Interventions: (took scheduled Robaxin) declines  Intervention: Prevent or Manage Pain  Recent Flowsheet Documentation  Taken 3/30/2022 0100 by Tami Cerna RN  Medication Review/Management: medications reviewed  Pt having minimal pain this shift.  Reported slight discomfort earlier in the shift but was relieved with Robaxin.    Pt doing better clinically.  Tobias patent. AMELIA with 40cc output this shift  Dressing to lower back incisional site is intact with old drainage noted shadowing the dressing.  Iv antibiotics  infused without incident.     Goal Outcome Evaluation:

## 2022-03-30 NOTE — PROGRESS NOTES
03/30/22 0930   Quick Adds   Type of Visit Initial PT Evaluation   Living Environment   Living Environment Comments see OT eval.Patient independent with mobility and ADL prior,Retired [employer] agent.   Self-Care   Current Activity Tolerance moderate   Equipment Currently Used at Home none   Fall history within last six months no   General Information   Onset of Illness/Injury or Date of Surgery 03/28/22   Referring Physician Neto Moody Elizabeth   Patient/Family Therapy Goals Statement (PT) return home   Pertinent History of Current Problem (include personal factors and/or comorbidities that impact the POC) L3-4,4-5 lami with epidural abcess evacuation   Existing Precautions/Restrictions spinal   Weight-Bearing Status - LLE full weight-bearing   Weight-Bearing Status - RLE full weight-bearing   General Observations cooperative   Cognition   Orientation Status (Cognition) oriented x 4   Pain Assessment   Patient Currently in Pain Yes, see Vital Sign flowsheet   Range of Motion (ROM)   Range of Motion ROM is WFL   Strength (Manual Muscle Testing)   Strength (Manual Muscle Testing) strength is WFL   Bed Mobility   Sit-Supine [place] (Bed Mobility) supervision;verbal cues   Bed Mobility Limitations decreased ability to use arms for pushing/pulling;decreased ability to use legs for bridging/pushing   Impairments Contributing to Impaired Bed Mobility pain   Comment, (Bed Mobility) cues for log roll   Sit-Stand Transfer   Sit-Stand [place] (Transfers) verbal cues;contact guard   Assistive Device (Sit-Stand Transfers) other (see comments)  (none)   Comment, (Sit-Stand Transfer) cues for spinal precautions   Stand-Sit Transfer   Stand-Sit [place] (Transfers) verbal cues;contact guard   Assistive Device (Stand-Sit Transfers) other (see comments)  (none)   Comment, (Stand-Sit Transfer) cues for spinal precautions   Gait/Stairs (Locomotion)   [place] Level (Gait) contact guard   Assistive Device (Gait) other  (see comments)  (none)   Distance in Feet (Required for LE Total Joints) 300 feet   Pattern (Gait) step-through   Deviations/Abnormal Patterns (Gait) jorge decreased;gait speed decreased;stride length decreased   [place] Level (Stairs) supervision   Handrail Location (Stairs) left side (ascending);right side (descending)   Number of Steps (Stairs) 4   Ascending Technique (Stairs) step-to-step   Descending Technique (Stairs) step-to-step   Comment, (Gait/Stairs) small loss of balance with turn   Clinical Impression   Criteria for Skilled Therapeutic Intervention Yes, treatment indicated   PT Diagnosis (PT) impaired functional mobility   Influenced by the following impairments pain ,surgery   Functional limitations due to impairments bed mobility,transfers,gait   Clinical Presentation (PT Evaluation Complexity) Stable/Uncomplicated   Clinical Presentation Rationale pt presents as med diagnosed   Clinical Decision Making (Complexity) low complexity   Planned Therapy Interventions (PT) bed mobility training;gait training;patient/family education;stair training;strengthening   Anticipated Equipment Needs at Discharge (PT) other (see comments)  (none)   Risk & Benefits of therapy have been explained evaluation/treatment results reviewed;patient   PT Discharge Planning   PT Discharge Recommendation (DC Rec) home with assist  (from spouse as needed)   PT Rationale for DC Rec patient was independent prior surgery,,should progress well to return home   PT Brief overview of current status Demonstrated and discussed spinal precautions.Issued pamphlet   Plan of Care Review   Plan of Care Reviewed With patient   Total Evaluation Time   Total Evaluation Time (Minutes) 10   Physical Therapy Goals   PT Frequency 2x/week   PT Predicated Duration/Target Date for Goal Attainment 04/01/22   PT Goals Bed Mobility;Transfers;Gait;Stairs;PT Goal 1   PT: Bed Mobility Independent;Supine to/from sit;Within precautions   PT: Transfers  Independent;Sit to/from stand;Within precautions   PT: Gait Supervision/stand-by assist;Greater than 200 feet   PT: Stairs Modified independent;3 stairs;Rail on left   PT: Goal 1 patient will verbalize 3/3 spinal precautions

## 2022-03-30 NOTE — PROGRESS NOTES
Neurosurgery Progress Note:  3/30/2022     A/P: Mr. Kiser is a pleasant 62 year old right handed male POD#1 exploration of left L3-L4 hemilaminectomy space with left L4-L5 hemilaminectomy for evacuation of epidural abscess by Dr. Duque status post bilateral lumbar 3- lumbar 4 hemilaminectomies, medial facetectomies, foraminotomies and microdiscectomies with small durotomy on right on 3/21/2022     Presently he states that he is doing well. Feels night and day improvements of his prior lower extremity pain, only currently notes incisional discomfort, had BM yesterday x2 before surgery and directly after surgery, barragan in place- postoperative retention following surgery last week spoke with urology yesterday and recommendations to keep barragan in place until outpatient follow up next week. Surgical cultures pending no growth presently      Plan:  1. Increase activity as tolerated with PT/OT  2. Continue pain control with prn medications   3. Keep drain in place  4. ID following   5. Keep barragan in place   6. SCDs, lovenox to start 48hrs postoperative        Neurosurgery Attending: The patient's clinical examination, laboratory data, and plan was discussed with Dr. Duque      HPI: Raghavendra Kiser is a 62 year old S/P Bilateral lumbar 3- lumbar 4 hemilaminectomies, medial facetectomies, foraminotomies and microdiscectomies with small durotomy on right with Dr Duque 3/21/2022. Pre-op had more right leg symptoms than left. Symptoms improved following surgery with worsening new left leg pain in last 1-2 days. MRI obtained which showed a new contrast enhancing collection mostly centered at left L5 extending form L4 to S1.This is causing severe spinal stenosis at these levels. We discussed exploration and extension of his prior hemilaminectomy for evacuation of the collection and possible re-repair of CSF leak. Risks and benefits dicussed and he agreed to proceed.     S:   States that he is doing much better  following surgery all of his previous radicular lower extremity pain is completely resolved. Denies numbness or tingling, had BM following surgery, barragan in place     O:  /66 (BP Location: Right arm)   Pulse 68   Temp 97.7  F (36.5  C) (Oral)   Resp 16   SpO2 95%        General: Awake, alert, NAD. Sitting upright in bed comfortably      Motor: normal bulk and tone     Strength: full strength in all extremities throughout, bilaterally. Improvements of prior left plantar flexion weakness      Sensation: intact to light touch throughout both upper and lower extremities     Incision: dressing dry and intact with AMELIA drain in place     AMELIA drain: 40mL/8hrs      Bella Kong PA-C  [facility]surgery  O: [phone]

## 2022-03-30 NOTE — PROGRESS NOTES
[facility]    Medicine Progress Note - Hospitalist Service       Date of Admission:  3/28/2022  Active Problems:    HTN (hypertension)    Hypothyroidism    History of stroke    Type 2 diabetes mellitus without complication, without long-term current use of insulin (H)    Lumbar radiculopathy    Spinal stenosis of lumbar region, unspecified whether neurogenic claudication present    Urine retention    Therapeutic opioid induced constipation     Assessment & Plan          Raghavendra Kiser is a 62 year old male admitted on 3/28/2022. He has history of diabetes, hypertension, gout, stroke, hyperlipidemia, recent L3-L4 hemilaminectomy, and durotomy repair with DuraSeal on 3/21 by Dr. Duque directly admitted on recommendation of neurosurgery for evaluation of suspected CSF leak with severe back pain, found to have a epidural abscess     Epidural abscess  Patient with worsening left-sided leg pain and back pain onset a few days postop, MRI showed CSF leak versus infection or DuraSeal displacement and patient was directly admitted on 3/28   Status post exploration of left lumbar 3-4 hemilaminectomy, L4-5 left hemilactamectomy with epidural abscess evacuation postoperative day 1  No further left leg pain or back pain, has drain in place  Cultures pending, negative to date   ID continuing cefepime and Vanco,    Postoperative urine retention  Patient passed void trial prior to previous discharge however return to the ER a few hours after discharge with urine retention, had Tobias placed  Has urology visit scheduled for next week, Tobias to remain in place until that time     Opioid-induced constipation  No bowel movement in 8 days.  Has tried numerous things including suppository and enema without significant output.  Took mag citrate, now having bowel movements.  Unremarkable abdominal exam  Continue senna and MiraLAX    History of stroke  With mild residual left leg weakness and some word finding  difficulty  Holding home aspirin and Plavix as above  Continue home Crestor.  He was started on Depakote for altered mental status at the time of his stroke and has continued on it since then, can continue home Depakote here  Stable.  Restart aspirin and Plavix when okay with neurosurgery     Type 2 diabetes  Home regimen as metformin 1000 mg daily  Hold Metformin, continue sliding scale, blood sugars controlled     Essential hypertension  Continue home lisinopril, restart amlodipine     #Hypothyroidism,   Continue home Synthroid         Diet: Moderate Consistent Carb (60 g CHO per Meal) Diet    DVT Prophylaxis: Defer to neurosurgery service  Tobias Catheter: PRESENT, indication: Retention  Central Lines: None  Code Status: Full Code      Disposition Plan   Expected Discharge:   3 to 5 days depending on surgical cultures, antibiotic plan, surgical progression           The patient's care was discussed with the Bedside Nurse, Care Coordinator/, Patient, Patient's Family and Infectious disease Consultant. for total time 35 minutes with greater than 50% of total time spent in counseling and coordination of care.    CRUZ HARE MD  Hospitalist Service  [facility]  Securely message with the Vocera Web Console (learn more here)  Text page via [employer] Paging/Directory        Clinically Significant Risk Factors Present on Admission             # Obesity: Estimated body mass index is 31.46 kg/m  as calculated from the following:    Height as of an earlier encounter on 3/28/22: 1.829 m (6').    Weight as of an earlier encounter on 3/28/22: 105.2 kg (232 lb).      ______________________________________________________________________    Interval History   Remainder of 12 point review of systems negative except as noted below    Subjective:  Patient feeling better, left leg pain resolved, no weakness.  Having multiple bowel movements, no melena or hematochezia.  No chest pain or shortness  of breath.       Was having severe back pain and left leg pain.  .  Has mild right foot drop from previous stroke.  Having bowel movements.    Data reviewed today: I reviewed all medications, new labs and imaging results over the last 24 hours.     Physical Exam   Vital Signs: Temp: 98.1  F (36.7  C) Temp src: Oral BP: (!) 144/78 Pulse: 62   Resp: 18 SpO2: 95 % O2 Device: None (Room air)    Weight: 0 lbs 0 oz  Physical Exam:  Temp:  [97.6  F (36.4  C)-98.2  F (36.8  C)] 98.1  F (36.7  C)  Pulse:  [62-88] 62  Resp:  [16-18] 18  BP: (127-158)/(66-88) 144/78  SpO2:  [94 %-95 %] 95 %    BP (!) 144/78 (BP Location: Right arm)   Pulse 62   Temp 98.1  F (36.7  C) (Oral)   Resp 18   SpO2 95%   General appearance: alert, appears stated age and cooperative  Head: Normocephalic, without obvious abnormality, atraumatic  Eyes: Clear conjuctiva  Neck: no JVD and supple, symmetrical, trachea midline  Lungs: clear to auscultation bilaterally  Heart: regular rate and rhythm, S1, S2 normal, no murmur, click, rub or gallop  Abdomen: soft, non-tender; bowel sounds normal; no masses,  no organomegaly  Extremities: Negative homans,   Skin: Skin color, texture, turgor normal. No rashes or lesions  Neurologic: Mental status: Alert, oriented, thought content appropriate  Cranial nerves: normal  Sensory: normal  Motor:grossly normal  AMELIA drain putting out serosanguineous red fluid        Data   Recent Labs   Lab 03/30/22  1126 03/30/22  0708 03/30/22  0635 03/29/22  2114 03/29/22  1805 03/28/22 2037 03/28/22 1948 03/23/22 2035   WBC  --   --  6.0  --   --   --  6.2 12.1*   HGB  --   --  10.2*  --   --   --  11.9* 13.6   MCV  --   --  102*  --   --   --  100 99   PLT  --   --  230  --   --   --  245 152   INR  --   --   --   --   --   --  1.01  --    NA  --   --  140  --   --   --  140 136   POTASSIUM  --   --  4.1  --   --   --  3.5 3.9   CHLORIDE  --   --  110*  --   --   --  100 103   CO2  --   --  24  --   --   --  28 21*   BUN  --    --  16  --   --   --  17 16   CR  --   --  0.81  --   --   --  0.95 1.15   ANIONGAP  --   --  6  --   --   --  12 12   CHARAN  --   --  7.9*  --   --   --  9.1 9.1   GLC 88 90 111   < >  --    < > 113 162*   ALBUMIN  --   --   --   --  3.1*  --   --   --    PROTTOTAL  --   --   --   --  6.4  --   --   --    BILITOTAL  --   --   --   --  0.4  --   --   --    ALKPHOS  --   --   --   --  73  --   --   --    ALT  --   --   --   --  78*  --   --   --    AST  --   --   --   --  33  --   --   --     < > = values in this interval not displayed.     No results found for this or any previous visit (from the past 24 hour(s)).

## 2022-03-30 NOTE — PROGRESS NOTES
[facility]    Medicine Progress Note - Hospitalist Service       Date of Admission:  3/28/2022  Active Problems:    HTN (hypertension)    Hypothyroidism    History of stroke    Type 2 diabetes mellitus without complication, without long-term current use of insulin (H)    Lumbar radiculopathy    Spinal stenosis of lumbar region, unspecified whether neurogenic claudication present    Urine retention    Therapeutic opioid induced constipation     Assessment & Plan          Raghavendra Kiser is a 62 year old male admitted on 3/28/2022. He has history of diabetes, hypertension, gout, stroke, hyperlipidemia, recent L3-L4 hemilaminectomy, and durotomy repair with DuraSeal on 3/21 by Dr. Duque directly admitted on recommendation of neurosurgery for evaluation of suspected CSF leak with severe back pain, found to have a epidural abscess     Epidural abscess  Patient with worsening left-sided leg pain and back pain onset a few days postop, MRI showed CSF leak versus infection or DuraSeal displacement and patient was directly admitted on 3/28   Status post exploration of left lumbar 3-4 hemilaminectomy, L4-5 left hemilactamectomy with epidural abscess evacuation postoperative day 0  Feeling much better.  ID starting cefepime and Vanco, intraoperative cultures pending    Postoperative urine retention  Patient passed void trial prior to previous discharge however return to the ER a few hours after discharge with urine retention, had Tobias placed  Has urology visit scheduled for next week, Tobias to remain in place until that time     Opioid-induced constipation  No bowel movement in 8 days.  Has tried numerous things including suppository and enema without significant output.  Took mag citrate, now having bowel movements.  Unremarkable abdominal exam  Continue senna and MiraLAX    #History of stroke  With mild residual left leg weakness and some word finding difficulty  Holding home aspirin and Plavix  as above  Continue home Crestor.  He was started on Depakote for altered mental status at the time of his stroke and has continued on it since then, can continue home Depakote here  Stable.  Restart aspirin and Plavix when okay with neurosurgery     Type 2 diabetes  Home regimen as metformin 1000 mg daily  Hold Metformin, continue sliding scale, blood sugars controlled     Essential hypertension  Well-controlled  Continue home lisinopril, holding amlodipine     #Hypothyroidism,   Continue home Synthroid         Diet: Moderate Consistent Carb (60 g CHO per Meal) Diet    DVT Prophylaxis: Defer to neurosurgery service  Tobias Catheter: PRESENT, indication: Retention  Central Lines: None  Code Status: Full Code      Disposition Plan   Expected Discharge:   3 to 5 days depending on surgical cultures, antibiotic plan, surgical progression           The patient's care was discussed with the Bedside Nurse, Care Coordinator/, Patient, Patient's Family and Infectious disease Consultant. for total time 35 minutes with greater than 50% of total time spent in counseling and coordination of care.    CRUZ HARE MD  Hospitalist Service  [facility]  Securely message with the Vocera Web Console (learn more here)  Text page via [employer] Paging/Directory        Clinically Significant Risk Factors Present on Admission             # Obesity: Estimated body mass index is 31.46 kg/m  as calculated from the following:    Height as of an earlier encounter on 3/28/22: 1.829 m (6').    Weight as of an earlier encounter on 3/28/22: 105.2 kg (232 lb).      ______________________________________________________________________    Interval History   Remainder of 12 point review of systems negative except as noted below    Subjective:  Patient doing well.  No chest pain or shortness of breath.  Was having severe back pain and left leg pain.  No leg pain currently, mild back pain.  No paresthesia.  Has mild  right foot drop from previous stroke.  Having bowel movements.    Data reviewed today: I reviewed all medications, new labs and imaging results over the last 24 hours.     Physical Exam   Vital Signs: Temp: 98  F (36.7  C) Temp src: Axillary BP: 132/74 Pulse: 76   Resp: 18 SpO2: 94 % O2 Device: None (Room air) Oxygen Delivery: 2 LPM  Weight: 0 lbs 0 oz  Physical Exam:  Temp:  [97.3  F (36.3  C)-98.6  F (37  C)] 98  F (36.7  C)  Pulse:  [68-76] 76  Resp:  [16-18] 18  BP: (126-171)/(65-80) 132/74  SpO2:  [92 %-96 %] 94 %    /74 (BP Location: Right arm)   Pulse 76   Temp 98  F (36.7  C) (Axillary)   Resp 18   SpO2 94%   General appearance: alert, appears stated age and cooperative  Head: Normocephalic, without obvious abnormality, atraumatic  Eyes: Clear conjuctiva  Neck: no JVD and supple, symmetrical, trachea midline  Lungs: clear to auscultation bilaterally  Heart: regular rate and rhythm, S1, S2 normal, no murmur, click, rub or gallop  Abdomen: soft, non-tender; bowel sounds normal; no masses,  no organomegaly  Extremities: Negative homans,   Skin: Skin color, texture, turgor normal. No rashes or lesions  Neurologic: Mental status: Alert, oriented, thought content appropriate  Cranial nerves: normal  Sensory: normal  Motor:grossly normal          Data   Recent Labs   Lab 03/29/22  1805 03/29/22  1715 03/29/22  1150 03/29/22  1021 03/28/22 2037 03/28/22 1948 03/23/22 2035   WBC  --   --   --   --   --  6.2 12.1*   HGB  --   --   --   --   --  11.9* 13.6   MCV  --   --   --   --   --  100 99   PLT  --   --   --   --   --  245 152   INR  --   --   --   --   --  1.01  --    NA  --   --   --   --   --  140 136   POTASSIUM  --   --   --   --   --  3.5 3.9   CHLORIDE  --   --   --   --   --  100 103   CO2  --   --   --   --   --  28 21*   BUN  --   --   --   --   --  17 16   CR  --   --   --   --   --  0.95 1.15   ANIONGAP  --   --   --   --   --  12 12   CHARAN  --   --   --   --   --  9.1 9.1   GLC  --  157*  161* 150*   < > 113 162*   ALBUMIN 3.1*  --   --   --   --   --   --    PROTTOTAL 6.4  --   --   --   --   --   --    BILITOTAL 0.4  --   --   --   --   --   --    ALKPHOS 73  --   --   --   --   --   --    ALT 78*  --   --   --   --   --   --    AST 33  --   --   --   --   --   --     < > = values in this interval not displayed.     Recent Results (from the past 24 hour(s))   Lateral Lumbar Spine for Level Confirmation [XR LUMBAR SPINE PORT 1  VIEW]    Narrative    EXAM: XR CROSSTABLE LATERAL LUMBAR SPINE PORTABLE  LOCATION: [facility]  DATE/TIME: 3/29/2022 8:08 AM    INDICATION: In OR for confirmation of spine level by C arm or hard plate.  COMPARISON: Lumbar spine MRI 03/28/2022  TECHNIQUE: CR Lumbar Spine.      Impression    IMPRESSION: Single crosstable lateral radiograph of the lumbar spine. 0810 hours. 03/29/2022.    Assuming 5 lumbar type vertebrae. Skin retractors in place.     The more superior surgical probe tip projects over the posterior elements corresponding to the level of the L4-L5 intervertebral disc space.     The more caudal surgical probe tip projects over the posterior elements corresponding to the L5 pedicle level.

## 2022-03-30 NOTE — PROGRESS NOTES
03/30/22 0845   Quick Adds   Type of Visit Initial Occupational Therapy Evaluation   Living Environment   People in Home spouse   Current Living Arrangements house   Home Accessibility stairs to enter home   Number of Stairs, Main Entrance 3   Stair Railings, Main Entrance railing on right side (ascending)   Self-Care   Usual Activity Tolerance excellent   Instrumental Activities of Daily Living (IADL)   Previous Responsibilities meal prep;housekeeping;laundry;yardwork;medication management;finances;driving   General Information   Onset of Illness/Injury or Date of Surgery 03/28/22   Referring Physician lizbeth Cardona   Patient/Family Therapy Goal Statement (OT) return home   Cognitive Status Examination   Orientation Status orientation to person, place and time   Range of Motion Comprehensive   General Range of Motion no range of motion deficits identified   Bed Mobility   Bed Mobility supine-sit   Supine-Sit [place] (Bed Mobility) modified independence   Assistive Device (Bed Mobility) bed rails   Transfers   Transfers bed-chair transfer;sit-stand transfer   Transfer Skill: Bed to Chair/Chair to Bed   Bed-Chair [place] (Transfers) contact guard   Assistive Device (Bed-Chair Transfers) rolling walker   Sit-Stand Transfer   Sit-Stand [place] (Transfers) contact guard   Clinical Impression   Criteria for Skilled Therapeutic Interventions Met (OT) Yes, treatment indicated   OT Diagnosis decreased adl's   Assessment of Occupational Performance 1-3 Performance Deficits   Planned Therapy Interventions (OT) ADL retraining;strengthening;transfer training   Clinical Decision Making Complexity (OT) moderate complexity   Risk & Benefits of therapy have been explained evaluation/treatment results reviewed   OT Discharge Planning   OT Discharge Recommendation (DC Rec) home with assist   OT Rationale for DC Rec homemaking assist/driving assist   Total Evaluation Time (Minutes)   Total Evaluation Time (Minutes)  12   OT Goals   Therapy Frequency (OT) 2 times/day   OT Predicated Duration/Target Date for Goal Attainment 04/01/22   OT Goals Hygiene/Grooming;Lower Body Dressing;Transfers   OT: Hygiene/Grooming supervision/stand-by assist;while standing   OT: Lower Body Dressing Modified independent;using adaptive equipment   OT: Transfer Supervision/stand-by assist

## 2022-03-30 NOTE — PROGRESS NOTES
INFECTIOUS DISEASE FOLLOW UP NOTE    Date: 2022   CHIEF COMPLAINT: No chief complaint on file.       ASSESSMENT:  1. Epidural abscess: following L3-L4 hemilaminectomy and durotomy repair with DuraSeal 3/21. There was concern for CSF leak however OR notes epidural abscess, GS with WBC, cultures are pending-NGTD at day 1. CRP elevated although improved from surgery. Active issue.   2. Comorbid conditions affecting immune system: DM2 on metformin, hx CVA    PLAN:  - continue cefepime IV  - vancomycin IV  - follow OR cultures-NGTD  - further recommendations to follow clinical course  - ID will follow, thanks    Mary Ellen Taylor MD  [facility]   On-Call: [phone]     ______________________________________________________________________    SUBJECTIVE / INTERVAL HISTORY: feels great. Legs feel the best they've been recently and he's looking forward to walking the hallway. Was constipated, given tons of stool softeners and now getting results. Appetite ok. No rashes. Tolerating antibiotics.     ROS: All other systems negative except as listed above.    SH/FH/Habits/PMH reviewed and unchanged.    OBJECTIVE:  BP (!) 144/78 (BP Location: Right arm)   Pulse 62   Temp 98.1  F (36.7  C) (Oral)   Resp 18   SpO2 95%      Resp: 18      Vital Signs  Temp: 98.1  F (36.7  C)  Temp src: Oral  Resp: 18  Pulse: 62  Pulse Rate Source: Monitor  BP: (!) 144/78  BP Location: Right arm    Temp (24hrs), Av.7  F (36.5  C), Min:97.3  F (36.3  C), Max:98.2  F (36.8  C)      GEN: No acute distress.    RESPIRATORY:  Normal breathing pattern  CARDIOVASCULAR:  Regular  ABDOMEN:  Soft, normal bowel sounds, non-tender,   EXTREMITIES: No edema.  SKIN/HAIR/NAILS:  No rashes  IV: peripheral IV        Antibiotics:  Cefepime   vanc    Pertinent labs:  CRP   Date Value Ref Range Status   2022 4.3 (H) 0.0-<0.8 mg/dL Final      CBC RESULTS:   Recent Labs   Lab Test 22  0635   WBC 6.0   RBC 3.03*   HGB  10.2*   HCT 30.9*   *   MCH 33.7*   MCHC 33.0   RDW 13.2         Last Comprehensive Metabolic Panel:  Sodium   Date Value Ref Range Status   03/30/2022 140 136 - 145 mmol/L Final     Potassium   Date Value Ref Range Status   03/30/2022 4.1 3.5 - 5.0 mmol/L Final     Chloride   Date Value Ref Range Status   03/30/2022 110 (H) 98 - 107 mmol/L Final     Carbon Dioxide (CO2)   Date Value Ref Range Status   03/30/2022 24 22 - 31 mmol/L Final     Anion Gap   Date Value Ref Range Status   03/30/2022 6 5 - 18 mmol/L Final     Glucose   Date Value Ref Range Status   03/30/2022 111 70 - 125 mg/dL Final     GLUCOSE BY METER POCT   Date Value Ref Range Status   03/30/2022 90 70 - 99 mg/dL Final     Urea Nitrogen   Date Value Ref Range Status   03/30/2022 16 8 - 22 mg/dL Final     Creatinine   Date Value Ref Range Status   03/30/2022 0.81 0.70 - 1.30 mg/dL Final     GFR Estimate   Date Value Ref Range Status   03/30/2022 >90 >60 mL/min/1.73m2 Final     Comment:     Effective December 21, 2021 eGFRcr in adults is calculated using the 2021 CKD-EPI creatinine equation which includes age and gender (Cherri et al., NEJ, DOI: 10.1056/RPEEdh1497744)   05/28/2021 >60 >60 mL/min/1.73m2 Final     Calcium   Date Value Ref Range Status   03/30/2022 7.9 (L) 8.5 - 10.5 mg/dL Final        MICROBIOLOGY DATA:  Personally reviewed.  3/28 BC pending  3/29 OR cultures pending, GS with WBC-NGTD at less than 1 day    RADIOLOGY:  Personally Reviewed.  No results found for this or any previous visit (from the past 24 hour(s)).    Active Problems:    HTN (hypertension)    Hypothyroidism    History of stroke    Type 2 diabetes mellitus without complication, without long-term current use of insulin (H)    Lumbar radiculopathy    Spinal stenosis of lumbar region, unspecified whether neurogenic claudication present    Urine retention    Therapeutic opioid induced constipation

## 2022-03-30 NOTE — OP NOTE
NEUROSURGERY OPERATIVE REPORT    DATE OF SERVICE: 3/30/2022    PREOPERATIVE DIAGNOSES:  Lumbar radiculopathy   Spinal stenosis of lumbar region due to new collection left L4-S1     POSTOPERATIVE DIAGNOSES:  Same with addition of epidural abscess     PROCEDURES:  1.  Exploration of prior left lumbar 3-lumbar 4 hemilaminectomy space with lumbar 3-lumbar 5 left hemilaminectomy for epidural abscess evaucation  2.  Use of operating room microscope.     SURGEON:  Abena Duque MD    ASSISTANT: MARIA ISABEL Whaley    INDICATIONS:  Raghavendra Kiser is a 62 year old S/P Bilateral lumbar 3- lumbar 4 hemilaminectomies, medial facetectomies, foraminotomies and microdiscectomies with small durotomy on right with Dr Duque 3/21/2022. Pre-op had more right leg symptoms than left. Symptoms improved following surgery with worsening new left leg pain in last 1-2 days. MRI obtained which showed a new contrast enhancing collection mostly centered at left L5 extending form L4 to S1.This is causing severe spinal stenosis at these levels. We discussed exploration and extension of his prior hemilaminectomy for evacuation of the collection and possible re-repair of CSF leak. Risks and benefits dicussed and he agreed to proceed.      PROCEDURE:  After obtaining informed consent, the patient was brought to the operating room with TEDs and pneumatic stockings in place.  IV antibiotics was administered.  He was intubated under general endotracheal anesthesia.  He was turned into the radiolucent laminectomy frame with all pressure points well padded.  He was prepped and draped in the usual sterile fashion.  A preincisional infiltration of local anesthetic was performed along the midline and the incision was opened sharply and extended down to the fascia.  The fascia was reopened in one layer with monopolar electrocautery.  As we opened the wound we noted a thickened material that appeared somewhat purulent. This was sent for culture  and listed as superficial wound cultures. We then explored both hemilaminectomy areas. No hawa csf was seen. We then extended the left hemilaminectomy to the inferior L5 level with    Kerrisons and a Midas drill after bringing in the intraoperative microscope. We drilled down to the ligamentum elevated the ligamentum from the underlying thecal sac and removed it with a combination of Kerrisons and curettes.  We noted a large organized thickened material that seemed most consistent with infection. No fluid was seen consistent with CSF. This was cultured as well and then removed with microdissection. The right hemilaminectomy defect was observed several times without any egress of csf. A valsalva was also performed without any egress of csf. No csf was also seen from the left hemilaminectomy defect at any point in the case as well. The wound was irrigated copiously with antibiotic containing irrigation. His tissues were very friable as well and hemostasis was achieved.     An assistant was needed for positioning, retraction and closure.     The incisions were closed in layers with interrupted 0 Vicryl to approximate the muscle and fascia, inverted 2-0 Vicryl to approximate the subcutaneous tissues and Ethilion to approximate the skin edges. Sponge and needle counts were correct prior to closure x2.  Sterile dressings were placed.      Patient tolerated the procedure well, was taken to the recovery room where he was extubated and found to be at his neurological baseline with no new deficits appreciated.    Estimated blood loss: 50 cc    Specimens:  ID Type Source Tests Collected by Time Destination   A : SUPERFICIAL WOUND ON THE BACK Wound Back, Lower ANAEROBIC BACTERIAL CULTURE ROUTINE, GRAM STAIN, AEROBIC BACTERIAL CULTURE ROUTINE Abena Duque MD 3/29/2022  8:18 AM    B : LEFT LUMBAR 5 EPIDURAL SPACE Wound Back, Lower ANAEROBIC BACTERIAL CULTURE ROUTINE, GRAM STAIN, AEROBIC BACTERIAL CULTURE ROUTINE  Abena Duque MD 3/29/2022  8:37 AM    C : LEFT LUMBAR 5 EPIDURAL SPACE Wound Back, Lower ANAEROBIC BACTERIAL CULTURE ROUTINE, GRAM STAIN, AEROBIC BACTERIAL CULTURE ROUTINE Abena Duque MD 3/29/2022  8:41 AM        Implants: none    Findings:   Large likely epidural abscess L4- L5/1 interspace. No csf leak observed at right L3-4 with valsalva. Area was unhealed but no abscess or csf leak was observed at side       Abena Duque MD    Cc: Luis Daniel Maciel

## 2022-03-30 NOTE — PLAN OF CARE
Problem: Plan of Care - These are the overarching goals to be used throughout the patient stay.    Goal: Plan of Care Review/Shift Note  Description: The Plan of Care Review/Shift note should be completed every shift.  The Outcome Evaluation is a brief statement about your assessment that the patient is improving, declining, or no change.  This information will be displayed automatically on your shift note.  Outcome: Ongoing, Progressing   Goal Outcome Evaluation:        Pt up walking halls and working with therapy. Pt not wanting narcotics, using tylenol for rubio. Wilfredo carmichael

## 2022-03-30 NOTE — CONSULTS
Care Management Initial Consult    General Information  Assessment completed with: Patient, patient (patient)  Type of CM/SW Visit: Initial Assessment    Primary Care Provider verified and updated as needed: Yes   Readmission within the last 30 days:        Reason for Consult: discharge planning  Advance Care Planning:            Communication Assessment  Patient's communication style: spoken language (English or Bilingual)    Hearing Difficulty or Deaf: no   Wear Glasses or Blind: yes    Cognitive  Cognitive/Neuro/Behavioral: WDL  Level of Consciousness: alert  Arousal Level: arouses to voice     Mood/Behavior: behavior appropriate to situation          Living Environment:   People in home: child(anna marie), adult, spouse  son Juan and wife Loren  Current living Arrangements: house      Able to return to prior arrangements: yes       Family/Social Support:  Care provided by: self  Provides care for:       Wife          Description of Support System:           Current Resources:   Patient receiving home care services: No     Community Resources:    Equipment currently used at home: none  Supplies currently used at home:      Employment/Financial:  Employment Status:          Financial Concerns:             Lifestyle & Psychosocial Needs:  Social Determinants of Health     Tobacco Use: Medium Risk     Smoking Tobacco Use: Former Smoker     Smokeless Tobacco Use: Never Used   Alcohol Use: Not on file   Financial Resource Strain: Not on file   Food Insecurity: Not on file   Transportation Needs: Not on file   Physical Activity: Not on file   Stress: Not on file   Social Connections: Not on file   Intimate Partner Violence: Not on file   Depression: Not at risk     PHQ-2 Score: 1   Housing Stability: Not on file       Functional Status:  Prior to admission patient needed assistance:              Mental Health Status:          Chemical Dependency Status:                Values/Beliefs:  Spiritual, Cultural Beliefs, Zoroastrianism  Practices, Values that affect care:                 Additional Information:  Chart reviewed. PT recommendations home with assist.    Met with patient to introduce care management role, progression of care, and possible services at discharge. Pt retired, lives with spouse, adult son and girlfriend also lives with pt. Patient is independent with ADL's/IDL's at baseline. Patient denies care management needs. Plan is to return home with outpatient follow up. Family will provide transport at discharge.    Nicole Macedo RN

## 2022-03-30 NOTE — PLAN OF CARE
Physical Therapy Discharge Summary    Reason for therapy discharge:    All goals and outcomes met, no further needs identified.    Progress towards therapy goal(s). See goals on Care Plan in [facility] electronic health record for goal details.  Goals met    Therapy recommendation(s):    Continue home exercise program.

## 2022-03-30 NOTE — PLAN OF CARE
"  Problem: Plan of Care - These are the overarching goals to be used throughout the patient stay.    Goal: Plan of Care Review/Shift Note  Description: The Plan of Care Review/Shift note should be completed every shift.  The Outcome Evaluation is a brief statement about your assessment that the patient is improving, declining, or no change.  This information will be displayed automatically on your shift note.  3/30/2022 1858 by Hali Kelsey  Outcome: Ongoing, Progressing  Flowsheets (Taken 3/30/2022 1858)  Plan of Care Reviewed With:   patient   spouse  Overall Patient Progress: improving  3/30/2022 1855 by Hali Kelsey  Outcome: Ongoing, Progressing  Flowsheets (Taken 3/30/2022 1855)  Plan of Care Reviewed With:   patient   spouse  Overall Patient Progress: improving  Goal: Patient-Specific Goal (Individualized)  Description: You can add care plan individualizations to a care plan. Examples of Individualization might be:  \"Parent requests to be called daily at 9am for status\", \"I have a hard time hearing out of my right ear\", or \"Do not touch me to wake me up as it startles me\".  3/30/2022 1858 by Hali Kelsey  Outcome: Ongoing, Progressing  3/30/2022 1855 by Hali Kelsey  Outcome: Ongoing, Progressing  Goal: Absence of Hospital-Acquired Illness or Injury  3/30/2022 1858 by Hali Kelsey  Outcome: Ongoing, Progressing  3/30/2022 1855 by Hali Kelsey  Outcome: Ongoing, Progressing  Intervention: Prevent Skin Injury  Recent Flowsheet Documentation  Taken 3/30/2022 1815 by Hali Kelsey  Body Position: position changed independently  Intervention: Prevent and Manage VTE (Venous Thromboembolism) Risk  Recent Flowsheet Documentation  Taken 3/30/2022 1815 by Hali Kelsey  Activity Management: standing at bedside  Goal: Optimal Comfort and Wellbeing  3/30/2022 1858 by Hali Kelsey  Outcome: Ongoing, Progressing  3/30/2022 1855 by Hali Kelsey  Outcome: Ongoing, " Progressing  Goal: Readiness for Transition of Care  3/30/2022 1858 by Hali Kelsey  Outcome: Ongoing, Progressing  3/30/2022 1855 by Hali Kelsey  Outcome: Ongoing, Progressing     Problem: Bowel Motility Impaired (Spinal Surgery)  Goal: Effective Bowel Elimination  3/30/2022 1858 by Hali Kelsey  Outcome: Ongoing, Progressing  3/30/2022 1855 by Hali Kelsey  Outcome: Ongoing, Progressing     Problem: Fluid and Electrolyte Imbalance (Spinal Surgery)  Goal: Fluid and Electrolyte Balance  3/30/2022 1858 by Hali Kelsey  Outcome: Ongoing, Progressing  3/30/2022 1855 by Hali Kelsey  Outcome: Ongoing, Progressing     Problem: Functional Ability Impaired (Spinal Surgery)  Goal: Optimal Functional Ability  3/30/2022 1858 by Hali Kelsey  Outcome: Ongoing, Progressing  3/30/2022 1855 by Hali Kelsey  Outcome: Ongoing, Progressing  Intervention: Optimize Functional Status  Recent Flowsheet Documentation  Taken 3/30/2022 1815 by Hali Kelsey  Activity Management: standing at bedside     Problem: Pain Acute  Goal: Acceptable Pain Control and Functional Ability  3/30/2022 1858 by Hali Kelsey  Outcome: Ongoing, Progressing  3/30/2022 1855 by Hali Kelsey  Outcome: Ongoing, Progressing     Problem: Infection  Goal: Absence of Infection Signs and Symptoms  Outcome: Ongoing, Progressing   Goal Outcome Evaluation:    Plan of Care Reviewed With: patient, spouse     Overall Patient Progress: improving

## 2022-03-31 LAB
ENTEROCOCCUS FAECALIS: NOT DETECTED
ENTEROCOCCUS FAECIUM: NOT DETECTED
ERYTHROCYTE [DISTWIDTH] IN BLOOD BY AUTOMATED COUNT: 13.2 % (ref 10–15)
GLUCOSE BLDC GLUCOMTR-MCNC: 109 MG/DL (ref 70–99)
GLUCOSE BLDC GLUCOMTR-MCNC: 111 MG/DL (ref 70–99)
GLUCOSE BLDC GLUCOMTR-MCNC: 115 MG/DL (ref 70–99)
GLUCOSE BLDC GLUCOMTR-MCNC: 83 MG/DL (ref 70–99)
GLUCOSE BLDC GLUCOMTR-MCNC: 85 MG/DL (ref 70–99)
GLUCOSE BLDC GLUCOMTR-MCNC: 90 MG/DL (ref 70–99)
HCT VFR BLD AUTO: 29.1 % (ref 40–53)
HGB BLD-MCNC: 9.8 G/DL (ref 13.3–17.7)
LISTERIA SPECIES (DETECTED/NOT DETECTED): NOT DETECTED
MCH RBC QN AUTO: 33.4 PG (ref 26.5–33)
MCHC RBC AUTO-ENTMCNC: 33.7 G/DL (ref 31.5–36.5)
MCV RBC AUTO: 99 FL (ref 78–100)
PLATELET # BLD AUTO: 239 10E3/UL (ref 150–450)
RBC # BLD AUTO: 2.93 10E6/UL (ref 4.4–5.9)
STAPHYLOCOCCUS AUREUS: NOT DETECTED
STAPHYLOCOCCUS EPIDERMIDIS: NOT DETECTED
STAPHYLOCOCCUS LUGDUNENSIS: NOT DETECTED
STAPHYLOCOCCUS SPECIES: DETECTED
STREPTOCOCCUS AGALACTIAE: NOT DETECTED
STREPTOCOCCUS ANGINOSUS GROUP: NOT DETECTED
STREPTOCOCCUS PNEUMONIAE: NOT DETECTED
STREPTOCOCCUS PYOGENES: NOT DETECTED
STREPTOCOCCUS SPECIES: NOT DETECTED
VANCOMYCIN SERPL-MCNC: 11.2 MG/L
WBC # BLD AUTO: 5.7 10E3/UL (ref 4–11)

## 2022-03-31 PROCEDURE — 250N000011 HC RX IP 250 OP 636: Performed by: INTERNAL MEDICINE

## 2022-03-31 PROCEDURE — 258N000003 HC RX IP 258 OP 636: Performed by: FAMILY MEDICINE

## 2022-03-31 PROCEDURE — 85014 HEMATOCRIT: CPT | Performed by: FAMILY MEDICINE

## 2022-03-31 PROCEDURE — 99233 SBSQ HOSP IP/OBS HIGH 50: CPT | Performed by: INTERNAL MEDICINE

## 2022-03-31 PROCEDURE — 99233 SBSQ HOSP IP/OBS HIGH 50: CPT | Performed by: FAMILY MEDICINE

## 2022-03-31 PROCEDURE — 250N000011 HC RX IP 250 OP 636: Performed by: FAMILY MEDICINE

## 2022-03-31 PROCEDURE — 250N000013 HC RX MED GY IP 250 OP 250 PS 637: Performed by: PHYSICIAN ASSISTANT

## 2022-03-31 PROCEDURE — 36415 COLL VENOUS BLD VENIPUNCTURE: CPT | Performed by: FAMILY MEDICINE

## 2022-03-31 PROCEDURE — 80202 ASSAY OF VANCOMYCIN: CPT | Performed by: FAMILY MEDICINE

## 2022-03-31 PROCEDURE — 250N000011 HC RX IP 250 OP 636: Performed by: NURSE PRACTITIONER

## 2022-03-31 PROCEDURE — 250N000013 HC RX MED GY IP 250 OP 250 PS 637: Performed by: HOSPITALIST

## 2022-03-31 PROCEDURE — 250N000013 HC RX MED GY IP 250 OP 250 PS 637: Performed by: FAMILY MEDICINE

## 2022-03-31 PROCEDURE — 258N000003 HC RX IP 258 OP 636: Performed by: INTERNAL MEDICINE

## 2022-03-31 PROCEDURE — 120N000001 HC R&B MED SURG/OB

## 2022-03-31 RX ORDER — METHOCARBAMOL 500 MG/1
500 TABLET, FILM COATED ORAL EVERY 6 HOURS PRN
Status: DISCONTINUED | OUTPATIENT
Start: 2022-03-31 | End: 2022-04-02 | Stop reason: HOSPADM

## 2022-03-31 RX ADMIN — POLYETHYLENE GLYCOL 3350 17 G: 17 POWDER, FOR SOLUTION ORAL at 08:33

## 2022-03-31 RX ADMIN — LISINOPRIL 20 MG: 20 TABLET ORAL at 21:12

## 2022-03-31 RX ADMIN — LEVOTHYROXINE SODIUM 125 MCG: 0.03 TABLET ORAL at 05:42

## 2022-03-31 RX ADMIN — CEFEPIME HYDROCHLORIDE 2 G: 2 INJECTION, POWDER, FOR SOLUTION INTRAVENOUS at 03:54

## 2022-03-31 RX ADMIN — LISINOPRIL 20 MG: 20 TABLET ORAL at 08:33

## 2022-03-31 RX ADMIN — ACETAMINOPHEN 975 MG: 325 TABLET ORAL at 03:53

## 2022-03-31 RX ADMIN — GABAPENTIN 300 MG: 300 CAPSULE ORAL at 13:54

## 2022-03-31 RX ADMIN — CEFEPIME HYDROCHLORIDE 2 G: 2 INJECTION, POWDER, FOR SOLUTION INTRAVENOUS at 18:46

## 2022-03-31 RX ADMIN — METHOCARBAMOL 500 MG: 500 TABLET, FILM COATED ORAL at 00:09

## 2022-03-31 RX ADMIN — DOCUSATE SODIUM 50 MG AND SENNOSIDES 8.6 MG 1 TABLET: 8.6; 5 TABLET, FILM COATED ORAL at 08:34

## 2022-03-31 RX ADMIN — GABAPENTIN 300 MG: 300 CAPSULE ORAL at 21:12

## 2022-03-31 RX ADMIN — ROSUVASTATIN CALCIUM 5 MG: 5 TABLET, FILM COATED ORAL at 08:34

## 2022-03-31 RX ADMIN — ACETAMINOPHEN 975 MG: 325 TABLET ORAL at 21:11

## 2022-03-31 RX ADMIN — VANCOMYCIN HYDROCHLORIDE 1500 MG: 5 INJECTION, POWDER, LYOPHILIZED, FOR SOLUTION INTRAVENOUS at 19:38

## 2022-03-31 RX ADMIN — DOCUSATE SODIUM 50 MG AND SENNOSIDES 8.6 MG 1 TABLET: 8.6; 5 TABLET, FILM COATED ORAL at 21:12

## 2022-03-31 RX ADMIN — THERA TABS 1 TABLET: TAB at 08:34

## 2022-03-31 RX ADMIN — METHOCARBAMOL 500 MG: 500 TABLET, FILM COATED ORAL at 05:42

## 2022-03-31 RX ADMIN — GABAPENTIN 300 MG: 300 CAPSULE ORAL at 08:34

## 2022-03-31 RX ADMIN — AMLODIPINE BESYLATE 5 MG: 5 TABLET ORAL at 08:34

## 2022-03-31 RX ADMIN — ACETAMINOPHEN 975 MG: 325 TABLET ORAL at 12:05

## 2022-03-31 RX ADMIN — ENOXAPARIN SODIUM 40 MG: 40 INJECTION SUBCUTANEOUS at 18:47

## 2022-03-31 RX ADMIN — ALLOPURINOL 300 MG: 300 TABLET ORAL at 08:34

## 2022-03-31 RX ADMIN — VANCOMYCIN HYDROCHLORIDE 1250 MG: 5 INJECTION, POWDER, LYOPHILIZED, FOR SOLUTION INTRAVENOUS at 04:00

## 2022-03-31 RX ADMIN — DIVALPROEX SODIUM 500 MG: 500 TABLET, EXTENDED RELEASE ORAL at 21:12

## 2022-03-31 ASSESSMENT — ACTIVITIES OF DAILY LIVING (ADL)
ADLS_ACUITY_SCORE: 8
ADLS_ACUITY_SCORE: 6
ADLS_ACUITY_SCORE: 8
ADLS_ACUITY_SCORE: 6
ADLS_ACUITY_SCORE: 8
ADLS_ACUITY_SCORE: 6
ADLS_ACUITY_SCORE: 8
ADLS_ACUITY_SCORE: 6
ADLS_ACUITY_SCORE: 6
ADLS_ACUITY_SCORE: 8
ADLS_ACUITY_SCORE: 8
ADLS_ACUITY_SCORE: 6
ADLS_ACUITY_SCORE: 8
ADLS_ACUITY_SCORE: 6
ADLS_ACUITY_SCORE: 6
ADLS_ACUITY_SCORE: 8

## 2022-03-31 NOTE — PLAN OF CARE
"  Problem: Plan of Care - These are the overarching goals to be used throughout the patient stay.    Goal: Plan of Care Review/Shift Note  Description: The Plan of Care Review/Shift note should be completed every shift.  The Outcome Evaluation is a brief statement about your assessment that the patient is improving, declining, or no change.  This information will be displayed automatically on your shift note.  Outcome: Ongoing, Progressing  Goal: Patient-Specific Goal (Individualized)  Description: You can add care plan individualizations to a care plan. Examples of Individualization might be:  \"Parent requests to be called daily at 9am for status\", \"I have a hard time hearing out of my right ear\", or \"Do not touch me to wake me up as it startles me\".  Outcome: Ongoing, Progressing  Goal: Absence of Hospital-Acquired Illness or Injury  Outcome: Ongoing, Progressing  Intervention: Identify and Manage Fall Risk  Recent Flowsheet Documentation  Taken 3/31/2022 0353 by Selam Woods RN  Safety Promotion/Fall Prevention: activity supervised  Taken 3/31/2022 0009 by Selam Woods RN  Safety Promotion/Fall Prevention: activity supervised  Intervention: Prevent Skin Injury  Recent Flowsheet Documentation  Taken 3/31/2022 0009 by Selam Woods, RN  Body Position:   position changed independently   left  Intervention: Prevent and Manage VTE (Venous Thromboembolism) Risk  Recent Flowsheet Documentation  Taken 3/31/2022 0009 by Selam Woods RN  Activity Management: activity encouraged  Goal: Optimal Comfort and Wellbeing  Outcome: Ongoing, Progressing  Intervention: Monitor Pain and Promote Comfort  Recent Flowsheet Documentation  Taken 3/31/2022 0009 by Selam Woods RN  Pain Management Interventions:   medication (see MAR)   cold applied   repositioned  Goal: Readiness for Transition of Care  Outcome: Ongoing, Progressing     Problem: Bowel Motility Impaired (Spinal Surgery)  Goal: Effective Bowel " Elimination  Outcome: Ongoing, Progressing     Problem: Fluid and Electrolyte Imbalance (Spinal Surgery)  Goal: Fluid and Electrolyte Balance  Outcome: Ongoing, Progressing     Problem: Functional Ability Impaired (Spinal Surgery)  Goal: Optimal Functional Ability  Outcome: Ongoing, Progressing  Intervention: Optimize Functional Status  Recent Flowsheet Documentation  Taken 3/31/2022 0009 by Selam Woods, RN  Activity Management: activity encouraged  Positioning/Transfer Devices: pillows     Problem: Pain Acute  Goal: Acceptable Pain Control and Functional Ability  Outcome: Ongoing, Progressing  Intervention: Develop Pain Management Plan  Recent Flowsheet Documentation  Taken 3/31/2022 0009 by Selam Woods, RN  Pain Management Interventions:   medication (see MAR)   cold applied   repositioned     Problem: Infection  Goal: Absence of Infection Signs and Symptoms  Outcome: Ongoing, Progressing   Goal Outcome Evaluation:      Pt a/o with pain controlled, rating 1-3 without any neuro deficits. Lab called with positive blood cultures Gram +Coccyx and will be running a rapid PCR which they will call the results back in about 2 hours. Hospitalist notified. Will monitor.

## 2022-03-31 NOTE — PROGRESS NOTES
Neurosurgery Progress Note:  3/31/2022     A/P: Mr. Kiser is a pleasant 62 year old right handed male POD#2 exploration of left L3-L4 hemilaminectomy space with left L4-L5 hemilaminectomy for evacuation of epidural abscess by Dr. Duque status post bilateral lumbar 3- lumbar 4 hemilaminectomies, medial facetectomies, foraminotomies and microdiscectomies with small durotomy on right on 3/21/2022     Presently he states that he is doing well. Denies any pain. Has had BM since surgery. Barragan in place for retention since 3/23. Leave in place per urology discussion with our team yesterday. They will follow up OP. Surgical cultures pending with no growth to date. Serum BC gram positive Cocci one of two bottles.     Discharge pending final cultures and plan from ID. Will resume plavix, aspirin 5-7 days post op.      Plan:  1. Increase activity as tolerated with PT/OT  2. Continue pain control with prn medications - has been requiring very little. Requests muscle relaxer be made as needed  3. Discontinue AMELIA drain   4. ID following   5. Keep barragan in place - see urology OP next week   6. SCDs, lovenox to start 48hrs postoperative      Neurosurgery Attending: The patient's clinical examination, laboratory data, and plan was discussed with Dr. Duque      HPI: Raghavendra Kiser is a 62 year old S/P Bilateral lumbar 3- lumbar 4 hemilaminectomies, medial facetectomies, foraminotomies and microdiscectomies with small durotomy on right with Dr Duque 3/21/2022. Pre-op had more right leg symptoms than left. Symptoms improved following surgery with worsening new left leg pain in last 1-2 days. MRI obtained which showed a new contrast enhancing collection mostly centered at left L5 extending form L4 to S1.This is causing severe spinal stenosis at these levels. We discussed exploration and extension of his prior hemilaminectomy for evacuation of the collection and possible re-repair of CSF leak. Risks and benefits dicussed and  he agreed to proceed.     S:   States that he is doing much better following surgery all of his previous radicular lower extremity pain is completely resolved. Denies numbness or tingling, had BM following surgery, barragan in place     O:  BP (!) 148/84 (BP Location: Right arm)   Pulse 64   Temp 97.9  F (36.6  C) (Oral)   Resp 18   SpO2 95%        General: Awake, alert, NAD. Sitting upright in bed comfortably      Motor: normal bulk and tone     Strength: full strength in all extremities throughout, bilaterally. Improvements of prior left plantar flexion weakness      Sensation: intact to light touch throughout both upper and lower extremities     Incision: changed dressing. Shadow drainage present on dressing. Wound is flat, well approximated. Skin far away from wound has erythema - irritation from tape     AMELIA drain: 10 ml, 12 ml the last two shifts. Will discontinue      AGUSTINA Marshall  [facility] Neurosurgery  O: [phone]

## 2022-03-31 NOTE — PLAN OF CARE
Problem: Plan of Care - These are the overarching goals to be used throughout the patient stay.    Goal: Plan of Care Review/Shift Note  Description: The Plan of Care Review/Shift note should be completed every shift.  The Outcome Evaluation is a brief statement about your assessment that the patient is improving, declining, or no change.  This information will be displayed automatically on your shift note.  Outcome: Ongoing, Progressing   Goal Outcome Evaluation:        Pt doing well with ambulation. Pt has barragan and AMELIA in place. Pt using tyleonol and robaxin for pain, prefers not to use narcotics. Continues on iv abx

## 2022-03-31 NOTE — PHARMACY-VANCOMYCIN DOSING SERVICE
Pharmacy Vancomycin Note  Date of Service 2022  Patient's  1960   62 year old, male    Indication: Epidural abscess  Day of Therapy:3  Current vancomycin regimen:  1250 mg IV q12h  Current vancomycin monitoring method: AUC  Current vancomycin therapeutic monitoring goal: 400-600 mg*h/L    [employer] Prediction of Current Vancomycin Regimen  Loading dose: 1750 mg at 00:00 2022.  Regimen: 1250 mg IV every 12 hours.  Start time: 18:21 on 2022  Exposure target: AUC24 (range)400-600 mg/L.hr   AUC24,ss: 437 mg/L.hr  Probability of AUC24 > 400: 69 %  Ctrough,ss: 12.9 mg/L  Probability of Ctrough,ss > 20: 4 %  Probability of nephrotoxicity (Lodise JEREMI ): 8 %    Current estimated CrCl = Estimated Creatinine Clearance: 115.5 mL/min (based on SCr of 0.81 mg/dL).    Creatinine for last 3 days  3/28/2022:  7:48 PM Creatinine 0.95 mg/dL  3/30/2022:  6:35 AM Creatinine 0.81 mg/dL    Recent Vancomycin Levels (past 3 days)  3/31/2022:  4:42 PM Vancomycin 11.2 mg/L    Vancomycin IV Administrations (past 72 hours)                   vancomycin (VANCOCIN) 1,250 mg in sodium chloride 0.9 % 250 mL intermittent infusion (mg) 1,250 mg New Bag 22 0400     1,250 mg New Bag 22 1713     1,250 mg New Bag  0544    vancomycin (VANCOCIN) 1,750 mg in sodium chloride 0.9 % 500 mL intermittent infusion (mg) 1,750 mg Given 22 1748                Nephrotoxins and other renal medications (From now, onward)    Start     Dose/Rate Route Frequency Ordered Stop    22 0500  vancomycin (VANCOCIN) 1,250 mg in sodium chloride 0.9 % 250 mL intermittent infusion         1,250 mg  over 90 Minutes Intravenous EVERY 12 HOURS 22 1631      22 2200  lisinopril (ZESTRIL) tablet 20 mg         20 mg Oral 2 TIMES DAILY 22 2130               Contrast Orders - past 72 hours (72h ago, onward)            None          Interpretation of levels and current regimen:  Vancomycin level is reflective of AUC  400-600    Has serum creatinine changed greater than 50% in last 72 hours: No    Urine output:  good urine output    Renal Function: Stable    [employer] Prediction of Planned New Vancomycin Regimen  Loading dose: 1750 mg at 00:00 03/29/2022.  Regimen: 1500 mg IV every 12 hours.  Start time: 18:21 on 03/31/2022  Exposure target: AUC24 (range)400-600 mg/L.hr   AUC24,ss: 524 mg/L.hr  Probability of AUC24 > 400: 93 %  Ctrough,ss: 15.8 mg/L  Probability of Ctrough,ss > 20: 16 %  Probability of nephrotoxicity (Lodise JEREMI 2009): 11 %    Plan:  1. Increase Dose to 1500 q 12hrs   2. Vancomycin monitoring method: AUC  3. Vancomycin therapeutic monitoring goal: 400-600 mg*h/L  4. Pharmacy will check vancomycin levels as appropriate in 1-3 Days.  5. Serum creatinine levels will be ordered daily for the first week of therapy and at least twice weekly for subsequent weeks.    MORALES ARCHULETA RPH

## 2022-03-31 NOTE — PROGRESS NOTES
INFECTIOUS DISEASE FOLLOW UP NOTE    Date: 2022   CHIEF COMPLAINT: No chief complaint on file.       ASSESSMENT:  1. Epidural abscess: following L3-L4 hemilaminectomy and durotomy repair with DuraSeal 3/21. There was concern for CSF leak however OR notes epidural abscess, GS with WBC, 2 cultures are growing s epi of unclear significance as this is common skin stephania and not routinely pathogenic. CRP elevated although improved from surgery. Active issue  2.  BC positive: staph species. contaminant.   3. Comorbid conditions affecting immune system: DM2 on metformin, hx CVA    PLAN:  - continue cefepime IV  - continue vancomycin IV  - follow OR cultures-2 with staph epi unclear significance, talked to micro  - further recommendations to follow clinical course  - ID will follow, thanks    Mary Ellen Taylor MD  [facility]   On-Call: [phone]     ______________________________________________________________________    SUBJECTIVE / INTERVAL HISTORY: eating lunch, pain in legs better. Tolerating antibiotics. Discussed lab results.      ROS: All other systems negative except as listed above.    SH/FH/Habits/PMH reviewed and unchanged.    OBJECTIVE:  BP (!) 148/84 (BP Location: Right arm)   Pulse 64   Temp 97.9  F (36.6  C) (Oral)   Resp 18   SpO2 95%      Resp: 18      Vital Signs  Temp: 98.1  F (36.7  C)  Temp src: Oral  Resp: 18  Pulse: 62  Pulse Rate Source: Monitor  BP: (!) 144/78  BP Location: Right arm    Temp (24hrs), Av.7  F (36.5  C), Min:97.3  F (36.3  C), Max:98.2  F (36.8  C)      GEN: No acute distress.    RESPIRATORY:  Normal breathing pattern  CARDIOVASCULAR:  Regular  ABDOMEN:  Soft, normal bowel sounds, non-tender,   EXTREMITIES: No edema.  SKIN/HAIR/NAILS:  No rashes  IV: peripheral IV        Antibiotics:  Cefepime   vanc    Pertinent labs:  CRP   Date Value Ref Range Status   2022 4.3 (H) 0.0-<0.8 mg/dL Final      CBC RESULTS:   Recent Labs   Lab Test  03/30/22  0635   WBC 6.0   RBC 3.03*   HGB 10.2*   HCT 30.9*   *   MCH 33.7*   MCHC 33.0   RDW 13.2         Last Comprehensive Metabolic Panel:  Sodium   Date Value Ref Range Status   03/30/2022 140 136 - 145 mmol/L Final     Potassium   Date Value Ref Range Status   03/30/2022 4.1 3.5 - 5.0 mmol/L Final     Chloride   Date Value Ref Range Status   03/30/2022 110 (H) 98 - 107 mmol/L Final     Carbon Dioxide (CO2)   Date Value Ref Range Status   03/30/2022 24 22 - 31 mmol/L Final     Anion Gap   Date Value Ref Range Status   03/30/2022 6 5 - 18 mmol/L Final     Glucose   Date Value Ref Range Status   03/30/2022 111 70 - 125 mg/dL Final     GLUCOSE BY METER POCT   Date Value Ref Range Status   03/31/2022 109 (H) 70 - 99 mg/dL Final     Urea Nitrogen   Date Value Ref Range Status   03/30/2022 16 8 - 22 mg/dL Final     Creatinine   Date Value Ref Range Status   03/30/2022 0.81 0.70 - 1.30 mg/dL Final     GFR Estimate   Date Value Ref Range Status   03/30/2022 >90 >60 mL/min/1.73m2 Final     Comment:     Effective December 21, 2021 eGFRcr in adults is calculated using the 2021 CKD-EPI creatinine equation which includes age and gender (Cherri et al., [place], DOI: 10.1056/XZEAcg6383842)   05/28/2021 >60 >60 mL/min/1.73m2 Final     Calcium   Date Value Ref Range Status   03/30/2022 7.9 (L) 8.5 - 10.5 mg/dL Final        MICROBIOLOGY DATA:  Personally reviewed.  3/28 BC 1/4 staph species-contaminant  3/29 OR cultures pending, GS with WBC-2 cultures are growing s epi    RADIOLOGY:  Personally Reviewed.  No results found for this or any previous visit (from the past 24 hour(s)).    Active Problems:    HTN (hypertension)    Hypothyroidism    History of stroke    Type 2 diabetes mellitus without complication, without long-term current use of insulin (H)    Lumbar radiculopathy    Spinal stenosis of lumbar region, unspecified whether neurogenic claudication present    Urine retention    Therapeutic opioid induced  constipation

## 2022-04-01 LAB
BACTERIA WND CULT: ABNORMAL
CREAT SERPL-MCNC: 0.74 MG/DL (ref 0.7–1.3)
ERYTHROCYTE [DISTWIDTH] IN BLOOD BY AUTOMATED COUNT: 13.2 % (ref 10–15)
GFR SERPL CREATININE-BSD FRML MDRD: >90 ML/MIN/1.73M2
GLUCOSE BLDC GLUCOMTR-MCNC: 100 MG/DL (ref 70–99)
GLUCOSE BLDC GLUCOMTR-MCNC: 125 MG/DL (ref 70–99)
GLUCOSE BLDC GLUCOMTR-MCNC: 83 MG/DL (ref 70–99)
HCT VFR BLD AUTO: 31.2 % (ref 40–53)
HGB BLD-MCNC: 10.8 G/DL (ref 13.3–17.7)
MCH RBC QN AUTO: 33.8 PG (ref 26.5–33)
MCHC RBC AUTO-ENTMCNC: 34.6 G/DL (ref 31.5–36.5)
MCV RBC AUTO: 98 FL (ref 78–100)
PLATELET # BLD AUTO: 275 10E3/UL (ref 150–450)
RBC # BLD AUTO: 3.2 10E6/UL (ref 4.4–5.9)
WBC # BLD AUTO: 5.1 10E3/UL (ref 4–11)

## 2022-04-01 PROCEDURE — 250N000013 HC RX MED GY IP 250 OP 250 PS 637: Performed by: HOSPITALIST

## 2022-04-01 PROCEDURE — 99233 SBSQ HOSP IP/OBS HIGH 50: CPT | Performed by: INTERNAL MEDICINE

## 2022-04-01 PROCEDURE — 36569 INSJ PICC 5 YR+ W/O IMAGING: CPT

## 2022-04-01 PROCEDURE — 99233 SBSQ HOSP IP/OBS HIGH 50: CPT | Performed by: FAMILY MEDICINE

## 2022-04-01 PROCEDURE — 250N000011 HC RX IP 250 OP 636: Performed by: NURSE PRACTITIONER

## 2022-04-01 PROCEDURE — 250N000013 HC RX MED GY IP 250 OP 250 PS 637: Performed by: FAMILY MEDICINE

## 2022-04-01 PROCEDURE — 36415 COLL VENOUS BLD VENIPUNCTURE: CPT | Performed by: INTERNAL MEDICINE

## 2022-04-01 PROCEDURE — 258N000003 HC RX IP 258 OP 636: Performed by: FAMILY MEDICINE

## 2022-04-01 PROCEDURE — 82565 ASSAY OF CREATININE: CPT | Performed by: INTERNAL MEDICINE

## 2022-04-01 PROCEDURE — 250N000011 HC RX IP 250 OP 636: Performed by: FAMILY MEDICINE

## 2022-04-01 PROCEDURE — 272N000450 HC KIT 4FR POWER PICC SINGLE LUMEN

## 2022-04-01 PROCEDURE — 120N000001 HC R&B MED SURG/OB

## 2022-04-01 PROCEDURE — 250N000013 HC RX MED GY IP 250 OP 250 PS 637: Performed by: PHYSICIAN ASSISTANT

## 2022-04-01 PROCEDURE — 250N000011 HC RX IP 250 OP 636: Performed by: INTERNAL MEDICINE

## 2022-04-01 PROCEDURE — 250N000009 HC RX 250: Performed by: INTERNAL MEDICINE

## 2022-04-01 PROCEDURE — 85027 COMPLETE CBC AUTOMATED: CPT | Performed by: FAMILY MEDICINE

## 2022-04-01 RX ORDER — ALLOPURINOL 300 MG/1
300 TABLET ORAL
Status: DISCONTINUED | OUTPATIENT
Start: 2022-04-03 | End: 2022-04-02 | Stop reason: HOSPADM

## 2022-04-01 RX ORDER — ASPIRIN 81 MG/1
81 TABLET ORAL DAILY
COMMUNITY
Start: 2022-04-03 | End: 2022-12-12

## 2022-04-01 RX ORDER — LIDOCAINE 40 MG/G
CREAM TOPICAL
Status: DISCONTINUED | OUTPATIENT
Start: 2022-04-01 | End: 2022-04-02 | Stop reason: HOSPADM

## 2022-04-01 RX ORDER — CLOPIDOGREL BISULFATE 75 MG/1
75 TABLET ORAL DAILY
Qty: 90 TABLET | Refills: 3 | Status: SHIPPED | OUTPATIENT
Start: 2022-04-05 | End: 2022-05-31

## 2022-04-01 RX ADMIN — VANCOMYCIN HYDROCHLORIDE 1500 MG: 5 INJECTION, POWDER, LYOPHILIZED, FOR SOLUTION INTRAVENOUS at 18:58

## 2022-04-01 RX ADMIN — CEFEPIME HYDROCHLORIDE 2 G: 2 INJECTION, POWDER, FOR SOLUTION INTRAVENOUS at 16:30

## 2022-04-01 RX ADMIN — DIVALPROEX SODIUM 500 MG: 500 TABLET, EXTENDED RELEASE ORAL at 21:17

## 2022-04-01 RX ADMIN — ACETAMINOPHEN 975 MG: 325 TABLET ORAL at 03:38

## 2022-04-01 RX ADMIN — DOCUSATE SODIUM 50 MG AND SENNOSIDES 8.6 MG 1 TABLET: 8.6; 5 TABLET, FILM COATED ORAL at 21:18

## 2022-04-01 RX ADMIN — POLYETHYLENE GLYCOL 3350 17 G: 17 POWDER, FOR SOLUTION ORAL at 07:59

## 2022-04-01 RX ADMIN — THERA TABS 1 TABLET: TAB at 07:59

## 2022-04-01 RX ADMIN — LISINOPRIL 20 MG: 20 TABLET ORAL at 08:00

## 2022-04-01 RX ADMIN — VANCOMYCIN HYDROCHLORIDE 1500 MG: 5 INJECTION, POWDER, LYOPHILIZED, FOR SOLUTION INTRAVENOUS at 06:36

## 2022-04-01 RX ADMIN — GABAPENTIN 300 MG: 300 CAPSULE ORAL at 08:00

## 2022-04-01 RX ADMIN — GABAPENTIN 300 MG: 300 CAPSULE ORAL at 14:21

## 2022-04-01 RX ADMIN — ROSUVASTATIN CALCIUM 5 MG: 5 TABLET, FILM COATED ORAL at 08:00

## 2022-04-01 RX ADMIN — CEFEPIME HYDROCHLORIDE 2 G: 2 INJECTION, POWDER, FOR SOLUTION INTRAVENOUS at 03:39

## 2022-04-01 RX ADMIN — LIDOCAINE HYDROCHLORIDE 1 ML: 10 INJECTION, SOLUTION EPIDURAL; INFILTRATION; INTRACAUDAL; PERINEURAL at 11:53

## 2022-04-01 RX ADMIN — AMLODIPINE BESYLATE 5 MG: 5 TABLET ORAL at 08:00

## 2022-04-01 RX ADMIN — GABAPENTIN 300 MG: 300 CAPSULE ORAL at 21:17

## 2022-04-01 RX ADMIN — LISINOPRIL 20 MG: 20 TABLET ORAL at 21:17

## 2022-04-01 RX ADMIN — ENOXAPARIN SODIUM 40 MG: 40 INJECTION SUBCUTANEOUS at 18:58

## 2022-04-01 RX ADMIN — LEVOTHYROXINE SODIUM 125 MCG: 0.03 TABLET ORAL at 06:37

## 2022-04-01 RX ADMIN — ALLOPURINOL 300 MG: 300 TABLET ORAL at 08:00

## 2022-04-01 ASSESSMENT — ACTIVITIES OF DAILY LIVING (ADL)
ADLS_ACUITY_SCORE: 7
ADLS_ACUITY_SCORE: 9
ADLS_ACUITY_SCORE: 6
ADLS_ACUITY_SCORE: 8
ADLS_ACUITY_SCORE: 6
ADLS_ACUITY_SCORE: 7
ADLS_ACUITY_SCORE: 6
ADLS_ACUITY_SCORE: 9
ADLS_ACUITY_SCORE: 7
ADLS_ACUITY_SCORE: 6
ADLS_ACUITY_SCORE: 6
ADLS_ACUITY_SCORE: 8
ADLS_ACUITY_SCORE: 6
ADLS_ACUITY_SCORE: 7
ADLS_ACUITY_SCORE: 6
ADLS_ACUITY_SCORE: 7
ADLS_ACUITY_SCORE: 6
ADLS_ACUITY_SCORE: 7
ADLS_ACUITY_SCORE: 6
ADLS_ACUITY_SCORE: 9

## 2022-04-01 NOTE — DISCHARGE INSTRUCTIONS
*No lifting, pushing or pulling greater than 5-10 pound (this is about a gallon of milk).  *No repetitive bending, twisting, or jarring activities  *No overhead work  *No aerobic or strenuous activity  *No activities with increased risk of falls  *You may move about your home as tolerated  *You may walk up and down stairs as tolerated  *You may increase your activity slowly over the next 4-6 weeks    WALKING PROGRAM: As you can tolerate, walk daily-start with 5-10 minutes of continuous walking. This is in addition to the walking that you do as part of your daily activities. Increase the time that you walk by 5 minutes every couple of days. Do not exceed 30-45 minutes of continuous walking until seen in follow-up. Walking is the best exercise after surgery.  **Listen to your body, if you find that you are more painful or fatigued, you may need to proceed more slowly.    **Do not smoke or expose yourself to second hand smoke. Cigarette smoke can delay healing and cause complications.     DRIVING:  We recommend that you do not drive while taking medications for pain or muscle spasms. Always read and follow the advice on your prescription bottle. If you have questions, speak with your pharmacist.  We recommend that you do not drive while wearing a brace, as it could limit your range of motion.    WORK: If you plan to return to work before you 4-6 weeks appointment, call and discuss with one of the nurses in the neurosurgery office.     USE OF ICE OR  HEAT:  *Icing can decrease post op swelling, thereby helping with post op pain control. Always protect your skin to prevent frostbite or burn.    *For the first 48 hours we recommend ice to the surgical area for 15-20 minutes at a time several times a day.      *Any longer than 15-20 minutes can cause damage to the tissues, including frostbite.     *Allow area to warm for at least 45 minutes or an hour between treatments.    *Ice as frequently as you wish, so long as the  area is warm to touch and has normal sensation before repeating.    *After 48 hours, you may use heat or ice, which ever feels best to you.  Always remember to protect your skin, and to use no greater than 15-20 minutes at a time.     *You can make your own ice bags at home by mixing 3 parts water with 1 part rubbing alcohol in a Ziplock bag and placing in the freezer.  It will not freeze solid.      BOWEL MOVEMENT:  If you did not have a bowel movement (pooped) before you were discharged from the hospital, and do not have a bowel movement within 2 days of discharge. Please call our office and request to speak to a nurse.    Your insurance may not cover medications to treat or prevent  constipation.      There are many  over the counter medications for constipation including: Colace, Senakot, Dulcolax, and Miralax. In addition to medications eat a high fiber diet and drink plenty of water.    If you are taking narcotics, you should being taking medication daily for constipation.      If you develop loose or runny stools, stop taking the medications for constipation.

## 2022-04-01 NOTE — PROGRESS NOTES
[facility]    Medicine Progress Note - Hospitalist Service       Date of Admission:  3/28/2022  Active Problems:    HTN (hypertension)    Hypothyroidism    History of stroke    Type 2 diabetes mellitus without complication, without long-term current use of insulin (H)    Lumbar radiculopathy    Spinal stenosis of lumbar region, unspecified whether neurogenic claudication present    Urine retention    Therapeutic opioid induced constipation     Assessment & Plan          Raghavendra Kiser is a 62 year old male admitted on 3/28/2022. He has history of diabetes, hypertension, gout, stroke, hyperlipidemia, recent L3-L4 hemilaminectomy, and durotomy repair with DuraSeal on 3/21 by Dr. Duque directly admitted on recommendation of neurosurgery for evaluation of suspected CSF leak with severe back pain, found to have a epidural abscess     Epidural abscess  Patient with worsening left-sided leg pain and back pain onset a few days postop after L4-L3 hemilaminectomy and durotomy repair on 3/21., MRI showed CSF leak versus infection or DuraSeal displacement and patient was directly admitted on 3/28   Status post exploration of left lumbar 3-4 hemilaminectomy, L4-5 left hemilactamectomy with epidural abscess evacuation postoperative day 2  No further left leg pain or back pain, drain removed today by neurosurgery  Cultures intraoperative cultures 1 out of 4 showing staph, likely contaminant   ID continuing cefepime and Vanco,  Increase activity, ambulate at least once a shift    Postoperative urine retention  Patient passed void trial prior to previous discharge however return to the ER a few hours after discharge with urine retention, had Tobias placed  Has urology visit scheduled for next week, Tobias to remain in place until that time     Opioid-induced constipation  No bowel movement in 8 days.  Has tried numerous things including suppository and enema without significant output.  Took mag  citrate, now having bowel movements.  Unremarkable abdominal exam  Continue senna and MiraLAX    History of stroke  With mild residual left leg weakness and some word finding difficulty  Holding home aspirin and Plavix as above.   Continue home Crestor.  He was started on Depakote for altered mental status at the time of his stroke and has continued on it since then, can continue home Depakote here  Stable.  Restart aspirin and Plavix when okay with neurosurgery-question 5 or 7 days postop     Type 2 diabetes  Home regimen as metformin 1000 mg daily  Hold Metformin, continue sliding scale, blood sugars controlled     Essential hypertension  Continue home lisinopril, restart amlodipine.  Controlled.     #Hypothyroidism,   Continue home Synthroid      Acute blood loss anemia-recheck.  No signs of GI loss.     Diet: Moderate Consistent Carb (60 g CHO per Meal) Diet    DVT Prophylaxis: Defer to neurosurgery service  Tobias Catheter: PRESENT, indication: Retention  Central Lines: None  Code Status: Full Code      Disposition Plan   Expected Discharge:   3 to 5 days depending on surgical cultures, antibiotic plan, surgical progression           The patient's care was discussed with the Bedside Nurse, Care Coordinator/, Patient, Patient's Family and Infectious disease Consultant. for total time 35 minutes with greater than 50% of total time spent in counseling and coordination of care.    CRUZ HARE MD  Hospitalist Service  [facility]  Securely message with the Vocera Web Console (learn more here)  Text page via [facility] Paging/Directory        Clinically Significant Risk Factors Present on Admission             # Obesity: Estimated body mass index is 31.46 kg/m  as calculated from the following:    Height as of an earlier encounter on 3/28/22: 1.829 m (6').    Weight as of an earlier encounter on 3/28/22: 105.2 kg (232 lb).       ______________________________________________________________________    Interval History   Remainder of 12 point review of systems negative except as noted below    Subjective:  Patient doing well.  No left leg pain or weakness, back pain controlled.  Having bowel movements.  No melena or hematochezia.  No chest pain or shortness of breath.  Would like to ambulate more.       Was having severe back pain and left leg pain.  .  Has mild right foot drop from previous stroke.  Having bowel movements.    Data reviewed today: I reviewed all medications, new labs and imaging results over the last 24 hours.     Physical Exam   Vital Signs: Temp: 98.4  F (36.9  C) Temp src: Oral BP: (!) 150/90 Pulse: 60   Resp: 18 SpO2: 99 % O2 Device: None (Room air)    Weight: 0 lbs 0 oz  Physical Exam:  Temp:  [97.9  F (36.6  C)-98.4  F (36.9  C)] 98.4  F (36.9  C)  Pulse:  [60-69] 60  Resp:  [18] 18  BP: (128-150)/(63-90) 150/90  SpO2:  [93 %-99 %] 99 %    BP (!) 150/90 (BP Location: Right arm)   Pulse 60   Temp 98.4  F (36.9  C) (Oral)   Resp 18   SpO2 99%   General appearance: alert, appears stated age and cooperative  Head: Normocephalic, without obvious abnormality, atraumatic  Eyes: Clear conjuctiva  Neck: no JVD and supple, symmetrical, trachea midline  Lungs: clear to auscultation bilaterally  Heart: regular rate and rhythm, S1, S2 normal, no murmur, click, rub or gallop  Abdomen: soft, non-tender; bowel sounds normal; no masses,  no organomegaly  Extremities: Negative homans,   Skin: Skin color, texture, turgor normal. No rashes or lesions  Neurologic: Mental status: Alert, oriented, thought content appropriate  Cranial nerves: normal  Sensory: normal  Motor:grossly normal          Data   Recent Labs   Lab 03/31/22  1715 03/31/22  1145 03/31/22  0800 03/31/22  0639 03/30/22  0708 03/30/22  0635 03/29/22  2114 03/29/22  1805 03/28/22 2037 03/28/22 1948   WBC  --   --   --  5.7  --  6.0  --   --   --  6.2   HGB  --   --    --  9.8*  --  10.2*  --   --   --  11.9*   MCV  --   --   --  99  --  102*  --   --   --  100   PLT  --   --   --  239  --  230  --   --   --  245   INR  --   --   --   --   --   --   --   --   --  1.01   NA  --   --   --   --   --  140  --   --   --  140   POTASSIUM  --   --   --   --   --  4.1  --   --   --  3.5   CHLORIDE  --   --   --   --   --  110*  --   --   --  100   CO2  --   --   --   --   --  24  --   --   --  28   BUN  --   --   --   --   --  16  --   --   --  17   CR  --   --   --   --   --  0.81  --   --   --  0.95   ANIONGAP  --   --   --   --   --  6  --   --   --  12   CHARAN  --   --   --   --   --  7.9*  --   --   --  9.1   GLC 83 109* 90  --    < > 111   < >  --    < > 113   ALBUMIN  --   --   --   --   --   --   --  3.1*  --   --    PROTTOTAL  --   --   --   --   --   --   --  6.4  --   --    BILITOTAL  --   --   --   --   --   --   --  0.4  --   --    ALKPHOS  --   --   --   --   --   --   --  73  --   --    ALT  --   --   --   --   --   --   --  78*  --   --    AST  --   --   --   --   --   --   --  33  --   --     < > = values in this interval not displayed.     No results found for this or any previous visit (from the past 24 hour(s)).

## 2022-04-01 NOTE — PROGRESS NOTES
Care Management Follow Up    Length of Stay (days): 4    Expected Discharge Date: 04/03/2022     Concerns to be Addressed: MD update, discharge plan for home infusion for 4-6 weeks of antibiotics      Patient plan of care discussed at interdisciplinary rounds: Yes    Anticipated Discharge Disposition: Home Infusion for IV antibiotics 4-6 weeks, referral made.     Anticipated Discharge Services:  Home infusion skilled nurse  Anticipated Discharge DME:  Per [facility]    Patient/family educated on Medicare website which has current facility and service quality ratings: N/A  Education Provided on the Discharge Plan: per team   Patient/Family in Agreement with the Plan:  patient    Referrals Placed by CM/SW:  Referral to [facility]  Private pay costs discussed: not at this time    Additional Information:  MD update, need plan for long term IV antibiotics. Referral made to [facility].    Met with patient and pt's spouse Loren to introduce care management role, discussed discharge options that of home infusion, outpatient infusion pending availability or TCU. Patient wanting to discharge to home, updated that referral was sent to [facility] to check insurance benefits and to arrange for skilled home nurse visits. Patient's spouse Loren and patient are teachable parties. Awaiting to see whether [facility] is able to check insurance benefits this late in afternoon. Informed patient and spouse that pending pending coordination of discharge, discharge may occur tomorrow, acknowledge.          Nicole Macedo RN

## 2022-04-01 NOTE — PLAN OF CARE
"  Problem: Plan of Care - These are the overarching goals to be used throughout the patient stay.    Goal: Plan of Care Review/Shift Note  Description: The Plan of Care Review/Shift note should be completed every shift.  The Outcome Evaluation is a brief statement about your assessment that the patient is improving, declining, or no change.  This information will be displayed automatically on your shift note.  Outcome: Ongoing, Progressing  Goal: Patient-Specific Goal (Individualized)  Description: You can add care plan individualizations to a care plan. Examples of Individualization might be:  \"Parent requests to be called daily at 9am for status\", \"I have a hard time hearing out of my right ear\", or \"Do not touch me to wake me up as it startles me\".  Outcome: Ongoing, Progressing  Goal: Absence of Hospital-Acquired Illness or Injury  Outcome: Ongoing, Progressing  Intervention: Identify and Manage Fall Risk  Recent Flowsheet Documentation  Taken 4/1/2022 0200 by Briana Haas RN  Safety Promotion/Fall Prevention:   activity supervised   assistive device/personal items within reach  Intervention: Prevent Skin Injury  Recent Flowsheet Documentation  Taken 4/1/2022 0200 by Briana Haas RN  Body Position: position changed independently  Intervention: Prevent and Manage VTE (Venous Thromboembolism) Risk  Recent Flowsheet Documentation  Taken 4/1/2022 0200 by Briana Haas, RN  Activity Management: ambulated in room  Goal: Optimal Comfort and Wellbeing  Outcome: Ongoing, Progressing  Goal: Readiness for Transition of Care  Outcome: Ongoing, Progressing     Problem: Bowel Motility Impaired (Spinal Surgery)  Goal: Effective Bowel Elimination  Outcome: Ongoing, Progressing     Problem: Fluid and Electrolyte Imbalance (Spinal Surgery)  Goal: Fluid and Electrolyte Balance  Outcome: Ongoing, Progressing     Problem: Functional Ability Impaired (Spinal Surgery)  Goal: Optimal Functional Ability  Outcome: Ongoing, " Progressing  Intervention: Optimize Functional Status  Recent Flowsheet Documentation  Taken 4/1/2022 0200 by Briana Haas, RN  Activity Management: ambulated in room  Positioning/Transfer Devices:   pillows   in use     Problem: Pain Acute  Goal: Acceptable Pain Control and Functional Ability  Outcome: Ongoing, Progressing  Intervention: Prevent or Manage Pain  Recent Flowsheet Documentation  Taken 4/1/2022 0200 by Briana Hasa, RN  Medication Review/Management: medications reviewed  Scheduled tylenol given at 0400, no further complaints of pain.   Problem: Infection  Goal: Absence of Infection Signs and Symptoms  Outcome: Ongoing, Progressing   Goal Outcome Evaluation:      Pt has been sleeping well throughout the night, awake only with cares. Tobias output this shift was 1700cc.

## 2022-04-01 NOTE — PROCEDURES
"PICC Line Insertion Procedure Note  Pt. Name: Raghavendra Kiser  MRN:        [id number]    Procedure: Insertion of a  single Lumen  4 fr  Bard SOLO (valved) Power PICC, Lot number [id number]    Indications: Antibiotic    Contraindications : none    Procedure Details   Patient identified with 2 identifiers and \"Time Out\" conducted.  .     Central line insertion bundle followed: hand hygeine performed prior to procedure, site cleansed with cholraprep, hat, mask, sterile gloves,sterile gown worn, patient draped with maximum barrier head to toe drape, sterile field maintained.    The vein was assessed and found to be compressible and of adequate size. 1 ml 1% Lidocaine administered sq to the insertion site. A 4 Fr PICC was inserted into the basilic vein of the right arm with ultrasound guidance. 1 attempt(s) required to access vein.   Catheter threaded without difficulty. Good blood return noted.    Modified Seldinger Technique used for insertion.    The 8 sharps that are included in the PICC insertion kit were accounted for and disposed of in the sharps container prior to breakdown of the sterile field.    Catheter secured with Statlock, biopatch and Tegaderm dressing applied.    Findings:  Total catheter length  44 cm, with 1 cm exposed. Mid upper arm circumference is 32 cm. Catheter was flushed with 20 cc NS. Patient  tolerated procedure well.    Tip placement verified by 3CG technology . Tip placement in the SVC.    CLABSI prevention brochure left at bedside.    Patient's primary RN notified PICC is ready for use.    Comments:          Segun APONTEN,RN,[facility]        "

## 2022-04-01 NOTE — PLAN OF CARE
Problem: Pain Acute  Goal: Acceptable Pain Control and Functional Ability  Outcome: Ongoing, Progressing  Intervention: Prevent or Manage Pain  Recent Flowsheet Documentation  Taken 4/1/2022 5540 by Serene Johnson RN  Medication Review/Management: medications reviewed     Problem: Infection  Goal: Absence of Infection Signs and Symptoms  Outcome: Ongoing, Progressing   Goal Outcome Evaluation:      Patient is pleasant, cooperative, and a/o x 4.  Patient ambulated more than 200 feet in the hallway and tolerated this very well.  Continues on IV antibiotics.  Tobias intact and patent.  Patient denies pain.  Dressing intact to back with dried drainage.  Pt requested to wait for dressing changed until neurosurgery team comes to see the incision.  Patient up in chair at this time.

## 2022-04-01 NOTE — PROGRESS NOTES
Neurosurgery Progress Note:  04/01/22       A/P: Mr. Kiser is a pleasant 62 year old right handed male POD#3 exploration of left L3-L4 hemilaminectomy space with left L4-L5 hemilaminectomy for evacuation of epidural abscess by Dr. Duque status post bilateral lumbar 3- lumbar 4 hemilaminectomies, medial facetectomies, foraminotomies and microdiscectomies with small durotomy on right on 3/21/2022    Minimal back pain, ID plan is for 4-6 weeks of IV abx. He already has follow up with urology.     Incision clean/dry/intact - skin irritation around incision, likely tape.     Plan:  1. Wound check 10-14 days from second surgery  2. Okay to discharge from neurosurgery perspective  3. Follow up with urology outpatient  4. Follow up with ID  5. Okay for plavix, aspirin POD #5        Neurosurgery Attending: The patient's clinical examination, laboratory data, and plan was discussed with Dr. Duque      HPI: Raghavendra Kiser is a 62 year old S/P Bilateral lumbar 3- lumbar 4 hemilaminectomies, medial facetectomies, foraminotomies and microdiscectomies with small durotomy on right with Dr Duque 3/21/2022. Pre-op had more right leg symptoms than left. Symptoms improved following surgery with worsening new left leg pain in last 1-2 days. MRI obtained which showed a new contrast enhancing collection mostly centered at left L5 extending form L4 to S1.This is causing severe spinal stenosis at these levels. We discussed exploration and extension of his prior hemilaminectomy for evacuation of the collection and possible re-repair of CSF leak. Risks and benefits dicussed and he agreed to proceed.     S:   Minimal back pain, not taking much pain medication.      O:  BP (!) 166/91 (BP Location: Left arm)   Pulse 61   Temp 98.1  F (36.7  C) (Oral)   Resp 16   SpO2 95%        General: Awake, alert, NAD. Sitting upright in bed comfortably      Motor: normal bulk and tone     Strength: full strength in all extremities  throughout, bilaterally. Improvements of prior left plantar flexion weakness      Sensation: intact to light touch throughout both upper and lower extremities     Incision: Irritation from tape still present, incision with small amt of drainage, bloody drainage from AMELIA site.        Maribell Calix CNP  [facility]  O: [phone]  P: [phone]

## 2022-04-01 NOTE — PROGRESS NOTES
INFECTIOUS DISEASE FOLLOW UP NOTE    Date: 2022   CHIEF COMPLAINT: No chief complaint on file.       ASSESSMENT:  1. Epidural abscess: following L3-L4 hemilaminectomy and durotomy repair with DuraSeal 3/21. There was concern for CSF leak however OR notes epidural abscess, GS with WBC, 2 cultures are growing s epi of unclear significance as this is common skin stephania and not routinely pathogenic. CRP elevated although improved from surgery. Active issue.   2.  BC positive: staph species. contaminant.   3. Comorbid conditions affecting immune system: DM2 on metformin, hx CVA    PLAN:  - continue cefepime IV  - continue vancomycin IV  - plan 4-6 weeks of IV cefepime and IV vancomycin  - weekly cbc diff, cmp, crp, vanc trough  - follow up in ID clinic with Brandon in ~3 weeks  - SW consult to assist with IV abx  - ID discharge orders have been placed. Please call with questions.     Mary Ellen Taylor MD  [facility]   On-Call: [phone]     ______________________________________________________________________    SUBJECTIVE / INTERVAL HISTORY: ambulating well. Tolerating antibiotics. Discussed micro results and antibiotic plan.     ROS: All other systems negative except as listed above.    SH/FH/Habits/PMH reviewed and unchanged.    OBJECTIVE:  BP (!) 166/91 (BP Location: Left arm)   Pulse 61   Temp 98.1  F (36.7  C) (Oral)   Resp 16   SpO2 95%      Resp: 16      Vital Signs  Temp: 98.1  F (36.7  C)  Temp src: Oral  Resp: 18  Pulse: 62  Pulse Rate Source: Monitor  BP: (!) 144/78  BP Location: Right arm    Temp (24hrs), Av.7  F (36.5  C), Min:97.3  F (36.3  C), Max:98.2  F (36.8  C)      GEN: No acute distress.    RESPIRATORY:  Normal breathing pattern  CARDIOVASCULAR:  Regular  ABDOMEN:  Soft, normal bowel sounds, non-tender,    EXTREMITIES: No edema.  SKIN/HAIR/NAILS:  No rashes  IV: peripheral IV        Antibiotics:  Cefepime   vanc    Pertinent labs:  CRP   Date Value Ref  Range Status   03/28/2022 4.3 (H) 0.0-<0.8 mg/dL Final      CBC RESULTS:   Recent Labs   Lab Test 03/30/22  0635   WBC 6.0   RBC 3.03*   HGB 10.2*   HCT 30.9*   *   MCH 33.7*   MCHC 33.0   RDW 13.2         Last Comprehensive Metabolic Panel:  Sodium   Date Value Ref Range Status   03/30/2022 140 136 - 145 mmol/L Final     Potassium   Date Value Ref Range Status   03/30/2022 4.1 3.5 - 5.0 mmol/L Final     Chloride   Date Value Ref Range Status   03/30/2022 110 (H) 98 - 107 mmol/L Final     Carbon Dioxide (CO2)   Date Value Ref Range Status   03/30/2022 24 22 - 31 mmol/L Final     Anion Gap   Date Value Ref Range Status   03/30/2022 6 5 - 18 mmol/L Final     Glucose   Date Value Ref Range Status   03/30/2022 111 70 - 125 mg/dL Final     GLUCOSE BY METER POCT   Date Value Ref Range Status   04/01/2022 100 (H) 70 - 99 mg/dL Final     Urea Nitrogen   Date Value Ref Range Status   03/30/2022 16 8 - 22 mg/dL Final     Creatinine   Date Value Ref Range Status   04/01/2022 0.74 0.70 - 1.30 mg/dL Final     GFR Estimate   Date Value Ref Range Status   04/01/2022 >90 >60 mL/min/1.73m2 Final     Comment:     Effective December 21, 2021 eGFRcr in adults is calculated using the 2021 CKD-EPI creatinine equation which includes age and gender (Cherri et al., [place], DOI: 10.1056/VDNCfs0728674)   05/28/2021 >60 >60 mL/min/1.73m2 Final     Calcium   Date Value Ref Range Status   03/30/2022 7.9 (L) 8.5 - 10.5 mg/dL Final        MICROBIOLOGY DATA:  Personally reviewed.  3/28 BC 1/4 staph species-contaminant  3/29 OR cultures pending, GS with WBC-3 cultures are growing s epi    Staphylococcus epidermidis       DAVIDA     Ciprofloxacin <=0.5 ug/mL Susceptible     Clindamycin >=8.0 ug/mL Resistant     Erythromycin >=8.0 ug/mL Resistant     Gentamicin >=16.0 ug/mL Resistant     Levofloxacin <=0.12 ug/mL Susceptible     Oxacillin >=4.0 ug/mL Resistant 1     Tetracycline 2.0 ug/mL Susceptible     Vancomycin 2.0 ug/mL Susceptible                   RADIOLOGY:  Personally Reviewed.  No results found for this or any previous visit (from the past 24 hour(s)).    Active Problems:    HTN (hypertension)    Hypothyroidism    History of stroke    Type 2 diabetes mellitus without complication, without long-term current use of insulin (H)    Lumbar radiculopathy    Spinal stenosis of lumbar region, unspecified whether neurogenic claudication present    Urine retention    Therapeutic opioid induced constipation

## 2022-04-02 ENCOUNTER — HOME INFUSION (PRE-WILLOW HOME INFUSION) (OUTPATIENT)
Dept: PHARMACY | Facility: CLINIC | Age: 62
End: 2022-04-02

## 2022-04-02 ENCOUNTER — HOME INFUSION (PRE-WILLOW HOME INFUSION) (OUTPATIENT)
Dept: PHARMACY | Facility: CLINIC | Age: 62
End: 2022-04-02
Payer: OTHER GOVERNMENT

## 2022-04-02 VITALS
OXYGEN SATURATION: 95 % | SYSTOLIC BLOOD PRESSURE: 132 MMHG | RESPIRATION RATE: 16 BRPM | HEART RATE: 69 BPM | TEMPERATURE: 98 F | DIASTOLIC BLOOD PRESSURE: 72 MMHG

## 2022-04-02 LAB
BACTERIA BLD CULT: NO GROWTH
GLUCOSE BLDC GLUCOMTR-MCNC: 106 MG/DL (ref 70–99)
GLUCOSE BLDC GLUCOMTR-MCNC: 118 MG/DL (ref 70–99)
GLUCOSE BLDC GLUCOMTR-MCNC: 95 MG/DL (ref 70–99)
GLUCOSE BLDC GLUCOMTR-MCNC: 97 MG/DL (ref 70–99)

## 2022-04-02 PROCEDURE — 250N000013 HC RX MED GY IP 250 OP 250 PS 637: Performed by: FAMILY MEDICINE

## 2022-04-02 PROCEDURE — 250N000011 HC RX IP 250 OP 636: Performed by: INTERNAL MEDICINE

## 2022-04-02 PROCEDURE — 258N000003 HC RX IP 258 OP 636: Performed by: FAMILY MEDICINE

## 2022-04-02 PROCEDURE — 250N000013 HC RX MED GY IP 250 OP 250 PS 637: Performed by: PHYSICIAN ASSISTANT

## 2022-04-02 PROCEDURE — 99239 HOSP IP/OBS DSCHRG MGMT >30: CPT | Performed by: HOSPITALIST

## 2022-04-02 PROCEDURE — 250N000013 HC RX MED GY IP 250 OP 250 PS 637: Performed by: HOSPITALIST

## 2022-04-02 PROCEDURE — 250N000011 HC RX IP 250 OP 636: Performed by: FAMILY MEDICINE

## 2022-04-02 RX ORDER — OXYCODONE HYDROCHLORIDE 5 MG/1
5 TABLET ORAL EVERY 4 HOURS PRN
Qty: 42 TABLET | Refills: 0 | Status: SHIPPED | OUTPATIENT
Start: 2022-04-02 | End: 2022-04-11

## 2022-04-02 RX ORDER — METHOCARBAMOL 500 MG/1
500 TABLET, FILM COATED ORAL EVERY 6 HOURS PRN
Qty: 42 TABLET | Refills: 1 | Status: SHIPPED | OUTPATIENT
Start: 2022-04-02 | End: 2022-06-22

## 2022-04-02 RX ADMIN — LISINOPRIL 20 MG: 20 TABLET ORAL at 08:18

## 2022-04-02 RX ADMIN — CEFEPIME HYDROCHLORIDE 2 G: 2 INJECTION, POWDER, FOR SOLUTION INTRAVENOUS at 04:29

## 2022-04-02 RX ADMIN — AMLODIPINE BESYLATE 5 MG: 5 TABLET ORAL at 08:19

## 2022-04-02 RX ADMIN — THERA TABS 1 TABLET: TAB at 08:19

## 2022-04-02 RX ADMIN — VANCOMYCIN HYDROCHLORIDE 1500 MG: 5 INJECTION, POWDER, LYOPHILIZED, FOR SOLUTION INTRAVENOUS at 10:40

## 2022-04-02 RX ADMIN — ROSUVASTATIN CALCIUM 5 MG: 5 TABLET, FILM COATED ORAL at 08:17

## 2022-04-02 RX ADMIN — DOCUSATE SODIUM 50 MG AND SENNOSIDES 8.6 MG 1 TABLET: 8.6; 5 TABLET, FILM COATED ORAL at 08:17

## 2022-04-02 RX ADMIN — VANCOMYCIN HYDROCHLORIDE 1500 MG: 5 INJECTION, POWDER, LYOPHILIZED, FOR SOLUTION INTRAVENOUS at 17:29

## 2022-04-02 RX ADMIN — POLYETHYLENE GLYCOL 3350 17 G: 17 POWDER, FOR SOLUTION ORAL at 08:17

## 2022-04-02 RX ADMIN — ALLOPURINOL 450 MG: 300 TABLET ORAL at 08:18

## 2022-04-02 RX ADMIN — LEVOTHYROXINE SODIUM 125 MCG: 0.03 TABLET ORAL at 06:32

## 2022-04-02 RX ADMIN — GABAPENTIN 300 MG: 300 CAPSULE ORAL at 13:53

## 2022-04-02 RX ADMIN — GABAPENTIN 300 MG: 300 CAPSULE ORAL at 08:18

## 2022-04-02 RX ADMIN — CEFEPIME HYDROCHLORIDE 2 G: 2 INJECTION, POWDER, FOR SOLUTION INTRAVENOUS at 16:43

## 2022-04-02 ASSESSMENT — ACTIVITIES OF DAILY LIVING (ADL)
ADLS_ACUITY_SCORE: 8

## 2022-04-02 NOTE — PROGRESS NOTES
Patient is discharging to home this evening.  Receiving 1 more dose of IV abx prior to discharge.  PICC intact and to be used at home.  Tobias intact and patent.  Bag changed to a leg bag per patient request.  F/u with urology on Wednesday 4/6/22.  Discharge paperwork reviewed with patient and wife, questions answered, and all belongings returned.

## 2022-04-02 NOTE — PROGRESS NOTES
Care Management Follow Up    Length of Stay (days): 5    Expected Discharge Date: 04/02/2022     Concerns to be Addressed:       Patient plan of care discussed at interdisciplinary rounds: Yes    Anticipated Discharge Disposition:  Home with home infusion     Anticipated Discharge Services:  Home infusion  Anticipated Discharge DME:  None      Referrals Placed by CM/SW:  Home infusion  Private pay costs discussed: Not applicable    Additional Information:  Plan is to discharge today 4/2 after last antibiotic at around 1830 and for home infusion nurse to visit [facility] to do the teaching and bring supplies to the home between [id number]. The pt is aware of this. verified with Home infusion that they received the orders at 1500 and they verified this. CM to follow for further discharge needs.     Cha Maldonado RN

## 2022-04-02 NOTE — PROGRESS NOTES
[facility]    Medicine Progress Note - Hospitalist Service       Date of Admission:  3/28/2022  Active Problems:    HTN (hypertension)    Hypothyroidism    History of stroke    Type 2 diabetes mellitus without complication, without long-term current use of insulin (H)    Lumbar radiculopathy    Spinal stenosis of lumbar region, unspecified whether neurogenic claudication present    Urine retention    Therapeutic opioid induced constipation     Assessment & Plan          Raghavendra Kiser is a 62 year old male admitted on 3/28/2022. He has history of diabetes, hypertension, gout, stroke, hyperlipidemia, recent L3-L4 hemilaminectomy, and durotomy repair with DuraSeal on 3/21 by Dr. Duque , directly admitted on recommendation of neurosurgery for evaluation of suspected CSF leak with severe back pain, found to have a epidural abscess.     Epidural abscess  Patient with worsening left-sided leg pain and back pain onset a few days postop after L4-L3 hemilaminectomy and durotomy repair on 3/21., MRI showed CSF leak versus infection or DuraSeal displacement and patient was directly admitted on 3/28   Status post exploration of left lumbar 3-4 hemilaminectomy, L4-5 left hemilactamectomy with epidural abscess evacuation postoperative day 3  No further left leg pain or back pain, drain removed by neurosurgery  Cultures intraoperative cultures 1 out of 4 showing staph, likely contaminant   ID continuing cefepime and Vanco, will need 4 to 6 weeks home IV antibiotic treatment  Care manager setting up home IV antibiotics, unable to do this today, likely tomorrow    Postoperative urine retention  Patient passed void trial prior to previous discharge however return to the ER a few hours after discharge with urine retention, had Tobias placed  Has urology visit scheduled for next week, Tobias to remain in place until that time     Opioid-induced constipation  No bowel movement in 8 days.  Has tried numerous  things including suppository and enema without significant output.  Took mag citrate, now having bowel movements.  Unremarkable abdominal exam  Continue senna and MiraLAX    History of stroke  With mild residual left leg weakness and some word finding difficulty  Holding home aspirin and Plavix as above.   Continue home Crestor.  He was started on Depakote for altered mental status at the time of his stroke and has continued on it since then, can continue home Depakote here  Stable.  Restart aspirin and Plavix on postop day 5      Type 2 diabetes  Home regimen as metformin 1000 mg daily  Hold Metformin, continue sliding scale, blood sugars controlled     Essential hypertension  Continue home lisinopril, restart amlodipine.  .  Running a little high.  Consider increasing amlodipine tomorrow     #Hypothyroidism,   Continue home Synthroid      Acute blood loss anemia-recheck.  No signs of GI loss.     Diet: Moderate Consistent Carb (60 g CHO per Meal) Diet  Discharge Instruction - Regular Diet Adult    DVT Prophylaxis: Defer to neurosurgery service  Tobias Catheter: PRESENT, indication: Retention  Central Lines: PRESENT  PICC Single Lumen 04/01/22 Right Brachial vein medial-Site Assessment: WDL  Code Status: Full Code      Disposition Plan   Expected Discharge:   Today or tomorrow depending on ability to set up home IV infusion           The patient's care was discussed with the Bedside Nurse, Care Coordinator/, Patient, Patient's Family and Infectious disease Consultant. for total time 35 minutes with greater than 50% of total time spent in counseling and coordination of care.    CRUZ HARE MD  Hospitalist Service  [facility]  Securely message with the Vocera Web Console (learn more here)  Text page via [employer] Paging/Directory        Clinically Significant Risk Factors Present on Admission                     ______________________________________________________________________    Interval History   Remainder of 12 point review of systems negative except as noted below    Subjective:  Patient doing well.  No left leg pain or weakness, back pain controlled.  Having bowel movements.  No melena or hematochezia.  No chest pain or shortness of breath.  Would like to ambulate more.       Was having severe back pain and left leg pain.  .  Has mild right foot drop from previous stroke.  Having bowel movements.    Data reviewed today: I reviewed all medications, new labs and imaging results over the last 24 hours.     Physical Exam   Vital Signs: Temp: 97.8  F (36.6  C) Temp src: Oral BP: (!) 152/82 Pulse: 69   Resp: 20 SpO2: 98 % O2 Device: None (Room air)    Weight: 0 lbs 0 oz  Physical Exam:  Temp:  [97.8  F (36.6  C)-98.1  F (36.7  C)] 97.8  F (36.6  C)  Pulse:  [61-69] 69  Resp:  [16-20] 20  BP: (143-167)/(78-91) 152/82  SpO2:  [95 %-98 %] 98 %    BP (!) 152/82 (BP Location: Left arm)   Pulse 69   Temp 97.8  F (36.6  C) (Oral)   Resp 20   SpO2 98%   General appearance: alert, appears stated age and cooperative  Head: Normocephalic, without obvious abnormality, atraumatic  Eyes: Clear conjuctiva  Neck: no JVD and supple, symmetrical, trachea midline  Lungs: clear to auscultation bilaterally  Heart: regular rate and rhythm, S1, S2 normal, no murmur, click, rub or gallop  Abdomen: soft, non-tender; bowel sounds normal; no masses,  no organomegaly  Extremities: Negative homans,   Skin: Skin color, texture, turgor normal. No rashes or lesions  Neurologic: Mental status: Alert, oriented, thought content appropriate  Cranial nerves: normal  Sensory: normal  Motor:grossly normal          Data   Recent Labs   Lab 04/01/22  1721 04/01/22  1139 04/01/22  0744 04/01/22  0650 03/31/22  0800 03/31/22  0639 03/30/22  0708 03/30/22  0635 03/29/22  2114 03/29/22  1805 03/28/22 2037 03/28/22  1948   WBC  --   --   --  5.1  --  5.7   --  6.0  --   --   --  6.2   HGB  --   --   --  10.8*  --  9.8*  --  10.2*  --   --   --  11.9*   MCV  --   --   --  98  --  99  --  102*  --   --   --  100   PLT  --   --   --  275  --  239  --  230  --   --   --  245   INR  --   --   --   --   --   --   --   --   --   --   --  1.01   NA  --   --   --   --   --   --   --  140  --   --   --  140   POTASSIUM  --   --   --   --   --   --   --  4.1  --   --   --  3.5   CHLORIDE  --   --   --   --   --   --   --  110*  --   --   --  100   CO2  --   --   --   --   --   --   --  24  --   --   --  28   BUN  --   --   --   --   --   --   --  16  --   --   --  17   CR  --   --   --  0.74  --   --   --  0.81  --   --   --  0.95   ANIONGAP  --   --   --   --   --   --   --  6  --   --   --  12   CHARAN  --   --   --   --   --   --   --  7.9*  --   --   --  9.1   GLC 83 125* 100*  --    < >  --    < > 111   < >  --    < > 113   ALBUMIN  --   --   --   --   --   --   --   --   --  3.1*  --   --    PROTTOTAL  --   --   --   --   --   --   --   --   --  6.4  --   --    BILITOTAL  --   --   --   --   --   --   --   --   --  0.4  --   --    ALKPHOS  --   --   --   --   --   --   --   --   --  73  --   --    ALT  --   --   --   --   --   --   --   --   --  78*  --   --    AST  --   --   --   --   --   --   --   --   --  33  --   --     < > = values in this interval not displayed.     No results found for this or any previous visit (from the past 24 hour(s)).

## 2022-04-02 NOTE — PROGRESS NOTES
Neurosurgery Progress Note:  04/02/22       A/P: Mr. Kiser is a pleasant 62 year old right handed male POD#4 exploration of left L3-L4 hemilaminectomy space with left L4-L5 hemilaminectomy for evacuation of epidural abscess by Dr. Duque status post bilateral lumbar 3- lumbar 4 hemilaminectomies, medial facetectomies, foraminotomies and microdiscectomies with small durotomy on right on 3/21/2022    States that he is doing well has slight incisional pain but states that his radicular pain remains resolved and is pleased with his progress     ID plan is for 4-6 weeks of IV abx home infusions to be arranged    Follow up with urology next week as scheduled     Incision clean/dry/intact - skin irritation around incision, likely tape.     Plan:  1. Wound check 10-14 days from second surgery  2. Okay to discharge from neurosurgery perspective  3. Follow up with urology outpatient  4. Follow up with ID  5. Okay for plavix, aspirin POD #5        Neurosurgery Attending: The patient's clinical examination, laboratory data, and plan was discussed with Dr. Duque      HPI: Raghavendra Kiser is a 62 year old S/P Bilateral lumbar 3- lumbar 4 hemilaminectomies, medial facetectomies, foraminotomies and microdiscectomies with small durotomy on right with Dr Duque 3/21/2022. Pre-op had more right leg symptoms than left. Symptoms improved following surgery with worsening new left leg pain in last 1-2 days. MRI obtained which showed a new contrast enhancing collection mostly centered at left L5 extending form L4 to S1.This is causing severe spinal stenosis at these levels. We discussed exploration and extension of his prior hemilaminectomy for evacuation of the collection and possible re-repair of CSF leak. Risks and benefits dicussed and he agreed to proceed.     S:   Minimal back pain, states that he is doing well      O:  BP (!) 174/87 (BP Location: Left arm)   Pulse 64   Temp 98.2  F (36.8  C) (Oral)   Resp 18   SpO2 96%       General: Awake, alert, NAD. Sitting upright in bed comfortably      Motor: normal bulk and tone     Strength: full strength in all extremities throughout, bilaterally. Improvements of prior left plantar flexion weakness      Sensation: intact to light touch throughout both upper and lower extremities     Incision: Irritation from tape still present, incision with small amt of drainage, bloody drainage from AMELIA site.     Bella Kong PA-C  [facility]  O: [phone]

## 2022-04-02 NOTE — PLAN OF CARE
Problem: Bowel Motility Impaired (Spinal Surgery)  Goal: Effective Bowel Elimination  Outcome: Ongoing, Progressing  Intervention: Enhance Bowel Motility and Elimination  Recent Flowsheet Documentation  Taken 4/2/2022 0830 by Serene Johnson RN  Bowel Elimination Management: (scheduled bowel meds, supp offered)   toileting offered   other (see comments)     Problem: Pain Acute  Goal: Acceptable Pain Control and Functional Ability  Outcome: Ongoing, Progressing  Intervention: Prevent or Manage Pain  Recent Flowsheet Documentation  Taken 4/2/2022 0830 by Serene Johnson RN  Bowel Elimination Promotion:   adequate fluid intake promoted   ambulation promoted  Medication Review/Management: medications reviewed   Goal Outcome Evaluation:                Patient is pleasant and cooperative.  Ambulated in the lemon with standby assist.  Tobias intact and patent.  PICC line patent and continues on IV antibiotics.  Dressing changed to incision on back.  No s/s of infection and denies pain.  Blood glucose WNL.  Care management working with home infusion team to get set up for discharge with at home antibiotics.

## 2022-04-02 NOTE — PROGRESS NOTES
[facility]    Referral received  from Nicole Macedo CM, for IV antibiotic.    Benefits verified. Pt has coverage for IV antibiotics under his  plan.  Deductible is $150 which has been met in full.  Pt has 75/25 coverage until Out of Pocket of $3706 is met (has met $1922 so far).    Per Nicole Macedo CM, pt planning on discharging on Saturday.  CM to contact [facility], at [phone], to discuss discharge plan and teaching visit in the home.     Thank you for the referral.    Mercy Marks, RN, BSN  [facility] Liaison  [phone] (Mon through Fri, 8:00 am-5:00 pm)  [phone] (Office)

## 2022-04-02 NOTE — PLAN OF CARE
Patient is A&O. No memory problems noted this shift. Dressing on back is CDI. No drainage noted. IV abx. He denies pain. BG's are WNL. Patient walked in lemon x2 this shift.  Patient has not had a bowel movement since admission. Pt was offered dulcolax suppository and stated he would try it tomorrow if he doesn't have one in the morning.  Problem: Plan of Care - These are the overarching goals to be used throughout the patient stay.    Goal: Absence of Hospital-Acquired Illness or Injury  Intervention: Identify and Manage Fall Risk  Recent Flowsheet Documentation  Taken 4/1/2022 2200 by Dianne Nagy RN  Safety Promotion/Fall Prevention: assistive device/personal items within reach  Taken 4/1/2022 1600 by Dianne Nagy RN  Safety Promotion/Fall Prevention: assistive device/personal items within reach  Intervention: Prevent Skin Injury  Recent Flowsheet Documentation  Taken 4/1/2022 1800 by Dianne Nagy RN  Body Position: position changed independently  Intervention: Prevent and Manage VTE (Venous Thromboembolism) Risk  Recent Flowsheet Documentation  Taken 4/1/2022 1600 by Dianne Nagy RN  VTE Prevention/Management: SCDs (sequential compression devices) on     Problem: Bowel Motility Impaired (Spinal Surgery)  Goal: Effective Bowel Elimination  Outcome: Ongoing, Not Progressing     Problem: Fluid and Electrolyte Imbalance (Spinal Surgery)  Goal: Fluid and Electrolyte Balance  Outcome: Ongoing, Progressing     Problem: Functional Ability Impaired (Spinal Surgery)  Goal: Optimal Functional Ability  Outcome: Ongoing, Progressing     Problem: Pain Acute  Goal: Acceptable Pain Control and Functional Ability  Outcome: Ongoing, Progressing  Intervention: Prevent or Manage Pain  Recent Flowsheet Documentation  Taken 4/1/2022 2200 by Dianne Nagy RN  Medication Review/Management: medications reviewed  Taken 4/1/2022 1600 by Dianne Nagy RN  Medication Review/Management: medications reviewed     Problem:  Infection  Goal: Absence of Infection Signs and Symptoms  Outcome: Ongoing, Progressing   Goal Outcome Evaluation:

## 2022-04-02 NOTE — DISCHARGE SUMMARY
[facility] MEDICINE  DISCHARGE SUMMARY     Primary Care Physician: Luis Daniel Maciel  Admission Date: 3/28/2022   Discharge Provider: Wayne Sheehan MD Discharge Date: 4/2/2022   Diet:   Active Diet and Nourishment Order   Procedures     Moderate Consistent Carb (60 g CHO per Meal) Diet     Discharge Instruction - Regular Diet Adult     Diet       Code Status: Full Code   Activity: DCACTIVITY: Activity as tolerated        Condition at Discharge: Stable     REASON FOR PRESENTATION(See Admission Note for Details)     Back pain    PRINCIPAL & ACTIVE DISCHARGE DIAGNOSES     Active Problems:    HTN (hypertension)    Hypothyroidism    History of stroke    Type 2 diabetes mellitus without complication, without long-term current use of insulin (H)    Lumbar radiculopathy    Spinal stenosis of lumbar region, unspecified whether neurogenic claudication present    Urine retention    Therapeutic opioid induced constipation      PENDING LABS     Unresulted Labs Ordered in the Past 30 Days of this Admission     Date and Time Order Name Status Description    3/29/2022  8:42 AM Anaerobic Bacterial Culture Routine Preliminary     3/29/2022  8:39 AM Anaerobic Bacterial Culture Routine Preliminary     3/29/2022  8:19 AM Anaerobic Bacterial Culture Routine Preliminary     3/28/2022  7:26 PM Blood Culture Arm, Right Preliminary     3/28/2022  7:26 PM Blood Culture Arm, Left Preliminary             PROCEDURES ( this hospitalization only)      Procedure(s):  LUMBAR 4-LUMBAR 5 LEFT HEMILAMINECTOMY WITH EPIDURAL ABSCESS EVACUATION    RECOMMENDATIONS TO OUTPATIENT PROVIDER FOR F/U VISIT     Follow-up Appointments     Follow Up Care      Our office will call you in 2-3 business days after discharge to schedule   your follow-up appointments. You should have a wound check and a provider   visit scheduled. If you have not received a call in 2-3 business days,   please call our office at [phone].    Your  follow up appointment may be with the surgeon or the nurse   practitioner.         Follow-up and recommended labs and tests      A referral was sent to Neurology clinic for hx of L sided strokes and L   carotid stenosis (50-70% on CT angiogram 12/25).  If you do not hear from   the clinic for an appointment please call to schedule.         Follow-up and recommended labs and tests       Our office will call you in 2-3 business days after discharge to schedule   your follow-up appointments. If you have not received a call in 2-3   business days, please call our office at [phone].    Your follow up appointment may be with the surgeon or the nurse   practitioner.               DISPOSITION     Home with home care    SUMMARY OF HOSPITAL COURSE:      62M with Dm2, HTN, gout, CVA, HLD, recent L3-L4 hemilaminectomy, and durotomy repair with DuraSeal on 3/21 by Dr. Duque , directly admitted on recommendation of neurosurgery for evaluation of suspected CSF leak with severe back pain, found to have a epidural abscess.     #Epidural abscess, post-operative infection  -Patient with worsening left-sided leg pain and back pain onset a few days postop after L4-L3 hemilaminectomy and durotomy repair on 3/21.  MRI showed CSF leak versus infection or DuraSeal displacement and patient was directly admitted on 3/28. Underwent left lumbar 3-4 hemilaminectomy, L4-5 left hemilactamectomy with epidural abscess evacuation.  -ID continuing cefepime and Vanco, will need 4 to 6 weeks home IV antibiotic treatment  -Home infusion to meet at home today for teaching and supplies.     #Postoperative urine retention  Patient passed void trial prior to previous discharge however return to the ER a few hours after discharge with urine retention, had Tobias placed  Has urology visit scheduled for next week, Tobias to remain in place until that time     #Opioid-induced constipation - resoled with bowel regimen     #History of stroke  - with mild  residual left leg weakness and some word finding difficulty  -asa& plavix (to resume 04/03), crestor  -He was started on Depakote for altered mental status at the time of his stroke and has continued on it since then.    -in reviewing indications for asa&plavix I reviewed Mr. Kiser's CVA hx.  It appears he had multifocal L MCA territory CVAs in 10&12/2020. CTA of the neck 12/2020 with a 50-70% L ICA stenosis.  Subsequent MRA neck with plaque resulting in <50% L ICA stenosis.  However, in general CTA should be better study for this and a referral was made to neurology to evaluate for need for either re-assessment of carotids or referral for carotid endarterectomy.     #Type 2 diabetes - metformin    #Essential hypertension  - lisinopril, norvasc      #Hypothyroidism - Synthroid      #Acute blood loss anemia  - stable    Discharge Medications with Med changes:     Current Discharge Medication List      START taking these medications    Details   ceFEPIme (MAXIPIME) 2 G vial Inject 2 g into the vein every 12 hours  Qty: 975 mL, Refills: 0    Associated Diagnoses: Epidural abscess      !! methocarbamol (ROBAXIN) 500 MG tablet Take 1 tablet (500 mg) by mouth every 6 hours as needed for muscle spasms  Qty: 42 tablet, Refills: 1    Associated Diagnoses: Spinal stenosis of lumbar region, unspecified whether neurogenic claudication present      !! oxyCODONE (ROXICODONE) 5 MG tablet Take 1 tablet (5 mg) by mouth every 4 hours as needed for moderate to severe pain  Qty: 42 tablet, Refills: 0    Associated Diagnoses: Spinal stenosis of lumbar region, unspecified whether neurogenic claudication present      vancomycin 1,500 mg Inject 1,500 mg into the vein every 12 hours    Associated Diagnoses: Epidural abscess       !! - Potential duplicate medications found. Please discuss with provider.      CONTINUE these medications which have CHANGED    Details   aspirin 81 MG EC tablet Take 1 tablet (81 mg) by mouth daily       clopidogrel (PLAVIX) 75 MG tablet Take 1 tablet (75 mg) by mouth daily  Qty: 90 tablet, Refills: 3    Associated Diagnoses: History of completed stroke         CONTINUE these medications which have NOT CHANGED    Details   acetaminophen (TYLENOL) 325 MG tablet Take 3 tablets (975 mg) by mouth every 8 hours    Associated Diagnoses: Lumbar radiculopathy      !! allopurinol (ZYLOPRIM) 300 MG tablet Take 450 mg by mouth every 48 hours Every other evening      !! 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, unspecified laterality      amLODIPine (NORVASC) 5 MG tablet Take 1 tablet (5 mg) by mouth daily  Qty: 90 tablet, Refills: 3    Associated Diagnoses: Essential hypertension      cyclobenzaprine (FLEXERIL) 10 MG tablet Take 10 mg by mouth 3 times daily as needed for muscle spasms      divalproex sodium extended-release (DEPAKOTE ER) 500 MG 24 hr tablet TAKE 1 TABLET AT BEDTIME  Qty: 90 tablet, Refills: 0    Associated Diagnoses: Cerebrovascular accident (CVA), unspecified mechanism (H)      gabapentin (NEURONTIN) 300 MG capsule Take 300 mg by mouth 3 times daily      levothyroxine (SYNTHROID/LEVOTHROID) 125 MCG tablet Take 125 mcg by mouth daily before breakfast       Lidocaine (LIDOCARE) 4 % Patch Place 1 patch onto the skin every 24 hours To prevent lidocaine toxicity, patient should be patch free for 12 hrs daily.  Qty: 10 patch, Refills: 0    Associated Diagnoses: Lumbar radiculopathy      lisinopril (ZESTRIL) 20 MG tablet TAKE 1 TABLET TWICE A DAY  Qty: 180 tablet, Refills: 3    Associated Diagnoses: Primary hypertension      metFORMIN (GLUCOPHAGE-XR) 500 MG 24 hr tablet Take 500 mg by mouth 2 times daily (with meals)       !! methocarbamol (ROBAXIN) 500 MG tablet Take 1 tablet (500 mg) by mouth every 6  hours  Qty: 42 tablet, Refills: 1    Associated Diagnoses: Lumbar radiculopathy      !! oxyCODONE (ROXICODONE) 5 MG tablet Take 1-2 tablets (5-10 mg) by mouth every 4 hours as needed for moderate to severe pain  Qty: 42 tablet, Refills: 0    Associated Diagnoses: Lumbar radiculopathy      rosuvastatin (CRESTOR) 5 MG tablet Take 1 tablet (5 mg) by mouth daily  Qty: 90 tablet, Refills: 3    Associated Diagnoses: Cerebrovascular accident (CVA), unspecified mechanism (H)      SENNA-docusate sodium (SENNA S) 8.6-50 MG tablet Take 1 tablet by mouth 2 times daily  Qty: 10 tablet, Refills: 0    Associated Diagnoses: Lumbar radiculopathy      blood glucose monitoring (FREESTYLE) lancets       FREESTYLE LITE test strip        !! - Potential duplicate medications found. Please discuss with provider.                Rationale for medication changes:      -Asa and plavix hold until 04/03 post spine sx  -vanco/cefepime for infection          Consults       CARE MANAGEMENT / SOCIAL WORK IP CONSULT  PHYSICAL THERAPY ADULT IP CONSULT  OCCUPATIONAL THERAPY ADULT IP CONSULT  INFECTIOUS DISEASES IP CONSULT  PHARMACY TO DOSE VANCO  DIABETES EDUCATION IP CONSULT  SOCIAL WORK IP CONSULT  VASCULAR ACCESS ADULT IP CONSULT  SOCIAL WORK IP CONSULT  PHARMACY IP CONSULT    Immunizations given this encounter     Most Recent Immunizations   Administered Date(s) Administered     COVID-19,PF,Moderna 10/27/2021     Flu, Unspecified 01/09/2021     Influenza (H1N1) 09/10/2021     Influenza (IIV3) PF 11/08/2019     Influenza Quad, Recombinant, pf(RIV4) (Flublok) 10/07/2020     Influenza Vaccine IM > 6 months Valent IIV4 (Alfuria,Fluzone) 10/06/2021     Influenza Vaccine, 6+MO IM (QUADRIVALENT W/PRESERVATIVES) 09/15/2017     Pneumococcal 23 valent 06/29/2021     Pneumococcal, Unspecified 09/10/2021     Tdap (Adacel,Boostrix) 09/14/2015     Tdap (Adult) Unspecified Formulation 07/05/2005     Zoster vaccine recombinant adjuvanted (SHINGRIX) 10/06/2021            Anticoagulation Information            SIGNIFICANT IMAGING FINDINGS     Results for orders placed or performed during the hospital encounter of 03/28/22   Lateral Lumbar Spine for Level Confirmation [XR LUMBAR SPINE PORT 1  VIEW]    Impression    IMPRESSION: Single crosstable lateral radiograph of the lumbar spine. 0810 hours. 03/29/2022.    Assuming 5 lumbar type vertebrae. Skin retractors in place.     The more superior surgical probe tip projects over the posterior elements corresponding to the level of the L4-L5 intervertebral disc space.     The more caudal surgical probe tip projects over the posterior elements corresponding to the L5 pedicle level.       SIGNIFICANT LABORATORY FINDINGS       Discharge Orders        Comprehensive metabolic panel     CRP, inflammation     Vancomycin level     Adult Neurology  Referral      Home infusion referral      Reason for your hospital stay    Low back infection     When to call - Contact Surgeon Team    Call [phone] with the following symptoms:    *Temperature 101(fever) or greater or chills  *Redness, swelling or increased drainage from the wound  *Worsening pain not relieved by the pain prescription given  *Worsening or new onset of weakness, or numbness and tingling  *Loss or change in your ability to control bowel or bladder function  *Change in your ability to walk, talk, see or think  *If you did not have a bowel movement before leaving the hospital and your bowels have not moved within 48 hours of your discharge    !!!! IF YOU HAVE A SERIOUS OR THREATENING EMERGENCY, CALL 911 OR COME TO THE EMERGENCY ROOM.  IF YOUR CONDITIONS ALLOWS COME TO THE HOSPITAL WHERE YOUR SURGERY WAS PERFORMED.    QUESTIONS OR CONCERNS:   OUR OFFICE HOURS ARE FROM 9:00 A.M -4:30 P.M. MONDAY-FRIDAY.  AFTER OFFICE HOURS, CALLS ARE ANSWERED BY THE ON-CALL PROVIDER. PLEASE CALL WITH ROUTINE QUESTIONS DURING OFFICE HOURS. THE ON-CALL PROVIDER WILL NOT REFILL  PRESCRIPTIONS.     Discharge Instruction - Breathing exercises    Perform breathing exercises using your Incentive Spirometer 10 times per hour while awake for 2 weeks.     Symptoms - Fever Management    A low grade fever can be expected after surgery.  Use acetaminophen (TYLENOL) as needed for fever management.  Contact your Surgeon Team if you have a fever greater than 101.5 F, chills, and/or night sweats.     Symptoms - Constipation management    Constipation (hard, dry bowel movements) is expected after surgery due to the combination of being less active, the anesthetic, and the opioid pain medication.  You can do the following to help reduce constipation:  ~  FLUIDS:  Drink clear liquids (water or Gatorade), or juice (apple/prune).  ~  DIET:  Eat a fiber rich diet.    ~  ACTIVITY:  Get up and move around several times a day.  Increase your activity as you are able.  MEDICATIONS:  Reduce the risk of constipation by starting medications before you are constipated.  You can take Miralax   (1 packet as directed) and/or a stool softener (Senokot 1-2 tablets 1-2 times a day).  If you already have constipation and these medications are not working, you can get magnesium citrate and use as directed.  If you continue to have constipation you can try an over the counter suppository or enema.  Call your Surgeon Team if it has been greater than 3 days since your last bowel movement.     Comfort and Pain Management - Pain after Surgery    Pain after surgery is normal and expected.  You will have some amount of pain for several weeks after surgery.  Your pain will improve with time.  There are several things you can do to help reduce your pain including: rest, ice, elevation, and using pain medications as needed. Contact your Surgeon Team if you have pain that persists or worsens after surgery despite rest, ice, elevation, and taking your medication(s) as prescribed. Contact your Surgeon Team if you have new numbness,  "tingling, or weakness in your operative extremity.     Comfort and Pain Management - Cold therapy    Ice can be used to control swelling and discomfort after surgery. Place a thin towel over your operative site and apply the ice pack overtop. Leave ice pack in place for 20 minutes, then remove for 20 minutes. Repeat this 20 minutes on/20 minutes off routine as often as tolerated.     Medication Instructions - Opioids - Tapering Instructions    In the first three days following surgery, your symptoms may warrant use of the narcotic pain medication every four to six hours as prescribed. This is normal. As your pain symptoms improve, focus your efforts on decreasing (tapering) use of narcotic medications.     Follow Up Care    Our office will call you in 2-3 business days after discharge to schedule your follow-up appointments. You should have a wound check and a provider visit scheduled. If you have not received a call in 2-3 business days, please call our office at [phone].    Your follow up appointment may be with the surgeon or the nurse practitioner.     Activity     Dressing / Wound Care - Wound    WOUND CARE:  Keep a dressing on the wound until the sutures are removed.  No lotions, soaps, powders, ointments, creams, or patches on incision until the wound is well healed.  The incision does not need to be \"cleaned\" with soap and water.  Change the dressing daily, more frequently if necessary to keep the wound dry.  If you notice increased or persistent drainage from your wound, contact our office.  You may use an extra large band-aid or 4x4 and tape.  Both can be purchased at your pharmacy.    Sutures are usually removed in 1-2 weeks.  They are sometimes left longer.    Wash your hands before changing the dressing or touching the wound. If someone is helping you change the dressing, ensure that the person washes his/her hands.  Good handwashing can decrease the risk of infection.  If you are unable to see " the wound, have someone check the wound daily for redness, swelling or drainage.  A small amount of drainage is normal.       You may shower  with the wound covered with a water proof dressing until the staples are removed.   Do not allow the shower water to beat directly on the wound.  After your shower, pat the wound dry, do not rub.  Be sure to remove the dressing after your shower and apply a fresh dry dressing to prevent the wound from becoming moist.  Waterproof dressings can be purchased at your local pharmacy or [facility].     If you have a lumbar (low back incision), no tub baths for 3-4 weeks, or until the wound is completely healed.    If you develop redness, swelling, drainage, or temp 101 or greater, please call our office at [phone].     Dressing / Wound Care - NO Tub Bathing    Tub bathing, swimming, or any other activities that will cause your incision to be submerged in water should be avoided until allowed by your Surgeon.     Dressing / Wound care - Shower with wound/dressing covered    You must COVER your dressing or incision with saran wrap (or any other non-permeable covering) to allow the incision to remain dry while showering.  You may shower anytime now after surgery as long as the surgical wound stays dry. Continue to cover your dressing or incision for showering until your first office visit.  You are strictly prohibited from soaking   or submerging the surgical wound underwater.     Reason for your hospital stay    Lumbar laminectomy     Follow-up and recommended labs and tests     Our office will call you in 2-3 business days after discharge to schedule your follow-up appointments. If you have not received a call in 2-3 business days, please call our office at [phone].    Your follow up appointment may be with the surgeon or the nurse practitioner.     Activity    *No lifting, pushing or pulling greater than 5-10 pound (this is about a gallon of milk).  *No  repetitive bending, twisting, or jarring activities  *No overhead work  *No aerobic or strenuous activity  *No activities with increased risk of falls  *You may move about your home as tolerated  *You may walk up and down stairs as tolerated  *You may increase your activity slowly over the next 4-6 weeks    WALKING PROGRAM: As you can tolerate, walk daily-start with 5-10 minutes of continuous walking. This is in addition to the walking that you do as part of your daily activities. Increase the time that you walk by 5 minutes every couple of days. Do not exceed 30-45 minutes of continuous walking until seen in follow-up. Walking is the best exercise after surgery.  **Listen to your body, if you find that you are more painful or fatigued, you may need to proceed more slowly.    **Do not smoke or expose yourself to second hand smoke. Cigarette smoke can delay healing and cause complications.     DRIVING:  We recommend that you do not drive while taking medications for pain or muscle spasms. Always read and follow the advice on your prescription bottle. If you have questions, speak with your pharmacist.  We recommend that you do not drive while wearing a brace, as it could limit your range of motion.    WORK: If you plan to return to work before you 4-6 weeks appointment, call and discuss with one of the nurses in the neurosurgery office.     USE OF ICE OR  HEAT:  *Icing can decrease post op swelling, thereby helping with post op pain control. Always protect your skin to prevent frostbite or burn.    *For the first 48 hours we recommend ice to the surgical area for 15-20 minutes at a time several times a day.      *Any longer than 15-20 minutes can cause damage to the tissues, including frostbite.     *Allow area to warm for at least 45 minutes or an hour between treatments.    *Ice as frequently as you wish, so long as the area is warm to touch and has normal sensation before repeating.    *After 48 hours, you may use  heat or ice, which ever feels best to you.  Always remember to protect your skin, and to use no greater than 15-20 minutes at a time.     *You can make your own ice bags at home by mixing 3 parts water with 1 part rubbing alcohol in a Ziplock bag and placing in the freezer.  It will not freeze solid.      BOWEL MOVEMENT:  If you did not have a bowel movement (pooped) before you were discharged from the hospital, and do not have a bowel movement within 2 days of discharge. Please call our office and request to speak to a nurse.    Your insurance may not cover medications to treat or prevent  constipation.      There are many  over the counter medications for constipation including: Colace, Senakot, Dulcolax, and Miralax. In addition to medications eat a high fiber diet and drink plenty of water.    If you are taking narcotics, you should being taking medication daily for constipation.      If you develop loose or runny stools, stop taking the medications for constipation.     When to contact your care team    Call [phone] with the following symptoms:    *Temperature 101(fever) or greater or chills  *Redness, swelling or increased drainage from the wound  *Worsening pain not relieved by the pain prescription given  *Worsening or new onset of weakness, or numbness and tingling  *Loss or change in your ability to control bowel or bladder function  *Change in your ability to walk, talk, see or think  *If you did not have a bowel movement before leaving the hospital and your bowels have not moved within 48 hours of your discharge    !!!! IF YOU HAVE A SERIOUS OR THREATENING EMERGENCY, CALL 911 OR COME TO THE EMERGENCY ROOM.  IF YOUR CONDITIONS ALLOWS COME TO THE HOSPITAL WHERE YOUR SURGERY WAS PERFORMED.    QUESTIONS OR CONCERNS:   OUR OFFICE HOURS ARE FROM 9:00 A.M -4:30 P.M. MONDAY-FRIDAY.  AFTER OFFICE HOURS, CALLS ARE ANSWERED BY THE ON-CALL PROVIDER. PLEASE CALL WITH ROUTINE QUESTIONS DURING OFFICE HOURS. THE  ON-CALL PROVIDER WILL NOT REFILL PRESCRIPTIONS.     Wound care and dressings    WOUND CARE:  Keep a dressing on the wound until the sutures are removed.  No lotions, soaps, powders, ointments, creams, or patches on incision until the wound is well healed.    Change the dressing daily, more frequently if necessary to keep the wound dry.  If you notice increased or persistent drainage from your wound, contact our office.  You may use an extra large band-aid or 4x4 and tape.  Both can be purchased at your pharmacy.    Sutures are usually removed in 1-2 weeks.  They are sometimes left longer.    Wash your hands before changing the dressing or touching the wound. If someone is helping you change the dressing, ensure that the person washes his/her hands.  Good handwashing can decrease the risk of infection.  If you are unable to see the wound, have someone check the wound daily for redness, swelling or drainage.  A small amount of drainage is normal.       Two days after surgery begin showering. Wash your wound daily. Remove all dressings prior to your shower.  Apply mild soap directly to the wound, form a light lather and rinse with running water.     Do not submerge the wound under water. No baths or swimming for 6 weeks.     If you develop redness, swelling, drainage, or temp 101 or greater, please call our office at [phone].     Follow-up and recommended labs and tests    A referral was sent to Neurology clinic for hx of L sided strokes and L carotid stenosis (50-70% on CT angiogram 12/25).  If you do not hear from the clinic for an appointment please call to schedule.     Discharge Instruction - Regular Diet Adult    Return to your pre-surgery diet unless instructed otherwise     Diet    Follow this diet upon discharge: Regular     CBC with Platelets & Differential       Examination   Physical Exam   Temp:  [97.8  F (36.6  C)-98.4  F (36.9  C)] 98.2  F (36.8  C)  Pulse:  [64-70] 70  Resp:  [18-20] 18  BP:  (136-174)/(79-87) 136/79  SpO2:  [95 %-98 %] 96 %  Wt Readings from Last 1 Encounters:   03/28/22 105.2 kg (232 lb)       Doing well. Eager to go home      Please see EMR for more detailed significant labs, imaging, consultant notes etc.    I, Wayne Sheehan MD, personally saw the patient today and spent greater than 30 minutes discharging this patient.    Wayne Sheehan MD  [facility]    CC:Luis Daniel Maciel

## 2022-04-02 NOTE — PLAN OF CARE
Problem: Bowel Motility Impaired (Spinal Surgery)  Goal: Effective Bowel Elimination  Outcome: Ongoing, Progressing     Problem: Functional Ability Impaired (Spinal Surgery)  Goal: Optimal Functional Ability  Intervention: Optimize Functional Status  Recent Flowsheet Documentation  Taken 4/2/2022 0033 by Nydia Reid RN  Activity Management: activity adjusted per tolerance     Problem: Pain Acute  Goal: Acceptable Pain Control and Functional Ability  Outcome: Ongoing, Progressing  Intervention: Prevent or Manage Pain  Recent Flowsheet Documentation  Taken 4/2/2022 0033 by Nydia Reid RN  Medication Review/Management: medications reviewed   Goal Outcome Evaluation:  Pt denied pain.   Mid back incision dry, intact.   Getting iv cefepime and vancomycin.   Tobias in place.   Anticipate discharge today.

## 2022-04-03 ENCOUNTER — HOME INFUSION (PRE-WILLOW HOME INFUSION) (OUTPATIENT)
Dept: PHARMACY | Facility: CLINIC | Age: 62
End: 2022-04-03

## 2022-04-04 ENCOUNTER — TELEPHONE (OUTPATIENT)
Dept: INFECTIOUS DISEASES | Facility: CLINIC | Age: 62
End: 2022-04-04

## 2022-04-04 ENCOUNTER — TELEPHONE (OUTPATIENT)
Dept: NEUROSURGERY | Facility: CLINIC | Age: 62
End: 2022-04-04

## 2022-04-04 ENCOUNTER — HOME INFUSION (PRE-WILLOW HOME INFUSION) (OUTPATIENT)
Dept: PHARMACY | Facility: CLINIC | Age: 62
End: 2022-04-04

## 2022-04-04 ENCOUNTER — PATIENT OUTREACH (OUTPATIENT)
Dept: CARE COORDINATION | Facility: CLINIC | Age: 62
End: 2022-04-04

## 2022-04-04 DIAGNOSIS — Z71.89 OTHER SPECIFIED COUNSELING: ICD-10-CM

## 2022-04-04 NOTE — PROGRESS NOTES
"CHW was unable to discuss Clinic Care Coordination with patient at this time. No enrollment status noted.     Clinic Care Coordination Contact  [facility]: Post-Discharge Note  SITUATION                                                      Admission:    Admission Date: 03/28/22   Reason for Admission: Back pain  Discharge:   Discharge Date: 04/02/22  Discharge Diagnosis: LUMBAR 4-LUMBAR 5 LEFT HEMILAMINECTOMY WITH EPIDURAL ABSCESS EVACUATION    BACKGROUND                                                      Per hospital discharge summary and inpatient provider notes:    62M with Dm2, HTN, gout, CVA, HLD, recent L3-L4 hemilaminectomy, and durotomy repair with DuraSeal on 3/21 by Dr. Duque , directly admitted on recommendation of neurosurgery for evaluation of suspected CSF leak with severe back pain, found to have a epidural abscess.    Epidural abscess  -Patient with worsening left-sided leg pain and back pain onset a few days postop after L4-L3 hemilaminectomy and durotomy repair on 3/21.  MRI showed CSF leak versus infection or DuraSeal displacement and patient was directly admitted on 3/28. Underwent left lumbar 3-4 hemilaminectomy, L4-5 left hemilactamectomy with epidural abscess evacuation.  -ID continuing cefepime and Vanco, will need 4 to 6 weeks home IV antibiotic treatment  -Home infusion to meet at home today for teaching and supplies.     ASSESSMENT      Discharge Assessment  How are you doing now that you are home?: \"Fine good. Much better. I'm doing fine.\"  How are your symptoms? (Red Flag symptoms escalate to triage hotline per guidelines): Improved  Do you feel your condition is stable enough to be safe at home until your provider visit?: Yes  Does the patient have their discharge instructions? : Yes  Does the patient have questions regarding their discharge instructions? : No  Were you started on any new medications or were there changes to any of your previous medications? : Yes  Does the " patient have all of their medications?: Yes  Do you have questions regarding any of your medications? : No  Do you have all of your needed medical supplies or equipment (DME)?  (i.e. oxygen tank, CPAP, cane, etc.): Yes  Discharge follow-up appointment scheduled within 14 calendar days? : Yes  Discharge Follow Up Appointment Date: 04/07/22  Discharge Follow Up Appointment Scheduled with?: Specialty Care Provider (Surgeon f/u appt.)    Post-op ([facility] CTA Only)  If the patient had a surgery or procedure, do they have any questions for a nurse?: No      PLAN                                                      Outpatient Plan:      Follow-up Appointments     Follow Up Care      Our office will call you in 2-3 business days after discharge to schedule   your follow-up appointments. You should have a wound check and a provider   visit scheduled. If you have not received a call in 2-3 business days,   please call our office at [phone].     Your follow up appointment may be with the surgeon or the nurse   practitioner.         Follow-up and recommended labs and tests      A referral was sent to Neurology clinic for hx of L sided strokes and L   carotid stenosis (50-70% on CT angiogram 12/25).  If you do not hear from   the clinic for an appointment please call to schedule.         Follow-up and recommended labs and tests       Our office will call you in 2-3 business days after discharge to schedule   your follow-up appointments. If you have not received a call in 2-3   business days, please call our office at [phone].     Your follow up appointment may be with the surgeon or the nurse   practitioner.       Future Appointments   Date Time Provider Department Center   4/7/2022  2:30 PM [facility]   4/12/2022  2:40 PM [facility]   4/14/2022  2:00 [facility]   5/10/2022  2:30 PM [facility]   11/2/2022  12:30 PM Alexander Cuevas MD NUNEU [facility]         For any urgent concerns, please contact our 24 hour nurse triage line: [phone] ([phone])       KAILEY Rivero  [phone]  [facility]

## 2022-04-04 NOTE — TELEPHONE ENCOUNTER
PATIENT NAME:  Raghavendra Kiser  YOB: 1960  MRN: [id number]  SURGEON: Dr. Duque  DATE of SURGERY: 03/29/2022  PROCEDURE: Exploration of prior left lumbar 3-lumbar 4 hemilaminectomy space with lumbar 3-lumbar 5 left hemilaminectomy for epidural abscess evaucuation        FOLLOW-UP:  Wound Check: 04/07/2022  Staples/Sutures Out: 04/14/2022  Post Op Visit: 6 weeks  Post-op Provider: INO on Dr. Duque clinic day  DIAGNOSTICS: n/a  DISPOSITION: Home 04/02/2022    ADDITIONAL INSTRUCTIONS FOR MEDICAL STAFF:        Kate Shelton RN, CNRN

## 2022-04-04 NOTE — TELEPHONE ENCOUNTER
Date: 4/12/2022 Status: Scheduled   Time: 2:40 PM Length: 20   Visit Type: RETURN [657] Copay: $0.00   Provider: Mary Ellen Taylor MD Department: [facility] INFECTIOUS DISEASE   Bill Area: Infectious Disease [facility]

## 2022-04-04 NOTE — TELEPHONE ENCOUNTER
4/4 update - pt discharged home    ----- Message from Violette Mansfield RN sent at 4/1/2022 10:38 AM CDT -----    ----- Message -----  From: Mary Ellen Taylor MD  Sent: 4/1/2022  10:29 AM CDT  [facility]    Please schedule follow-up with me in ~3 weeks.  - plan 4-6 weeks of IV cefepime and IV vancomycin  - weekly cbc diff, cmp, crp, vanc trough  Thanks!

## 2022-04-05 ENCOUNTER — LAB REQUISITION (OUTPATIENT)
Dept: LAB | Facility: CLINIC | Age: 62
End: 2022-04-05
Payer: OTHER GOVERNMENT

## 2022-04-05 ENCOUNTER — HOME INFUSION (PRE-WILLOW HOME INFUSION) (OUTPATIENT)
Dept: PHARMACY | Facility: CLINIC | Age: 62
End: 2022-04-05

## 2022-04-05 DIAGNOSIS — M48.062 SPINAL STENOSIS, LUMBAR REGION WITH NEUROGENIC CLAUDICATION: ICD-10-CM

## 2022-04-05 LAB
ALBUMIN SERPL-MCNC: 3.2 G/DL (ref 3.4–5)
ALP SERPL-CCNC: 81 U/L (ref 40–150)
ALT SERPL W P-5'-P-CCNC: 51 U/L (ref 0–70)
ANION GAP SERPL CALCULATED.3IONS-SCNC: 7 MMOL/L (ref 3–14)
AST SERPL W P-5'-P-CCNC: 26 U/L (ref 0–45)
BACTERIA BLD CULT: ABNORMAL
BACTERIA BLD CULT: ABNORMAL
BACTERIA WND CULT: NORMAL
BASOPHILS # BLD AUTO: 0 10E3/UL (ref 0–0.2)
BASOPHILS NFR BLD AUTO: 1 %
BILIRUB SERPL-MCNC: 0.4 MG/DL (ref 0.2–1.3)
BUN SERPL-MCNC: 14 MG/DL (ref 7–30)
CALCIUM SERPL-MCNC: 9.2 MG/DL (ref 8.5–10.1)
CHLORIDE BLD-SCNC: 111 MMOL/L (ref 94–109)
CO2 SERPL-SCNC: 24 MMOL/L (ref 20–32)
CREAT SERPL-MCNC: 0.93 MG/DL (ref 0.66–1.25)
CRP SERPL-MCNC: 3.6 MG/L (ref 0–8)
EOSINOPHIL # BLD AUTO: 0.2 10E3/UL (ref 0–0.7)
EOSINOPHIL NFR BLD AUTO: 3 %
ERYTHROCYTE [DISTWIDTH] IN BLOOD BY AUTOMATED COUNT: 13.7 % (ref 10–15)
GFR SERPL CREATININE-BSD FRML MDRD: >90 ML/MIN/1.73M2
GLUCOSE BLD-MCNC: 115 MG/DL (ref 70–99)
HCT VFR BLD AUTO: 35.4 % (ref 40–53)
HGB BLD-MCNC: 11.6 G/DL (ref 13.3–17.7)
HOLD SPECIMEN: NORMAL
IMM GRANULOCYTES # BLD: 0.1 10E3/UL
IMM GRANULOCYTES NFR BLD: 2 %
LYMPHOCYTES # BLD AUTO: 1.2 10E3/UL (ref 0.8–5.3)
LYMPHOCYTES NFR BLD AUTO: 20 %
MCH RBC QN AUTO: 33 PG (ref 26.5–33)
MCHC RBC AUTO-ENTMCNC: 32.8 G/DL (ref 31.5–36.5)
MCV RBC AUTO: 101 FL (ref 78–100)
MONOCYTES # BLD AUTO: 0.4 10E3/UL (ref 0–1.3)
MONOCYTES NFR BLD AUTO: 7 %
NEUTROPHILS # BLD AUTO: 4.2 10E3/UL (ref 1.6–8.3)
NEUTROPHILS NFR BLD AUTO: 67 %
NRBC # BLD AUTO: 0 10E3/UL
NRBC BLD AUTO-RTO: 0 /100
PLATELET # BLD AUTO: 379 10E3/UL (ref 150–450)
POTASSIUM BLD-SCNC: 4 MMOL/L (ref 3.4–5.3)
PROT SERPL-MCNC: 7 G/DL (ref 6.8–8.8)
RBC # BLD AUTO: 3.51 10E6/UL (ref 4.4–5.9)
SODIUM SERPL-SCNC: 142 MMOL/L (ref 133–144)
VANCOMYCIN SERPL-MCNC: 19 MG/L
WBC # BLD AUTO: 6.2 10E3/UL (ref 4–11)

## 2022-04-05 PROCEDURE — 80202 ASSAY OF VANCOMYCIN: CPT | Performed by: INTERNAL MEDICINE

## 2022-04-05 PROCEDURE — 80053 COMPREHEN METABOLIC PANEL: CPT | Performed by: INTERNAL MEDICINE

## 2022-04-05 PROCEDURE — 85025 COMPLETE CBC W/AUTO DIFF WBC: CPT | Performed by: INTERNAL MEDICINE

## 2022-04-05 PROCEDURE — 86140 C-REACTIVE PROTEIN: CPT | Performed by: INTERNAL MEDICINE

## 2022-04-05 NOTE — DISCHARGE SUMMARY
HOSPITAL DISCHARGE SUMMARY         Patient Name: Raghavendra Kiser  YOB: 1960  Age: 62 year old  Medical Record Number: [id number]  Primary Physician: Luis Daniel Maciel  Admission Date: 3/21/2022.  Discharge Date: 3/23/2022      Raghavendra Kiser will be discharged from [facility] to home    PRINCIPAL DIAGNOSIS CAUSING ADMISSION: <principal problem not specified>    Discharge Diagnoses:  Active Problems:    HTN (hypertension)    History of TIA (transient ischemic attack) and stroke    Hypothyroidism    Type 2 diabetes mellitus without complication, without long-term current use of insulin (H)    Lumbar radiculopathy      HPI: Raghavendra Kiser is a 62-year-old male who presents with bilateral lower extremity pain, numbness tingling, weakness.  Lumbar x-ray shows retrolisthesis of lumbar 1 on 2 and lumbar 23 without any overt instability.  MRI of his lumbar spine shows a broad-based disc herniation at the lumbar 3-4 level with a superimposed left paracentral disc protrusion resulting in severe spinal canal stenosis with left greater than right right lateral recess stenosis.  He also has mild to moderate canal narrowing at the lumbar 4-5 level as well as mild right lateral recess stenosis with foraminal narrowing at the lumbar 5-sacral 1 level.  Lastly at the T11-T12 level he has mild to moderate central canal narrowing with mild cord flattening.  Patient is on Plavix.  Discussed lumbar 3-4 hemilaminectomies with microdiscectomies but will need to be delayed for Plavix discontinuation.  Risks and benefits of lumbar decompression discussed including but not limited to infection, hematoma, nerve damage including paralysis, post op radiculitis, durotomy, risks associated with the use of general anesthesia, blood clots in the lungs or legs. He agreed to proceed.        BRIEF HOSPITAL COURSE: Raghavendra Kiser is a 62 year old male who was admitted on day of surgery and underwent  Procedure(s):  Bilateral lumbar 3- lumbar 4 hemilaminectomies, medial facetectomies, foraminotomies, microdiscectomies.  Surgery was without complications.  Postoperatively he reported resolution of pre-op leg symptoms, tolerable back pain. Difficulty voiding post op. Similar scenario in the past and went home without difficulty or barragan.     On exam on day of discharge, his strength was full with no new focal deficits.  PT/OT consultations were obtained to assess function, assist with mobilization, and evaluate for discharge recommendations.  By POD # 2 he was tolerating a regular diet, ambulating, and obtaining good pain control on oral medication. No headache, nausea. His incision was clean, dry and intact. Patient requested barragan out before discharge. Educated about risks and when to return to ED (unable to void in 8 hours, abdominal discomfort) risk of infection if urinary retention. He met all discharge criteria and was stable for discharge.      PROCEDURES PERFORMED DURING HOSPITALIZATION: Procedure/Surgery Information   Procedure: Procedure(s):  Bilateral lumbar 3- lumbar 4 hemilaminectomies, medial facetectomies, foraminotomies, microdiscectomies   Surgeon(s): Surgeon(s) and Role:     * Abena Duque MD - Primary     * Bella Kong PA-C - Assisting             COMPLICATIONS IN HOSPITAL:  Small durotomy intraop - repaired    IMPORTANT PENDING TEST RESULTS: None.    PERTINENT FINDINGS/RESULTS AT DISCHARGE:  None.    CONDITION AT DISCHARGE:  improving    DISCHARGE MEDICATIONS  No current facility-administered medications for this encounter.    Current Outpatient Medications:      acetaminophen (TYLENOL) 325 MG tablet, Take 3 tablets (975 mg) by mouth every 8 hours (Patient taking differently: Take 325-650 mg by mouth every 8 hours as needed ), Disp: , Rfl:      allopurinol (ZYLOPRIM) 300 MG tablet, TAKE 1 TABLET  (300 MG) EVERY OTHER DAY ALTERNATING WITH ONE AND ONE-HALF TABLETS  (450 MG) EVERY  OTHER DAY AT BEDTIME (Patient taking differently: Take 300 mg by mouth every 48 hours Every other evening), Disp: 113 tablet, Rfl: 3     amLODIPine (NORVASC) 5 MG tablet, Take 1 tablet (5 mg) by mouth daily, Disp: 90 tablet, Rfl: 3     divalproex sodium extended-release (DEPAKOTE ER) 500 MG 24 hr tablet, TAKE 1 TABLET AT BEDTIME (Patient taking differently: Take 500 mg by mouth At Bedtime ), Disp: 90 tablet, Rfl: 0     levothyroxine (SYNTHROID/LEVOTHROID) 125 MCG tablet, Take 125 mcg by mouth daily before breakfast , Disp: , Rfl:      lisinopril (ZESTRIL) 20 MG tablet, TAKE 1 TABLET TWICE A DAY (Patient taking differently: Take 20 mg by mouth 2 times daily ), Disp: 180 tablet, Rfl: 3     metFORMIN (GLUCOPHAGE-XR) 500 MG 24 hr tablet, Take 500 mg by mouth 2 times daily (with meals) , Disp: , Rfl:      methocarbamol (ROBAXIN) 500 MG tablet, Take 1 tablet (500 mg) by mouth every 6 hours, Disp: 42 tablet, Rfl: 1     oxyCODONE (ROXICODONE) 5 MG tablet, Take 1-2 tablets (5-10 mg) by mouth every 4 hours as needed for moderate to severe pain, Disp: 42 tablet, Rfl: 0     rosuvastatin (CRESTOR) 5 MG tablet, Take 1 tablet (5 mg) by mouth daily, Disp: 90 tablet, Rfl: 3     allopurinol (ZYLOPRIM) 300 MG tablet, Take 450 mg by mouth every 48 hours Every other evening, Disp: , Rfl:      aspirin 81 MG EC tablet, Take 1 tablet (81 mg) by mouth daily, Disp: , Rfl:      blood glucose monitoring (FREESTYLE) lancets, , Disp: , Rfl:      ceFEPIme (MAXIPIME) 2 G vial, Inject 2 g into the vein every 12 hours, Disp: 975 mL, Rfl: 0     clopidogrel (PLAVIX) 75 MG tablet, Take 1 tablet (75 mg) by mouth daily, Disp: 90 tablet, Rfl: 3     cyclobenzaprine (FLEXERIL) 10 MG tablet, Take 10 mg by mouth 3 times daily as needed for muscle spasms, Disp: , Rfl:      FREESTYLE LITE test strip, , Disp: , Rfl:      gabapentin (NEURONTIN) 300 MG capsule, Take 300 mg by mouth 3 times daily, Disp: , Rfl:      Lidocaine (LIDOCARE) 4 % Patch, Place 1 patch onto  the skin every 24 hours To prevent lidocaine toxicity, patient should be patch free for 12 hrs daily. (Patient taking differently: Place 1 patch onto the skin daily as needed To prevent lidocaine toxicity, patient should be patch free for 12 hrs daily.), Disp: 10 patch, Rfl: 0     methocarbamol (ROBAXIN) 500 MG tablet, Take 1 tablet (500 mg) by mouth every 6 hours as needed for muscle spasms, Disp: 42 tablet, Rfl: 1     oxyCODONE (ROXICODONE) 5 MG tablet, Take 1 tablet (5 mg) by mouth every 4 hours as needed for moderate to severe pain, Disp: 42 tablet, Rfl: 0     SENNA-docusate sodium (SENNA S) 8.6-50 MG tablet, Take 1 tablet by mouth 2 times daily, Disp: 10 tablet, Rfl: 0     vancomycin 1,500 mg, Inject 1,500 mg into the vein every 12 hours, Disp: , Rfl:        AFTER DISCHARGE ORDERS AND INSTRUCTIONS:     Follow up with Neurosurgery: in 2 weeks, 6 weeks.   Follow up appointment with Primary Care Physician: Luis Daniel Maciel as needed  Diet: Orders Placed This Encounter      Diet     Activity: *No lifting, pushing or pulling greater than 5-10 pound (this is about a gallon of milk).  *No repetitive bending, twisting, or jarring activities  *No overhead work  *No aerobic or strenuous activity  *No activities with increased risk of falls  *You may move about your home as tolerated  *You may walk up and down stairs as tolerated  *You may increase your activity slowly over the next 4-6 weeks    Warnings: Call [phone] with the following symptoms:    *Temperature 101(fever) or greater or chills  *Redness, swelling or increased drainage from the wound  *Worsening pain not relieved by the pain prescription given  *Worsening or new onset of weakness, or numbness and tingling  *Loss or change in your ability to control bowel or bladder function  *Change in your ability to walk, talk, see or think  *If you did not have a bowel movement before leaving the hospital and your bowels have not moved within 48 hours of your  discharge    Wound care: WOUND CARE WITH sutures  WOUND CARE:  Keep a dressing on the wound until the sutures are removed.  No lotions, soaps, powders, ointments, creams, or patches on incision until the wound is well healed.    Change the dressing daily, more frequently if necessary to keep the wound dry.  If you notice increased or persistent drainage from your wound, contact our office.  You may use an extra large band-aid or 4x4 and tape.  Both can be purchased at your pharmacy.    Sutures are usually removed in 2 weeks.  They are sometimes left longer.    Wash your hands before changing the dressing or touching the wound. If someone is helping you change the dressing, ensure that the person washes his/her hands.  Good handwashing can decrease the risk of infection.  If you are unable to see the wound, have someone check the wound daily for redness, swelling or drainage.  A small amount of drainage is normal.       Two days after surgery begin showering. Wash your wound daily. Remove all dressings prior to your shower.  Apply mild soap directly to the wound, form a light lather and rinse with running water.     Do not submerge the wound under water. No baths or swimming for 6 weeks.     If you develop redness, swelling, drainage, or temp 101 or greater, please call our office at [phone].      MORGAN Scott [place] [id number]  Office: [phone]

## 2022-04-06 ENCOUNTER — HOME INFUSION (PRE-WILLOW HOME INFUSION) (OUTPATIENT)
Dept: PHARMACY | Facility: CLINIC | Age: 62
End: 2022-04-06
Payer: OTHER GOVERNMENT

## 2022-04-06 ENCOUNTER — HOME INFUSION (PRE-WILLOW HOME INFUSION) (OUTPATIENT)
Dept: PHARMACY | Facility: CLINIC | Age: 62
End: 2022-04-06

## 2022-04-06 ENCOUNTER — TRANSFERRED RECORDS (OUTPATIENT)
Dept: HEALTH INFORMATION MANAGEMENT | Facility: CLINIC | Age: 62
End: 2022-04-06
Payer: OTHER GOVERNMENT

## 2022-04-07 ENCOUNTER — ALLIED HEALTH/NURSE VISIT (OUTPATIENT)
Dept: NEUROSURGERY | Facility: CLINIC | Age: 62
End: 2022-04-07
Payer: OTHER GOVERNMENT

## 2022-04-07 VITALS — HEART RATE: 72 BPM | DIASTOLIC BLOOD PRESSURE: 68 MMHG | SYSTOLIC BLOOD PRESSURE: 112 MMHG | RESPIRATION RATE: 18 BRPM

## 2022-04-07 DIAGNOSIS — M54.16 LUMBAR RADICULOPATHY: Primary | ICD-10-CM

## 2022-04-07 PROCEDURE — 99207 PR NO CHARGE NURSE ONLY: CPT

## 2022-04-07 NOTE — PATIENT INSTRUCTIONS
Patient Name: Evie Lawton  : 1977  MRN: [id number]  Patient Address: [address] [place] [address],[address] [address]      Coronavirus Disease 2019 (COVID-19)     [facility] is committed to the safety and well-being of our patients, members, employ WOUND CARE:  Keep a dressing on the wound until the sutures are removed.  No lotions, soaps, powders, ointments, creams, or patches on incision until the wound is well healed.    Change the dressing daily, more frequently if necessary to keep the wound dry.  If you notice increased or persistent drainage from your wound, contact our office.  You may use an extra large band-aid or 4x4 and tape.  Both can be purchased at your pharmacy.    Sutures are usually removed in 1-2 weeks.  They are sometimes left longer.    Wash your hands before changing the dressing or touching the wound. If someone is helping you change the dressing, ensure that the person washes his/her hands.  Good handwashing can decrease the risk of infection.  If you are unable to see the wound, have someone check the wound daily for redness, swelling or drainage.  A small amount of drainage is normal.       Two days after surgery begin showering. Wash your wound daily. Remove all dressings prior to your shower.  Apply mild soap directly to the wound, form a light lather and rinse with running water.     Do not submerge the wound under water. No baths or swimming for 6 weeks.     If you develop redness, swelling, drainage, or temp 101 or greater, please call our office at [phone].      * No lifting, pushing or pulling greater than 5-10 pound (this is about a gallon of milk) for the first 6 weeks after surgery .  *No repetitive bending, twisting, or jarring activities for 6 weeks.  *No overhead work  *No aerobic or strenuous activity  *No activities with increased risk of falls  *You may move about your home as tolerated  *You may walk up and down stairs as tolerated  *You may increase your activity slowly over the next 6 weeks    WALKING PROGRAM: As you can tolerate, walk daily-start with 5-10 minutes of continuous walking. This is in addition to the walking that you do as part of your daily activities. Increase the time that you walk by 5  minutes every couple of days. Do not exceed 30-45 minutes of continuous walking until seen in follow-up. Walking is the best exercise after surgery.  **Listen to your body, if you find that you are more painful or fatigued, you may need to proceed more slowly.    **Do not smoke or expose yourself to second hand smoke. Cigarette smoke can delay healing and cause complications.     DRIVING:  We recommend that you do not drive while taking medications for pain or muscle spasms. Always read and follow the advice on your prescription bottle. If you have questions, speak with your pharmacist.  We recommend that you do not drive while wearing a brace, as it could limit your range of motion.    WORK: If you plan to return to work before your 6 week post-op appointment, call and discuss with one of the nurses in the neurosurgery office.      your symptoms get worse, call your healthcare provider immediately. 3. Get rest and stay hydrated.    4. If you have a medical appointment, call the healthcare provider ahead of time and tell them that you have or may have COVID-19.  5. For medical emergen fever-reducing medications; and  · Improvement in respiratory symptoms (e.g., cough, shortness of breath); and  · At least 10 days have passed since symptoms first appeared OR if asymptomatic patient or date of symptom onset is unclear then use 10 days pos donors must:    · Have had a confirmed diagnosis of COVID-19  · Be symptom-free for at least 14 days*    *Some people will be required to have a repeat COVID-19 test in order to be eligible to donate.  If you’re instructed by Chaya that a repeat test is r random. Researchers are trying to identify similarities between people with a Post-COVID condition to better understand if there are risk factors. How do I prevent a Post-COVID condition?   The best way to prevent the long-term symptoms of COVID-19 is

## 2022-04-07 NOTE — PROGRESS NOTES
Mauri Kiser is status post Exploration of prior left lumbar 3-lumbar 4 hemilaminectomy space with lumbar 3-lumbar 5 left hemilaminectomy for epidural abscess evacuation on 03/29/2022 with Dr. Duque.  Preoperatively presented with worsening new left leg pain.  Today he returns in follow up for a wound check. He is accompanied by his spouse. He is very pleased with his outcome - reports a resolved leg pain, denies sensory or motor issues in LE. Takes Gabapentin and Robaxin as prescribed, on Tylenol prn for intermittent back ache. Gait and balance are normal.   PICC line in place, follow up with ID.  Tobias cath is out, no bowel or bladder concerns.    Sutures intact.  Surgical wound WNL - CDI, no signs of infection or skin breakdown.  Incision well-healed: good skin approximation, no redness or visible/palpable edema, no tenderness to palpation.  PT. AF, denies fever, chills or sweats.  Pt. reports that the symptoms are improved from pre-op.    Kate Shelton, RN, CNRN

## 2022-04-08 ENCOUNTER — LAB REQUISITION (OUTPATIENT)
Dept: LAB | Facility: HOSPITAL | Age: 62
End: 2022-04-08
Payer: OTHER GOVERNMENT

## 2022-04-08 ENCOUNTER — HOME INFUSION (PRE-WILLOW HOME INFUSION) (OUTPATIENT)
Dept: PHARMACY | Facility: CLINIC | Age: 62
End: 2022-04-08

## 2022-04-08 DIAGNOSIS — M48.062 SPINAL STENOSIS, LUMBAR REGION WITH NEUROGENIC CLAUDICATION: ICD-10-CM

## 2022-04-08 LAB
BUN SERPL-MCNC: 13 MG/DL (ref 8–22)
CREAT SERPL-MCNC: 1.02 MG/DL (ref 0.7–1.3)
GFR SERPL CREATININE-BSD FRML MDRD: 82 ML/MIN/1.73M2
HOLD SPECIMEN: NORMAL
VANCOMYCIN SERPL-MCNC: 15.4 MG/L

## 2022-04-08 PROCEDURE — 84520 ASSAY OF UREA NITROGEN: CPT | Performed by: INTERNAL MEDICINE

## 2022-04-08 PROCEDURE — 82565 ASSAY OF CREATININE: CPT | Performed by: INTERNAL MEDICINE

## 2022-04-08 PROCEDURE — 36592 COLLECT BLOOD FROM PICC: CPT | Performed by: INTERNAL MEDICINE

## 2022-04-08 PROCEDURE — 80202 ASSAY OF VANCOMYCIN: CPT | Performed by: INTERNAL MEDICINE

## 2022-04-10 ENCOUNTER — TELEPHONE (OUTPATIENT)
Dept: NEUROSURGERY | Facility: CLINIC | Age: 62
End: 2022-04-10
Payer: OTHER GOVERNMENT

## 2022-04-10 DIAGNOSIS — T14.8XXA WOUND DRAINAGE: Primary | ICD-10-CM

## 2022-04-10 NOTE — TELEPHONE ENCOUNTER
"Phone call from Mauri's wife about wound drainage. Picture shared. Wound looks healthy. May have some edema to the right of the incision. When she pushes this area more fluid drains. \"pinky yellow\". Moderate amount of drainage yesterday on dressing. He does not have any back or leg pain. He is on a 6 week course of Abx and has PCP appt tomorrow and ID on Tuesday. He resumed his plavix on Tuesday. Will order some labs to be done tomorrow at PCP. Not sure this will be beneficial since he is on Abx. Request pressure dressing attempted. No fevers. He feels well. Will discuss with Dr Duque.     Alix Velazquez, KALEB-CNP  [facility] Neurosurgery  O: [phone]    "

## 2022-04-11 ENCOUNTER — OFFICE VISIT (OUTPATIENT)
Dept: NEUROSURGERY | Facility: CLINIC | Age: 62
End: 2022-04-11
Payer: OTHER GOVERNMENT

## 2022-04-11 ENCOUNTER — OFFICE VISIT (OUTPATIENT)
Dept: FAMILY MEDICINE | Facility: CLINIC | Age: 62
End: 2022-04-11
Payer: OTHER GOVERNMENT

## 2022-04-11 ENCOUNTER — HOME INFUSION (PRE-WILLOW HOME INFUSION) (OUTPATIENT)
Dept: PHARMACY | Facility: CLINIC | Age: 62
End: 2022-04-11

## 2022-04-11 VITALS
DIASTOLIC BLOOD PRESSURE: 88 MMHG | SYSTOLIC BLOOD PRESSURE: 164 MMHG | HEIGHT: 72 IN | BODY MASS INDEX: 32.51 KG/M2 | HEART RATE: 62 BPM | WEIGHT: 240 LBS | OXYGEN SATURATION: 98 %

## 2022-04-11 VITALS
WEIGHT: 240 LBS | SYSTOLIC BLOOD PRESSURE: 145 MMHG | TEMPERATURE: 98.3 F | RESPIRATION RATE: 20 BRPM | OXYGEN SATURATION: 97 % | BODY MASS INDEX: 32.55 KG/M2 | DIASTOLIC BLOOD PRESSURE: 78 MMHG | HEART RATE: 64 BPM

## 2022-04-11 DIAGNOSIS — G06.2 EPIDURAL ABSCESS: ICD-10-CM

## 2022-04-11 DIAGNOSIS — Z98.890 POSTOPERATIVE STATE: Primary | ICD-10-CM

## 2022-04-11 DIAGNOSIS — T14.8XXA WOUND DRAINAGE: ICD-10-CM

## 2022-04-11 DIAGNOSIS — T14.8XXA WOUND DRAINAGE: Primary | ICD-10-CM

## 2022-04-11 LAB
ALBUMIN SERPL-MCNC: 3.6 G/DL (ref 3.5–5)
ALP SERPL-CCNC: 88 U/L (ref 45–120)
ALT SERPL W P-5'-P-CCNC: 21 U/L (ref 0–45)
ANION GAP SERPL CALCULATED.3IONS-SCNC: 12 MMOL/L (ref 5–18)
AST SERPL W P-5'-P-CCNC: 17 U/L (ref 0–40)
BASOPHILS # BLD AUTO: 0 10E3/UL (ref 0–0.2)
BASOPHILS NFR BLD AUTO: 1 %
BILIRUB SERPL-MCNC: 0.4 MG/DL (ref 0–1)
BUN SERPL-MCNC: 11 MG/DL (ref 8–22)
C REACTIVE PROTEIN LHE: 0.2 MG/DL (ref 0–0.8)
CALCIUM SERPL-MCNC: 9.3 MG/DL (ref 8.5–10.5)
CHLORIDE BLD-SCNC: 109 MMOL/L (ref 98–107)
CO2 SERPL-SCNC: 23 MMOL/L (ref 22–31)
CREAT SERPL-MCNC: 0.81 MG/DL (ref 0.7–1.3)
EOSINOPHIL # BLD AUTO: 0.1 10E3/UL (ref 0–0.7)
EOSINOPHIL NFR BLD AUTO: 3 %
ERYTHROCYTE [DISTWIDTH] IN BLOOD BY AUTOMATED COUNT: 13.6 % (ref 10–15)
ERYTHROCYTE [SEDIMENTATION RATE] IN BLOOD BY WESTERGREN METHOD: 26 MM/HR (ref 0–15)
GFR SERPL CREATININE-BSD FRML MDRD: >90 ML/MIN/1.73M2
GLUCOSE BLD-MCNC: 84 MG/DL (ref 70–125)
HCT VFR BLD AUTO: 36.5 % (ref 40–53)
HGB BLD-MCNC: 12.2 G/DL (ref 13.3–17.7)
IMM GRANULOCYTES # BLD: 0 10E3/UL
IMM GRANULOCYTES NFR BLD: 0 %
LYMPHOCYTES # BLD AUTO: 1.4 10E3/UL (ref 0.8–5.3)
LYMPHOCYTES NFR BLD AUTO: 26 %
MCH RBC QN AUTO: 33.3 PG (ref 26.5–33)
MCHC RBC AUTO-ENTMCNC: 33.4 G/DL (ref 31.5–36.5)
MCV RBC AUTO: 100 FL (ref 78–100)
MONOCYTES # BLD AUTO: 0.5 10E3/UL (ref 0–1.3)
MONOCYTES NFR BLD AUTO: 9 %
NEUTROPHILS # BLD AUTO: 3.4 10E3/UL (ref 1.6–8.3)
NEUTROPHILS NFR BLD AUTO: 62 %
PLATELET # BLD AUTO: 287 10E3/UL (ref 150–450)
POTASSIUM BLD-SCNC: 4.1 MMOL/L (ref 3.5–5)
PROT SERPL-MCNC: 6.9 G/DL (ref 6–8)
RBC # BLD AUTO: 3.66 10E6/UL (ref 4.4–5.9)
SODIUM SERPL-SCNC: 144 MMOL/L (ref 136–145)
WBC # BLD AUTO: 5.5 10E3/UL (ref 4–11)

## 2022-04-11 PROCEDURE — 86140 C-REACTIVE PROTEIN: CPT | Performed by: FAMILY MEDICINE

## 2022-04-11 PROCEDURE — 99024 POSTOP FOLLOW-UP VISIT: CPT | Performed by: NURSE PRACTITIONER

## 2022-04-11 PROCEDURE — 85025 COMPLETE CBC W/AUTO DIFF WBC: CPT | Performed by: FAMILY MEDICINE

## 2022-04-11 PROCEDURE — 85652 RBC SED RATE AUTOMATED: CPT | Performed by: FAMILY MEDICINE

## 2022-04-11 PROCEDURE — 87040 BLOOD CULTURE FOR BACTERIA: CPT | Performed by: FAMILY MEDICINE

## 2022-04-11 PROCEDURE — 36415 COLL VENOUS BLD VENIPUNCTURE: CPT | Performed by: FAMILY MEDICINE

## 2022-04-11 PROCEDURE — 80053 COMPREHEN METABOLIC PANEL: CPT | Performed by: FAMILY MEDICINE

## 2022-04-11 PROCEDURE — 99495 TRANSJ CARE MGMT MOD F2F 14D: CPT | Performed by: FAMILY MEDICINE

## 2022-04-11 NOTE — PROGRESS NOTES
This is a recent snapshot of the patient's [facility] medical record.  For current drug dose and complete information and questions, call [phone] or In Basket pool, [facility] home infusion ([id number])  CSN Number:  [id number]

## 2022-04-11 NOTE — PROGRESS NOTES
3/29/2022 exploration of left L3-L4 hemilaminectomy space with left L4-L5 hemilaminectomy for evacuation of epidural abscess by Dr. Duque status post bilateral lumbar 3- lumbar 4 hemilaminectomies, medial facetectomies, foraminotomies and microdiscectomies with small durotomy on right on 3/21/2022    Mauri is here today for wound check after reporting some wound drainage over the weekend starting Saturday. Still feels well in terms of minimal pain. No fevers, chills, headaches. Labs pending which were drawn this morning although he in on a 6 wk course of Abx (vanco, Cefepime) so they may not be helpful. He see's ID tomorrow.     Wound has sutures in place. Upper medial area of wound draining somewhat steady small amount of clear pink tinged fluid from the center. Yellow fibrinous skin edge of incision near area of leakage. No halo noted on gauze test. Area to right of wound tight, swollen. Per RN in clinic who saw wound last week, looks less swollen. Able to extrude fluid with pressure to this area. Non tender to patient. No obvious concern for infection, or CSF. Blister to right on incision secondary to tape.     Question need for MRI with history of possible abscess, CSF leak or watch and see since he does not have back, leg pain and this could be seroma. Would recommend leaving sutures a few days longer so wound dehiscence does not occur. Cleansed wound today with chlorhexidine and betadine. Patient has resumed his plavix as of last Tuesday.     Sed rate today 26, 73 two weeks ago  CBC with normal WBC  CRP pending   BC pending     Discussed with Dr Duque - will wait on MRI for now. Could consider drain placement if necessary. Discussed with Mauri's wife via telephone. Come see us Thursday as scheduled. Sooner if wound continues to drain a lot or worse than it is now or symptoms change.     AGUSTINA Marshall  [facility] Neurosurgery  O: [phone]

## 2022-04-11 NOTE — LETTER
4/11/2022         RE: Raghavendra Kiser  [address]  [place] [facility] [address]        Dear Colleague,    Thank you for referring your patient, Raghavendra Kiser, to the [facility]. Please see a copy of my visit note below.    3/29/2022 exploration of left L3-L4 hemilaminectomy space with left L4-L5 hemilaminectomy for evacuation of epidural abscess by Dr. Duque status post bilateral lumbar 3- lumbar 4 hemilaminectomies, medial facetectomies, foraminotomies and microdiscectomies with small durotomy on right on 3/21/2022    Mauri is here today for wound check after reporting some wound drainage over the weekend starting Saturday. Still feels well in terms of minimal pain. No fevers, chills, headaches. Labs pending which were drawn this morning although he in on a 6 wk course of Abx (vanco, Cefepime) so they may not be helpful. He see's ID tomorrow.     Wound has sutures in place. Upper medial area of wound draining somewhat steady small amount of clear pink tinged fluid from the center. Yellow fibrinous skin edge of incision near area of leakage. No halo noted on gauze test. Area to right of wound tight, swollen. Per RN in clinic who saw wound last week, looks less swollen. Able to extrude fluid with pressure to this area. Non tender to patient. No obvious concern for infection, or CSF. Blister to right on incision secondary to tape.     Question need for MRI with history of possible abscess, CSF leak or watch and see since he does not have back, leg pain and this could be seroma. Would recommend leaving sutures a few days longer so wound dehiscence does not occur. Cleansed wound today with chlorhexidine and betadine. Patient has resumed his plavix as of last Tuesday.     Sed rate today 26, 73 two weeks ago  CBC with normal WBC  CRP pending   BC pending     Discussed with Dr Duque - will wait on MRI for now. Could consider drain placement if necessary. Discussed with Mauri's wife  via telephone. Come see us Thursday as scheduled. Sooner if wound continues to drain a lot or worse than it is now or symptoms change.     KALEB Marshall-CNP  [facility]  O: [phone]              Again, thank you for allowing me to participate in the care of your patient.        Sincerely,        MORGAN Scott CNP

## 2022-04-11 NOTE — PROGRESS NOTES
"Post Discharge Outreach 4/4/2022   Admission Date 3/28/2022   Reason for Admission Back pain   Discharge Date 4/2/2022   Discharge Diagnosis LUMBAR 4-LUMBAR 5 LEFT HEMILAMINECTOMY WITH EPIDURAL ABSCESS EVACUATION   How are you doing now that you are home? \"Fine good. Much better. I'm doing fine.\"   How are your symptoms? (Red Flag symptoms escalate to triage hotline per guidelines) Improved   Do you feel your condition is stable enough to be safe at home until your provider visit? Yes   Does the patient have their discharge instructions?  Yes   Does the patient have questions regarding their discharge instructions?  No   Were you started on any new medications or were there changes to any of your previous medications?  Yes   Does the patient have all of their medications? Yes   Do you have questions regarding any of your medications?  No   Do you have all of your needed medical supplies or equipment (DME)?  (i.e. oxygen tank, CPAP, cane, etc.) Yes   Discharge follow-up appointment scheduled within 14 calendar days?  Yes   Discharge Follow Up Appointment Date 4/7/2022   Discharge Follow Up Appointment Scheduled with? Specialty Care Provider     Hospital Follow-up Visit:    Hospital/Nursing Home/IP Rehab Facility: [facility]  Date of Admission: 3-28-22  Date of Discharge: 4-2-22  Reason(s) for Admission:  Post op infection/abscess      Was your hospitalization related to COVID-19? No   Problems taking medications regularly:  None  Medication changes since discharge: None  Problems adhering to non-medication therapy:  None    Summary of hospitalization:  [facility] discharge summary reviewed  Diagnostic Tests/Treatments reviewed.  Follow up needed: none  Other Healthcare Providers Involved in Patient s Care:         Specialist appointment - Neurosurgery  Update since discharge: improved.   HPI:   NO FEVER OR CHILLS  EATING OK.  SOME INCISIONAL PAIN IN THE LOW BACK  EATING " OK.    EXAM  BP (!) 145/78   Pulse 64   Temp 98.3  F (36.8  C) (Oral)   Resp 20   Wt 108.9 kg (240 lb)   SpO2 97%   BMI 32.55 kg/m     LUNGS CTA  COR RRR WITHOUT MURMUR  EXT WITHOUT EDEMA OR SWELLING.    Encounter Diagnoses   Name Primary?     Postoperative state, stable Yes     Epidural abscess, on IV antibiotics      Wound drainage, drain in place      PLAN:   Follow up with surgery today    Continue antibiotics    Follow up labs drawn as ordered by surgery    Post Discharge Medication Reconciliation: discharge medications reconciled, continue medications without change.  Plan of care communicated with patient and family

## 2022-04-11 NOTE — PATIENT INSTRUCTIONS
Follow up with surgery today    Continue antibiotics    Follow up labs drawn as ordered by surgery

## 2022-04-12 ENCOUNTER — OFFICE VISIT (OUTPATIENT)
Dept: INFECTIOUS DISEASES | Facility: CLINIC | Age: 62
End: 2022-04-12

## 2022-04-12 ENCOUNTER — HOME INFUSION (PRE-WILLOW HOME INFUSION) (OUTPATIENT)
Dept: PHARMACY | Facility: CLINIC | Age: 62
End: 2022-04-12

## 2022-04-12 ENCOUNTER — LAB REQUISITION (OUTPATIENT)
Dept: LAB | Facility: HOSPITAL | Age: 62
End: 2022-04-12
Payer: OTHER GOVERNMENT

## 2022-04-12 VITALS
SYSTOLIC BLOOD PRESSURE: 122 MMHG | WEIGHT: 234 LBS | BODY MASS INDEX: 31.74 KG/M2 | HEART RATE: 68 BPM | DIASTOLIC BLOOD PRESSURE: 72 MMHG | TEMPERATURE: 99.1 F

## 2022-04-12 DIAGNOSIS — G06.2 EPIDURAL ABSCESS: Primary | ICD-10-CM

## 2022-04-12 DIAGNOSIS — M48.062 SPINAL STENOSIS, LUMBAR REGION WITH NEUROGENIC CLAUDICATION: ICD-10-CM

## 2022-04-12 LAB
ALBUMIN SERPL-MCNC: 3.3 G/DL (ref 3.5–5)
ALP SERPL-CCNC: 75 U/L (ref 45–120)
ALT SERPL W P-5'-P-CCNC: 18 U/L (ref 0–45)
ANION GAP SERPL CALCULATED.3IONS-SCNC: 12 MMOL/L (ref 5–18)
AST SERPL W P-5'-P-CCNC: 14 U/L (ref 0–40)
BASOPHILS # BLD AUTO: 0 10E3/UL (ref 0–0.2)
BASOPHILS NFR BLD AUTO: 1 %
BILIRUB SERPL-MCNC: 0.4 MG/DL (ref 0–1)
BUN SERPL-MCNC: 10 MG/DL (ref 8–22)
C REACTIVE PROTEIN LHE: 0.1 MG/DL (ref 0–0.8)
CALCIUM SERPL-MCNC: 9 MG/DL (ref 8.5–10.5)
CHLORIDE BLD-SCNC: 108 MMOL/L (ref 98–107)
CO2 SERPL-SCNC: 23 MMOL/L (ref 22–31)
CREAT SERPL-MCNC: 0.85 MG/DL (ref 0.7–1.3)
EOSINOPHIL # BLD AUTO: 0.1 10E3/UL (ref 0–0.7)
EOSINOPHIL NFR BLD AUTO: 2 %
ERYTHROCYTE [DISTWIDTH] IN BLOOD BY AUTOMATED COUNT: 13.6 % (ref 10–15)
GFR SERPL CREATININE-BSD FRML MDRD: >90 ML/MIN/1.73M2
GLUCOSE BLD-MCNC: 172 MG/DL (ref 70–125)
HCT VFR BLD AUTO: 34.8 % (ref 40–53)
HGB BLD-MCNC: 11.5 G/DL (ref 13.3–17.7)
IMM GRANULOCYTES # BLD: 0 10E3/UL
IMM GRANULOCYTES NFR BLD: 0 %
LYMPHOCYTES # BLD AUTO: 1 10E3/UL (ref 0.8–5.3)
LYMPHOCYTES NFR BLD AUTO: 19 %
MCH RBC QN AUTO: 33.1 PG (ref 26.5–33)
MCHC RBC AUTO-ENTMCNC: 33 G/DL (ref 31.5–36.5)
MCV RBC AUTO: 100 FL (ref 78–100)
MONOCYTES # BLD AUTO: 0.4 10E3/UL (ref 0–1.3)
MONOCYTES NFR BLD AUTO: 7 %
NEUTROPHILS # BLD AUTO: 4 10E3/UL (ref 1.6–8.3)
NEUTROPHILS NFR BLD AUTO: 71 %
NRBC # BLD AUTO: 0 10E3/UL
NRBC BLD AUTO-RTO: 0 /100
PLATELET # BLD AUTO: 234 10E3/UL (ref 150–450)
POTASSIUM BLD-SCNC: 3.9 MMOL/L (ref 3.5–5)
PROT SERPL-MCNC: 6.5 G/DL (ref 6–8)
RBC # BLD AUTO: 3.47 10E6/UL (ref 4.4–5.9)
SODIUM SERPL-SCNC: 143 MMOL/L (ref 136–145)
VANCOMYCIN SERPL-MCNC: 14.2 MG/L
WBC # BLD AUTO: 5.5 10E3/UL (ref 4–11)

## 2022-04-12 PROCEDURE — 80053 COMPREHEN METABOLIC PANEL: CPT | Performed by: INTERNAL MEDICINE

## 2022-04-12 PROCEDURE — 80202 ASSAY OF VANCOMYCIN: CPT | Performed by: INTERNAL MEDICINE

## 2022-04-12 PROCEDURE — 85025 COMPLETE CBC W/AUTO DIFF WBC: CPT | Performed by: INTERNAL MEDICINE

## 2022-04-12 PROCEDURE — 36592 COLLECT BLOOD FROM PICC: CPT | Performed by: INTERNAL MEDICINE

## 2022-04-12 PROCEDURE — 99213 OFFICE O/P EST LOW 20 MIN: CPT | Performed by: INTERNAL MEDICINE

## 2022-04-12 PROCEDURE — 86140 C-REACTIVE PROTEIN: CPT | Performed by: INTERNAL MEDICINE

## 2022-04-12 NOTE — PROGRESS NOTES
INFECTIOUS DISEASE [facility] FOLLOW UP NOTE      Date: 04/12/2022   Patient Name: Raghavendra Kiser   YOB: 1960  MRN: [id number]      ASSESSMENT:  1. Epidural abscess: following L3-L4 hemilaminectomy and durotomy repair with DuraSeal 3/21. There was concern for CSF leak however OR notes epidural abscess, 3 cultures grew staph epi-this is common skin stephania and not routinely pathogenic. CRP has now normalized. Following with neurosurgery for drainage around incision.   2. 1/4 BC positive: staph species-different from one above. contaminant.   3. Comorbid conditions affecting immune system: DM2 on metformin, hx CVA     PLAN:  - continue cefepime IV  - continue vancomycin IV  - plan 4-6 weeks IV abx  - weekly labs  - follow up with neurosurgery re drainage from incision later this week.   - discussed with patient and wife and they are in agreement    Mary Ellen Taylor MD  [facility]   Clinic phone: [phone]   Clinic fax: [phone]    ______________________________________________________________________    SUBJECTIVE / INTERVAL HISTORY: Raghavendra Kiser returns for follow up of epidural abscess.    He is feeling well. Tolerating the antibiotics. Some reaction to the adhesive over PICC. Eating, drinking ok. No fevers, chills. Saturday noticed some drainage from mid incision, saw neurosurgery yesterday and has follow up with them later this week. No erythema. Wife thinks the swelling is a bit better than previously.     ROS: All other systems negative except as listed above.      Current Outpatient Medications:      allopurinol (ZYLOPRIM) 300 MG tablet, Take 450 mg by mouth every 48 hours Every other evening, Disp: , Rfl:      amLODIPine (NORVASC) 5 MG tablet, Take 1 tablet (5 mg) by mouth daily, Disp: 90 tablet, Rfl: 3     aspirin 81 MG EC tablet, Take 1 tablet (81 mg) by mouth daily, Disp: , Rfl:      blood glucose monitoring (FREESTYLE) lancets, , Disp: , Rfl:       ceFEPIme (MAXIPIME) 2 G vial, Inject 2 g into the vein every 12 hours, Disp: 975 mL, Rfl: 0     clopidogrel (PLAVIX) 75 MG tablet, Take 1 tablet (75 mg) by mouth daily, Disp: 90 tablet, Rfl: 3     cyclobenzaprine (FLEXERIL) 10 MG tablet, Take 10 mg by mouth 3 times daily as needed for muscle spasms, Disp: , Rfl:      divalproex sodium extended-release (DEPAKOTE ER) 500 MG 24 hr tablet, TAKE 1 TABLET AT BEDTIME (Patient taking differently: Take 500 mg by mouth At Bedtime), Disp: 90 tablet, Rfl: 0     FREESTYLE LITE test strip, , Disp: , Rfl:      gabapentin (NEURONTIN) 300 MG capsule, Take 300 mg by mouth 3 times daily, Disp: , Rfl:      levothyroxine (SYNTHROID/LEVOTHROID) 125 MCG tablet, Take 125 mcg by mouth daily before breakfast , Disp: , Rfl:      lisinopril (ZESTRIL) 20 MG tablet, TAKE 1 TABLET TWICE A DAY (Patient taking differently: Take 20 mg by mouth in the morning and 20 mg in the evening.), Disp: 180 tablet, Rfl: 3     metFORMIN (GLUCOPHAGE-XR) 500 MG 24 hr tablet, Take 500 mg by mouth 2 times daily (with meals) , Disp: , Rfl:      methocarbamol (ROBAXIN) 500 MG tablet, Take 1 tablet (500 mg) by mouth every 6 hours as needed for muscle spasms, Disp: 42 tablet, Rfl: 1     rosuvastatin (CRESTOR) 5 MG tablet, Take 1 tablet (5 mg) by mouth daily, Disp: 90 tablet, Rfl: 3     vancomycin 1,500 mg, Inject 1,500 mg into the vein every 12 hours, Disp: , Rfl:       OBJECTIVE:  /72   Pulse 68   Temp 99.1  F (37.3  C)   Wt 106.1 kg (234 lb)   BMI 31.74 kg/m        GEN: No acute distress.    RESPIRATORY:  Normal breathing pattern.   EXTREMITIES: No edema.  SKIN/HAIR/NAILS:  RUE PICC c/d/i  Incision: yellow drainage from middle, slough on bandage. No erythema. Mild swelling on right side.     Pertinent labs:    CRP   Date Value Ref Range Status   04/12/2022 0.1 0.0 - 0.8 mg/dL Final     Last Comprehensive Metabolic Panel:  Sodium   Date Value Ref Range Status   04/12/2022 143 136 - 145 mmol/L Final  "    Potassium   Date Value Ref Range Status   04/12/2022 3.9 3.5 - 5.0 mmol/L Final     Chloride   Date Value Ref Range Status   04/12/2022 108 (H) 98 - 107 mmol/L Final     Carbon Dioxide (CO2)   Date Value Ref Range Status   04/12/2022 23 22 - 31 mmol/L Final     Anion Gap   Date Value Ref Range Status   04/12/2022 12 5 - 18 mmol/L Final     Glucose   Date Value Ref Range Status   04/12/2022 172 (H) 70 - 125 mg/dL Final     Urea Nitrogen   Date Value Ref Range Status   04/12/2022 10 8 - 22 mg/dL Final     Creatinine   Date Value Ref Range Status   04/12/2022 0.85 0.70 - 1.30 mg/dL Final     GFR Estimate   Date Value Ref Range Status   04/12/2022 >90 >60 mL/min/1.73m2 Final     Comment:     Effective December 21, 2021 eGFRcr in adults is calculated using the 2021 CKD-EPI creatinine equation which includes age and gender (Cherri et al., NE, DOI: 10.1056/NKEBuf2396296)   05/28/2021 >60 >60 mL/min/1.73m2 Final     Calcium   Date Value Ref Range Status   04/12/2022 9.0 8.5 - 10.5 mg/dL Final     CBC RESULTS:   Recent Labs   Lab Test 04/12/22  0830   WBC 5.5   RBC 3.47*   HGB 11.5*   HCT 34.8*      MCH 33.1*   MCHC 33.0   RDW 13.6          MICROBIOLOGY DATA:  Reviewed.  T Cell Subset:  WBC Count   Date Value Ref Range Status   04/12/2022 5.5 4.0 - 11.0 10e3/uL Final     % Lymphocytes   Date Value Ref Range Status   04/12/2022 19 % Final     RADIOLOGY:    Recent Results (from the past 744 hour(s))   POC US Guidance Needle Placement    Narrative    Ultrasound was performed as guidance to an anesthesia procedure.  Click   \"PACS images\" hyperlink below to view any stored images.  For specific   procedure details, view procedure note authored by anesthesia.   POC US Guided Vascular Access    Narrative    Ultrasound was performed as guidance to an anesthesia procedure.  Click   \"PACS images\" hyperlink below to view any stored images.  For specific   procedure details, view procedure note authored by anesthesia. " "  POC US Guidance Needle Placement    Narrative    Ultrasound was performed as guidance to an anesthesia procedure.  Click   \"PACS images\" hyperlink below to view any stored images.  For specific   procedure details, view procedure note authored by anesthesia.   Lateral Lumbar Spine for Level Confirmation [XR LUMBAR SPINE PORT 1  VIEW]    Narrative    EXAM: XR CROSSTABLE LATERAL LUMBAR SPINE PORTABLE  LOCATION: [facility]  DATE/TIME: 3/21/2022 5:01 PM    INDICATION: In OR for confirmation of spine level by C arm or hard plate.  COMPARISON: None.  TECHNIQUE: CR Lumbar Spine.      Impression    IMPRESSION: 5 lumbar type vertebra. There is a probe projecting posterior to the C3-4 interspace. Vertebral body height is normal. There is slight retrolisthesis of L2 and L3. Moderate loss of disc height at L4-5 and L5-S1. Mild loss of disc height at   L2-3.   MR External Imaging Spine    Narrative    Images were obtained from an external facility.  Click PACS Images   hyperlink to view images.  Textual results have been scanned into the   media tab.   Lateral Lumbar Spine for Level Confirmation [XR LUMBAR SPINE PORT 1  VIEW]    Narrative    EXAM: XR CROSSTABLE LATERAL LUMBAR SPINE PORTABLE  LOCATION: [facility]  DATE/TIME: 3/29/2022 8:08 AM    INDICATION: In OR for confirmation of spine level by C arm or hard plate.  COMPARISON: Lumbar spine MRI 03/28/2022  TECHNIQUE: CR Lumbar Spine.      Impression    IMPRESSION: Single crosstable lateral radiograph of the lumbar spine. 0810 hours. 03/29/2022.    Assuming 5 lumbar type vertebrae. Skin retractors in place.     The more superior surgical probe tip projects over the posterior elements corresponding to the level of the L4-L5 intervertebral disc space.     The more caudal surgical probe tip projects over the posterior elements corresponding to the L5 pedicle level.        Total Time Spent 25 minutes including chart review, time " with patient, orders, and documentation.

## 2022-04-12 NOTE — PATIENT INSTRUCTIONS
Thanks for the visit today.  Follow up with neurosurgery this week  Will be in touch for final plan.

## 2022-04-13 ENCOUNTER — HOME INFUSION (PRE-WILLOW HOME INFUSION) (OUTPATIENT)
Dept: PHARMACY | Facility: CLINIC | Age: 62
End: 2022-04-13
Payer: OTHER GOVERNMENT

## 2022-04-13 NOTE — PROGRESS NOTES
This is a recent snapshot of the patient's [facility] medical record.  For current drug dose and complete information and questions, call [phone] or In Basket pool, fv home infusion ([id number])  CSN Number:  [id number]

## 2022-04-14 ENCOUNTER — ALLIED HEALTH/NURSE VISIT (OUTPATIENT)
Dept: NEUROSURGERY | Facility: CLINIC | Age: 62
End: 2022-04-14
Payer: OTHER GOVERNMENT

## 2022-04-14 ENCOUNTER — HOME INFUSION (PRE-WILLOW HOME INFUSION) (OUTPATIENT)
Dept: PHARMACY | Facility: CLINIC | Age: 62
End: 2022-04-14

## 2022-04-14 DIAGNOSIS — M54.16 LUMBAR RADICULOPATHY: Primary | ICD-10-CM

## 2022-04-14 PROCEDURE — 99207 PR NO CHARGE NURSE ONLY: CPT

## 2022-04-14 RX ORDER — GABAPENTIN 300 MG/1
300 CAPSULE ORAL 3 TIMES DAILY
Qty: 90 CAPSULE | Refills: 0 | Status: SHIPPED | OUTPATIENT
Start: 2022-04-14 | End: 2022-05-18

## 2022-04-14 NOTE — PROGRESS NOTES
"Mauri Kiser is status post Exploration of prior left lumbar 3-lumbar 4 hemilaminectomy space with lumbar 3-lumbar 5 left hemilaminectomy for epidural abscess evacuation on 03/29/2022 with Dr. Duque.  Preoperatively presented with worsening new left leg pain.  Last seen on 4/11/2022 for wound check. \"Upper medial area of wound draining somewhat steady small amount of clear pink tinged fluid from the center\".  Today he returns in follow up. He is accompanied by his spouse. He denies any change of symptoms in the interim.     POC discussed in collaboration with Dr. Duque who saw patient.    Wound appears healing with secondary intention. Thre is a scant amount of s/s drainage coming out with pressure along the incision. There is a superficial skin dehiscence in a middle of wound. No edema, no redness, no TTP.    Will keep sutures in till next week.  Kate Shelton RN, CNRN      "

## 2022-04-14 NOTE — PATIENT INSTRUCTIONS
WOUND CARE:  Keep a dressing on the wound until the sutures are removed.  No lotions, soaps, powders, ointments, creams, or patches on incision until the wound is well healed.    Change the dressing daily, more frequently if necessary to keep the wound dry.  If you notice increased or persistent drainage from your wound, contact our office.  You may use an extra large band-aid or 4x4 and tape.  Both can be purchased at your pharmacy.    Sutures are usually removed in 1-2 weeks.  They are sometimes left longer.    Wash your hands before changing the dressing or touching the wound. If someone is helping you change the dressing, ensure that the person washes his/her hands.  Good handwashing can decrease the risk of infection.  If you are unable to see the wound, have someone check the wound daily for redness, swelling or drainage.  A small amount of drainage is normal.       Two days after surgery begin showering. Wash your wound daily. Remove all dressings prior to your shower.  Apply mild soap directly to the wound, form a light lather and rinse with running water.     Do not submerge the wound under water. No baths or swimming for 6 weeks.     If you develop redness, swelling, drainage, or temp 101 or greater, please call our office at [phone].       * No lifting, pushing or pulling greater than 5-10 pound (this is about a gallon of milk) for the first 6 weeks after surgery .  *No repetitive bending, twisting, or jarring activities for 6 weeks.  *No overhead work  *No aerobic or strenuous activity  *No activities with increased risk of falls  *You may move about your home as tolerated  *You may walk up and down stairs as tolerated  *You may increase your activity slowly over the next 6 weeks    WALKING PROGRAM: As you can tolerate, walk daily-start with 5-10 minutes of continuous walking. This is in addition to the walking that you do as part of your daily activities. Increase the time that you walk by 5  minutes every couple of days. Do not exceed 30-45 minutes of continuous walking until seen in follow-up. Walking is the best exercise after surgery.  **Listen to your body, if you find that you are more painful or fatigued, you may need to proceed more slowly.    **Do not smoke or expose yourself to second hand smoke. Cigarette smoke can delay healing and cause complications.     DRIVING:  We recommend that you do not drive while taking medications for pain or muscle spasms. Always read and follow the advice on your prescription bottle. If you have questions, speak with your pharmacist.  We recommend that you do not drive while wearing a brace, as it could limit your range of motion.    WORK: If you plan to return to work before your 6 week post-op appointment, call and discuss with one of the nurses in the neurosurgery office.

## 2022-04-15 ENCOUNTER — HOME INFUSION (PRE-WILLOW HOME INFUSION) (OUTPATIENT)
Dept: PHARMACY | Facility: CLINIC | Age: 62
End: 2022-04-15

## 2022-04-15 ENCOUNTER — LAB REQUISITION (OUTPATIENT)
Dept: LAB | Facility: HOSPITAL | Age: 62
End: 2022-04-15
Payer: OTHER GOVERNMENT

## 2022-04-15 DIAGNOSIS — M48.062 SPINAL STENOSIS, LUMBAR REGION WITH NEUROGENIC CLAUDICATION: ICD-10-CM

## 2022-04-15 LAB
CREAT SERPL-MCNC: 0.93 MG/DL (ref 0.7–1.3)
GFR SERPL CREATININE-BSD FRML MDRD: >90 ML/MIN/1.73M2
VANCOMYCIN SERPL-MCNC: 18 MG/L

## 2022-04-15 PROCEDURE — 82565 ASSAY OF CREATININE: CPT | Performed by: INTERNAL MEDICINE

## 2022-04-15 PROCEDURE — 36592 COLLECT BLOOD FROM PICC: CPT | Performed by: INTERNAL MEDICINE

## 2022-04-15 PROCEDURE — 80202 ASSAY OF VANCOMYCIN: CPT | Performed by: INTERNAL MEDICINE

## 2022-04-16 ENCOUNTER — HOME INFUSION (PRE-WILLOW HOME INFUSION) (OUTPATIENT)
Dept: PHARMACY | Facility: CLINIC | Age: 62
End: 2022-04-16
Payer: OTHER GOVERNMENT

## 2022-04-16 LAB — BACTERIA BLD CULT: NO GROWTH

## 2022-04-18 ENCOUNTER — ALLIED HEALTH/NURSE VISIT (OUTPATIENT)
Dept: NEUROSURGERY | Facility: CLINIC | Age: 62
End: 2022-04-18
Payer: OTHER GOVERNMENT

## 2022-04-18 DIAGNOSIS — M54.16 LUMBAR RADICULOPATHY: Primary | ICD-10-CM

## 2022-04-18 PROCEDURE — 99207 PR NO CHARGE NURSE ONLY: CPT

## 2022-04-18 NOTE — PROGRESS NOTES
This is a recent snapshot of the patient's [facility] medical record.  For current drug dose and complete information and questions, call [phone] or In Basket pool, fv home infusion ([id number])  CSN Number:  [id number]

## 2022-04-18 NOTE — PROGRESS NOTES
This is a recent snapshot of the patient's [facility] medical record.  For current drug dose and complete information and questions, call [phone] or In Basket pool, fv home infusion ([id number])  CSN Number:  [id number]

## 2022-04-19 ENCOUNTER — HOME INFUSION (PRE-WILLOW HOME INFUSION) (OUTPATIENT)
Dept: PHARMACY | Facility: CLINIC | Age: 62
End: 2022-04-19
Payer: OTHER GOVERNMENT

## 2022-04-20 ENCOUNTER — HOME INFUSION (PRE-WILLOW HOME INFUSION) (OUTPATIENT)
Dept: PHARMACY | Facility: CLINIC | Age: 62
End: 2022-04-20
Payer: OTHER GOVERNMENT

## 2022-04-20 ENCOUNTER — TRANSFERRED RECORDS (OUTPATIENT)
Dept: HEALTH INFORMATION MANAGEMENT | Facility: CLINIC | Age: 62
End: 2022-04-20
Payer: OTHER GOVERNMENT

## 2022-04-20 ENCOUNTER — TELEPHONE (OUTPATIENT)
Dept: INFECTIOUS DISEASES | Facility: CLINIC | Age: 62
End: 2022-04-20
Payer: OTHER GOVERNMENT

## 2022-04-20 DIAGNOSIS — G06.2 EPIDURAL ABSCESS: Primary | ICD-10-CM

## 2022-04-20 NOTE — PROGRESS NOTES
This is a recent snapshot of the patient's [facility] medical record.  For current drug dose and complete information and questions, call [phone] or In Basket pool, fv home infusion ([id number])  CSN Number:  [id number]

## 2022-04-21 ENCOUNTER — HOME INFUSION (PRE-WILLOW HOME INFUSION) (OUTPATIENT)
Dept: PHARMACY | Facility: CLINIC | Age: 62
End: 2022-04-21

## 2022-04-21 ENCOUNTER — LAB (OUTPATIENT)
Dept: LAB | Facility: CLINIC | Age: 62
End: 2022-04-21
Payer: OTHER GOVERNMENT

## 2022-04-21 ENCOUNTER — TELEPHONE (OUTPATIENT)
Dept: INFECTIOUS DISEASES | Facility: CLINIC | Age: 62
End: 2022-04-21

## 2022-04-21 DIAGNOSIS — G06.2 EPIDURAL ABSCESS: ICD-10-CM

## 2022-04-21 DIAGNOSIS — G06.2 EPIDURAL ABSCESS: Primary | ICD-10-CM

## 2022-04-21 DIAGNOSIS — Z11.59 NEED FOR HEPATITIS C SCREENING TEST: Primary | ICD-10-CM

## 2022-04-21 LAB
ALBUMIN SERPL-MCNC: 3.8 G/DL (ref 3.5–5)
ALP SERPL-CCNC: 92 U/L (ref 45–120)
ALT SERPL W P-5'-P-CCNC: 11 U/L (ref 0–45)
ANION GAP SERPL CALCULATED.3IONS-SCNC: 15 MMOL/L (ref 5–18)
AST SERPL W P-5'-P-CCNC: 16 U/L (ref 0–40)
BASOPHILS # BLD AUTO: 0 10E3/UL (ref 0–0.2)
BASOPHILS NFR BLD AUTO: 1 %
BILIRUB SERPL-MCNC: 0.5 MG/DL (ref 0–1)
BUN SERPL-MCNC: 10 MG/DL (ref 8–22)
C REACTIVE PROTEIN LHE: <0.1 MG/DL (ref 0–0.8)
CALCIUM SERPL-MCNC: 9.2 MG/DL (ref 8.5–10.5)
CHLORIDE BLD-SCNC: 109 MMOL/L (ref 98–107)
CO2 SERPL-SCNC: 20 MMOL/L (ref 22–31)
CREAT SERPL-MCNC: 0.88 MG/DL (ref 0.7–1.3)
EOSINOPHIL # BLD AUTO: 0.1 10E3/UL (ref 0–0.7)
EOSINOPHIL NFR BLD AUTO: 3 %
ERYTHROCYTE [DISTWIDTH] IN BLOOD BY AUTOMATED COUNT: 13.7 % (ref 10–15)
GFR SERPL CREATININE-BSD FRML MDRD: >90 ML/MIN/1.73M2
GLUCOSE BLD-MCNC: 154 MG/DL (ref 70–125)
HCT VFR BLD AUTO: 37.2 % (ref 40–53)
HGB BLD-MCNC: 12.6 G/DL (ref 13.3–17.7)
IMM GRANULOCYTES # BLD: 0 10E3/UL
IMM GRANULOCYTES NFR BLD: 0 %
LYMPHOCYTES # BLD AUTO: 1 10E3/UL (ref 0.8–5.3)
LYMPHOCYTES NFR BLD AUTO: 29 %
MCH RBC QN AUTO: 33 PG (ref 26.5–33)
MCHC RBC AUTO-ENTMCNC: 33.9 G/DL (ref 31.5–36.5)
MCV RBC AUTO: 97 FL (ref 78–100)
MONOCYTES # BLD AUTO: 0.3 10E3/UL (ref 0–1.3)
MONOCYTES NFR BLD AUTO: 9 %
NEUTROPHILS # BLD AUTO: 2 10E3/UL (ref 1.6–8.3)
NEUTROPHILS NFR BLD AUTO: 57 %
PLATELET # BLD AUTO: 127 10E3/UL (ref 150–450)
POTASSIUM BLD-SCNC: 3.9 MMOL/L (ref 3.5–5)
PROT SERPL-MCNC: 6.6 G/DL (ref 6–8)
RBC # BLD AUTO: 3.82 10E6/UL (ref 4.4–5.9)
SODIUM SERPL-SCNC: 144 MMOL/L (ref 136–145)
VANCOMYCIN SERPL-MCNC: 17.6 MG/L
WBC # BLD AUTO: 3.4 10E3/UL (ref 4–11)

## 2022-04-21 PROCEDURE — 86140 C-REACTIVE PROTEIN: CPT

## 2022-04-21 PROCEDURE — 85025 COMPLETE CBC W/AUTO DIFF WBC: CPT

## 2022-04-21 PROCEDURE — 86803 HEPATITIS C AB TEST: CPT

## 2022-04-21 PROCEDURE — 80202 ASSAY OF VANCOMYCIN: CPT

## 2022-04-21 PROCEDURE — 80053 COMPREHEN METABOLIC PANEL: CPT

## 2022-04-21 PROCEDURE — 36415 COLL VENOUS BLD VENIPUNCTURE: CPT

## 2022-04-21 NOTE — TELEPHONE ENCOUNTER
Elodia from [facility] calling to inform that the pt's PICC does not have blood return so they had to send the pt in to the clinic for labs. The pt's like was working for labs originally. [facility] has used TPA twice without success. The pt's PICC has come out 3 cm since placement, however the pt is feeling fine, and the abx are infusing well. [facility] will need orders to continue the use of a incompletely patent line, would like orders for a heparin lock BID, and would like to know if the pt should be seen with IR for PICC evaluation. I informed I will discuss with the MD and contact Elodia back at [phone].

## 2022-04-22 ENCOUNTER — HOSPITAL ENCOUNTER (OUTPATIENT)
Dept: RADIOLOGY | Facility: HOSPITAL | Age: 62
Discharge: HOME | End: 2022-04-22
Attending: INTERNAL MEDICINE | Admitting: INTERNAL MEDICINE
Payer: OTHER GOVERNMENT

## 2022-04-22 ENCOUNTER — HOME INFUSION (PRE-WILLOW HOME INFUSION) (OUTPATIENT)
Dept: PHARMACY | Facility: CLINIC | Age: 62
End: 2022-04-22
Payer: OTHER GOVERNMENT

## 2022-04-22 DIAGNOSIS — G06.2 EPIDURAL ABSCESS: ICD-10-CM

## 2022-04-22 LAB — HCV AB SERPL QL IA: NONREACTIVE

## 2022-04-22 PROCEDURE — 71046 X-RAY EXAM CHEST 2 VIEWS: CPT

## 2022-04-22 NOTE — PROGRESS NOTES
This is a recent snapshot of the patient's [facility] medical record.  For current drug dose and complete information and questions, call [phone] or In Basket pool, fv home infusion ([id number])  CSN Number:  [id number]

## 2022-04-22 NOTE — PROGRESS NOTES
This is a recent snapshot of the patient's [facility] medical record.  For current drug dose and complete information and questions, call [phone] or In Basket pool, fv home infusion ([id number])  CSN Number:  [id number]

## 2022-04-22 NOTE — TELEPHONE ENCOUNTER
I called [facility] and informed that Dr Taylor would like a chest xray to check PICC placement and if the PICC is incorrectly placed, the PICC will need to be fixed by IR.

## 2022-04-25 ENCOUNTER — HOME INFUSION (PRE-WILLOW HOME INFUSION) (OUTPATIENT)
Dept: PHARMACY | Facility: CLINIC | Age: 62
End: 2022-04-25
Payer: OTHER GOVERNMENT

## 2022-04-25 NOTE — PROGRESS NOTES
This is a recent snapshot of the patient's [facility] medical record.  For current drug dose and complete information and questions, call [phone] or In Basket pool, fv home infusion ([id number])  CSN Number:  [id number]

## 2022-04-25 NOTE — PROGRESS NOTES
This is a recent snapshot of the patient's [facility] medical record.  For current drug dose and complete information and questions, call [phone] or In Basket pool, fv home infusion ([id number])  CSN Number:  [id number]

## 2022-04-26 ENCOUNTER — LAB REQUISITION (OUTPATIENT)
Dept: LAB | Facility: HOSPITAL | Age: 62
End: 2022-04-26
Payer: OTHER GOVERNMENT

## 2022-04-26 ENCOUNTER — HOME INFUSION (PRE-WILLOW HOME INFUSION) (OUTPATIENT)
Dept: PHARMACY | Facility: CLINIC | Age: 62
End: 2022-04-26

## 2022-04-26 DIAGNOSIS — M48.062 SPINAL STENOSIS, LUMBAR REGION WITH NEUROGENIC CLAUDICATION: ICD-10-CM

## 2022-04-26 LAB
ALBUMIN SERPL-MCNC: 3.7 G/DL (ref 3.5–5)
ALP SERPL-CCNC: 83 U/L (ref 45–120)
ALT SERPL W P-5'-P-CCNC: 19 U/L (ref 0–45)
ANION GAP SERPL CALCULATED.3IONS-SCNC: 12 MMOL/L (ref 5–18)
AST SERPL W P-5'-P-CCNC: 15 U/L (ref 0–40)
BASOPHILS # BLD AUTO: 0 10E3/UL (ref 0–0.2)
BASOPHILS NFR BLD AUTO: 1 %
BILIRUB SERPL-MCNC: 0.4 MG/DL (ref 0–1)
BUN SERPL-MCNC: 10 MG/DL (ref 8–22)
C REACTIVE PROTEIN LHE: <0.1 MG/DL (ref 0–0.8)
CALCIUM SERPL-MCNC: 9 MG/DL (ref 8.5–10.5)
CHLORIDE BLD-SCNC: 107 MMOL/L (ref 98–107)
CO2 SERPL-SCNC: 25 MMOL/L (ref 22–31)
CREAT SERPL-MCNC: 0.94 MG/DL (ref 0.7–1.3)
EOSINOPHIL # BLD AUTO: 0.1 10E3/UL (ref 0–0.7)
EOSINOPHIL NFR BLD AUTO: 4 %
ERYTHROCYTE [DISTWIDTH] IN BLOOD BY AUTOMATED COUNT: 13.5 % (ref 10–15)
GFR SERPL CREATININE-BSD FRML MDRD: >90 ML/MIN/1.73M2
GLUCOSE BLD-MCNC: 131 MG/DL (ref 70–125)
HCT VFR BLD AUTO: 38 % (ref 40–53)
HGB BLD-MCNC: 12.8 G/DL (ref 13.3–17.7)
IMM GRANULOCYTES # BLD: 0 10E3/UL
IMM GRANULOCYTES NFR BLD: 1 %
LYMPHOCYTES # BLD AUTO: 1 10E3/UL (ref 0.8–5.3)
LYMPHOCYTES NFR BLD AUTO: 27 %
MCH RBC QN AUTO: 32.6 PG (ref 26.5–33)
MCHC RBC AUTO-ENTMCNC: 33.7 G/DL (ref 31.5–36.5)
MCV RBC AUTO: 97 FL (ref 78–100)
MONOCYTES # BLD AUTO: 0.4 10E3/UL (ref 0–1.3)
MONOCYTES NFR BLD AUTO: 10 %
NEUTROPHILS # BLD AUTO: 2.2 10E3/UL (ref 1.6–8.3)
NEUTROPHILS NFR BLD AUTO: 57 %
NRBC # BLD AUTO: 0 10E3/UL
NRBC BLD AUTO-RTO: 0 /100
PLATELET # BLD AUTO: 126 10E3/UL (ref 150–450)
POTASSIUM BLD-SCNC: 3.8 MMOL/L (ref 3.5–5)
PROT SERPL-MCNC: 6.7 G/DL (ref 6–8)
RBC # BLD AUTO: 3.93 10E6/UL (ref 4.4–5.9)
SODIUM SERPL-SCNC: 144 MMOL/L (ref 136–145)
VANCOMYCIN SERPL-MCNC: 15.2 MG/L
WBC # BLD AUTO: 3.8 10E3/UL (ref 4–11)

## 2022-04-26 PROCEDURE — 80202 ASSAY OF VANCOMYCIN: CPT | Performed by: INTERNAL MEDICINE

## 2022-04-26 PROCEDURE — 86140 C-REACTIVE PROTEIN: CPT | Performed by: INTERNAL MEDICINE

## 2022-04-26 PROCEDURE — 80053 COMPREHEN METABOLIC PANEL: CPT | Performed by: INTERNAL MEDICINE

## 2022-04-26 PROCEDURE — 36415 COLL VENOUS BLD VENIPUNCTURE: CPT | Performed by: INTERNAL MEDICINE

## 2022-04-26 PROCEDURE — 85025 COMPLETE CBC W/AUTO DIFF WBC: CPT | Performed by: INTERNAL MEDICINE

## 2022-04-27 ENCOUNTER — TELEPHONE (OUTPATIENT)
Dept: INFECTIOUS DISEASES | Facility: CLINIC | Age: 62
End: 2022-04-27
Payer: OTHER GOVERNMENT

## 2022-04-27 ENCOUNTER — HOME INFUSION (PRE-WILLOW HOME INFUSION) (OUTPATIENT)
Dept: PHARMACY | Facility: CLINIC | Age: 62
End: 2022-04-27
Payer: OTHER GOVERNMENT

## 2022-04-27 NOTE — PROGRESS NOTES
This is a recent snapshot of the patient's [facility] medical record.  For current drug dose and complete information and questions, call [phone] or In Basket pool, fv home infusion ([id number])  CSN Number:  [id number]

## 2022-04-27 NOTE — TELEPHONE ENCOUNTER
I pharm Joao asking for LOT.    Per Dr Taylor pt has f/u with neurosurg on 5/10. Will extend iv abx until 5/11/22 and reassess.     Order received

## 2022-04-28 NOTE — PROGRESS NOTES
This is a recent snapshot of the patient's [facility] medical record.  For current drug dose and complete information and questions, call [phone] or In [facility] pool, fv home infusion ([id number])  CSN Number:  [id number]

## 2022-05-02 ASSESSMENT — ENCOUNTER SYMPTOMS: FEVER: 1

## 2022-05-03 ENCOUNTER — TRANSFERRED RECORDS (OUTPATIENT)
Dept: HEALTH INFORMATION MANAGEMENT | Facility: CLINIC | Age: 62
End: 2022-05-03

## 2022-05-03 ENCOUNTER — HOME INFUSION (PRE-WILLOW HOME INFUSION) (OUTPATIENT)
Dept: PHARMACY | Facility: CLINIC | Age: 62
End: 2022-05-03

## 2022-05-03 ENCOUNTER — LAB REQUISITION (OUTPATIENT)
Dept: LAB | Facility: HOSPITAL | Age: 62
End: 2022-05-03
Payer: OTHER GOVERNMENT

## 2022-05-03 ENCOUNTER — TELEPHONE (OUTPATIENT)
Dept: NEUROSURGERY | Facility: CLINIC | Age: 62
End: 2022-05-03

## 2022-05-03 DIAGNOSIS — M48.062 SPINAL STENOSIS, LUMBAR REGION WITH NEUROGENIC CLAUDICATION: ICD-10-CM

## 2022-05-03 LAB
ALBUMIN SERPL-MCNC: 3.7 G/DL (ref 3.5–5)
ALP SERPL-CCNC: 77 U/L (ref 45–120)
ALT SERPL W P-5'-P-CCNC: 10 U/L (ref 0–45)
ANION GAP SERPL CALCULATED.3IONS-SCNC: 12 MMOL/L (ref 5–18)
AST SERPL W P-5'-P-CCNC: 13 U/L (ref 0–40)
BASOPHILS # BLD AUTO: 0 10E3/UL (ref 0–0.2)
BASOPHILS NFR BLD AUTO: 1 %
BILIRUB SERPL-MCNC: 0.6 MG/DL (ref 0–1)
BUN SERPL-MCNC: 11 MG/DL (ref 8–22)
C REACTIVE PROTEIN LHE: 0.1 MG/DL (ref 0–0.8)
CALCIUM SERPL-MCNC: 9.3 MG/DL (ref 8.5–10.5)
CHLORIDE BLD-SCNC: 106 MMOL/L (ref 98–107)
CO2 SERPL-SCNC: 25 MMOL/L (ref 22–31)
CREAT SERPL-MCNC: 0.84 MG/DL (ref 0.7–1.3)
EOSINOPHIL # BLD AUTO: 0.1 10E3/UL (ref 0–0.7)
EOSINOPHIL NFR BLD AUTO: 3 %
ERYTHROCYTE [DISTWIDTH] IN BLOOD BY AUTOMATED COUNT: 13.4 % (ref 10–15)
GFR SERPL CREATININE-BSD FRML MDRD: >90 ML/MIN/1.73M2
GLUCOSE BLD-MCNC: 118 MG/DL (ref 70–125)
HCT VFR BLD AUTO: 37.4 % (ref 40–53)
HGB BLD-MCNC: 12.5 G/DL (ref 13.3–17.7)
IMM GRANULOCYTES # BLD: 0 10E3/UL
IMM GRANULOCYTES NFR BLD: 1 %
LYMPHOCYTES # BLD AUTO: 1 10E3/UL (ref 0.8–5.3)
LYMPHOCYTES NFR BLD AUTO: 27 %
MCH RBC QN AUTO: 32.2 PG (ref 26.5–33)
MCHC RBC AUTO-ENTMCNC: 33.4 G/DL (ref 31.5–36.5)
MCV RBC AUTO: 96 FL (ref 78–100)
MONOCYTES # BLD AUTO: 0.4 10E3/UL (ref 0–1.3)
MONOCYTES NFR BLD AUTO: 10 %
NEUTROPHILS # BLD AUTO: 2.3 10E3/UL (ref 1.6–8.3)
NEUTROPHILS NFR BLD AUTO: 58 %
NRBC # BLD AUTO: 0 10E3/UL
NRBC BLD AUTO-RTO: 0 /100
PLATELET # BLD AUTO: 152 10E3/UL (ref 150–450)
POTASSIUM BLD-SCNC: 3.6 MMOL/L (ref 3.5–5)
PROT SERPL-MCNC: 6.9 G/DL (ref 6–8)
RBC # BLD AUTO: 3.88 10E6/UL (ref 4.4–5.9)
SODIUM SERPL-SCNC: 143 MMOL/L (ref 136–145)
VANCOMYCIN SERPL-MCNC: 16.7 MG/L
WBC # BLD AUTO: 3.9 10E3/UL (ref 4–11)

## 2022-05-03 PROCEDURE — 80053 COMPREHEN METABOLIC PANEL: CPT | Performed by: INTERNAL MEDICINE

## 2022-05-03 PROCEDURE — 80202 ASSAY OF VANCOMYCIN: CPT | Performed by: INTERNAL MEDICINE

## 2022-05-03 PROCEDURE — 85025 COMPLETE CBC W/AUTO DIFF WBC: CPT | Performed by: INTERNAL MEDICINE

## 2022-05-03 PROCEDURE — 36415 COLL VENOUS BLD VENIPUNCTURE: CPT | Performed by: INTERNAL MEDICINE

## 2022-05-03 PROCEDURE — 86140 C-REACTIVE PROTEIN: CPT | Performed by: INTERNAL MEDICINE

## 2022-05-03 NOTE — TELEPHONE ENCOUNTER
Called and let Mauri know he may stop taking Robaxin at any time - he verbalized agreement.  Kate Shelton RN, CNRN

## 2022-05-03 NOTE — TELEPHONE ENCOUNTER
05.03.22- Pt called wanting to know when he may be able to stop taking his muscle relaxer. He states he has been doing fine and would like to stop taking them as soon as possible.    Good call back number [phone].  Thank you!

## 2022-05-04 ENCOUNTER — HOME INFUSION (PRE-WILLOW HOME INFUSION) (OUTPATIENT)
Dept: PHARMACY | Facility: CLINIC | Age: 62
End: 2022-05-04
Payer: OTHER GOVERNMENT

## 2022-05-04 NOTE — PROGRESS NOTES
This is a recent snapshot of the patient's [facility] medical record.  For current drug dose and complete information and questions, call [phone] or In Basket pool, fv home infusion ([id number])  CSN Number:  [id number]

## 2022-05-05 NOTE — PROGRESS NOTES
This is a recent snapshot of the patient's [facility] medical record.  For current drug dose and complete information and questions, call [phone] or In Basket pool, fv home infusion ([id number])  CSN Number:  [id number]

## 2022-05-06 NOTE — PROGRESS NOTES
This is a recent snapshot of the patient's [facility] medical record.  For current drug dose and complete information and questions, call [phone] or In Basket pool, fv home infusion ([id number])  CSN Number:  [id number]

## 2022-05-09 ENCOUNTER — HOME INFUSION (PRE-WILLOW HOME INFUSION) (OUTPATIENT)
Dept: PHARMACY | Facility: CLINIC | Age: 62
End: 2022-05-09

## 2022-05-10 ENCOUNTER — OFFICE VISIT (OUTPATIENT)
Dept: NEUROSURGERY | Facility: CLINIC | Age: 62
End: 2022-05-10
Payer: OTHER GOVERNMENT

## 2022-05-10 ENCOUNTER — LAB REQUISITION (OUTPATIENT)
Dept: LAB | Facility: HOSPITAL | Age: 62
End: 2022-05-10
Payer: OTHER GOVERNMENT

## 2022-05-10 ENCOUNTER — HOME INFUSION (PRE-WILLOW HOME INFUSION) (OUTPATIENT)
Dept: PHARMACY | Facility: CLINIC | Age: 62
End: 2022-05-10

## 2022-05-10 VITALS
WEIGHT: 234 LBS | SYSTOLIC BLOOD PRESSURE: 136 MMHG | HEART RATE: 60 BPM | HEIGHT: 72 IN | BODY MASS INDEX: 31.69 KG/M2 | DIASTOLIC BLOOD PRESSURE: 72 MMHG | OXYGEN SATURATION: 98 %

## 2022-05-10 DIAGNOSIS — M54.16 LUMBAR RADICULOPATHY: Primary | ICD-10-CM

## 2022-05-10 DIAGNOSIS — M48.062 SPINAL STENOSIS, LUMBAR REGION WITH NEUROGENIC CLAUDICATION: ICD-10-CM

## 2022-05-10 LAB
ALBUMIN SERPL-MCNC: 3.7 G/DL (ref 3.5–5)
ALP SERPL-CCNC: 80 U/L (ref 45–120)
ALT SERPL W P-5'-P-CCNC: 14 U/L (ref 0–45)
ANION GAP SERPL CALCULATED.3IONS-SCNC: 10 MMOL/L (ref 5–18)
AST SERPL W P-5'-P-CCNC: 15 U/L (ref 0–40)
BASOPHILS # BLD AUTO: 0 10E3/UL (ref 0–0.2)
BASOPHILS NFR BLD AUTO: 1 %
BILIRUB SERPL-MCNC: 0.6 MG/DL (ref 0–1)
BUN SERPL-MCNC: 11 MG/DL (ref 8–22)
C REACTIVE PROTEIN LHE: <0.1 MG/DL (ref 0–0.8)
CALCIUM SERPL-MCNC: 9.2 MG/DL (ref 8.5–10.5)
CHLORIDE BLD-SCNC: 107 MMOL/L (ref 98–107)
CO2 SERPL-SCNC: 26 MMOL/L (ref 22–31)
CREAT SERPL-MCNC: 0.86 MG/DL (ref 0.7–1.3)
EOSINOPHIL # BLD AUTO: 0.1 10E3/UL (ref 0–0.7)
EOSINOPHIL NFR BLD AUTO: 4 %
ERYTHROCYTE [DISTWIDTH] IN BLOOD BY AUTOMATED COUNT: 13.2 % (ref 10–15)
GFR SERPL CREATININE-BSD FRML MDRD: >90 ML/MIN/1.73M2
GLUCOSE BLD-MCNC: 98 MG/DL (ref 70–125)
HCT VFR BLD AUTO: 36 % (ref 40–53)
HGB BLD-MCNC: 12.1 G/DL (ref 13.3–17.7)
IMM GRANULOCYTES # BLD: 0 10E3/UL
IMM GRANULOCYTES NFR BLD: 0 %
LYMPHOCYTES # BLD AUTO: 0.9 10E3/UL (ref 0.8–5.3)
LYMPHOCYTES NFR BLD AUTO: 27 %
MCH RBC QN AUTO: 32.3 PG (ref 26.5–33)
MCHC RBC AUTO-ENTMCNC: 33.6 G/DL (ref 31.5–36.5)
MCV RBC AUTO: 96 FL (ref 78–100)
MONOCYTES # BLD AUTO: 0.4 10E3/UL (ref 0–1.3)
MONOCYTES NFR BLD AUTO: 13 %
NEUTROPHILS # BLD AUTO: 1.9 10E3/UL (ref 1.6–8.3)
NEUTROPHILS NFR BLD AUTO: 55 %
NRBC # BLD AUTO: 0 10E3/UL
NRBC BLD AUTO-RTO: 0 /100
PLATELET # BLD AUTO: 157 10E3/UL (ref 150–450)
POTASSIUM BLD-SCNC: 3.4 MMOL/L (ref 3.5–5)
PROT SERPL-MCNC: 6.6 G/DL (ref 6–8)
RBC # BLD AUTO: 3.75 10E6/UL (ref 4.4–5.9)
SODIUM SERPL-SCNC: 143 MMOL/L (ref 136–145)
VANCOMYCIN SERPL-MCNC: 17.2 MG/L
WBC # BLD AUTO: 3.4 10E3/UL (ref 4–11)

## 2022-05-10 PROCEDURE — 99024 POSTOP FOLLOW-UP VISIT: CPT | Performed by: PHYSICIAN ASSISTANT

## 2022-05-10 PROCEDURE — 80202 ASSAY OF VANCOMYCIN: CPT | Performed by: INTERNAL MEDICINE

## 2022-05-10 PROCEDURE — 36592 COLLECT BLOOD FROM PICC: CPT | Performed by: INTERNAL MEDICINE

## 2022-05-10 PROCEDURE — 85025 COMPLETE CBC W/AUTO DIFF WBC: CPT | Performed by: INTERNAL MEDICINE

## 2022-05-10 PROCEDURE — 80053 COMPREHEN METABOLIC PANEL: CPT | Performed by: INTERNAL MEDICINE

## 2022-05-10 PROCEDURE — 86140 C-REACTIVE PROTEIN: CPT | Performed by: INTERNAL MEDICINE

## 2022-05-10 NOTE — PATIENT INSTRUCTIONS
Mr. Kiser is doing well. He will begin to work with physical therapy and increase his activity gradually by increasing weight restrictions by 5 pounds per week until back to his normal. Follow up has been requested in 6 weeks and understands to contact the office sooner should any symptoms worsen or arise.

## 2022-05-10 NOTE — LETTER
5/10/2022         RE: Raghavendra Kiser  [address]  [place] [facility] [address]        Dear Colleague,    Thank you for referring your patient, Raghavendra Kiser, to the [facility] SPINE [facility]UROSURGERY. Please see a copy of my visit note below.    Neurosurgery Progress Note: 5/10/2022     A/P:   Postoperative Exploration of prior left lumbar 3-lumbar 4 hemilaminectomy space with lumbar 3-lumbar 5 left hemilaminectomy for epidural abscess evacuation on 03/29/2022     Plan:  Mr. Kiser is doing well. He will begin to work with physical therapy and increase his activity gradually by increasing weight restrictions by 5 pounds per week until back to his normal. Follow up has been requested in 6 weeks and understands to contact the office sooner should any symptoms worsen or arise. He has been advised to follow up with his PCP for concerns of right lower extremity swelling and elevated BP today that improved on recheck. He will begin to taper down on his Gabapentin to completely off in the next week as he is currently on 300mg TID.     Mr. Kiser is a pleasant 62 year old right handed male who presents postoperative exploration of prior left lumbar 3-lumbar 4 hemilaminectomy space with lumbar 3-lumbar 5 left hemilaminectomy for epidural abscess evacuation on 03/29/2022 by Dr. Duque. He states that he is doing very well. He denies any radicular lower extremity pain. He notes numbness of his bilateral shins but feels that it is improving. He completes IV antibiotics tomorrow with plans to remove the PICC line on Thursday this week. He is voiding without difficulty and is please with his progress. CRP trended to normal and followed by ID. He notes intermittent LLE swelling that is worsened after prolonged standing and was present prior to surgery.     HPI:   Raghavendra Kiser is a 62 year old S/P Bilateral lumbar 3- lumbar 4 hemilaminectomies, medial facetectomies, foraminotomies and microdiscectomies  with small durotomy on right with Dr Duque 3/21/2022. Pre-op had more right leg symptoms than left. Symptoms improved following surgery with worsening new left leg pain in last 1-2 days. MRI obtained which showed a new contrast enhancing collection mostly centered at left L5 extending form L4 to S1.This is causing severe spinal stenosis at these levels. We discussed exploration and extension of his prior hemilaminectomy for evacuation of the collection and possible re-repair of CSF leak. Risks and benefits dicussed and he agreed to proceed.      Exam:    /72   Pulse 60   Ht 6' (1.829 m)   Wt 234 lb (106.1 kg)   SpO2 98%   BMI 31.74 kg/m      General: alert and oriented x3     Strength is 5/5 throughout both lower extremities throughout.    Sensation is intact throughout both upper and lower extremities    Gait is smooth and coordinated    Incision: well healed small scab formation superior incision with retained nylon sutures         Bella Kong PA-C  [facility]  O: [phone]        Again, thank you for allowing me to participate in the care of your patient.        Sincerely,        Bella Kong PA-C

## 2022-05-10 NOTE — PROGRESS NOTES
Neurosurgery Progress Note: 5/10/2022     A/P:   Postoperative Exploration of prior left lumbar 3-lumbar 4 hemilaminectomy space with lumbar 3-lumbar 5 left hemilaminectomy for epidural abscess evacuation on 03/29/2022     Plan:  Mr. Kiser is doing well. He will begin to work with physical therapy and increase his activity gradually by increasing weight restrictions by 5 pounds per week until back to his normal. Follow up has been requested in 6 weeks and understands to contact the office sooner should any symptoms worsen or arise. He has been advised to follow up with his PCP for concerns of right lower extremity swelling and elevated BP today that improved on recheck. He will begin to taper down on his Gabapentin to completely off in the next week as he is currently on 300mg TID.     Mr. Kiser is a pleasant 62 year old right handed male who presents postoperative exploration of prior left lumbar 3-lumbar 4 hemilaminectomy space with lumbar 3-lumbar 5 left hemilaminectomy for epidural abscess evacuation on 03/29/2022 by Dr. Duque. He states that he is doing very well. He denies any radicular lower extremity pain. He notes numbness of his bilateral shins but feels that it is improving. He completes IV antibiotics tomorrow with plans to remove the PICC line on Thursday this week. He is voiding without difficulty and is please with his progress. CRP trended to normal and followed by ID. He notes intermittent LLE swelling that is worsened after prolonged standing and was present prior to surgery.     HPI:   Raghavendra Kiser is a 62 year old S/P Bilateral lumbar 3- lumbar 4 hemilaminectomies, medial facetectomies, foraminotomies and microdiscectomies with small durotomy on right with Dr Duque 3/21/2022. Pre-op had more right leg symptoms than left. Symptoms improved following surgery with worsening new left leg pain in last 1-2 days. MRI obtained which showed a new contrast enhancing collection mostly  centered at left L5 extending form L4 to S1.This is causing severe spinal stenosis at these levels. We discussed exploration and extension of his prior hemilaminectomy for evacuation of the collection and possible re-repair of CSF leak. Risks and benefits dicussed and he agreed to proceed.      Exam:    /72   Pulse 60   Ht 6' (1.829 m)   Wt 234 lb (106.1 kg)   SpO2 98%   BMI 31.74 kg/m      General: alert and oriented x3     Strength is 5/5 throughout both lower extremities throughout.    Sensation is intact throughout both upper and lower extremities    Gait is smooth and coordinated    Incision: well healed small scab formation superior incision with retained nylon sutures         Bella Kong PA-C  [facility]  O: [phone]

## 2022-05-11 ENCOUNTER — HOME INFUSION (PRE-WILLOW HOME INFUSION) (OUTPATIENT)
Dept: PHARMACY | Facility: CLINIC | Age: 62
End: 2022-05-11

## 2022-05-11 ENCOUNTER — TELEPHONE (OUTPATIENT)
Dept: INFECTIOUS DISEASES | Facility: CLINIC | Age: 62
End: 2022-05-11
Payer: OTHER GOVERNMENT

## 2022-05-11 NOTE — TELEPHONE ENCOUNTER
----- Message from Mary Ellen Taylor MD sent at 5/11/2022 12:12 PM CDT -----  Incision looked good at neurosurgery visit yesterday. Ok to end abx as planned today and pull PICC at end of therapy. Thanks!

## 2022-05-12 ENCOUNTER — HOME INFUSION (PRE-WILLOW HOME INFUSION) (OUTPATIENT)
Dept: PHARMACY | Facility: CLINIC | Age: 62
End: 2022-05-12

## 2022-05-18 DIAGNOSIS — M54.16 LUMBAR RADICULOPATHY: ICD-10-CM

## 2022-05-18 RX ORDER — GABAPENTIN 300 MG/1
300 CAPSULE ORAL 3 TIMES DAILY
Qty: 90 CAPSULE | Refills: 0 | Status: SHIPPED | OUTPATIENT
Start: 2022-05-18 | End: 2022-10-20

## 2022-05-18 NOTE — TELEPHONE ENCOUNTER
Rec'd fax from [facility] requesting refill of gabapentin 300 mg capsules. Taking 1 capsule TID.     Pt's next follow up 6/22/22.     Jennifer Rosado RN

## 2022-05-20 NOTE — PROGRESS NOTES
This is a recent snapshot of the patient's [facility] medical record.  For current drug dose and complete information and questions, call [phone] or In Basket pool, [facility] home infusion ([id number])  CSN Number:  [id number]

## 2022-05-28 DIAGNOSIS — Z86.73 HISTORY OF COMPLETED STROKE: ICD-10-CM

## 2022-05-31 RX ORDER — CLOPIDOGREL BISULFATE 75 MG/1
TABLET ORAL
Qty: 90 TABLET | Refills: 1 | Status: SHIPPED | OUTPATIENT
Start: 2022-05-31 | End: 2022-11-23

## 2022-05-31 NOTE — TELEPHONE ENCOUNTER
Refill request for clopidogrel.  Previous pt of Dr. Knutson.  Pt has upcoming appt on 11/2/22 with Dr. Cuevas.  Medication T'd for review and signature      Latricia Kohler LPN on 5/31/2022 at 8:26 AM

## 2022-06-15 ENCOUNTER — HOSPITAL ENCOUNTER (OUTPATIENT)
Dept: PHYSICAL THERAPY | Facility: REHABILITATION | Age: 62
Discharge: HOME | End: 2022-06-15
Attending: PHYSICIAN ASSISTANT
Payer: OTHER GOVERNMENT

## 2022-06-15 DIAGNOSIS — M62.81 MUSCLE WEAKNESS (GENERALIZED): Primary | ICD-10-CM

## 2022-06-15 DIAGNOSIS — M54.16 LUMBAR RADICULOPATHY: ICD-10-CM

## 2022-06-15 PROCEDURE — 97110 THERAPEUTIC EXERCISES: CPT | Mod: GP | Performed by: PHYSICAL THERAPIST

## 2022-06-15 PROCEDURE — 97161 PT EVAL LOW COMPLEX 20 MIN: CPT | Mod: GP | Performed by: PHYSICAL THERAPIST

## 2022-06-20 NOTE — PROGRESS NOTES
06/15/22 1600   General Information   Type of Visit Initial OP Ortho PT Evaluation   Start of Care Date 06/15/22   Referring Physician Bella Gao PA-C   Patient/Family Goals Statement to be able to do a sit up   Orders Evaluate and Treat   Date of Order 05/10/22   Medical Diagnosis Lumbar radiculopathy   Surgical/Medical history reviewed Yes   Precautions/Limitations no known precautions/limitations   Weight-Bearing Status - LUE full weight-bearing   Weight-Bearing Status - RUE full weight-bearing   Weight-Bearing Status - LLE full weight-bearing   Weight-Bearing Status - RLE full weight-bearing       Present No   Body Part(s)   Body Part(s) Lumbar Spine/SI   Presentation and Etiology   Pertinent history of current problem (include personal factors and/or comorbidities that impact the POC) Patient reports he started to have pain in the low back n Jan 2022 and it progressiely became worse. he tried chirorpractor and only increased pain. Patient went to the MD, and had MRI done demonstrating a large disc herniation and had surgery in March 2022, had an infection and ended up with another surgery on 3/28/22.   Impairments A. Pain;D. Decreased ROM;E. Decreased flexibility;F. Decreased strength and endurance;G. Impaired balance   Functional Limitations perform activities of daily living;perform desired leisure / sports activities   Symptom Location low back   How/Where did it occur From insidious onset   Onset date of current episode/exacerbation 03/28/22   Chronicity Chronic   Pain rating (0-10 point scale) Best (/10);Worst (/10)   Best (/10) 1   Worst (/10) 2   Pain quality C. Aching   Frequency of pain/symptoms B. Intermittent   Pain/symptoms are: The same all the time   Progression of symptoms since onset: Improved   Current / Previous Interventions   Diagnostic Tests: MRI   MRI Results unremarkable   Current Level of Function   Current Community Support Family/friend caregiver   Patient  role/employment history F. Retired   [facility]   Home/community accessibility no concerns   Current equipment-Gait/Locomotion None   Current equipment-ADL None   Fall Risk Screen   Fall screen completed by PT   Have you fallen 2 or more times in the past year? No   Have you fallen and had an injury in the past year? No   Is patient a fall risk? No   Abuse Screen (yes response referral indicated)   Feels Unsafe at Home or Work/School no   Feels Threatened by Someone no   Does Anyone Try to Keep You From Having Contact with Others or Doing Things Outside Your Home? no   Physical Signs of Abuse Present no   Patient needs abuse support services and resources No   System Outcome Measures   Outcome Measures Low Back Pain (see Oswestry and Jimmy)  (18% YOBANY)   Lumbar Spine/SI Objective Findings   Balance/Proprioception (Single Leg Stance) able to SL Stance 5 dec, unstable able to tandem stance eyes ope, rhomberg eyes open and closed   Hamstring Flexibility mild tightness bilaterally   Flexion ROM to ankles, stiffness   Extension ROM mod dec,   Right Side Bending ROM min dec tight   Left Side Bending ROM min dec tight   Lumbar ROM Comment rotation each way no pain   Lumbar/Hip/Knee/Foot Strength Comments able to heel and toe walk bilaterally   SLR negative   Segmental Mobility not assessed   Slump Test negative   Sensation Testing WNL to light touch in B LEs   Planned Therapy Interventions   Planned Therapy Interventions manual therapy;joint mobilization;neuromuscular re-education;ROM;strengthening;stretching   Planned Modality Interventions   Planned Modality Interventions Cryotherapy;Electrical stimulation;TENS;Ultrasound   Clinical Impression   Criteria for Skilled Therapeutic Interventions Met yes, treatment indicated   PT Diagnosis lumbar radiculopathy, s/p Lumbar discectomy   Influenced by the following impairments decreased ROM, weakness   Functional limitations due to impairments standing,  walking, bending, lifting   Clinical Presentation Stable/Uncomplicated   Clinical Decision Making (Complexity) Low complexity   Therapy Frequency 1 time/week   Predicted Duration of Therapy Intervention (days/wks) 12 weeks   Risk & Benefits of therapy have been explained Yes   Patient, Family & other staff in agreement with plan of care Yes   Clinical Impression Comments Patient presents to therapy s/p lumbar discectomy which was complicated by an infection and a second surgery on 3/28/22. Patient has a slight decrease in ROM and some muscle tightness, He will benefit from skilled therapy yo increase ROM, increase strengh and stabilization.   Education Assessment   Preferred Learning Style Listening;Reading;Demonstration;Pictures/video   Barriers to Learning No barriers   ORTHO GOALS   PT Ortho Eval Goals 1;2;3   Ortho Goal 1   Goal Identifier HEP   Goal Description Patient will be independent in a HEP in 12 weeks   Target Date 09/12/22   Ortho Goal 2   Goal Identifier Walking   Goal Description Patient will be able to walk unrestricted without increase in pain for ADLs, self cares and functional mobility in 12 weeks   Target Date 09/12/22   Ortho Goal 3   Goal Identifier Lifting   Goal Description Patient will be able to lift >20# without increase in pain for increase ease in self cares and ADLs in 12 weeks   Target Date 09/12/22   Total Evaluation Time   PT Eval, Low Complexity Minutes (82377) 30     Kay Gregory, PT, DPT, CLSUMEET-HUSEYIN

## 2022-06-22 ENCOUNTER — HOSPITAL ENCOUNTER (OUTPATIENT)
Dept: PHYSICAL THERAPY | Facility: REHABILITATION | Age: 62
Discharge: HOME | End: 2022-06-22
Attending: PHYSICIAN ASSISTANT
Payer: OTHER GOVERNMENT

## 2022-06-22 ENCOUNTER — OFFICE VISIT (OUTPATIENT)
Dept: NEUROSURGERY | Facility: CLINIC | Age: 62
End: 2022-06-22
Payer: OTHER GOVERNMENT

## 2022-06-22 VITALS
HEIGHT: 72 IN | HEART RATE: 65 BPM | BODY MASS INDEX: 31.69 KG/M2 | WEIGHT: 234 LBS | DIASTOLIC BLOOD PRESSURE: 71 MMHG | SYSTOLIC BLOOD PRESSURE: 137 MMHG | OXYGEN SATURATION: 98 %

## 2022-06-22 DIAGNOSIS — R26.81 GAIT INSTABILITY: ICD-10-CM

## 2022-06-22 DIAGNOSIS — M54.16 LUMBAR RADICULOPATHY: Primary | ICD-10-CM

## 2022-06-22 DIAGNOSIS — M62.81 MUSCLE WEAKNESS (GENERALIZED): ICD-10-CM

## 2022-06-22 DIAGNOSIS — T14.8XXA WOUND DRAINAGE: ICD-10-CM

## 2022-06-22 DIAGNOSIS — R53.1 RIGHT SIDED WEAKNESS: ICD-10-CM

## 2022-06-22 PROCEDURE — 97110 THERAPEUTIC EXERCISES: CPT | Mod: GP | Performed by: PHYSICAL THERAPIST

## 2022-06-22 PROCEDURE — 99024 POSTOP FOLLOW-UP VISIT: CPT | Performed by: NURSE PRACTITIONER

## 2022-06-22 NOTE — LETTER
6/22/2022         RE: Raghavendra Kiser  [address]  [place] [facility] [address]        Dear Colleague,    Thank you for referring your patient, Raghavendra Kiser, to the [facility] SPINE AND NEUROSURGERY. Please see a copy of my visit note below.    Neurosurgery Progress Note: 5/10/2022     A/P:   Mauri is a 63yo M who is 3mos s/p Exploration of prior left lumbar 3-lumbar 4 hemilaminectomy space with lumbar 3-lumbar 5 left hemilaminectomy for epidural abscess evacuation by Dr Duque on 03/29/2022.    Mauri is doing very well. He denies any back or leg pain, numbness, weakness, HA, incision complaints, or change in bowel or bladder control. He finished his course of antibiotics and has been cleared by ID. Since off abx, he has had no recurrent symptoms. He is neuro intact today. Explained that he may follow up PRN at this point and continue to advance activity as tolerated. No further postop activity restrictions from our perspective.       Plan:  1. Follow up PRN if question or concerns, or if symptoms recur  2. Continue Physical Therapy  3. If symptoms return, would have a low threshold to have patient re-evaluated by ID, infectious marker labs, and repeat MRI lumbar spine w wo contrast      HPI:   Raghavendra Kiser is a 62 year old S/P Bilateral lumbar 3- lumbar 4 hemilaminectomies, medial facetectomies, foraminotomies and microdiscectomies with small durotomy on right with Dr Duque 3/21/2022. Pre-op had more right leg symptoms than left. Symptoms improved following surgery with worsening new left leg pain in last 1-2 days. MRI obtained which showed a new contrast enhancing collection mostly centered at left L5 extending form L4 to S1.This is causing severe spinal stenosis at these levels. We discussed exploration and extension of his prior hemilaminectomy for evacuation of the collection and possible re-repair of CSF leak. Risks and benefits dicussed and he agreed to proceed.      Exam:    /71    Pulse 65   Ht 6' (1.829 m)   Wt 234 lb (106.1 kg)   SpO2 98%   BMI 31.74 kg/m      General: alert and oriented x3, seated in NAD, appears well    Strength is 5/5 throughout both lower extremities    Sensation is intact throughout both lower extremities    Gait is smooth and coordinated        Kylie SAENZP-C  [facility]  O. [phone]        Again, thank you for allowing me to participate in the care of your patient.        Sincerely,        MORGAN Schulte CNP

## 2022-06-24 NOTE — PROGRESS NOTES
Neurosurgery Progress Note: 5/10/2022     A/P:   Mauri is a 63yo M who is 3mos s/p Exploration of prior left lumbar 3-lumbar 4 hemilaminectomy space with lumbar 3-lumbar 5 left hemilaminectomy for epidural abscess evacuation by Dr Duque on 03/29/2022.    Mauri is doing very well. He denies any back or leg pain, numbness, weakness, HA, incision complaints, or change in bowel or bladder control. He finished his course of antibiotics and has been cleared by ID. Since off abx, he has had no recurrent symptoms. He is neuro intact today. Explained that he may follow up PRN at this point and continue to advance activity as tolerated. No further postop activity restrictions from our perspective.       Plan:  1. Follow up PRN if question or concerns, or if symptoms recur  2. Continue Physical Therapy  3. If symptoms return, would have a low threshold to have patient re-evaluated by ID, infectious marker labs, and repeat MRI lumbar spine w wo contrast      HPI:   Raghavendra Kiser is a 62 year old S/P Bilateral lumbar 3- lumbar 4 hemilaminectomies, medial facetectomies, foraminotomies and microdiscectomies with small durotomy on right with Dr Duque 3/21/2022. Pre-op had more right leg symptoms than left. Symptoms improved following surgery with worsening new left leg pain in last 1-2 days. MRI obtained which showed a new contrast enhancing collection mostly centered at left L5 extending form L4 to S1.This is causing severe spinal stenosis at these levels. We discussed exploration and extension of his prior hemilaminectomy for evacuation of the collection and possible re-repair of CSF leak. Risks and benefits dicussed and he agreed to proceed.      Exam:    /71   Pulse 65   Ht 6' (1.829 m)   Wt 234 lb (106.1 kg)   SpO2 98%   BMI 31.74 kg/m      General: alert and oriented x3, seated in NAD, appears well    Strength is 5/5 throughout both lower extremities    Sensation is intact throughout both lower  extremities    Gait is smooth and coordinated        Kylie Spencer FNP-C  [facility]  O. [phone]

## 2022-07-14 ENCOUNTER — OFFICE VISIT (OUTPATIENT)
Dept: FAMILY MEDICINE | Facility: CLINIC | Age: 62
End: 2022-07-14
Payer: OTHER GOVERNMENT

## 2022-07-14 VITALS
HEART RATE: 69 BPM | OXYGEN SATURATION: 100 % | WEIGHT: 233.4 LBS | TEMPERATURE: 98.5 F | BODY MASS INDEX: 31.65 KG/M2 | DIASTOLIC BLOOD PRESSURE: 79 MMHG | SYSTOLIC BLOOD PRESSURE: 125 MMHG | RESPIRATION RATE: 12 BRPM

## 2022-07-14 DIAGNOSIS — Z12.5 SCREENING FOR PROSTATE CANCER: ICD-10-CM

## 2022-07-14 DIAGNOSIS — E03.9 HYPOTHYROIDISM, UNSPECIFIED TYPE: ICD-10-CM

## 2022-07-14 DIAGNOSIS — M1A.9XX0 CHRONIC GOUT WITHOUT TOPHUS, UNSPECIFIED CAUSE, UNSPECIFIED SITE: ICD-10-CM

## 2022-07-14 DIAGNOSIS — I10 PRIMARY HYPERTENSION: ICD-10-CM

## 2022-07-14 DIAGNOSIS — E87.6 HYPOKALEMIA: ICD-10-CM

## 2022-07-14 DIAGNOSIS — E11.9 TYPE 2 DIABETES MELLITUS WITHOUT COMPLICATION, WITHOUT LONG-TERM CURRENT USE OF INSULIN (H): ICD-10-CM

## 2022-07-14 DIAGNOSIS — R44.3 HALLUCINATIONS: ICD-10-CM

## 2022-07-14 LAB
ANION GAP SERPL CALCULATED.3IONS-SCNC: 11 MMOL/L (ref 7–15)
BUN SERPL-MCNC: 16 MG/DL (ref 8–23)
CALCIUM SERPL-MCNC: 9.9 MG/DL (ref 8.8–10.2)
CHLORIDE SERPL-SCNC: 105 MMOL/L (ref 98–107)
CREAT SERPL-MCNC: 1.03 MG/DL (ref 0.67–1.17)
DEPRECATED HCO3 PLAS-SCNC: 26 MMOL/L (ref 22–29)
GFR SERPL CREATININE-BSD FRML MDRD: 81 ML/MIN/1.73M2
GLUCOSE SERPL-MCNC: 80 MG/DL (ref 70–99)
HBA1C MFR BLD: 6 % (ref 0–5.6)
POTASSIUM SERPL-SCNC: 4.6 MMOL/L (ref 3.4–5.3)
PSA SERPL-MCNC: 1.83 NG/ML (ref 0–4.5)
SODIUM SERPL-SCNC: 142 MMOL/L (ref 136–145)
T4 FREE SERPL-MCNC: 1.25 NG/DL (ref 0.9–1.7)
TSH SERPL DL<=0.005 MIU/L-ACNC: 6.02 UIU/ML (ref 0.3–4.2)
VALPROATE SERPL-MCNC: 32.1 UG/ML

## 2022-07-14 PROCEDURE — 80164 ASSAY DIPROPYLACETIC ACD TOT: CPT | Performed by: FAMILY MEDICINE

## 2022-07-14 PROCEDURE — 84443 ASSAY THYROID STIM HORMONE: CPT | Performed by: FAMILY MEDICINE

## 2022-07-14 PROCEDURE — 83036 HEMOGLOBIN GLYCOSYLATED A1C: CPT | Performed by: FAMILY MEDICINE

## 2022-07-14 PROCEDURE — 36415 COLL VENOUS BLD VENIPUNCTURE: CPT | Performed by: FAMILY MEDICINE

## 2022-07-14 PROCEDURE — 80048 BASIC METABOLIC PNL TOTAL CA: CPT | Performed by: FAMILY MEDICINE

## 2022-07-14 PROCEDURE — 99214 OFFICE O/P EST MOD 30 MIN: CPT | Performed by: FAMILY MEDICINE

## 2022-07-14 PROCEDURE — G0103 PSA SCREENING: HCPCS | Performed by: FAMILY MEDICINE

## 2022-07-14 PROCEDURE — 84439 ASSAY OF FREE THYROXINE: CPT | Performed by: FAMILY MEDICINE

## 2022-07-14 RX ORDER — LISINOPRIL 20 MG/1
20 TABLET ORAL 2 TIMES DAILY
Start: 2022-07-14 | End: 2023-02-28

## 2022-07-14 NOTE — LETTER
July 17, 2022      Mauri Kiser  [address] [place] [address]        Dear ,  We are writing to inform you of your test results.    Joe Dotson:  Your PSA is normal and stable;  It should be rechecked in one year.  Your diabetes remains under excellent control.    Your TSH is slightly elevated as we usually like to keep that down below 3.5.  Especially if you have had any fatigue you may benefit from a small increase in your dose of levothyroxine.  Let me know if you would like me to make that upward adjustment.  Then you would recheck the thyroid labs again after 3 months.    The valproic acid level is on the low side which is ok.  The remaining labs are normal.      Resulted Orders   Hemoglobin A1c   Result Value Ref Range    Hemoglobin A1C 6.0 (H) 0.0 - 5.6 %      Comment:      Normal <5.7%   Prediabetes 5.7-6.4%    Diabetes 6.5% or higher     Note: Adopted from ADA consensus guidelines.   TSH with free T4 reflex   Result Value Ref Range    TSH 6.02 (H) 0.30 - 4.20 uIU/mL   Basic metabolic panel  (Ca, Cl, CO2, Creat, Gluc, K, Na, BUN)   Result Value Ref Range    Creatinine 1.03 0.67 - 1.17 mg/dL    Sodium 142 136 - 145 mmol/L    Potassium 4.6 3.4 - 5.3 mmol/L    Urea Nitrogen 16.0 8.0 - 23.0 mg/dL    Chloride 105 98 - 107 mmol/L    Carbon Dioxide (CO2) 26 22 - 29 mmol/L    Anion Gap 11 7 - 15 mmol/L    Glucose 80 70 - 99 mg/dL    GFR Estimate 81 >60 mL/min/1.73m2      Comment:      Effective December 21, 2021 eGFRcr in adults is calculated using the 2021 CKD-EPI creatinine equation which includes age and gender (Cherri et al., NEJM, DOI: 10.1056/UIUWkp6149573)    Calcium 9.9 8.8 - 10.2 mg/dL   PSA, screen   Result Value Ref Range    Prostate Specific Antigen Screen 1.83 0.00 - 4.50 ng/mL    Narrative    This result is obtained using the Roche Elecsys total PSA method on the donell e801 immunoassay analyzer. Results obtained with different assay methods or kits cannot be used interchangeably.    Valproic acid   Result Value Ref Range    Valproic acid 32.1 (L)   ug/mL      Comment:      Therapeutic Range:  ug/mL   Epilepsy and nadrew patients:  ug/mL   Some patients require and can tolerate values up to 150 ug/mL     Critical: Greater than 150 ug/mL   T4 free   Result Value Ref Range    Free T4 1.25 0.90 - 1.70 ng/dL       If you have any questions or concerns, please call the clinic at the number listed above.       Sincerely,      Luis Daniel Maciel MD

## 2022-07-14 NOTE — PROGRESS NOTES
Constantin Dotson is a 62 year old  presenting for the following health issues:  Medication check    No fatigue issues.  [facility]    History of Present Illness       Reason for visit:  Med check    He eats 0-1 servings of fruits and vegetables daily.He consumes 2 sweetened beverage(s) daily.He exercises with enough effort to increase his heart rate 9 or less minutes per day.  He exercises with enough effort to increase his heart rate 3 or less days per week.   He is taking medications regularly.           Objective    /79   Pulse 69   Temp 98.5  F (36.9  C) (Oral)   Resp 12   Wt 105.9 kg (233 lb 6.4 oz)   SpO2 100%   BMI 31.65 kg/m    Body mass index is 31.65 kg/m .  Physical Exam   GENERAL: healthy, alert and no distress  RESP: lungs clear to auscultation - no rales, rhonchi or wheezes  CV: regular rate and rhythm, normal S1 S2, no S3 or S4, no murmur, click or rub, no peripheral edema and peripheral pulses strong  PSYCH: mentation appears normal, affect normal/bright    Prostate Specific Antigen Screen   Date Value Ref Range Status   07/14/2022 1.83 0.00 - 4.50 ng/mL Final   11/03/2021 2.63 0.00 - 4.50 ug/L Final         TSH   Date Value Ref Range Status   07/14/2022 6.02 (H) 0.30 - 4.20 uIU/mL Final   11/03/2021 5.26 (H) 0.30 - 5.00 uIU/mL Final     Lab Results   Component Value Date    A1C 6.0 07/14/2022    A1C 5.9 03/16/2022    A1C 5.4 11/03/2021    A1C 5.7 05/28/2021    A1C 5.6 12/24/2020       CBC RESULTS: Recent Labs   Lab Test 05/10/22  0820   WBC 3.4*   RBC 3.75*   HGB 12.1*   HCT 36.0*   MCV 96   MCH 32.3   MCHC 33.6   RDW 13.2        Last Comprehensive Metabolic Panel:  Sodium   Date Value Ref Range Status   05/10/2022 143 136 - 145 mmol/L Final     Potassium   Date Value Ref Range Status   05/10/2022 3.4 (L) 3.5 - 5.0 mmol/L Final     Chloride   Date Value Ref Range Status   05/10/2022 107 98 - 107 mmol/L Final     Carbon Dioxide (CO2)   Date Value Ref Range  Status   05/10/2022 26 22 - 31 mmol/L Final     Anion Gap   Date Value Ref Range Status   05/10/2022 10 5 - 18 mmol/L Final     Glucose   Date Value Ref Range Status   05/10/2022 98 70 - 125 mg/dL Final     Urea Nitrogen   Date Value Ref Range Status   05/10/2022 11 8 - 22 mg/dL Final     Creatinine   Date Value Ref Range Status   05/10/2022 0.86 0.70 - 1.30 mg/dL Final     GFR Estimate   Date Value Ref Range Status   05/10/2022 >90 >60 mL/min/1.73m2 Final     Comment:     Effective December 21, 2021 eGFRcr in adults is calculated using the 2021 CKD-EPI creatinine equation which includes age and gender (Cherri et al., NEJ, DOI: 10.1056/CRGVyn7043951)   05/28/2021 >60 >60 mL/min/1.73m2 Final     Calcium   Date Value Ref Range Status   05/10/2022 9.2 8.5 - 10.5 mg/dL Final     Bilirubin Total   Date Value Ref Range Status   05/10/2022 0.6 0.0 - 1.0 mg/dL Final     Alkaline Phosphatase   Date Value Ref Range Status   05/10/2022 80 45 - 120 U/L Final     ALT   Date Value Ref Range Status   05/10/2022 14 0 - 45 U/L Final     AST   Date Value Ref Range Status   05/10/2022 15 0 - 40 U/L Final       Here are the results:  Lab Results   Component Value Date    CHOL 131 11/03/2021     Lab Results   Component Value Date    HDL 25 11/03/2021     Lab Results   Component Value Date    LDL 38 11/03/2021    LDL 36 11/08/2019     Lab Results   Component Value Date    TRIG 342 11/03/2021       Encounter Diagnoses   Name Primary?     Type 2 diabetes mellitus without complication, without long-term current use of insulin (H),  CHECK A1C      Primary hypertension, CONTROLLED      Hypothyroidism, unspecified type, CHECK TSH      Chronic gout without tophus, unspecified cause, unspecified site      Hypokalemia, RECHECK URIC ACID Yes     Screening for prostate cancer,  RECHECK PSA      Hallucinations, CHECK DEPAKOTE LEVEL          PLAN:   Annual eye exam    Recheck PSA    Check A1C, BMP (potassium)    Recheck on the thyroid (TSH)    Check  Valproic Acid level    Fasting lipids in Nov 2022                                                                                                                                                                                                                                    .  ..

## 2022-07-26 DIAGNOSIS — I10 ESSENTIAL HYPERTENSION: ICD-10-CM

## 2022-07-26 RX ORDER — AMLODIPINE BESYLATE 5 MG/1
5 TABLET ORAL DAILY
Qty: 90 TABLET | Refills: 3 | Status: SHIPPED | OUTPATIENT
Start: 2022-07-26 | End: 2023-07-21

## 2022-07-26 NOTE — TELEPHONE ENCOUNTER
"Last Written Prescription Date:  8/17/21  Last Fill Quantity: 90,  # refills: 3   Last office visit provider:  7/14/22     Requested Prescriptions   Pending Prescriptions Disp Refills     amLODIPine (NORVASC) 5 MG tablet [Pharmacy Med Name: AMLODIPINE BESYLATE TABS 5MG] 90 tablet 3     Sig: TAKE 1 TABLET DAILY       Calcium Channel Blockers Protocol  Passed - 7/26/2022  8:25 AM        Passed - Blood pressure under 140/90 in past 12 months     BP Readings from Last 3 Encounters:   07/14/22 125/79   06/22/22 137/71   05/10/22 136/72                 Passed - Recent (12 mo) or future (30 days) visit within the authorizing provider's specialty     Patient has had an office visit with the authorizing provider or a provider within the authorizing providers department within the previous 12 mos or has a future within next 30 days. See \"Patient Info\" tab in inbasket, or \"Choose Columns\" in Meds & Orders section of the refill encounter.              Passed - Medication is active on med list        Passed - Patient is age 18 or older        Passed - Normal serum creatinine on file in past 12 months     Recent Labs   Lab Test 07/14/22  1636   CR 1.03       Ok to refill medication if creatinine is low               Luis Daniel Daly RN 07/26/22 8:25 AM  "

## 2022-08-16 NOTE — PROGRESS NOTES
This is a recent snapshot of the patient's [facility] medical record.  For current drug dose and complete information and questions, call [phone] or In [facility] pool, fv home infusion ([id number])  CSN Number:  [id number]

## 2022-08-16 NOTE — PROGRESS NOTES
This is a recent snapshot of the patient's [facility] medical record.  For current drug dose and complete information and questions, call [phone] or In [facility] pool, fv home infusion ([id number])  CSN Number:  [id number]

## 2022-08-26 NOTE — PROGRESS NOTES
This is a recent snapshot of the patient's [facility] medical record.  For current drug dose and complete information and questions, call [phone] or In [facility] pool, fv home infusion ([id number])  CSN Number:  [id number]

## 2022-09-06 NOTE — PROGRESS NOTES
This is a recent snapshot of the patient's [facility] medical record.  For current drug dose and complete information and questions, call [phone] or In Basket pool, fv home infusion ([id number])  CSN Number:  [id number]

## 2022-09-06 NOTE — PROGRESS NOTES
This is a recent snapshot of the patient's [facility] medical record.  For current drug dose and complete information and questions, call [phone] or In Basket pool, fv home infusion ([id number])  CSN Number:  [id number]

## 2022-09-12 DIAGNOSIS — I63.9 CEREBROVASCULAR ACCIDENT (CVA), UNSPECIFIED MECHANISM (H): ICD-10-CM

## 2022-09-12 RX ORDER — ALLOPURINOL 300 MG/1
TABLET ORAL
Qty: 113 TABLET | Refills: 3 | OUTPATIENT
Start: 2022-09-12

## 2022-09-12 RX ORDER — DIVALPROEX SODIUM 500 MG/1
TABLET, EXTENDED RELEASE ORAL
Qty: 90 TABLET | Refills: 2 | Status: SHIPPED | OUTPATIENT
Start: 2022-09-12 | End: 2023-05-18

## 2022-09-12 NOTE — TELEPHONE ENCOUNTER
"Routing refill request to provider for review/approval because:  Labs out of range:  Cbc  level    Last Written Prescription Date:  6/3/22  Last Fill Quantity: 90,  # refills: 0   Last office visit provider:  7/14/22     Requested Prescriptions   Pending Prescriptions Disp Refills     divalproex sodium extended-release (DEPAKOTE ER) 500 MG 24 hr tablet [Pharmacy Med Name: DIVALPROEX SODIUM ER TABS 500MG] 90 tablet 3     Sig: TAKE 1 TABLET AT BEDTIME       Anti-Seizure Meds Protocol  Failed - 9/12/2022 10:31 AM        Failed - Review Authorizing provider's last note.      Refer to last progress notes: confirm request is for original authorizing provider (cannot be through other providers).          Failed - Normal CBC on file in past 26 months     Recent Labs   Lab Test 05/10/22  0820   WBC 3.4*   RBC 3.75*   HGB 12.1*   HCT 36.0*                    Failed - Depakote level within therapeutic range in past 26 months     No results found for: VALPROIC, ALECIA, GICHVAL  Depakote level must be checked 2-4 weeks after dosage change.          Passed - Recent (12 mo) or future (30 days) visit within the authorizing provider's specialty     Patient has had an office visit with the authorizing provider or a provider within the authorizing providers department within the previous 12 mos or has a future within next 30 days. See \"Patient Info\" tab in inbasket, or \"Choose Columns\" in Meds & Orders section of the refill encounter.              Passed - Normal ALT or AST on file in past 26 months     Recent Labs   Lab Test 05/10/22  0820   ALT 14     Recent Labs   Lab Test 05/10/22  0820   AST 15             Passed - Normal platelet count on file in past 26 months     Recent Labs   Lab Test 05/10/22  0820                  Passed - Medication is active on med list             Trish Granda RN 09/12/22 2:55 PM  "

## 2022-09-15 ENCOUNTER — TELEPHONE (OUTPATIENT)
Dept: RHEUMATOLOGY | Facility: CLINIC | Age: 62
End: 2022-09-15

## 2022-09-15 DIAGNOSIS — M1A.0791 IDIOPATHIC CHRONIC GOUT OF FOOT WITH TOPHUS, UNSPECIFIED LATERALITY: Primary | ICD-10-CM

## 2022-09-15 RX ORDER — ALLOPURINOL 300 MG/1
TABLET ORAL
Qty: 135 TABLET | Refills: 0 | Status: SHIPPED | OUTPATIENT
Start: 2022-09-15 | End: 2022-11-10

## 2022-09-15 NOTE — TELEPHONE ENCOUNTER
Dr Katlyn kam'amirah refilling allopurinol until f/u appt on 11/20/22    Refilled per Rheum RN refill protocol

## 2022-09-15 NOTE — TELEPHONE ENCOUNTER
Patient needs a refill of allupurinol. Would like sent to [id number] - [place], [place] - [address] (Ph: [phone])

## 2022-09-21 DIAGNOSIS — E11.9 TYPE 2 DIABETES MELLITUS WITHOUT COMPLICATION, WITHOUT LONG-TERM CURRENT USE OF INSULIN (H): Primary | ICD-10-CM

## 2022-09-21 RX ORDER — METFORMIN HCL 500 MG
500 TABLET, EXTENDED RELEASE 24 HR ORAL 2 TIMES DAILY WITH MEALS
Qty: 180 TABLET | Refills: 3 | Status: SHIPPED | OUTPATIENT
Start: 2022-09-21 | End: 2022-12-05

## 2022-09-21 NOTE — TELEPHONE ENCOUNTER
Patient needs  a short supply sent to [facility] and refills sent to [facility].     Medication Question or Refill    Contacts       Type Contact Phone/Fax    09/21/2022 10:15 AM CDT Phone (Incoming) Mauri Kiser (Self) [phone] (H)          What medication are you calling about (include dose and sig)?: metFORMIN (GLUCOPHAGE-XR) 500 MG 24 hr tablet    Controlled Substance Agreement on file:   CSA -- Patient Level:    CSA: None found at the patient level.       Who prescribed the medication?: Metformin     Do you need a refill? Yes:     When did you use the medication last? today    Patient offered an appointment? No    Do you have any questions or concerns?  No    Preferred Pharmacy:   [id number] - [place], [place] - [address] [place] [address]  Phone: [phone] Fax: [phone]    [facility] - [place], [place] - [address] [place] [address]  Phone: [phone] Fax: [phone]      [facility] we [facility] you [facility] or would you prefer to receive a phone call?:   Patient would prefer a phone call   Okay to leave a detailed message?: Yes at Home number on file [phone] (home)

## 2022-10-20 ENCOUNTER — OFFICE VISIT (OUTPATIENT)
Dept: FAMILY MEDICINE | Facility: CLINIC | Age: 62
End: 2022-10-20
Payer: OTHER GOVERNMENT

## 2022-10-20 VITALS
WEIGHT: 235.4 LBS | OXYGEN SATURATION: 99 % | SYSTOLIC BLOOD PRESSURE: 138 MMHG | HEIGHT: 72 IN | TEMPERATURE: 97.5 F | RESPIRATION RATE: 12 BRPM | BODY MASS INDEX: 31.89 KG/M2 | HEART RATE: 60 BPM | DIASTOLIC BLOOD PRESSURE: 81 MMHG

## 2022-10-20 DIAGNOSIS — G60.9 IDIOPATHIC PERIPHERAL NEUROPATHY: ICD-10-CM

## 2022-10-20 DIAGNOSIS — E03.9 HYPOTHYROIDISM, UNSPECIFIED TYPE: ICD-10-CM

## 2022-10-20 DIAGNOSIS — M54.16 LUMBAR RADICULOPATHY: ICD-10-CM

## 2022-10-20 DIAGNOSIS — Z86.0100 HISTORY OF COLONIC POLYPS: ICD-10-CM

## 2022-10-20 DIAGNOSIS — I10 PRIMARY HYPERTENSION: ICD-10-CM

## 2022-10-20 DIAGNOSIS — Z13.220 SCREENING FOR HYPERLIPIDEMIA: ICD-10-CM

## 2022-10-20 DIAGNOSIS — Z00.00 HEALTHCARE MAINTENANCE: Primary | ICD-10-CM

## 2022-10-20 DIAGNOSIS — E66.811 CLASS 1 OBESITY DUE TO EXCESS CALORIES WITH SERIOUS COMORBIDITY AND BODY MASS INDEX (BMI) OF 32.0 TO 32.9 IN ADULT: ICD-10-CM

## 2022-10-20 DIAGNOSIS — E11.9 TYPE 2 DIABETES MELLITUS WITHOUT COMPLICATION, WITHOUT LONG-TERM CURRENT USE OF INSULIN (H): ICD-10-CM

## 2022-10-20 DIAGNOSIS — E66.09 CLASS 1 OBESITY DUE TO EXCESS CALORIES WITH SERIOUS COMORBIDITY AND BODY MASS INDEX (BMI) OF 32.0 TO 32.9 IN ADULT: ICD-10-CM

## 2022-10-20 LAB
ALBUMIN SERPL BCG-MCNC: 4.2 G/DL (ref 3.5–5.2)
ALP SERPL-CCNC: 70 U/L (ref 40–129)
ALT SERPL W P-5'-P-CCNC: 11 U/L (ref 10–50)
ANION GAP SERPL CALCULATED.3IONS-SCNC: 10 MMOL/L (ref 7–15)
AST SERPL W P-5'-P-CCNC: 25 U/L (ref 10–50)
BILIRUB SERPL-MCNC: 0.3 MG/DL
BUN SERPL-MCNC: 10.3 MG/DL (ref 8–23)
CALCIUM SERPL-MCNC: 9.5 MG/DL (ref 8.8–10.2)
CHLORIDE SERPL-SCNC: 107 MMOL/L (ref 98–107)
CHOLEST SERPL-MCNC: 116 MG/DL
CREAT SERPL-MCNC: 0.89 MG/DL (ref 0.67–1.17)
DEPRECATED HCO3 PLAS-SCNC: 26 MMOL/L (ref 22–29)
ERYTHROCYTE [DISTWIDTH] IN BLOOD BY AUTOMATED COUNT: 14.3 % (ref 10–15)
GFR SERPL CREATININE-BSD FRML MDRD: >90 ML/MIN/1.73M2
GLUCOSE SERPL-MCNC: 98 MG/DL (ref 70–99)
HBA1C MFR BLD: 5.7 % (ref 0–5.6)
HCT VFR BLD AUTO: 38.1 % (ref 40–53)
HDLC SERPL-MCNC: 24 MG/DL
HGB BLD-MCNC: 13 G/DL (ref 13.3–17.7)
HOLD SPECIMEN: NORMAL
LDLC SERPL CALC-MCNC: 12 MG/DL
MCH RBC QN AUTO: 32.7 PG (ref 26.5–33)
MCHC RBC AUTO-ENTMCNC: 34.1 G/DL (ref 31.5–36.5)
MCV RBC AUTO: 96 FL (ref 78–100)
NONHDLC SERPL-MCNC: 92 MG/DL
PLATELET # BLD AUTO: 191 10E3/UL (ref 150–450)
POTASSIUM SERPL-SCNC: 4.2 MMOL/L (ref 3.4–5.3)
PROT SERPL-MCNC: 6.7 G/DL (ref 6.4–8.3)
RBC # BLD AUTO: 3.97 10E6/UL (ref 4.4–5.9)
SODIUM SERPL-SCNC: 143 MMOL/L (ref 136–145)
TRIGL SERPL-MCNC: 399 MG/DL
TSH SERPL DL<=0.005 MIU/L-ACNC: 5.03 UIU/ML (ref 0.3–4.2)
WBC # BLD AUTO: 4.9 10E3/UL (ref 4–11)

## 2022-10-20 PROCEDURE — 80061 LIPID PANEL: CPT | Performed by: FAMILY MEDICINE

## 2022-10-20 PROCEDURE — 83036 HEMOGLOBIN GLYCOSYLATED A1C: CPT | Performed by: FAMILY MEDICINE

## 2022-10-20 PROCEDURE — 85027 COMPLETE CBC AUTOMATED: CPT | Performed by: FAMILY MEDICINE

## 2022-10-20 PROCEDURE — 36415 COLL VENOUS BLD VENIPUNCTURE: CPT | Performed by: FAMILY MEDICINE

## 2022-10-20 PROCEDURE — 84443 ASSAY THYROID STIM HORMONE: CPT | Performed by: FAMILY MEDICINE

## 2022-10-20 PROCEDURE — 99396 PREV VISIT EST AGE 40-64: CPT | Performed by: FAMILY MEDICINE

## 2022-10-20 PROCEDURE — 99214 OFFICE O/P EST MOD 30 MIN: CPT | Mod: 25 | Performed by: FAMILY MEDICINE

## 2022-10-20 PROCEDURE — 84439 ASSAY OF FREE THYROXINE: CPT | Performed by: FAMILY MEDICINE

## 2022-10-20 PROCEDURE — 80053 COMPREHEN METABOLIC PANEL: CPT | Performed by: FAMILY MEDICINE

## 2022-10-20 RX ORDER — GABAPENTIN 300 MG/1
300 CAPSULE ORAL 3 TIMES DAILY
Qty: 90 CAPSULE | Refills: 3 | Status: SHIPPED | OUTPATIENT
Start: 2022-10-20 | End: 2023-01-04

## 2022-10-20 ASSESSMENT — ENCOUNTER SYMPTOMS
HEMATOCHEZIA: 0
FEVER: 0
DIARRHEA: 0
COUGH: 0
DYSURIA: 0
CHILLS: 0
WEAKNESS: 0
HEMATURIA: 0
ABDOMINAL PAIN: 0
CONSTIPATION: 0
SORE THROAT: 0
FREQUENCY: 0
EYE PAIN: 0
HEARTBURN: 0
HEADACHES: 0
ARTHRALGIAS: 0
SHORTNESS OF BREATH: 0
NAUSEA: 0
PALPITATIONS: 0
PARESTHESIAS: 0
JOINT SWELLING: 0
DIZZINESS: 0
MYALGIAS: 0
NERVOUS/ANXIOUS: 0

## 2022-10-20 NOTE — LETTER
October 22, 2022      Mauri Kiser  [address] [place] [address]        Dear ,  We are writing to inform you of your test results.    Joe Dotson:  Your diabetes is well controlled.  The cholesterol panel is good other than for a high triglyceride level.  Continue to watch the carbs.  Also the LDL is extremely low.  Consideration could be given to cutting back on the statin though I would let Neurology weight in on this.  The hemoglobin is slightly low but stable.  Your TSH remains slightly elevated with a low normal Free T4.  If you are having any fatigue issues I would consider a slight increase in your thyroid replacement dose.  Let me know if you would like me to make that change.  The remaining labs are normal.  As always it was good seeing you in clinic this past week.    Resulted Orders   Hemoglobin A1c   Result Value Ref Range    Hemoglobin A1C 5.7 (H) 0.0 - 5.6 %      Comment:      Normal <5.7%   Prediabetes 5.7-6.4%    Diabetes 6.5% or higher     Note: Adopted from ADA consensus guidelines.   Comprehensive metabolic panel (BMP + Alb, Alk Phos, ALT, AST, Total. Bili, TP)   Result Value Ref Range    Sodium 143 136 - 145 mmol/L    Potassium 4.2 3.4 - 5.3 mmol/L    Chloride 107 98 - 107 mmol/L    Carbon Dioxide (CO2) 26 22 - 29 mmol/L    Anion Gap 10 7 - 15 mmol/L    Urea Nitrogen 10.3 8.0 - 23.0 mg/dL    Creatinine 0.89 0.67 - 1.17 mg/dL    Calcium 9.5 8.8 - 10.2 mg/dL    Glucose 98 70 - 99 mg/dL    Alkaline Phosphatase 70 40 - 129 U/L    AST 25 10 - 50 U/L    ALT 11 10 - 50 U/L    Protein Total 6.7 6.4 - 8.3 g/dL    Albumin 4.2 3.5 - 5.2 g/dL    Bilirubin Total 0.3 <=1.2 mg/dL    GFR Estimate >90 >60 mL/min/1.73m2      Comment:      Effective December 21, 2021 eGFRcr in adults is calculated using the 2021 CKD-EPI creatinine equation which includes age and gender (Cherri gallegos al., NEJM, DOI: 10.1056/OYUBcb7400954)   Lipid panel reflex to direct LDL Non-fasting   Result Value Ref Range     Cholesterol 116 <200 mg/dL    Triglycerides 399 (H) <150 mg/dL    Direct Measure HDL 24 (L) >=40 mg/dL    LDL Cholesterol Calculated 12 <=100 mg/dL    Non HDL Cholesterol 92 <130 mg/dL    Narrative    Cholesterol  Desirable:  <200 mg/dL    Triglycerides  Normal:  Less than 150 mg/dL  Borderline High:  150-199 mg/dL  High:  200-499 mg/dL  Very High:  Greater than or equal to 500 mg/dL    Direct Measure HDL  Female:  Greater than or equal to 50 mg/dL   Male:  Greater than or equal to 40 mg/dL    LDL Cholesterol  Desirable:  <100mg/dL  Above Desirable:  100-129 mg/dL   Borderline High:  130-159 mg/dL   High:  160-189 mg/dL   Very High:  >= 190 mg/dL    Non HDL Cholesterol  Desirable:  130 mg/dL  Above Desirable:  130-159 mg/dL  Borderline High:  160-189 mg/dL  High:  190-219 mg/dL  Very High:  Greater than or equal to 220 mg/dL   CBC with platelets   Result Value Ref Range    WBC Count 4.9 4.0 - 11.0 10e3/uL    RBC Count 3.97 (L) 4.40 - 5.90 10e6/uL    Hemoglobin 13.0 (L) 13.3 - 17.7 g/dL    Hematocrit 38.1 (L) 40.0 - 53.0 %    MCV 96 78 - 100 fL    MCH 32.7 26.5 - 33.0 pg    MCHC 34.1 31.5 - 36.5 g/dL    RDW 14.3 10.0 - 15.0 %    Platelet Count 191 150 - 450 10e3/uL   TSH with free T4 reflex   Result Value Ref Range    TSH 5.03 (H) 0.30 - 4.20 uIU/mL   T4 free   Result Value Ref Range    Free T4 1.20 0.90 - 1.70 ng/dL       If you have any questions or concerns, please call the clinic at the number listed above.       Sincerely,      Luis Daniel Maciel MD

## 2022-10-20 NOTE — PROGRESS NOTES
SUBJECTIVE:   CC: Mauri is an 62 year old who presents for preventative health visit.       Patient has been advised of split billing requirements and indicates understanding: Yes  Healthy Habits:     Getting at least 3 servings of Calcium per day:  NO    Bi-annual eye exam:  Yes    Dental care twice a year:  Yes    Sleep apnea or symptoms of sleep apnea:  None    Diet:  Low salt    Frequency of exercise:  1 day/week    Duration of exercise:  15-30 minutes    Taking medications regularly:  Yes    Medication side effects:  None    PHQ-2 Total Score: 0    Additional concerns today:  No              Today's PHQ-2 Score:   PHQ-2 (  [employer]) 10/20/2022   Q1: Little interest or pleasure in doing things 0   Q2: Feeling down, depressed or hopeless 0   PHQ-2 Score 0   PHQ-2 Total Score (12-17 Years)- Positive if 3 or more points; Administer PHQ-A if positive -   Q1: Little interest or pleasure in doing things Not at all   Q2: Feeling down, depressed or hopeless Not at all   PHQ-2 Score 0       Abuse: Current or Past(Physical, Sexual or Emotional)- No  Do you feel safe in your environment? Yes        Social History     Tobacco Use     Smoking status: Former     Packs/day: 2.00     Years: 20.00     Pack years: 40.00     Types: Cigarettes     Quit date: 2/15/1993     Years since quittin.6     Smokeless tobacco: Never     Tobacco comments:     quit in    Substance Use Topics     Alcohol use: Yes     Alcohol/week: 0.0 standard drinks     Comment: Alcoholic Drinks/day: rarely         Last PSA:   Prostate Specific Antigen Screen   Date Value Ref Range Status   2022 1.83 0.00 - 4.50 ng/mL Final   2021 2.63 0.00 - 4.50 ug/L Final           Review of Systems   Constitutional: Negative for chills and fever.   HENT: Negative for congestion, ear pain, hearing loss and sore throat.    Eyes: Negative for pain and visual disturbance.   Respiratory: Negative for cough and shortness of breath.    Cardiovascular:  "Negative for chest pain, palpitations and peripheral edema.   Gastrointestinal: Negative for abdominal pain, constipation, diarrhea, heartburn, hematochezia and nausea.   Genitourinary: Negative for dysuria, frequency, genital sores, hematuria, impotence, penile discharge and urgency.   Musculoskeletal: Negative for arthralgias, joint swelling and myalgias.   Skin: Negative for rash.   Neurological: Negative for dizziness, weakness, headaches and paresthesias.   Psychiatric/Behavioral: Negative for mood changes. The patient is not nervous/anxious.    outside of feet numb for about 3weeks  Sometimes feels pulse in thigh      OBJECTIVE:   /81   Pulse 60   Temp 97.5  F (36.4  C) (Oral)   Resp 12   Ht 1.822 m (5' 11.75\")   Wt 106.8 kg (235 lb 6.4 oz)   SpO2 99%   BMI 32.15 kg/m        Wt Readings from Last 4 Encounters:   10/20/22 106.8 kg (235 lb 6.4 oz)   07/14/22 105.9 kg (233 lb 6.4 oz)   06/22/22 106.1 kg (234 lb)   05/10/22 106.1 kg (234 lb)     Physical Exam  GENERAL: healthy, alert and no distress  EYES: Eyes grossly normal to inspection, PERRL and conjunctivae and sclerae normal  HENT: ear canals and TM's normal, nose and mouth without ulcers or lesions  NECK: no adenopathy, no asymmetry, masses, or scars and thyroid normal to palpation  RESP: lungs clear to auscultation - no rales, rhonchi or wheezes  CV: regular rate and rhythm, normal S1 S2, no S3 or S4, no murmur, click or rub, no peripheral edema and peripheral pulses strong  ABDOMEN: soft, nontender, no hepatosplenomegaly, no masses and bowel sounds normal  RECTAL:  Exam declined.   Will do yearly PSA  MS: no gross musculoskeletal defects noted, no edema  SKIN: no suspicious lesions or rashes  NEURO: Normal strength and tone, mentation intact and speech normal  PSYCH: mentation appears normal, affect normal/bright    FEET:  MF TESTING: NL              SKIN EXAM:  NL              VASCULAR:   R DP  PULSE: +                                      " L DP  PULSE: +                                      R PT  PULSE: -                                      L PT  PULSE: -        Lab Results   Component Value Date    A1C 5.7 10/20/2022    A1C 6.0 07/14/2022    A1C 5.9 03/16/2022    A1C 5.4 11/03/2021    A1C 5.7 05/28/2021     Prostate Specific Antigen Screen   Date Value Ref Range Status   07/14/2022 1.83 0.00 - 4.50 ng/mL Final   11/03/2021 2.63 0.00 - 4.50 ug/L Final         ASSESSMENT/PLAN:       ICD-10-CM    1. Healthcare maintenance  Z00.00 REVIEW OF HEALTH MAINTENANCE PROTOCOL ORDERS     CBC with platelets     CBC with platelets      2. Type 2 diabetes mellitus without complication, without long-term current use of insulin (H), well controlled E11.9 Lipid panel reflex to direct LDL Non-fasting     Albumin Random Urine Quantitative with Creat Ratio     Hemoglobin A1c     Hemoglobin A1c     Lipid panel reflex to direct LDL Non-fasting     CANCELED: Adult Eye  Referral      3. Screening for hyperlipidemia, stable on a statin Z13.220 Lipid panel reflex to direct LDL Non-fasting     Comprehensive metabolic panel (BMP + Alb, Alk Phos, ALT, AST, Total. Bili, TP)     Comprehensive metabolic panel (BMP + Alb, Alk Phos, ALT, AST, Total. Bili, TP)     Lipid panel reflex to direct LDL Non-fasting      4. Hypothyroidism, unspecified type, on thyroid replacement E03.9 TSH with free T4 reflex     TSH with free T4 reflex      5. Class 1 obesity due to excess calories with serious comorbidity and body mass index (BMI) of 32.0 to 32.9 in adult  E66.09     Z68.32       6. Lumbar radiculopathy, stable M54.16       7. Idiopathic peripheral neuropathy, mild sxs G60.9 gabapentin (NEURONTIN) 300 MG capsule      8. Primary hypertension, controlled I10 Comprehensive metabolic panel (BMP + Alb, Alk Phos, ALT, AST, Total. Bili, TP)     Comprehensive metabolic panel (BMP + Alb, Alk Phos, ALT, AST, Total. Bili, TP)      9. History of colonic polyps, colonoscopy due  Z86.010 Adult GI  " Referral - Procedure Only          PLAN:   Fasting labs    Schedule colonoscopy in Jan 2023    PSA normal in July 2022 and will be repeated at your next physical    Trial back on gabapentin 300 mg daily for a week; then twice daily for a week and then three times daily.   See if this helps with abnormal feeling in feet.       Patient has been advised of split billing requirements and indicates understanding: Yes      COUNSELING:   Reviewed preventive health counseling, as reflected in patient instructions       Regular exercise       Healthy diet/nutrition       Colorectal cancer screening       Prostate cancer screening    Estimated body mass index is 32.15 kg/m  as calculated from the following:    Height as of this encounter: 1.822 m (5' 11.75\").    Weight as of this encounter: 106.8 kg (235 lb 6.4 oz).         He reports that he quit smoking about 29 years ago. His smoking use included cigarettes. He has a 40.00 pack-year smoking history. He has never used smokeless tobacco.        Luis Daniel Maceil MD  [facility]  "

## 2022-10-20 NOTE — PATIENT INSTRUCTIONS
Fasting labs    Schedule colonoscopy in Jan 2023    PSA normal in July 2022 and will be repeated at your next physical    Trial back on gabapentin 300 mg daily for a week; then twice daily for a week and then three times daily.   See if this helps with abnormal feeling in feet.

## 2022-10-21 ENCOUNTER — LAB (OUTPATIENT)
Dept: LAB | Facility: CLINIC | Age: 62
End: 2022-10-21
Payer: OTHER GOVERNMENT

## 2022-10-21 DIAGNOSIS — E11.9 TYPE 2 DIABETES MELLITUS WITHOUT COMPLICATION, WITHOUT LONG-TERM CURRENT USE OF INSULIN (H): ICD-10-CM

## 2022-10-21 LAB
CREAT UR-MCNC: 259 MG/DL
MICROALBUMIN UR-MCNC: <12 MG/L
MICROALBUMIN/CREAT UR: NORMAL MG/G{CREAT}
T4 FREE SERPL-MCNC: 1.2 NG/DL (ref 0.9–1.7)

## 2022-10-21 PROCEDURE — 82043 UR ALBUMIN QUANTITATIVE: CPT

## 2022-10-28 NOTE — PROGRESS NOTES
Discharge instructions with patient.  No further questions or concerns.  Discharged home, accompanied by friend.    Dianne Pinon RN   28-Oct-2022 13:06

## 2022-11-01 NOTE — PROGRESS NOTES
In person evaluation    HPI  12/25/2020, in person hospital visit  Previous care by  Dr. Enrike Ayala and Dr. Mamadou Knutson  11/2/2022, in person visit    62-year-old evaluated neurologically for:  Left MCA stroke, December 2020  Left internal carotid artery stroke October 2020        A.  CVA       Left MCA stroke, December 2020       Left internal carotid artery stroke October 2020             Risk factors for stroke       Hypertension/hyperlipidemia/diabetes/recurrent stroke         Treated with dual antiplatelet medication       Treated with Depakote for mood changes after stroke         Almost 2 years on dual antiplatelet medication       Was on full aspirin 325 prior to this event       Will switch to Plavix only      HEAD MRI: 1/14/2022  1.  No recent infarct, intracranial mass, abnormal enhancement or evidence of intracranial hemorrhage.  2.  There are several small chronic infarcts in the left parietal lobe, posterior left frontal lobe and upper left basal ganglia.  3.  Underlying mild volume loss and presumed chronic small vessel ischemic changes.  HEAD MRA: 1/14/2022  1.  Normal MRA [facility].  NECK MRA: 1/14/2022  1.  No hemodynamically significant stenosis in either proximal internal carotid artery.  2.  Vertebral arteries are patent through the neck and into the head.        B.  History of lumbar radiculopathy/lumbar spinal stenosis        Epidural abscess lumbar region March 2022        Status post L4-L3 hemilaminectomy and durotomy 3/21/2022          C.  Difficulty with memory        Slums score of 17 out of 30 when he had a stroke with agitation and hallucinations (12/2020)         Continue Depakote 500 mg once at nighttime for mood         Is on metformin check B12 level           MoCA 27+1 = 28 out of 30 (11/2/2022)    Past medical history  CVA left MCA December 2020  CVA October 2020, right-sided weakness (left posterior limb internal capsule)  Left ICA 50% stenosis  Covid 19, October  2020  Diabetes  Hyperlipidemia and hypertension  Cervical radiculopathy  Hypothyroid  Gout        Habits  Past smoker quit 27 years ago  Does drink alcohol      Family history  Father with cancer  Mother with cancer  Sister with cancer  Brother with diabetes and heart disease      Work-up  MRI scan brain 12/27/2020 Limited study  A.  Scattered infarcts in the left MCA territory  B.  Infarct burden increased compared to 12/25/2020  C.  Subacute infarct posterior limb left internal capsule stable  D.  No hemorrhagic transformation  MRI scan 12/25/2020 see official report left hemisphere stroke old evolving left internal capsule stroke  CT scan head 12/25/2020 see official report left posterior internal capsule stroke no hemorrhage  CTA intracranial vessels no significant stenosis  CTA of the neck  A.  Left ICA 60-70% stenosis  B.  Right ICA less than 50% stenosis  C.  Moderate stenosis right vert  EEG December 25, 2020 theta slowing of the background consistent with diffuse cerebral dysfunction  Echo 60% ejection fraction, left atrium normal size  EKG normal sinus rhythm  Urine tox screen on admission positive for benzodiazepines given by EMS   COVID-19 negative  Slums score 17 out of 30  CSF WBC 3/1, RBC 19/62, glucose 78, cultures no growth  CSF HSV negative, cryptococcal antigen negative     HEAD MRI: 1/14/2022  1.  No recent infarct, intracranial mass, abnormal enhancement or evidence of intracranial hemorrhage.  2.  There are several small chronic infarcts in the left parietal lobe, posterior left frontal lobe and upper left basal ganglia.  3.  Underlying mild volume loss and presumed chronic small vessel ischemic changes.  HEAD MRA: 1/14/2022  1.  Normal MRA [facility].  NECK MRA: 1/14/2022  1.  No hemodynamically significant stenosis in either proximal internal carotid artery.  2.  Vertebral arteries are patent through the neck and into the head.  B12 level 397, (11/2/2022)  Carotid ultrasound  11/4/2022  A.  Right ICA peak systolic velocity 98 cm/s, no significant stenosis  B.  Left ICA peak systolic velocity 133 cm/s no significant stenosis  C.  Antegrade flow in vertebral arteries      Laboratory data review                    7/14/2022      10/2022      11/2/2022    Depakote      32.1    NA/K                                143/4.2    BUN/Cr                            10.3/0.89    GLU                                  98    AST                                   25    WBC/HGB                       4.9/13.0    PLTs                               191,000    HDL/LDL                          24/12    HGBA1C                             5.7    TSH                                   5.03    B12                                                          397    Slums score 17 out of 30 (12/31/2020)        MoCA   11/2/2022         27+1 = 28 out of 30          Exam  Review of systems    No headache no chest pain or shortness of breath no nausea vomiting no diarrhea no fever chills    No diplopia no dysarthria  No visual changes    Has some right-sided weakness more so arm than leg  Sometimes has some mild dysphagia with saliva getting stuck but can swallow food  Complains of trouble with memory recall    Sleeps okay  No falls or injuries    Otherwise review of systems negative    Exam  Blood pressure 155/81, pulse 66  Alert orient x3  Lungs clear  Heart rate regular  Abdomen soft  Symmetrical pulses  No edema the feet        Neurologic exam  Alert orient x3  Normal prosody speech  Normal naming  Normal comprehension  Normal repetition  No aphasia  No neglect    MoCA   28 out of 30      Cranials 2 through 12  No ophthalmoplegia  No nystagmus  Face symmetrical  Tongue twisters good  Visual fields intact    Upper extremity  Very subtle mild interning of the right arm  Left arm normal    Lower extremities  Toe tapping and a little bit slower on the right than the left subtle    Gait  Normal        Assessment/plan      1.   December 2020 left MCA stroke with confusion and agitation       October 2020 left internal capsule small vessel ischemic stroke during COVID infection    2.  Risk factors for stroke       Hypertension/hyperlipidemia/multiple strokes/past smoker    3.  Significant visual hallucinations and confusion with left MCA stroke December 2020 treated with Depakote    4.  March 2022 left lumbar surgery with epidural abscess    5.  Memory difficulty       Check B12 patient is on metformin       B12 level 397, (11/2/2022)       Baseline MoCA        MoCA 27+1 = 28 out of 30 (11/2/2022)        Recommend/plan    Diagnosis  Left MCA stroke  Left internal capsule stroke        Plavix 75 mg once per day  Crestor 5 mg p.o. daily  Depakote 500 mg extended release nightly for sleep/hallucinations started empirically    Risk factor reduction per primary MD    Some trouble with sleeping flashing lights in his vision at night as some parietal-occipital junction changes on ischemia we will add Depakote at nighttime to see if this helps    Depakote 500 mg extended release nightly slept a lot better      Stop 81 mg of aspirin  Continue Plavix  Continue Depakote help with flashing/hallucinations/sleep    Baseline carotid Dopplers for following in the future  Baseline MoCA for following in the future  Check B12 level due to memory difficulties  Follow-up in 6 months    Prolonged discussion about multiple issues above    Total care time today 42 minutes      As per the visit today  Reviewed past work-up  Reviewed scans  Reviewed hospitalization for epidural abscess March 2022  Reviewed laboratory data      Addendum 11/4/2022  B12 level 397, (11/2/2022)  Carotid ultrasound 11/4/2022  A.  Right ICA peak systolic velocity 98 cm/s, no significant stenosis  B.  Left ICA peak systolic velocity 133 cm/s no significant stenosis  C.  Antegrade flow in vertebral arteries

## 2022-11-02 ENCOUNTER — LAB (OUTPATIENT)
Dept: LAB | Facility: HOSPITAL | Age: 62
End: 2022-11-02
Payer: OTHER GOVERNMENT

## 2022-11-02 ENCOUNTER — OFFICE VISIT (OUTPATIENT)
Dept: NEUROLOGY | Facility: CLINIC | Age: 62
End: 2022-11-02
Attending: HOSPITALIST
Payer: OTHER GOVERNMENT

## 2022-11-02 VITALS
HEIGHT: 72 IN | DIASTOLIC BLOOD PRESSURE: 81 MMHG | SYSTOLIC BLOOD PRESSURE: 155 MMHG | BODY MASS INDEX: 32.23 KG/M2 | WEIGHT: 238 LBS | HEART RATE: 66 BPM

## 2022-11-02 DIAGNOSIS — R53.1 RIGHT SIDED WEAKNESS: Primary | ICD-10-CM

## 2022-11-02 DIAGNOSIS — R53.1 RIGHT SIDED WEAKNESS: ICD-10-CM

## 2022-11-02 DIAGNOSIS — R41.3 MEMORY LOSS: ICD-10-CM

## 2022-11-02 DIAGNOSIS — I63.312 CEREBROVASCULAR ACCIDENT (CVA) DUE TO THROMBOSIS OF LEFT MIDDLE CEREBRAL ARTERY (H): ICD-10-CM

## 2022-11-02 PROCEDURE — 99215 OFFICE O/P EST HI 40 MIN: CPT | Performed by: PSYCHIATRY & NEUROLOGY

## 2022-11-02 PROCEDURE — 36415 COLL VENOUS BLD VENIPUNCTURE: CPT

## 2022-11-02 PROCEDURE — 82607 VITAMIN B-12: CPT

## 2022-11-02 ASSESSMENT — MONTREAL COGNITIVE ASSESSMENT (MOCA)
7. [VIGILENCE] TAP WHEN HEARING DESIGNATED LETTER: 1
9. REPEAT EACH SENTENCE: 2
8. SERIAL SUBTRACTION OF 7S: 3
10. [FLUENCY] NAME WORDS STARTING WITH DESIGNATED LETTER: 1
4. NAME EACH OF THE THREE ANIMALS SHOWN: 3
WHAT IS THE TOTAL SCORE (OUT OF 30): 28
13. ORIENTATION SUBSCORE: 6
VISUOSPATIAL/EXECUTIVE SUBSCORE: 5
12. MEMORY INDEX SCORE: 2
6. READ LIST OF DIGITS [FORWARD/BACKWARD]: 2
11. FOR EACH PAIR OF WORDS, WHAT CATEGORY DO THEY BELONG TO (OUT OF 2): 2
WHAT LEVEL OF EDUCATION WAS ATTAINED: 1

## 2022-11-02 NOTE — LETTER
11/2/2022         RE: Raghavendra Kiser  [address]  [place]  [place] [address]        Dear Colleague,    Thank you for referring your patient, Raghavendra Kiser, to the [facility] [place]. Please see a copy of my visit note below.    In person evaluation    HPI  12/25/2020, in person hospital visit  Previous care by  Dr. Enrike Ayala and Dr. Mamadou Knutson  11/2/2022, in person visit    62-year-old evaluated neurologically for:  Left MCA stroke, December 2020  Left internal carotid artery stroke October 2020        A.  CVA       Left MCA stroke, December 2020       Left internal carotid artery stroke October 2020             Risk factors for stroke       Hypertension/hyperlipidemia/diabetes/recurrent stroke         Treated with dual antiplatelet medication       Treated with Depakote for mood changes after stroke         Almost 2 years on dual antiplatelet medication       Was on full aspirin 325 prior to this event       Will switch to Plavix only      HEAD MRI: 1/14/2022  1.  No recent infarct, intracranial mass, abnormal enhancement or evidence of intracranial hemorrhage.  2.  There are several small chronic infarcts in the left parietal lobe, posterior left frontal lobe and upper left basal ganglia.  3.  Underlying mild volume loss and presumed chronic small vessel ischemic changes.  HEAD MRA: 1/14/2022  1.  Normal MRA [facility].  NECK MRA: 1/14/2022  1.  No hemodynamically significant stenosis in either proximal internal carotid artery.  2.  Vertebral arteries are patent through the neck and into the head.        B.  History of lumbar radiculopathy/lumbar spinal stenosis        Epidural abscess lumbar region March 2022        Status post L4-L3 hemilaminectomy and durotomy 3/21/2022          C.  Difficulty with memory        Slums score of 17 out of 30 when he had a stroke with agitation and hallucinations           Continue Depakote 500 mg once at nighttime for mood         Is  on metformin check B12 level        Past medical history  CVA left MCA December 2020  CVA October 2020, right-sided weakness (left posterior limb internal capsule)  Left ICA 50% stenosis  Covid 19, October 2020  Diabetes  Hyperlipidemia and hypertension  Cervical radiculopathy  Hypothyroid  Gout        Habits  Past smoker quit 27 years ago  Does drink alcohol      Family history  Father with cancer  Mother with cancer  Sister with cancer  Brother with diabetes and heart disease      Work-up  MRI scan brain 12/27/2020 Limited study  A.  Scattered infarcts in the left MCA territory  B.  Infarct burden increased compared to 12/25/2020  C.  Subacute infarct posterior limb left internal capsule stable  D.  No hemorrhagic transformation  MRI scan 12/25/2020 see official report left hemisphere stroke old evolving left internal capsule stroke  CT scan head 12/25/2020 see official report left posterior internal capsule stroke no hemorrhage  CTA intracranial vessels no significant stenosis  CTA of the neck  A.  Left ICA 60-70% stenosis  B.  Right ICA less than 50% stenosis  C.  Moderate stenosis right vert  EEG December 25, 2020 theta slowing of the background consistent with diffuse cerebral dysfunction  Echo 60% ejection fraction, left atrium normal size  EKG normal sinus rhythm  Urine tox screen on admission positive for benzodiazepines given by EMS   COVID-19 negative  Slums score 17 out of 30  CSF WBC 3/1, RBC 19/62, glucose 78, cultures no growth  CSF HSV negative, cryptococcal antigen negative     HEAD MRI: 1/14/2022  1.  No recent infarct, intracranial mass, abnormal enhancement or evidence of intracranial hemorrhage.  2.  There are several small chronic infarcts in the left parietal lobe, posterior left frontal lobe and upper left basal ganglia.  3.  Underlying mild volume loss and presumed chronic small vessel ischemic changes.  HEAD MRA: 1/14/2022  1.  Normal MRA [facility].  NECK MRA: 1/14/2022  1.  No  hemodynamically significant stenosis in either proximal internal carotid artery.  2.  Vertebral arteries are patent through the neck and into the head.      Laboratory data review                    7/14/2022      10/2022    Depakote      32.1    NA/K                                143/4.2    BUN/Cr                            10.3/0.89    GLU                                  98    AST                                   25    WBC/HGB                       4.9/13.0    PLTs                               191,000    HDL/LDL                          24/12    HGBA1C                             5.7    TSH                                   5.03        Slums score 17 out of 30 (12/31/2020)              Exam  Review of systems    No headache no chest pain or shortness of breath no nausea vomiting no diarrhea no fever chills    No diplopia no dysarthria  No visual changes    Has some right-sided weakness more so arm than leg  Sometimes has some mild dysphagia with saliva getting stuck but can swallow food  Complains of trouble with memory recall    Sleeps okay  No falls or injuries    Otherwise review of systems negative    Exam  Blood pressure 155/81, pulse 66  Alert orient x3  Lungs clear  Heart rate regular  Abdomen soft  Symmetrical pulses  No edema the feet        Neurologic exam  Alert orient x3  Normal prosody speech  Normal naming  Normal comprehension  Normal repetition  No aphasia  No neglect    MoCA      Cranials 2 through 12  No ophthalmoplegia  No nystagmus  Face symmetrical  Tongue twisters good  Visual fields intact    Upper extremity  Very subtle mild interning of the right arm  Left arm normal    Lower extremities  Toe tapping and a little bit slower on the right than the left subtle    Gait  Normal        Assessment/plan      1.  December 2020 left MCA stroke with confusion and agitation       October 2020 left internal capsule small vessel ischemic stroke during COVID infection    2.  Risk factors for stroke        Hypertension/hyperlipidemia/multiple strokes/past smoker    3.  Significant visual hallucinations and confusion with left MCA stroke December 2020 treated with Depakote    4.  March 2022 left lumbar surgery with epidural abscess    5.  Memory difficulty       Check B12 patient is on metformin       Baseline MoCA     Recommend/plan    Diagnosis  Left MCA stroke  Left internal capsule stroke        Plavix 75 mg once per day  Crestor 5 mg p.o. daily  Depakote 500 mg extended release nightly for sleep/hallucinations started empirically    Risk factor reduction per primary MD    Some trouble with sleeping flashing lights in his vision at night as some parietal-occipital junction changes on ischemia we will add Depakote at nighttime to see if this helps    Depakote 500 mg extended release nightly slept a lot better      Stop 81 mg of aspirin  Continue Plavix  Continue Depakote help with flashing/hallucinations/sleep    Baseline carotid Dopplers for following in the future  Baseline MoCA for following in the future  Check B12 level due to memory difficulties  Follow-up in 6 months    Prolonged discussion about multiple issues above    Total care time today 42 minutes      As per the visit today  Reviewed past work-up  Reviewed scans  Reviewed hospitalization for epidural abscess March 2022  Reviewed laboratory data                        Again, thank you for allowing me to participate in the care of your patient.        Sincerely,        Alexander Cuevas MD

## 2022-11-02 NOTE — NURSING NOTE
Chief Complaint   Patient presents with     Stroke     Follow up left MCA stroke 12/25/20.      Latricia Kohler LPN on 11/2/2022 at 12:33 PM

## 2022-11-02 NOTE — LETTER
November 4, 2022      Mauri Kiser  [address] [place] [address]        Dear Rashel,    We are writing to inform you of your test results.    B12 level 397, (11/2/2022)  Carotid ultrasound 11/4/2022  A.  Right ICA peak systolic velocity 98 cm/s, no significant stenosis  B.  Left ICA peak systolic velocity 133 cm/s no significant stenosis  C.  Antegrade flow in vertebral arteries    Above B12 level and carotid Doppler studies are good  Continue as planned  Follow-up 5/12/2023  If you have any questions or concerns, please call the clinic at the number listed above.     Sincerely,    Dr. Alexander Cuevas

## 2022-11-02 NOTE — NURSING NOTE
AMAN COGNITIVE ASSESSMENT (MOCA)  Version 7.1 Original Version  VISUOSPATIAL/EXECUTIVE               COPY CUBE      [  1  ]                                [ 1  ] DRAW CLOCK (Ten past eleven)  (3 points)    [   1 ]                    [ 1   ]               [ 1   ]       Contour            Numbers     Hands POINTS                  5 / 5   NAMING    [ 1  ]                                                                        [ 1   ]                                             [ 1   ]  Lirodger Mcintosh                                Camel                    3 / 3   MEMORY Read list of words, subject must repeat them. Do 2 trials, even if 1st trial is successful. Do a recall after 5 minutes  FACE VELVET Yazidi ISABEL RED No Points    1st          2nd         ATTENTION Read list of digits (1 digit/sec) Subject has to repeat in the forward order       [   1 ]   2  1  8  5  4                                [ 1   ] 7 4 2                         2 /2   Read list of letters. The subject must tap with his hand at each letter A. No points if > 2 errors.  [ 1   ] F B A C M N A A J K L B A F A K D E A A A J A M O F A A B              1/1   Serial 7 subtraction starting at 100          [  1  ] 93         [ 1   ] 86          [  1  ] 79          [  1  ] 72         [ 1   ] 65   4 or 5 correct subtractions: 3 points,  2 or 3 correct: 2 points,  1correct: 1 point,   0 correct: 0 points           3 /3   LANGUAGE Repeat: I only know that Marcellus is the one to help today. [ 1    ]                                      The cat always hid under the couch when dogs were in the room. [ 1  ]              2 /2   Fluency: Name maximum number of words in one minute that begin with the letter F                                                                                                                    [ 1   ] 20___ (N > 11 words)             1 /1   ABSTRACTION Similarity  between e.g. banana-orange=fruit                                                                   [ 1   ] train-bicycle                      [  1  ] watch-ruler             2 /2   DELAYED  RECALL Has to recall words  WITH NO CUE FACE  [ 0   ] VELVET  [ 1   ] [employer]ist  [ 1   ]  ISABEL  [0    ] RED  [ 0   ] Points for UNCUED recall only           2 /5           OPTIONAL Category cue           Multiple choice cue          ORIENTATION  [  1  ] Date     [ 1   ] Month       [1    ] Year      [  1  ] Day      [  1  ] Place        [  1  ] City          6/6   TOTAL  Normal > 26/30 Add 1 point if < 12 years education +1      28 /30

## 2022-11-03 LAB — VIT B12 SERPL-MCNC: 397 PG/ML (ref 232–1245)

## 2022-11-04 ENCOUNTER — HOSPITAL ENCOUNTER (OUTPATIENT)
Dept: ULTRASOUND IMAGING | Facility: HOSPITAL | Age: 62
Discharge: HOME | End: 2022-11-04
Attending: PSYCHIATRY & NEUROLOGY | Admitting: PSYCHIATRY & NEUROLOGY
Payer: OTHER GOVERNMENT

## 2022-11-04 PROCEDURE — 93880 EXTRACRANIAL BILAT STUDY: CPT

## 2022-11-10 ENCOUNTER — OFFICE VISIT (OUTPATIENT)
Dept: RHEUMATOLOGY | Facility: CLINIC | Age: 62
End: 2022-11-10
Payer: OTHER GOVERNMENT

## 2022-11-10 ENCOUNTER — LAB (OUTPATIENT)
Dept: LAB | Facility: CLINIC | Age: 62
End: 2022-11-10
Payer: OTHER GOVERNMENT

## 2022-11-10 VITALS
SYSTOLIC BLOOD PRESSURE: 130 MMHG | BODY MASS INDEX: 32.5 KG/M2 | DIASTOLIC BLOOD PRESSURE: 78 MMHG | HEART RATE: 78 BPM | WEIGHT: 238 LBS

## 2022-11-10 DIAGNOSIS — M1A.0791 IDIOPATHIC CHRONIC GOUT OF FOOT WITH TOPHUS, UNSPECIFIED LATERALITY: ICD-10-CM

## 2022-11-10 DIAGNOSIS — M1A.0791 IDIOPATHIC CHRONIC GOUT OF FOOT WITH TOPHUS, UNSPECIFIED LATERALITY: Primary | ICD-10-CM

## 2022-11-10 LAB
ALBUMIN SERPL BCG-MCNC: 4.4 G/DL (ref 3.5–5.2)
CREAT SERPL-MCNC: 0.98 MG/DL (ref 0.67–1.17)
ERYTHROCYTE [DISTWIDTH] IN BLOOD BY AUTOMATED COUNT: 13.6 % (ref 10–15)
GFR SERPL CREATININE-BSD FRML MDRD: 86 ML/MIN/1.73M2
HCT VFR BLD AUTO: 40.1 % (ref 40–53)
HGB BLD-MCNC: 13.7 G/DL (ref 13.3–17.7)
MCH RBC QN AUTO: 32.8 PG (ref 26.5–33)
MCHC RBC AUTO-ENTMCNC: 34.2 G/DL (ref 31.5–36.5)
MCV RBC AUTO: 96 FL (ref 78–100)
PLATELET # BLD AUTO: 161 10E3/UL (ref 150–450)
RBC # BLD AUTO: 4.18 10E6/UL (ref 4.4–5.9)
URATE SERPL-MCNC: 4 MG/DL (ref 3.4–7)
WBC # BLD AUTO: 5.3 10E3/UL (ref 4–11)

## 2022-11-10 PROCEDURE — 82040 ASSAY OF SERUM ALBUMIN: CPT

## 2022-11-10 PROCEDURE — 85027 COMPLETE CBC AUTOMATED: CPT

## 2022-11-10 PROCEDURE — 84550 ASSAY OF BLOOD/URIC ACID: CPT

## 2022-11-10 PROCEDURE — 99214 OFFICE O/P EST MOD 30 MIN: CPT | Performed by: INTERNAL MEDICINE

## 2022-11-10 PROCEDURE — 36415 COLL VENOUS BLD VENIPUNCTURE: CPT

## 2022-11-10 PROCEDURE — 82565 ASSAY OF CREATININE: CPT

## 2022-11-10 RX ORDER — ALLOPURINOL 300 MG/1
TABLET ORAL
Qty: 135 TABLET | Refills: 0 | Status: SHIPPED | OUTPATIENT
Start: 2022-11-10 | End: 2023-01-25

## 2022-11-10 NOTE — PROGRESS NOTES
Rheumatology follow-up visit note     Raghavendra is a 62 year old male presents today for follow-up.    Raghavendra was seen today for recheck.    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  As well as he is doing we will meet here in 12 months with labs every 6 months.    Follow up in 1 year.    HPI    Raghavendra Kiser is a 62 year old male is here for follow-up of gout.  He has not had a episode over the past year.  He is on allopurinol.  He has had no active ongoing joint symptoms anymore.  Very happy with that situation.    Unfortunately recently he sustained a stroke.  Was admitted, intubated at [facility].    He has some residual right-sided weakness. Background of hypertension.  We discussed how to avoid nonsteroidals given his comorbidities now.  He is to continue the current regimen.  Earlier this year he had lumbar spine surgery. We will meet here in 12 months or sooner.p of       DETAILED EXAMINATION  11/10/22  :    Vitals:    11/10/22 1355   BP: (!) 160/90   Pulse: 78   Weight: 108 kg (238 lb)     Alert oriented. Head including the face is examined for malar rash, heliotropes, scarring, lupus pernio. Eyes examined for redness such as in episcleritis/scleritis, periorbital lesions.   Neck/ Face examined for parotid gland swelling, range of motion of neck.  Left upper and lower and right upper and lower extremities examined for tenderness, swelling, warmth of the appendicular joints, range of motion, edema, rash.  Some of the  important findings included: he does not have evidence of synovitis in the palpable joints of the upper extremities.  No significant deformities of the digits.  No Heberden nodes.  Range of motion of the shoulders  show full abduction.  No JLT effusion or warmth of the knees or the ankles.  he does not have dactylitis of the digits.  No tophi such as extensor surface of the upper extremities.     Patient Active Problem List    Diagnosis Date Noted     Spinal stenosis of lumbar region, unspecified whether neurogenic claudication present 03/28/2022     Priority: Medium     Urine retention 03/28/2022     Priority: Medium     Therapeutic opioid induced constipation 03/28/2022     Priority: Medium     Lumbar radiculopathy 03/21/2022     Priority: Medium     Acute respiratory failure with hypoxia (H) 02/08/2021     Priority: Medium     Cognitive and behavioral changes 02/08/2021     Priority: Medium     Sleep difficulties 02/08/2021     Priority: Medium     AMS (altered mental status) 12/25/2020     Priority: Medium     Elevated blood pressure reading without diagnosis of hypertension 11/12/2020     Priority: Medium     Hypothyroidism 11/12/2020     Priority: Medium     Created by Conversion    Replacement Utility updated for latest IMO load       Other stiff joint of shoulder region 11/12/2020     Priority: Medium     Pain in joint involving ankle and foot 11/12/2020     Priority: Medium     Arthralgia of hand 11/12/2020     Priority: Medium     Peyronie's disease 11/12/2020     Priority: Medium     Right sided weakness 10/14/2020     Priority: Medium     Stenosis of left internal carotid artery 10/07/2020     Priority: Medium     History of colonic polyps 01/28/2020     Priority: Medium     Sessile serrated adenoma on colonoscopy 1-22-20.   Repeat in 3 years.       Type 2 diabetes mellitus without complication, without long-term current use of insulin (H) 11/22/2019     Priority: Medium     Vitamin deficiency 11/22/2019      Priority: Medium     Precordial pain 11/06/2018     Priority: Medium     Hyperlipidemia 10/17/2018     Priority: Medium     Created by Conversion       Cervical radiculopathy 01/23/2018     Priority: Medium     Right shoulder tendinitis 08/29/2017     Priority: Medium     HTN (hypertension) 08/01/2017     Priority: Medium     High risk medication use 02/28/2017     Priority: Medium     Ganglion cyst 04/11/2016     Priority: Medium     Medial epicondylitis 02/10/2016     Priority: Medium     Atherosclerosis of right carotid artery 05/22/2015     Priority: Medium     Superficial venous thrombosis of arm 05/22/2015     Priority: Medium     History of stroke 05/16/2015     Priority: Medium     Small stroke in the left corona radiata affecting right side.       TIA (transient ischemic attack) 05/16/2015     Priority: Medium     Acute ischemic left MCA stroke (H) 05/16/2015     Priority: Medium     History of TIA (transient ischemic attack) and stroke 05/01/2015     Priority: Medium     Apr 21, 2016 Entered By: CAROLEE GONZALEZ Comment: Carotid Artery Surgery - June 2015, F/U scheduled for Aug 16       Chronic gout 12/09/2014     Priority: Medium     Past Surgical History:   Procedure Laterality Date     CERVICAL DISC SURGERY       IR LUMBAR PUNCTURE  12/25/2020     IR SPINAL PUNCTURE  12/25/2020     LAMINECTOMY LUMBAR ONE LEVEL Bilateral 3/21/2022    Procedure: Bilateral lumbar 3- lumbar 4 hemilaminectomies, medial facetectomies, foraminotomies, microdiscectomies;  Surgeon: Abena Duque MD;  Location: [facility] [place] OR     LAMINECTOMY LUMBAR TWO LEVELS Left 3/29/2022    Procedure: LUMBAR 4-LUMBAR 5 LEFT HEMILAMINECTOMY WITH EPIDURAL ABSCESS EVACUATION;  Surgeon: Abena Duque MD;  Location: [facility] [place] OR     PICC INSERTION - TRIPLE LUMEN  12/26/2020          PICC SINGLE LUMEN PLACEMENT  4/1/2022          TONSILLECTOMY       ZZC THROMBOENDARTECTMY NECK,NECK INCIS Right 06/08/2015    Procedure: Right  Carotid Endarterectomy with Impulse Monitoring;  Surgeon: Marcellus Toth MD;  Location: [facility] [place];  Service: General      Past Medical History:   Diagnosis Date     Atherosclerosis of right carotid artery      Carotid stenosis, bilateral      Cervical radiculopathy      Chronic gout      Fracture of right humerus      Ganglion cyst      History of stroke without residual deficits  Oct 2020, Dec 2020     Humerus fracture     Right     Hyperlipidemia      Hypertension      Hypothyroidism      Medial epicondylitis      Shoulder tendinitis, right      Superficial venous thrombosis of arm     right lateral antecubital fossa     TIA (transient ischemic attack) 05/16/2015     Tinnitus      Type 2 diabetes mellitus without complication, without long-term current use of insulin (H) 11/22/2019     No Known Allergies  Current Outpatient Medications   Medication Sig Dispense Refill     allopurinol (ZYLOPRIM) 300 MG tablet Take 450 mg by mouth every other day alternating with 300mg 135 tablet 0     amLODIPine (NORVASC) 5 MG tablet Take 1 tablet (5 mg) by mouth daily 90 tablet 3     aspirin 81 MG EC tablet Take 1 tablet (81 mg) by mouth daily       blood glucose (FREESTYLE LITE) test strip USE 1 STRIP TWICE A DAY AS DIRECTED 200 strip 4     blood glucose monitoring (FREESTYLE) lancets        cholecalciferol 25 MCG (1000 UT) TABS 100 mcg       clopidogrel (PLAVIX) 75 MG tablet TAKE 1 TABLET DAILY 90 tablet 1     divalproex sodium extended-release (DEPAKOTE ER) 500 MG 24 hr tablet TAKE 1 TABLET AT BEDTIME 90 tablet 2     gabapentin (NEURONTIN) 300 MG capsule Take 1 capsule (300 mg) by mouth 3 times daily 90 capsule 3     levothyroxine (SYNTHROID/LEVOTHROID) 125 MCG tablet Take 125 mcg by mouth daily before breakfast        lisinopril (ZESTRIL) 20 MG tablet Take 1 tablet (20 mg) by mouth 2 times daily       metFORMIN (GLUCOPHAGE XR) 500 MG 24 hr tablet Take 1 tablet (500 mg) by mouth 2 times daily (with meals) Take 500 mg  by mouth 2 times daily (with meals) 180 tablet 3     rosuvastatin (CRESTOR) 5 MG tablet Take 1 tablet (5 mg) by mouth daily 90 tablet 3     family history includes Cancer in his father and mother; Diabetes in his brother; Diabetes Type 1 in his brother; Heart Disease in his brother; Lung Cancer in his father, mother, and sister; No Known Problems in his brother, sister, sister, sister, sister, and son; Stomach Cancer in his mother; Throat cancer in his father.  Social Connections: Not on file          WBC Count   Date Value Ref Range Status   10/20/2022 4.9 4.0 - 11.0 10e3/uL Final     RBC Count   Date Value Ref Range Status   10/20/2022 3.97 (L) 4.40 - 5.90 10e6/uL Final     Hemoglobin   Date Value Ref Range Status   10/20/2022 13.0 (L) 13.3 - 17.7 g/dL Final     Hematocrit   Date Value Ref Range Status   10/20/2022 38.1 (L) 40.0 - 53.0 % Final     MCV   Date Value Ref Range Status   10/20/2022 96 78 - 100 fL Final     MCH   Date Value Ref Range Status   10/20/2022 32.7 26.5 - 33.0 pg Final     Platelet Count   Date Value Ref Range Status   10/20/2022 191 150 - 450 10e3/uL Final     % Lymphocytes   Date Value Ref Range Status   05/10/2022 27 % Final     AST   Date Value Ref Range Status   10/20/2022 25 10 - 50 U/L Final     ALT   Date Value Ref Range Status   10/20/2022 11 10 - 50 U/L Final     Albumin   Date Value Ref Range Status   10/20/2022 4.2 3.5 - 5.2 g/dL Final   05/10/2022 3.7 3.5 - 5.0 g/dL Final     Alkaline Phosphatase   Date Value Ref Range Status   10/20/2022 70 40 - 129 U/L Final     Creatinine   Date Value Ref Range Status   10/20/2022 0.89 0.67 - 1.17 mg/dL Final     GFR Estimate   Date Value Ref Range Status   10/20/2022 >90 >60 mL/min/1.73m2 Final     Comment:     Effective December 21, 2021 eGFRcr in adults is calculated using the 2021 CKD-EPI creatinine equation which includes age and gender (Cherri et al., NEJM, DOI: 10.1056/HLRTaa7231452)   05/28/2021 >60 >60 mL/min/1.73m2 Final     GFR  Estimate If Black   Date Value Ref Range Status   05/28/2021 >60 >60 mL/min/1.73m2 Final     Creatinine Urine mg/dL   Date Value Ref Range Status   10/21/2022 259.0 mg/dL Final     Comment:     The reference ranges have not been established in urine creatinine. The results should be integrated into the clinical context for interpretation.   11/03/2021 242 mg/dL Final     Erythrocyte Sedimentation Rate   Date Value Ref Range Status   04/11/2022 26 (H) 0 - 15 mm/hr Final     CRP   Date Value Ref Range Status   05/10/2022 <0.1 0.0 - 0.8 mg/dL Final

## 2022-11-22 DIAGNOSIS — Z86.73 HISTORY OF COMPLETED STROKE: ICD-10-CM

## 2022-11-23 DIAGNOSIS — E11.9 TYPE 2 DIABETES MELLITUS WITHOUT COMPLICATION, WITHOUT LONG-TERM CURRENT USE OF INSULIN (H): ICD-10-CM

## 2022-11-23 RX ORDER — CLOPIDOGREL BISULFATE 75 MG/1
TABLET ORAL
Qty: 90 TABLET | Refills: 1 | Status: SHIPPED | OUTPATIENT
Start: 2022-11-23 | End: 2023-04-26

## 2022-11-23 NOTE — PROGRESS NOTES
This is a recent snapshot of the patient's [facility] medical record.  For current drug dose and complete information and questions, call [phone] or In Basket pool, fv home infusion ([id number])  CSN Number:  [id number]

## 2022-11-23 NOTE — TELEPHONE ENCOUNTER
Refill request for: clopidogrel 75mg   Directions: take one tablet by mouth once a day.    LOV: 11/02/22  NOV: 05/12/2023    90 day supply with 1 refills Medication T'd for review and signature    Latricia Kohler LPN on 11/23/2022 at 2:12 PM

## 2022-11-24 RX ORDER — METFORMIN HCL 500 MG
500 TABLET, EXTENDED RELEASE 24 HR ORAL 2 TIMES DAILY WITH MEALS
Qty: 180 TABLET | Refills: 3 | OUTPATIENT
Start: 2022-11-24

## 2022-11-25 NOTE — PROGRESS NOTES
Cardiology consult faxed and called to Dr. Margo Tom. [facility] Doctor office, at this time. Tuesday day shift nursing staff to follow up. This is a recent snapshot of the patient's [facility] medical record.  For current drug dose and complete information and questions, call [phone] or In Basket pool, fv home infusion ([id number])  CSN Number:  [id number]

## 2022-11-30 NOTE — PROGRESS NOTES
This is a recent snapshot of the patient's [facility] medical record.  For current drug dose and complete information and questions, call [phone] or In Basket pool, fv home infusion ([id number])  CSN Number:  [id number]

## 2022-12-01 NOTE — PROGRESS NOTES
This is a recent snapshot of the patient's [facility] medical record.  For current drug dose and complete information and questions, call [phone] or In Basket pool, fv home infusion ([id number])  CSN Number:  [id number]

## 2022-12-05 RX ORDER — METFORMIN HCL 500 MG
500 TABLET, EXTENDED RELEASE 24 HR ORAL 2 TIMES DAILY WITH MEALS
Qty: 180 TABLET | Refills: 2 | Status: SHIPPED | OUTPATIENT
Start: 2022-12-05 | End: 2023-05-12 | Stop reason: SINTOL

## 2022-12-05 RX ORDER — METFORMIN HCL 500 MG
500 TABLET, EXTENDED RELEASE 24 HR ORAL 2 TIMES DAILY WITH MEALS
Qty: 180 TABLET | Refills: 2 | Status: SHIPPED | OUTPATIENT
Start: 2022-12-05 | End: 2022-12-05

## 2022-12-05 NOTE — TELEPHONE ENCOUNTER
Patient calling to request that medication attached be cancelled with [facility] as they are unable to fill due to supply issues.  They did not have a timeline in which this will be remedied.  Patient asking for this to be sent to his local [facility] pharmacy (on file) instead as they have the medication on hand.    Any questions, patient can be reached at [phone].  Okay to leave VM if needed.

## 2022-12-12 ENCOUNTER — VIRTUAL VISIT (OUTPATIENT)
Dept: FAMILY MEDICINE | Facility: CLINIC | Age: 62
End: 2022-12-12
Payer: OTHER GOVERNMENT

## 2022-12-12 DIAGNOSIS — U07.1 COVID-19 VIRUS INFECTION: Primary | ICD-10-CM

## 2022-12-12 DIAGNOSIS — R09.81 NASAL CONGESTION: ICD-10-CM

## 2022-12-12 PROBLEM — J96.01 ACUTE RESPIRATORY FAILURE WITH HYPOXIA (H): Status: RESOLVED | Noted: 2021-02-08 | Resolved: 2022-12-12

## 2022-12-12 PROCEDURE — 99441 PR PHYSICIAN TELEPHONE EVALUATION 5-10 MIN: CPT | Mod: CS | Performed by: FAMILY MEDICINE

## 2022-12-12 RX ORDER — BENZONATATE 100 MG/1
100-200 CAPSULE ORAL 3 TIMES DAILY PRN
Qty: 30 CAPSULE | Refills: 1 | Status: SHIPPED | OUTPATIENT
Start: 2022-12-12 | End: 2023-05-12

## 2022-12-12 NOTE — PROGRESS NOTES
Mauri is a 62 year old who is being evaluated via a billable telephone visit.      What phone number would you like to be contacted at? [phone]  How would you like to obtain your AVS? Harpal    Distant Location (provider location):  Off-site    Assessment & Plan     COVID-19 virus infection    - benzonatate (TESSALON) 100 MG capsule; Take 1-2 capsules (100-200 mg) by mouth 3 times daily as needed for cough    Nasal congestion  Patient Instructions   Sinus pressure is primarily a problem of drainage.  You can best help your body clear the sinus secretions and pressure by opening up the natural passageways which are often blocked by viral colds and allergies.      Short courses of a nasal decongestant spray (Afrin or Neosinephrine) are one of the most effective tools in opening sinus drainage passageways.  Their use should be restricted to 3 days though due to the high risk of nasal addiction.  Pseudoephedrine or phenylephrine (Sudafed) is often helpful but it can cause elevations in blood pressure and insomnia.     Sometimes a nasal saline spray will help rinse out the nasal passages and feel good.     Guaifenesin (Robitussin or Mucinex) helps loosen secretions and often help make the mucous more liquid and easier to clear.    For pain and fevers, acetaminophen (Tylenol) is most appropriate.  Ibuprofen (Advil) or naproxen (Aleve) are useful too and last longer but they can cause elevation of blood pressure or stomach problems.    Antihistamines (Benadryl, Dimetapp, etc.) cause sedation, confusion, bowel and urinary abnormalities and are of little use for infectious causes of cough and nasal congestion.  Their use should be reserved for allergic symptoms.    The body needs to be treated well in order to help heal itself.  Rest as needed.  It is ok to reduce food intake if appetite is poor but it is quite important to maintain/increase fluid intake.  Sinus pressure and infections usually go away on their own with  "appropriate care.  If symptoms worsen or persist beyond 10 days, an antibiotic might be worth trying to treat a possible bacterial component.                 BMI:   Estimated body mass index is 32.5 kg/m  as calculated from the following:    Height as of 11/2/22: 1.822 m (5' 11.75\").    Weight as of 11/10/22: 108 kg (238 lb).       FUTURE APPOINTMENTS:       - Follow-up for annual visit or as needed    No follow-ups on file.    Gillian Davis MD  [facility] DAYAMI Dotson is a 62 year old, presenting for the following health issues:  Covid Concern and Telephone      HPI       COVID-19 Symptom Review  How many days ago did these symptoms start? 2 weeks ago,      Tested positive this morning.     Are any of the following symptoms significant for you?    New or worsening difficulty breathing? No    Worsening cough? Yes, it's a dry cough.     Fever or chills? No    Headache: No    Sore throat: YES    Chest pain: No    Diarrhea: No    Body aches? No    What treatments has patient tried? [facility]     Does patient live in a nursing home, group home, or shelter? No  Does patient have a way to get food/medications during quarantined? Yes, I have a friend or family member who can help me.    He has had tested positive for covid   Has been sick for about 2 weeks as   Has been taking over the counter   The congestionis th eworst of iit. Sleeping ok except for the congestion          Review of Systems   As above       Objective           Vitals:  No vitals were obtained today due to virtual visit.    Physical Exam   alert, no distress and sounds very nasally congested   PSYCH: Alert and oriented times 3; coherent speech, normal   rate and volume, able to articulate logical thoughts, able   to abstract reason, no tangential thoughts, no hallucinations   or delusions  His affect is normal  RESP: No cough, no audible wheezing, able to talk in full sentences  Remainder of exam unable to be completed " due to telephone visits                Phone call duration: 8 minutes spent on the day of service with chart review, telephone call, counseling, and charting. Gillian Davis M.D.

## 2022-12-19 DIAGNOSIS — I63.9 CEREBROVASCULAR ACCIDENT (CVA), UNSPECIFIED MECHANISM (H): ICD-10-CM

## 2022-12-19 DIAGNOSIS — E11.9 TYPE 2 DIABETES MELLITUS WITHOUT COMPLICATION, WITHOUT LONG-TERM CURRENT USE OF INSULIN (H): Primary | ICD-10-CM

## 2022-12-19 RX ORDER — ROSUVASTATIN CALCIUM 5 MG/1
5 TABLET, COATED ORAL DAILY
Qty: 90 TABLET | Refills: 3 | Status: SHIPPED | OUTPATIENT
Start: 2022-12-19 | End: 2023-12-13

## 2022-12-19 RX ORDER — LANCETS 28 GAUGE
EACH MISCELLANEOUS
Qty: 100 EACH | Refills: 1 | Status: SHIPPED | OUTPATIENT
Start: 2022-12-19 | End: 2023-03-27

## 2022-12-19 NOTE — TELEPHONE ENCOUNTER
"Last Written Prescription Date:  1/10/22  Last Fill Quantity: 90,  # refills: 3   Last office visit provider:  10/20/22     Requested Prescriptions   Pending Prescriptions Disp Refills     rosuvastatin (CRESTOR) 5 MG tablet [Pharmacy Med Name: ROSUVASTATIN TABS 5MG] 90 tablet 3     Sig: TAKE 1 TABLET DAILY       Statins Protocol Passed - 12/19/2022  1:51 PM        Passed - LDL on file in past 12 months     Recent Labs   Lab Test 10/20/22  1559   LDL 12             Passed - No abnormal creatine kinase in past 12 months     Recent Labs   Lab Test 12/27/20  0402   *                Passed - Recent (12 mo) or future (30 days) visit within the authorizing provider's specialty     Patient has had an office visit with the authorizing provider or a provider within the authorizing providers department within the previous 12 mos or has a future within next 30 days. See \"Patient Info\" tab in inbasket, or \"Choose Columns\" in Meds & Orders section of the refill encounter.              Passed - Medication is active on med list        Passed - Patient is age 18 or older             Luis Daniel Daly RN 12/19/22 1:51 PM  "

## 2022-12-19 NOTE — TELEPHONE ENCOUNTER
"Outpatient Medication Detail     Disp Refills Start End SONA   blood glucose monitoring (FREESTYLE) lancets   10/20/2021  --   Class: Historical   Order: [id number]     Routing refill request to provider for review/approval because:  Medication is reported/historical    Last office visit provider:  10/20/22     Requested Prescriptions   Pending Prescriptions Disp Refills     blood glucose monitoring (FREESTYLE) lancets         Diabetic Supplies Protocol Passed - 12/19/2022 10:54 AM        Passed - Medication is active on med list        Passed - Patient is 18 years of age or older        Passed - Recent (6 mo) or future (30 days) visit within the authorizing provider's specialty     Patient had office visit in the last 6 months or has a visit in the next 30 days with authorizing provider.  See \"Patient Info\" tab in inbasket, or \"Choose Columns\" in Meds & Orders section of the refill encounter.                 Luis Daniel Daly RN 12/19/22 10:54 AM  "
Pt requesting prescription for lancets. Has only 2 left  
none

## 2022-12-20 NOTE — PROGRESS NOTES
This is a recent snapshot of the patient's [facility] medical record.  For current drug dose and complete information and questions, call [phone] or In Basket pool, fv home infusion ([id number])  CSN Number:  [id number]

## 2022-12-28 NOTE — PROGRESS NOTES
This is a recent snapshot of the patient's [facility] medical record.  For current drug dose and complete information and questions, call [phone] or In Basket pool, fv home infusion ([id number])  CSN Number:  [id number]

## 2023-01-04 DIAGNOSIS — G60.9 IDIOPATHIC PERIPHERAL NEUROPATHY: ICD-10-CM

## 2023-01-04 RX ORDER — GABAPENTIN 300 MG/1
CAPSULE ORAL
Qty: 90 CAPSULE | Refills: 11 | Status: SHIPPED | OUTPATIENT
Start: 2023-01-04 | End: 2023-05-12

## 2023-01-04 NOTE — TELEPHONE ENCOUNTER
Routing refill request to provider for review/approval because:  Drug not on the [facility] refill protocol     Last Written Prescription Date:  10/20/22  Last Fill Quantity: 90,  # refills: 3   Last office visit provider:   10/20/22    Requested Prescriptions   Pending Prescriptions Disp Refills     gabapentin (NEURONTIN) 300 MG capsule [Pharmacy Med Name: GABAPENTIN CAPS 300MG] 90 capsule 11     Sig: TAKE 1 CAPSULE THREE TIMES A DAY       There is no refill protocol information for this order          Mary Ellen Sánchez RN 01/04/23 4:53 PM

## 2023-01-05 ENCOUNTER — HOSPITAL ENCOUNTER (EMERGENCY)
Facility: HOSPITAL | Age: 63
Discharge: HOME | End: 2023-01-05
Attending: FAMILY MEDICINE | Admitting: FAMILY MEDICINE
Payer: MEDICARE

## 2023-01-05 ENCOUNTER — APPOINTMENT (OUTPATIENT)
Dept: RADIOLOGY | Facility: HOSPITAL | Age: 63
End: 2023-01-05
Attending: EMERGENCY MEDICINE
Payer: MEDICARE

## 2023-01-05 VITALS
HEART RATE: 53 BPM | TEMPERATURE: 98.2 F | RESPIRATION RATE: 16 BRPM | SYSTOLIC BLOOD PRESSURE: 149 MMHG | WEIGHT: 230 LBS | HEIGHT: 72 IN | DIASTOLIC BLOOD PRESSURE: 80 MMHG | BODY MASS INDEX: 31.15 KG/M2 | OXYGEN SATURATION: 99 %

## 2023-01-05 DIAGNOSIS — K29.00 ACUTE GASTRITIS WITHOUT HEMORRHAGE, UNSPECIFIED GASTRITIS TYPE: ICD-10-CM

## 2023-01-05 PROBLEM — D12.3 BENIGN NEOPLASM OF TRANSVERSE COLON: Status: ACTIVE | Noted: 2020-01-24

## 2023-01-05 PROBLEM — H90.3 SENSORINEURAL HEARING LOSS, BILATERAL: Status: ACTIVE | Noted: 2023-01-05

## 2023-01-05 PROBLEM — K63.5 POLYP OF COLON: Status: ACTIVE | Noted: 2020-01-22

## 2023-01-05 LAB
ALBUMIN SERPL BCG-MCNC: 4.4 G/DL (ref 3.5–5.2)
ALP SERPL-CCNC: 77 U/L (ref 40–129)
ALT SERPL W P-5'-P-CCNC: 16 U/L (ref 10–50)
ANION GAP SERPL CALCULATED.3IONS-SCNC: 8 MMOL/L (ref 7–15)
AST SERPL W P-5'-P-CCNC: 19 U/L (ref 10–50)
BASOPHILS # BLD AUTO: 0 10E3/UL (ref 0–0.2)
BASOPHILS NFR BLD AUTO: 0 %
BILIRUB DIRECT SERPL-MCNC: <0.2 MG/DL (ref 0–0.3)
BILIRUB SERPL-MCNC: 0.4 MG/DL
BUN SERPL-MCNC: 9.3 MG/DL (ref 8–23)
CALCIUM SERPL-MCNC: 9.6 MG/DL (ref 8.8–10.2)
CHLORIDE SERPL-SCNC: 107 MMOL/L (ref 98–107)
CREAT SERPL-MCNC: 0.92 MG/DL (ref 0.67–1.17)
DEPRECATED HCO3 PLAS-SCNC: 29 MMOL/L (ref 22–29)
EOSINOPHIL # BLD AUTO: 0.1 10E3/UL (ref 0–0.7)
EOSINOPHIL NFR BLD AUTO: 2 %
ERYTHROCYTE [DISTWIDTH] IN BLOOD BY AUTOMATED COUNT: 13.7 % (ref 10–15)
GFR SERPL CREATININE-BSD FRML MDRD: >90 ML/MIN/1.73M2
GLUCOSE SERPL-MCNC: 101 MG/DL (ref 70–99)
HCT VFR BLD AUTO: 41.5 % (ref 40–53)
HGB BLD-MCNC: 13.8 G/DL (ref 13.3–17.7)
IMM GRANULOCYTES # BLD: 0 10E3/UL
IMM GRANULOCYTES NFR BLD: 0 %
LIPASE SERPL-CCNC: 57 U/L (ref 13–60)
LYMPHOCYTES # BLD AUTO: 1.6 10E3/UL (ref 0.8–5.3)
LYMPHOCYTES NFR BLD AUTO: 36 %
MAGNESIUM SERPL-MCNC: 2 MG/DL (ref 1.7–2.3)
MCH RBC QN AUTO: 31.9 PG (ref 26.5–33)
MCHC RBC AUTO-ENTMCNC: 33.3 G/DL (ref 31.5–36.5)
MCV RBC AUTO: 96 FL (ref 78–100)
MONOCYTES # BLD AUTO: 0.4 10E3/UL (ref 0–1.3)
MONOCYTES NFR BLD AUTO: 8 %
NEUTROPHILS # BLD AUTO: 2.4 10E3/UL (ref 1.6–8.3)
NEUTROPHILS NFR BLD AUTO: 54 %
NRBC # BLD AUTO: 0 10E3/UL
NRBC BLD AUTO-RTO: 0 /100
PLATELET # BLD AUTO: 153 10E3/UL (ref 150–450)
POTASSIUM SERPL-SCNC: 4 MMOL/L (ref 3.4–5.3)
PROT SERPL-MCNC: 7.1 G/DL (ref 6.4–8.3)
RBC # BLD AUTO: 4.33 10E6/UL (ref 4.4–5.9)
SODIUM SERPL-SCNC: 144 MMOL/L (ref 136–145)
TROPONIN T SERPL HS-MCNC: 13 NG/L
TROPONIN T SERPL HS-MCNC: 15 NG/L
TSH SERPL DL<=0.005 MIU/L-ACNC: 2.68 UIU/ML (ref 0.3–4.2)
WBC # BLD AUTO: 4.5 10E3/UL (ref 4–11)

## 2023-01-05 PROCEDURE — 36415 COLL VENOUS BLD VENIPUNCTURE: CPT | Performed by: EMERGENCY MEDICINE

## 2023-01-05 PROCEDURE — 71046 X-RAY EXAM CHEST 2 VIEWS: CPT

## 2023-01-05 PROCEDURE — 82248 BILIRUBIN DIRECT: CPT | Performed by: EMERGENCY MEDICINE

## 2023-01-05 PROCEDURE — 250N000013 HC RX MED GY IP 250 OP 250 PS 637: Performed by: EMERGENCY MEDICINE

## 2023-01-05 PROCEDURE — 99285 EMERGENCY DEPT VISIT HI MDM: CPT | Mod: 25

## 2023-01-05 PROCEDURE — 83690 ASSAY OF LIPASE: CPT | Performed by: EMERGENCY MEDICINE

## 2023-01-05 PROCEDURE — 80053 COMPREHEN METABOLIC PANEL: CPT | Performed by: EMERGENCY MEDICINE

## 2023-01-05 PROCEDURE — 84443 ASSAY THYROID STIM HORMONE: CPT | Performed by: EMERGENCY MEDICINE

## 2023-01-05 PROCEDURE — 36415 COLL VENOUS BLD VENIPUNCTURE: CPT | Performed by: FAMILY MEDICINE

## 2023-01-05 PROCEDURE — 93005 ELECTROCARDIOGRAM TRACING: CPT | Performed by: EMERGENCY MEDICINE

## 2023-01-05 PROCEDURE — 84484 ASSAY OF TROPONIN QUANT: CPT | Performed by: EMERGENCY MEDICINE

## 2023-01-05 PROCEDURE — 84484 ASSAY OF TROPONIN QUANT: CPT | Mod: 91 | Performed by: FAMILY MEDICINE

## 2023-01-05 PROCEDURE — 85004 AUTOMATED DIFF WBC COUNT: CPT | Performed by: EMERGENCY MEDICINE

## 2023-01-05 PROCEDURE — 83735 ASSAY OF MAGNESIUM: CPT | Performed by: EMERGENCY MEDICINE

## 2023-01-05 PROCEDURE — 84484 ASSAY OF TROPONIN QUANT: CPT | Performed by: FAMILY MEDICINE

## 2023-01-05 RX ORDER — SUCRALFATE 1 G/1
1 TABLET ORAL 4 TIMES DAILY
Qty: 28 TABLET | Refills: 0 | Status: SHIPPED | OUTPATIENT
Start: 2023-01-05 | End: 2023-01-12

## 2023-01-05 RX ORDER — ASPIRIN 81 MG/1
162 TABLET, CHEWABLE ORAL ONCE
Status: COMPLETED | OUTPATIENT
Start: 2023-01-05 | End: 2023-01-05

## 2023-01-05 RX ADMIN — ASPIRIN 81 MG CHEWABLE TABLET 162 MG: 81 TABLET CHEWABLE at 15:38

## 2023-01-05 ASSESSMENT — ENCOUNTER SYMPTOMS
NAUSEA: 1
SHORTNESS OF BREATH: 0

## 2023-01-05 ASSESSMENT — ACTIVITIES OF DAILY LIVING (ADL): ADLS_ACUITY_SCORE: 35

## 2023-01-05 NOTE — ED NOTES
The patient is resting calmly in his bed. He had walked to Xray and walking did not cause him sob or dizziness. He denies CP. States that he feels completely normal.

## 2023-01-05 NOTE — ED NOTES
Per verbal order from MD. The Troponin that was just collected is too close to the last collection. It was wasted and another will be collected at 1530

## 2023-01-05 NOTE — ED PROVIDER NOTES
EMERGENCY DEPARTMENT ENCOUNTER      NAME: Raghavendra Kiser  AGE: 62 year old male  YOB: 1960  MRN: [id number]  EVALUATION DATE & TIME: 1/5/2023  3:38 PM    PCP: Luis Daniel Maciel    ED PROVIDER: Magno Martin M.D.    Chief Complaint   Patient presents with     Chest Pain       FINAL IMPRESSION:  1. Acute gastritis without hemorrhage, unspecified gastritis type        ED COURSE & MEDICAL DECISION MAKING:    Pertinent Labs & Imaging studies independently interpreted by me. (See chart for details)  3:46 PM Patient seen and examined, external records reviewed with history of gout, also recent COVID infection, prior CVA with residual right-sided weakness, and diabetes.  Differential diagnosis includes but not limited to chest wall pain, esophagitis, myocardial infarction, pneumonia, rib fracture, pulmonary embolism, pneumothorax, aortic dissection, aortic aneurysm.  Patient presents with heartburn and nausea that occurred after he had been shoveling.  He had no symptoms while he was shoveling.  Pain occurred around 4 hours prior to coming emergency department.  EKG is reassuring, labs are ordered.  No abdominal tenderness on exam here.  4:33 PM Labs independently interpreted by me demonstrate normal hepatic panel, normal lipase, reassuring basic panel, negative troponin.  Repeat troponin will be done as patient is not quite passed to the 6-hour window for symptoms.  5:56 PM repeat troponin is stable, patient remains asymptomatic.  Patient with heartburn earlier today after activity but nothing during activity.  Symptoms are most consistent with gastritis, patient was started on Carafate and is stable for discharge.    At the conclusion of the encounter I discussed the results of all of the tests and the disposition. The questions were answered. The patient or family acknowledged understanding and was agreeable with the care plan.     Medical Decision Making    History:    Supplemental history from:  N/A    External Record(s) reviewed: Documented in HPI, if applicable.    Work Up:    Chart documentation includes differential considered and any EKGs or imaging independently interpreted by provider.  In additional to work up documented, I considered the following work up: see ED Course.    External consultation:    Discussion of management with another provider: See chart documentation, if applicable    Complicating factors:    Care impacted by chronic illness: Hypertension    Care affected by social determinants of health: N/A    Disposition considerations: Discharge. I prescribed additional prescription strength medication(s) as charted. I considered admission, but ultimately discharged patient with reassuring labs and exam.        MEDICATIONS GIVEN IN THE EMERGENCY:  Medications   aspirin (ASA) chewable tablet 162 mg (162 mg Oral Given 1/5/23 4624)       NEW PRESCRIPTIONS STARTED AT TODAY'S ER VISIT  New Prescriptions    SUCRALFATE (CARAFATE) 1 GM TABLET    Take 1 tablet (1 g) by mouth 4 times daily for 7 days       =================================================================    [facility]    Patient information was obtained from: patient      Raghavendra Kiser is a 62 year old male with a pertinent history of Atherosclerosis of right carotid artery, Stenosis of left internal carotid artery, HTN, TIA, hyperlipidemia, DM2, acute ischemic left MCA stroke, and spinal stenosis of lumbar region who presents to this ED via walk-in for evaluation of chest pain.    While resting at home 4 hours ago, the patient suddenly developed lower sternal pain that does not radiate and is not reproducible with cough of palpation. He checked his blood pressure and it was higher than usual at 185/97. He notes that for the last 2 days he has been shoveling snow after a long snowstorm and had intermittent bouts of nausea. Of note, he has a history of strokes and is taking Plavix.     He denies shortness of breath or any other  complaints at this time.     REVIEW OF SYSTEMS   Review of Systems   Respiratory: Negative for shortness of breath.    Cardiovascular: Positive for chest pain.   Gastrointestinal: Positive for nausea.   All other systems reviewed and are negative.     All other systems reviewed and negative    PAST MEDICAL HISTORY:  Past Medical History:   Diagnosis Date     Atherosclerosis of right carotid artery      Carotid stenosis, bilateral      Cervical radiculopathy      Chronic gout      Fracture of right humerus      Ganglion cyst      History of stroke without residual deficits  Oct 2020, Dec 2020     Humerus fracture     Right     Hyperlipidemia      Hypertension      Hypothyroidism      Medial epicondylitis      Shoulder tendinitis, right      Superficial venous thrombosis of arm     right lateral antecubital fossa     TIA (transient ischemic attack) 05/16/2015     Tinnitus      Type 2 diabetes mellitus without complication, without long-term current use of insulin (H) 11/22/2019       PAST SURGICAL HISTORY:  Past Surgical History:   Procedure Laterality Date     CERVICAL DISC SURGERY       IR LUMBAR PUNCTURE  12/25/2020     IR SPINAL PUNCTURE  12/25/2020     LAMINECTOMY LUMBAR ONE LEVEL Bilateral 3/21/2022    Procedure: Bilateral lumbar 3- lumbar 4 hemilaminectomies, medial facetectomies, foraminotomies, microdiscectomies;  Surgeon: Abena Duque MD;  Location: [facility]     LAMINECTOMY LUMBAR TWO LEVELS Left 3/29/2022    Procedure: LUMBAR 4-LUMBAR 5 LEFT HEMILAMINECTOMY WITH EPIDURAL ABSCESS EVACUATION;  Surgeon: Abena Duque MD;  Location: [facility]     PICC INSERTION - TRIPLE LUMEN  12/26/2020          PICC SINGLE LUMEN PLACEMENT  4/1/2022          TONSILLECTOMY       ZZC THROMBOENDARTECTMY NECK,NECK INCIS Right 06/08/2015    Procedure: Right Carotid Endarterectomy with Impulse Monitoring;  Surgeon: Marcellus Toth MD;  Location: [facility] OR;  Service: General       CURRENT  MEDICATIONS:    No current facility-administered medications for this encounter.     Current Outpatient Medications   Medication     sucralfate (CARAFATE) 1 GM tablet     allopurinol (ZYLOPRIM) 300 MG tablet     amLODIPine (NORVASC) 5 MG tablet     benzonatate (TESSALON) 100 MG capsule     blood glucose (FREESTYLE LITE) test strip     blood glucose monitoring (FREESTYLE) lancets     cholecalciferol 25 MCG (1000 UT) TABS     clopidogrel (PLAVIX) 75 MG tablet     divalproex sodium extended-release (DEPAKOTE ER) 500 MG 24 hr tablet     gabapentin (NEURONTIN) 300 MG capsule     levothyroxine (SYNTHROID/LEVOTHROID) 125 MCG tablet     lisinopril (ZESTRIL) 20 MG tablet     metFORMIN (GLUCOPHAGE XR) 500 MG 24 hr tablet     rosuvastatin (CRESTOR) 5 MG tablet       ALLERGIES:  No Known Allergies    FAMILY HISTORY:  Family History   Problem Relation Age of Onset     Stomach Cancer Mother      Lung Cancer Mother      Cancer Mother         Lung     Throat cancer Father      Lung Cancer Father      Cancer Father         Lung     Lung Cancer Sister      No Known Problems Sister      No Known Problems Sister      No Known Problems Sister      No Known Problems Sister      Heart Disease Brother          of a heart attack.     Diabetes Type 1 Brother      Diabetes Brother      No Known Problems Brother      No Known Problems Son        SOCIAL HISTORY:   Social History     Socioeconomic History     Marital status:    Tobacco Use     Smoking status: Former     Packs/day: 2.00     Years: 20.00     Pack years: 40.00     Types: Cigarettes     Quit date: 2/15/1993     Years since quittin.9     Smokeless tobacco: Never     Tobacco comments:     quit in    Substance and Sexual Activity     Alcohol use: Yes     Alcohol/week: 0.0 standard drinks     Comment: Alcoholic Drinks/day: rarely     Drug use: No     Sexual activity: Yes     Partners: Female   Other Topics Concern     Parent/sibling w/ CABG, MI or angioplasty before  65F 55M? No   Social History Narrative    The patient lives with family.  He is    He has 1 son   Who is 23-year-old.  Son is currently in senior living  due to recurrent drug use problem.  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  Maynor Lanza MD  10/16/2018           VITALS:  BP (!) 154/87   Pulse 56   Temp 98.2  F (36.8  C)   Resp 16   Ht 1.829 m (6')   Wt 104.3 kg (230 lb)   SpO2 97%   BMI 31.19 kg/m      PHYSICAL EXAM:  Physical Exam  Vitals and nursing note reviewed.   Constitutional:       Appearance: Normal appearance.   HENT:      Head: Normocephalic and atraumatic.      Right Ear: External ear normal.      Left Ear: External ear normal.      Nose: Nose normal.      Mouth/Throat:      Mouth: Mucous membranes are moist.   Eyes:      Extraocular Movements: Extraocular movements intact.      Conjunctiva/sclera: Conjunctivae normal.      Pupils: Pupils are equal, round, and reactive to light.   Cardiovascular:      Rate and Rhythm: Normal rate and regular rhythm.   Pulmonary:      Effort: Pulmonary effort is normal.      Breath sounds: Normal breath sounds. No wheezing or rales.   Abdominal:      General: Abdomen is flat. There is no distension.      Palpations: Abdomen is soft.      Tenderness: There is no abdominal tenderness. There is no guarding.   Musculoskeletal:         General: Normal range of motion.      Cervical back: Normal range of motion and neck supple.      Right lower leg: No edema.      Left lower leg: No edema.   Lymphadenopathy:      Cervical: No cervical adenopathy.   Skin:     General: Skin is warm and dry.   Neurological:      General: No focal deficit present.      Mental Status: He is alert and oriented to person, place, and time. Mental status is at baseline.      Comments: No gross focal neurologic deficits   Psychiatric:         Mood and Affect: Mood normal.         Behavior: Behavior normal.         Thought Content: Thought content normal.          LAB:  All  pertinent labs reviewed and interpreted.  Results for orders placed or performed during the hospital encounter of 01/05/23   XR Chest 2 Views    Impression    IMPRESSION:     Cardiac silhouette is normal in size. Mediastinal borders are well-defined. Hilar contours and lung vascularity are normal.    Symmetric lung inflation. No airspace or interstitial opacities.    Diaphragm curvature is preserved. No pleural effusion.    There is a disc spacer at T1-T2. Bone mineralization is normal. No displaced rib fractures are detected.   Basic metabolic panel   Result Value Ref Range    Sodium 144 136 - 145 mmol/L    Potassium 4.0 3.4 - 5.3 mmol/L    Chloride 107 98 - 107 mmol/L    Carbon Dioxide (CO2) 29 22 - 29 mmol/L    Anion Gap 8 7 - 15 mmol/L    Urea Nitrogen 9.3 8.0 - 23.0 mg/dL    Creatinine 0.92 0.67 - 1.17 mg/dL    Calcium 9.6 8.8 - 10.2 mg/dL    Glucose 101 (H) 70 - 99 mg/dL    GFR Estimate >90 >60 mL/min/1.73m2   Result Value Ref Range    Lipase 57 13 - 60 U/L   Hepatic function panel   Result Value Ref Range    Protein Total 7.1 6.4 - 8.3 g/dL    Albumin 4.4 3.5 - 5.2 g/dL    Bilirubin Total 0.4 <=1.2 mg/dL    Alkaline Phosphatase 77 40 - 129 U/L    AST 19 10 - 50 U/L    ALT 16 10 - 50 U/L    Bilirubin Direct <0.20 0.00 - 0.30 mg/dL   Result Value Ref Range    Troponin T, High Sensitivity 15 <=22 ng/L   Result Value Ref Range    Magnesium 2.0 1.7 - 2.3 mg/dL   TSH with free T4 reflex   Result Value Ref Range    TSH 2.68 0.30 - 4.20 uIU/mL   CBC with platelets and differential   Result Value Ref Range    WBC Count 4.5 4.0 - 11.0 10e3/uL    RBC Count 4.33 (L) 4.40 - 5.90 10e6/uL    Hemoglobin 13.8 13.3 - 17.7 g/dL    Hematocrit 41.5 40.0 - 53.0 %    MCV 96 78 - 100 fL    MCH 31.9 26.5 - 33.0 pg    MCHC 33.3 31.5 - 36.5 g/dL    RDW 13.7 10.0 - 15.0 %    Platelet Count 153 150 - 450 10e3/uL    % Neutrophils 54 %    % Lymphocytes 36 %    % Monocytes 8 %    % Eosinophils 2 %    % Basophils 0 %    % Immature  Granulocytes 0 %    NRBCs per 100 WBC 0 <1 /100    Absolute Neutrophils 2.4 1.6 - 8.3 10e3/uL    Absolute Lymphocytes 1.6 0.8 - 5.3 10e3/uL    Absolute Monocytes 0.4 0.0 - 1.3 10e3/uL    Absolute Eosinophils 0.1 0.0 - 0.7 10e3/uL    Absolute Basophils 0.0 0.0 - 0.2 10e3/uL    Absolute Immature Granulocytes 0.0 <=0.4 10e3/uL    Absolute NRBCs 0.0 10e3/uL       RADIOLOGY:  Reviewed all pertinent imaging. Please see official radiology report.  XR Chest 2 Views   Final Result   IMPRESSION:       Cardiac silhouette is normal in size. Mediastinal borders are well-defined. Hilar contours and lung vascularity are normal.      Symmetric lung inflation. No airspace or interstitial opacities.      Diaphragm curvature is preserved. No pleural effusion.      There is a disc spacer at T1-T2. Bone mineralization is normal. No displaced rib fractures are detected.          EKG:    Performed at: 2:53 PM  Impression: Minimal voltage criteria for left ventricular hypertrophy  Rate: 68  Rhythm: Sinus  Axis: Normal  NJ Interval: 162  QRS Interval: 88  QTc Interval: 435  ST Changes: No acute ischemic changes  Comparison: January 2022, no acute changes    I have independently reviewed and interpreted the EKG(s) documented above.    I, Terrance Tasneem, am serving as a scribe to document services personally performed by Dr. Martin based on my observation and the provider's statements to me. I, Magno Martin MD attest that Terrance Boateng is acting in a scribe capacity, has observed my performance of the services and has documented them in accordance with my direction.    Magno Martin M.D.  Emergency Medicine  [facility] EMERGENCY DEPARTMENT  [address] [facility] [id number] [phone]  Dept: [phone]     Magno Martin MD  01/05/23 3818

## 2023-01-05 NOTE — ED TRIAGE NOTES
Pt with history of CVA, developed lower sternal pain at 1200,not reproducible with cough and palpation. Non radiating. No nausea

## 2023-01-05 NOTE — ED NOTES
ED Triage Provider Note  [facility]  Encounter Date: Jan 5, 2023      The patient was seen in triage to expedite ED workup.     History:  Chief Complaint   Patient presents with     Chest Pain     Raghavendra Kiser is a 62 year old male with history of HTN, prior stroke presenting for evaluation of chest pain. Was sitting at home a couple hours ago when he developed lower midline chest pressure; intermittent and ongoing since. No pleuritic or exertional symptoms. No shortness of breath. No abdominal pain, nausea, vomiting, numbness, tingling, focal weakness. Denies history of similar.    Review of Systems:  - Positive for chest pain  - Negative for shortness of breath, pleuritic or exertional symptoms, abdominal pain, nausea, vomiting, leg pain/swelling    Vitals:  BP (!) 207/106   Pulse 73   Temp 98.2  F (36.8  C)   Resp 20   Ht 1.829 m (6')   Wt 104.3 kg (230 lb)   SpO2 100%   BMI 31.19 kg/m      Brief Exam:  steady unaccompanied gait, normal work of breathing, no distress, clear speech, normal phonation, no stridor    Medical Decision Making:  Patient arriving to the ED with problem as above. A medical screening exam was performed. Orders initiated from triage to expedite ED workup.    Not typical for ACS but certainly warrants chest pain workup. Will obtain EKG, CXR, blood while giving empiric Aspirin.    The patient is appropriate to wait in triage until ED room available.      Orders Placed This Encounter   Procedures     XR Chest 2 Views     Basic metabolic panel     Lipase     Hepatic function panel     Troponin T, High Sensitivity     Magnesium     TSH with free T4 reflex     ECG 12-LEAD WITH MUSE (LHE)     Peripheral IV catheter     Pulse oximetry nursing     Cardiac Continuous Monitoring     CBC with platelets differential       Medications   aspirin (ASA) chewable tablet 162 mg (has no administration in time range)           Richie Harris MD  01/05/23  Emergency  [facility] EMERGENCY DEPARTMENT  [address] [place] [id number]  [phone]  Dept: [phone]       Richie Harris MD  01/05/23 1450

## 2023-01-20 ENCOUNTER — TRANSFERRED RECORDS (OUTPATIENT)
Dept: HEALTH INFORMATION MANAGEMENT | Facility: CLINIC | Age: 63
End: 2023-01-20
Payer: MEDICARE

## 2023-01-25 DIAGNOSIS — M1A.0791 IDIOPATHIC CHRONIC GOUT OF FOOT WITH TOPHUS, UNSPECIFIED LATERALITY: ICD-10-CM

## 2023-01-25 RX ORDER — ALLOPURINOL 300 MG/1
TABLET ORAL
Qty: 135 TABLET | Refills: 1 | Status: ON HOLD | OUTPATIENT
Start: 2023-01-25 | End: 2023-06-30

## 2023-02-15 ENCOUNTER — HOSPITAL ENCOUNTER (OUTPATIENT)
Dept: MRI IMAGING | Facility: HOSPITAL | Age: 63
Discharge: HOME | End: 2023-02-15
Attending: FAMILY MEDICINE | Admitting: FAMILY MEDICINE
Payer: MEDICARE

## 2023-02-15 ENCOUNTER — OFFICE VISIT (OUTPATIENT)
Dept: FAMILY MEDICINE | Facility: CLINIC | Age: 63
End: 2023-02-15
Payer: MEDICARE

## 2023-02-15 VITALS
DIASTOLIC BLOOD PRESSURE: 84 MMHG | HEIGHT: 72 IN | HEART RATE: 64 BPM | RESPIRATION RATE: 16 BRPM | SYSTOLIC BLOOD PRESSURE: 144 MMHG | BODY MASS INDEX: 32.34 KG/M2 | OXYGEN SATURATION: 100 % | WEIGHT: 238.8 LBS

## 2023-02-15 DIAGNOSIS — M54.16 LUMBAR RADICULOPATHY: Primary | ICD-10-CM

## 2023-02-15 DIAGNOSIS — M54.16 LUMBAR RADICULOPATHY: ICD-10-CM

## 2023-02-15 PROCEDURE — 72158 MRI LUMBAR SPINE W/O & W/DYE: CPT | Mod: MF

## 2023-02-15 PROCEDURE — 255N000002 HC RX 255 OP 636: Performed by: FAMILY MEDICINE

## 2023-02-15 PROCEDURE — A9585 GADOBUTROL INJECTION: HCPCS | Performed by: FAMILY MEDICINE

## 2023-02-15 PROCEDURE — 99213 OFFICE O/P EST LOW 20 MIN: CPT | Performed by: FAMILY MEDICINE

## 2023-02-15 RX ORDER — GADOBUTROL 604.72 MG/ML
10 INJECTION INTRAVENOUS ONCE
Status: COMPLETED | OUTPATIENT
Start: 2023-02-15 | End: 2023-02-15

## 2023-02-15 RX ADMIN — GADOBUTROL 10 ML: 604.72 INJECTION INTRAVENOUS at 21:14

## 2023-02-15 ASSESSMENT — ENCOUNTER SYMPTOMS: BACK PAIN: 1

## 2023-02-15 NOTE — PROGRESS NOTES
Constantin Dotson is a 63 year old accompanied by his N/A, presenting for the following health issues:  Back Pain (Possibly herniated disc again, pt had surgery last march for this, feels like the same kind of pain coming back. )      Pain in the buttocks and down the left leg (and occas down the right) to the knee level..  Sharp and stabbing pain.  Onset a week ago.  Feels exactly like before.  No bowel or bladder concerns.  Pain level 1-2/10 but up to 8-9/10 momentarily.   The pain is not disturbing his sleep.  The pain is worse going from sitting to standing.     Back Pain     History of Present Illness       Back Pain:  He presents for follow up of back pain. Patient's back pain is a recurring problem.  Location of back pain:  Right lower back, left lower back, right hip and left hip  Description of back pain: sharp and shooting  Back pain spreads: left buttocks and left thigh    Since patient first noticed back pain, pain is: rapidly worsening  Does back pain interfere with his job:  Not applicable      He eats 0-1 servings of fruits and vegetables daily.He consumes 1 sweetened beverage(s) daily.He exercises with enough effort to increase his heart rate 9 or less minutes per day.  He exercises with enough effort to increase his heart rate 3 or less days per week.   He is taking medications regularly.       Review of Systems   Musculoskeletal: Positive for back pain.     Past Surgical History:   Procedure Laterality Date     CERVICAL DISC SURGERY       IR LUMBAR PUNCTURE  12/25/2020     IR SPINAL PUNCTURE  12/25/2020     LAMINECTOMY LUMBAR ONE LEVEL Bilateral 3/21/2022    Procedure: Bilateral lumbar 3- lumbar 4 hemilaminectomies, medial facetectomies, foraminotomies, microdiscectomies;  Surgeon: Abena Duque MD;  [place] [facility]     LAMINECTOMY LUMBAR TWO LEVELS Left 3/29/2022    Procedure: LUMBAR 4-LUMBAR 5 LEFT HEMILAMINECTOMY WITH EPIDURAL ABSCESS EVACUATION;  Surgeon: Neto  Abena GALLEGOS MD;  Location: [facility]     PICC INSERTION - TRIPLE LUMEN  12/26/2020          PICC SINGLE LUMEN PLACEMENT  4/1/2022          TONSILLECTOMY       ZZC THROMBOENDARTECTMY NECK,NECK INCIS Right 06/08/2015    Procedure: Right Carotid Endarterectomy with Impulse Monitoring;  Surgeon: Marcellus Toth MD;  Location: [facility] OR;  Service: General         Objective    BP (!) 144/84 (BP Location: Left arm, Patient Position: Sitting, Cuff Size: Adult Large)   Pulse 64   Resp 16   Ht 1.829 m (6')   Wt 108.3 kg (238 lb 12.8 oz)   SpO2 100%   BMI 32.39 kg/m    Body mass index is 32.39 kg/m .  Physical Exam   GENERAL: healthy, alert and no distress  MS: neg SLR bilat; strong dorseflexion bilat feet; DTRs absent LE.    No significant tx buttocks or paralumbar.    Lab Results   Component Value Date    A1C 5.7 10/20/2022    A1C 6.0 07/14/2022    A1C 5.9 03/16/2022    A1C 5.4 11/03/2021    A1C 5.7 05/28/2021     Last Comprehensive Metabolic Panel:  Sodium   Date Value Ref Range Status   01/05/2023 144 136 - 145 mmol/L Final     Potassium   Date Value Ref Range Status   01/05/2023 4.0 3.4 - 5.3 mmol/L Final   05/10/2022 3.4 (L) 3.5 - 5.0 mmol/L Final     Chloride   Date Value Ref Range Status   01/05/2023 107 98 - 107 mmol/L Final   05/10/2022 107 98 - 107 mmol/L Final     Carbon Dioxide (CO2)   Date Value Ref Range Status   01/05/2023 29 22 - 29 mmol/L Final   05/10/2022 26 22 - 31 mmol/L Final     Anion Gap   Date Value Ref Range Status   01/05/2023 8 7 - 15 mmol/L Final   05/10/2022 10 5 - 18 mmol/L Final     Glucose   Date Value Ref Range Status   01/05/2023 101 (H) 70 - 99 mg/dL Final   05/10/2022 98 70 - 125 mg/dL Final     Urea Nitrogen   Date Value Ref Range Status   01/05/2023 9.3 8.0 - 23.0 mg/dL Final   05/10/2022 11 8 - 22 mg/dL Final     Creatinine   Date Value Ref Range Status   01/05/2023 0.92 0.67 - 1.17 mg/dL Final     GFR Estimate   Date Value Ref Range Status   01/05/2023 >90 >60  mL/min/1.73m2 Final     Comment:     Effective December 21, 2021 eGFRcr in adults is calculated using the 2021 CKD-EPI creatinine equation which includes age and gender (Cherri et al., NEJM, DOI: 10.1056/SHDXwt2996898)   05/28/2021 >60 >60 mL/min/1.73m2 Final     Calcium   Date Value Ref Range Status   01/05/2023 9.6 8.8 - 10.2 mg/dL Final         Encounter Diagnosis   Name Primary?     Lumbar radiculopathy Yes     PLAN:   MRI OF THE LUMBAR SPINE  (contrast may be used as deemed necessary by the imaging department)

## 2023-02-20 ENCOUNTER — TELEPHONE (OUTPATIENT)
Dept: FAMILY MEDICINE | Facility: CLINIC | Age: 63
End: 2023-02-20
Payer: MEDICARE

## 2023-02-20 NOTE — TELEPHONE ENCOUNTER
MRI reviewed with pt today.  His symptoms have somewhat improved.  Recommend that he give give some additional time.  If symptoms do not improve over the next 2-3 weeks then pt should be seen in the [facility].

## 2023-02-24 DIAGNOSIS — I10 PRIMARY HYPERTENSION: ICD-10-CM

## 2023-02-25 NOTE — TELEPHONE ENCOUNTER
"Routing refill request to provider for review/approval because:  Labs out of range:  BP    Last Written Prescription Date:  7/14/2022  Last Fill Quantity: na,  # refills: na   Last office visit provider:  2/15/2023     Requested Prescriptions   Pending Prescriptions Disp Refills     lisinopril (ZESTRIL) 20 MG tablet [Pharmacy Med Name: LISINOPRIL TABS 20MG] 180 tablet 3     Sig: TAKE 1 TABLET TWICE A DAY       ACE Inhibitors (Including Combos) Protocol Failed - 2/24/2023  1:37 AM        Failed - Blood pressure under 140/90 in past 12 months     BP Readings from Last 3 Encounters:   02/15/23 (!) 144/84   01/05/23 (!) 149/80   11/10/22 130/78                 Passed - Recent (12 mo) or future (30 days) visit within the authorizing provider's specialty     Patient has had an office visit with the authorizing provider or a provider within the authorizing providers department within the previous 12 mos or has a future within next 30 days. See \"Patient Info\" tab in inbasket, or \"Choose Columns\" in Meds & Orders section of the refill encounter.              Passed - Medication is active on med list        Passed - Patient is age 18 or older        Passed - Normal serum creatinine on file in past 12 months     Recent Labs   Lab Test 01/05/23  1530   CR 0.92       Ok to refill medication if creatinine is low          Passed - Normal serum potassium on file in past 12 months     Recent Labs   Lab Test 01/05/23  1530   POTASSIUM 4.0                  Nicole Whittaker RN 02/25/23 4:23 AM  "

## 2023-02-28 RX ORDER — LISINOPRIL 20 MG/1
TABLET ORAL
Qty: 180 TABLET | Refills: 3 | Status: SHIPPED | OUTPATIENT
Start: 2023-02-28 | End: 2023-11-03 | Stop reason: SINTOL

## 2023-03-23 ENCOUNTER — MYC MEDICAL ADVICE (OUTPATIENT)
Dept: FAMILY MEDICINE | Facility: CLINIC | Age: 63
End: 2023-03-23
Payer: MEDICARE

## 2023-03-23 DIAGNOSIS — M48.061 SPINAL STENOSIS OF LUMBAR REGION, UNSPECIFIED WHETHER NEUROGENIC CLAUDICATION PRESENT: Primary | ICD-10-CM

## 2023-03-24 NOTE — TELEPHONE ENCOUNTER
"Please see patient message regarding continued back pain since 2/15 OV. Result notes from MRI states,   \"There is severe spinal canal stenosis at L3-4 that is unchanged from previous imaging. If you have fever or chills then we should look further into the fluid collection noted (felt likely to be a seroma). If you are not continuing to improve we will have you seen in consultation at the [facility].\"    Spine clinic referral pended.    Carmita Cardoza RN    "

## 2023-03-25 NOTE — TELEPHONE ENCOUNTER
I have placed a referral to the [facility] for you to have further evaluation of your back and spine related symptoms.

## 2023-03-27 DIAGNOSIS — E11.9 TYPE 2 DIABETES MELLITUS WITHOUT COMPLICATION, WITHOUT LONG-TERM CURRENT USE OF INSULIN (H): ICD-10-CM

## 2023-03-27 RX ORDER — LANCETS 28 GAUGE
EACH MISCELLANEOUS
Qty: 100 EACH | Refills: 3 | Status: SHIPPED | OUTPATIENT
Start: 2023-03-27 | End: 2024-04-19

## 2023-03-27 NOTE — TELEPHONE ENCOUNTER
"    Last Written Prescription Date:  12/19/2022  Last Fill Quantity: 100,  # refills: 1   Last office visit provider: 02/15/2023     Requested Prescriptions   Pending Prescriptions Disp Refills     blood glucose monitoring (FREESTYLE) lancets 100 each 1     Sig: For checking blood sugars up to twice daily       Diabetic Supplies Protocol Passed - 3/27/2023  9:55 AM        Passed - Medication is active on med list        Passed - Patient is 18 years of age or older        Passed - Recent (6 mo) or future (30 days) visit within the authorizing provider's specialty     Patient had office visit in the last 6 months or has a visit in the next 30 days with authorizing provider.  See \"Patient Info\" tab in inbasket, or \"Choose Columns\" in Meds & Orders section of the refill encounter.                 Bethanie Miranda RN 03/27/23 9:56 AM  "

## 2023-03-28 ENCOUNTER — OFFICE VISIT (OUTPATIENT)
Dept: NEUROSURGERY | Facility: CLINIC | Age: 63
End: 2023-03-28
Attending: FAMILY MEDICINE
Payer: MEDICARE

## 2023-03-28 VITALS — SYSTOLIC BLOOD PRESSURE: 161 MMHG | OXYGEN SATURATION: 98 % | DIASTOLIC BLOOD PRESSURE: 84 MMHG | HEART RATE: 59 BPM

## 2023-03-28 DIAGNOSIS — M48.061 SPINAL STENOSIS OF LUMBAR REGION, UNSPECIFIED WHETHER NEUROGENIC CLAUDICATION PRESENT: ICD-10-CM

## 2023-03-28 DIAGNOSIS — M47.26 OTHER SPONDYLOSIS WITH RADICULOPATHY, LUMBAR REGION: ICD-10-CM

## 2023-03-28 DIAGNOSIS — M25.48: Primary | ICD-10-CM

## 2023-03-28 PROCEDURE — 99213 OFFICE O/P EST LOW 20 MIN: CPT | Performed by: NURSE PRACTITIONER

## 2023-03-28 ASSESSMENT — PAIN SCALES - GENERAL: PAINLEVEL: SEVERE PAIN (6)

## 2023-03-28 NOTE — NURSING NOTE
Raghavendra Kiser is a 63 year old male who presents for:  Chief Complaint   Patient presents with     Follow Up     Surgery 3/29/22 with [facility]  6 weeks ago, preop symptoms reappeared      Back Pain     Low back, radiates down legs - especially left side          Initial Vitals:  BP (!) 161/84   Pulse 59   SpO2 98%  Estimated body mass index is 32.39 kg/m  as calculated from the following:    Height as of 2/15/23: 6' (1.829 m).    Weight as of 2/15/23: 238 lb 12.8 oz (108.3 kg).. There is no height or weight on file to calculate BSA. BP completed using cuff size: regular  Severe Pain (6)    Nursing Comments: Mauri to follow up with Primary Care provider regarding elevated blood pressure.      Dre Barrera

## 2023-03-28 NOTE — PATIENT INSTRUCTIONS
Physical therapy   Injection - they will call you to schedule   Flexion extension XR - walk into [facility] XR department     We should see you back two weeks after injection

## 2023-03-28 NOTE — PROGRESS NOTES
Neurosurgery Follow UP  March 28, 2023    A/P:  Mauri Kiser is here today for recurrence of bilateral posterior sharp shooting leg pain, similar to pre-op 3/2022. He is S/P Bilateral lumbar 3- lumbar 4 hemilaminectomies, medial facetectomies, foraminotomies and microdiscectomies with small durotomy on right with Dr Duque 3/21/2022. He denies numbness or tingling. No bowel or bladder concerns. States his legs lock up in pain if he attempts to walk any distance. Typically walks his dog for a mile and can now only tolerate a block. He feels his pain started after an experience at golf simulation. He does feel like it is somewhat better than initially. He saw his PCP originally in February and completed an MRI. We spent time reviewing today and comparing to pre-op and post op 2022. Question recurrence of disc herniation L3-4 with stenosis. MRI radiologist calls severe stenosis at this level although seems improved in my comparison of films. He also has facet joint effusions at L3-4. Mild foraminal stenosis L3-4. L4-5 diffuse disc bulge with posterior endplate osteophyte ridging and moderate facet arthropathy. Small SubQ fluid collection L4-5 likely seroma. Subcutaneous edema overlays the lumbar spine. He has been doing some stretching at home. Has not had formal PT. Sleeping is not interrupted. Does not tolerate gabapentin due to side effects. Unable to take NSAIDS due to Plavix. Diabetic on metformin, so avoiding steroids.     PLAN:   Flex,ext  Injection bilateral L3-4, L4-5 TFESI  PT  Return two weeks after injection to see INO on Dr Duque day     HPI: Raghavendra Kiser is a 62 year old S/P Bilateral lumbar 3- lumbar 4 hemilaminectomies, medial facetectomies, foraminotomies and microdiscectomies with small durotomy on right with Dr Duque 3/21/2022. Pre-op had more right leg symptoms than left. Symptoms improved following surgery with worsening new left leg pain in last 1-2 days. MRI obtained which showed a  new contrast enhancing collection mostly centered at left L5 extending form L4 to S1.This is causing severe spinal stenosis at these levels. Exploration of prior left lumbar 3-lumbar 4 hemilaminectomy space with lumbar 3-lumbar 5 left hemilaminectomy for epidural abscess evacuation by Dr Duque on 03/29/2022.    Physical Exam  BP (!) 161/84   Pulse 59   SpO2 98%     General: alert, oriented.    Motor: normal tone     Strength:   Hip flexors 5/5 right, 5/5 left   Quadriceps: 5/5 right, 5/5 left   Ankle dorsiflexion: 5/5 right, 5/5 left   Extensor hallicus longus: 5/5 right, 5/5 left   Ankle plantar flexion : 5/5 right, 5/5 left     Musculoskeletal: negative straight leg raise     Sensation: intact    Reflexes: normal to diminished     Coordination: steady gait     Imaging: reviewed MRI, compared to prior. Shared with Mauri.     AGUSTINA Marshall  [facility]  O: [phone]

## 2023-03-28 NOTE — LETTER
3/28/2023         RE: Raghavendra Kiser  [address]  [place]  [place] [address]        Dear Colleague,    Thank you for referring your patient, Raghavendra Kiser, to the [facility]. Please see a copy of my visit note below.    Neurosurgery Follow UP  March 28, 2023    A/P:  Mauri Kiser is here today for recurrence of bilateral posterior sharp shooting leg pain, similar to pre-op 3/2022. He is S/P Bilateral lumbar 3- lumbar 4 hemilaminectomies, medial facetectomies, foraminotomies and microdiscectomies with small durotomy on right with Dr Duque 3/21/2022. He denies numbness or tingling. No bowel or bladder concerns. States his legs lock up in pain if he attempts to walk any distance. Typically walks his dog for a mile and can now only tolerate a block. He feels his pain started after an experience at golf simulation. He does feel like it is somewhat better than initially. He saw his PCP originally in February and completed an MRI. We spent time reviewing today and comparing to pre-op and post op 2022. Question recurrence of disc herniation L3-4 with stenosis. MRI radiologist calls severe stenosis at this level although seems improved in my comparison of films. He also has facet joint effusions at L3-4. Mild foraminal stenosis L3-4. L4-5 diffuse disc bulge with posterior endplate osteophyte ridging and moderate facet arthropathy. Small SubQ fluid collection L4-5 likely seroma. Subcutaneous edema overlays the lumbar spine. He has been doing some stretching at home. Has not had formal PT. Sleeping is not interrupted. Does not tolerate gabapentin due to side effects. Unable to take NSAIDS due to Plavix. Diabetic on metformin, so avoiding steroids.     PLAN:   Flex,ext  Injection bilateral L3-4, L4-5 TFESI  PT  Return two weeks after injection to see INO on Dr Duque day     HPI: Raghavendra Kiser is a 62 year old S/P Bilateral lumbar 3- lumbar 4 hemilaminectomies, medial  facetectomies, foraminotomies and microdiscectomies with small durotomy on right with Dr Duque 3/21/2022. Pre-op had more right leg symptoms than left. Symptoms improved following surgery with worsening new left leg pain in last 1-2 days. MRI obtained which showed a new contrast enhancing collection mostly centered at left L5 extending form L4 to S1.This is causing severe spinal stenosis at these levels. Exploration of prior left lumbar 3-lumbar 4 hemilaminectomy space with lumbar 3-lumbar 5 left hemilaminectomy for epidural abscess evacuation by Dr Duque on 03/29/2022.    Physical Exam  BP (!) 161/84   Pulse 59   SpO2 98%     General: alert, oriented.    Motor: normal tone     Strength:   Hip flexors 5/5 right, 5/5 left   Quadriceps: 5/5 right, 5/5 left   Ankle dorsiflexion: 5/5 right, 5/5 left   Extensor hallicus longus: 5/5 right, 5/5 left   Ankle plantar flexion : 5/5 right, 5/5 left     Musculoskeletal: negative straight leg raise     Sensation: intact    Reflexes: normal to diminished     Coordination: steady gait     Imaging: reviewed MRI, compared to prior. Shared with Mauri.     KALEB Marshall-CNP  [facility]  O: [phone]            Again, thank you for allowing me to participate in the care of your patient.        Sincerely,        MORGAN Scott CNP

## 2023-03-29 ENCOUNTER — HOSPITAL ENCOUNTER (OUTPATIENT)
Dept: RADIOLOGY | Facility: HOSPITAL | Age: 63
Discharge: HOME | End: 2023-03-29
Attending: NURSE PRACTITIONER | Admitting: NURSE PRACTITIONER
Payer: MEDICARE

## 2023-03-29 DIAGNOSIS — M48.061 SPINAL STENOSIS OF LUMBAR REGION, UNSPECIFIED WHETHER NEUROGENIC CLAUDICATION PRESENT: ICD-10-CM

## 2023-03-29 DIAGNOSIS — M25.48: ICD-10-CM

## 2023-03-29 PROCEDURE — 72100 X-RAY EXAM L-S SPINE 2/3 VWS: CPT

## 2023-03-30 ENCOUNTER — THERAPY VISIT (OUTPATIENT)
Dept: PHYSICAL THERAPY | Facility: CLINIC | Age: 63
End: 2023-03-30
Attending: NURSE PRACTITIONER
Payer: MEDICARE

## 2023-03-30 DIAGNOSIS — M48.061 SPINAL STENOSIS OF LUMBAR REGION, UNSPECIFIED WHETHER NEUROGENIC CLAUDICATION PRESENT: ICD-10-CM

## 2023-03-30 PROCEDURE — 97161 PT EVAL LOW COMPLEX 20 MIN: CPT | Mod: GP

## 2023-03-30 PROCEDURE — 97530 THERAPEUTIC ACTIVITIES: CPT | Mod: GP

## 2023-03-30 PROCEDURE — 97110 THERAPEUTIC EXERCISES: CPT | Mod: GP

## 2023-03-30 NOTE — PROGRESS NOTES
[facility]    OUTPATIENT Physical Therapy ORTHOPEDIC EVALUATION  PLAN OF TREATMENT FOR OUTPATIENT REHABILITATION  (COMPLETE FOR INITIAL CLAIMS ONLY)  Patient's Last Name, First Name, M.I.  YOB: 1960  Raghavendra Kiser    Provider s Name:  [facility]   Medical Record No.  [id number]   Start of Care Date:  03/30/23   Onset Date:   02/01/23   Treatment Diagnosis:    Medical Diagnosis:  Spinal stenosis of lumbar region, unspecified whether neurogenic claudication present       Goals:     03/30/23 0500   Body Part   Goals listed below are for B radicular symptoms   Goal #1   Goal #1 standing   Previous Functional Level No restrictions   Performance level No pain with standing for any length of time.   Current Functional Level Minutes patient can stand   Performance level 1 min before pain onset of 5/10   STG Target Performance Minutes patient will be able to stand   Performance level 30 min of standing with no more than 2/10 pain not below gluteal fold. .   Rationale for housekeeping tasks such as vacuuming, bed making, mowing, gardening;for meal preparation;for safe household ambulation;for safe community ambulation;for personal hygiene   Due date 04/20/23   LTG Target Performance Hours patient will be able to stand   Performance Level 2 hours with no more than 1/10 pain not below gluteal fold.   Rationale for housekeeping tasks such as vacuuming, bed making, mowing;for personal hygiene;for meal preparation;for safe household ambulation;for safe community ambulation   Due date 05/25/23         Therapy Frequency:  1x/week  Predicted Duration of Therapy Intervention:  8 weeks    Radha Shah, PT                 I CERTIFY THE NEED FOR THESE SERVICES FURNISHED UNDER        THIS PLAN OF TREATMENT AND WHILE UNDER MY CARE     (Physician attestation of this document  indicates review and certification of the therapy plan).                     Certification Date From:  03/30/23   Certification Date To:  05/25/23    Referring Provider:  Alix Velazquez    Initial Assessment        See Epic Evaluation SOC Date: 03/30/23

## 2023-03-30 NOTE — PROGRESS NOTES
Physical Therapy Initial Evaluation  Subjective:  The history is provided by the patient. No  was used.   Patient Health History  Raghavendra Kiser being seen for pain in gluteal region and down B legs (L>R). Pt reports that he consistently has pain in glutes and thigh's when standing that will occassionally become very sharp. Sudden onset pain varies in aggravating factors. Symptoms do not progress below the knee. Symptoms worse with standing >1 min. Pt has attempted self stretching and rest to alleviate symtoms with brief relief. He has difficulty standing or walking for his required daily activity. .     Problem began: 2/1/2023.   Problem occurred: Pt feels these symptoms coinside with a golf simulation trip in february and have gotten worse since.    Pain is reported as 5/10 (Rest: 0/10 walking: 3/10 always, stabbing pain in back of thigh. ) on pain scale.  General health as reported by patient is good.  Pertinent medical history includes: stroke.     Medical allergies: none.   Surgeries include:  Orthopedic surgery.    Current medications:  High blood pressure medication and thyroid medication.    Current occupation is Retired.   Primary job tasks include:  Computer work.                  Objective:  LUMBAR:    Posture: slight R lateral hip shift with compensatory L trunk lean in standing.     Neurological:    Motor Deficit:  Myotomes L R   L1-2 (hip flexion) 5 5   L3 (knee extension) 5 5   L4 (ankle DF) 5 5   L5 (g. toe ext) 5 5   S1 (ankle PF or knee flex) 5 5     Sensory Deficit, Reflexes: No changes in sensation to B LE.     Dural Signs:   L R   Slump + +   SLR + +       AROM: (Major, Moderate, Minimal or Nil loss)  Movement Loss Ron Mod Min Nil Pain   Flexion    x    Extension  x      Side Gliding L  x   Pain on R.    Side Gliding R  x        Other Tests: 30 sec STS: 13 rises.   Gait:  Slight R shift at hips with L lateral trunk lean. (pt notes this is likely residual from stroke 3  years ago)      Assessment/Plan:    Patient is a 63 year old male with radiating pain complaints.    Patient has the following significant findings with corresponding treatment plan.                Diagnosis 1:  Spinal stenosis with B radicular pain  Pain -  hot/cold therapy, manual therapy, self management and education  Decreased ROM/flexibility - manual therapy and therapeutic exercise  Impaired functional mobility  - neuro re-education and therapeutic activities    Therapy Evaluation Codes:   1) History comprised of:   Personal factors that impact the plan of care:      None.    Comorbidity factors that impact the plan of care are:      Stroke.     Medications impacting care: None.  2) Examination of Body Systems comprised of:   Body structures and functions that impact the plan of care:      Hip, Lumbar spine, Pelvis and Sacral illiac joint.   Activity limitations that impact the plan of care are:      Standing and Walking.  3) Clinical presentation characteristics are:   Stable/Uncomplicated.  4) Decision-Making    Low complexity using standardized patient assessment instrument and/or measureable assessment of functional outcome.  Cumulative Therapy Evaluation is: Low complexity.    Previous and current functional limitations:  (See Goal Flow Sheet for this information)    Short term and Long term goals: (See Goal Flow Sheet for this information)     Communication ability:  Patient appears to be able to clearly communicate and understand verbal and written communication and follow directions correctly.  Treatment Explanation - The following has been discussed with the patient:   RX ordered/plan of care  Anticipated outcomes  Possible risks and side effects  This patient would benefit from PT intervention to resume normal activities.   Rehab potential is good.    Frequency:  1 X week, once daily  Duration:  for 8 weeks  Discharge Plan:  Achieve all LTG.  Independent in home treatment program.  Reach maximal  therapeutic benefit.    Please refer to the daily flowsheet for treatment today, total treatment time and time spent performing 1:1 timed codes.

## 2023-04-06 ENCOUNTER — THERAPY VISIT (OUTPATIENT)
Dept: PHYSICAL THERAPY | Facility: CLINIC | Age: 63
End: 2023-04-06
Payer: MEDICARE

## 2023-04-06 DIAGNOSIS — M54.16 LUMBAR RADICULOPATHY: Primary | ICD-10-CM

## 2023-04-06 PROCEDURE — 97110 THERAPEUTIC EXERCISES: CPT | Mod: GP

## 2023-04-13 ENCOUNTER — THERAPY VISIT (OUTPATIENT)
Dept: PHYSICAL THERAPY | Facility: CLINIC | Age: 63
End: 2023-04-13
Payer: MEDICARE

## 2023-04-13 DIAGNOSIS — M54.16 LUMBAR RADICULOPATHY: Primary | ICD-10-CM

## 2023-04-13 PROCEDURE — 97110 THERAPEUTIC EXERCISES: CPT | Mod: GP

## 2023-04-13 PROCEDURE — 97140 MANUAL THERAPY 1/> REGIONS: CPT | Mod: GP

## 2023-04-23 ENCOUNTER — HEALTH MAINTENANCE LETTER (OUTPATIENT)
Age: 63
End: 2023-04-23

## 2023-04-26 DIAGNOSIS — Z86.73 HISTORY OF COMPLETED STROKE: ICD-10-CM

## 2023-04-26 RX ORDER — CLOPIDOGREL BISULFATE 75 MG/1
TABLET ORAL
Qty: 90 TABLET | Refills: 1 | Status: SHIPPED | OUTPATIENT
Start: 2023-04-26 | End: 2023-05-12

## 2023-04-26 NOTE — TELEPHONE ENCOUNTER
Refill request for: clopidogrel (PLAVIX) 75 MG tablet  Directions: TAKE 1 TABLET DAILY    LOV: 11/2/22  NOV: 5/12/23    90 day supply with 1 refills Medication T'd for review and signature  Rody Glez CMA on 4/26/2023 at 11:09 AM

## 2023-04-27 ENCOUNTER — THERAPY VISIT (OUTPATIENT)
Dept: PHYSICAL THERAPY | Facility: CLINIC | Age: 63
End: 2023-04-27
Payer: MEDICARE

## 2023-04-27 DIAGNOSIS — M54.16 LUMBAR RADICULOPATHY: Primary | ICD-10-CM

## 2023-04-27 PROCEDURE — 97110 THERAPEUTIC EXERCISES: CPT | Mod: GP

## 2023-04-27 PROCEDURE — 97112 NEUROMUSCULAR REEDUCATION: CPT | Mod: GP

## 2023-05-03 ENCOUNTER — RADIOLOGY INJECTION OFFICE VISIT (OUTPATIENT)
Dept: PHYSICAL MEDICINE AND REHAB | Facility: CLINIC | Age: 63
End: 2023-05-03
Attending: NURSE PRACTITIONER
Payer: MEDICARE

## 2023-05-03 VITALS
HEART RATE: 66 BPM | TEMPERATURE: 97.7 F | SYSTOLIC BLOOD PRESSURE: 126 MMHG | RESPIRATION RATE: 18 BRPM | DIASTOLIC BLOOD PRESSURE: 80 MMHG | OXYGEN SATURATION: 99 %

## 2023-05-03 DIAGNOSIS — M48.061 SPINAL STENOSIS OF LUMBAR REGION, UNSPECIFIED WHETHER NEUROGENIC CLAUDICATION PRESENT: ICD-10-CM

## 2023-05-03 DIAGNOSIS — M54.16 LUMBAR RADICULOPATHY: ICD-10-CM

## 2023-05-03 DIAGNOSIS — E11.9 TYPE 2 DIABETES MELLITUS WITHOUT COMPLICATION, WITHOUT LONG-TERM CURRENT USE OF INSULIN (H): Primary | ICD-10-CM

## 2023-05-03 DIAGNOSIS — M47.26 OTHER SPONDYLOSIS WITH RADICULOPATHY, LUMBAR REGION: ICD-10-CM

## 2023-05-03 LAB — GLUCOSE SERPL-MCNC: 81 MG/DL (ref 70–99)

## 2023-05-03 PROCEDURE — 82962 GLUCOSE BLOOD TEST: CPT | Performed by: PAIN MEDICINE

## 2023-05-03 PROCEDURE — 64483 NJX AA&/STRD TFRM EPI L/S 1: CPT | Mod: 50 | Performed by: PAIN MEDICINE

## 2023-05-03 RX ORDER — LIDOCAINE HYDROCHLORIDE 10 MG/ML
INJECTION, SOLUTION EPIDURAL; INFILTRATION; INTRACAUDAL; PERINEURAL
Status: COMPLETED | OUTPATIENT
Start: 2023-05-03 | End: 2023-05-03

## 2023-05-03 RX ORDER — DEXAMETHASONE SODIUM PHOSPHATE 10 MG/ML
INJECTION, SOLUTION INTRAMUSCULAR; INTRAVENOUS
Status: COMPLETED | OUTPATIENT
Start: 2023-05-03 | End: 2023-05-03

## 2023-05-03 RX ADMIN — DEXAMETHASONE SODIUM PHOSPHATE 20 MG: 10 INJECTION, SOLUTION INTRAMUSCULAR; INTRAVENOUS at 15:45

## 2023-05-03 RX ADMIN — LIDOCAINE HYDROCHLORIDE 4 ML: 10 INJECTION, SOLUTION EPIDURAL; INFILTRATION; INTRACAUDAL; PERINEURAL at 15:45

## 2023-05-03 ASSESSMENT — PAIN SCALES - GENERAL
PAINLEVEL: NO PAIN (0)
PAINLEVEL: MILD PAIN (3)

## 2023-05-03 NOTE — PATIENT INSTRUCTIONS
Please be advised that your blood glucose levels may be increased for the next 5-7 days as a result of the steroid you received with your injection today.  It is recommended that you monitor your blood glucose levels closely over the next week and direct any additional questions regarding your blood glucose management to your primary doctor.       DISCHARGE INSTRUCTIONS    During office hours (8:00 a.m.- 4:00 p.m.) questions or concerns may be answered  by calling [facility] Nurses at  [phone].  Messages received after hours will be returned the following business day.      In the case of an emergency, please dial 911 or seek assistance at the nearest Emergency Room/Urgent Care facility.     All Patients:    You may experience an increase in your symptoms for the first 2 days (It may take anywhere between 2 days- 2 weeks for the steroid to have maximum effect).    You may use ice on the injection site, as frequently as 20 minutes each hour if needed.    You may take your pain medicine.    You may continue taking your regular medication after your injection. If you have had a Medial Branch Block you may resume pain medication once your pain diary is completed.    You may shower. No swimming, tub bath or hot tub for 48 hours.  You may remove your bandaid/bandage as soon as you are home.    You may resume light activities, as tolerated.    Resume your usual diet as tolerated.    It is strongly advised that you do not drive for 1-3 hours post injection.    If you have had oral sedation:  Do not drive for 8 hours post injection.      If you have had IV sedation:  Do not drive for 24 hours post injection.  Do not operate hazardous machinery or make important personal/business decisions for 24 hours.      POSSIBLE STEROID SIDE EFFECTS (If steroid/cortisone was used for your procedure)    -If you experience these symptoms, it should only last for a short period    Swelling of the legs              Skin  redness (flushing)     Mouth (oral) irritation   Blood sugar (glucose) levels            Sweats                    Mood changes  Headache  Sleeplessness  Weakened immune system for up to 14 days, which could increase the risk of niall the COVID-19 virus and/or experiencing more severe symptoms of the disease, if exposed.  Decreased effectiveness of the flu vaccine if given within 2 weeks of the steroid.         POSSIBLE PROCEDURE SIDE EFFECTS  -Call the Sp[facility] Center if you are concerned  Increased Pain           Increased numbness/tingling      Nausea/Vomiting          Bruising/bleeding at site      Redness or swelling                                              Difficulty walking      Weakness           Fever greater than 100.5    *In the event of a severe headache after an epidural steroid injection that is relieved by lying down, please call the [facility] to speak with a clinical staff member*

## 2023-05-04 ENCOUNTER — THERAPY VISIT (OUTPATIENT)
Dept: PHYSICAL THERAPY | Facility: CLINIC | Age: 63
End: 2023-05-04
Payer: MEDICARE

## 2023-05-04 DIAGNOSIS — M54.16 LUMBAR RADICULOPATHY: Primary | ICD-10-CM

## 2023-05-04 PROCEDURE — 97110 THERAPEUTIC EXERCISES: CPT | Mod: GP

## 2023-05-04 PROCEDURE — 97112 NEUROMUSCULAR REEDUCATION: CPT | Mod: GP

## 2023-05-10 NOTE — PROGRESS NOTES
"In person evaluation    HPI  12/25/2020, in person hospital visit  Previous care by  Dr. Enrike Ayala and Dr. Mamadou Knutson  11/2/2022, in person visit  5/12/2023, in person visit      63-year-old evaluated neurologically for:  Left MCA stroke, December 2020  Left internal carotid artery stroke October 2020    Since last seen November 2022  No hospitalizations  No surgeries  Does have low back pain radicular to both legs follows at the spine center    Difficulty with increased diarrhea may be started about 8 months ago but really bad over the last month  Primary MD is stopping his metformin is only been off of it for 1 day  We would wait at least a month before making any other med changes    He thinks it may be the Depakote    He has been on the Depakote though for a long time for his \"hallucinations\" which we thought might be seizure that occurred after his stroke that was very cortical in nature    He is on low-dose Depakote 500 mg extended release once per night    If we had to decrease the Depakote we go down to 250 mg    Otherwise stable        A.  CVA       Left MCA stroke, December 2020       Left internal carotid artery stroke October 2020             Risk factors for stroke       Hypertension/hyperlipidemia/diabetes/recurrent stroke         Treated with dual antiplatelet medication       Treated with Depakote for mood changes after stroke         Almost 2 years on dual antiplatelet medication       Was on full aspirin 325 prior to this event       Will switch to Plavix only      HEAD MRI: 1/14/2022  1.  No recent infarct, intracranial mass, abnormal enhancement or evidence of intracranial hemorrhage.  2.  There are several small chronic infarcts in the left parietal lobe, posterior left frontal lobe and upper left basal ganglia.  3.  Underlying mild volume loss and presumed chronic small vessel ischemic changes.  HEAD MRA: 1/14/2022  1.  Normal MRA [place].  NECK MRA: 1/14/2022  1.  No hemodynamically " significant stenosis in either proximal internal carotid artery.  2.  Vertebral arteries are patent through the neck and into the head.        B.  History of lumbar radiculopathy/lumbar spinal stenosis        Epidural abscess lumbar region March 2022        Status post L4-L3 hemilaminectomy and durotomy 3/21/2022        Reviewed neurosurgery notes 3/28/2023 (status post bilateral lumbar 3-4 hemilaminectomies/medial facetectomies/foraminotomies                                                                             Microdiscectomies/small durotomy on the right Dr. Duque 3/21/2022)      C.  Difficulty with memory        Slums score of 17 out of 30 when he had a stroke with agitation and hallucinations (12/2020)         Continue Depakote 500 mg once at nighttime for mood         Is on metformin check B12 level           MoCA 27+1 = 28 out of 30 (11/2/2022)    Past medical history  CVA left MCA December 2020  CVA October 2020, right-sided weakness (left posterior limb internal capsule)  Left ICA 50% stenosis  Covid 19, October 2020  Diabetes  Hyperlipidemia and hypertension  Cervical radiculopathy  Hypothyroid  Gout        Habits  Past smoker quit 27 years ago  Does drink alcohol      Family history  Father with cancer  Mother with cancer  Sister with cancer  Brother with diabetes and heart disease      Work-up  MRI scan brain 12/27/2020 Limited study  A.  Scattered infarcts in the left MCA territory  B.  Infarct burden increased compared to 12/25/2020  C.  Subacute infarct posterior limb left internal capsule stable  D.  No hemorrhagic transformation  MRI scan 12/25/2020 see official report left hemisphere stroke old evolving left internal capsule stroke  CT scan head 12/25/2020 see official report left posterior internal capsule stroke no hemorrhage  CTA intracranial vessels no significant stenosis  CTA of the neck  A.  Left ICA 60-70% stenosis  B.  Right ICA less than 50% stenosis  C.  Moderate stenosis right  vert  EEG December 25, 2020 theta slowing of the background consistent with diffuse cerebral dysfunction  Echo 60% ejection fraction, left atrium normal size  EKG normal sinus rhythm  Urine tox screen on admission positive for benzodiazepines given by EMS   COVID-19 negative  Slums score 17 out of 30  CSF WBC 3/1, RBC 19/62, glucose 78, cultures no growth  CSF HSV negative, cryptococcal antigen negative     HEAD MRI: 1/14/2022  1.  No recent infarct, intracranial mass, abnormal enhancement or evidence of intracranial hemorrhage.  2.  There are several small chronic infarcts in the left parietal lobe, posterior left frontal lobe and upper left basal ganglia.  3.  Underlying mild volume loss and presumed chronic small vessel ischemic changes.  HEAD MRA: 1/14/2022  1.  Normal MRA [facility].  NECK MRA: 1/14/2022  1.  No hemodynamically significant stenosis in either proximal internal carotid artery.  2.  Vertebral arteries are patent through the neck and into the head.  B12 level 397, (11/2/2022)  Carotid ultrasound 11/4/2022  A.  Right ICA peak systolic velocity 98 cm/s, no significant stenosis  B.  Left ICA peak systolic velocity 133 cm/s no significant stenosis  C.  Antegrade flow in vertebral arteries    MRI lumbar spine 2/15/2023  1.  Compared to most recent MRI, previously demonstrated rim-enhancing left posterolateral epidural fluid collection extending from L4 through S1 has resolved.        There is significantly decreased secondary spinal canal stenosis at the L4-L5 and L5-S1 levels, as described in official report.  2.  Unchanged severe spinal canal stenosis at L3-L4. Lesser degrees of spinal canal stenosis elsewhere.  3.  Varying degrees of multilevel neural foraminal stenosis, not significantly changed.  4.  Multilevel degenerative and postoperative changes, as described. Please see the body of the report for additional details.  5.  Small rim-enhancing fluid collection in the subcutaneous tissues  overlying the L4-L5 level, probably representing a small postoperative seroma,        although infection cannot be completely excluded by imaging features alone. Recommend clinical correlation.      Laboratory data review                    7/14/2022      10/2022      11/2/2022             1/5/2023    Depakote      32.1    NA/K                                143/4.2                                       144/4.0    BUN/Cr                            10.3/0.89                                   9.3/0.92    GLU                                  98                                              101    AST                                   25                                                 19    WBC/HGB                       4.9/13.0    PLTs                               191,000    HDL/LDL                          24/12    HGBA1C                             5.7    TSH                                   5.03                                                  2.68                                 B12                                                          397    Slums score 17 out of 30 (12/31/2020)        MoCA   11/2/2022         27+1 = 28 out of 30          Exam  Review of systems    No headache no chest pain or shortness of breath no nausea vomiting no diarrhea no fever chills    No diplopia no dysarthria  No visual changes    Has some right-sided weakness more so arm than leg  Sometimes has some mild dysphagia with saliva getting stuck but can swallow food  Complains of trouble with memory recall    Sleeps okay  No falls or injuries    Otherwise review of systems negative    Exam  Blood pressure 144/92, pulse 58  Alert orient x3  Lungs clear  Heart rate regular  Abdomen soft  Symmetrical pulses  No edema the feet  Straight leg raising sign negative      Neurologic exam  Alert orient x3  Normal prosody speech  Normal naming  Normal comprehension  Normal repetition  No aphasia  No neglect    MoCA   28 out of 30  (11/2/2022)      Cranials 2 through 12  No ophthalmoplegia  No nystagmus  Face symmetrical  Tongue twisters good  Visual fields intact    Upper extremity  Very subtle mild interning of the right arm  Left arm normal    Lower extremities  Toe tapping and a little bit slower on the right than the left subtle  Test and reported right over left  Iliopsoas 5/5  Hamstring 5/5  Quadriceps 5/5  Anterior tibial 5/5  Posterior tibial 5/5      Gait  Normal        Assessment/plan      1.  December 2020 left MCA stroke with confusion and agitation       October 2020 left internal capsule small vessel ischemic stroke during COVID infection    2.  Risk factors for stroke       Hypertension/hyperlipidemia/multiple strokes/past smoker    3.  Significant visual hallucinations and confusion with left MCA stroke December 2020 treated with Depakote    4.  March 2022 left lumbar surgery with epidural abscess    5.  Memory difficulty       Check B12 patient is on metformin       B12 level 397, (11/2/2022)       Baseline MoCA        MoCA 27+1 = 28 out of 30 (11/2/2022)        Recommend/plan    Diagnosis  Left MCA stroke  Left internal capsule stroke        Plavix 75 mg once per day  Crestor 5 mg p.o. daily  Depakote 500 mg extended release nightly for sleep/hallucinations started empirically    Risk factor reduction per primary MD    Some trouble with sleeping flashing lights in his vision at night as some parietal-occipital junction changes on ischemia we will add Depakote at nighttime to see if this helps    Depakote 500 mg extended release nightly slept a lot better      Plan  Plavix  75 mg once per day    Continue Depakote help with flashing/hallucinations/sleep    If he has continued diarrhea even after being off the metformin and he feels the Depakote is the cause and we can decrease Depakote to 250 mg once at nighttime and see how things go.    We also reviewed his MRI scans from his original stroke  We discussed his symptoms and  signs and the purpose for the medications above    Patient follow-up in 1 year    Total care time today 32 minutes        As part of visit today  Reviewed laboratory data  Reviewed MRI lumbar spine  Reviewed neurosurgery notes 3/28/2023 (status post bilateral lumbar 3-4 hemilaminectomies/medial facetectomies/foraminotomies                                                                       Microdiscectomies/small durotomy on the right Dr. Duque 3/21/2022)

## 2023-05-11 DIAGNOSIS — R19.7 DIARRHEA, UNSPECIFIED TYPE: Primary | ICD-10-CM

## 2023-05-12 ENCOUNTER — OFFICE VISIT (OUTPATIENT)
Dept: NEUROLOGY | Facility: CLINIC | Age: 63
End: 2023-05-12
Payer: MEDICARE

## 2023-05-12 VITALS
HEIGHT: 72 IN | BODY MASS INDEX: 31.15 KG/M2 | DIASTOLIC BLOOD PRESSURE: 92 MMHG | HEART RATE: 58 BPM | WEIGHT: 230 LBS | SYSTOLIC BLOOD PRESSURE: 144 MMHG

## 2023-05-12 DIAGNOSIS — R41.3 MEMORY LOSS: ICD-10-CM

## 2023-05-12 DIAGNOSIS — Z86.73 HISTORY OF COMPLETED STROKE: ICD-10-CM

## 2023-05-12 DIAGNOSIS — I63.312 CEREBROVASCULAR ACCIDENT (CVA) DUE TO THROMBOSIS OF LEFT MIDDLE CEREBRAL ARTERY (H): Primary | ICD-10-CM

## 2023-05-12 DIAGNOSIS — R53.1 RIGHT SIDED WEAKNESS: ICD-10-CM

## 2023-05-12 PROCEDURE — 99214 OFFICE O/P EST MOD 30 MIN: CPT | Performed by: PSYCHIATRY & NEUROLOGY

## 2023-05-12 RX ORDER — CLOPIDOGREL BISULFATE 75 MG/1
75 TABLET ORAL DAILY
Qty: 90 TABLET | Refills: 3 | Status: ON HOLD | OUTPATIENT
Start: 2023-05-12 | End: 2023-07-02

## 2023-05-12 NOTE — NURSING NOTE
Chief Complaint   Patient presents with     Follow Up     6 month follow up for CVA in 12/2020.     Latricia Kohler LPN on 5/12/2023 at 2:15 PM

## 2023-05-12 NOTE — LETTER
"    5/12/2023         RE: Raghavendra Kiser  [address]  [place]  [place] [address]        Dear Colleague,    Thank you for referring your patient, Raghavendra Kiser, to the [facility] [place]. Please see a copy of my visit note below.    In person evaluation    HPI  12/25/2020, in person hospital visit  Previous care by  Dr. Enrike Ayala and Dr. Mamadou Knutson  11/2/2022, in person visit  5/12/2023, in person visit      63-year-old evaluated neurologically for:  Left MCA stroke, December 2020  Left internal carotid artery stroke October 2020    Since last seen November 2022  No hospitalizations  No surgeries  Does have low back pain radicular to both legs follows at the spine center    Difficulty with increased diarrhea may be started about 8 months ago but really bad over the last month  Primary MD is stopping his metformin is only been off of it for 1 day  We would wait at least a month before making any other med changes    He thinks it may be the Depakote    He has been on the Depakote though for a long time for his \"hallucinations\" which we thought might be seizure that occurred after his stroke that was very cortical in nature    He is on low-dose Depakote 500 mg extended release once per night    If we had to decrease the Depakote we go down to 250 mg    Otherwise stable        A.  CVA       Left MCA stroke, December 2020       Left internal carotid artery stroke October 2020             Risk factors for stroke       Hypertension/hyperlipidemia/diabetes/recurrent stroke         Treated with dual antiplatelet medication       Treated with Depakote for mood changes after stroke         Almost 2 years on dual antiplatelet medication       Was on full aspirin 325 prior to this event       Will switch to Plavix only      HEAD MRI: 1/14/2022  1.  No recent infarct, intracranial mass, abnormal enhancement or evidence of intracranial hemorrhage.  2.  There are several small chronic infarcts in " the left parietal lobe, posterior left frontal lobe and upper left basal ganglia.  3.  Underlying mild volume loss and presumed chronic small vessel ischemic changes.  HEAD MRA: 1/14/2022  1.  Normal MRA [facility] [place].  NECK MRA: 1/14/2022  1.  No hemodynamically significant stenosis in either proximal internal carotid artery.  2.  Vertebral arteries are patent through the neck and into the head.        B.  History of lumbar radiculopathy/lumbar spinal stenosis        Epidural abscess lumbar region March 2022        Status post L4-L3 hemilaminectomy and durotomy 3/21/2022        Reviewed neurosurgery notes 3/28/2023 (status post bilateral lumbar 3-4 hemilaminectomies/medial facetectomies/foraminotomies                                                                             Microdiscectomies/small durotomy on the right Dr. Duque 3/21/2022)      C.  Difficulty with memory        Slums score of 17 out of 30 when he had a stroke with agitation and hallucinations (12/2020)         Continue Depakote 500 mg once at nighttime for mood         Is on metformin check B12 level           MoCA 27+1 = 28 out of 30 (11/2/2022)    Past medical history  CVA left MCA December 2020  CVA October 2020, right-sided weakness (left posterior limb internal capsule)  Left ICA 50% stenosis  Covid 19, October 2020  Diabetes  Hyperlipidemia and hypertension  Cervical radiculopathy  Hypothyroid  Gout        Habits  Past smoker quit 27 years ago  Does drink alcohol      Family history  Father with cancer  Mother with cancer  Sister with cancer  Brother with diabetes and heart disease      Work-up  MRI scan brain 12/27/2020 Limited study  A.  Scattered infarcts in the left MCA territory  B.  Infarct burden increased compared to 12/25/2020  C.  Subacute infarct posterior limb left internal capsule stable  D.  No hemorrhagic transformation  MRI scan 12/25/2020 see official report left hemisphere stroke old evolving left internal capsule  stroke  CT scan head 12/25/2020 see official report left posterior internal capsule stroke no hemorrhage  CTA intracranial vessels no significant stenosis  CTA of the neck  A.  Left ICA 60-70% stenosis  B.  Right ICA less than 50% stenosis  C.  Moderate stenosis right vert  EEG December 25, 2020 theta slowing of the background consistent with diffuse cerebral dysfunction  Echo 60% ejection fraction, left atrium normal size  EKG normal sinus rhythm  Urine tox screen on admission positive for benzodiazepines given by EMS   COVID-19 negative  Slums score 17 out of 30  CSF WBC 3/1, RBC 19/62, glucose 78, cultures no growth  CSF HSV negative, cryptococcal antigen negative     HEAD MRI: 1/14/2022  1.  No recent infarct, intracranial mass, abnormal enhancement or evidence of intracranial hemorrhage.  2.  There are several small chronic infarcts in the left parietal lobe, posterior left frontal lobe and upper left basal ganglia.  3.  Underlying mild volume loss and presumed chronic small vessel ischemic changes.  HEAD MRA: 1/14/2022  1.  Normal MRA [place] [facility] [place].  NECK MRA: 1/14/2022  1.  No hemodynamically significant stenosis in either proximal internal carotid artery.  2.  Vertebral arteries are patent through the neck and into the head.  B12 level 397, (11/2/2022)  Carotid ultrasound 11/4/2022  A.  Right ICA peak systolic velocity 98 cm/s, no significant stenosis  B.  Left ICA peak systolic velocity 133 cm/s no significant stenosis  C.  Antegrade flow in vertebral arteries    MRI lumbar spine 2/15/2023  1.  Compared to most recent MRI, previously demonstrated rim-enhancing left posterolateral epidural fluid collection extending from L4 through S1 has resolved.        There is significantly decreased secondary spinal canal stenosis at the L4-L5 and L5-S1 levels, as described in official report.  2.  Unchanged severe spinal canal stenosis at L3-L4. Lesser degrees of spinal canal stenosis elsewhere.  3.  Varying degrees  of multilevel neural foraminal stenosis, not significantly changed.  4.  Multilevel degenerative and postoperative changes, as described. Please see the body of the report for additional details.  5.  Small rim-enhancing fluid collection in the subcutaneous tissues overlying the L4-L5 level, probably representing a small postoperative seroma,        although infection cannot be completely excluded by imaging features alone. Recommend clinical correlation.      Laboratory data review                    7/14/2022      10/2022      11/2/2022             1/5/2023    Depakote      32.1    NA/K                                143/4.2                                       144/4.0    BUN/Cr                            10.3/0.89                                   9.3/0.92    GLU                                  98                                              101    AST                                   25                                                 19    WBC/HGB                       4.9/13.0    PLTs                               191,000    HDL/LDL                          24/12    HGBA1C                             5.7    TSH                                   5.03                                                  2.68                                 B12                                                          397    Slums score 17 out of 30 (12/31/2020)        MoCA   11/2/2022         27+1 = 28 out of 30          Exam  Review of systems    No headache no chest pain or shortness of breath no nausea vomiting no diarrhea no fever chills    No diplopia no dysarthria  No visual changes    Has some right-sided weakness more so arm than leg  Sometimes has some mild dysphagia with saliva getting stuck but can swallow food  Complains of trouble with memory recall    Sleeps okay  No falls or injuries    Otherwise review of systems negative    Exam  Blood pressure 144/92, pulse 58  Alert orient x3  Lungs clear  Heart rate  regular  Abdomen soft  Symmetrical pulses  No edema the feet  Straight leg raising sign negative      Neurologic exam  Alert orient x3  Normal prosody speech  Normal naming  Normal comprehension  Normal repetition  No aphasia  No neglect    MoCA   28 out of 30 (11/2/2022)      Cranials 2 through 12  No ophthalmoplegia  No nystagmus  Face symmetrical  Tongue twisters good  Visual fields intact    Upper extremity  Very subtle mild interning of the right arm  Left arm normal    Lower extremities  Toe tapping and a little bit slower on the right than the left subtle  Test and reported right over left  Iliopsoas 5/5  Hamstring 5/5  Quadriceps 5/5  Anterior tibial 5/5  Posterior tibial 5/5      Gait  Normal        Assessment/plan      1.  December 2020 left MCA stroke with confusion and agitation       October 2020 left internal capsule small vessel ischemic stroke during COVID infection    2.  Risk factors for stroke       Hypertension/hyperlipidemia/multiple strokes/past smoker    3.  Significant visual hallucinations and confusion with left MCA stroke December 2020 treated with Depakote    4.  March 2022 left lumbar surgery with epidural abscess    5.  Memory difficulty       Check B12 patient is on metformin       B12 level 397, (11/2/2022)       Baseline MoCA        MoCA 27+1 = 28 out of 30 (11/2/2022)        Recommend/plan    Diagnosis  Left MCA stroke  Left internal capsule stroke        Plavix 75 mg once per day  Crestor 5 mg p.o. daily  Depakote 500 mg extended release nightly for sleep/hallucinations started empirically    Risk factor reduction per primary MD    Some trouble with sleeping flashing lights in his vision at night as some parietal-occipital junction changes on ischemia we will add Depakote at nighttime to see if this helps    Depakote 500 mg extended release nightly slept a lot better      Plan  Plavix  75 mg once per day    Continue Depakote help with flashing/hallucinations/sleep    If he has  continued diarrhea even after being off the metformin and he feels the Depakote is the cause and we can decrease Depakote to 250 mg once at nighttime and see how things go.    We also reviewed his MRI scans from his original stroke  We discussed his symptoms and signs and the purpose for the medications above    Patient follow-up in 1 year    Total care time today 32 minutes        As part of visit today  Reviewed laboratory data  Reviewed MRI lumbar spine  Reviewed neurosurgery notes 3/28/2023 (status post bilateral lumbar 3-4 hemilaminectomies/medial facetectomies/foraminotomies                                                                       Microdiscectomies/small durotomy on the right Dr. Duque 3/21/2022)              Again, thank you for allowing me to participate in the care of your patient.        Sincerely,        Alexander Cuevas MD

## 2023-05-17 DIAGNOSIS — I63.9 CEREBROVASCULAR ACCIDENT (CVA), UNSPECIFIED MECHANISM (H): ICD-10-CM

## 2023-05-18 ENCOUNTER — THERAPY VISIT (OUTPATIENT)
Dept: PHYSICAL THERAPY | Facility: CLINIC | Age: 63
End: 2023-05-18
Payer: MEDICARE

## 2023-05-18 DIAGNOSIS — M54.16 LUMBAR RADICULOPATHY: Primary | ICD-10-CM

## 2023-05-18 PROCEDURE — 97112 NEUROMUSCULAR REEDUCATION: CPT | Mod: GP

## 2023-05-18 PROCEDURE — 97110 THERAPEUTIC EXERCISES: CPT | Mod: GP

## 2023-05-18 PROCEDURE — 97530 THERAPEUTIC ACTIVITIES: CPT | Mod: GP

## 2023-05-18 RX ORDER — DIVALPROEX SODIUM 500 MG/1
TABLET, EXTENDED RELEASE ORAL
Qty: 90 TABLET | Refills: 0 | Status: SHIPPED | OUTPATIENT
Start: 2023-05-18 | End: 2023-08-02

## 2023-05-18 NOTE — TELEPHONE ENCOUNTER
"Routing refill request to provider for review/approval because:  Needs review    Last Written Prescription Date:  9/12/22  Last Fill Quantity: 90,  # refills: 2   Last office visit provider:  2/15/23     Requested Prescriptions   Pending Prescriptions Disp Refills     divalproex sodium extended-release (DEPAKOTE ER) 500 MG 24 hr tablet [Pharmacy Med Name: DIVALPROEX SODIUM ER TABS 500MG] 90 tablet 3     Sig: TAKE 1 TABLET AT BEDTIME       Anti-Seizure Meds Protocol  Failed - 5/17/2023  9:05 AM        Failed - Review Authorizing provider's last note.      Refer to last progress notes: confirm request is for original authorizing provider (cannot be through other providers).          Failed - Normal CBC on file in past 26 months     Recent Labs   Lab Test 01/05/23  1530   WBC 4.5   RBC 4.33*   HGB 13.8   HCT 41.5                    Failed - Depakote level within therapeutic range in past 26 months     Lab Results   Component Value Date    VALPROATE 32.1 07/14/2022     Depakote level must be checked 2-4 weeks after dosage change.          Passed - Recent (12 mo) or future (30 days) visit within the authorizing provider's specialty     Patient has had an office visit with the authorizing provider or a provider within the authorizing providers department within the previous 12 mos or has a future within next 30 days. See \"Patient Info\" tab in inbasket, or \"Choose Columns\" in Meds & Orders section of the refill encounter.              Passed - Normal ALT or AST on file in past 26 months     Recent Labs   Lab Test 01/05/23  1530   ALT 16     Recent Labs   Lab Test 01/05/23  1530   AST 19             Passed - Normal platelet count on file in past 26 months     Recent Labs   Lab Test 01/05/23  1530                  Passed - Medication is active on med list             Trish Granda RN 05/17/23 9:56 PM  "

## 2023-05-26 ENCOUNTER — RADIOLOGY INJECTION OFFICE VISIT (OUTPATIENT)
Dept: PHYSICAL MEDICINE AND REHAB | Facility: CLINIC | Age: 63
End: 2023-05-26
Attending: PAIN MEDICINE
Payer: MEDICARE

## 2023-05-26 VITALS
TEMPERATURE: 97.7 F | OXYGEN SATURATION: 99 % | HEART RATE: 55 BPM | RESPIRATION RATE: 16 BRPM | DIASTOLIC BLOOD PRESSURE: 68 MMHG | SYSTOLIC BLOOD PRESSURE: 124 MMHG

## 2023-05-26 DIAGNOSIS — M54.16 LUMBAR RADICULOPATHY: ICD-10-CM

## 2023-05-26 DIAGNOSIS — E11.9 DIABETES MELLITUS (H): Primary | ICD-10-CM

## 2023-05-26 LAB — GLUCOSE SERPL-MCNC: 74 MG/DL (ref 70–99)

## 2023-05-26 PROCEDURE — 82962 GLUCOSE BLOOD TEST: CPT | Performed by: PAIN MEDICINE

## 2023-05-26 PROCEDURE — 64483 NJX AA&/STRD TFRM EPI L/S 1: CPT | Mod: 50 | Performed by: PAIN MEDICINE

## 2023-05-26 RX ORDER — LIDOCAINE HYDROCHLORIDE 10 MG/ML
INJECTION, SOLUTION EPIDURAL; INFILTRATION; INTRACAUDAL; PERINEURAL
Status: COMPLETED | OUTPATIENT
Start: 2023-05-26 | End: 2023-05-26

## 2023-05-26 RX ORDER — DEXAMETHASONE SODIUM PHOSPHATE 10 MG/ML
INJECTION, SOLUTION INTRAMUSCULAR; INTRAVENOUS
Status: COMPLETED | OUTPATIENT
Start: 2023-05-26 | End: 2023-05-26

## 2023-05-26 RX ADMIN — DEXAMETHASONE SODIUM PHOSPHATE 10 MG: 10 INJECTION, SOLUTION INTRAMUSCULAR; INTRAVENOUS at 13:03

## 2023-05-26 RX ADMIN — LIDOCAINE HYDROCHLORIDE 4 ML: 10 INJECTION, SOLUTION EPIDURAL; INFILTRATION; INTRACAUDAL; PERINEURAL at 13:02

## 2023-05-26 ASSESSMENT — PAIN SCALES - GENERAL
PAINLEVEL: MILD PAIN (3)
PAINLEVEL: MILD PAIN (2)

## 2023-05-26 NOTE — PATIENT INSTRUCTIONS
Follow-up visit with either TODD Vu or Naty Parmar CNP in 2-4 weeks to discuss injection outcome and determine care plan going forward.    Please be advised that your blood glucose levels may be increased for the next 5-7 days as a result of the steroid you received with your injection today.  It is recommended that you monitor your blood glucose levels closely over the next week and direct any additional questions regarding your blood glucose management to your primary doctor.       DISCHARGE INSTRUCTIONS    During office hours (8:00 a.m.- 4:00 p.m.) questions or concerns may be answered  by calling [facility] Navigation Nurses at  [phone].  Messages received after hours will be returned the following business day.      In the case of an emergency, please dial 911 or seek assistance at the nearest Emergency Room/Urgent Care facility.     All Patients:    You may experience an increase in your symptoms for the first 2 days (It may take anywhere between 2 days- 2 weeks for the steroid to have maximum effect).    You may use ice on the injection site, as frequently as 20 minutes each hour if needed.    You may take your pain medicine.    You may continue taking your regular medication after your injection. If you have had a Medial Branch Block you may resume pain medication once your pain diary is completed.    You may shower. No swimming, tub bath or hot tub for 48 hours.  You may remove your bandaid/bandage as soon as you are home.    You may resume light activities, as tolerated.    Resume your usual diet as tolerated.    It is strongly advised that you do not drive for 1-3 hours post injection.    If you have had oral sedation:  Do not drive for 8 hours post injection.      If you have had IV sedation:  Do not drive for 24 hours post injection.  Do not operate hazardous machinery or make important personal/business decisions for 24 hours.      POSSIBLE STEROID SIDE EFFECTS (If  steroid/cortisone was used for your procedure)    -If you experience these symptoms, it should only last for a short period    Swelling of the legs              Skin redness (flushing)     Mouth (oral) irritation   Blood sugar (glucose) levels            Sweats                    Mood changes  Headache  Sleeplessness  Weakened immune system for up to 14 days, which could increase the risk of niall the COVID-19 virus and/or experiencing more severe symptoms of the disease, if exposed.  Decreased effectiveness of the flu vaccine if given within 2 weeks of the steroid.         POSSIBLE PROCEDURE SIDE EFFECTS  -Call the Spine Center if you are concerned  Increased Pain           Increased numbness/tingling      Nausea/Vomiting          Bruising/bleeding at site      Redness or swelling                                              Difficulty walking      Weakness           Fever greater than 100.5    *In the event of a severe headache after an epidural steroid injection that is relieved by lying down, please call the [facility] to speak with a clinical staff member*

## 2023-06-02 NOTE — PROGRESS NOTES
This is a recent snapshot of the patient's [facility] medical record.  For current drug dose and complete information and questions, call [phone] or In Basket pool, [facility] ([id number])  CSN Number:  [id number]

## 2023-06-13 ENCOUNTER — PREP FOR PROCEDURE (OUTPATIENT)
Dept: NEUROSURGERY | Facility: CLINIC | Age: 63
End: 2023-06-13

## 2023-06-13 ENCOUNTER — OFFICE VISIT (OUTPATIENT)
Dept: NEUROSURGERY | Facility: CLINIC | Age: 63
End: 2023-06-13
Payer: MEDICARE

## 2023-06-13 VITALS — SYSTOLIC BLOOD PRESSURE: 123 MMHG | HEART RATE: 69 BPM | DIASTOLIC BLOOD PRESSURE: 78 MMHG | OXYGEN SATURATION: 98 %

## 2023-06-13 DIAGNOSIS — M48.061 SPINAL STENOSIS OF LUMBAR REGION, UNSPECIFIED WHETHER NEUROGENIC CLAUDICATION PRESENT: Primary | ICD-10-CM

## 2023-06-13 DIAGNOSIS — M48.062 LUMBAR STENOSIS WITH NEUROGENIC CLAUDICATION: Primary | ICD-10-CM

## 2023-06-13 PROCEDURE — 99214 OFFICE O/P EST MOD 30 MIN: CPT | Performed by: SURGERY

## 2023-06-13 ASSESSMENT — PAIN SCALES - GENERAL: PAINLEVEL: MILD PAIN (3)

## 2023-06-13 NOTE — LETTER
6/13/2023         RE: Raghavendra Kiser  [address]  [place] [facility] [address]        Dear Colleague,    Thank you for referring your patient, Raghavendra Kiser, to the [facility]. Please see a copy of my visit note below.    NEUROSURGERY FOLLOWUP  NOTE    Raghavendra Kiser comes today in f/u.  Patient is a  62 yo male who is s/p bilateral lumbar 3- lumbar 4 hemilaminectomies, medial facetectomies, foraminotomies and microdiscectomies with small durotomy on right on 3/21/2022. He is also s/p left lumbar 4-5, lumbar 5-sacral 1 hemilaminectomy for epidural abscess evacuation as well. He was doing well until February. He then developed symptoms after golfing at a golf simulator.     He started having back pain and leg pain again. He underwent bilateral L3-4, L4-5 TFESI. He felt great 24 hours after each injection. Symptoms then returned. Physical therapy is helping keep him moving but not resolving symptoms. Chiropractor 3 x a week. Will get some improvement.    Symptoms currently in anterior leg and posterior leg and buttocks. Claudicating back and leg pain.  Pain feels fairly constant. Stretching can decrease it. No weakness. Numbness in planter surface of foot under 4th and 5th toe only.    Lumbar xray shows anterolisthesis of lumbar 4-5 with 1 mm movement on flexion/extension.   MRI of his lumbar spine shows worsening stenosis again at lumbar 3-4 with severe spinal stenosis.  Improved stenosis at lumbar 4-sacral 1. Recommend lumbar 3-4 laminectomies, medial facetectomies, foraminotomies. Risks and benefits of lumbar decompression discussed including but not limited to infection, hematoma, nerve damage including paralysis, post op radiculitis, durotomy, risks associated with the use of general anesthesia, blood clots in the lungs or legs. He agreed to proceed.                               Abena Duque MD      CC:     Luis Daniel Maciel  [facility] [address]  [facility]  [id number]        Again, thank you for allowing me to participate in the care of your patient.        Sincerely,        Abena Duque MD

## 2023-06-13 NOTE — PROGRESS NOTES
NEUROSURGERY FOLLOWUP  NOTE    Raghavendra Kiser comes today in f/u.  Patient is a  64 yo male who is s/p bilateral lumbar 3- lumbar 4 hemilaminectomies, medial facetectomies, foraminotomies and microdiscectomies with small durotomy on right on 3/21/2022. He is also s/p left lumbar 4-5, lumbar 5-sacral 1 hemilaminectomy for epidural abscess evacuation as well. He was doing well until February. He then developed symptoms after golfing at a golf simulator.     He started having back pain and leg pain again. He underwent bilateral L3-4, L4-5 TFESI. He felt great 24 hours after each injection. Symptoms then returned. Physical therapy is helping keep him moving but not resolving symptoms. Chiropractor 3 x a week. Will get some improvement.    Symptoms currently in anterior leg and posterior leg and buttocks. Claudicating back and leg pain.  Pain feels fairly constant. Stretching can decrease it. No weakness. Numbness in planter surface of foot under 4th and 5th toe only.    Lumbar xray shows anterolisthesis of lumbar 4-5 with 1 mm movement on flexion/extension.   MRI of his lumbar spine shows worsening stenosis again at lumbar 3-4 with severe spinal stenosis.  Improved stenosis at lumbar 4-sacral 1. Recommend lumbar 3-4 laminectomies, medial facetectomies, foraminotomies. Risks and benefits of lumbar decompression discussed including but not limited to infection, hematoma, nerve damage including paralysis, post op radiculitis, durotomy, risks associated with the use of general anesthesia, blood clots in the lungs or legs. He agreed to proceed.                               Abena Duque MD      CC:     Luis Daniel Maciel  [facility] [address]  [facility] [facility] [address]

## 2023-06-13 NOTE — PATIENT INSTRUCTIONS
Recommend lumbar 3-4 laminectomies      Please review COMPLETE information about your proposed surgery, pre-operative requirements, post-operative course and expectations - available in a folder provided to you in clinic!    Your surgery scheduler will call you within 3 business days to begin the process of scheduling your surgery and appointments.     Pre-Operative    Pre-operative physical / Lab work with primary care physician within 30 days of surgical date. We prefer the pre-op exam to be done 2 weeks prior to surgery. We also prefer pre-op lab work be done at [facility] outpatient lab, 2 weeks prior to surgery.     If all pre-op appointments/test are not completed prior to your surgery date, you will be asked to reschedule your surgery.           As part of preparation for your upcoming procedure your primary care doctor may order a test to rule out a COVID-19 infection. This is no longer a requirement prior to surgery.     Readiness Visits    Prior to surgery, you may have a telephone or in person readiness visit with our RN team to discuss your upcomming surgery, results of your pre-op physical, and lab work.   If you will require a collar/neck brace after surgery, you will be fitted for one at your readiness visit prior to surgery (scheduled by the surgery scheduler).     Shower procedure    Hibiclens shower: Use one packet the night before surgery and one packet the morning of surgery for a whole body shower. Avoid face, hair, and genitals.      Eating/Drinking    Stop all solid foods 8 hours before surgery.  Stop all clear liquids 2 hours prior to arrival time     Clear liquids include water, clear juice, black coffee, or clear tea without milk, Gatorade, clear soda.     Medications - please refer to the pre-operative medication instructions sheet in your folder    Hold Aspirin, NSAIDs (Advil/Ibuprofen, Indocin, Naproxen,Nuprin,Relafen/Nabumetone, Diclofenac,Meloxicam, Aleve,  Celebrex) and all vitamins and supplements x 7-10 days prior to surgical date  You can take Tylenol (Acetaminophen) for pain up until the date of your surgery   Do not exceed 3,000 mg per day   Any other medications prescribed, please discuss with your primary care provider at your pre-operative physical. Please discuss when/if it is safe for you to stop taking blood thinners with your primary care provider.   We will NOT provide pain medications prior to surgery. We will prescribe post-op pain medications for up to 6 weeks after surgery.       FMLA/Short-term disability    If you are currently employed, you will likely need to be off work for 4-6 weeks for post-op recovery and healing.  Please fax any FMLA/short term disability paperwork to [phone], mail it into the clinic, drop it off in person, or send via a [employer] message.   You may also call our clinic with the date in which you'd like to return to work, and we can provide a work letter to your employer  We will support Short-Term Disability up to 12 weeks, beginning the date of your surgery. We do NOT support Long-Term Disability/Social Security Benefits.     Pain Management after surgery    Dealing with pain    As your body heals, you might feel a stabbing, burning, or aching pain. You may also have some numbness.  Everyone feels pain differently, we may ask you to rate your pain using a pain scale. This will let us know how much pain you feel.   Keep in mind that medicine won't take away all of your pain. It helps to try other ways to relax and ease pain.     Things to help with pain    After surgery, we will give you medicine for your pain. These medications work well, but they can make you drowsy, itchy, or sick to your stomach, and constipated. Try to take narcotics with food if they cause nausea.   For mild to moderate pain, you can take medication such as Tylenol or Ibuprofen. These can be used with narcotics and muscle relaxants. *If you have had  a fusion: do NOT use NSAIDs for 6 months after surgery.   Do NOT drive while taking narcotic pain medication  Do NOT drink alcohol while using narcotic pain medication  You can utilize ice as needed (no longer than 20 minutes at one time) you may apply this over your covered incision  Utilize heat for muscle spasms, do not apply heat over your incision  If a muscle relaxer is prescribed, please do NOT take it at the same time as your narcotic pain medication. Take them at least 90 minutes apart.   You may also use pain cream/patches on sore muscles. Do NOT apply these on your incision. Patches may be cut in 1/2 if needed.     *After your surgery, if you will be staying in-patient, a nursing team will be monitoring you closely throughout your stay and communicate your health status to your surgeon and other providers.  You will be seen by Advanced Practice Providers (e.g., nurse practitioners, clinic nurse specialists, and physician assistants) who will check on you regularly to assess the status of your surgical recovery.     Incision Care    Look at your incision site every day. You  may need a mirror, or family member to help you.   Do not submerge your incision in water such as pools, hot tubs, baths for at least 6 weeks or until incision is healed  You may get your incision wet in the shower. Allow water and soap to run over incision, and gently pat dry.   Remove the dressing the day after you are discharged from the hospital. Keep the incision clean and dry at all times. This may require several bandage changes.   Contact us right away if you have:   Fever over 101 degrees farenheit  Green or yellow drainage (pus) from your incision or increased bloody drainage   Redness, swelling, or warmth at your surgery site   Notify the clinic [phone].    Activity Restrictions    For the first 6 weeks, no lifting,pushing, or pulling > 5-10 pounds, no bending, twisting.  Use the stairs in moderation   Walking: Walking  is the best way to start exercise after surgery. Take short frequent walks. You may gradually increase the distance as tolerated. If you feel pain, decrease your activity, but DO NOT stop walking. Walking will help you regain strength.  Avoid prolonged positioning for longer than 30 minutes (change positions frequently while awake)  No contact sports until after follow up visit  No high impact activities such as; running/jogging, snowmobile or 4 carver riding or any other recreational vehicles until deemed safe by your surgeon/care team.   Please call the clinic if you develop any of the following symptoms:  Swelling and/or warmth in one or both legs  Pain or tenderness in your leg, ankle, foot, or arm   Red or discolored/pale skin     Post-Op Follow Up Appointments    We will call you to schedule these appointments after your discharge from the hospital.   Incision assessment within 2 weeks with a Registered Nurse   6 week post-op with a Nurse Practitioner/Physician Assistant. Your surgeon will be available on this day.

## 2023-06-13 NOTE — NURSING NOTE
Raghavendra Kiser is a 63 year old male who presents for:  Chief Complaint   Patient presents with     Follow Up     Injection follow up  -no relief  Upper leg pain        Initial Vitals:  /78   Pulse 69   SpO2 98%  Estimated body mass index is 31.19 kg/m  as calculated from the following:    Height as of 5/12/23: 6' (1.829 m).    Weight as of 5/12/23: 230 lb (104.3 kg).. There is no height or weight on file to calculate BSA. BP completed using cuff size: regular  Mild Pain (3)      Dre Barrera

## 2023-06-16 ENCOUNTER — OFFICE VISIT (OUTPATIENT)
Dept: FAMILY MEDICINE | Facility: CLINIC | Age: 63
End: 2023-06-16
Payer: MEDICARE

## 2023-06-16 VITALS
TEMPERATURE: 98.3 F | HEIGHT: 72 IN | DIASTOLIC BLOOD PRESSURE: 73 MMHG | OXYGEN SATURATION: 100 % | HEART RATE: 58 BPM | SYSTOLIC BLOOD PRESSURE: 132 MMHG | WEIGHT: 223.4 LBS | BODY MASS INDEX: 30.26 KG/M2 | RESPIRATION RATE: 16 BRPM

## 2023-06-16 DIAGNOSIS — E78.5 HYPERLIPIDEMIA, UNSPECIFIED HYPERLIPIDEMIA TYPE: ICD-10-CM

## 2023-06-16 DIAGNOSIS — E11.9 TYPE 2 DIABETES MELLITUS WITHOUT COMPLICATION, WITHOUT LONG-TERM CURRENT USE OF INSULIN (H): ICD-10-CM

## 2023-06-16 DIAGNOSIS — I10 PRIMARY HYPERTENSION: ICD-10-CM

## 2023-06-16 DIAGNOSIS — E03.9 HYPOTHYROIDISM, UNSPECIFIED TYPE: ICD-10-CM

## 2023-06-16 DIAGNOSIS — M48.061 SPINAL STENOSIS OF LUMBAR REGION, UNSPECIFIED WHETHER NEUROGENIC CLAUDICATION PRESENT: ICD-10-CM

## 2023-06-16 DIAGNOSIS — I63.512 ACUTE ISCHEMIC LEFT MCA STROKE (H): ICD-10-CM

## 2023-06-16 DIAGNOSIS — Z01.818 PREOPERATIVE EXAMINATION: Primary | ICD-10-CM

## 2023-06-16 LAB
ANION GAP SERPL CALCULATED.3IONS-SCNC: 11 MMOL/L (ref 7–15)
APTT PPP: 25 SECONDS (ref 22–38)
BUN SERPL-MCNC: 14 MG/DL (ref 8–23)
CALCIUM SERPL-MCNC: 10 MG/DL (ref 8.8–10.2)
CHLORIDE SERPL-SCNC: 106 MMOL/L (ref 98–107)
CREAT SERPL-MCNC: 0.95 MG/DL (ref 0.67–1.17)
DEPRECATED HCO3 PLAS-SCNC: 26 MMOL/L (ref 22–29)
ERYTHROCYTE [DISTWIDTH] IN BLOOD BY AUTOMATED COUNT: 13.7 % (ref 10–15)
GFR SERPL CREATININE-BSD FRML MDRD: 89 ML/MIN/1.73M2
GLUCOSE SERPL-MCNC: 69 MG/DL (ref 70–99)
HBA1C MFR BLD: 5.7 % (ref 0–5.6)
HCT VFR BLD AUTO: 42 % (ref 40–53)
HGB BLD-MCNC: 14.3 G/DL (ref 13.3–17.7)
INR PPP: 0.94 (ref 0.85–1.15)
MCH RBC QN AUTO: 32.4 PG (ref 26.5–33)
MCHC RBC AUTO-ENTMCNC: 34 G/DL (ref 31.5–36.5)
MCV RBC AUTO: 95 FL (ref 78–100)
PLATELET # BLD AUTO: 180 10E3/UL (ref 150–450)
POTASSIUM SERPL-SCNC: 4.1 MMOL/L (ref 3.4–5.3)
RBC # BLD AUTO: 4.42 10E6/UL (ref 4.4–5.9)
SODIUM SERPL-SCNC: 143 MMOL/L (ref 136–145)
WBC # BLD AUTO: 4.6 10E3/UL (ref 4–11)

## 2023-06-16 PROCEDURE — 85610 PROTHROMBIN TIME: CPT | Performed by: PHYSICIAN ASSISTANT

## 2023-06-16 PROCEDURE — 83036 HEMOGLOBIN GLYCOSYLATED A1C: CPT | Performed by: PHYSICIAN ASSISTANT

## 2023-06-16 PROCEDURE — 85730 THROMBOPLASTIN TIME PARTIAL: CPT | Performed by: PHYSICIAN ASSISTANT

## 2023-06-16 PROCEDURE — 99214 OFFICE O/P EST MOD 30 MIN: CPT | Performed by: PHYSICIAN ASSISTANT

## 2023-06-16 PROCEDURE — 85027 COMPLETE CBC AUTOMATED: CPT | Performed by: PHYSICIAN ASSISTANT

## 2023-06-16 PROCEDURE — 36415 COLL VENOUS BLD VENIPUNCTURE: CPT | Performed by: PHYSICIAN ASSISTANT

## 2023-06-16 PROCEDURE — 80048 BASIC METABOLIC PNL TOTAL CA: CPT | Performed by: PHYSICIAN ASSISTANT

## 2023-06-16 NOTE — PROGRESS NOTES
[facility]  [address] [facility] [address]  Phone: [phone]  Fax: [phone]  Primary Provider: Luis Daniel Maciel  Pre-op Performing Provider: LOUISE CAT      PREOPERATIVE EVALUATION:  Today's date: 6/16/2023    Raghavendra Kiser is a 63 year old male who presents for a preoperative evaluation.      6/16/2023     1:41 PM   Additional Questions   Roomed by ASHER Jay CMA([facility])     Surgical Information:  Surgery/Procedure: lumbar 3-lumbar 4 bilateral laminectomies, medial facetectomies, foraminotomies with possible microdiscectomies  Surgery Location: [facility]  Surgeon: Dr. Duque  Surgery Date: 06/30/2023  Time of Surgery: 9:55am  Where patient plans to recover: At home with family  Fax number for surgical facility: Note does not need to be faxed, will be available electronically in Epic.    Assessment & Plan     The proposed surgical procedure is considered INTERMEDIATE risk.    Preoperative examination  Spinal stenosis of lumbar region, unspecified whether neurogenic claudication present      Type 2 diabetes mellitus without complication, without long-term current use of insulin (H)  Chronic issue, recently stopped metformin, has been watching diet, will check A1C today.  - HEMOGLOBIN A1C; Future    Acute ischemic left MCA stroke (H)  Chronic issue, on Plavix, medication recommendations given to hold 5-7 days prior to surgery.     Primary hypertension  Chronic issue, stable.    Hyperlipidemia, unspecified hyperlipidemia type  Chronic issue, stable.    Hypothyroidism, unspecified type  Chronic issue, stable.             Risks and Recommendations:  The patient has the following additional risks and recommendations for perioperative complications:   - Consult Hospitalist / IM to assist with post-op medical management  Diabetes:  - Patient is not on insulin therapy: regular NPO guidelines can be followed.     Antiplatelet or Anticoagulation Medication  Instructions:   - Patient is undergoing an interventional pain or spine procedure. Will consult with Pain Specialist about holding antiplatelet and anticoagulant medications.   - clopidrogel (Plavix), prasugrel (Effient), ticagrelor (Brilinta): No contraindication to stopping Plavix, HOLD 5-7 days before surgery.     Additional Medication Instructions:   - ACE/ARB: Continue without modification (e.g., MAC anesthesia, neurosurgery, spine surgery, heart failure, or labile hypertension with risk of hypertension).   - Calcium Channel Blockers: May be continued on the day of surgery.  - ENDOCRINE Medications: take Synthroid AM surgery with small sip of water   - SSRIs, SNRIs, TCAs, Antipsychotics: Continue without modification.       Patient has issues with urinary retention post anesthesia    RECOMMENDATION:  APPROVAL GIVEN to proceed with proposed procedure, without further diagnostic evaluation.            Subjective       HPI related to upcoming procedure: history         6/15/2023     3:00 PM   Preop Questions   1. Have you ever had a heart attack or stroke? YES - stroke   2. Have you ever had surgery on your heart or blood vessels, such as a stent placement, a coronary artery bypass, or surgery on an artery in your head, neck, heart, or legs? YES - carotid endarterectomy   3. Do you have chest pain with activity? No   4. Do you have a history of  heart failure? No   5. Do you currently have a cold, bronchitis or symptoms of other infection? No   6. Do you have a cough, shortness of breath, or wheezing? No   7. Do you or anyone in your family have previous history of blood clots? No   8. Do you or does anyone in your family have a serious bleeding problem such as prolonged bleeding following surgeries or cuts? No   9. Have you ever had problems with anemia or been told to take iron pills? No   10. Have you had any abnormal blood loss such as black, tarry or bloody stools? No   11. Have you ever had a blood  transfusion? No   12. Are you willing to have a blood transfusion if it is medically needed before, during, or after your surgery? Yes   13. Have you or any of your relatives ever had problems with anesthesia? YES - has issues with urinary retention post anesthesia   14. Do you have sleep apnea, excessive snoring or daytime drowsiness? No   15. Do you have any artifical heart valves or other implanted medical devices like a pacemaker, defibrillator, or continuous glucose monitor? No   16. Do you have artificial joints? No   17. Are you allergic to latex? No       Health Care Directive:  Patient does not have a Health Care Directive or Living Will: Discussed advance care planning with patient; information given to patient to review.    Preoperative Review of :    Review of Systems  CONSTITUTIONAL: NEGATIVE for fever, chills, change in weight  INTEGUMENTARY/SKIN: NEGATIVE for worrisome rashes, moles or lesions  EYES: NEGATIVE for vision changes or irritation  ENT/MOUTH: NEGATIVE for ear, mouth and throat problems  RESP: NEGATIVE for significant cough or SOB  CV: NEGATIVE for chest pain, palpitations or peripheral edema  GI: NEGATIVE for nausea, abdominal pain, heartburn, or change in bowel habits  : NEGATIVE for frequency, dysuria, or hematuria  MUSCULOSKELETAL: NEGATIVE for significant arthralgias or myalgia  NEURO: NEGATIVE for weakness, dizziness or paresthesias  ENDOCRINE: NEGATIVE for temperature intolerance, skin/hair changes  HEME: NEGATIVE for bleeding problems  PSYCHIATRIC: NEGATIVE for changes in mood or affect     + back pain which is reason for surgery    Patient Active Problem List    Diagnosis Date Noted     Sensorineural hearing loss, bilateral 01/05/2023     Priority: Medium     Spinal stenosis of lumbar region, unspecified whether neurogenic claudication present 03/28/2022     Priority: Medium     Urine retention 03/28/2022     Priority: Medium     Therapeutic opioid induced constipation  03/28/2022     Priority: Medium     Lumbar radiculopathy 03/21/2022     Priority: Medium     Cognitive and behavioral changes 02/08/2021     Priority: Medium     Sleep difficulties 02/08/2021     Priority: Medium     AMS (altered mental status) 12/25/2020     Priority: Medium     Elevated blood pressure reading without diagnosis of hypertension 11/12/2020     Priority: Medium     Hypothyroidism 11/12/2020     Priority: Medium     Created by Conversion    Replacement Utility updated for latest IMO load       Other stiff joint of shoulder region 11/12/2020     Priority: Medium     Pain in joint involving ankle and foot 11/12/2020     Priority: Medium     Arthralgia of hand 11/12/2020     Priority: Medium     Peyronie's disease 11/12/2020     Priority: Medium     Right sided weakness 10/14/2020     Priority: Medium     Stenosis of left internal carotid artery 10/07/2020     Priority: Medium     History of colonic polyps 01/28/2020     Priority: Medium     Sessile serrated adenoma on colonoscopy 1-22-20.   Repeat in 3 years.       Benign neoplasm of transverse colon 01/24/2020     Priority: Medium     1-20-23 multiple adenomas (one an advanced adenoma);  Follow up colonoscopy in 3 years.       Polyp of colon 01/22/2020     Priority: Medium     Type 2 diabetes mellitus without complication, without long-term current use of insulin (H) 11/22/2019     Priority: Medium     Vitamin deficiency 11/22/2019     Priority: Medium     Precordial pain 11/06/2018     Priority: Medium     Hyperlipidemia 10/17/2018     Priority: Medium     Created by Conversion       Cervical radiculopathy 01/23/2018     Priority: Medium     Right shoulder tendinitis 08/29/2017     Priority: Medium     HTN (hypertension) 08/01/2017     Priority: Medium     High risk medication use 02/28/2017     Priority: Medium     Ganglion cyst 04/11/2016     Priority: Medium     Medial epicondylitis 02/10/2016     Priority: Medium     Atherosclerosis of right  carotid artery 05/22/2015     Priority: Medium     Superficial venous thrombosis of arm 05/22/2015     Priority: Medium     History of stroke 05/16/2015     Priority: Medium     Small stroke in the left corona radiata affecting right side.       TIA (transient ischemic attack) 05/16/2015     Priority: Medium     Acute ischemic left MCA stroke (H) 05/16/2015     Priority: Medium     History of TIA (transient ischemic attack) and stroke 05/01/2015     Priority: Medium     Apr 21, 2016 Entered By: CAROLEE GONZALEZ Comment: Carotid Artery Surgery - June 2015, F/U scheduled for Aug 16       Chronic gout 12/09/2014     Priority: Medium      Past Medical History:   Diagnosis Date     Atherosclerosis of right carotid artery      Carotid stenosis, bilateral      Cervical radiculopathy      Chronic gout      Fracture of right humerus      Ganglion cyst      History of stroke without residual deficits  Oct 2020, Dec 2020     Humerus fracture     Right     Hyperlipidemia      Hypertension      Hypothyroidism      Medial epicondylitis      Shoulder tendinitis, right      Superficial venous thrombosis of arm     right lateral antecubital fossa     TIA (transient ischemic attack) 05/16/2015     Tinnitus      Type 2 diabetes mellitus without complication, without long-term current use of insulin (H) 11/22/2019     Past Surgical History:   Procedure Laterality Date     CERVICAL DISC SURGERY       IR LUMBAR PUNCTURE  12/25/2020     IR SPINAL PUNCTURE  12/25/2020     LAMINECTOMY LUMBAR ONE LEVEL Bilateral 3/21/2022    Procedure: Bilateral lumbar 3- lumbar 4 hemilaminectomies, medial facetectomies, foraminotomies, microdiscectomies;  Surgeon: Abena Duque MD;  Location: [facility] [place] [facility]     LAMINECTOMY LUMBAR TWO LEVELS Left 3/29/2022    Procedure: LUMBAR 4-LUMBAR 5 LEFT HEMILAMINECTOMY WITH EPIDURAL ABSCESS EVACUATION;  Surgeon: Abena Duque MD;  Location: [facility] [place] [facility]     PICC INSERTION - TRIPLE LUMEN  12/26/2020  "         PICC SINGLE LUMEN PLACEMENT  2022          TONSILLECTOMY       ZZC THROMBOENDARTECTMY NECK,NECK INCIS Right 2015    Procedure: Right Carotid Endarterectomy with Impulse Monitoring;  Surgeon: Marcellus Toth MD;  Location: [facility];  Service: General     Current Outpatient Medications   Medication Sig Dispense Refill     allopurinol (ZYLOPRIM) 300 MG tablet TAKE ONE AND ONE-HALF TABLETS (450 MG) EVERY OTHER DAY, ALTERNATING WITH 300  tablet 1     amLODIPine (NORVASC) 5 MG tablet Take 1 tablet (5 mg) by mouth daily 90 tablet 3     blood glucose (FREESTYLE LITE) test strip USE 1 STRIP TWICE A DAY AS DIRECTED 200 strip 4     blood glucose monitoring (FREESTYLE) lancets For checking blood sugars up to twice daily 100 each 3     cholecalciferol 25 MCG (1000 UT) TABS 100 mcg       clopidogrel (PLAVIX) 75 MG tablet Take 1 tablet (75 mg) by mouth daily 90 tablet 3     divalproex sodium extended-release (DEPAKOTE ER) 500 MG 24 hr tablet TAKE 1 TABLET AT BEDTIME 90 tablet 0     levothyroxine (SYNTHROID/LEVOTHROID) 125 MCG tablet Take 125 mcg by mouth daily before breakfast        lisinopril (ZESTRIL) 20 MG tablet TAKE 1 TABLET TWICE A  tablet 3     rosuvastatin (CRESTOR) 5 MG tablet Take 1 tablet (5 mg) by mouth daily 90 tablet 3       No Known Allergies     Social History     Tobacco Use     Smoking status: Former     Packs/day: 2.00     Years: 20.00     Pack years: 40.00     Types: Cigarettes     Start date: 1972     Quit date: 2/15/1993     Years since quittin.3     Smokeless tobacco: Never     Tobacco comments:     quit in    Vaping Use     Vaping status: Not on file   Substance Use Topics     Alcohol use: Yes     Comment: Moderate use, once or twice a month.       History   Drug Use No         Objective     /73   Pulse 58   Temp 98.3  F (36.8  C) (Oral)   Resp 16   Ht 1.822 m (5' 11.75\")   Wt 101.3 kg (223 lb 6.4 oz)   SpO2 100%   BMI 30.51 kg/m  "     Physical Exam    GENERAL APPEARANCE: healthy, alert and no distress     EYES: EOMI,  PERRL     HENT: ear canals and TM's normal and nose and mouth without ulcers or lesions     NECK: no adenopathy, no asymmetry, masses, or scars and thyroid normal to palpation     RESP: lungs clear to auscultation - no rales, rhonchi or wheezes     CV: regular rates and rhythm, normal S1 S2, no S3 or S4 and no murmur, click or rub     ABDOMEN:  soft, nontender, no HSM or masses and bowel sounds normal     MS: extremities normal- no gross deformities noted, no evidence of inflammation in joints, FROM in all extremities.     SKIN: no suspicious lesions or rashes     NEURO: Normal strength and tone, sensory exam grossly normal, mentation intact and speech normal     PSYCH: mentation appears normal. and affect normal/bright     LYMPHATICS: No cervical adenopathy    Recent Labs   Lab Test 01/05/23  1530 11/10/22  1430 10/20/22  1559 07/14/22  1636 03/30/22  0635 03/28/22  1948 03/22/22  0602 03/16/22  1043   HGB 13.8 13.7 13.0*  --    < > 11.9*   < > 15.4    161 191  --    < > 245   < > 170   INR  --   --   --   --   --  1.01  --  0.90     --  143 142   < > 140   < > 143   POTASSIUM 4.0  --  4.2 4.6   < > 3.5   < > 3.7   CR 0.92 0.98 0.89 1.03   < > 0.95   < > 0.92   A1C  --   --  5.7* 6.0*  --   --   --  5.9*    < > = values in this interval not displayed.        Diagnostics:  No labs were ordered during this visit.   No EKG required, no history of coronary heart disease, significant arrhythmia, peripheral arterial disease or other structural heart disease.    Revised Cardiac Risk Index (RCRI):  The patient has the following serious cardiovascular risks for perioperative complications:   - Cerebrovascular Disease (TIA or CVA) = 1 point     RCRI Interpretation: 0 points: Class I (very low risk - 0.4% complication rate)           Signed Electronically by: Anjelica Song PA-C  Copy of this evaluation report is  provided to requesting physician.

## 2023-06-16 NOTE — H&P (VIEW-ONLY)
[facility]  [address] [facility] [address]  Phone: [phone]  Fax: [phone]  Primary Provider: Luis Daniel Maciel  Pre-op Performing Provider: LOUISE CAT      PREOPERATIVE EVALUATION:  Today's date: 6/16/2023    Raghavendra Kiser is a 63 year old male who presents for a preoperative evaluation.      6/16/2023     1:41 PM   Additional Questions   Roomed by ASHER Jay CMA([facility])     Surgical Information:  Surgery/Procedure: lumbar 3-lumbar 4 bilateral laminectomies, medial facetectomies, foraminotomies with possible microdiscectomies  Surgery Location: [facility]  Surgeon: Dr. Duque  Surgery Date: 06/30/2023  Time of Surgery: 9:55am  Where patient plans to recover: At home with family  Fax number for surgical facility: Note does not need to be faxed, will be available electronically in Epic.    Assessment & Plan     The proposed surgical procedure is considered INTERMEDIATE risk.    Preoperative examination  Spinal stenosis of lumbar region, unspecified whether neurogenic claudication present      Type 2 diabetes mellitus without complication, without long-term current use of insulin (H)  Chronic issue, recently stopped metformin, has been watching diet, will check A1C today.  - HEMOGLOBIN A1C; Future    Acute ischemic left MCA stroke (H)  Chronic issue, on Plavix, medication recommendations given to hold 5-7 days prior to surgery.     Primary hypertension  Chronic issue, stable.    Hyperlipidemia, unspecified hyperlipidemia type  Chronic issue, stable.    Hypothyroidism, unspecified type  Chronic issue, stable.             Risks and Recommendations:  The patient has the following additional risks and recommendations for perioperative complications:   - Consult Hospitalist / IM to assist with post-op medical management  Diabetes:  - Patient is not on insulin therapy: regular NPO guidelines can be followed.     Antiplatelet or Anticoagulation Medication  Instructions:   - Patient is undergoing an interventional pain or spine procedure. Will consult with Pain Specialist about holding antiplatelet and anticoagulant medications.   - clopidrogel (Plavix), prasugrel (Effient), ticagrelor (Brilinta): No contraindication to stopping Plavix, HOLD 5-7 days before surgery.     Additional Medication Instructions:   - ACE/ARB: Continue without modification (e.g., MAC anesthesia, neurosurgery, spine surgery, heart failure, or labile hypertension with risk of hypertension).   - Calcium Channel Blockers: May be continued on the day of surgery.  - ENDOCRINE Medications: take Synthroid AM surgery with small sip of water   - SSRIs, SNRIs, TCAs, Antipsychotics: Continue without modification.       Patient has issues with urinary retention post anesthesia    RECOMMENDATION:  APPROVAL GIVEN to proceed with proposed procedure, without further diagnostic evaluation.            Subjective       HPI related to upcoming procedure: history         6/15/2023     3:00 PM   Preop Questions   1. Have you ever had a heart attack or stroke? YES - stroke   2. Have you ever had surgery on your heart or blood vessels, such as a stent placement, a coronary artery bypass, or surgery on an artery in your head, neck, heart, or legs? YES - carotid endarterectomy   3. Do you have chest pain with activity? No   4. Do you have a history of  heart failure? No   5. Do you currently have a cold, bronchitis or symptoms of other infection? No   6. Do you have a cough, shortness of breath, or wheezing? No   7. Do you or anyone in your family have previous history of blood clots? No   8. Do you or does anyone in your family have a serious bleeding problem such as prolonged bleeding following surgeries or cuts? No   9. Have you ever had problems with anemia or been told to take iron pills? No   10. Have you had any abnormal blood loss such as black, tarry or bloody stools? No   11. Have you ever had a blood  transfusion? No   12. Are you willing to have a blood transfusion if it is medically needed before, during, or after your surgery? Yes   13. Have you or any of your relatives ever had problems with anesthesia? YES - has issues with urinary retention post anesthesia   14. Do you have sleep apnea, excessive snoring or daytime drowsiness? No   15. Do you have any artifical heart valves or other implanted medical devices like a pacemaker, defibrillator, or continuous glucose monitor? No   16. Do you have artificial joints? No   17. Are you allergic to latex? No       Health Care Directive:  Patient does not have a Health Care Directive or Living Will: Discussed advance care planning with patient; information given to patient to review.    Preoperative Review of :    Review of Systems  CONSTITUTIONAL: NEGATIVE for fever, chills, change in weight  INTEGUMENTARY/SKIN: NEGATIVE for worrisome rashes, moles or lesions  EYES: NEGATIVE for vision changes or irritation  ENT/MOUTH: NEGATIVE for ear, mouth and throat problems  RESP: NEGATIVE for significant cough or SOB  CV: NEGATIVE for chest pain, palpitations or peripheral edema  GI: NEGATIVE for nausea, abdominal pain, heartburn, or change in bowel habits  : NEGATIVE for frequency, dysuria, or hematuria  MUSCULOSKELETAL: NEGATIVE for significant arthralgias or myalgia  NEURO: NEGATIVE for weakness, dizziness or paresthesias  ENDOCRINE: NEGATIVE for temperature intolerance, skin/hair changes  HEME: NEGATIVE for bleeding problems  PSYCHIATRIC: NEGATIVE for changes in mood or affect     + back pain which is reason for surgery    Patient Active Problem List    Diagnosis Date Noted     Sensorineural hearing loss, bilateral 01/05/2023     Priority: Medium     Spinal stenosis of lumbar region, unspecified whether neurogenic claudication present 03/28/2022     Priority: Medium     Urine retention 03/28/2022     Priority: Medium     Therapeutic opioid induced constipation  03/28/2022     Priority: Medium     Lumbar radiculopathy 03/21/2022     Priority: Medium     Cognitive and behavioral changes 02/08/2021     Priority: Medium     Sleep difficulties 02/08/2021     Priority: Medium     AMS (altered mental status) 12/25/2020     Priority: Medium     Elevated blood pressure reading without diagnosis of hypertension 11/12/2020     Priority: Medium     Hypothyroidism 11/12/2020     Priority: Medium     Created by Conversion    Replacement Utility updated for latest IMO load       Other stiff joint of shoulder region 11/12/2020     Priority: Medium     Pain in joint involving ankle and foot 11/12/2020     Priority: Medium     Arthralgia of hand 11/12/2020     Priority: Medium     Peyronie's disease 11/12/2020     Priority: Medium     Right sided weakness 10/14/2020     Priority: Medium     Stenosis of left internal carotid artery 10/07/2020     Priority: Medium     History of colonic polyps 01/28/2020     Priority: Medium     Sessile serrated adenoma on colonoscopy 1-22-20.   Repeat in 3 years.       Benign neoplasm of transverse colon 01/24/2020     Priority: Medium     1-20-23 multiple adenomas (one an advanced adenoma);  Follow up colonoscopy in 3 years.       Polyp of colon 01/22/2020     Priority: Medium     Type 2 diabetes mellitus without complication, without long-term current use of insulin (H) 11/22/2019     Priority: Medium     Vitamin deficiency 11/22/2019     Priority: Medium     Precordial pain 11/06/2018     Priority: Medium     Hyperlipidemia 10/17/2018     Priority: Medium     Created by Conversion       Cervical radiculopathy 01/23/2018     Priority: Medium     Right shoulder tendinitis 08/29/2017     Priority: Medium     HTN (hypertension) 08/01/2017     Priority: Medium     High risk medication use 02/28/2017     Priority: Medium     Ganglion cyst 04/11/2016     Priority: Medium     Medial epicondylitis 02/10/2016     Priority: Medium     Atherosclerosis of right  carotid artery 05/22/2015     Priority: Medium     Superficial venous thrombosis of arm 05/22/2015     Priority: Medium     History of stroke 05/16/2015     Priority: Medium     Small stroke in the left corona radiata affecting right side.       TIA (transient ischemic attack) 05/16/2015     Priority: Medium     Acute ischemic left MCA stroke (H) 05/16/2015     Priority: Medium     History of TIA (transient ischemic attack) and stroke 05/01/2015     Priority: Medium     Apr 21, 2016 Entered By: CAROLEE GONZALEZ Comment: Carotid Artery Surgery - June 2015, F/U scheduled for Aug 16       Chronic gout 12/09/2014     Priority: Medium      Past Medical History:   Diagnosis Date     Atherosclerosis of right carotid artery      Carotid stenosis, bilateral      Cervical radiculopathy      Chronic gout      Fracture of right humerus      Ganglion cyst      History of stroke without residual deficits  Oct 2020, Dec 2020     Humerus fracture     Right     Hyperlipidemia      Hypertension      Hypothyroidism      Medial epicondylitis      Shoulder tendinitis, right      Superficial venous thrombosis of arm     right lateral antecubital fossa     TIA (transient ischemic attack) 05/16/2015     Tinnitus      Type 2 diabetes mellitus without complication, without long-term current use of insulin (H) 11/22/2019     Past Surgical History:   Procedure Laterality Date     CERVICAL DISC SURGERY       IR LUMBAR PUNCTURE  12/25/2020     IR SPINAL PUNCTURE  12/25/2020     LAMINECTOMY LUMBAR ONE LEVEL Bilateral 3/21/2022    Procedure: Bilateral lumbar 3- lumbar 4 hemilaminectomies, medial facetectomies, foraminotomies, microdiscectomies;  Surgeon: Abena Duque MD;  Location: [facility] OR     LAMINECTOMY LUMBAR TWO LEVELS Left 3/29/2022    Procedure: LUMBAR 4-LUMBAR 5 LEFT HEMILAMINECTOMY WITH EPIDURAL ABSCESS EVACUATION;  Surgeon: Abena Duque MD;  Location: [facility] OR     PICC INSERTION - TRIPLE LUMEN  12/26/2020  "         PICC SINGLE LUMEN PLACEMENT  2022          TONSILLECTOMY       ZZC THROMBOENDARTECTMY NECK,NECK INCIS Right 2015    Procedure: Right Carotid Endarterectomy with Impulse Monitoring;  Surgeon: Marcellus Toth MD;  Location: [facility];  Service: General     Current Outpatient Medications   Medication Sig Dispense Refill     allopurinol (ZYLOPRIM) 300 MG tablet TAKE ONE AND ONE-HALF TABLETS (450 MG) EVERY OTHER DAY, ALTERNATING WITH 300  tablet 1     amLODIPine (NORVASC) 5 MG tablet Take 1 tablet (5 mg) by mouth daily 90 tablet 3     blood glucose (FREESTYLE LITE) test strip USE 1 STRIP TWICE A DAY AS DIRECTED 200 strip 4     blood glucose monitoring (FREESTYLE) lancets For checking blood sugars up to twice daily 100 each 3     cholecalciferol 25 MCG (1000 UT) TABS 100 mcg       clopidogrel (PLAVIX) 75 MG tablet Take 1 tablet (75 mg) by mouth daily 90 tablet 3     divalproex sodium extended-release (DEPAKOTE ER) 500 MG 24 hr tablet TAKE 1 TABLET AT BEDTIME 90 tablet 0     levothyroxine (SYNTHROID/LEVOTHROID) 125 MCG tablet Take 125 mcg by mouth daily before breakfast        lisinopril (ZESTRIL) 20 MG tablet TAKE 1 TABLET TWICE A  tablet 3     rosuvastatin (CRESTOR) 5 MG tablet Take 1 tablet (5 mg) by mouth daily 90 tablet 3       No Known Allergies     Social History     Tobacco Use     Smoking status: Former     Packs/day: 2.00     Years: 20.00     Pack years: 40.00     Types: Cigarettes     Start date: 1972     Quit date: 2/15/1993     Years since quittin.3     Smokeless tobacco: Never     Tobacco comments:     quit in    Vaping Use     Vaping status: Not on file   Substance Use Topics     Alcohol use: Yes     Comment: Moderate use, once or twice a month.       History   Drug Use No         Objective     /73   Pulse 58   Temp 98.3  F (36.8  C) (Oral)   Resp 16   Ht 1.822 m (5' 11.75\")   Wt 101.3 kg (223 lb 6.4 oz)   SpO2 100%   BMI 30.51 kg/m  "     Physical Exam    GENERAL APPEARANCE: healthy, alert and no distress     EYES: EOMI,  PERRL     HENT: ear canals and TM's normal and nose and mouth without ulcers or lesions     NECK: no adenopathy, no asymmetry, masses, or scars and thyroid normal to palpation     RESP: lungs clear to auscultation - no rales, rhonchi or wheezes     CV: regular rates and rhythm, normal S1 S2, no S3 or S4 and no murmur, click or rub     ABDOMEN:  soft, nontender, no HSM or masses and bowel sounds normal     MS: extremities normal- no gross deformities noted, no evidence of inflammation in joints, FROM in all extremities.     SKIN: no suspicious lesions or rashes     NEURO: Normal strength and tone, sensory exam grossly normal, mentation intact and speech normal     PSYCH: mentation appears normal. and affect normal/bright     LYMPHATICS: No cervical adenopathy    Recent Labs   Lab Test 01/05/23  1530 11/10/22  1430 10/20/22  1559 07/14/22  1636 03/30/22  0635 03/28/22  1948 03/22/22  0602 03/16/22  1043   HGB 13.8 13.7 13.0*  --    < > 11.9*   < > 15.4    161 191  --    < > 245   < > 170   INR  --   --   --   --   --  1.01  --  0.90     --  143 142   < > 140   < > 143   POTASSIUM 4.0  --  4.2 4.6   < > 3.5   < > 3.7   CR 0.92 0.98 0.89 1.03   < > 0.95   < > 0.92   A1C  --   --  5.7* 6.0*  --   --   --  5.9*    < > = values in this interval not displayed.        Diagnostics:  No labs were ordered during this visit.   No EKG required, no history of coronary heart disease, significant arrhythmia, peripheral arterial disease or other structural heart disease.    Revised Cardiac Risk Index (RCRI):  The patient has the following serious cardiovascular risks for perioperative complications:   - Cerebrovascular Disease (TIA or CVA) = 1 point     RCRI Interpretation: 0 points: Class I (very low risk - 0.4% complication rate)           Signed Electronically by: Anjelica Song PA-C  Copy of this evaluation report is  provided to requesting physician.

## 2023-06-16 NOTE — PATIENT INSTRUCTIONS
HOLD PLAVIX 5-7 days prior to procedure.    Okay to take all morning pills (Norvasc, Lisinopril, Synthroid) with small sip of water.    Okay to take PM pills night before.

## 2023-06-29 ENCOUNTER — TELEPHONE (OUTPATIENT)
Dept: NEUROSURGERY | Facility: CLINIC | Age: 63
End: 2023-06-29
Payer: MEDICARE

## 2023-06-29 NOTE — TELEPHONE ENCOUNTER
Called patient, discussed surgery, post-op course, expectations, follow up plan.    Reviewed H&P from 06/16/2023 - cleared for surgery.  Labs - WNL    MRI done on 02/15/2023  - in [place]    To OR as planned.     Check in - 0830    Nothing to eat or drink after midnight the night before surgery.     Reviewed with patient: Arrive 2 hours prior to scheduled surgery.    Bring all pertinent films to hospital the day of surgery.     Continue to refrain from NSAIDS (Ibuprofen, Aleve, Naprosyn), ASA, Over the counter herbal medications or supplements, anti-coagulants and blood thinners.     Patient confirmed they have help/assistance in place at home upon discharge    Instructions: using a washcloth and a bottle of provided Hibiclens, wash your body, avoiding your face and genitals. Preferably, shower the night before surgery and the morning of surgery using a half a bottle each time for your whole body shower.        Patient was informed that we will provide up to 6 weeks prescription of pain medication for post-operative pain.      Instructed patient about the following: After your surgery, if you will be staying in-patient, a nursing provider team will be monitoring you closely throughout your stay and communicate your health status to your surgeon and other providers.  You will be seen by Advanced Practice Providers (e.g., nurse practitioners, clinic nurse specialist, and physician assistants) who will check on you regularly to assess the status of your surgery.     Kate Shelton RN, CNRN

## 2023-06-30 ENCOUNTER — ANESTHESIA EVENT (OUTPATIENT)
Dept: SURGERY | Facility: CLINIC | Age: 63
DRG: 520 | End: 2023-06-30
Payer: MEDICARE

## 2023-06-30 ENCOUNTER — ANESTHESIA (OUTPATIENT)
Dept: SURGERY | Facility: CLINIC | Age: 63
DRG: 520 | End: 2023-06-30
Payer: MEDICARE

## 2023-06-30 ENCOUNTER — HOSPITAL ENCOUNTER (INPATIENT)
Facility: CLINIC | Age: 63
LOS: 2 days | Discharge: HOME | DRG: 520 | End: 2023-07-02
Attending: SURGERY | Admitting: SURGERY
Payer: MEDICARE

## 2023-06-30 ENCOUNTER — APPOINTMENT (OUTPATIENT)
Dept: RADIOLOGY | Facility: CLINIC | Age: 63
DRG: 520 | End: 2023-06-30
Attending: PHYSICIAN ASSISTANT
Payer: MEDICARE

## 2023-06-30 DIAGNOSIS — M48.062 SPINAL STENOSIS OF LUMBAR REGION WITH NEUROGENIC CLAUDICATION: Primary | ICD-10-CM

## 2023-06-30 DIAGNOSIS — Z86.73 HISTORY OF COMPLETED STROKE: ICD-10-CM

## 2023-06-30 PROBLEM — M48.00 SPINAL STENOSIS: Status: ACTIVE | Noted: 2023-06-30

## 2023-06-30 LAB
BASOPHILS # BLD AUTO: 0 10E3/UL (ref 0–0.2)
BASOPHILS NFR BLD AUTO: 0 %
CREAT SERPL-MCNC: 1.04 MG/DL (ref 0.7–1.3)
EOSINOPHIL # BLD AUTO: 0 10E3/UL (ref 0–0.7)
EOSINOPHIL NFR BLD AUTO: 0 %
GFR SERPL CREATININE-BSD FRML MDRD: 80 ML/MIN/1.73M2
GLUCOSE BLDC GLUCOMTR-MCNC: 108 MG/DL (ref 70–99)
GLUCOSE BLDC GLUCOMTR-MCNC: 162 MG/DL (ref 70–99)
IMM GRANULOCYTES # BLD: 0 10E3/UL
IMM GRANULOCYTES NFR BLD: 0 %
LACTATE SERPL-SCNC: 2 MMOL/L (ref 0.7–2)
LACTATE SERPL-SCNC: 3.3 MMOL/L (ref 0.7–2)
LYMPHOCYTES # BLD AUTO: 0.6 10E3/UL (ref 0.8–5.3)
LYMPHOCYTES NFR BLD AUTO: 8 %
MONOCYTES # BLD AUTO: 0.1 10E3/UL (ref 0–1.3)
MONOCYTES NFR BLD AUTO: 2 %
NEUTROPHILS # BLD AUTO: 6.6 10E3/UL (ref 1.6–8.3)
NEUTROPHILS NFR BLD AUTO: 90 %
NRBC # BLD AUTO: 0 10E3/UL
NRBC BLD AUTO-RTO: 0 /100
WBC # BLD AUTO: 7.3 10E3/UL (ref 4–11)

## 2023-06-30 PROCEDURE — 250N000005 HC OR RX SURGIFLO HEMOSTATIC MATRIX 10ML 199102S OPNP: Performed by: SURGERY

## 2023-06-30 PROCEDURE — 250N000011 HC RX IP 250 OP 636: Mod: JZ | Performed by: ANESTHESIOLOGY

## 2023-06-30 PROCEDURE — 63047 LAM FACETEC & FORAMOT LUMBAR: CPT | Mod: AS | Performed by: NURSE PRACTITIONER

## 2023-06-30 PROCEDURE — 250N000009 HC RX 250: Performed by: NURSE ANESTHETIST, CERTIFIED REGISTERED

## 2023-06-30 PROCEDURE — 85004 AUTOMATED DIFF WBC COUNT: CPT | Performed by: INTERNAL MEDICINE

## 2023-06-30 PROCEDURE — 63047 LAM FACETEC & FORAMOT LUMBAR: CPT | Performed by: SURGERY

## 2023-06-30 PROCEDURE — 01NB0ZZ RELEASE LUMBAR NERVE, OPEN APPROACH: ICD-10-PCS | Performed by: SURGERY

## 2023-06-30 PROCEDURE — 99222 1ST HOSP IP/OBS MODERATE 55: CPT | Performed by: EMERGENCY MEDICINE

## 2023-06-30 PROCEDURE — 258N000003 HC RX IP 258 OP 636: Performed by: NURSE PRACTITIONER

## 2023-06-30 PROCEDURE — 360N000077 HC SURGERY LEVEL 4, PER MIN: Performed by: SURGERY

## 2023-06-30 PROCEDURE — 250N000013 HC RX MED GY IP 250 OP 250 PS 637: Performed by: EMERGENCY MEDICINE

## 2023-06-30 PROCEDURE — 272N000001 HC OR GENERAL SUPPLY STERILE: Performed by: SURGERY

## 2023-06-30 PROCEDURE — 82565 ASSAY OF CREATININE: CPT | Performed by: NURSE PRACTITIONER

## 2023-06-30 PROCEDURE — 36415 COLL VENOUS BLD VENIPUNCTURE: CPT | Performed by: EMERGENCY MEDICINE

## 2023-06-30 PROCEDURE — 83605 ASSAY OF LACTIC ACID: CPT | Performed by: EMERGENCY MEDICINE

## 2023-06-30 PROCEDURE — 250N000013 HC RX MED GY IP 250 OP 250 PS 637: Performed by: ANESTHESIOLOGY

## 2023-06-30 PROCEDURE — 250N000025 HC SEVOFLURANE, PER MIN: Performed by: SURGERY

## 2023-06-30 PROCEDURE — 258N000003 HC RX IP 258 OP 636: Performed by: ANESTHESIOLOGY

## 2023-06-30 PROCEDURE — 36415 COLL VENOUS BLD VENIPUNCTURE: CPT | Performed by: NURSE PRACTITIONER

## 2023-06-30 PROCEDURE — 258N000003 HC RX IP 258 OP 636: Performed by: INTERNAL MEDICINE

## 2023-06-30 PROCEDURE — 250N000011 HC RX IP 250 OP 636: Mod: JZ | Performed by: NURSE PRACTITIONER

## 2023-06-30 PROCEDURE — 00NY0ZZ RELEASE LUMBAR SPINAL CORD, OPEN APPROACH: ICD-10-PCS | Performed by: SURGERY

## 2023-06-30 PROCEDURE — 370N000017 HC ANESTHESIA TECHNICAL FEE, PER MIN: Performed by: SURGERY

## 2023-06-30 PROCEDURE — 710N000010 HC RECOVERY PHASE 1, LEVEL 2, PER MIN: Performed by: SURGERY

## 2023-06-30 PROCEDURE — 999N000141 HC STATISTIC PRE-PROCEDURE NURSING ASSESSMENT: Performed by: SURGERY

## 2023-06-30 PROCEDURE — 999N000063 XR CROSSTABLE LATERAL LUMBAR SPINE PORTABLE

## 2023-06-30 PROCEDURE — 250N000011 HC RX IP 250 OP 636: Performed by: SURGERY

## 2023-06-30 PROCEDURE — 258N000003 HC RX IP 258 OP 636: Performed by: NURSE ANESTHETIST, CERTIFIED REGISTERED

## 2023-06-30 PROCEDURE — 82962 GLUCOSE BLOOD TEST: CPT

## 2023-06-30 PROCEDURE — 250N000011 HC RX IP 250 OP 636: Performed by: PHYSICIAN ASSISTANT

## 2023-06-30 PROCEDURE — 250N000011 HC RX IP 250 OP 636: Mod: JZ | Performed by: NURSE ANESTHETIST, CERTIFIED REGISTERED

## 2023-06-30 PROCEDURE — 250N000013 HC RX MED GY IP 250 OP 250 PS 637: Performed by: NURSE PRACTITIONER

## 2023-06-30 PROCEDURE — 250N000013 HC RX MED GY IP 250 OP 250 PS 637: Performed by: PHYSICIAN ASSISTANT

## 2023-06-30 PROCEDURE — 250N000009 HC RX 250: Performed by: SURGERY

## 2023-06-30 PROCEDURE — 120N000001 HC R&B MED SURG/OB

## 2023-06-30 RX ORDER — SODIUM CHLORIDE, SODIUM LACTATE, POTASSIUM CHLORIDE, CALCIUM CHLORIDE 600; 310; 30; 20 MG/100ML; MG/100ML; MG/100ML; MG/100ML
INJECTION, SOLUTION INTRAVENOUS CONTINUOUS
Status: DISCONTINUED | OUTPATIENT
Start: 2023-06-30 | End: 2023-06-30 | Stop reason: HOSPADM

## 2023-06-30 RX ORDER — ONDANSETRON 2 MG/ML
4 INJECTION INTRAMUSCULAR; INTRAVENOUS EVERY 30 MIN PRN
Status: DISCONTINUED | OUTPATIENT
Start: 2023-06-30 | End: 2023-06-30

## 2023-06-30 RX ORDER — GABAPENTIN 100 MG/1
100 CAPSULE ORAL
Status: COMPLETED | OUTPATIENT
Start: 2023-06-30 | End: 2023-06-30

## 2023-06-30 RX ORDER — HYDROMORPHONE HCL IN WATER/PF 6 MG/30 ML
0.2 PATIENT CONTROLLED ANALGESIA SYRINGE INTRAVENOUS
Status: DISCONTINUED | OUTPATIENT
Start: 2023-06-30 | End: 2023-07-02 | Stop reason: HOSPADM

## 2023-06-30 RX ORDER — POLYETHYLENE GLYCOL 3350 17 G/17G
17 POWDER, FOR SOLUTION ORAL DAILY
Status: DISCONTINUED | OUTPATIENT
Start: 2023-07-01 | End: 2023-07-02 | Stop reason: HOSPADM

## 2023-06-30 RX ORDER — CEFAZOLIN SODIUM 2 G/100ML
2 INJECTION, SOLUTION INTRAVENOUS EVERY 8 HOURS
Status: COMPLETED | OUTPATIENT
Start: 2023-06-30 | End: 2023-07-01

## 2023-06-30 RX ORDER — DEXAMETHASONE SODIUM PHOSPHATE 10 MG/ML
INJECTION, SOLUTION INTRAMUSCULAR; INTRAVENOUS PRN
Status: DISCONTINUED | OUTPATIENT
Start: 2023-06-30 | End: 2023-06-30

## 2023-06-30 RX ORDER — LEVOTHYROXINE SODIUM 125 UG/1
125 TABLET ORAL
Status: DISCONTINUED | OUTPATIENT
Start: 2023-07-01 | End: 2023-07-02 | Stop reason: HOSPADM

## 2023-06-30 RX ORDER — LIDOCAINE HYDROCHLORIDE 10 MG/ML
INJECTION, SOLUTION INFILTRATION; PERINEURAL PRN
Status: DISCONTINUED | OUTPATIENT
Start: 2023-06-30 | End: 2023-06-30

## 2023-06-30 RX ORDER — ALLOPURINOL 300 MG/1
300 TABLET ORAL DAILY
COMMUNITY
End: 2023-09-14

## 2023-06-30 RX ORDER — ONDANSETRON 4 MG/1
4 TABLET, ORALLY DISINTEGRATING ORAL EVERY 6 HOURS PRN
Status: DISCONTINUED | OUTPATIENT
Start: 2023-06-30 | End: 2023-07-02 | Stop reason: HOSPADM

## 2023-06-30 RX ORDER — BISACODYL 10 MG
10 SUPPOSITORY, RECTAL RECTAL DAILY PRN
Status: DISCONTINUED | OUTPATIENT
Start: 2023-06-30 | End: 2023-07-02 | Stop reason: HOSPADM

## 2023-06-30 RX ORDER — BUPIVACAINE HYDROCHLORIDE AND EPINEPHRINE 2.5; 5 MG/ML; UG/ML
INJECTION, SOLUTION EPIDURAL; INFILTRATION; INTRACAUDAL; PERINEURAL PRN
Status: DISCONTINUED | OUTPATIENT
Start: 2023-06-30 | End: 2023-06-30

## 2023-06-30 RX ORDER — OXYCODONE HYDROCHLORIDE 5 MG/1
5 TABLET ORAL EVERY 4 HOURS PRN
Status: DISCONTINUED | OUTPATIENT
Start: 2023-06-30 | End: 2023-07-02 | Stop reason: HOSPADM

## 2023-06-30 RX ORDER — PROCHLORPERAZINE MALEATE 5 MG
5 TABLET ORAL EVERY 6 HOURS PRN
Status: DISCONTINUED | OUTPATIENT
Start: 2023-06-30 | End: 2023-07-02 | Stop reason: HOSPADM

## 2023-06-30 RX ORDER — CEFAZOLIN SODIUM/WATER 2 G/20 ML
2 SYRINGE (ML) INTRAVENOUS
Status: COMPLETED | OUTPATIENT
Start: 2023-06-30 | End: 2023-06-30

## 2023-06-30 RX ORDER — VITAMIN B COMPLEX
25 TABLET ORAL DAILY
Status: DISCONTINUED | OUTPATIENT
Start: 2023-07-01 | End: 2023-07-02 | Stop reason: HOSPADM

## 2023-06-30 RX ORDER — EPHEDRINE SULFATE 50 MG/ML
INJECTION, SOLUTION INTRAMUSCULAR; INTRAVENOUS; SUBCUTANEOUS PRN
Status: DISCONTINUED | OUTPATIENT
Start: 2023-06-30 | End: 2023-06-30

## 2023-06-30 RX ORDER — LIDOCAINE 40 MG/G
CREAM TOPICAL
Status: DISCONTINUED | OUTPATIENT
Start: 2023-06-30 | End: 2023-06-30 | Stop reason: HOSPADM

## 2023-06-30 RX ORDER — AMOXICILLIN 250 MG
1 CAPSULE ORAL 2 TIMES DAILY
Status: DISCONTINUED | OUTPATIENT
Start: 2023-06-30 | End: 2023-07-02 | Stop reason: HOSPADM

## 2023-06-30 RX ORDER — ENOXAPARIN SODIUM 100 MG/ML
40 INJECTION SUBCUTANEOUS EVERY 24 HOURS
Status: DISCONTINUED | OUTPATIENT
Start: 2023-07-02 | End: 2023-07-02 | Stop reason: HOSPADM

## 2023-06-30 RX ORDER — HYDROMORPHONE HCL IN WATER/PF 6 MG/30 ML
0.4 PATIENT CONTROLLED ANALGESIA SYRINGE INTRAVENOUS
Status: DISCONTINUED | OUTPATIENT
Start: 2023-06-30 | End: 2023-07-02 | Stop reason: HOSPADM

## 2023-06-30 RX ORDER — ACETAMINOPHEN 325 MG/1
975 TABLET ORAL EVERY 8 HOURS
Status: DISCONTINUED | OUTPATIENT
Start: 2023-06-30 | End: 2023-07-02 | Stop reason: HOSPADM

## 2023-06-30 RX ORDER — MULTIVITAMIN,THERAPEUTIC
1 TABLET ORAL DAILY
Status: DISCONTINUED | OUTPATIENT
Start: 2023-07-01 | End: 2023-07-02 | Stop reason: HOSPADM

## 2023-06-30 RX ORDER — ONDANSETRON 4 MG/1
4 TABLET, ORALLY DISINTEGRATING ORAL EVERY 30 MIN PRN
Status: DISCONTINUED | OUTPATIENT
Start: 2023-06-30 | End: 2023-06-30

## 2023-06-30 RX ORDER — LORATADINE 10 MG/1
10 TABLET ORAL DAILY PRN
Status: DISCONTINUED | OUTPATIENT
Start: 2023-06-30 | End: 2023-07-02 | Stop reason: HOSPADM

## 2023-06-30 RX ORDER — TAMSULOSIN HYDROCHLORIDE 0.4 MG/1
0.4 CAPSULE ORAL DAILY
Status: DISCONTINUED | OUTPATIENT
Start: 2023-06-30 | End: 2023-07-02 | Stop reason: HOSPADM

## 2023-06-30 RX ORDER — BUPIVACAINE HYDROCHLORIDE AND EPINEPHRINE 2.5; 5 MG/ML; UG/ML
INJECTION, SOLUTION EPIDURAL; INFILTRATION; INTRACAUDAL; PERINEURAL
Status: DISCONTINUED
Start: 2023-06-30 | End: 2023-06-30 | Stop reason: HOSPADM

## 2023-06-30 RX ORDER — ACETAMINOPHEN 325 MG/1
650 TABLET ORAL EVERY 4 HOURS PRN
Status: DISCONTINUED | OUTPATIENT
Start: 2023-07-03 | End: 2023-07-02 | Stop reason: HOSPADM

## 2023-06-30 RX ORDER — FENTANYL CITRATE 50 UG/ML
50 INJECTION, SOLUTION INTRAMUSCULAR; INTRAVENOUS EVERY 5 MIN PRN
Status: DISCONTINUED | OUTPATIENT
Start: 2023-06-30 | End: 2023-06-30

## 2023-06-30 RX ORDER — HALOPERIDOL 5 MG/ML
1 INJECTION INTRAMUSCULAR
Status: DISCONTINUED | OUTPATIENT
Start: 2023-06-30 | End: 2023-06-30

## 2023-06-30 RX ORDER — CEFAZOLIN SODIUM/WATER 2 G/20 ML
2 SYRINGE (ML) INTRAVENOUS SEE ADMIN INSTRUCTIONS
Status: DISCONTINUED | OUTPATIENT
Start: 2023-06-30 | End: 2023-06-30

## 2023-06-30 RX ORDER — FENTANYL CITRATE 50 UG/ML
25 INJECTION, SOLUTION INTRAMUSCULAR; INTRAVENOUS
Status: DISCONTINUED | OUTPATIENT
Start: 2023-06-30 | End: 2023-06-30

## 2023-06-30 RX ORDER — DIVALPROEX SODIUM 500 MG/1
500 TABLET, EXTENDED RELEASE ORAL AT BEDTIME
Status: DISCONTINUED | OUTPATIENT
Start: 2023-06-30 | End: 2023-07-02 | Stop reason: HOSPADM

## 2023-06-30 RX ORDER — SODIUM CHLORIDE 9 MG/ML
INJECTION, SOLUTION INTRAVENOUS CONTINUOUS
Status: DISCONTINUED | OUTPATIENT
Start: 2023-06-30 | End: 2023-07-02 | Stop reason: HOSPADM

## 2023-06-30 RX ORDER — AMLODIPINE BESYLATE 5 MG/1
5 TABLET ORAL DAILY
Status: DISCONTINUED | OUTPATIENT
Start: 2023-07-01 | End: 2023-07-02 | Stop reason: HOSPADM

## 2023-06-30 RX ORDER — ALLOPURINOL 300 MG/1
450 TABLET ORAL
Status: ON HOLD | COMMUNITY
End: 2023-07-02

## 2023-06-30 RX ORDER — HYDROMORPHONE HCL IN WATER/PF 6 MG/30 ML
0.2 PATIENT CONTROLLED ANALGESIA SYRINGE INTRAVENOUS EVERY 5 MIN PRN
Status: DISCONTINUED | OUTPATIENT
Start: 2023-06-30 | End: 2023-06-30

## 2023-06-30 RX ORDER — METHOCARBAMOL 750 MG/1
750 TABLET, FILM COATED ORAL 4 TIMES DAILY
Status: DISCONTINUED | OUTPATIENT
Start: 2023-06-30 | End: 2023-07-02 | Stop reason: HOSPADM

## 2023-06-30 RX ORDER — KETAMINE HYDROCHLORIDE 10 MG/ML
INJECTION INTRAMUSCULAR; INTRAVENOUS PRN
Status: DISCONTINUED | OUTPATIENT
Start: 2023-06-30 | End: 2023-06-30

## 2023-06-30 RX ORDER — ACETAMINOPHEN 325 MG/1
975 TABLET ORAL ONCE
Status: COMPLETED | OUTPATIENT
Start: 2023-06-30 | End: 2023-06-30

## 2023-06-30 RX ORDER — MAGNESIUM SULFATE 4 G/50ML
4 INJECTION INTRAVENOUS ONCE
Status: COMPLETED | OUTPATIENT
Start: 2023-06-30 | End: 2023-06-30

## 2023-06-30 RX ORDER — LISINOPRIL 20 MG/1
20 TABLET ORAL 2 TIMES DAILY
Status: DISCONTINUED | OUTPATIENT
Start: 2023-06-30 | End: 2023-07-02 | Stop reason: HOSPADM

## 2023-06-30 RX ORDER — ALLOPURINOL 300 MG/1
300 TABLET ORAL
Status: DISCONTINUED | OUTPATIENT
Start: 2023-06-30 | End: 2023-07-02 | Stop reason: HOSPADM

## 2023-06-30 RX ORDER — ROSUVASTATIN CALCIUM 5 MG/1
5 TABLET, COATED ORAL AT BEDTIME
Status: DISCONTINUED | OUTPATIENT
Start: 2023-06-30 | End: 2023-07-02 | Stop reason: HOSPADM

## 2023-06-30 RX ORDER — HYDROMORPHONE HCL IN WATER/PF 6 MG/30 ML
0.4 PATIENT CONTROLLED ANALGESIA SYRINGE INTRAVENOUS EVERY 5 MIN PRN
Status: DISCONTINUED | OUTPATIENT
Start: 2023-06-30 | End: 2023-06-30

## 2023-06-30 RX ORDER — ONDANSETRON 2 MG/ML
4 INJECTION INTRAMUSCULAR; INTRAVENOUS EVERY 6 HOURS PRN
Status: DISCONTINUED | OUTPATIENT
Start: 2023-06-30 | End: 2023-07-02 | Stop reason: HOSPADM

## 2023-06-30 RX ORDER — OXYCODONE HYDROCHLORIDE 5 MG/1
5 TABLET ORAL
Status: DISCONTINUED | OUTPATIENT
Start: 2023-06-30 | End: 2023-06-30

## 2023-06-30 RX ORDER — LABETALOL HYDROCHLORIDE 5 MG/ML
5 INJECTION, SOLUTION INTRAVENOUS EVERY 10 MIN PRN
Status: DISCONTINUED | OUTPATIENT
Start: 2023-06-30 | End: 2023-06-30

## 2023-06-30 RX ORDER — FENTANYL CITRATE 50 UG/ML
25 INJECTION, SOLUTION INTRAMUSCULAR; INTRAVENOUS EVERY 5 MIN PRN
Status: DISCONTINUED | OUTPATIENT
Start: 2023-06-30 | End: 2023-06-30

## 2023-06-30 RX ORDER — PROPOFOL 10 MG/ML
INJECTION, EMULSION INTRAVENOUS PRN
Status: DISCONTINUED | OUTPATIENT
Start: 2023-06-30 | End: 2023-06-30

## 2023-06-30 RX ORDER — OXYCODONE HYDROCHLORIDE 5 MG/1
10 TABLET ORAL
Status: DISCONTINUED | OUTPATIENT
Start: 2023-06-30 | End: 2023-06-30

## 2023-06-30 RX ORDER — OXYCODONE HYDROCHLORIDE 5 MG/1
10 TABLET ORAL EVERY 4 HOURS PRN
Status: DISCONTINUED | OUTPATIENT
Start: 2023-06-30 | End: 2023-07-02 | Stop reason: HOSPADM

## 2023-06-30 RX ORDER — FENTANYL CITRATE 50 UG/ML
INJECTION, SOLUTION INTRAMUSCULAR; INTRAVENOUS PRN
Status: DISCONTINUED | OUTPATIENT
Start: 2023-06-30 | End: 2023-06-30

## 2023-06-30 RX ORDER — ONDANSETRON 2 MG/ML
INJECTION INTRAMUSCULAR; INTRAVENOUS PRN
Status: DISCONTINUED | OUTPATIENT
Start: 2023-06-30 | End: 2023-06-30

## 2023-06-30 RX ADMIN — KETAMINE HYDROCHLORIDE 30 MG: 10 INJECTION, SOLUTION INTRAMUSCULAR; INTRAVENOUS at 10:00

## 2023-06-30 RX ADMIN — Medication 10 MG: at 10:35

## 2023-06-30 RX ADMIN — ROCURONIUM BROMIDE 70 MG: 10 INJECTION, SOLUTION INTRAVENOUS at 10:00

## 2023-06-30 RX ADMIN — SODIUM CHLORIDE 500 ML: 9 INJECTION, SOLUTION INTRAVENOUS at 19:37

## 2023-06-30 RX ADMIN — Medication 2 G: at 10:00

## 2023-06-30 RX ADMIN — DEXAMETHASONE SODIUM PHOSPHATE 10 MG: 10 INJECTION, SOLUTION INTRAMUSCULAR; INTRAVENOUS at 10:00

## 2023-06-30 RX ADMIN — FENTANYL CITRATE 25 MCG: 50 INJECTION, SOLUTION INTRAMUSCULAR; INTRAVENOUS at 12:26

## 2023-06-30 RX ADMIN — PHENYLEPHRINE HYDROCHLORIDE 0.2 MCG/KG/MIN: 10 INJECTION INTRAVENOUS at 10:31

## 2023-06-30 RX ADMIN — Medication 5 MG: at 11:33

## 2023-06-30 RX ADMIN — GABAPENTIN 100 MG: 100 CAPSULE ORAL at 08:29

## 2023-06-30 RX ADMIN — SODIUM CHLORIDE, POTASSIUM CHLORIDE, SODIUM LACTATE AND CALCIUM CHLORIDE: 600; 310; 30; 20 INJECTION, SOLUTION INTRAVENOUS at 12:20

## 2023-06-30 RX ADMIN — Medication 5 MG: at 11:17

## 2023-06-30 RX ADMIN — FENTANYL CITRATE 25 MCG: 50 INJECTION, SOLUTION INTRAMUSCULAR; INTRAVENOUS at 12:52

## 2023-06-30 RX ADMIN — LISINOPRIL 20 MG: 20 TABLET ORAL at 20:30

## 2023-06-30 RX ADMIN — PHENYLEPHRINE HYDROCHLORIDE 50 MCG: 10 INJECTION INTRAVENOUS at 11:38

## 2023-06-30 RX ADMIN — METHOCARBAMOL 750 MG: 750 TABLET, FILM COATED ORAL at 15:38

## 2023-06-30 RX ADMIN — LIDOCAINE HYDROCHLORIDE 20 MG: 10 INJECTION, SOLUTION INFILTRATION; PERINEURAL at 10:00

## 2023-06-30 RX ADMIN — SUGAMMADEX 200 MG: 100 INJECTION, SOLUTION INTRAVENOUS at 12:22

## 2023-06-30 RX ADMIN — SENNOSIDES AND DOCUSATE SODIUM 1 TABLET: 50; 8.6 TABLET ORAL at 20:30

## 2023-06-30 RX ADMIN — TAMSULOSIN HYDROCHLORIDE 0.4 MG: 0.4 CAPSULE ORAL at 19:04

## 2023-06-30 RX ADMIN — Medication 5 MG: at 10:59

## 2023-06-30 RX ADMIN — ONDANSETRON 4 MG: 2 INJECTION INTRAMUSCULAR; INTRAVENOUS at 12:20

## 2023-06-30 RX ADMIN — PHENYLEPHRINE HYDROCHLORIDE 50 MCG: 10 INJECTION INTRAVENOUS at 10:29

## 2023-06-30 RX ADMIN — METHOCARBAMOL 750 MG: 750 TABLET, FILM COATED ORAL at 20:30

## 2023-06-30 RX ADMIN — PHENYLEPHRINE HYDROCHLORIDE 50 MCG: 10 INJECTION INTRAVENOUS at 10:19

## 2023-06-30 RX ADMIN — CEFAZOLIN SODIUM 2 G: 2 INJECTION, SOLUTION INTRAVENOUS at 19:01

## 2023-06-30 RX ADMIN — PROPOFOL 150 MG: 10 INJECTION, EMULSION INTRAVENOUS at 10:00

## 2023-06-30 RX ADMIN — ACETAMINOPHEN 975 MG: 325 TABLET ORAL at 08:39

## 2023-06-30 RX ADMIN — SODIUM CHLORIDE: 9 INJECTION, SOLUTION INTRAVENOUS at 19:01

## 2023-06-30 RX ADMIN — MIDAZOLAM 2 MG: 1 INJECTION INTRAMUSCULAR; INTRAVENOUS at 09:55

## 2023-06-30 RX ADMIN — SODIUM CHLORIDE, POTASSIUM CHLORIDE, SODIUM LACTATE AND CALCIUM CHLORIDE: 600; 310; 30; 20 INJECTION, SOLUTION INTRAVENOUS at 08:58

## 2023-06-30 RX ADMIN — PHENYLEPHRINE HYDROCHLORIDE 50 MCG: 10 INJECTION INTRAVENOUS at 10:25

## 2023-06-30 RX ADMIN — ACETAMINOPHEN 975 MG: 325 TABLET ORAL at 15:38

## 2023-06-30 RX ADMIN — HYDROMORPHONE HYDROCHLORIDE 1 MG: 1 INJECTION, SOLUTION INTRAMUSCULAR; INTRAVENOUS; SUBCUTANEOUS at 10:00

## 2023-06-30 RX ADMIN — DIVALPROEX SODIUM 500 MG: 500 TABLET, FILM COATED, EXTENDED RELEASE ORAL at 22:43

## 2023-06-30 RX ADMIN — MAGNESIUM SULFATE HEPTAHYDRATE 4 G: 80 INJECTION, SOLUTION INTRAVENOUS at 08:58

## 2023-06-30 RX ADMIN — ROSUVASTATIN CALCIUM 5 MG: 5 TABLET, FILM COATED ORAL at 20:30

## 2023-06-30 RX ADMIN — FENTANYL CITRATE 25 MCG: 50 INJECTION, SOLUTION INTRAMUSCULAR; INTRAVENOUS at 10:00

## 2023-06-30 RX ADMIN — PHENYLEPHRINE HYDROCHLORIDE 100 MCG: 10 INJECTION INTRAVENOUS at 10:59

## 2023-06-30 RX ADMIN — ALLOPURINOL 300 MG: 300 TABLET ORAL at 20:30

## 2023-06-30 ASSESSMENT — ACTIVITIES OF DAILY LIVING (ADL)
ADLS_ACUITY_SCORE: 20

## 2023-06-30 ASSESSMENT — LIFESTYLE VARIABLES: TOBACCO_USE: 1

## 2023-06-30 NOTE — PHARMACY-ADMISSION MEDICATION HISTORY
Pharmacist Admission Medication History    Admission medication history is complete. The information provided in this note is only as accurate as the sources available at the time of the update.    Medication reconciliation/reorder completed by provider prior to medication history? No    Information Source(s): Patient and CareEverywhere/SureScripts via in-person    Pertinent Information: pt is no longer on metformin    Changes made to PTA medication list:    Added: None    Deleted: None    Changed: None    Medication Affordability:  Not including over the counter (OTC) medications, was there a time in the past 3 months when you did not take your medications as prescribed because of cost?: No    Allergies reviewed with patient and updates made in EHR: yes    Medication History Completed By: Regina Nagy [facility] 6/30/2023 9:08 AM    Prior to Admission medications    Medication Sig Last Dose Taking? Auth Provider Long Term End Date   allopurinol (ZYLOPRIM) 300 MG tablet Take 450 mg by mouth every 48 hours Alternates with 300 mg 6/29/2023 at hs Yes Unknown, Entered By History     allopurinol (ZYLOPRIM) 300 MG tablet Take 300 mg by mouth every 48 hours 6/28/2023 at hs Yes Unknown, Entered By History     amLODIPine (NORVASC) 5 MG tablet Take 1 tablet (5 mg) by mouth daily 6/30/2023 at am Yes Luis Daniel Maciel MD Yes    cholecalciferol 25 MCG (1000 UT) TABS Take 25 mcg by mouth daily 6/30/2023 at am Yes Reported, Patient     clopidogrel (PLAVIX) 75 MG tablet Take 1 tablet (75 mg) by mouth daily 6/22/2023 at am Yes Alexander Cuevas MD Yes    divalproex sodium extended-release (DEPAKOTE ER) 500 MG 24 hr tablet TAKE 1 TABLET AT BEDTIME 6/29/2023 at hs Yes Luis Daniel Maciel MD Yes    levothyroxine (SYNTHROID/LEVOTHROID) 125 MCG tablet Take 125 mcg by mouth daily before breakfast  6/30/2023 at am Yes Reported, Patient Yes    lisinopril (ZESTRIL) 20 MG tablet TAKE 1 TABLET TWICE A DAY 6/30/2023 at am Yes Luis Daniel Maciel  MD Yes    rosuvastatin (CRESTOR) 5 MG tablet Take 1 tablet (5 mg) by mouth daily 6/29/2023 at hs Yes Luis Daniel Maciel MD Yes    blood glucose (FREESTYLE LITE) test strip USE 1 STRIP TWICE A DAY AS DIRECTED   Luis Daniel Maciel MD     blood glucose monitoring (FREESTYLE) lancets For checking blood sugars up to twice daily   Luis Daniel Maciel MD

## 2023-06-30 NOTE — ANESTHESIA CARE TRANSFER NOTE
Patient: Raghavendra Kiser    Procedure: Procedure(s):  lumbar 3-lumbar 4 bilateral laminectomies, medial facetectomies, foraminotomies.       Diagnosis: Lumbar stenosis with neurogenic claudication [M48.062]  Diagnosis Additional Information: No value filed.    Anesthesia Type:   General     Note:    Oropharynx: oropharynx clear of all foreign objects and spontaneously breathing  Level of Consciousness: awake  Oxygen Supplementation: face mask  Level of Supplemental Oxygen (L/min / FiO2): 6    Dentition: dentition unchanged  Vital Signs Stable: post-procedure vital signs reviewed and stable    Patient transferred to: PACU    Handoff Report: Identifed the Patient, Identified the Reponsible Provider, Reviewed the pertinent medical history, Discussed the surgical course, Reviewed Intra-OP anesthesia mangement and issues during anesthesia, Set expectations for post-procedure period and Allowed opportunity for questions and acknowledgement of understanding      Vitals:  Vitals Value Taken Time   /77 06/30/23 1230   Temp 36.2  C (97.1  F) 06/30/23 1229   Pulse 75 06/30/23 1231   Resp     SpO2 100 % 06/30/23 1231   Vitals shown include unvalidated device data.    Electronically Signed By: MORGAN Castellon CRNA  June 30, 2023  12:33 PM

## 2023-06-30 NOTE — INTERVAL H&P NOTE
"I have reviewed the surgical (or preoperative) H&P that is linked to this encounter, and examined the patient. There are no significant changes    Clinical Conditions Present on Arrival:  Clinically Significant Risk Factors Present on Admission                 # Drug Induced Platelet Defect: home medication list includes an antiplatelet medication  # Obesity: Estimated body mass index is 31.09 kg/m  as calculated from the following:    Height as of this encounter: 5' 11\" (1.803 m).    Weight as of this encounter: 222 lb 14.4 oz (101.1 kg).       "

## 2023-06-30 NOTE — ANESTHESIA POSTPROCEDURE EVALUATION
Patient: Raghavendra Kiser    Procedure: Procedure(s):  lumbar 3-lumbar 4 bilateral laminectomies, medial facetectomies, foraminotomies.       Anesthesia Type:  General    Note:     Postop Pain Control: Uneventful            Sign Out: Well controlled pain   PONV: No   Neuro/Psych: Uneventful            Sign Out: Acceptable/Baseline neuro status   Airway/Respiratory: Uneventful            Sign Out: Acceptable/Baseline resp. status   CV/Hemodynamics: Uneventful            Sign Out: Acceptable CV status; No obvious hypovolemia; No obvious fluid overload   Other NRE: NONE   DID A NON-ROUTINE EVENT OCCUR? No           Last vitals:  Vitals Value Taken Time   /73 06/30/23 1300   Temp 36.2  C (97.1  F) 06/30/23 1229   Pulse 80 06/30/23 1309   Resp 12 06/30/23 1309   SpO2 94 % 06/30/23 1309   Vitals shown include unvalidated device data.    Electronically Signed By: Patricia You MD  June 30, 2023  1:14 PM

## 2023-06-30 NOTE — PROGRESS NOTES
Epidural drain required to mitigate neurological risk of epidural hematoma causing cord/canal compression. Requires ongoing nursing monitoring  for assessment of profound bleeding, cerebrospinal fluid leak or drain malfunction with resulting risk of epidural infection and abscess. This will necessitate ongoing hospitalization until epidural drain removed.  Flomax added to orders for prior issues with urinary retention following surgery. Patient on plavix at baseline which can be resumed POD #5 if post op recovery goes well in terms of drain output.     AGUSTINA Marshall  [facility]  O: [phone]

## 2023-06-30 NOTE — CONSULTS
[facility] MEDICINE CONSULT NOTE   Physician requesting consult: Abena Duque MD    Reason for consult: Postoperative medical management of medical co-morbidities as below    Assessment and Plan      63 year old male into [facility] on 6/30/2023 for an elective L3-4 bilateral laminectomies; medial   Facetectomies, foraminotomies    [facility] service was asked to evaluate patient for postoperative medical management as follows below. Please resume the home medications as reconciled and further noted below with ordered hold parameters.  Vital signs have been stable post-operatively including hemodynamically stable blood pressure and heart rate. Thank you for this consult; we will continue to follow this patient until discharge.    Problem list:    1.  POD #0 L3-4 laminectomies, facetectomies, foraminotomies  2.  HTN; norvasc 5 mg once daily, lisinopril 20 mg twice daily  3.  History of CVA, left MCA: resume plavix 75 mg once daily to begin on POD #5 (7/5)   If all is ok with epidural drain; depakote at night  4.  Hypothyroidism: synthroid  5.  Dyslipidemia:  crestor 5 mg once daily  6.  History of urinary retention: per NS started flomax; as needed bladder scan    -Reviewed the patient's preoperative H and P and updated missing elements.  -Home medication reconciliation has been reviewed.  Medications have been ordered as noted from the home list and changes are documented above         Past Medical History     Patient Active Problem List    Diagnosis Date Noted     Spinal stenosis 06/30/2023     Priority: Medium     Sensorineural hearing loss, bilateral 01/05/2023     Priority: Medium     Spinal stenosis of lumbar region, unspecified whether neurogenic claudication present 03/28/2022     Priority: Medium     Urine retention 03/28/2022     Priority: Medium     Therapeutic opioid induced constipation 03/28/2022     Priority: Medium     Lumbar radiculopathy 03/21/2022     Priority: Medium      Cognitive and behavioral changes 02/08/2021     Priority: Medium     Sleep difficulties 02/08/2021     Priority: Medium     AMS (altered mental status) 12/25/2020     Priority: Medium     Elevated blood pressure reading without diagnosis of hypertension 11/12/2020     Priority: Medium     Hypothyroidism 11/12/2020     Priority: Medium     Created by Conversion    Replacement Utility updated for latest IMO load       Other stiff joint of shoulder region 11/12/2020     Priority: Medium     Pain in joint involving ankle and foot 11/12/2020     Priority: Medium     Arthralgia of hand 11/12/2020     Priority: Medium     Peyronie's disease 11/12/2020     Priority: Medium     Right sided weakness 10/14/2020     Priority: Medium     Stenosis of left internal carotid artery 10/07/2020     Priority: Medium     History of colonic polyps 01/28/2020     Priority: Medium     Sessile serrated adenoma on colonoscopy 1-22-20.   Repeat in 3 years.       Benign neoplasm of transverse colon 01/24/2020     Priority: Medium     1-20-23 multiple adenomas (one an advanced adenoma);  Follow up colonoscopy in 3 years.       Polyp of colon 01/22/2020     Priority: Medium     Type 2 diabetes mellitus without complication, without long-term current use of insulin (H) 11/22/2019     Priority: Medium     Vitamin deficiency 11/22/2019     Priority: Medium     Precordial pain 11/06/2018     Priority: Medium     Hyperlipidemia 10/17/2018     Priority: Medium     Created by Conversion       Cervical radiculopathy 01/23/2018     Priority: Medium     Right shoulder tendinitis 08/29/2017     Priority: Medium     HTN (hypertension) 08/01/2017     Priority: Medium     High risk medication use 02/28/2017     Priority: Medium     Ganglion cyst 04/11/2016     Priority: Medium     Medial epicondylitis 02/10/2016     Priority: Medium     Atherosclerosis of right carotid artery 05/22/2015     Priority: Medium     Superficial venous thrombosis of arm  05/22/2015     Priority: Medium     History of stroke 05/16/2015     Priority: Medium     Small stroke in the left corona radiata affecting right side.       TIA (transient ischemic attack) 05/16/2015     Priority: Medium     Acute ischemic left MCA stroke (H) 05/16/2015     Priority: Medium     History of TIA (transient ischemic attack) and stroke 05/01/2015     Priority: Medium     Apr 21, 2016 Entered By: CAROLEE GONZALEZ Comment: Carotid Artery Surgery - June 2015, F/U scheduled for Aug 16       Chronic gout 12/09/2014     Priority: Medium        Surgical History   He  has a past surgical history that includes IR Lumbar Puncture (12/25/2020); Tonsillectomy; THROMBOENDARTECTMY NECK,NECK INCIS (Right, 06/08/2015); Ir Spinal Puncture (12/25/2020); Picc Insertion - Triple Lumen (12/26/2020); Cervical Disc Surgery; Laminectomy lumbar one level (Bilateral, 3/21/2022); Laminectomy lumbar two levels (Left, 3/29/2022); and PICC/Midline Placement (4/1/2022).     Past Surgical History:   Procedure Laterality Date     CERVICAL DISC SURGERY       IR LUMBAR PUNCTURE  12/25/2020     IR SPINAL PUNCTURE  12/25/2020     LAMINECTOMY LUMBAR ONE LEVEL Bilateral 3/21/2022    Procedure: Bilateral lumbar 3- lumbar 4 hemilaminectomies, medial facetectomies, foraminotomies, microdiscectomies;  Surgeon: Abena Duque MD;  Location: [facility]     LAMINECTOMY LUMBAR TWO LEVELS Left 3/29/2022    Procedure: LUMBAR 4-LUMBAR 5 LEFT HEMILAMINECTOMY WITH EPIDURAL ABSCESS EVACUATION;  Surgeon: Abena Duque MD;  Location: [facility] [place]     PICC INSERTION - TRIPLE LUMEN  12/26/2020          PICC SINGLE LUMEN PLACEMENT  4/1/2022          TONSILLECTOMY       ZZC THROMBOENDARTECTMY NECK,NECK INCIS Right 06/08/2015    Procedure: Right Carotid Endarterectomy with Impulse Monitoring;  Surgeon: Marcellus Toth MD;  [place] [facility] [place];  Service: General       Family History    Reviewed, and family history includes Cancer in  his father and mother; Diabetes in his brother and brother; Diabetes Type 1 in his brother; Heart Disease in his brother; Lung Cancer in his father, mother, and sister; No Known Problems in his brother, sister, sister, sister, and son; Other Cancer in his father, mother, sister, and sister; Stomach Cancer in his mother; Throat cancer in his father.    Social History    Reviewed, and he  reports that he quit smoking about 30 years ago. His smoking use included cigarettes. He started smoking about 51 years ago. He has a 40.00 pack-year smoking history. He has never used smokeless tobacco. He reports current alcohol use. He reports that he does not use drugs.  Social History     Tobacco Use     Smoking status: Former     Packs/day: 2.00     Years: 20.00     Pack years: 40.00     Types: Cigarettes     Start date: 1972     Quit date: 2/15/1993     Years since quittin.3     Smokeless tobacco: Never     Tobacco comments:     quit in    Substance Use Topics     Alcohol use: Yes     Comment: Moderate use, once or twice a month.       Allergies   No Known Allergies    Prior to Admission Medications      Medications Prior to Admission   Medication Sig Dispense Refill Last Dose     allopurinol (ZYLOPRIM) 300 MG tablet Take 450 mg by mouth every 48 hours Alternates with 300 mg   2023 at hs     allopurinol (ZYLOPRIM) 300 MG tablet Take 300 mg by mouth every 48 hours   2023 at hs     amLODIPine (NORVASC) 5 MG tablet Take 1 tablet (5 mg) by mouth daily 90 tablet 3 2023 at am     cholecalciferol 25 MCG (1000 UT) TABS Take 25 mcg by mouth daily   2023 at am     clopidogrel (PLAVIX) 75 MG tablet Take 1 tablet (75 mg) by mouth daily 90 tablet 3 2023 at am     divalproex sodium extended-release (DEPAKOTE ER) 500 MG 24 hr tablet TAKE 1 TABLET AT BEDTIME 90 tablet 0 2023 at hs     levothyroxine (SYNTHROID/LEVOTHROID) 125 MCG tablet Take 125 mcg by mouth daily before breakfast    2023 at am      lisinopril (ZESTRIL) 20 MG tablet TAKE 1 TABLET TWICE A  tablet 3 6/30/2023 at am     rosuvastatin (CRESTOR) 5 MG tablet Take 1 tablet (5 mg) by mouth daily 90 tablet 3 6/29/2023 at hs     blood glucose (FREESTYLE LITE) test strip USE 1 STRIP TWICE A DAY AS DIRECTED 200 strip 4      blood glucose monitoring (FREESTYLE) lancets For checking blood sugars up to twice daily 100 each 3        Review of Systems   A 12 point comprehensive review of systems was negative except as noted above.    OBJECTIVE         Physical Exam   Temp:  [97.1  F (36.2  C)-97.3  F (36.3  C)] 97.1  F (36.2  C)  Pulse:  [54-85] 85  Resp:  [11-18] 11  BP: (142-154)/(70-77) 154/73  FiO2 (%):  [10 %] 10 %  SpO2:  [96 %-100 %] 96 %  Body mass index is 31.09 kg/m .     GENERAL:  Alert, appears comfortable, in no acute distress, appears stated age   HEAD:  Normocephalic, without obvious abnormality, atraumatic   EYES:  PERRL, conjunctiva/corneas clear, no scleral icterus, EOM's intact   NOSE: Nares normal, septum midline, mucosa normal, no drainage   THROAT: Lips, mucosa, and tongue normal; teeth and gums normal, mouth moist   NECK: Supple, symmetrical, trachea midline   BACK:   No redness; surgical dressing not pulled;    LUNGS:   Clear to auscultation bilaterally, no rales, rhonchi, or wheezing, symmetric chest rise on inhalation, respirations unlabored   CHEST WALL:  No tenderness or deformity   HEART:  Regular rate and rhythm, S1 and S2 normal, no murmur, rub, or gallop    ABDOMEN:   Soft, non-tender, bowel sounds active all four quadrants, no masses, no organomegaly, no rebound or guarding   EXTREMITIES: Extremities normal, atraumatic, no cyanosis or edema    SKIN: Dry to touch, no exanthems in the visualized areas   NEURO: Alert, oriented x 4, moves all four extremities freely/spontaneously   PSYCH: Cooperative, behavior is appropriate            Imaging Reviewed Personally By Myself    Radiology Results: No results found for this or  any previous visit (from the past 24 hour(s)).    Labs Reviewed Personally By Myself     Results for orders placed or performed during the hospital encounter of 06/30/23 (from the past 24 hour(s))   Glucose by meter   Result Value Ref Range    GLUCOSE BY METER POCT 108 (H) 70 - 99 mg/dL   Glucose by meter   Result Value Ref Range    GLUCOSE BY METER POCT 162 (H) 70 - 99 mg/dL       Preoperative Labs Reviewed Personally By Myself     Thank you for this consultation.  Appreciate the opportunity to participate in the care of Raghavendra Kiser, please feel free to contact us for any questions or concerns.    Vivi Trevino MD  [facility] [place]  Phone: #[phone]

## 2023-06-30 NOTE — PROGRESS NOTES
Pt arrived to the unit at around 1500 and was settled in his room.  Vital signs completed and report given to 1500 nurse.

## 2023-06-30 NOTE — ANESTHESIA PREPROCEDURE EVALUATION
Anesthesia Pre-Procedure Evaluation    Patient: Raghavendra Kiser   MRN: [id number] : 1960        Procedure : Procedure(s):  lumbar 3-lumbar 4 bilateral laminectomies, medial facetectomies, foraminotomies with possible microdiscectomies          Past Medical History:   Diagnosis Date     Atherosclerosis of right carotid artery      Carotid stenosis, bilateral      Cervical radiculopathy      Chronic gout      Fracture of right humerus      Ganglion cyst      History of stroke without residual deficits  Oct 2020, Dec 2020     Humerus fracture     Right     Hyperlipidemia      Hypertension      Hypothyroidism      Medial epicondylitis      Shoulder tendinitis, right      Superficial venous thrombosis of arm     right lateral antecubital fossa     TIA (transient ischemic attack) 2015     Tinnitus      Type 2 diabetes mellitus without complication, without long-term current use of insulin (H) 2019      Past Surgical History:   Procedure Laterality Date     CERVICAL DISC SURGERY       IR LUMBAR PUNCTURE  2020     IR SPINAL PUNCTURE  2020     LAMINECTOMY LUMBAR ONE LEVEL Bilateral 3/21/2022    Procedure: Bilateral lumbar 3- lumbar 4 hemilaminectomies, medial facetectomies, foraminotomies, microdiscectomies;  Surgeon: Abena Duque MD;  Location: [facility] [place]     LAMINECTOMY LUMBAR TWO LEVELS Left 3/29/2022    Procedure: LUMBAR 4-LUMBAR 5 LEFT HEMILAMINECTOMY WITH EPIDURAL ABSCESS EVACUATION;  Surgeon: Abena Duque MD;  Location: [facility] [place]     PICC INSERTION - TRIPLE LUMEN  2020          PICC SINGLE LUMEN PLACEMENT  2022          TONSILLECTOMY       ZZC THROMBOENDARTECTMY NECK,NECK INCIS Right 2015    Procedure: Right Carotid Endarterectomy with Impulse Monitoring;  Surgeon: Marcellus Toth MD;  Location: [facility];  Service: General      No Known Allergies   Social History     Tobacco Use     Smoking status: Former     Packs/day: 2.00      Years: 20.00     Pack years: 40.00     Types: Cigarettes     Start date: 1972     Quit date: 2/15/1993     Years since quittin.3     Smokeless tobacco: Never     Tobacco comments:     quit in    Substance Use Topics     Alcohol use: Yes     Comment: Moderate use, once or twice a month.      Wt Readings from Last 1 Encounters:   23 101.1 kg (222 lb 14.4 oz)        Anesthesia Evaluation   Pt has had prior anesthetic.     No history of anesthetic complications       ROS/MED HX  ENT/Pulmonary:     (+) tobacco use, Past use,     Neurologic:     (+) CVA (minimal), TIA,     Cardiovascular:     (+) hypertension-----    METS/Exercise Tolerance: >4 METS    Hematologic:       Musculoskeletal:   (+) arthritis,     GI/Hepatic:       Renal/Genitourinary:       Endo:     (+) type II DM, Not using insulin, thyroid problem,     Psychiatric/Substance Use:       Infectious Disease:       Malignancy:       Other:            Physical Exam    Airway        Mallampati: IV    Neck ROM: full     Respiratory Devices and Support         Dental       (+) Modest Abnormalities - crowns, retainers, 1 or 2 missing teeth      Cardiovascular          Rhythm and rate: regular     Pulmonary           breath sounds clear to auscultation           OUTSIDE LABS:  CBC:   Lab Results   Component Value Date    WBC 4.6 2023    WBC 4.5 2023    HGB 14.3 2023    HGB 13.8 2023    HCT 42.0 2023    HCT 41.5 2023     2023     2023     BMP:   Lab Results   Component Value Date     2023     2023    POTASSIUM 4.1 2023    POTASSIUM 4.0 2023    CHLORIDE 106 2023    CHLORIDE 107 2023    CO2 26 2023    CO2 29 2023    BUN 14.0 2023    BUN 9.3 2023    CR 0.95 2023    CR 0.92 2023     (H) 2023    GLC 69 (L) 2023     COAGS:   Lab Results   Component Value Date    PTT 25 2023    INR 0.94  06/16/2023     POC: No results found for: BGM, HCG, HCGS  HEPATIC:   Lab Results   Component Value Date    ALBUMIN 4.4 01/05/2023    PROTTOTAL 7.1 01/05/2023    ALT 16 01/05/2023    AST 19 01/05/2023    ALKPHOS 77 01/05/2023    BILITOTAL 0.4 01/05/2023     OTHER:   Lab Results   Component Value Date    PH 7.37 12/25/2020    LACT 0.5 (L) 12/27/2020    A1C 5.7 (H) 06/16/2023    CHARAN 10.0 06/16/2023    MAG 2.0 01/05/2023    LIPASE 57 01/05/2023    TSH 2.68 01/05/2023    T4 1.20 10/20/2022    CRP <0.1 05/10/2022    SED 26 (H) 04/11/2022       Anesthesia Plan    ASA Status:  3   NPO Status:  NPO Appropriate    Anesthesia Type: General.     - Airway: ETT   Induction: Intravenous, Propofol.   Maintenance: Balanced.   Techniques and Equipment:     - Airway: Video-Laryngoscope         Consents    Anesthesia Plan(s) and associated risks, benefits, and realistic alternatives discussed. Questions answered and patient/representative(s) expressed understanding.    - Discussed:     - Discussed with:  Patient      - Patient is DNR/DNI Status: No    Use of blood products discussed: Yes.     - Discussed with: Patient.     - Consented: consented to blood products            Reason for refusal: other.     Postoperative Care    Pain management: Multi-modal analgesia.   PONV prophylaxis: Ondansetron (or other 5HT-3), Dexamethasone or Solumedrol     Comments:    Other Comments:     Dilaudid on induction  Ketamine  Precedex boluses prn            Patricia You MD

## 2023-06-30 NOTE — BRIEF OP NOTE
[facility] Brief Operative Note    Pre-operative diagnosis: Lumbar stenosis with neurogenic claudication [M48.062]   Post-operative diagnosis same   Procedure: Procedure(s):  Redo lumbar 3-lumbar 4 bilateral laminectomies, medial facetectomies, foraminotomies   Surgeon(s): Surgeon(s) and Role:     * Abena Duque MD - Primary     * Alix Velazquez APRN CNP - Assisting   Estimated blood loss: 30 cc    Specimens: * No specimens in log *   Findings: Very extensive scar bilaterally at lumbar 3-4 with severe stenosis

## 2023-06-30 NOTE — ANESTHESIA PROCEDURE NOTES
Airway       Patient location during procedure: OR       Procedure Start/Stop Times: 6/30/2023 10:03 AM and 6/30/2023 10:17 AM  Staff -        CRNA: Rick Candelario APRN CRNA       Performed By: CRNA  Consent for Airway        Urgency: elective  Indications and Patient Condition       Indications for airway management: xiomara-procedural       Induction type:intravenous       Mask difficulty assessment: 1 - vent by mask    Final Airway Details       Final airway type: endotracheal airway       Successful airway: ETT - single  Endotracheal Airway Details        ETT size (mm): 7.5       Successful intubation technique: video laryngoscopy       VL Blade Size: Glidescope 3       Grade View of Cords: 1       Adjucts: stylet       Position: Right       Measured from: gums/teeth       Secured at (cm): 24       Bite block used: Soft    Post intubation assessment        Placement verified by: capnometry and chest rise        Number of attempts at approach: 1       Secured with: silk tape       Ease of procedure: easy       Dentition: Intact and Unchanged    Medication(s) Administered   Medication Administration Time: 6/30/2023 10:03 AM

## 2023-07-01 ENCOUNTER — APPOINTMENT (OUTPATIENT)
Dept: OCCUPATIONAL THERAPY | Facility: CLINIC | Age: 63
DRG: 520 | End: 2023-07-01
Attending: SURGERY
Payer: MEDICARE

## 2023-07-01 ENCOUNTER — APPOINTMENT (OUTPATIENT)
Dept: PHYSICAL THERAPY | Facility: CLINIC | Age: 63
DRG: 520 | End: 2023-07-01
Attending: SURGERY
Payer: MEDICARE

## 2023-07-01 PROCEDURE — 258N000003 HC RX IP 258 OP 636: Performed by: NURSE PRACTITIONER

## 2023-07-01 PROCEDURE — 97166 OT EVAL MOD COMPLEX 45 MIN: CPT | Mod: GO

## 2023-07-01 PROCEDURE — 250N000011 HC RX IP 250 OP 636: Mod: JZ | Performed by: NURSE PRACTITIONER

## 2023-07-01 PROCEDURE — 97535 SELF CARE MNGMENT TRAINING: CPT | Mod: GO

## 2023-07-01 PROCEDURE — 97161 PT EVAL LOW COMPLEX 20 MIN: CPT | Mod: GP

## 2023-07-01 PROCEDURE — 250N000013 HC RX MED GY IP 250 OP 250 PS 637: Performed by: NURSE PRACTITIONER

## 2023-07-01 PROCEDURE — 97530 THERAPEUTIC ACTIVITIES: CPT | Mod: GP

## 2023-07-01 PROCEDURE — 97116 GAIT TRAINING THERAPY: CPT | Mod: GP

## 2023-07-01 PROCEDURE — 250N000013 HC RX MED GY IP 250 OP 250 PS 637: Performed by: EMERGENCY MEDICINE

## 2023-07-01 PROCEDURE — 120N000001 HC R&B MED SURG/OB

## 2023-07-01 PROCEDURE — 99232 SBSQ HOSP IP/OBS MODERATE 35: CPT | Performed by: EMERGENCY MEDICINE

## 2023-07-01 RX ORDER — NALOXONE HYDROCHLORIDE 0.4 MG/ML
0.2 INJECTION, SOLUTION INTRAMUSCULAR; INTRAVENOUS; SUBCUTANEOUS
Status: DISCONTINUED | OUTPATIENT
Start: 2023-07-01 | End: 2023-07-02 | Stop reason: HOSPADM

## 2023-07-01 RX ORDER — NALOXONE HYDROCHLORIDE 0.4 MG/ML
0.4 INJECTION, SOLUTION INTRAMUSCULAR; INTRAVENOUS; SUBCUTANEOUS
Status: DISCONTINUED | OUTPATIENT
Start: 2023-07-01 | End: 2023-07-02 | Stop reason: HOSPADM

## 2023-07-01 RX ADMIN — ACETAMINOPHEN 975 MG: 325 TABLET ORAL at 09:17

## 2023-07-01 RX ADMIN — CEFAZOLIN SODIUM 2 G: 2 INJECTION, SOLUTION INTRAVENOUS at 09:18

## 2023-07-01 RX ADMIN — LEVOTHYROXINE SODIUM 125 MCG: 125 TABLET ORAL at 09:16

## 2023-07-01 RX ADMIN — AMLODIPINE BESYLATE 5 MG: 5 TABLET ORAL at 09:17

## 2023-07-01 RX ADMIN — METHOCARBAMOL 750 MG: 750 TABLET, FILM COATED ORAL at 20:46

## 2023-07-01 RX ADMIN — LISINOPRIL 20 MG: 20 TABLET ORAL at 20:46

## 2023-07-01 RX ADMIN — ACETAMINOPHEN 975 MG: 325 TABLET ORAL at 16:06

## 2023-07-01 RX ADMIN — METHOCARBAMOL 750 MG: 750 TABLET, FILM COATED ORAL at 16:07

## 2023-07-01 RX ADMIN — POLYETHYLENE GLYCOL 3350 17 G: 17 POWDER, FOR SOLUTION ORAL at 09:17

## 2023-07-01 RX ADMIN — SENNOSIDES AND DOCUSATE SODIUM 1 TABLET: 50; 8.6 TABLET ORAL at 09:17

## 2023-07-01 RX ADMIN — Medication 25 MCG: at 09:17

## 2023-07-01 RX ADMIN — METHOCARBAMOL 750 MG: 750 TABLET, FILM COATED ORAL at 12:18

## 2023-07-01 RX ADMIN — LISINOPRIL 20 MG: 20 TABLET ORAL at 09:17

## 2023-07-01 RX ADMIN — THERA TABS 1 TABLET: TAB at 09:18

## 2023-07-01 RX ADMIN — TAMSULOSIN HYDROCHLORIDE 0.4 MG: 0.4 CAPSULE ORAL at 18:05

## 2023-07-01 RX ADMIN — CEFAZOLIN SODIUM 2 G: 2 INJECTION, SOLUTION INTRAVENOUS at 02:09

## 2023-07-01 RX ADMIN — ACETAMINOPHEN 975 MG: 325 TABLET ORAL at 00:22

## 2023-07-01 RX ADMIN — METHOCARBAMOL 750 MG: 750 TABLET, FILM COATED ORAL at 09:17

## 2023-07-01 RX ADMIN — SODIUM CHLORIDE: 9 INJECTION, SOLUTION INTRAVENOUS at 02:13

## 2023-07-01 RX ADMIN — DIVALPROEX SODIUM 500 MG: 500 TABLET, FILM COATED, EXTENDED RELEASE ORAL at 20:46

## 2023-07-01 RX ADMIN — ROSUVASTATIN CALCIUM 5 MG: 5 TABLET, FILM COATED ORAL at 20:46

## 2023-07-01 RX ADMIN — ACETAMINOPHEN 975 MG: 325 TABLET ORAL at 23:38

## 2023-07-01 RX ADMIN — SENNOSIDES AND DOCUSATE SODIUM 1 TABLET: 50; 8.6 TABLET ORAL at 20:46

## 2023-07-01 ASSESSMENT — ACTIVITIES OF DAILY LIVING (ADL)
ADLS_ACUITY_SCORE: 20

## 2023-07-01 NOTE — PLAN OF CARE
Patient alert and oriented. VSS. Tolerating diet. Verbalized adequate pain control with current scheduled regimen and denies the need for prn intervention thus far. Incision clean, dry and intact. Sepsis protocol fired; initial lactic was 3.3. MD notified; order for bolus fluid received. Vitals remain stable. Repeat lactic is 2.0. Tobias also placed for urinary retention per protocol.  Remote on call and neurosurgery made aware. Telephone order from neurosurgery PA to keep Tobias overnight. Ambulating. Doing IS.  Will continue with plan of care.   Problem: Spinal Surgery  Goal: Effective Urinary Elimination  Outcome: Progressing     Problem: Spinal Surgery  Goal: Optimal Coping with Surgery  Outcome: Progressing  Goal: Absence of Bleeding  Outcome: Progressing  Goal: Effective Bowel Elimination  Outcome: Progressing  Goal: Fluid and Electrolyte Balance  Outcome: Progressing  Goal: Optimal Functional Ability  Outcome: Progressing  Intervention: Optimize Functional Status  Recent Flowsheet Documentation  Taken 6/30/2023 1538 by Marivel Parsons, RN  Activity Management: activity adjusted per tolerance  Goal: Absence of Infection Signs and Symptoms  Outcome: Progressing  Goal: Optimal Neurologic Function  Outcome: Progressing  Goal: Anesthesia/Sedation Recovery  Outcome: Progressing  Intervention: Optimize Anesthesia Recovery  Recent Flowsheet Documentation  Taken 6/30/2023 1530 by Marivel Parsons, RN  Safety Promotion/Fall Prevention:   activity supervised   mobility aid in reach   nonskid shoes/slippers when out of bed   safety round/check completed   clutter free environment maintained  Administration (IS):   instruction provided, initial   proper technique demonstrated  Level Incentive Spirometer (mL): 2500  Incentive Spirometer Predicted Level (mL): 2500  Number of Repetitions (IS): 5  Goal: Optimal Pain Control and Function  Outcome: Progressing  Goal: Nausea and Vomiting Relief  Outcome: Progressing  Goal:  Effective Urinary Elimination  Outcome: Progressing  Goal: Effective Oxygenation and Ventilation  Outcome: Progressing

## 2023-07-01 NOTE — PROGRESS NOTES
Neurosurgery Progress Note: 7/1/2023       A/P: Mr. Kiser is a 63 year old male who is POD#1 Redo lumbar 3-lumbar 4 bilateral laminectomies, medial facetectomies, foraminotomies by Dr. Duque     States that he is doing well notes that his previous radicular leg pain is completely resolved notes bilateral foot numbness R>L, AMELIA drain with 75mL bloody drainage overnight,  barragan placed last night secondary to PVR >500 pending drain outpatient tentative discharge later today vs tomorrow, will attempt void trial prior to discharge      Plan:  1. Keep drain in place  2. Keep barragan in place attempt trial void tomorrow   3. Increase activity as tolerated with PT/OT  4. Bowel regimen   5. SCDs, Lovenox to begin 48hrs postoperative   6. continue Flomax   7. Continue current scheduled and prn medications for pain control     Addendum:   Drain output still evaluated at 40mL/8hrs will keep in place and keep patient overnight   Will keep barragan in place with plans for void trial tomorrow   Tentative discharge tomorrow pending drain output      Neurosurgery Attending: The patient's clinical examination, laboratory data, and plan was discussed with Dr. Pride covering for Dr. Duque     HPI: Raghavendra Kiser comes today in f/u.  Patient is a  62 yo male who is s/p bilateral lumbar 3- lumbar 4 hemilaminectomies, medial facetectomies, foraminotomies and microdiscectomies with small durotomy on right on 3/21/2022. He is also s/p left lumbar 4-5, lumbar 5-sacral 1 hemilaminectomy for epidural abscess evacuation as well. He was doing well until February. He then developed symptoms after golfing at a golf simulator. He started having back pain and leg pain again. He underwent bilateral L3-4, L4-5 TFESI. He felt great 24 hours after each injection. Symptoms then returned. Physical therapy is helping keep him moving but not resolving symptoms. Chiropractor 3 x a week. Will get some improvement.Symptoms currently in anterior leg and  "posterior leg and buttocks. Claudicating back and leg pain.  Pain feels fairly constant. Stretching can decrease it. No weakness. Numbness in planter surface of foot under 4th and 5th toe only. Lumbar xray shows anterolisthesis of lumbar 4-5 with 1 mm movement on flexion/extension. MRI of his lumbar spine shows worsening stenosis again at lumbar 3-4 with severe spinal stenosis.  Improved stenosis at lumbar 4-sacral 1. Recommend lumbar 3-4 laminectomies, medial facetectomies, foraminotomies. Risks and benefits of lumbar decompression discussed including but not limited to infection, hematoma, nerve damage including paralysis, post op radiculitis, durotomy, risks associated with the use of general anesthesia, blood clots in the lungs or legs. He agreed to proceed.      S:   States that he is doing well notes that his previous radicular leg pain is completely resolved notes bilateral foot numbness R>L      O:  BP (!) 144/73 (BP Location: Left arm)   Pulse 83   Temp 97.2  F (36.2  C) (Oral)   Resp 18   Ht 1.803 m (5' 11\")   Wt 101.1 kg (222 lb 14.4 oz)   SpO2 97%   BMI 31.09 kg/m       General: Awake, alert, NAD.      Motor: normal bulk and tone     Strength: full strength in all extremities throughout, bilaterally.     Sensation: intact to light touch throughout both upper and lower extremities     Incision: dressing dry and intact        TASNEEM Ross [facility] Neurosurgery  O: [phone]    "

## 2023-07-01 NOTE — PROGRESS NOTES
[facility] CONSULT NOTE  FOLLOW UP     Summary: 63 year old male into [facility] on 6/30/2023 after presenting for an elective  L3/4 surgery including laminectomies, facetectomies, foraminotomies      Problem list:    1.  POD #0 L3-4 laminectomies, foraminotomies, facetectomies: pt will stay inhouse due to   Drain outputs (plan for discharge if drain output is less than 30 mls/shift)  2.  HTN:  Continue norvasc, lisinopril,   3.  History of CVA:  tenative plans to resume plavix on POD #5  4.  Hypothyroidism: synthroid  5.  Dyslipidemia: continue crestor  6.  History of urinary retention:  Continue flomax; bladder scan as needed;         Vivi Trevino MD  [facility]  [facility]  Phone: #[phone]  Securely message with the Vocera Web Console (learn more here)  Text page via [employer] Paging/Directory     Interval History/Subjective: stable overnight; afebrile,     Physical Exam/Objective:  Temp:  [96.1  F (35.6  C)-97.9  F (36.6  C)] 97.9  F (36.6  C)  Pulse:  [] 80  Resp:  [16-18] 16  BP: (134-182)/(72-88) 134/72  SpO2:  [91 %-99 %] 98 %  Body mass index is 31.09 kg/m .    GENERAL:  Alert, appears comfortable, in no acute distress, appears stated age   HEAD:  Normocephalic, without obvious abnormality, atraumatic   EYES:  PERRL, conjunctiva/corneas clear, no scleral icterus, EOM's intact   NOSE: Nares normal, septum midline, mucosa normal, no drainage   THROAT: Lips, mucosa, and tongue normal; teeth and gums normal, mouth moist   NECK: Supple, symmetrical, trachea midline   BACK:   Post op dressing not pulled   LUNGS:   Clear to auscultation bilaterally, no rales, rhonchi, or wheezing, symmetric chest rise on inhalation, respirations unlabored   CHEST WALL:  No tenderness or deformity   HEART:  Regular rate and rhythm, S1 and S2 normal, no murmur, rub, or gallop    ABDOMEN:   Soft, non-tender, bowel sounds active all four quadrants, no  masses, no organomegaly, no rebound or guarding   EXTREMITIES: Extremities normal, atraumatic, no cyanosis or edema    SKIN: Dry to touch, no exanthems in the visualized areas   NEURO: Alert, oriented x3, moves all four extremities freely   PSYCH: Cooperative, behavior is appropriate      Data reviewed today: I personally reviewed all new medications, labs, imaging/diagnostics reports over the past 24 hours. Pertinent findings include:    Imaging:   No results found for this or any previous visit (from the past 24 hour(s)).    Labs:  Most Recent 3 BMP's:Recent Labs   Lab Test 06/30/23  1621 06/30/23  1242 06/30/23  0850 06/16/23  1410 05/03/23  1524 01/05/23  1530 11/10/22  1430 10/20/22  1559   NA  --   --   --  143  --  144  --  143   POTASSIUM  --   --   --  4.1  --  4.0  --  4.2   CHLORIDE  --   --   --  106  --  107  --  107   CO2  --   --   --  26  --  29  --  26   BUN  --   --   --  14.0  --  9.3  --  10.3   CR 1.04  --   --  0.95  --  0.92   < > 0.89   ANIONGAP  --   --   --  11  --  8  --  10   CHARAN  --   --   --  10.0  --  9.6  --  9.5   GLC  --  162* 108* 69*   < > 101*  --  98    < > = values in this interval not displayed.       Medications:   Personally Reviewed.  Medications     sodium chloride 75 mL/hr at 07/01/23 0213       acetaminophen  975 mg Oral Q8H     allopurinol  300 mg Oral Q48H     amLODIPine  5 mg Oral Daily     divalproex sodium extended-release  500 mg Oral At Bedtime     [START ON 7/2/2023] enoxaparin ANTICOAGULANT  40 mg Subcutaneous Q24H     levothyroxine  125 mcg Oral QAM AC     lisinopril  20 mg Oral BID     methocarbamol  750 mg Oral 4x Daily     multivitamin, therapeutic  1 tablet Oral Daily     polyethylene glycol  17 g Oral Daily     rosuvastatin  5 mg Oral At Bedtime     senna-docusate  1 tablet Oral BID     tamsulosin  0.4 mg Oral Daily     Vitamin D3  25 mcg Oral Daily

## 2023-07-01 NOTE — PLAN OF CARE
Problem: Plan of Care - These are the overarching goals to be used throughout the patient stay.    Goal: Optimal Comfort and Wellbeing  Outcome: Progressing     Problem: Plan of Care - These are the overarching goals to be used throughout the patient stay.    Goal: Absence of Hospital-Acquired Illness or Injury  Intervention: Identify and Manage Fall Risk  Recent Flowsheet Documentation  Taken 7/1/2023 0300 by Reyes, Dora, RN  Safety Promotion/Fall Prevention: safety round/check completed  Taken 7/1/2023 0022 by Reyes, Dora, RN  Safety Promotion/Fall Prevention:    activity supervised    mobility aid in reach    nonskid shoes/slippers when out of bed    safety round/check completed    clutter free environment maintained   Goal Outcome Evaluation:    Pt AxO x4. No acute changes this shift. Denies pain this shift. Ambulate w/sba, no issues. Pt denies, n/v,sob, dizziness or chest pain. IV NS running at 75/mL. IV cefazolin administered this shift. Tobias in place and patent. AMELIA drain in place and patent. Pt calls appropriately and is able to make needs known. Will continue to monitor.

## 2023-07-01 NOTE — PROGRESS NOTES
Care Management Follow Up    Length of Stay (days): 1    Expected Discharge Date: 07/02/2023     Concerns to be Addressed:       Patient plan of care discussed at interdisciplinary rounds: Yes    Anticipated Discharge Disposition:    Home      Additional Information:  Chart Reviewed. Pt to discharge pending voiding trial.       LUZ MARIA Brewer

## 2023-07-01 NOTE — SIGNIFICANT EVENT
Sepsis Evaluation     I was called to see Raghavendra Kiser due to abnormal vital signs triggering the Sepsis SIRS screening alert. He is not known to have an infection.     PHYSICAL EXAM  Vital Signs:  Temp: 97.3  F (36.3  C) Temp src: Oral BP: (!) 143/81 Pulse: 84   Resp: 16 SpO2: 93 % O2 Device: None (Room air) Oxygen Delivery: 2 LPM    General: in no acute distress  Mental Status: AAOx4.     Remainder of physical exam is significant for per [facility] consult note     DATA  Lactic Acid   Date Value Ref Range Status   06/30/2023 3.3 (H) 0.7 - 2.0 mmol/L Final   12/27/2020 0.5 (L) 0.7 - 2.0 mmol/L Final     Comment:     Best practice for following serial lactic acid analysis is use of a consistent sample type.       ASSESSMENT AND PLAN  Raghavendra Kiser meets SIRS criteria AND has a lactate >2 or other evidence of acute organ damage.  These vital signs, lab and physical exam findings constitute a diagnosis of SEVERE SEPSIS, based on: Lactate resulted, and the level was > 2.0          Anti-infectives (From now, onward)    Start     Dose/Rate Route Frequency Ordered Stop    06/30/23 1800  ceFAZolin (ANCEF) 2 g in 100 mL D5W intermittent infusion         2 g  200 mL/hr over 30 Minutes Intravenous EVERY 8 HOURS 06/30/23 1228 07/01/23 1759        Current antibiotic coverage on post op abx .    3 Hour Severe Sepsis Bundle Completion:  1. Initial Lactic Acid result shown above. Repeat lactic acid ordered by reflex for 3 hours from initial collection.  2. Blood Cultures before Antibiotics: No, antibiotics were started prior to BCx collection b/c waiting for BCx to be collected would have been detrimental to the patient  3. Broad Spectrum Antibiotics Administered: no  4. Is hypotension present? No (IV fluid bolus NOT required). IV Fluid volume administered: 500 cc    Disposition: The patient will remain on the current unit. We will continue to monitor this patient closely..  Kalpesh Orellana MD  06/30/23, 7:24 PM    Sepsis  Criteria   Sepsis: The body's generalized inflammatory state as a response to an infection. Sepsis Predictive Model includes >80 variable to alert to potential sepsis.  Severe Sepsis: Sepsis plus one or more variables of acute organ dysfunction (Note: lactic acid >2 or acute encephalopathy each qualify as organ dysfunction)  Septic Shock: Sepsis AND hypotension despite adequate volume resuscitation with crystalloid or lactic acid >=4  Note: HYPOTENSION is defined as 2 BP readings measured 3 hrs apart that have a SBP <90, MAP <65, or decrease >40 mmHg, occurring 6 hrs before or after t-zero

## 2023-07-01 NOTE — PROGRESS NOTES
Occupational Therapy Discharge Summary    Reason for therapy discharge:    All goals and outcomes met, no further needs identified.    Progress towards therapy goal(s). See goals on Care Plan in [facility] electronic health record for goal details.  Goals met    Therapy recommendation(s):    No further therapy is recommended. Spouse assist for ADLs/IADLs as needed.    Delmis De OT 7/1/2023

## 2023-07-01 NOTE — PLAN OF CARE
Problem: Plan of Care - These are the overarching goals to be used throughout the patient stay.    Goal: Optimal Comfort and Wellbeing  Intervention: Monitor Pain and Promote Comfort  Recent Flowsheet Documentation  Taken 7/1/2023 1606 by Soledad Soto RN  Pain Management Interventions: (scheduled) medication (see MAR)     Problem: Spinal Surgery  Goal: Effective Urinary Elimination  Outcome: Progressing   Goal Outcome Evaluation: Pt has minimal pain. Tobias catheter in until tomorrow for urinary retention. Ambulated halls. Up with minimal SBA.

## 2023-07-01 NOTE — PROGRESS NOTES
07/01/23 0950   Appointment Info   Signing Clinician's Name / Credentials (PT) Mylene Bhat, PT, DPT   Living Environment   People in Home spouse   Current Living Arrangements house   Home Accessibility stairs to enter home   Number of Stairs, Main Entrance 2   Stair Railings, Main Entrance railings safe and in good condition   Self-Care   Equipment Currently Used at Home none   General Information   Onset of Illness/Injury or Date of Surgery 06/30/23   Referring Physician Abena Duque MD   Patient/Family Therapy Goals Statement (PT) to get better   Pertinent History of Current Problem (include personal factors and/or comorbidities that impact the POC) Spinal stenosis   Existing Precautions/Restrictions spinal   Weight-Bearing Status - LLE weight-bearing as tolerated   Weight-Bearing Status - RLE weight-bearing as tolerated   Transfers   Transfers sit-stand transfer   Comment, (Transfers) Independent with sit<>stand transfers.   Sit-Stand Transfer   Sit-Stand [place] (Transfers) independent;verbal cues   Assistive Device (Sit-Stand Transfers) other (see comments)  (none)   Comment, (Sit-Stand Transfer) Independent with sit<>stand transfers. Verbal cues and education on spinal precautions.   Gait/Stairs (Locomotion)   [place] Level (Gait) supervision;verbal cues   Assistive Device (Gait) other (see comments)  (none)   Distance in Feet 10'   Distance in Feet (Gait) 400'   Pattern (Gait) swing-through   Deviations/Abnormal Patterns (Gait) jorge decreased;gait speed decreased   Negotiation (Stairs) stairs independence;handrail location;number of steps;ascending technique;descending technique   [place] Level (Stairs) supervision;verbal cues   Handrail Location (Stairs) left side (ascending);right side (descending)   Number of Steps (Stairs) 4 steps   Ascending Technique (Stairs) step-over-step;step-to-step   Descending Technique (Stairs) step-over-step;step-to-step   Comment, (Gait/Stairs) Pt  ambulates with SBA and no AD. Verbal cues for safety and posture. Education on spinal precautions. Pt negotiates 4 steps with unilateral railing and SBA. Verbal cues for safety and safe step technique.   Clinical Impression   Criteria for Skilled Therapeutic Intervention Yes, treatment indicated   PT Diagnosis (PT) impaired functional mobility   Influenced by the following impairments weakness   Functional limitations due to impairments transfers, ambulation, stairs   Clinical Presentation (PT Evaluation Complexity) Stable/Uncomplicated   Clinical Presentation Rationale Pt presents as medically diagnosed   Clinical Decision Making (Complexity) low complexity   Planned Therapy Interventions (PT) balance training;bed mobility training;gait training;home exercise program;patient/family education;ROM (range of motion);stair training;strengthening;stretching;transfer training   Anticipated Equipment Needs at Discharge (PT) other (see comments)  (none)   Risk & Benefits of therapy have been explained patient;spouse/significant other   PT Total Evaluation Time   PT Eval, Low Complexity Minutes (64546) 10   Plan of Care Review   Plan of Care Reviewed With patient;spouse   Physical Therapy Goals   PT Frequency One time eval and treatment only   PT Predicted Duration/Target Date for Goal Attainment 07/01/23   PT Goals Transfers;Gait;Stairs   PT: Transfers Independent;Sit to/from stand;Goal Met   PT: Gait Supervision/stand-by assist;Greater than 200 feet;Goal Met   PT: Stairs Supervision/stand-by assist;8 stairs;Rail on left;Goal Met   Interventions   Interventions Quick Adds Gait Training;Therapeutic Activity;Therapeutic Procedure   Therapeutic Procedure/Exercise   Treatment Detail/Skilled Intervention Pt provided with s/p lumbar surgery exercise handout. Pt verbalizes understanding.   Therapeutic Activity   Therapeutic Activities: dynamic activities to improve functional performance Minutes (19352) 10   Treatment  Detail/Skilled Intervention Independent with sit<>stand trnasfers. Pt verbalizes and demonstrates understanding of safe transfers. Pt has fair standing balance when adjusting barragan while maintaining spinal precautions. Education on spinal precautions. Pt up in chair at end of session with chair alarm on and call light within reach.   Gait Training   Gait Training Minutes (34138) 10   Treatment Detail/Skilled Intervention Pt ambulates with SBA and no AD. Verbal cues for safety. Pt negotiates 4 steps x 2 with unilateral railings and SBA. Verbal cues for safety and safe step technique.   [place] Level (Gait Training) stand-by assist   Physical Assistance Level (Gait Training) supervision;verbal cues   Weight Bearing (Gait Training) weight-bearing as tolerated   Pattern Analysis (Gait Training) swing-through gait   Gait Analysis Deviations decreased jorge;decreased step length   Impairments (Gait Analysis/Training) strength decreased   Stair Railings present on left side   Physical Assist/Nonphysical Assist (Stairs) supervision;verbal cues   Level of [place] (Stairs) stand-by assist   PT Discharge Planning   PT Plan discharge PT   PT Discharge Recommendation (DC Rec) (S)  home with assist;home with outpatient physical therapy   PT Rationale for DC Rec Pt reports good home setup and good home support. Pt reports that he is planning on completing more PT after discharge.   PT Brief overview of current status Independent with transfers, SBA with mobility   Total Session Time   Timed Code Treatment Minutes 20   Total Session Time (sum of timed and untimed services) 30     Mylene Bhat, PT, DPT

## 2023-07-01 NOTE — UTILIZATION REVIEW
Inpatient appropriate    Admission Status; Secondary Review Determination       Under the authority of the Utilization Management Committee, the utilization review process indicated a secondary review on the above patient. The review outcome is based on review of the medical records, discussions with staff, and applying clinical experience noted on the date of the review.     (x) Inpatient Status Appropriate - This patient's medical care is consistent with medical management for inpatient care and reasonable inpatient medical practice.     RATIONALE FOR DETERMINATION   63 years old male with past medical history significant for hypertension, CVA, hypothyroidism, urinary retention, dyslipidemia and lumbar stenosis.  Patient underwent L3-L4 laminectomy, facetectomy and foraminotomies yesterday.  Patient has urinary retention postoperative require Tobias catheter placement.  Currently has AMELIA drain in place.  Keep inpatient if patient remain hospitalized.  However change to outpatient if pt is discharge today.     At the time of admission with the information available to the attending physician more than 2 nights Hospital complex care was anticipated, based on patient risk of adverse outcome if treated as outpatient and complex care required. Inpatient admission is appropriate based on the Medicare guidelines.     The information on this document is developed by the utilization review team in order for the business office to ensure compliance. This only denotes the appropriateness of proper admission status and does not reflect the quality of care rendered.   The definitions of Inpatient Status and Observation Status used in making the determination above are those provided in the CMS Coverage Manual, Chapter 1 and Chapter 6, section 70.4.   Sincerely,   Álvaro Rashid MD  Utilization Review  Physician Advisor  [facility].

## 2023-07-01 NOTE — PROGRESS NOTES
"Occupational Therapy     07/01/23 0900   Appointment Info   Signing Clinician's Name / Credentials (OT) Delmis De OT   Living Environment   People in Home spouse   Current Living Arrangements house   Living Environment Comments STS with vanity nearby; tub/shower with shower chair   Self-Care   Usual Activity Tolerance excellent   Current Activity Tolerance good   Equipment Currently Used at Home none   Fall history within last six months no   Activity/Exercise/Self-Care Comment Ind ADLs   Instrumental Activities of Daily Living (IADL)   IADL Comments Ind; spouse does most of the driving   General Information   Onset of Illness/Injury or Date of Surgery 06/30/23   Referring Physician Abena Duque MD   Patient/Family Therapy Goal Statement (OT) Home   Additional Occupational Profile Info/Pertinent History of Current Problem \"63 year old male into [facility] on 6/30/2023 for an elective L3-4 bilateral laminectomies; medial              Facetectomies, foraminotomies\"   Existing Precautions/Restrictions spinal   Cognitive Status Examination   Orientation Status orientation to person, place and time   Affect/Mental Status (Cognitive) WNL   Sensory   Sensory Quick Adds sensation intact   Range of Motion Comprehensive   General Range of Motion bilateral upper extremity ROM WNL   Strength Comprehensive (MMT)   Comment, General Manual Muscle Testing (MMT) Assessment NFT due to lifting precautions; BUE strength is WFL   Coordination   Upper Extremity Coordination No deficits were identified   Bed Mobility   Bed Mobility supine-sit   Supine-Sit [place] (Bed Mobility) supervision   Transfers   Transfers sit-stand transfer   Sit-Stand Transfer   Sit-Stand [place] (Transfers) contact guard   Assistive Device (Sit-Stand Transfers) walker, front-wheeled   Activities of Daily Living   BADL Assessment/Intervention lower body dressing   Lower Body Dressing Assessment/Training   Position (Lower Body Dressing) " supported sitting   [place] Level (Lower Body Dressing) supervision   Clinical Impression   Criteria for Skilled Therapeutic Interventions Met (OT) Yes, treatment indicated   OT Diagnosis Decreased ADL independence   OT Problem List-Impairments impacting ADL problems related to;pain;mobility;post-surgical precautions   Assessment of Occupational Performance 1-3 Performance Deficits   Identified Performance Deficits dressing, transfers, bed mob   Planned Therapy Interventions (OT) ADL retraining;transfer training;bed mobility training;home program guidelines   Clinical Decision Making Complexity (OT) moderate complexity   Risk & Benefits of therapy have been explained evaluation/treatment results reviewed;care plan/treatment goals reviewed;patient   OT Total Evaluation Time   OT Eval, Moderate Complexity Minutes (61884) 10   OT Goals   Therapy Frequency (OT) One time eval and treatment   OT Predicted Duration/Target Date for Goal Attainment 07/01/23   OT Goals Lower Body Dressing;Bed Mobility;Toilet Transfer/Toileting   OT: Lower Body Dressing Modified independent;within precautions   OT: Bed Mobility Modified independent;within precautions   OT: Toilet Transfer/Toileting Modified independent   Interventions   Interventions Quick Adds Self-Care/Home Management   Self-Care/Home Management   Self-Care/Home Mgmt/ADL, Compensatory, Meal Prep Minutes (33902) 25   Symptoms Noted During/After Treatment (Meal Preparation/Planning Training) none   Treatment Detail/Skilled Intervention Pt familiar with spinal precautions due to 2 prevoius spinal surgeries- able to indepently state all precautions. Pt completes bed mob supine>sit with logroll and SBA/Mod I. Sit<>stand transfers with FWW, Mod I throughout session at EOB/chair/RTS with vanity (used grab bar in hospital setting). LB dressing using figure 4 technique- SBA/Mod I after OT interventions. Educ on kitchen mob with FWW- pt verbalized understanding. Pt verbalized  safe technique for car transfer/no concerns. Toilet transfer, Mod I. Tub/shower transfer- SBA/Mod I after OT interventions. Pt seated in chair at end of session, all needs within reach. Handout issued for spinal precautions/safe methods for ADLs.   OT Discharge Planning   OT Plan d/c OT   OT Discharge Recommendation (DC Rec) home with assist   OT Rationale for DC Rec Pt is near baseline for ADLs-completes with SBA/Mod I. Spouse is able to assist at home if needed.   OT Brief overview of current status SBA/Mod I ADLs and fxl mob   Total Session Time   Timed Code Treatment Minutes 25   Total Session Time (sum of timed and untimed services) 35     Delmis De, OT 7/1/2023

## 2023-07-01 NOTE — PLAN OF CARE
Physical Therapy Discharge Summary    Reason for therapy discharge:    All goals and outcomes met, no further needs identified.    Progress towards therapy goal(s). See goals on Care Plan in [facility] electronic health record for goal details.  Goals met    Therapy recommendation(s):    Continued therapy is recommended.  Rationale/Recommendations:  continued PT with outpatient PT to improve functional mobility.  Continue home exercise program.

## 2023-07-01 NOTE — PLAN OF CARE
Pt eating, drinking and has audible bowel sounds.  Minimal pain.  Barriers to discharge is output of drain and urinary retention (barragan in place).    Patient vital signs are at baseline: Yes  Patient able to ambulate as they were prior to admission or with assist devices provided by therapies during their stay:  Yes  Patient MUST void prior to discharge:  No,  Reason:  Barr[place]  Patient able to tolerate oral intake:  Yes  Pain has adequate pain control using Oral analgesics:  Yes  Does patient have an identified :  Yes  Has goal D/C date and time been discussed with patient:  Yes    Continue to monitor VS, labs, pain level, incision site and activity tolerance.

## 2023-07-02 VITALS
HEART RATE: 78 BPM | DIASTOLIC BLOOD PRESSURE: 78 MMHG | RESPIRATION RATE: 20 BRPM | TEMPERATURE: 97.9 F | OXYGEN SATURATION: 99 % | HEIGHT: 71 IN | BODY MASS INDEX: 31.21 KG/M2 | SYSTOLIC BLOOD PRESSURE: 151 MMHG | WEIGHT: 222.9 LBS

## 2023-07-02 PROCEDURE — 250N000013 HC RX MED GY IP 250 OP 250 PS 637: Performed by: NURSE PRACTITIONER

## 2023-07-02 PROCEDURE — 250N000011 HC RX IP 250 OP 636: Mod: JZ | Performed by: NURSE PRACTITIONER

## 2023-07-02 PROCEDURE — 250N000013 HC RX MED GY IP 250 OP 250 PS 637: Performed by: EMERGENCY MEDICINE

## 2023-07-02 RX ORDER — CLOPIDOGREL BISULFATE 75 MG/1
75 TABLET ORAL DAILY
Qty: 90 TABLET | Refills: 3
Start: 2023-07-05 | End: 2023-11-15

## 2023-07-02 RX ORDER — METHOCARBAMOL 750 MG/1
750 TABLET, FILM COATED ORAL 4 TIMES DAILY
Qty: 42 TABLET | Refills: 0 | Status: SHIPPED | OUTPATIENT
Start: 2023-07-02 | End: 2023-11-03

## 2023-07-02 RX ORDER — TAMSULOSIN HYDROCHLORIDE 0.4 MG/1
0.4 CAPSULE ORAL DAILY
Qty: 10 CAPSULE | Refills: 0 | Status: SHIPPED | OUTPATIENT
Start: 2023-07-02 | End: 2023-07-11

## 2023-07-02 RX ADMIN — METHOCARBAMOL 750 MG: 750 TABLET, FILM COATED ORAL at 16:16

## 2023-07-02 RX ADMIN — AMLODIPINE BESYLATE 5 MG: 5 TABLET ORAL at 09:06

## 2023-07-02 RX ADMIN — Medication 25 MCG: at 09:05

## 2023-07-02 RX ADMIN — METHOCARBAMOL 750 MG: 750 TABLET, FILM COATED ORAL at 12:42

## 2023-07-02 RX ADMIN — SENNOSIDES AND DOCUSATE SODIUM 1 TABLET: 50; 8.6 TABLET ORAL at 09:06

## 2023-07-02 RX ADMIN — METHOCARBAMOL 750 MG: 750 TABLET, FILM COATED ORAL at 09:06

## 2023-07-02 RX ADMIN — ENOXAPARIN SODIUM 40 MG: 100 INJECTION SUBCUTANEOUS at 16:15

## 2023-07-02 RX ADMIN — THERA TABS 1 TABLET: TAB at 09:06

## 2023-07-02 RX ADMIN — LISINOPRIL 20 MG: 20 TABLET ORAL at 09:05

## 2023-07-02 RX ADMIN — POLYETHYLENE GLYCOL 3350 17 G: 17 POWDER, FOR SOLUTION ORAL at 09:05

## 2023-07-02 RX ADMIN — ACETAMINOPHEN 975 MG: 325 TABLET ORAL at 16:15

## 2023-07-02 RX ADMIN — ACETAMINOPHEN 975 MG: 325 TABLET ORAL at 09:05

## 2023-07-02 RX ADMIN — LEVOTHYROXINE SODIUM 125 MCG: 125 TABLET ORAL at 09:05

## 2023-07-02 RX ADMIN — TAMSULOSIN HYDROCHLORIDE 0.4 MG: 0.4 CAPSULE ORAL at 16:16

## 2023-07-02 ASSESSMENT — ACTIVITIES OF DAILY LIVING (ADL)
ADLS_ACUITY_SCORE: 20
ADLS_ACUITY_SCORE: 22
ADLS_ACUITY_SCORE: 22
ADLS_ACUITY_SCORE: 20
ADLS_ACUITY_SCORE: 22
ADLS_ACUITY_SCORE: 20

## 2023-07-02 NOTE — PLAN OF CARE
Problem: Plan of Care - These are the overarching goals to be used throughout the patient stay.    Goal: Optimal Comfort and Wellbeing  Intervention: Monitor Pain and Promote Comfort  Recent Flowsheet Documentation  Taken 7/2/2023 1615 by Mira Seymour, RN  Pain Management Interventions:   medication (see MAR)   rest  A/O x 4, VSS on RA. L PIV SL. Tolerating regular diet. SBA for ambulation. Dressing CDI. Discharge pending decreased PVR, per TODD Lazaro with Dr. Duque's team.

## 2023-07-02 NOTE — PROGRESS NOTES
MD aware: Pt voided 100 and was bladder scanned for 551.  He refused being straight cath and went 100 again.  Pt really doesn't want to be straight cath and wanted to try to void more - one more time.      1445 - voided 300, bladder scanned for 450.  Still refusing to be cath'd.

## 2023-07-02 NOTE — PLAN OF CARE
Problem: Spinal Surgery  Goal: Optimal Functional Ability  Intervention: Optimize Functional Status  Recent Flowsheet Documentation  Taken 7/2/2023 0000 by Reyes, Dora, RN  Activity Management: activity adjusted per tolerance  Positioning/Transfer Devices:   pillows   in use   Goal Outcome Evaluation:    Pt AxO x4. No acute changes this shift. Reports 2/10 pain this shift, controlled w/scheduled tylenol. Ambulate w/sba, no issues. Pt denies, n/v,sob, dizziness or chest pain. PIV is SL.Tobias in place and patent. AMELIA drain in place and patent. Pt calls appropriately and is able to make needs known. Pt resting comfortably in between cares. Will continue to monitor.

## 2023-07-02 NOTE — PROGRESS NOTES
Neurosurgery Progress Note: 7/2/2023       A/P: Mr. Kiser is a 63 year old male who is POD#1 Redo lumbar 3-lumbar 4 bilateral laminectomies, medial facetectomies, foraminotomies by Dr. Duque     States that he is doing well notes that his previous radicular leg pain is completely resolved notes lateral right foot numbness that he states was resolved yesterday but returned today slightly. Overall very pleased with his outcome thus far.  AMELIA drain with 8mL overnight, barragan to be removed with void trial today, patient states that he does not want oxycodone prescription sent to pharmacy and is doing well with muscle relaxants and tylenol      Plan:  1. Remove drain  2. Remove barragan trial void today with PVR to be completed   3. Increase activity as tolerated with PT/OT  4. Bowel regimen   5. SCDs, Lovenox to begin 48hrs postoperative   6. continue Flomax   7. Will plan discharge home later today     Addendum:   Discussed further with Dr. Pride  Patient attempted void trial today with elevated PVR and patient refusing to be straight cath throughout the day with highest PVR over 500 discussed risk associated with urinary retention to include hydronephritis and loss of bladder function or risk of infection. He states he understands and would like to still get discharged home.   Will discharge home       Neurosurgery Attending: The patient's clinical examination, laboratory data, and plan was discussed with Dr. Pride covering for Dr. Duque     HPI: Raghavendra Kiser comes today in f/u.  Patient is a  62 yo male who is s/p bilateral lumbar 3- lumbar 4 hemilaminectomies, medial facetectomies, foraminotomies and microdiscectomies with small durotomy on right on 3/21/2022. He is also s/p left lumbar 4-5, lumbar 5-sacral 1 hemilaminectomy for epidural abscess evacuation as well. He was doing well until February. He then developed symptoms after golfing at a golf simulator. He started having back pain and leg pain  "again. He underwent bilateral L3-4, L4-5 TFESI. He felt great 24 hours after each injection. Symptoms then returned. Physical therapy is helping keep him moving but not resolving symptoms. Chiropractor 3 x a week. Will get some improvement.Symptoms currently in anterior leg and posterior leg and buttocks. Claudicating back and leg pain.  Pain feels fairly constant. Stretching can decrease it. No weakness. Numbness in planter surface of foot under 4th and 5th toe only. Lumbar xray shows anterolisthesis of lumbar 4-5 with 1 mm movement on flexion/extension. MRI of his lumbar spine shows worsening stenosis again at lumbar 3-4 with severe spinal stenosis.  Improved stenosis at lumbar 4-sacral 1. Recommend lumbar 3-4 laminectomies, medial facetectomies, foraminotomies. Risks and benefits of lumbar decompression discussed including but not limited to infection, hematoma, nerve damage including paralysis, post op radiculitis, durotomy, risks associated with the use of general anesthesia, blood clots in the lungs or legs. He agreed to proceed.      S:   States that he is doing well notes that his previous radicular leg pain is completely resolved notes slight lateral right foot numbness      O:  BP (!) 144/73 (BP Location: Left arm)   Pulse 83   Temp 97.2  F (36.2  C) (Oral)   Resp 18   Ht 1.803 m (5' 11\")   Wt 101.1 kg (222 lb 14.4 oz)   SpO2 97%   BMI 31.09 kg/m       General: Awake, alert, NAD.      Motor: normal bulk and tone     Strength: full strength in all extremities throughout, bilaterally.     Sensation: intact to light touch throughout both upper and lower extremities     Incision: well approximated with ash        Bella Gao PA-C  [facility]  O: [phone]    "

## 2023-07-02 NOTE — PLAN OF CARE
Pt eating, drinking, barragan removed at 1000 - due to void, audible bowel sounds.    Problem: Spinal Surgery  Goal: Effective Urinary Elimination  Outcome: Not Progressing  Failing voiding trail and refuses to be cath'd.  See previous note.       Problem: Spinal Surgery  Goal: Optimal Coping with Surgery  Outcome: Progressing  Goal: Optimal Pain Control and Function  Outcome: Progressing      AMELIA drain pulled with no problems.  Minimal to no pain at this time.    Continue to monitor VS, labs, pain level, incision site and activity tolerance.

## 2023-07-02 NOTE — PROGRESS NOTES
Care Management Discharge Note    Discharge Date: 07/02/2023    Discharge Disposition:  Home     Additional Information:  Reviewed. Home pending voiding trial. No CM needs for discharge.         LUZ MARIA Brewer

## 2023-07-02 NOTE — PLAN OF CARE
Problem: Spinal Surgery  Goal: Absence of Bleeding  Outcome: Progressing     Problem: Spinal Surgery  Goal: Absence of Infection Signs and Symptoms  Outcome: Progressing    A/O x 4, VSS on RA. L PIV SL. AMELIA in place with bright red output. Tobias in place until tomorrow, void trial then. Tolerating regular diet. SBA for ambulation. Dressing CDI. Discharge pending decrease in AMELIA output.

## 2023-07-03 ENCOUNTER — TELEPHONE (OUTPATIENT)
Dept: NEUROSURGERY | Facility: CLINIC | Age: 63
End: 2023-07-03
Payer: MEDICARE

## 2023-07-03 NOTE — DISCHARGE SUMMARY
HOSPITAL DISCHARGE SUMMARY         Patient Name: Raghavendra Kiser  YOB: 1960  Age: 63 year old  Medical Record Number: [id number]  Primary Physician: Luis Daniel Maciel  Admission Date: 6/30/2023.  Discharge Date:7/2/2023      He will be discharged from [facility] to home    PRINCIPAL DIAGNOSIS CAUSING ADMISSION: Spinal stenosis    Discharge Diagnoses:  Principal Problem:    Spinal stenosis  Active Problems:    Lumbar radiculopathy      HPI:Raghavendra Kiser comes today in f/u.  Patient is a  62 yo male who is s/p bilateral lumbar 3- lumbar 4 hemilaminectomies, medial facetectomies, foraminotomies and microdiscectomies with small durotomy on right on 3/21/2022. He is also s/p left lumbar 4-5, lumbar 5-sacral 1 hemilaminectomy for epidural abscess evacuation as well. He was doing well until February. He then developed symptoms after golfing at a golf simulator. He started having back pain and leg pain again. He underwent bilateral L3-4, L4-5 TFESI. He felt great 24 hours after each injection. Symptoms then returned. Physical therapy is helping keep him moving but not resolving symptoms. Chiropractor 3 x a week. Will get some improvement.Symptoms currently in anterior leg and posterior leg and buttocks. Claudicating back and leg pain.  Pain feels fairly constant. Stretching can decrease it. No weakness. Numbness in planter surface of foot under 4th and 5th toe only. Lumbar xray shows anterolisthesis of lumbar 4-5 with 1 mm movement on flexion/extension. MRI of his lumbar spine shows worsening stenosis again at lumbar 3-4 with severe spinal stenosis.  Improved stenosis at lumbar 4-sacral 1. Recommend lumbar 3-4 laminectomies, medial facetectomies, foraminotomies. Risks and benefits of lumbar decompression discussed including but not limited to infection, hematoma, nerve damage including paralysis, post op radiculitis, durotomy, risks associated with the use of general anesthesia, blood clots  in the lungs or legs. He agreed to proceed.        BRIEF HOSPITAL COURSE: Raghavendra Kiser is a 63 year old male who was admitted on day of surgery and underwent Procedure(s):  lumbar 3-lumbar 4 bilateral laminectomies, medial facetectomies, foraminotomies..  Surgery was without complications.  Postoperatively he reported complete resolution of radicular leg pain that was present prior to preop and noted only slight lateral right foot numbness. Had urinary retention postoperative requiring barragan.POD#2 void trial with continued elevated PVRs but patient refused straight cath understands risk associated.    On exam on day of discharge, his strength was 5/5 with no new focal deficits.  PT/OT consultations were obtained to assess function, assist with mobilization, and evaluate for discharge recommendations.  By POD # 0 he was tolerating a regular diet, ambulating, voiding without difficulty, and obtaining good pain control on oral medication.  His incision was clean, dry and intact.   He met all discharge criteria and was stable for discharge.      PROCEDURES PERFORMED DURING HOSPITALIZATION: Procedure(s):  lumbar 3-lumbar 4 bilateral laminectomies, medial facetectomies, foraminotomies.     COMPLICATIONS IN HOSPITAL: postoperative urinary retention    IMPORTANT PENDING TEST RESULTS: None.    PERTINENT FINDINGS/RESULTS AT DISCHARGE:  None.    CONDITION AT DISCHARGE:  improving    DISCHARGE MEDICATIONS  flomax  robaxin      AFTER DISCHARGE ORDERS AND INSTRUCTIONS:    Follow up with Neurosurgery: in 2  weeks  Follow up appointment with Primary Care Physician: Luis aDniel Maciel as needed  Diet: regular diet  Activity: *No lifting, pushing or pulling greater than 5-10 pounds (this is about a gallon of milk).  *No repetitive bending, twisting, or jarring activities  *No overhead work  *No aerobic or strenuous activity  *No activities with increased risk of falls  *You may move about your home as tolerated  *You may walk up  and down stairs as tolerated  *You may increase your activity slowly over the next 4-6 weeks    WALKING PROGRAM: As you can tolerate, walk daily-start with 5-10 minutes of continuous walking. This is in addition to the walking that you do as part of your daily activities. Increase the time that you walk by 5 minutes every couple of days. Do not exceed 30-45 minutes of continuous walking until seen in follow-up. Walking is the best exercise after surgery.  **Listen to your body, if you find that you are more painful or fatigued, you may need to proceed more slowly.    **Do not smoke or expose yourself to second hand smoke. Cigarette smoke can delay healing and cause complications.     DRIVING:  We recommend that you do not drive while taking medications for pain or muscle spasms. Always read and follow the advice on your prescription bottle. If you have questions, speak with your pharmacist.  We recommend that you do not drive while wearing a brace, as it could limit your range of motion.    WORK: If you plan to return to work before your 4-6 week appointment, call and discuss with one of the nurses in the neurosurgery office.     USE OF ICE OR  HEAT:  *Icing can decrease post op swelling, thereby helping with post op pain control. Always protect your skin to prevent frostbite or burn.    *For the first 48 hours we recommend ice to the surgical area for 15-20 minutes at a time several times a day.      *Any longer than 15-20 minutes can cause damage to the tissues, including frostbite.     *Allow area to warm for at least 45 minutes or an hour between treatments.    *Ice as frequently as you wish, so long as the area is warm to touch and has normal sensation before repeating.    *After 48 hours, you may use heat or ice, which ever feels best to you.  Always remember to protect your skin, and to use no greater than 15-20 minutes at a time.     *You can make your own ice bags at home by mixing 3 parts water with 1 part  rubbing alcohol in a Ziplock bag and placing in the freezer.  It will not freeze solid.      BOWEL MOVEMENT:  If you did not have a bowel movement (pooped) before you were discharged from the hospital, and do not have a bowel movement within 2 days of discharge. Please call our office and request to speak to a nurse.    Your insurance may not cover medications to treat or prevent  constipation.      There are many  over the counter medications for constipation including: Colace, Senakot, Dulcolax, and Miralax. In addition to medications eat a high fiber diet and drink plenty of water.    If you are taking narcotics, you should being taking medication daily for constipation.      If you develop loose or runny stools, stop taking the medications for constipation.    Warnings: Call [phone] with the following symptoms:    *Temperature 101(fever) or greater or chills  *Redness, swelling or increased drainage from the wound  *Worsening pain not relieved by the pain prescription given  *Worsening or new onset of weakness, or numbness and tingling  *Loss or change in your ability to control bowel or bladder function  *Change in your ability to walk, talk, see or think  *If you did not have a bowel movement before leaving the hospital and your bowels have not moved within 48 hours of your discharge    !!!! IF YOU HAVE A SERIOUS OR THREATENING EMERGENCY, CALL [phone] OR COME TO THE EMERGENCY ROOM.  IF YOUR CONDITIONS ALLOWS COME TO THE HOSPITAL WHERE YOUR SURGERY WAS PERFORMED.    QUESTIONS OR CONCERNS:   OUR OFFICE HOURS ARE FROM 9:00 A.M -4:30 P.M. MONDAY-FRIDAY.  AFTER OFFICE HOURS, CALLS ARE ANSWERED BY THE ON-CALL PROVIDER. PLEASE CALL WITH ROUTINE QUESTIONS DURING OFFICE HOURS. THE ON-CALL PROVIDER WILL NOT REFILL PRESCRIPTIONS.  Wound care:WOUND CARE:  Keep a dressing on the wound until the staples are removed.  No lotions, soaps, powders, ointments, creams, or patches on incision until the wound is well healed.     Change the dressing daily, more frequently if necessary to keep the wound dry.  If you notice increased or persistent drainage from your wound, contact our office.  You may use an extra large band-aid or 4x4 and tape.  Both can be purchased at your pharmacy.    Staples are usually removed in 1-2 weeks.  They are sometimes left longer.    Wash your hands before changing the dressing or touching the wound. If someone is helping you change the dressing, ensure that the person washes his/her hands.  Good handwashing can decrease the risk of infection.  If you are unable to see the wound, have someone check the wound daily for redness, swelling or drainage.  A small amount of drainage is normal.       Two days after surgery begin showering. Wash your wound daily. Remove all dressings prior to your shower.  Apply mild soap directly to the wound, form a light lather and rinse with running water.     Do not submerge the wound under water. No baths or swimming for 6 weeks.     If you develop redness, swelling, drainage, or temp 101 or greater, please call our office at [phone].    Bella Gao PA-C  [facility]  O: [phone]

## 2023-07-03 NOTE — TELEPHONE ENCOUNTER
"Called Mauri to check in on his current symptoms/concers. \"I an doing great\", he said. He denied any urinary issues. He will follow up in clinic on 7/13/2023.  Kate Shelton RN, CNRN      "

## 2023-07-05 NOTE — OP NOTE
NEUROSURGERY OPERATIVE REPORT     DATE OF SERVICE:6/30/2023    PREOPERATIVE DIAGNOSES:  Lumbar stenosis with neurogenic claudication     POSTOPERATIVE DIAGNOSES:  Same     PROCEDURES:  1.  Redo lumbar 3-lumbar 4 bilateral laminectomies, medial facetectomies, foraminotomies    SURGEON:  Abena Duque MD    ASSISTANT:  Alix Velazquez APRN CNP     INDICATIONS:  Raghavendra Kiser is a 62 yo male who is s/p bilateral lumbar 3- lumbar 4 hemilaminectomies, medial facetectomies, foraminotomies and microdiscectomies with small durotomy on right on 3/21/2022. He is also s/p left lumbar 4-5, lumbar 5-sacral 1 hemilaminectomy for epidural abscess evacuation as well. Symptoms currently in anterior leg and posterior leg and buttocks. Claudicating back and leg pain. Lumbar xray shows anterolisthesis of lumbar 4-5 with 1 mm movement on flexion/extension.  MRI of his lumbar spine shows worsening stenosis again at lumbar 3-4 with severe spinal stenosis.  Improved stenosis at lumbar 4-sacral 1. Recommend lumbar 3-4 laminectomies, medial facetectomies, foraminotomies. Risks and benefits of lumbar decompression discussed including but not limited to infection, hematoma, nerve damage including paralysis, post op radiculitis, durotomy, risks associated with the use of general anesthesia, blood clots in the lungs or legs. He agreed to proceed.     PROCEDURE:  After obtaining informed consent, the patient was brought to the operating room with pneumatic stockings in place.  IV antibiotics was administered.  He was intubated under general  endotracheal anesthesia.  He was turned into the radiolucent laminectomy frame with all pressure points well padded.  He was prepped and draped in the usual sterile fashion.  A preincisional infiltration of local anesthetic was performed along the midline and the incision was opened sharply and extended down to the fascia.  The fascia was opened in one layer with monopolar electrocautery.   A subperiosteal dissection was undertaken to expose the remaining at lamina of lumbar 3 and lumbar 4.  We verified levels with a lateral x-ray.      Once levels were verified, we then proceeded to remove the remaining spinous process and lamina of lumbar 3 and superior lumbar 4 with Roungers,  Midas drill, and kerrisons.   We drilled down to the ligamentum and scar and elevated the ligamentum and scar from the underlying thecal sac and removed it with a combination of Kerrisons and curettes. We next drilled redo partial medial facetectomy and foraminotomies opening up the lateral recess and expose the underlying impinged lateral recess and nerve roots. Once the thecal sac and nerve roots were decompressed we used ball tip probe to verify no further pressure either in the canal or in the foramina.  The lateral recess was nicely decompressed.   We then proceeded to copiously irrigate the incision with antibiotic-containing saline and achieved hemostasis. A drain was placed for hematoma prevention.     Due to the nature and complexity of the case an assistant was required for the duration of the case for positioning, retraction, hemostasis, and closure.     The incision was closed in layers with interrupted 0 Vicryl to approximate the muscle and fascia, inverted 2-0 Vicryl to approximate the subcutaneous tissues and staples to approximate the skin edges.  Sponge and needle counts were correct prior to closure x2.  Sterile dressings were placed.  Patient tolerated the procedure well, was taken to the recovery room where he was extubated and found to be at his neurological baseline with no new deficits appreciated.    Estimated blood loss: 30 ml    Specimens: none    Findings:Very extensive scar bilaterally at lumbar 3-4 with severe stenosis        Abena Duque MD    Cc: Luis Daniel Maciel

## 2023-07-11 ENCOUNTER — OFFICE VISIT (OUTPATIENT)
Dept: FAMILY MEDICINE | Facility: CLINIC | Age: 63
End: 2023-07-11
Payer: MEDICARE

## 2023-07-11 VITALS
RESPIRATION RATE: 16 BRPM | DIASTOLIC BLOOD PRESSURE: 68 MMHG | BODY MASS INDEX: 31.1 KG/M2 | HEART RATE: 63 BPM | OXYGEN SATURATION: 99 % | TEMPERATURE: 97.6 F | WEIGHT: 223 LBS | SYSTOLIC BLOOD PRESSURE: 136 MMHG

## 2023-07-11 DIAGNOSIS — E11.9 TYPE 2 DIABETES MELLITUS WITHOUT COMPLICATION, WITHOUT LONG-TERM CURRENT USE OF INSULIN (H): Primary | ICD-10-CM

## 2023-07-11 DIAGNOSIS — Z12.5 SCREENING PSA (PROSTATE SPECIFIC ANTIGEN): ICD-10-CM

## 2023-07-11 DIAGNOSIS — R97.20 RISING PSA LEVEL: ICD-10-CM

## 2023-07-11 DIAGNOSIS — Z51.81 ENCOUNTER FOR THERAPEUTIC DRUG MONITORING: ICD-10-CM

## 2023-07-11 LAB — PSA SERPL DL<=0.01 NG/ML-MCNC: 3.59 NG/ML (ref 0–4.5)

## 2023-07-11 PROCEDURE — 99213 OFFICE O/P EST LOW 20 MIN: CPT | Performed by: FAMILY MEDICINE

## 2023-07-11 PROCEDURE — 36415 COLL VENOUS BLD VENIPUNCTURE: CPT | Performed by: FAMILY MEDICINE

## 2023-07-11 PROCEDURE — G0103 PSA SCREENING: HCPCS | Performed by: FAMILY MEDICINE

## 2023-07-11 PROCEDURE — 80164 ASSAY DIPROPYLACETIC ACD TOT: CPT | Performed by: FAMILY MEDICINE

## 2023-07-11 NOTE — PROGRESS NOTES
Constantin Dotson is a 63 year old, presenting for the following health issues:  Diabetes (Medication check, discuss blood sugars )        7/11/2023    10:10 AM   Additional Questions   Roomed by Jennifer SOLOMON CMA     History of Present Illness       Diabetes:   He presents for follow up of diabetes.  He is checking home blood glucose one time daily. He checks blood glucose before meals.  Blood glucose is sometimes over 200 and never under 70. He is aware of hypoglycemia symptoms including shakiness and weakness. He has no concerns regarding his diabetes at this time.  He is not experiencing numbness or burning in feet, excessive thirst, blurry vision, weight changes or redness, sores or blisters on feet. The patient has not had a diabetic eye exam in the last 12 months.     He consumes 0 sweetened beverage(s) daily.He exercises with enough effort to increase his heart rate 9 or less minutes per day.  He exercises with enough effort to increase his heart rate 3 or less days per week.   He is taking medications regularly.     Off metformin for 6-7 weeks  Urinating fine now.  Had a bit of post op urine retention about 6 weeks ago following back surgery.        Objective    /68 (BP Location: Left arm, Patient Position: Sitting, Cuff Size: Adult Large)   Pulse 63   Temp 97.6  F (36.4  C) (Oral)   Resp 16   Wt 101.2 kg (223 lb)   SpO2 99%   BMI 31.10 kg/m    Body mass index is 31.1 kg/m .  Physical Exam   GENERAL: healthy, alert and no distress  RESP: lungs clear to auscultation - no rales, rhonchi or wheezes  CV: regular rate and rhythm, normal S1 S2, no S3 or S4, no murmur, click or rub, no peripheral edema and peripheral pulses strong  MS: no gross musculoskeletal defects noted, no edema    Lab Results   Component Value Date    A1C 5.7 06/16/2023    A1C 5.7 10/20/2022    A1C 6.0 07/14/2022    A1C 5.9 03/16/2022    A1C 5.4 11/03/2021           Encounter Diagnoses   Name Primary?     Type 2 diabetes mellitus  without complication, without long-term current use of insulin (H), controlled Yes     Screening PSA (prostate specific antigen), check PSA      Encounter for therapeutic drug monitoring, check Valproic acid level         PLAN:        On or after 9-16-23 recheck A1C and get fasting lipids at the same time    Check PSA today    Check Valproic acid level today.     [facility]

## 2023-07-11 NOTE — LETTER
July 12, 2023      Mauri Kiser  [address] [facility] [address]        Dear ,  We are writing to inform you of your test results.    Joe Dotson:  Your valproic acid level is low but stable and you have done well at that level.    The PSA is elevated from last year.  It could be due to your recent barragan catheter.  We will recheck the PSA in one month and I have placed an order.  Just make a lab appt.  If it remains elevated I will refer you to Urology.     Resulted Orders   PSA, screen   Result Value Ref Range    Prostate Specific Antigen Screen 3.59 0.00 - 4.50 ng/mL    Narrative    This result is obtained using the Roche Elecsys total PSA method on the donell e801 immunoassay analyzer. Results obtained with different assay methods or kits cannot be used interchangeably.   Valproic acid   Result Value Ref Range    Valproic acid 25.0 (L)   ug/mL      Comment:      Therapeutic Range:  ug/mL   Epilepsy and andrew patients:  ug/mL   Some patients require and can tolerate values up to 150 ug/mL     Critical: Greater than 150 ug/mL       If you have any questions or concerns, please call the clinic at the number listed above.       Sincerely,      Luis Daniel Maciel MD

## 2023-07-11 NOTE — PATIENT INSTRUCTIONS
On or after 9-16-23 recheck A1C and get fasting lipids at the same time    Check PSA today    Check Valproic acid level today.     [facility]

## 2023-07-12 LAB — VALPROATE SERPL-MCNC: 25 UG/ML

## 2023-07-13 ENCOUNTER — OFFICE VISIT (OUTPATIENT)
Dept: NEUROSURGERY | Facility: CLINIC | Age: 63
End: 2023-07-13
Payer: MEDICARE

## 2023-07-13 VITALS — SYSTOLIC BLOOD PRESSURE: 142 MMHG | HEART RATE: 76 BPM | DIASTOLIC BLOOD PRESSURE: 88 MMHG | RESPIRATION RATE: 18 BRPM

## 2023-07-13 DIAGNOSIS — M48.062 LUMBAR STENOSIS WITH NEUROGENIC CLAUDICATION: Primary | ICD-10-CM

## 2023-07-13 PROCEDURE — 99207 PR NO CHARGE NURSE ONLY: CPT

## 2023-07-13 RX ORDER — METHOCARBAMOL 750 MG/1
750 TABLET, FILM COATED ORAL 4 TIMES DAILY PRN
Qty: 40 TABLET | Refills: 0 | Status: SHIPPED | OUTPATIENT
Start: 2023-07-13 | End: 2023-11-03

## 2023-07-13 NOTE — PATIENT INSTRUCTIONS
A dressing is not required. Your wound is covered with steri-strips.  The steri-strips should fall off in 7-10 days.  If they have not fallen off in 14 days, remove them.    Keep the wound clean.    Wash your hands before touching the wound.  Ensure that anyone assisting you in the care of your wound washes her/his hands before touching the wound. Good handwashing can decrease the risk of serious infection.    If you are unable to see your wound, have someone check the wound daily for redness, swelling,or drainage. A small amount of drainage is normal.    You may shower. Do not allow shower to beat directly on the wound.  Pat the wound dry. Do not rub.    No tub baths until the wound is well healed.  Usually 3-4 weeks.     If you develop redness, swelling, drainage, or temp 101 or greater, call our office at [phone].      * No lifting, pushing or pulling greater than 5-10 pound (this is about a gallon of milk) for the first 6 weeks after surgery .  *No repetitive bending, twisting, or jarring activities for 6 weeks.  *No overhead work  *No aerobic or strenuous activity  *No activities with increased risk of falls  *You may move about your home as tolerated  *You may walk up and down stairs as tolerated  *You may increase your activity slowly over the next 6 weeks    WALKING PROGRAM: As you can tolerate, walk daily-start with 5-10 minutes of continuous walking. This is in addition to the walking that you do as part of your daily activities. Increase the time that you walk by 5 minutes every couple of days. Do not exceed 30-45 minutes of continuous walking until seen in follow-up. Walking is the best exercise after surgery.  **Listen to your body, if you find that you are more painful or fatigued, you may need to proceed more slowly.    **Do not smoke or expose yourself to second hand smoke. Cigarette smoke can delay healing and cause complications.     DRIVING:  We recommend that you do not drive while taking  medications for pain or muscle spasms. Always read and follow the advice on your prescription bottle. If you have questions, speak with your pharmacist.  We recommend that you do not drive while wearing a brace, as it could limit your range of motion.    WORK: If you plan to return to work before your 6 week post-op appointment, call and discuss with one of the nurses in the neurosurgery office.

## 2023-07-13 NOTE — PROGRESS NOTES
"Raghavendra Kiser is status post Redo lumbar 3-lumbar 4 bilateral laminectomies, medial facetectomies, foraminotomies on 06/30/2023 with Dr. Duque.  Preoperatively presented with claudicating back and bilateral leg pain.  Today he returns in follow up for staples removal. He is accompanied by his SO. He is very pleased with his outcome - his back discomfort is minimal, his bilateral leg pain has resolved. He denies sensory or motor disturbance in LE. Notes intermittent back \"stiffness\", well controlled with Robaxin prn. Gait and balance are normal. Not taking pain meds.  Refilled Robaxin 750 mg.  Dr. Duque saw Mauri during his appt in clinic this morning.    Surgical wound WNL - CDI, no signs of infection or skin breakdown.  Incision well-healed: good skin approximation, no redness or visible/palpable edema, no tenderness to palpation.  PT. AF, denies fever, chills or sweats.  Pt. reports that the symptoms are improved from pre-op.    Staples - intact removed without difficulty. Wound prepped with Betadine before and after removal.  Surrounding skin has no signs of breakdown.  Verbal instructions regarding incision care are given.  Pt. advised to call us if any s/s of infection noted - all discussed in details.      Kate Shelton, RN, CNRN            "

## 2023-07-17 DIAGNOSIS — E11.9 TYPE 2 DIABETES MELLITUS WITHOUT COMPLICATION, WITHOUT LONG-TERM CURRENT USE OF INSULIN (H): ICD-10-CM

## 2023-07-18 RX ORDER — BLOOD-GLUCOSE METER
KIT MISCELLANEOUS
Qty: 200 STRIP | Refills: 1 | Status: SHIPPED | OUTPATIENT
Start: 2023-07-18 | End: 2023-10-13

## 2023-07-18 NOTE — TELEPHONE ENCOUNTER
"Last Written Prescription Date:  6/2/22  Last Fill Quantity: 200,  # refills: 4   Last office visit provider:  7/11/23     Requested Prescriptions   Pending Prescriptions Disp Refills     FREESTYLE LITE test strip [Pharmacy Med Name: FREESTYLE LITE STRIPS 50'S] 200 strip 3     Sig: USE 1 STRIP TWICE A DAY AS DIRECTED       Diabetic Supplies Protocol Passed - 7/18/2023  9:48 AM        Passed - Medication is active on med list        Passed - Patient is 18 years of age or older        Passed - Recent (6 mo) or future (30 days) visit within the authorizing provider's specialty     Patient had office visit in the last 6 months or has a visit in the next 30 days with authorizing provider.  See \"Patient Info\" tab in inbasket, or \"Choose Columns\" in Meds & Orders section of the refill encounter.                 Luis Daniel Daly RN 07/18/23 9:49 AM  "

## 2023-07-21 DIAGNOSIS — I10 ESSENTIAL HYPERTENSION: ICD-10-CM

## 2023-07-21 RX ORDER — AMLODIPINE BESYLATE 5 MG/1
TABLET ORAL
Qty: 90 TABLET | Refills: 3 | Status: SHIPPED | OUTPATIENT
Start: 2023-07-21 | End: 2024-04-03

## 2023-07-21 NOTE — TELEPHONE ENCOUNTER
"Routing refill request to provider for review/approval because:  Elevated blood pressure.    Last Written Prescription Date:  7-26-22  Last Fill Quantity: 90,  # refills: 3   Last office visit provider:  7-11-23     Requested Prescriptions   Pending Prescriptions Disp Refills     amLODIPine (NORVASC) 5 MG tablet [Pharmacy Med Name: AMLODIPINE BESYLATE TABS 5MG] 90 tablet 3     Sig: TAKE 1 TABLET DAILY       Calcium Channel Blockers Protocol  Failed - 7/21/2023  2:20 AM        Failed - Blood pressure under 140/90 in past 12 months     BP Readings from Last 3 Encounters:   07/13/23 (!) 142/88   07/11/23 136/68   07/02/23 (!) 151/78                 Passed - Recent (12 mo) or future (30 days) visit within the authorizing provider's specialty     Patient has had an office visit with the authorizing provider or a provider within the authorizing providers department within the previous 12 mos or has a future within next 30 days. See \"Patient Info\" tab in inbasket, or \"Choose Columns\" in Meds & Orders section of the refill encounter.              Passed - Medication is active on med list        Passed - Patient is age 18 or older        Passed - Normal serum creatinine on file in past 12 months     Recent Labs   Lab Test 06/30/23  1621   CR 1.04       Ok to refill medication if creatinine is low               Ange Hernández RN 07/21/23 9:40 AM  "

## 2023-08-02 DIAGNOSIS — I63.9 CEREBROVASCULAR ACCIDENT (CVA), UNSPECIFIED MECHANISM (H): ICD-10-CM

## 2023-08-02 RX ORDER — DIVALPROEX SODIUM 500 MG/1
TABLET, EXTENDED RELEASE ORAL
Qty: 90 TABLET | Refills: 3 | Status: SHIPPED | OUTPATIENT
Start: 2023-08-02 | End: 2024-04-03

## 2023-08-02 NOTE — TELEPHONE ENCOUNTER
"Routing refill request to provider for review/approval because:  Labs not current:  Last Depakote level was 7/11/20    Last Written Prescription Date:  5/18/23  Last Fill Quantity: 90,  # refills: 0   Last office visit provider:  7/11/23     Requested Prescriptions   Pending Prescriptions Disp Refills    divalproex sodium extended-release (DEPAKOTE ER) 500 MG 24 hr tablet [Pharmacy Med Name: DIVALPROEX SODIUM ER TABS 500MG] 90 tablet 3     Sig: TAKE 1 TABLET AT BEDTIME       Anti-Seizure Meds Protocol  Failed - 8/2/2023  3:34 PM        Failed - Review Authorizing provider's last note.      Refer to last progress notes: confirm request is for original authorizing provider (cannot be through other providers).          Failed - Depakote level within therapeutic range in past 26 months     Lab Results   Component Value Date    VALPROATE 25.0 07/11/2023     Depakote level must be checked 2-4 weeks after dosage change.          Passed - Recent (12 mo) or future (30 days) visit within the authorizing provider's specialty     Patient has had an office visit with the authorizing provider or a provider within the authorizing providers department within the previous 12 mos or has a future within next 30 days. See \"Patient Info\" tab in inbasket, or \"Choose Columns\" in Meds & Orders section of the refill encounter.              Passed - Normal CBC on file in past 26 months     Recent Labs   Lab Test 06/30/23 2052 06/16/23  1410   WBC 7.3 4.6   RBC  --  4.42   HGB  --  14.3   HCT  --  42.0   PLT  --  180                 Passed - Normal ALT or AST on file in past 26 months     Recent Labs   Lab Test 01/05/23  1530   ALT 16     Recent Labs   Lab Test 01/05/23  1530   AST 19             Passed - Normal platelet count on file in past 26 months     Recent Labs   Lab Test 06/16/23  1410                  Passed - Medication is active on med list             Tara Rodrigues RN 08/02/23 3:35 PM  "

## 2023-08-10 ENCOUNTER — LAB (OUTPATIENT)
Dept: LAB | Facility: CLINIC | Age: 63
End: 2023-08-10
Payer: MEDICARE

## 2023-08-10 DIAGNOSIS — R97.20 RISING PSA LEVEL: ICD-10-CM

## 2023-08-10 DIAGNOSIS — R97.20 RISING PSA LEVEL: Primary | ICD-10-CM

## 2023-08-10 LAB — PSA SERPL DL<=0.01 NG/ML-MCNC: 3.41 NG/ML (ref 0–4.5)

## 2023-08-10 PROCEDURE — 36415 COLL VENOUS BLD VENIPUNCTURE: CPT

## 2023-08-10 PROCEDURE — 84153 ASSAY OF PSA TOTAL: CPT

## 2023-08-11 NOTE — PROGRESS NOTES
Neurosurgery Follow UP  August 15, 2023    A/P: Mauri Kiser is S/P Redo lumbar 3-lumbar 4 bilateral laminectomies, medial facetectomies, foraminotomies 6/30/2023 with Dr Duque. Post op resolution of pre op symptoms. Mauri feels great. Slight intermittent numbness right outer foot but he can live with it. New since prior to this surgery.     PLAN:   Ok to advance your activity as you tolerate   Listen to your body   Start slow and build  OK to stop your lifting restrictions - add 5 pounds per week until back to baseline     Physical Exam:    /70   Pulse 62   SpO2 99%     General: alert, oriented. DUDLEY. Speech is clear.     Motor: normal bulk and tone     Strength:   Appears to have full strength     Coordination: steady gait    Incision: healed incision with small scab     Imaging: none    KALEB Marshall-CNP  [facility] Neurosurgery  O: [phone]

## 2023-08-15 ENCOUNTER — OFFICE VISIT (OUTPATIENT)
Dept: NEUROSURGERY | Facility: CLINIC | Age: 63
End: 2023-08-15
Payer: MEDICARE

## 2023-08-15 VITALS — OXYGEN SATURATION: 99 % | SYSTOLIC BLOOD PRESSURE: 128 MMHG | HEART RATE: 62 BPM | DIASTOLIC BLOOD PRESSURE: 70 MMHG

## 2023-08-15 DIAGNOSIS — M48.062 LUMBAR STENOSIS WITH NEUROGENIC CLAUDICATION: Primary | ICD-10-CM

## 2023-08-15 PROCEDURE — 99024 POSTOP FOLLOW-UP VISIT: CPT | Performed by: NURSE PRACTITIONER

## 2023-08-15 ASSESSMENT — PAIN SCALES - GENERAL: PAINLEVEL: NO PAIN (0)

## 2023-08-15 NOTE — LETTER
8/15/2023         RE: Raghavendra Kiser  [address]  [place] [address]        Dear Colleague,    Thank you for referring your patient, Raghavendra Kiser, to the [facility]. Please see a copy of my visit note below.    Neurosurgery Follow UP  August 15, 2023    A/P: Mauri Kiser is S/P Redo lumbar 3-lumbar 4 bilateral laminectomies, medial facetectomies, foraminotomies 6/30/2023 with Dr Duque. Post op resolution of pre op symptoms. Mauri feels great. Slight intermittent numbness right outer foot but he can live with it. New since prior to this surgery.     PLAN:   Ok to advance your activity as you tolerate   Listen to your body   Start slow and build  OK to stop your lifting restrictions - add 5 pounds per week until back to baseline     Physical Exam:    /70   Pulse 62   SpO2 99%     General: alert, oriented. DUDLEY. Speech is clear.     Motor: normal bulk and tone     Strength:   Appears to have full strength     Coordination: steady gait    Incision: healed incision with small scab     Imaging: none    KALEB Marshall-CNP  [facility] Neurosurgery  O: [phone]        Again, thank you for allowing me to participate in the care of your patient.        Sincerely,        MORGAN Scott CNP

## 2023-08-15 NOTE — PATIENT INSTRUCTIONS
Ok to advance your activity as you tolerate   Listen to your body   Start slow and build  OK to stop your lifting restrictions - add 5 pounds per week until back to baseline

## 2023-08-23 NOTE — PROGRESS NOTES
This is a recent snapshot of the patient's [facility] medical record.  For current drug dose and complete information and questions, call [phone] or In Basket pool, [facility] home infusion ([id number])  CSN Number:  [id number]

## 2023-08-24 ENCOUNTER — TRANSFERRED RECORDS (OUTPATIENT)
Dept: HEALTH INFORMATION MANAGEMENT | Facility: CLINIC | Age: 63
End: 2023-08-24
Payer: MEDICARE

## 2023-09-13 ENCOUNTER — TELEPHONE (OUTPATIENT)
Dept: RHEUMATOLOGY | Facility: CLINIC | Age: 63
End: 2023-09-13

## 2023-09-13 ENCOUNTER — LAB (OUTPATIENT)
Dept: LAB | Facility: CLINIC | Age: 63
End: 2023-09-13
Payer: MEDICARE

## 2023-09-13 DIAGNOSIS — M1A.0791 IDIOPATHIC CHRONIC GOUT OF FOOT WITH TOPHUS, UNSPECIFIED LATERALITY: ICD-10-CM

## 2023-09-13 DIAGNOSIS — M1A.0791 IDIOPATHIC CHRONIC GOUT OF FOOT WITH TOPHUS, UNSPECIFIED LATERALITY: Primary | ICD-10-CM

## 2023-09-13 LAB
ERYTHROCYTE [DISTWIDTH] IN BLOOD BY AUTOMATED COUNT: 13.5 % (ref 10–15)
HCT VFR BLD AUTO: 40 % (ref 40–53)
HGB BLD-MCNC: 13.5 G/DL (ref 13.3–17.7)
MCH RBC QN AUTO: 32.1 PG (ref 26.5–33)
MCHC RBC AUTO-ENTMCNC: 33.8 G/DL (ref 31.5–36.5)
MCV RBC AUTO: 95 FL (ref 78–100)
PLATELET # BLD AUTO: 201 10E3/UL (ref 150–450)
RBC # BLD AUTO: 4.21 10E6/UL (ref 4.4–5.9)
WBC # BLD AUTO: 7.7 10E3/UL (ref 4–11)

## 2023-09-13 PROCEDURE — 84460 ALANINE AMINO (ALT) (SGPT): CPT

## 2023-09-13 PROCEDURE — 82565 ASSAY OF CREATININE: CPT

## 2023-09-13 PROCEDURE — 85027 COMPLETE CBC AUTOMATED: CPT

## 2023-09-13 PROCEDURE — 36415 COLL VENOUS BLD VENIPUNCTURE: CPT

## 2023-09-13 PROCEDURE — 84550 ASSAY OF BLOOD/URIC ACID: CPT

## 2023-09-13 NOTE — TELEPHONE ENCOUNTER
Pt is scheduled for a lab appt today, 9/13/23.    Pt has a follow up scheduled for 11/6/23 with Dr. Potts.

## 2023-09-14 LAB
ALT SERPL W P-5'-P-CCNC: 13 U/L (ref 0–70)
CREAT SERPL-MCNC: 0.99 MG/DL (ref 0.67–1.17)
EGFRCR SERPLBLD CKD-EPI 2021: 85 ML/MIN/1.73M2
URATE SERPL-MCNC: 3.5 MG/DL (ref 3.4–7)

## 2023-09-14 RX ORDER — ALLOPURINOL 300 MG/1
TABLET ORAL
Qty: 135 TABLET | Refills: 0 | Status: SHIPPED | OUTPATIENT
Start: 2023-09-14 | End: 2023-11-06

## 2023-10-13 ENCOUNTER — PATIENT OUTREACH (OUTPATIENT)
Dept: GASTROENTEROLOGY | Facility: CLINIC | Age: 63
End: 2023-10-13
Payer: MEDICARE

## 2023-10-13 DIAGNOSIS — E11.9 TYPE 2 DIABETES MELLITUS WITHOUT COMPLICATION, WITHOUT LONG-TERM CURRENT USE OF INSULIN (H): ICD-10-CM

## 2023-10-13 RX ORDER — BLOOD-GLUCOSE METER
KIT MISCELLANEOUS
Qty: 100 STRIP | Refills: 1 | Status: SHIPPED | OUTPATIENT
Start: 2023-10-13

## 2023-10-13 NOTE — TELEPHONE ENCOUNTER
Medication Question or Refill    Contacts         Type Contact Phone/Fax    10/13/2023 11:59 AM CDT Phone (Incoming) Mauri Kiser (Self) [phone] (M)            What medication are you calling about (include dose and sig)?: FREESTYLE LITE test strip     Preferred Pharmacy:       [facility] - [place], [place] - [facility] [facility]  [address] [facility] [address]  Phone: [phone] Fax: [phone]      Controlled Substance Agreement on file:   CSA -- Patient Level:    CSA: None found at the patient level.       Who prescribed the medication?: Luis Daniel Maciel    Do you need a refill? No    When did you use the medication last? 10/13/23    Patient offered an appointment? No    Do you have any questions or concerns?  Yes: Pt states that he has an over abundance of test strips. He would like the prescription adjusted do that he doesn't have to many/have to pay for strips he doesn't need. Pt states that he gets shipped a box of 200 strips every 3 months, but he is only using 1 test strip per day as he is only testing his blood sugars once daily, so he ends up with an excess of over 100 strips every 3 months.      Could we send this information to you in [place] or would you prefer to receive a phone call?:   Patient would prefer a phone call   Okay to leave a detailed message?: No at Home number on file [phone] (home)

## 2023-10-13 NOTE — TELEPHONE ENCOUNTER
Pended modified test strip order for instructions to check BGL once a day with appropriate quantity.    Luke Gutierrez RN     [facility]

## 2023-10-26 NOTE — PROGRESS NOTES
DISCHARGE SUMMARY    Raghavendra Kiser was seen  5 times for evaluation and treatment.  Patient did not return for further treatment and current status is unknown.  Due to short treatment duration, no objective or functional changes were made.  Please see goal flow sheet from episode noted date below and initial evaluation for further information.  Patient is discharged from therapy and therapy episode is resolved as of 10/26/23.      Linked Episodes   Type: Episode: Status: Noted: Resolved: Last update: Updated by:   PHYSICAL THERAPY low back pain Active 3/30/2023  5/18/2023  3:05 PM Radha Shah, PT      Comments:

## 2023-11-03 ENCOUNTER — OFFICE VISIT (OUTPATIENT)
Dept: FAMILY MEDICINE | Facility: CLINIC | Age: 63
End: 2023-11-03
Payer: MEDICARE

## 2023-11-03 VITALS
SYSTOLIC BLOOD PRESSURE: 129 MMHG | OXYGEN SATURATION: 98 % | DIASTOLIC BLOOD PRESSURE: 79 MMHG | HEIGHT: 72 IN | RESPIRATION RATE: 16 BRPM | TEMPERATURE: 97.6 F | WEIGHT: 224.8 LBS | BODY MASS INDEX: 30.45 KG/M2 | HEART RATE: 64 BPM

## 2023-11-03 DIAGNOSIS — I10 PRIMARY HYPERTENSION: ICD-10-CM

## 2023-11-03 DIAGNOSIS — Z00.00 INITIAL MEDICARE ANNUAL WELLNESS VISIT: ICD-10-CM

## 2023-11-03 DIAGNOSIS — R97.20 RISING PSA LEVEL: ICD-10-CM

## 2023-11-03 DIAGNOSIS — E11.9 TYPE 2 DIABETES MELLITUS WITHOUT COMPLICATION, WITHOUT LONG-TERM CURRENT USE OF INSULIN (H): ICD-10-CM

## 2023-11-03 DIAGNOSIS — Z86.73 H/O: CVA (CEREBROVASCULAR ACCIDENT): ICD-10-CM

## 2023-11-03 DIAGNOSIS — E03.9 HYPOTHYROIDISM, UNSPECIFIED TYPE: ICD-10-CM

## 2023-11-03 DIAGNOSIS — E78.5 HYPERLIPIDEMIA, UNSPECIFIED HYPERLIPIDEMIA TYPE: ICD-10-CM

## 2023-11-03 DIAGNOSIS — K63.5 POLYP OF COLON, UNSPECIFIED PART OF COLON, UNSPECIFIED TYPE: ICD-10-CM

## 2023-11-03 LAB
ALBUMIN SERPL BCG-MCNC: 4.2 G/DL (ref 3.5–5.2)
ALP SERPL-CCNC: 78 U/L (ref 40–129)
ALT SERPL W P-5'-P-CCNC: 20 U/L (ref 0–70)
ANION GAP SERPL CALCULATED.3IONS-SCNC: 13 MMOL/L (ref 7–15)
AST SERPL W P-5'-P-CCNC: 20 U/L (ref 0–45)
BILIRUB SERPL-MCNC: 0.3 MG/DL
BUN SERPL-MCNC: 15.5 MG/DL (ref 8–23)
CALCIUM SERPL-MCNC: 9.4 MG/DL (ref 8.8–10.2)
CHLORIDE SERPL-SCNC: 106 MMOL/L (ref 98–107)
CHOLEST SERPL-MCNC: 111 MG/DL
CREAT SERPL-MCNC: 0.93 MG/DL (ref 0.67–1.17)
CREAT UR-MCNC: 214 MG/DL
DEPRECATED HCO3 PLAS-SCNC: 23 MMOL/L (ref 22–29)
EGFRCR SERPLBLD CKD-EPI 2021: >90 ML/MIN/1.73M2
GLUCOSE SERPL-MCNC: 148 MG/DL (ref 70–99)
HBA1C MFR BLD: 6 % (ref 0–5.6)
HDLC SERPL-MCNC: 21 MG/DL
LDLC SERPL CALC-MCNC: 38 MG/DL
MICROALBUMIN UR-MCNC: <12 MG/L
MICROALBUMIN/CREAT UR: NORMAL MG/G{CREAT}
NONHDLC SERPL-MCNC: 90 MG/DL
POTASSIUM SERPL-SCNC: 4 MMOL/L (ref 3.4–5.3)
PROT SERPL-MCNC: 6.9 G/DL (ref 6.4–8.3)
PSA SERPL DL<=0.01 NG/ML-MCNC: 3.12 NG/ML (ref 0–4.5)
SODIUM SERPL-SCNC: 142 MMOL/L (ref 135–145)
TRIGL SERPL-MCNC: 262 MG/DL
TSH SERPL DL<=0.005 MIU/L-ACNC: 3.99 UIU/ML (ref 0.3–4.2)

## 2023-11-03 PROCEDURE — 83036 HEMOGLOBIN GLYCOSYLATED A1C: CPT | Performed by: FAMILY MEDICINE

## 2023-11-03 PROCEDURE — 99214 OFFICE O/P EST MOD 30 MIN: CPT | Mod: 25 | Performed by: FAMILY MEDICINE

## 2023-11-03 PROCEDURE — 82570 ASSAY OF URINE CREATININE: CPT | Performed by: FAMILY MEDICINE

## 2023-11-03 PROCEDURE — 82043 UR ALBUMIN QUANTITATIVE: CPT | Performed by: FAMILY MEDICINE

## 2023-11-03 PROCEDURE — 84443 ASSAY THYROID STIM HORMONE: CPT | Performed by: FAMILY MEDICINE

## 2023-11-03 PROCEDURE — 36415 COLL VENOUS BLD VENIPUNCTURE: CPT | Performed by: FAMILY MEDICINE

## 2023-11-03 PROCEDURE — G0402 INITIAL PREVENTIVE EXAM: HCPCS | Performed by: FAMILY MEDICINE

## 2023-11-03 PROCEDURE — 84153 ASSAY OF PSA TOTAL: CPT | Performed by: FAMILY MEDICINE

## 2023-11-03 PROCEDURE — 80053 COMPREHEN METABOLIC PANEL: CPT | Performed by: FAMILY MEDICINE

## 2023-11-03 PROCEDURE — 80061 LIPID PANEL: CPT | Performed by: FAMILY MEDICINE

## 2023-11-03 RX ORDER — LOSARTAN POTASSIUM 50 MG/1
TABLET ORAL
Qty: 180 TABLET | Refills: 1 | Status: SHIPPED | OUTPATIENT
Start: 2023-11-03 | End: 2023-12-18

## 2023-11-03 RX ORDER — RESPIRATORY SYNCYTIAL VIRUS VACCINE 120MCG/0.5
0.5 KIT INTRAMUSCULAR ONCE
Qty: 1 EACH | Refills: 0 | Status: CANCELLED | OUTPATIENT
Start: 2023-11-03 | End: 2023-11-03

## 2023-11-03 ASSESSMENT — ENCOUNTER SYMPTOMS
ARTHRALGIAS: 0
NERVOUS/ANXIOUS: 0
HEMATURIA: 0
PALPITATIONS: 0
DYSURIA: 0
ABDOMINAL PAIN: 0
SORE THROAT: 0
JOINT SWELLING: 0
DIZZINESS: 0
COUGH: 1
MYALGIAS: 0
NAUSEA: 0
FEVER: 0
HEARTBURN: 0
HEADACHES: 0
CONSTIPATION: 0
CHILLS: 0
SHORTNESS OF BREATH: 0
WEAKNESS: 0
DIARRHEA: 0
PARESTHESIAS: 0
FREQUENCY: 0
EYE PAIN: 0
HEMATOCHEZIA: 0

## 2023-11-03 ASSESSMENT — ACTIVITIES OF DAILY LIVING (ADL): CURRENT_FUNCTION: NO ASSISTANCE NEEDED

## 2023-11-03 NOTE — LETTER
November 5, 2023      Mauri Kiser  [address] [place] [facility] [address]        Dear ,  We are writing to inform you of your test results.    Joe Dotson:  Your cholesterol and LDL are great.   The triglycerides are a bit up so continue to limit the carbs.  Your PSA has improved but is still slightly above 3.0.  I know Urology was going to look with an MRI if the PSA was still above 3.0  Did they already get the MRI?  Let me know and also follow up with Urology according to their instructions.  The A1C is still under good control on diet alone.  Your thyroid is under good control  The remaining labs are normal.    Resulted Orders   Lipid panel reflex to direct LDL Non-fasting   Result Value Ref Range    Cholesterol 111 <200 mg/dL    Triglycerides 262 (H) <150 mg/dL    Direct Measure HDL 21 (L) >=40 mg/dL    LDL Cholesterol Calculated 38 <=100 mg/dL    Non HDL Cholesterol 90 <130 mg/dL    Narrative    Cholesterol  Desirable:  <200 mg/dL    Triglycerides  Normal:  Less than 150 mg/dL  Borderline High:  150-199 mg/dL  High:  200-499 mg/dL  Very High:  Greater than or equal to 500 mg/dL    Direct Measure HDL  Female:  Greater than or equal to 50 mg/dL   Male:  Greater than or equal to 40 mg/dL    LDL Cholesterol  Desirable:  <100mg/dL  Above Desirable:  100-129 mg/dL   Borderline High:  130-159 mg/dL   High:  160-189 mg/dL   Very High:  >= 190 mg/dL    Non HDL Cholesterol  Desirable:  130 mg/dL  Above Desirable:  130-159 mg/dL  Borderline High:  160-189 mg/dL  High:  190-219 mg/dL  Very High:  Greater than or equal to 220 mg/dL   Albumin Random Urine Quantitative with Creat Ratio   Result Value Ref Range    Creatinine Urine mg/dL 214.0 mg/dL      Comment:      The reference ranges have not been established in urine creatinine. The results should be integrated into the clinical context for interpretation.    Albumin Urine mg/L <12.0 mg/L      Comment:      The reference ranges have not been established in  urine albumin. The results should be integrated into the clinical context for interpretation.    Albumin Urine mg/g Cr        Comment:      Unable to calculate, urine albumin and/or urine creatinine is outside detectable limits.  Microalbuminuria is defined as an albumin:creatinine ratio of 17 to 299 for males and 25 to 299 for females. A ratio of albumin:creatinine of 300 or higher is indicative of overt proteinuria.  Due to biologic variability, positive results should be confirmed by a second, first-morning random or 24-hour timed urine specimen. If there is discrepancy, a third specimen is recommended. When 2 out of 3 results are in the microalbuminuria range, this is evidence for incipient nephropathy and warrants increased efforts at glucose control, blood pressure control, and institution of therapy with an angiotensin-converting-enzyme (ACE) inhibitor (if the patient can tolerate it).     Comprehensive metabolic panel (BMP + Alb, Alk Phos, ALT, AST, Total. Bili, TP)   Result Value Ref Range    Sodium 142 135 - 145 mmol/L      Comment:      Reference intervals for this test were updated on 09/26/2023 to more accurately reflect our healthy population. There may be differences in the flagging of prior results with similar values performed with this method. Interpretation of those prior results can be made in the context of the updated reference intervals.     Potassium 4.0 3.4 - 5.3 mmol/L    Carbon Dioxide (CO2) 23 22 - 29 mmol/L    Anion Gap 13 7 - 15 mmol/L    Urea Nitrogen 15.5 8.0 - 23.0 mg/dL    Creatinine 0.93 0.67 - 1.17 mg/dL    GFR Estimate >90 >60 mL/min/1.73m2    Calcium 9.4 8.8 - 10.2 mg/dL    Chloride 106 98 - 107 mmol/L    Glucose 148 (H) 70 - 99 mg/dL    Alkaline Phosphatase 78 40 - 129 U/L    AST 20 0 - 45 U/L      Comment:      Reference intervals for this test were updated on 6/12/2023 to more accurately reflect our healthy population. There may be differences in the flagging of prior results  with similar values performed with this method. Interpretation of those prior results can be made in the context of the updated reference intervals.    ALT 20 0 - 70 U/L      Comment:      Reference intervals for this test were updated on 6/12/2023 to more accurately reflect our healthy population. There may be differences in the flagging of prior results with similar values performed with this method. Interpretation of those prior results can be made in the context of the updated reference intervals.      Protein Total 6.9 6.4 - 8.3 g/dL    Albumin 4.2 3.5 - 5.2 g/dL    Bilirubin Total 0.3 <=1.2 mg/dL   PSA, tumor marker   Result Value Ref Range    PSA Tumor Marker 3.12 0.00 - 4.50 ng/mL    Narrative    This result is obtained using the Roche Elecsys total PSA method on the donell e801 immunoassay analyzer. Results obtained with different assay methods or kits cannot be used interchangeably.   TSH with free T4 reflex   Result Value Ref Range    TSH 3.99 0.30 - 4.20 uIU/mL   Hemoglobin A1c   Result Value Ref Range    Hemoglobin A1C 6.0 (H) 0.0 - 5.6 %      Comment:      Normal <5.7%   Prediabetes 5.7-6.4%    Diabetes 6.5% or higher     Note: Adopted from ADA consensus guidelines.       If you have any questions or concerns, please call the clinic at the number listed above.       Sincerely,      Luis Daniel Maciel MD

## 2023-11-03 NOTE — PATIENT INSTRUCTIONS
Colonoscopy Jan 2026    Non-fasting labs    Stop lisinopril due to cough    Start losartan at 50 mg daily.  Monitor BP twice daily  if the BP starts running > 140/90  then increase losartan to twice daily.  Watch  for resolution of cough.    Patient Education   Personalized Prevention Plan  You are due for the preventive services outlined below.  Your care team is available to assist you in scheduling these services.  If you have already completed any of these items, please share that information with your care team to update in your medical record.  Health Maintenance Due   Topic Date Due    Diabetic Foot Exam  Never done    RSV VACCINE (Pregnancy & 60+) (1 - 1-dose 60+ series) Never done    Eye Exam  12/03/2020    AORTIC ANEURYSM SCREENING (SYSTEM ASSIGNED)  Never done    Cholesterol Lab  10/20/2023    ANNUAL REVIEW OF HM ORDERS  10/20/2023    Kidney Microalbumin Urine Test  10/21/2023    Annual Wellness Visit  10/20/2023     Learning About Dietary Guidelines  What are the Dietary Guidelines for Americans?     Dietary Guidelines for Americans provide tips for eating well and staying healthy. This helps reduce the risk for long-term (chronic) diseases.  These guidelines recommend that you:  Eat and drink the right amount for you. The U.S. government's food guide is called MyPlate. It can help you make your own well-balanced eating plan.  Try to balance your eating with your activity. This helps you stay at a healthy weight.  Drink alcohol in moderation, if at all.  Limit foods high in salt, saturated fat, trans fat, and added sugar.  These guidelines are from the U.S. Department of Agriculture and the U.S. Department of Health and Human Services. They are updated every 5 years.  What is MyPlate?  MyPlate is the U.[place]. government's food guide. It can help you make your own well-balanced eating plan. A balanced eating plan means that you eat enough, but not too much, and that your food gives you the nutrients you  "need to stay healthy.  MyPlate focuses on eating plenty of whole grains, fruits, and vegetables, and on limiting fat and sugar. It is available online at www.ChooseMyPlate.gov.  How can you get started?  If you're trying to eat healthier, you can slowly change your eating habits over time. You don't have to make big changes all at once. Start by adding one or two healthy foods to your meals each day.  Grains  Choose whole-grain breads and cereals and whole-wheat pasta and whole-grain crackers.  Vegetables  Eat a variety of vegetables every day. They have lots of nutrients and are part of a heart-healthy diet.  Fruits  Eat a variety of fruits every day. Fruits contain lots of nutrients. Choose fresh fruit instead of fruit juice.  Protein foods  Choose fish and lean poultry more often. Eat red meat and fried meats less often. Dried beans, tofu, and nuts are also good sources of protein.  Dairy  Choose low-fat or fat-free products from this food group. If you have problems digesting milk, try eating cheese or yogurt instead.  Fats and oils  Limit fats and oils if you're trying to cut calories. Choose healthy fats when you cook. These include canola oil and olive oil.  Where can you learn more?  Go to https://www.[employer].net/patiented  Enter D676 in the search box to learn more about \"Learning About Dietary Guidelines.\"  Current as of: March 1, 2023               Content Version: 13.7    0283-3265 [employer].   Care instructions adapted under license by your healthcare professional. If you have questions about a medical condition or this instruction, always ask your healthcare professional. [employer] disclaims any warranty or liability for your use of this information.         "

## 2023-11-03 NOTE — PROGRESS NOTES
"SUBJECTIVE:   Mauri is a 63 year old who presents for Preventive Visit.      Are you in the first 12 months of your Medicare coverage?  Yes,  Visual Acuity:  Right Eye: 10/20   Left Eye: 10/16  Both Eyes: 10/10    Healthy Habits:     In general, how would you rate your overall health?  Good    Frequency of exercise:  2-3 days/week    Duration of exercise:  15-30 minutes    Do you usually eat at least 4 servings of fruit and vegetables a day, include whole grains    & fiber and avoid regularly eating high fat or \"junk\" foods?  No    Taking medications regularly:  Yes    Medication side effects:  None    Ability to successfully perform activities of daily living:  No assistance needed    Home Safety:  No safety concerns identified    Hearing Impairment:  No hearing concerns    In the past 6 months, have you been bothered by leaking of urine?  No    In general, how would you rate your overall mental or emotional health?  Good    Additional concerns today:  No      Today's PHQ-2 Score:       11/3/2023     8:09 AM   PHQ-2 ( 1999 [employer])   Q1: Little interest or pleasure in doing things 0   Q2: Feeling down, depressed or hopeless 0   PHQ-2 Score 0   Q1: Little interest or pleasure in doing things Not at all   Q2: Feeling down, depressed or hopeless Not at all   PHQ-2 Score 0       Have you ever done Advance Care Planning? (For example, a Health Directive, POLST, or a discussion with a medical provider or your loved ones about your wishes): No, advance care planning information given to patient to review.  Patient plans to discuss their wishes with loved ones or provider.         Fall risk  Fallen 2 or more times in the past year?: No  Any fall with injury in the past year?: No    Cognitive Screening   1) Repeat 3 items (Leader, Season, Table)    2) Clock draw: NORMAL  3) 3 item recall: Recalls NO objects   Results: 0 items recalled: PROBABLE COGNITIVE IMPAIRMENT, **INFORM PROVIDER**    Mini-CogTM Copyright S Alison. " Licensed by the author for use in [facility]; reprinted with permission ([email or web address]@[email or web address]). All rights reserved.          Reviewed and updated as needed this visit by clinical staff   Tobacco  Allergies  Meds              Reviewed and updated as needed this visit by Provider                 Social History     Tobacco Use    Smoking status: Former     Packs/day: 2.00     Years: 20.00     Additional pack years: 0.00     Total pack years: 40.00     Types: Cigarettes     Start date: 1972     Quit date: 2/15/1993     Years since quittin.7    Smokeless tobacco: Never    Tobacco comments:     quit in    Substance Use Topics    Alcohol use: Yes     Comment: Moderate use, once or twice a month.             11/3/2023     8:08 AM   Alcohol Use   Prescreen: >3 drinks/day or >7 drinks/week? No     Do you have a current opioid prescription? No  Do you use any other controlled substances or medications that are not prescribed by a provider? None              Current providers sharing in care for this patient include:   Patient Care Team:  Luis Daniel Maciel MD as PCP - General (Family Medicine)  Mamadou Knutson MD as MD (Neurology)  Betina Cruz, PharmD as Pharmacist (Pharmacist)  Luis Daniel Maciel MD as Assigned PCP  Alexander Cuevas MD as MD (Neurology)  Sukumar Potts MBBS as Assigned Rheumatology Provider  Alexander Cuevas MD as Assigned Neuroscience Provider    The following health maintenance items are reviewed in Epic and correct as of today:  Health Maintenance   Topic Date Due    DIABETIC FOOT EXAM  Never done    RSV VACCINE (Pregnancy & 60+) (1 - 1-dose 60+ series) Never done    EYE EXAM  2020    AORTIC ANEURYSM SCREENING (SYSTEM ASSIGNED)  Never done    LIPID  10/20/2023    MICROALBUMIN  10/21/2023    MEDICARE ANNUAL WELLNESS VISIT  10/20/2023    A1C  2023    TSH W/FREE T4 REFLEX  2024    BMP  2024    ANNUAL REVIEW OF HM ORDERS  2024    FALL RISK  "ASSESSMENT  11/03/2024    DTAP/TDAP/TD IMMUNIZATION (4 - Td or Tdap) 09/14/2025    COLORECTAL CANCER SCREENING  01/20/2026    Pneumococcal Vaccine: 65+ Years (3 - PPSV23 or PCV20) 10/15/2027    ADVANCE CARE PLANNING  11/03/2028    HEPATITIS C SCREENING  Completed    HIV SCREENING  Completed    PHQ-2 (once per calendar year)  Completed    INFLUENZA VACCINE  Completed    ZOSTER IMMUNIZATION  Completed    COVID-19 Vaccine  Completed    IPV IMMUNIZATION  Aged Out    HPV IMMUNIZATION  Aged Out    MENINGITIS IMMUNIZATION  Aged Out    RSV MONOCLONAL ANTIBODY  Aged Out       Review of Systems   Constitutional:  Negative for chills and fever.   HENT:  Positive for congestion. Negative for ear pain, hearing loss and sore throat.    Eyes:  Negative for pain and visual disturbance.   Respiratory:  Positive for cough. Negative for shortness of breath.    Cardiovascular:  Negative for chest pain, palpitations and peripheral edema.   Gastrointestinal:  Negative for abdominal pain, constipation, diarrhea, heartburn, hematochezia and nausea.   Genitourinary:  Negative for dysuria, frequency, genital sores, hematuria, impotence, penile discharge and urgency.   Musculoskeletal:  Negative for arthralgias, joint swelling and myalgias.   Skin:  Negative for rash.   Neurological:  Negative for dizziness, weakness, headaches and paresthesias.   Psychiatric/Behavioral:  Negative for mood changes. The patient is not nervous/anxious.          OBJECTIVE:   /79 (BP Location: Left arm, Patient Position: Sitting, Cuff Size: Adult Regular)   Pulse 64   Temp 97.6  F (36.4  C) (Oral)   Resp 16   Ht 1.82 m (5' 11.65\")   Wt 102 kg (224 lb 12.8 oz)   SpO2 98%   BMI 30.78 kg/m   Estimated body mass index is 30.78 kg/m  as calculated from the following:    Height as of this encounter: 1.82 m (5' 11.65\").    Weight as of this encounter: 102 kg (224 lb 12.8 oz).    Wt Readings from Last 4 Encounters:   11/03/23 102 kg (224 lb 12.8 oz) "   07/11/23 101.2 kg (223 lb)   06/30/23 101.1 kg (222 lb 14.4 oz)   06/16/23 101.3 kg (223 lb 6.4 oz)      Physical Exam  GENERAL: healthy, alert and no distress  EYES: Eyes grossly normal to inspection, PERRL and conjunctivae and sclerae normal  HENT: ear canals and TM's normal, nose and mouth without ulcers or lesions  NECK: no adenopathy, no asymmetry, masses, or scars and thyroid normal to palpation  RESP: lungs clear to auscultation - no rales, rhonchi or wheezes  CV: regular rate and rhythm, normal S1 S2, no S3 or S4, no murmur, click or rub, no peripheral edema and peripheral pulses strong  ABDOMEN: soft, nontender, no hepatosplenomegaly, no masses and bowel sounds normal  RECTAL:   exam declined.   Will do the yearly PSA  MS: no gross musculoskeletal defects noted, no edema  SKIN: no suspicious lesions or rashes  NEURO: Normal strength and tone, mentation intact and speech normal  PSYCH: mentation appears normal, affect normal/bright    Lab Results   Component Value Date    A1C 5.7 06/16/2023    A1C 5.7 10/20/2022    A1C 6.0 07/14/2022    A1C 5.9 03/16/2022    A1C 5.4 11/03/2021     Prostate Specific Antigen Screen   Date Value Ref Range Status   11/03/2021 2.63 0.00 - 4.50 ug/L Final     PSA Tumor Marker   Date Value Ref Range Status   08/10/2023 3.41 0.00 - 4.50 ng/mL Final     Lab Results   Component Value Date    A1C 5.7 06/16/2023    A1C 5.7 10/20/2022    A1C 6.0 07/14/2022    A1C 5.9 03/16/2022    A1C 5.4 11/03/2021           ASSESSMENT / PLAN:       ICD-10-CM    1. Initial Medicare annual wellness visit  Z00.00 REVIEW OF HEALTH MAINTENANCE PROTOCOL ORDERS      2. Primary hypertension, controlled (cough related to lisinopril,  will change to losartan) I10 losartan (COZAAR) 50 MG tablet      3. Type 2 diabetes mellitus without complication, without long-term current use of insulin (H). At gpal E11.9 Albumin Random Urine Quantitative with Creat Ratio     Comprehensive metabolic panel (BMP + Alb, Alk  Phos, ALT, AST, Total. Bili, TP)     Hemoglobin A1c      4. Polyp of colon, unspecified part of colon, unspecified type  K63.5       5. H/O: CVA (cerebrovascular accident)  Z86.73       6. Hypothyroidism, unspecified type  E03.9 TSH with free T4 reflex      7. Hyperlipidemia, unspecified hyperlipidemia type. Stable E78.5 Lipid panel reflex to direct LDL Non-fasting      8. Rising PSA level  R97.20 PSA, tumor marker          PLAN:          Colonoscopy Jan 2026    Non-fasting labs    Stop lisinopril due to cough    Start losartan at 50 mg daily.  Monitor BP twice daily  if the BP starts running > 140/90  then increase losartan to twice daily.  Watch  for resolution of cough.

## 2023-11-06 ENCOUNTER — OFFICE VISIT (OUTPATIENT)
Dept: RHEUMATOLOGY | Facility: CLINIC | Age: 63
End: 2023-11-06
Payer: MEDICARE

## 2023-11-06 VITALS
HEART RATE: 72 BPM | WEIGHT: 224.1 LBS | SYSTOLIC BLOOD PRESSURE: 140 MMHG | OXYGEN SATURATION: 99 % | BODY MASS INDEX: 30.69 KG/M2 | DIASTOLIC BLOOD PRESSURE: 72 MMHG

## 2023-11-06 DIAGNOSIS — M48.061 SPINAL STENOSIS OF LUMBAR REGION, UNSPECIFIED WHETHER NEUROGENIC CLAUDICATION PRESENT: ICD-10-CM

## 2023-11-06 DIAGNOSIS — M1A.0791 IDIOPATHIC CHRONIC GOUT OF FOOT WITH TOPHUS, UNSPECIFIED LATERALITY: Primary | ICD-10-CM

## 2023-11-06 DIAGNOSIS — I10 PRIMARY HYPERTENSION: ICD-10-CM

## 2023-11-06 PROCEDURE — 99214 OFFICE O/P EST MOD 30 MIN: CPT | Performed by: INTERNAL MEDICINE

## 2023-11-06 RX ORDER — LOSARTAN POTASSIUM 25 MG/1
25 TABLET ORAL DAILY
COMMUNITY
End: 2023-11-22

## 2023-11-06 RX ORDER — ALLOPURINOL 300 MG/1
TABLET ORAL
Qty: 135 TABLET | Refills: 3 | Status: SHIPPED | OUTPATIENT
Start: 2023-11-06

## 2023-11-06 RX ORDER — ALLOPURINOL 300 MG/1
TABLET ORAL
Qty: 135 TABLET | Refills: 3 | Status: SHIPPED | OUTPATIENT
Start: 2023-11-06 | End: 2023-11-06

## 2023-11-06 NOTE — PROGRESS NOTES
Rheumatology follow-up visit note     Raghavendra is a 63 year old male presents today for follow-up.    Mauri was seen today for follow up.    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  There have been no flareup of gout.  He is going to stay the course we will meet here in 12 months labs every 6 weeks.  Given the background comorbidities such as hypertension he is not a candidate to go for nonsteroidals in case of a gout flareup prednisone by mouth or intra-articular corticosteroid injection would be preferred choice.  The back pain unlikely due to gout in the vast majority of the patients.    Follow up in 1 year.    HPI    Raghavendra Kiser is a 63 year old male is here for follow-up of follow-up of gout.  He has not had a episode over the past year.  He is on allopurinol.  He has had no active ongoing joint symptoms anymore.  Very happy with that situation.  Since his stroke that took him to [facility] where he was intubated with the right-sided weakness of which he has made very good recovery, background of hypertension, has retired from his [employer] position.  He has not had any significant flareup of the back pain that still troublesome however.  He has had surgery there.  This was last year.       DETAILED EXAMINATION  11/06/23  :    Vitals:    11/06/23 1339   BP: (!) 140/72   Pulse: 72   SpO2: 99%   Weight: 101.7 kg (224 lb 1.6 oz)     Alert  oriented. Head including the face is examined for malar rash, heliotropes, scarring, lupus pernio. Eyes examined for redness such as in episcleritis/scleritis, periorbital lesions.   Neck/ Face examined for parotid gland swelling, range of motion of neck.  Left upper and lower and right upper and lower extremities examined for tenderness, swelling, warmth of the appendicular joints, range of motion, edema, rash.  Some of the important findings included: he does not have evidence of synovitis in the palpable joints of the upper extremities.  No significant deformities of the digits.  no Heberden nodes.  Range of motion of the shoulders   show full abduction.  No JLT effusion or warmth of the knees.  he does not have dactylitis of the digits.     Patient Active Problem List    Diagnosis Date Noted    Spinal stenosis 06/30/2023     Priority: Medium    Sensorineural hearing loss, bilateral 01/05/2023     Priority: Medium    Spinal stenosis of lumbar region, unspecified whether neurogenic claudication present 03/28/2022     Priority: Medium    Urine retention 03/28/2022     Priority: Medium    Therapeutic opioid induced constipation 03/28/2022     Priority: Medium    Lumbar radiculopathy 03/21/2022     Priority: Medium    Cognitive and behavioral changes 02/08/2021     Priority: Medium    Sleep difficulties 02/08/2021     Priority: Medium    AMS (altered mental status) 12/25/2020     Priority: Medium    Elevated blood pressure reading without diagnosis of hypertension 11/12/2020     Priority: Medium    Hypothyroidism 11/12/2020     Priority: Medium     Created by Conversion    Replacement Utility updated for latest IMO load      Other stiff joint of shoulder region 11/12/2020     Priority: Medium    Pain in joint involving ankle and foot 11/12/2020     Priority: Medium    Arthralgia of hand 11/12/2020     Priority: Medium    Peyronie's disease 11/12/2020     Priority: Medium    Right sided weakness 10/14/2020      Priority: Medium    Stenosis of left internal carotid artery 10/07/2020     Priority: Medium    History of colonic polyps 01/28/2020     Priority: Medium     Sessile serrated adenoma on colonoscopy 1-22-20.   Repeat in 3 years.      Benign neoplasm of transverse colon 01/24/2020     Priority: Medium     1-20-23 multiple adenomas (one an advanced adenoma);  Follow up colonoscopy in 3 years.      Polyp of colon 01/22/2020     Priority: Medium    Type 2 diabetes mellitus without complication, without long-term current use of insulin (H) 11/22/2019     Priority: Medium    Vitamin deficiency 11/22/2019     Priority: Medium    Precordial pain 11/06/2018     Priority: Medium    Hyperlipidemia 10/17/2018     Priority: Medium     Created by Conversion      Cervical radiculopathy 01/23/2018     Priority: Medium    Right shoulder tendinitis 08/29/2017     Priority: Medium    HTN (hypertension) 08/01/2017     Priority: Medium    High risk medication use 02/28/2017     Priority: Medium    Ganglion cyst 04/11/2016     Priority: Medium    Medial epicondylitis 02/10/2016     Priority: Medium    Atherosclerosis of right carotid artery 05/22/2015     Priority: Medium    Superficial venous thrombosis of arm 05/22/2015     Priority: Medium    History of stroke 05/16/2015     Priority: Medium     Small stroke in the left corona radiata affecting right side.      TIA (transient ischemic attack) 05/16/2015     Priority: Medium    Acute ischemic left MCA stroke (H) 05/16/2015     Priority: Medium    History of TIA (transient ischemic attack) and stroke 05/01/2015     Priority: Medium     Apr 21, 2016 Entered By: CAROLEE GONZALEZ Comment: Carotid Artery Surgery - June 2015, F/U scheduled for Aug 16      Chronic gout 12/09/2014     Priority: Medium     Past Surgical History:   Procedure Laterality Date    CERVICAL DISC SURGERY      IR LUMBAR PUNCTURE  12/25/2020    IR SPINAL PUNCTURE  12/25/2020    LAMINECTOMY LUMBAR ONE LEVEL Bilateral  3/21/2022    Procedure: Bilateral lumbar 3- lumbar 4 hemilaminectomies, medial facetectomies, foraminotomies, microdiscectomies;  Surgeon: Abena Duque MD;  Location: [facility] OR    LAMINECTOMY LUMBAR ONE LEVEL Bilateral 6/30/2023    Procedure: lumbar 3-lumbar 4 bilateral laminectomies, medial facetectomies, foraminotomies.;  Surgeon: Abena Duque MD;  Location: [facility] OR    LAMINECTOMY LUMBAR TWO LEVELS Left 3/29/2022    Procedure: LUMBAR 4-LUMBAR 5 LEFT HEMILAMINECTOMY WITH EPIDURAL ABSCESS EVACUATION;  Surgeon: Abena Duque MD;  Location: [facility]    PICC INSERTION - TRIPLE LUMEN  12/26/2020         PICC SINGLE LUMEN PLACEMENT  4/1/2022         TONSILLECTOMY      ZZC THROMBOENDARTECTMY NECK,NECK INCIS Right 06/08/2015    Procedure: Right Carotid Endarterectomy with Impulse Monitoring;  Surgeon: Marcellus Toth MD;  Location: [facility];  Service: General      Past Medical History:   Diagnosis Date    Atherosclerosis of right carotid artery     Carotid stenosis, bilateral     Cervical radiculopathy     Chronic gout     Fracture of right humerus     Ganglion cyst     History of stroke without residual deficits  Oct 2020, Dec 2020    Humerus fracture     Right    Hyperlipidemia     Hypertension     Hypothyroidism     Medial epicondylitis     Shoulder tendinitis, right     Superficial venous thrombosis of arm     right lateral antecubital fossa    TIA (transient ischemic attack) 05/16/2015    Tinnitus     Type 2 diabetes mellitus without complication, without long-term current use of insulin (H) 11/22/2019     No Known Allergies  Current Outpatient Medications   Medication Sig Dispense Refill    allopurinol (ZYLOPRIM) 300 MG tablet TAKE ONE AND ONE-HALF TABLETS (450 MG) EVERY OTHER DAY, ALTERNATING WITH 300  tablet 3    blood glucose (FREESTYLE LITE) test strip Use 1 strip daily to check blood sugar level 100 strip 1    blood glucose monitoring (FREESTYLE)  lancets For checking blood sugars up to twice daily 100 each 3    cholecalciferol 25 MCG (1000 UT) TABS Take 25 mcg by mouth daily      clopidogrel (PLAVIX) 75 MG tablet Take 1 tablet (75 mg) by mouth daily 90 tablet 3    divalproex sodium extended-release (DEPAKOTE ER) 500 MG 24 hr tablet TAKE 1 TABLET AT BEDTIME 90 tablet 3    levothyroxine (SYNTHROID/LEVOTHROID) 125 MCG tablet Take 125 mcg by mouth daily before breakfast       losartan (COZAAR) 25 MG tablet Take 25 mg by mouth daily      losartan (COZAAR) 50 MG tablet Take 50 mg daily for the first week.  Monitor blood pressures twice daily.  If BP > 140/90 then increase this med to twice daily. 180 tablet 1    rosuvastatin (CRESTOR) 5 MG tablet Take 1 tablet (5 mg) by mouth daily 90 tablet 3    amLODIPine (NORVASC) 5 MG tablet TAKE 1 TABLET DAILY (Patient not taking: Reported on 11/6/2023) 90 tablet 3     family history includes Cancer in his father and mother; Diabetes in his brother and brother; Diabetes Type 1 in his brother; Heart Disease in his brother; Lung Cancer in his father, mother, and sister; No Known Problems in his brother, sister, sister, sister, and son; Other Cancer in his father, mother, sister, and sister; Stomach Cancer in his mother; Throat cancer in his father.  Social Connections: Not on file          WBC Count   Date Value Ref Range Status   09/13/2023 7.7 4.0 - 11.0 10e3/uL Final     RBC Count   Date Value Ref Range Status   09/13/2023 4.21 (L) 4.40 - 5.90 10e6/uL Final     Hemoglobin   Date Value Ref Range Status   09/13/2023 13.5 13.3 - 17.7 g/dL Final     Hematocrit   Date Value Ref Range Status   09/13/2023 40.0 40.0 - 53.0 % Final     MCV   Date Value Ref Range Status   09/13/2023 95 78 - 100 fL Final     MCH   Date Value Ref Range Status   09/13/2023 32.1 26.5 - 33.0 pg Final     Platelet Count   Date Value Ref Range Status   09/13/2023 201 150 - 450 10e3/uL Final     % Lymphocytes   Date Value Ref Range Status   06/30/2023 8 %  Final     AST   Date Value Ref Range Status   11/03/2023 20 0 - 45 U/L Final     Comment:     Reference intervals for this test were updated on 6/12/2023 to more accurately reflect our healthy population. There may be differences in the flagging of prior results with similar values performed with this method. Interpretation of those prior results can be made in the context of the updated reference intervals.     ALT   Date Value Ref Range Status   11/03/2023 20 0 - 70 U/L Final     Comment:     Reference intervals for this test were updated on 6/12/2023 to more accurately reflect our healthy population. There may be differences in the flagging of prior results with similar values performed with this method. Interpretation of those prior results can be made in the context of the updated reference intervals.       Albumin   Date Value Ref Range Status   11/03/2023 4.2 3.5 - 5.2 g/dL Final   05/10/2022 3.7 3.5 - 5.0 g/dL Final     Alkaline Phosphatase   Date Value Ref Range Status   11/03/2023 78 40 - 129 U/L Final     Creatinine   Date Value Ref Range Status   11/03/2023 0.93 0.67 - 1.17 mg/dL Final     GFR Estimate   Date Value Ref Range Status   11/03/2023 >90 >60 mL/min/1.73m2 Final   05/28/2021 >60 >60 mL/min/1.73m2 Final     GFR Estimate If Black   Date Value Ref Range Status   05/28/2021 >60 >60 mL/min/1.73m2 Final     Creatinine Urine mg/dL   Date Value Ref Range Status   11/03/2023 214.0 mg/dL Final     Comment:     The reference ranges have not been established in urine creatinine. The results should be integrated into the clinical context for interpretation.   11/03/2021 242 mg/dL Final     Erythrocyte Sedimentation Rate   Date Value Ref Range Status   04/11/2022 26 (H) 0 - 15 mm/hr Final     CRP   Date Value Ref Range Status   05/10/2022 <0.1 0.0 - 0.8 mg/dL Final

## 2023-11-15 DIAGNOSIS — Z86.73 HISTORY OF COMPLETED STROKE: ICD-10-CM

## 2023-11-15 RX ORDER — CLOPIDOGREL BISULFATE 75 MG/1
75 TABLET ORAL DAILY
Qty: 90 TABLET | Refills: 2 | Status: SHIPPED | OUTPATIENT
Start: 2023-11-15 | End: 2024-05-20

## 2023-11-15 NOTE — TELEPHONE ENCOUNTER
Refill request for: clopidogrel 75mg   Directions: Take 1 tablet (75 mg) by mouth daily     LOV: 05/12/23  NOV: 05/20/24    90 day supply with 2 refills Medication T'd for review and signature    Latricia Kohler LPN on 11/15/2023 at 9:36 AM

## 2023-11-22 DIAGNOSIS — I10 PRIMARY HYPERTENSION: Primary | ICD-10-CM

## 2023-11-22 RX ORDER — LOSARTAN POTASSIUM 25 MG/1
25 TABLET ORAL DAILY
Qty: 90 TABLET | Refills: 3 | Status: SHIPPED | OUTPATIENT
Start: 2023-11-22 | End: 2023-12-18 | Stop reason: DRUGHIGH

## 2023-12-13 DIAGNOSIS — I63.9 CEREBROVASCULAR ACCIDENT (CVA), UNSPECIFIED MECHANISM (H): ICD-10-CM

## 2023-12-13 RX ORDER — ROSUVASTATIN CALCIUM 5 MG/1
5 TABLET, COATED ORAL DAILY
Qty: 90 TABLET | Refills: 3 | Status: SHIPPED | OUTPATIENT
Start: 2023-12-13 | End: 2024-04-03

## 2023-12-18 DIAGNOSIS — I10 PRIMARY HYPERTENSION: ICD-10-CM

## 2023-12-18 RX ORDER — LOSARTAN POTASSIUM 50 MG/1
50 TABLET ORAL 2 TIMES DAILY
Qty: 180 TABLET | Refills: 1 | Status: SHIPPED | OUTPATIENT
Start: 2023-12-18 | End: 2024-04-03

## 2023-12-18 NOTE — TELEPHONE ENCOUNTER
Patient calling in because he is having trouble getting his losartan to be filled through [facility]. I confirmed that he is taking 50 mg BID. Will need to get them a new script. Pended here.    Luke Gutierrez RN     [facility]

## 2024-02-14 ENCOUNTER — HOSPITAL ENCOUNTER (OUTPATIENT)
Dept: MRI IMAGING | Facility: HOSPITAL | Age: 64
Discharge: HOME | End: 2024-02-14
Attending: UROLOGY | Admitting: UROLOGY
Payer: MEDICARE

## 2024-02-14 DIAGNOSIS — R97.20 ELEVATED PROSTATE SPECIFIC ANTIGEN (PSA): ICD-10-CM

## 2024-02-14 PROCEDURE — 72197 MRI PELVIS W/O & W/DYE: CPT

## 2024-02-14 PROCEDURE — 255N000002 HC RX 255 OP 636: Performed by: UROLOGY

## 2024-02-14 PROCEDURE — A9585 GADOBUTROL INJECTION: HCPCS | Performed by: UROLOGY

## 2024-02-14 RX ORDER — GADOBUTROL 604.72 MG/ML
10 INJECTION INTRAVENOUS ONCE
Status: COMPLETED | OUTPATIENT
Start: 2024-02-14 | End: 2024-02-14

## 2024-02-14 RX ADMIN — GADOBUTROL 10 ML: 604.72 INJECTION INTRAVENOUS at 16:30

## 2024-02-21 NOTE — PROGRESS NOTES
This is a recent snapshot of the patient's [facility] medical record.  For current drug dose and complete information and questions, call [phone] or In Basket pool, [facility] home infusion ([id number])  CSN Number:  [id number]

## 2024-02-21 NOTE — PROGRESS NOTES
This is a recent snapshot of the patient's [facility] medical record.  For current drug dose and complete information and questions, call [phone] or In Basket pool, [facility] home infusion ([id number])  CSN Number:  [id number]

## 2024-03-06 ENCOUNTER — TRANSFERRED RECORDS (OUTPATIENT)
Dept: MULTI SPECIALTY CLINIC | Facility: CLINIC | Age: 64
End: 2024-03-06
Payer: MEDICARE

## 2024-03-06 LAB — RETINOPATHY: NORMAL

## 2024-04-03 ENCOUNTER — OFFICE VISIT (OUTPATIENT)
Dept: FAMILY MEDICINE | Facility: CLINIC | Age: 64
End: 2024-04-03
Payer: MEDICARE

## 2024-04-03 VITALS
DIASTOLIC BLOOD PRESSURE: 78 MMHG | RESPIRATION RATE: 16 BRPM | HEIGHT: 72 IN | BODY MASS INDEX: 31.61 KG/M2 | OXYGEN SATURATION: 98 % | HEART RATE: 71 BPM | TEMPERATURE: 97.7 F | SYSTOLIC BLOOD PRESSURE: 127 MMHG | WEIGHT: 233.38 LBS

## 2024-04-03 DIAGNOSIS — Z13.6 SCREENING FOR AAA (ABDOMINAL AORTIC ANEURYSM): ICD-10-CM

## 2024-04-03 DIAGNOSIS — Z86.73 HISTORY OF COMPLETED STROKE: ICD-10-CM

## 2024-04-03 DIAGNOSIS — Z76.89 ENCOUNTER TO ESTABLISH CARE: Primary | ICD-10-CM

## 2024-04-03 DIAGNOSIS — R44.3 HALLUCINATIONS: ICD-10-CM

## 2024-04-03 DIAGNOSIS — E11.9 TYPE 2 DIABETES MELLITUS WITHOUT COMPLICATION, WITHOUT LONG-TERM CURRENT USE OF INSULIN (H): ICD-10-CM

## 2024-04-03 DIAGNOSIS — I10 ESSENTIAL HYPERTENSION: ICD-10-CM

## 2024-04-03 PROBLEM — R41.82 AMS (ALTERED MENTAL STATUS): Status: RESOLVED | Noted: 2020-12-25 | Resolved: 2024-04-03

## 2024-04-03 PROBLEM — G47.9 SLEEP DIFFICULTIES: Status: RESOLVED | Noted: 2021-02-08 | Resolved: 2024-04-03

## 2024-04-03 PROBLEM — D12.3 BENIGN NEOPLASM OF TRANSVERSE COLON: Status: RESOLVED | Noted: 2020-01-24 | Resolved: 2024-04-03

## 2024-04-03 PROBLEM — N48.6 PEYRONIE'S DISEASE: Status: RESOLVED | Noted: 2020-11-12 | Resolved: 2024-04-03

## 2024-04-03 PROBLEM — K63.5 POLYP OF COLON: Status: RESOLVED | Noted: 2020-01-22 | Resolved: 2024-04-03

## 2024-04-03 PROBLEM — R97.20 HIGH PROSTATE SPECIFIC ANTIGEN (PSA): Status: ACTIVE | Noted: 2023-08-24

## 2024-04-03 LAB
ERYTHROCYTE [DISTWIDTH] IN BLOOD BY AUTOMATED COUNT: 13.6 % (ref 10–15)
HBA1C MFR BLD: 6.2 % (ref 0–5.6)
HCT VFR BLD AUTO: 41.8 % (ref 40–53)
HGB BLD-MCNC: 14.2 G/DL (ref 13.3–17.7)
MCH RBC QN AUTO: 31.6 PG (ref 26.5–33)
MCHC RBC AUTO-ENTMCNC: 34 G/DL (ref 31.5–36.5)
MCV RBC AUTO: 93 FL (ref 78–100)
PLATELET # BLD AUTO: 182 10E3/UL (ref 150–450)
RBC # BLD AUTO: 4.5 10E6/UL (ref 4.4–5.9)
WBC # BLD AUTO: 4.8 10E3/UL (ref 4–11)

## 2024-04-03 PROCEDURE — 83036 HEMOGLOBIN GLYCOSYLATED A1C: CPT | Performed by: FAMILY MEDICINE

## 2024-04-03 PROCEDURE — 80164 ASSAY DIPROPYLACETIC ACD TOT: CPT | Performed by: FAMILY MEDICINE

## 2024-04-03 PROCEDURE — 99214 OFFICE O/P EST MOD 30 MIN: CPT | Performed by: FAMILY MEDICINE

## 2024-04-03 PROCEDURE — 85027 COMPLETE CBC AUTOMATED: CPT | Performed by: FAMILY MEDICINE

## 2024-04-03 PROCEDURE — 80053 COMPREHEN METABOLIC PANEL: CPT | Performed by: FAMILY MEDICINE

## 2024-04-03 PROCEDURE — 36415 COLL VENOUS BLD VENIPUNCTURE: CPT | Performed by: FAMILY MEDICINE

## 2024-04-03 RX ORDER — LANCETS 28 GAUGE
EACH MISCELLANEOUS
Qty: 100 EACH | Refills: 3 | Status: CANCELLED | OUTPATIENT
Start: 2024-04-03

## 2024-04-03 RX ORDER — AMLODIPINE BESYLATE 5 MG/1
5 TABLET ORAL DAILY
Qty: 90 TABLET | Refills: 3 | Status: SHIPPED | OUTPATIENT
Start: 2024-04-03

## 2024-04-03 RX ORDER — DIVALPROEX SODIUM 500 MG/1
500 TABLET, EXTENDED RELEASE ORAL AT BEDTIME
Qty: 90 TABLET | Refills: 3 | Status: SHIPPED | OUTPATIENT
Start: 2024-04-03

## 2024-04-03 RX ORDER — LOSARTAN POTASSIUM 100 MG/1
100 TABLET ORAL DAILY
Qty: 90 TABLET | Refills: 3 | Status: SHIPPED | OUTPATIENT
Start: 2024-04-03

## 2024-04-03 RX ORDER — ROSUVASTATIN CALCIUM 5 MG/1
5 TABLET, COATED ORAL DAILY
Qty: 90 TABLET | Refills: 3 | Status: SHIPPED | OUTPATIENT
Start: 2024-04-03

## 2024-04-03 RX ORDER — BLOOD-GLUCOSE METER
KIT MISCELLANEOUS
Qty: 100 STRIP | Refills: 1 | Status: CANCELLED | OUTPATIENT
Start: 2024-04-03

## 2024-04-03 NOTE — PROGRESS NOTES
Assessment & Plan     1. Encounter to establish care  Dr. Maciel retired.  Establishing care today.  Will follow-up in the fall for wellness plus med check.    2. Type 2 diabetes mellitus without complication, without long-term current use of insulin (H)  - Comprehensive metabolic panel (BMP + Alb, Alk Phos, ALT, AST, Total. Bili, TP); Future  - Hemoglobin A1c; Future    Manage lifestyle modifications.    3. History of completed stroke  4. Hallucinations  - divalproex sodium extended-release (DEPAKOTE ER) 500 MG 24 hr tablet; Take 1 tablet (500 mg) by mouth at bedtime  Dispense: 90 tablet; Refill: 3  - rosuvastatin (CRESTOR) 5 MG tablet; Take 1 tablet (5 mg) by mouth daily  Dispense: 90 tablet; Refill: 3  - Valproic acid; Future  - CBC with platelets; Future    Continues on Crestor for cholesterol management and secondary prevention.  LDL under 40, continue low-dose Crestor.  Continues on Depakote due to hallucinations in the hospital.  He is question discontinuing this as he has had no recurrence of symptoms, though has opted to continue as he is tolerating well and to help prevent recurrence.  Discussed with neurology trial off medication.  Will obtain medication monitoring labs today.  He also continues on Plavix antiplatelet therapy, no concerns for bleeding, plan for long-term use.  Thankfully majority of his deficits have resolved or significantly improved with time.    5. Essential hypertension  - losartan (COZAAR) 100 MG tablet; Take 1 tablet (100 mg) by mouth daily  Dispense: 90 tablet; Refill: 3  - amLODIPine (NORVASC) 5 MG tablet; Take 1 tablet (5 mg) by mouth daily  Dispense: 90 tablet; Refill: 3  - Comprehensive metabolic panel (BMP + Alb, Alk Phos, ALT, AST, Total. Bili, TP); Future    Blood pressure is at goal under 130/80.  We will continue current treatment regimen, small tweak to change losartan from 50 mg twice daily to 100 mg once daily for convenience.    6. Screening for AAA (abdominal  aortic aneurysm)  -  Aorta Medicare AAA Screening; Future     Meets criteria for AAA screening due to age, male sex, and greater than 100 cigarette smoking history.  Orders placed.    The longitudinal plan of care for the diagnosis(es)/condition(s) as documented were addressed during this visit. Due to the added complexity in care, I will continue to support Mauri in the subsequent management and with ongoing continuity of care.     Constantin Dotson is a 64 year old, presenting for the following health issues:  Recheck Medication and Establish Care      4/3/2024     2:17 PM   Additional Questions   Roomed by Siena BAINS CMA   Accompanied by Self     Via the Health Maintenance questionnaire, the patient has reported the following services have been completed -Eye Exam, this information has been sent to the abstraction team.  History of Present Illness       Reason for visit:  [facility] visit    He eats 0-1 servings of fruits and vegetables daily.He consumes 0 sweetened beverage(s) daily.He exercises with enough effort to increase his heart rate 9 or less minutes per day.  He exercises with enough effort to increase his heart rate 4 days per week.   He is taking medications regularly.     Encounter to establish care  - Eye: abstracted    - AAA: will screen, hx tobacco use        Type 2 diabetes mellitus without complication, without long-term current use of insulin (H)  Hemoglobin A1C   Date Value Ref Range Status   11/03/2023 6.0 (H) 0.0 - 5.6 % Final     Comment:     Normal <5.7%   Prediabetes 5.7-6.4%    Diabetes 6.5% or higher     Note: Adopted from ADA consensus guidelines.     Lifestyle management.  Averaging 100-104.   Didn't tolerate metformin.   Has plenty of supplies at home.      History of completed stroke  Hallucinations  Crestor 5 mg daily, managing blood pressure with losartan 50 mg twice daily and amlodipine 5 mg daily, on Plavix through neurology, taking Depakote.    Difficulty stringing words  "together, no other deficits. Majority of deficits resolved.       The ASCVD Risk score (Darin MOSLEY, et al., 2019) failed to calculate for the following reasons:    The patient has a prior MI or stroke diagnosis     Lab Results   Component Value Date    CHOL 111 11/03/2023     Lab Results   Component Value Date    HDL 21 11/03/2023     Lab Results   Component Value Date    LDL 38 11/03/2023    LDL 36 11/08/2019     Lab Results   Component Value Date    TRIG 262 11/03/2023     No results found for: \"CHOLHDLRATIO\"      Essential hypertension  BP Readings from Last 6 Encounters:   04/03/24 127/78   11/06/23 (!) 140/72   11/03/23 129/79   08/15/23 128/70   07/13/23 (!) 142/88   07/11/23 136/68     Per above, losartan 50 mg twice daily, amlodipine 5 mg daily.  Previously in lisinopril, changed to losartan due to cough. Still coughing.  Readings at home comparable to clinic.      Hypothyroidism  Taking levothyroxine 125 mcg daily.  No treatment, no ablation. Diagnosed when discharged from [facility].  [facility].    Lab Results   Component Value Date    TSH 3.99 11/03/2023    TSH 5.26 11/03/2021       Gout  Uric Acid   Date Value Ref Range Status   09/13/2023 3.5 3.4 - 7.0 mg/dL Final     GFR Estimate   Date Value Ref Range Status   11/03/2023 >90 >60 mL/min/1.73m2 Final   05/28/2021 >60 >60 mL/min/1.73m2 Final     Allopurinol 450 mg alternating with 300 mg.  No flares for years.         Review of Systems  See HPI above.         Objective    /78 (BP Location: Left arm, Patient Position: Sitting, Cuff Size: Adult Regular)   Pulse 71   Temp 97.7  F (36.5  C) (Oral)   Resp 16   Ht 1.82 m (5' 11.65\")   Wt 105.9 kg (233 lb 6 oz)   SpO2 98%   BMI 31.96 kg/m    Body mass index is 31.96 kg/m .  Physical Exam   GENERAL: alert and no distress  NECK: no adenopathy, no asymmetry, masses, or scars  RESP: lungs clear to auscultation - no rales, rhonchi or wheezes  CV: regular rate and rhythm, normal S1 S2, no " S3 or S4, no murmur, click or rub, no peripheral edema  ABDOMEN: soft, nontender, no hepatosplenomegaly, no masses and bowel sounds normal  MS: no gross musculoskeletal defects noted, no edema            Signed Electronically by: Cecile Naidu, DO

## 2024-04-04 LAB
ALBUMIN SERPL BCG-MCNC: 4.5 G/DL (ref 3.5–5.2)
ALP SERPL-CCNC: 82 U/L (ref 40–150)
ALT SERPL W P-5'-P-CCNC: 24 U/L (ref 0–70)
ANION GAP SERPL CALCULATED.3IONS-SCNC: 11 MMOL/L (ref 7–15)
AST SERPL W P-5'-P-CCNC: 20 U/L (ref 0–45)
BILIRUB SERPL-MCNC: 0.3 MG/DL
BUN SERPL-MCNC: 11.6 MG/DL (ref 8–23)
CALCIUM SERPL-MCNC: 9.8 MG/DL (ref 8.8–10.2)
CHLORIDE SERPL-SCNC: 104 MMOL/L (ref 98–107)
CREAT SERPL-MCNC: 1.04 MG/DL (ref 0.67–1.17)
DEPRECATED HCO3 PLAS-SCNC: 26 MMOL/L (ref 22–29)
EGFRCR SERPLBLD CKD-EPI 2021: 79 ML/MIN/1.73M2
GLUCOSE SERPL-MCNC: 140 MG/DL (ref 70–99)
POTASSIUM SERPL-SCNC: 4.5 MMOL/L (ref 3.4–5.3)
PROT SERPL-MCNC: 7.1 G/DL (ref 6.4–8.3)
SODIUM SERPL-SCNC: 141 MMOL/L (ref 135–145)
VALPROATE SERPL-MCNC: 20.2 UG/ML

## 2024-04-04 NOTE — RESULT ENCOUNTER NOTE
Patient updated by [employer] message with lab results.    Mauri,  Your labs have returned.  1.  Kidney function, electrolytes, liver enzymes all in the normal range.  Glucose slightly elevated as anticipated with diabetes.  2.  A1c very well-controlled, continue current treatment plan.  3.  Depakote levels below therapeutic range.  As you have done well with this range, okay to continue.   4.  Normal blood cell counts.  Please reach out by [employer] with any follow-up questions or concerns.  Cecile Naidu, DO

## 2024-04-12 ENCOUNTER — HOSPITAL ENCOUNTER (OUTPATIENT)
Dept: ULTRASOUND IMAGING | Facility: HOSPITAL | Age: 64
Discharge: HOME | End: 2024-04-12
Attending: FAMILY MEDICINE | Admitting: FAMILY MEDICINE
Payer: MEDICARE

## 2024-04-12 DIAGNOSIS — Z13.6 SCREENING FOR AAA (ABDOMINAL AORTIC ANEURYSM): ICD-10-CM

## 2024-04-12 PROCEDURE — 76706 US ABDL AORTA SCREEN AAA: CPT

## 2024-04-12 NOTE — RESULT ENCOUNTER NOTE
Patient updated by [email or web address] message with AAA results.      Alison Dotson,  Thank you for completing your AAA screening.  Your aorta looks normal.  Please reach out with any follow-up questions or concerns.  Cecile Naidu, DO

## 2024-04-19 DIAGNOSIS — E11.9 TYPE 2 DIABETES MELLITUS WITHOUT COMPLICATION, WITHOUT LONG-TERM CURRENT USE OF INSULIN (H): ICD-10-CM

## 2024-04-19 RX ORDER — LANCETS 28 GAUGE
EACH MISCELLANEOUS
Qty: 100 EACH | Refills: 1 | Status: SHIPPED | OUTPATIENT
Start: 2024-04-19 | End: 2024-09-25

## 2024-05-17 NOTE — PROGRESS NOTES
"In person evaluation    HPI  12/25/2020, in person hospital visit  Previous care by  Dr. Enriek Ayala and Dr. Mamadou Knutson  11/2/2022, in person visit  5/12/2023, in person visit  5/20/2024, in person visit      64-year-old evaluated neurologically for:  Left MCA stroke, December 2020  Left internal capsule stroke October 2020  Right endarterectomy due to ulcerated plaque June 2015    Since last seen a year ago  Patient had lumbar spine surgery June 30, 2023  Does have some mood changes from his left hemisphere stroke  Has some difficulty with aphasia  Minimal right hand difficulties          Since last seen November 2022  No hospitalizations  No surgeries  Does have low back pain radicular to both legs follows at the spine center    Difficulty with increased diarrhea may be started about 8 months ago but really bad over the last month  Primary MD is stopping his metformin is only been off of it for 1 day  We would wait at least a month before making any other med changes    He thinks it may be the Depakote    He has been on the Depakote though for a long time for his \"hallucinations\" which we thought might be seizure that occurred after his stroke that was very cortical in nature    He is on low-dose Depakote 500 mg extended release once per night    If we had to decrease the Depakote we go down to 250 mg    Otherwise stable        A.  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  No recent infarct, intracranial mass, abnormal enhancement or evidence of " intracranial hemorrhage.  2.  There are several small chronic infarcts in the left parietal lobe, posterior left frontal lobe and upper left basal ganglia.  3.  Underlying mild volume loss and presumed chronic small vessel ischemic changes.  HEAD MRA: 1/14/2022  1.  Normal MRA [place] [facility] [place].  NECK MRA: 1/14/2022  1.  No hemodynamically significant stenosis in either proximal internal carotid artery.  2.  Vertebral arteries are patent through the neck and into the head.        B.  History of lumbar radiculopathy/lumbar spinal stenosis        Epidural abscess lumbar region March 2022        Status post L4-L3 hemilaminectomy and durotomy 3/21/2022        Reviewed neurosurgery notes 3/28/2023 (status post bilateral lumbar 3-4 hemilaminectomies/medial facetectomies/foraminotomies                                                                             Microdiscectomies/small durotomy on the right Dr. Duque 3/21/2022)      C.  Difficulty with memory        Slums score of 17 out of 30 when he had a stroke with agitation and hallucinations (12/2020)         Continue Depakote 500 mg once at nighttime for mood         Is on metformin check B12 level        MoCA   11/2/2022,        27+1 = 28 out of 30  5/20/2024,       28+1 = 29 out of 30      Past medical history  CVA left MCA December 2020  CVA October 2020, right-sided weakness (left posterior limb internal capsule)  Left ICA 50% stenosis  Right endarterectomy due to ulcerated plaque June 2015  Covid 19, October 2020  Diabetes  Hyperlipidemia and hypertension  Cervical radiculopathy  Hypothyroid  Gout        Habits  Past smoker quit 27 years ago  Does drink alcohol      Family history  Father with cancer  Mother with cancer  Sister with cancer  Brother with diabetes and heart disease      Work-up  MRI scan brain 12/27/2020 Limited study  A.  Scattered infarcts in the left MCA territory  B.  Infarct burden increased compared to 12/25/2020  C.  Subacute infarct  posterior limb left internal capsule stable  D.  No hemorrhagic transformation  MRI scan 12/25/2020 see official report left hemisphere stroke old evolving left internal capsule stroke  CT scan head 12/25/2020 see official report left posterior internal capsule stroke no hemorrhage  CTA intracranial vessels no significant stenosis  CTA of the neck  A.  Left ICA 60-70% stenosis  B.  Right ICA less than 50% stenosis  C.  Moderate stenosis right vert  EEG December 25, 2020 theta slowing of the background consistent with diffuse cerebral dysfunction  Echo 60% ejection fraction, left atrium normal size  EKG normal sinus rhythm  Urine tox screen on admission positive for benzodiazepines given by EMS   COVID-19 negative  Slums score 17 out of 30  CSF WBC 3/1, RBC 19/62, glucose 78, cultures no growth  CSF HSV negative, cryptococcal antigen negative     HEAD MRI: 1/14/2022  1.  No recent infarct, intracranial mass, abnormal enhancement or evidence of intracranial hemorrhage.  2.  There are several small chronic infarcts in the left parietal lobe, posterior left frontal lobe and upper left basal ganglia.  3.  Underlying mild volume loss and presumed chronic small vessel ischemic changes.  HEAD MRA: 1/14/2022  1.  Normal MRA [place] [facility] [place].  NECK MRA: 1/14/2022  1.  No hemodynamically significant stenosis in either proximal internal carotid artery.  2.  Vertebral arteries are patent through the neck and into the head.  B12 level 397, (11/2/2022)  Carotid ultrasound 11/4/2022  A.  Right ICA peak systolic velocity 98 cm/s, no significant stenosis  B.  Left ICA peak systolic velocity 133 cm/s no significant stenosis  C.  Antegrade flow in vertebral arteries    MRI lumbar spine 2/15/2023  1.  Compared to most recent MRI, previously demonstrated rim-enhancing left posterolateral epidural fluid collection extending from L4 through S1 has resolved.        There is significantly decreased secondary spinal canal stenosis at the  L4-L5 and L5-S1 levels, as described in official report.  2.  Unchanged severe spinal canal stenosis at L3-L4. Lesser degrees of spinal canal stenosis elsewhere.  3.  Varying degrees of multilevel neural foraminal stenosis, not significantly changed.  4.  Multilevel degenerative and postoperative changes, as described. Please see the body of the report for additional details.  5.  Small rim-enhancing fluid collection in the subcutaneous tissues overlying the L4-L5 level, probably representing a small postoperative seroma,        although infection cannot be completely excluded by imaging features alone. Recommend clinical correlation.  Carotid Doppler 6/18/2024  A.  Right ICA peak systolic velocity 70 cm/s, less than 50% stenosis  B.  Left ICA peak systolic velocity 131 cm/s, less than 50% stenosis  C.  Antegrade flow in the vertebral arteries.      Laboratory data review                    7/14/2022      10/2022      11/2/2022     1/5/2023    4/2024  Depakote      32.1                                                                     20.2  NA/K                                143/4.2                            144/4.0     141/4.5  BUN/Cr                            10.3/0.89                          9.3/0.92   11.6/1.04  GLU                                  98                                   101           140  AST                                   25                                   19             20  WBC/HGB                       4.9/13.0                                            4.8/14.2  PLTs                               191,000                                             182,000    HDL/LDL                          24/12  HGBA1C                             5.7                                                6.2  TSH                                   5.03                                  2.68                               B12                                                          397    Slums score 17 out  of 30 (12/31/2020)        MoCA   11/2/2022,        27+1 = 28 out of 30  5/20/2024,       28+1 = 29 out of 30        Exam  Review of systems    No headache no chest pain or shortness of breath no nausea vomiting no diarrhea no fever chills    No diplopia no dysarthria  No visual changes    Has some right-sided weakness more so arm than leg  Sometimes has some mild dysphagia with saliva getting stuck but can swallow food  Complains of trouble with memory recall    Sleeps okay  No falls or injuries    Otherwise review of systems negative    Exam  Blood pressure 136/79, pulse 64  Alert orient x3  Lungs clear  Heart rate regular  Abdomen soft  Symmetrical pulses  No edema the feet  Straight leg raising sign negative      Neurologic exam  Alert orient x3  Normal prosody speech  Normal naming  Normal comprehension  Normal repetition  No significant aphasia (once in a while seems to have hesitancy of speech)  No neglect        MoCA   11/2/2022,        27+1 = 28 out of 30  5/20/2024,       28+1 = 29 out of 30    Cranials 2 through 12  No ophthalmoplegia  No nystagmus  Face symmetrical  Tongue twisters good  Visual fields intact    Upper extremity  Very subtle mild interning of the right arm  Left arm normal    Lower extremities  Toe tapping and a little bit slower on the right than the left subtle  Test and reported right over left  Iliopsoas 5/5  Hamstring 5/5  Quadriceps 5/5  Anterior tibial 5/5  Posterior tibial 5/5      Gait  Normal        Assessment/plan      1.  December 2020 left MCA stroke with confusion and agitation       October 2020 left internal capsule small vessel ischemic stroke during COVID infection       Right endarterectomy due to ulcerated plaque June 2015    Carotid Doppler 6/18/2024  A.  Right ICA peak systolic velocity 70 cm/s, less than 50% stenosis  B.  Left ICA peak systolic velocity 131 cm/s, less than 50% stenosis  C.  Antegrade flow in the vertebral arteries.    2.  Risk factors for stroke        Hypertension/hyperlipidemia/multiple strokes/past smoker    3.  Significant visual hallucinations and confusion with left MCA stroke December 2020 treated with Depakote    4.  March 2022 left lumbar surgery with epidural abscess       Reviewed MRI lumbar spine       Reviewed neurosurgery notes 3/28/2023 (status post bilateral lumbar 3-4 hemilaminectomies/medial facetectomies/foraminotomies                                                                       Microdiscectomies/small durotomy on the right Dr. Duque 3/21/2022)       Lumbar spine surgery June 30, 2023      5.  Memory difficulty       Checked B12 patient is on metformin       B12 level 397, (11/2/2022)        MoCA   11/2/2022,        27+1 = 28 out of 30  5/20/2024,       28+1 = 29 out of 30          Recommend/plan    Diagnosis  Left MCA stroke  Left internal capsule stroke        Plavix 75 mg once per day  Crestor 5 mg p.o. daily  Depakote 500 mg extended release nightly for sleep/hallucinations started empirically    Risk factor reduction per primary MD    Some trouble with sleeping, flashing lights in his vision at night as some parietal-occipital junction changes on ischemia we  added Depakote at nighttime to help this  Depakote 500 mg extended release nightly slept a lot better      Plan  Plavix  75 mg once per day  Continue Depakote help with flashing/hallucinations/sleep    If he has continued diarrhea even after being off the metformin and he feels the Depakote is the cause and we can decrease Depakote to 250 mg once at nighttime and see how things go.    Check carotid Dopplers previous right endarterectomy in 2015  Follow-up in 1 year  Discussed multiple issues as above    Total care time today 30 minutes      As part of visit today  Reviewed primary MD notes 4/3/2024  Reviewed EMR notes  Reviewed laboratory data  Reviewed urology note 5/20/2024, (elevated PSA)    Addendum 6/19/2024  Carotid Doppler 6/18/2024  A.  Right ICA peak systolic  velocity 70 cm/s, less than 50% stenosis  B.  Left ICA peak systolic velocity 131 cm/s, less than 50% stenosis  C.  Antegrade flow in the vertebral arteries.

## 2024-05-20 ENCOUNTER — OFFICE VISIT (OUTPATIENT)
Dept: NEUROLOGY | Facility: CLINIC | Age: 64
End: 2024-05-20
Payer: MEDICARE

## 2024-05-20 VITALS
BODY MASS INDEX: 31.56 KG/M2 | HEART RATE: 64 BPM | DIASTOLIC BLOOD PRESSURE: 79 MMHG | HEIGHT: 72 IN | SYSTOLIC BLOOD PRESSURE: 136 MMHG | WEIGHT: 233 LBS

## 2024-05-20 DIAGNOSIS — R53.1 RIGHT SIDED WEAKNESS: ICD-10-CM

## 2024-05-20 DIAGNOSIS — I63.312 CEREBROVASCULAR ACCIDENT (CVA) DUE TO THROMBOSIS OF LEFT MIDDLE CEREBRAL ARTERY (H): Primary | ICD-10-CM

## 2024-05-20 DIAGNOSIS — R41.3 MEMORY LOSS: ICD-10-CM

## 2024-05-20 DIAGNOSIS — Z86.73 HISTORY OF COMPLETED STROKE: ICD-10-CM

## 2024-05-20 PROCEDURE — 99214 OFFICE O/P EST MOD 30 MIN: CPT | Performed by: PSYCHIATRY & NEUROLOGY

## 2024-05-20 RX ORDER — CLOPIDOGREL BISULFATE 75 MG/1
75 TABLET ORAL DAILY
Qty: 90 TABLET | Refills: 2 | Status: SHIPPED | OUTPATIENT
Start: 2024-05-20

## 2024-05-20 ASSESSMENT — MONTREAL COGNITIVE ASSESSMENT (MOCA)
6. READ LIST OF DIGITS [FORWARD/BACKWARD]: 2
12. MEMORY INDEX SCORE: 3
WHAT LEVEL OF EDUCATION WAS ATTAINED: 1
10. [FLUENCY] NAME WORDS STARTING WITH DESIGNATED LETTER: 1
11. FOR EACH PAIR OF WORDS, WHAT CATEGORY DO THEY BELONG TO (OUT OF 2): 2
8. SERIAL SUBTRACTION OF 7S: 3
WHAT IS THE TOTAL SCORE (OUT OF 30): 29
VISUOSPATIAL/EXECUTIVE SUBSCORE: 5
7. [VIGILENCE] TAP WHEN HEARING DESIGNATED LETTER: 1
4. NAME EACH OF THE THREE ANIMALS SHOWN: 3
9. REPEAT EACH SENTENCE: 2
13. ORIENTATION SUBSCORE: 6

## 2024-05-20 NOTE — LETTER
June 19, 2024      Mauri Kiser  [address]  [facility] [address]        Dear Rashel,    We are writing to inform you of your test results.    Carotid Doppler 6/18/2024  A.  Right ICA peak systolic velocity 70 cm/s, less than 50% stenosis  B.  Left ICA peak systolic velocity 131 cm/s, less than 50% stenosis  C.  Antegrade flow in the vertebral arteries.  Doppler study above looks good continue as planned keep follow-up visit 5/20/2025  If you have any questions or concerns, please call the clinic at the number listed above.       Sincerely,    Dr. Alexander Cuevas

## 2024-05-20 NOTE — LETTER
"    5/20/2024         RE: Raghavendra Kiser  [address]  [facility] [address]        Dear Colleague,    Thank you for referring your patient, Raghavendra Kiser, to the [facility] [place]. Please see a copy of my visit note below.    In person evaluation    HPI  12/25/2020, in person hospital visit  Previous care by  Dr. Enrike Ayala and Dr. Mamadou Knutson  11/2/2022, in person visit  5/12/2023, in person visit  5/20/2024, in person visit      64-year-old evaluated neurologically for:  Left MCA stroke, December 2020  Left internal capsule stroke October 2020  Right endarterectomy due to ulcerated plaque June 2015    Since last seen a year ago  Patient had lumbar spine surgery June 30, 2023  Does have some mood changes from his left hemisphere stroke  Has some difficulty with aphasia  Minimal right hand difficulties          Since last seen November 2022  No hospitalizations  No surgeries  Does have low back pain radicular to both legs follows at the spine center    Difficulty with increased diarrhea may be started about 8 months ago but really bad over the last month  Primary MD is stopping his metformin is only been off of it for 1 day  We would wait at least a month before making any other med changes    He thinks it may be the Depakote    He has been on the Depakote though for a long time for his \"hallucinations\" which we thought might be seizure that occurred after his stroke that was very cortical in nature    He is on low-dose Depakote 500 mg extended release once per night    If we had to decrease the Depakote we go down to 250 mg    Otherwise stable        A.  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  No recent infarct, intracranial mass, abnormal enhancement or evidence of intracranial hemorrhage.  2.  There are several small chronic infarcts in the left parietal lobe, posterior left frontal lobe and upper left basal ganglia.  3.  Underlying mild volume loss and presumed chronic small vessel ischemic changes.  HEAD MRA: 1/14/2022  1.  Normal MRA [facility].  NECK MRA: 1/14/2022  1.  No hemodynamically significant stenosis in either proximal internal carotid artery.  2.  Vertebral arteries are patent through the neck and into the head.        B.  History of lumbar radiculopathy/lumbar spinal stenosis        Epidural abscess lumbar region March 2022        Status post L4-L3 hemilaminectomy and durotomy 3/21/2022        Reviewed neurosurgery notes 3/28/2023 (status post bilateral lumbar 3-4 hemilaminectomies/medial facetectomies/foraminotomies                                                                             Microdiscectomies/small durotomy on the right Dr. Duque 3/21/2022)      C.  Difficulty with memory        Slums score of 17 out of 30 when he had a stroke with agitation and hallucinations (12/2020)         Continue Depakote 500 mg once at nighttime for mood         Is on metformin check B12 level        MoCA   11/2/2022,        27+1 = 28 out of 30  5/20/2024,       28+1 = 29 out of 30      Past medical history  CVA left MCA December 2020  CVA October 2020, right-sided weakness (left posterior limb internal capsule)  Left ICA 50% stenosis  Right endarterectomy due to ulcerated plaque June 2015  Covid 19, October 2020  Diabetes  Hyperlipidemia and hypertension  Cervical radiculopathy  Hypothyroid  Gout        Habits  Past smoker quit 27 years ago  Does drink alcohol      Family history  Father with  cancer  Mother with cancer  Sister with cancer  Brother with diabetes and heart disease      Work-up  MRI scan brain 12/27/2020 Limited study  A.  Scattered infarcts in the left MCA territory  B.  Infarct burden increased compared to 12/25/2020  C.  Subacute infarct posterior limb left internal capsule stable  D.  No hemorrhagic transformation  MRI scan 12/25/2020 see official report left hemisphere stroke old evolving left internal capsule stroke  CT scan head 12/25/2020 see official report left posterior internal capsule stroke no hemorrhage  CTA intracranial vessels no significant stenosis  CTA of the neck  A.  Left ICA 60-70% stenosis  B.  Right ICA less than 50% stenosis  C.  Moderate stenosis right vert  EEG December 25, 2020 theta slowing of the background consistent with diffuse cerebral dysfunction  Echo 60% ejection fraction, left atrium normal size  EKG normal sinus rhythm  Urine tox screen on admission positive for benzodiazepines given by EMS   COVID-19 negative  Slums score 17 out of 30  CSF WBC 3/1, RBC 19/62, glucose 78, cultures no growth  CSF HSV negative, cryptococcal antigen negative     HEAD MRI: 1/14/2022  1.  No recent infarct, intracranial mass, abnormal enhancement or evidence of intracranial hemorrhage.  2.  There are several small chronic infarcts in the left parietal lobe, posterior left frontal lobe and upper left basal ganglia.  3.  Underlying mild volume loss and presumed chronic small vessel ischemic changes.  HEAD MRA: 1/14/2022  1.  Normal MRA [facility].  NECK MRA: 1/14/2022  1.  No hemodynamically significant stenosis in either proximal internal carotid artery.  2.  Vertebral arteries are patent through the neck and into the head.  B12 level 397, (11/2/2022)  Carotid ultrasound 11/4/2022  A.  Right ICA peak systolic velocity 98 cm/s, no significant stenosis  B.  Left ICA peak systolic velocity 133 cm/s no significant stenosis  C.  Antegrade flow in vertebral arteries    MRI  lumbar spine 2/15/2023  1.  Compared to most recent MRI, previously demonstrated rim-enhancing left posterolateral epidural fluid collection extending from L4 through S1 has resolved.        There is significantly decreased secondary spinal canal stenosis at the L4-L5 and L5-S1 levels, as described in official report.  2.  Unchanged severe spinal canal stenosis at L3-L4. Lesser degrees of spinal canal stenosis elsewhere.  3.  Varying degrees of multilevel neural foraminal stenosis, not significantly changed.  4.  Multilevel degenerative and postoperative changes, as described. Please see the body of the report for additional details.  5.  Small rim-enhancing fluid collection in the subcutaneous tissues overlying the L4-L5 level, probably representing a small postoperative seroma,        although infection cannot be completely excluded by imaging features alone. Recommend clinical correlation.      Laboratory data review                    7/14/2022      10/2022      11/2/2022     1/5/2023    4/2024  Depakote      32.1                                                                     20.2  NA/K                                143/4.2                            144/4.0     141/4.5  BUN/Cr                            10.3/0.89                          9.3/0.92   11.6/1.04  GLU                                  98                                   101           140  AST                                   25                                   19             20  WBC/HGB                       4.9/13.0                                            4.8/14.2  PLTs                               191,000                                             182,000    HDL/LDL                          24/12  HGBA1C                             5.7                                                6.2  TSH                                   5.03                                  2.68                               B12                                                           397    Slums score 17 out of 30 (12/31/2020)        MoCA   11/2/2022,        27+1 = 28 out of 30  5/20/2024,       28+1 = 29 out of 30        Exam  Review of systems    No headache no chest pain or shortness of breath no nausea vomiting no diarrhea no fever chills    No diplopia no dysarthria  No visual changes    Has some right-sided weakness more so arm than leg  Sometimes has some mild dysphagia with saliva getting stuck but can swallow food  Complains of trouble with memory recall    Sleeps okay  No falls or injuries    Otherwise review of systems negative    Exam  Blood pressure 136/79, pulse 64  Alert orient x3  Lungs clear  Heart rate regular  Abdomen soft  Symmetrical pulses  No edema the feet  Straight leg raising sign negative      Neurologic exam  Alert orient x3  Normal prosody speech  Normal naming  Normal comprehension  Normal repetition  No significant aphasia (once in a while seems to have hesitancy of speech)  No neglect        MoCA   11/2/2022,        27+1 = 28 out of 30  5/20/2024,       28+1 = 29 out of 30    Cranials 2 through 12  No ophthalmoplegia  No nystagmus  Face symmetrical  Tongue twisters good  Visual fields intact    Upper extremity  Very subtle mild interning of the right arm  Left arm normal    Lower extremities  Toe tapping and a little bit slower on the right than the left subtle  Test and reported right over left  Iliopsoas 5/5  Hamstring 5/5  Quadriceps 5/5  Anterior tibial 5/5  Posterior tibial 5/5      Gait  Normal        Assessment/plan      1.  December 2020 left MCA stroke with confusion and agitation       October 2020 left internal capsule small vessel ischemic stroke during COVID infection       Right endarterectomy due to ulcerated plaque June 2015    2.  Risk factors for stroke       Hypertension/hyperlipidemia/multiple strokes/past smoker    3.  Significant visual hallucinations and confusion with left MCA stroke December 2020 treated with  Depakote    4.  March 2022 left lumbar surgery with epidural abscess       Reviewed MRI lumbar spine       Reviewed neurosurgery notes 3/28/2023 (status post bilateral lumbar 3-4 hemilaminectomies/medial facetectomies/foraminotomies                                                                       Microdiscectomies/small durotomy on the right Dr. Duque 3/21/2022)       Lumbar spine surgery June 30, 2023      5.  Memory difficulty       Checked B12 patient is on metformin       B12 level 397, (11/2/2022)        MoCA   11/2/2022,        27+1 = 28 out of 30  5/20/2024,       28+1 = 29 out of 30          Recommend/plan    Diagnosis  Left MCA stroke  Left internal capsule stroke        Plavix 75 mg once per day  Crestor 5 mg p.o. daily  Depakote 500 mg extended release nightly for sleep/hallucinations started empirically    Risk factor reduction per primary MD    Some trouble with sleeping, flashing lights in his vision at night as some parietal-occipital junction changes on ischemia we  added Depakote at nighttime to help this  Depakote 500 mg extended release nightly slept a lot better      Plan  Plavix  75 mg once per day  Continue Depakote help with flashing/hallucinations/sleep    If he has continued diarrhea even after being off the metformin and he feels the Depakote is the cause and we can decrease Depakote to 250 mg once at nighttime and see how things go.    Check carotid Dopplers previous right endarterectomy in 2015  Follow-up in 1 year  Discussed multiple issues as above    Total care time today 30 minutes      As part of visit today  Reviewed primary MD notes 4/3/2024  Reviewed EMR notes  Reviewed laboratory data  Reviewed urology note 5/20/2024, (elevated PSA)              Again, thank you for allowing me to participate in the care of your patient.        Sincerely,        jules Cuevas MD

## 2024-05-20 NOTE — NURSING NOTE
AMAN COGNITIVE ASSESSMENT (MOCA)  Version 7.1 Original Version  VISUOSPATIAL/EXECUTIVE               COPY CUBE      [  1  ]                                [ 1   ] DRAW CLOCK (Ten past eleven)  (3 points)    [   1 ]                    [  1  ]               [   1 ]       Contour            Numbers     Hands POINTS                  5 / 5   NAMING    [  1 ]                                                                        [  1  ]                                             [ 1   ]  Lirodger Mcintosh                                Camel                    3 / 3   MEMORY Read list of words, subject must repeat them. Do 2 trials, even if 1st trial is successful. Do a recall after 5 minutes  FACE VELVET [facility] ISABEL RED No Points    1st          2nd         ATTENTION Read list of digits (1 digit/sec) Subject has to repeat in the forward order       [ 1   ]   2  1  8  5  4                                [  1  ] 7 4 2                         2 /2   Read list of letters. The subject must tap with his hand at each letter A. No points if > 2 errors.  [  1  ] F B A C M N A A J K L B A F A K D E A A A J A M O F A A B             1 /1   Serial 7 subtraction starting at 100          [  1  ] 93         [   1 ] 86          [ 1   ] 79          [  1  ] 72         [  1  ] 65   4 or 5 correct subtractions: 3 points,  2 or 3 correct: 2 points,  1correct: 1 point,   0 correct: 0 points            3/3   LANGUAGE Repeat: I only know that Marcellus is the one to help today. [   1  ]                                      The cat always hid under the couch when dogs were in the room. [ 1  ]              2 /2   Fluency: Name maximum number of words in one minute that begin with the letter F                                                                                                                    [ 1   ] _14__ (N > 11 words)              1 /1   ABSTRACTION Similarity  between e.g. banana-orange=fruit                                                                   [   1 ] train-bicycle                      [   1 ] watch-ruler             2 /2   DELAYED  RECALL Has to recall words  WITH NO CUE FACE  [  1  ] VELVET  [   1 ] Baptism  [ 0   ]  ISABEL  [ 0   ] RED  [ 1   ] Points for UNCUED recall only           3 /5           OPTIONAL Category cue           Multiple choice cue          ORIENTATION  [ 1   ] Date     [  1  ] Month       [ 1   ] Year      [   1 ] Day      [ 1   ] Place        [ 1   ] City         6 /6   TOTAL  Normal > 26/30 Add 1 point if < 12 years education  +1       29 /30

## 2024-05-20 NOTE — NURSING NOTE
Chief Complaint   Patient presents with    Annual Visit     Annual follow up, CVA's 2020.      Latricia Kohler LPN on 5/20/2024 at 2:18 PM

## 2024-06-18 ENCOUNTER — HOSPITAL ENCOUNTER (OUTPATIENT)
Dept: ULTRASOUND IMAGING | Facility: HOSPITAL | Age: 64
Discharge: HOME | End: 2024-06-18
Attending: PSYCHIATRY & NEUROLOGY | Admitting: PSYCHIATRY & NEUROLOGY
Payer: MEDICARE

## 2024-06-18 PROCEDURE — 93880 EXTRACRANIAL BILAT STUDY: CPT

## 2024-07-13 ENCOUNTER — APPOINTMENT (OUTPATIENT)
Dept: CT IMAGING | Facility: HOSPITAL | Age: 64
End: 2024-07-13
Attending: EMERGENCY MEDICINE
Payer: MEDICARE

## 2024-07-13 ENCOUNTER — HOSPITAL ENCOUNTER (EMERGENCY)
Facility: HOSPITAL | Age: 64
Discharge: HOME | End: 2024-07-13
Attending: EMERGENCY MEDICINE | Admitting: EMERGENCY MEDICINE
Payer: MEDICARE

## 2024-07-13 ENCOUNTER — APPOINTMENT (OUTPATIENT)
Dept: MRI IMAGING | Facility: HOSPITAL | Age: 64
End: 2024-07-13
Attending: EMERGENCY MEDICINE
Payer: MEDICARE

## 2024-07-13 VITALS
HEART RATE: 56 BPM | WEIGHT: 235 LBS | OXYGEN SATURATION: 95 % | RESPIRATION RATE: 21 BRPM | DIASTOLIC BLOOD PRESSURE: 81 MMHG | SYSTOLIC BLOOD PRESSURE: 157 MMHG | TEMPERATURE: 97.7 F | HEIGHT: 72 IN | BODY MASS INDEX: 31.83 KG/M2

## 2024-07-13 DIAGNOSIS — R29.90 STROKE-LIKE SYMPTOMS: ICD-10-CM

## 2024-07-13 DIAGNOSIS — R20.2 PARESTHESIA OF LEFT ARM: ICD-10-CM

## 2024-07-13 LAB
ANION GAP SERPL CALCULATED.3IONS-SCNC: 10 MMOL/L (ref 7–15)
APTT PPP: 27 SECONDS (ref 22–38)
BASOPHILS # BLD AUTO: 0 10E3/UL (ref 0–0.2)
BASOPHILS NFR BLD AUTO: 1 %
BUN SERPL-MCNC: 18.5 MG/DL (ref 8–23)
CALCIUM SERPL-MCNC: 9.5 MG/DL (ref 8.8–10.2)
CHLORIDE SERPL-SCNC: 107 MMOL/L (ref 98–107)
CREAT SERPL-MCNC: 1.07 MG/DL (ref 0.67–1.17)
DEPRECATED HCO3 PLAS-SCNC: 25 MMOL/L (ref 22–29)
EGFRCR SERPLBLD CKD-EPI 2021: 77 ML/MIN/1.73M2
EOSINOPHIL # BLD AUTO: 0.1 10E3/UL (ref 0–0.7)
EOSINOPHIL NFR BLD AUTO: 2 %
ERYTHROCYTE [DISTWIDTH] IN BLOOD BY AUTOMATED COUNT: 14.2 % (ref 10–15)
GLUCOSE BLDC GLUCOMTR-MCNC: 197 MG/DL (ref 70–99)
GLUCOSE SERPL-MCNC: 163 MG/DL (ref 70–99)
HCT VFR BLD AUTO: 42.1 % (ref 40–53)
HGB BLD-MCNC: 13.9 G/DL (ref 13.3–17.7)
HOLD SPECIMEN: NORMAL
IMM GRANULOCYTES # BLD: 0 10E3/UL
IMM GRANULOCYTES NFR BLD: 0 %
INR PPP: 0.94 (ref 0.85–1.15)
LYMPHOCYTES # BLD AUTO: 2.2 10E3/UL (ref 0.8–5.3)
LYMPHOCYTES NFR BLD AUTO: 40 %
MCH RBC QN AUTO: 31.2 PG (ref 26.5–33)
MCHC RBC AUTO-ENTMCNC: 33 G/DL (ref 31.5–36.5)
MCV RBC AUTO: 94 FL (ref 78–100)
MONOCYTES # BLD AUTO: 0.4 10E3/UL (ref 0–1.3)
MONOCYTES NFR BLD AUTO: 8 %
NEUTROPHILS # BLD AUTO: 2.7 10E3/UL (ref 1.6–8.3)
NEUTROPHILS NFR BLD AUTO: 50 %
NRBC # BLD AUTO: 0 10E3/UL
NRBC BLD AUTO-RTO: 0 /100
PLATELET # BLD AUTO: 173 10E3/UL (ref 150–450)
POTASSIUM SERPL-SCNC: 3.9 MMOL/L (ref 3.4–5.3)
RBC # BLD AUTO: 4.46 10E6/UL (ref 4.4–5.9)
SODIUM SERPL-SCNC: 142 MMOL/L (ref 135–145)
TROPONIN T SERPL HS-MCNC: 10 NG/L
WBC # BLD AUTO: 5.5 10E3/UL (ref 4–11)

## 2024-07-13 PROCEDURE — 36415 COLL VENOUS BLD VENIPUNCTURE: CPT | Performed by: EMERGENCY MEDICINE

## 2024-07-13 PROCEDURE — 85025 COMPLETE CBC W/AUTO DIFF WBC: CPT | Performed by: EMERGENCY MEDICINE

## 2024-07-13 PROCEDURE — 85730 THROMBOPLASTIN TIME PARTIAL: CPT | Mod: GZ | Performed by: EMERGENCY MEDICINE

## 2024-07-13 PROCEDURE — 84484 ASSAY OF TROPONIN QUANT: CPT | Performed by: EMERGENCY MEDICINE

## 2024-07-13 PROCEDURE — 70553 MRI BRAIN STEM W/O & W/DYE: CPT | Mod: MG

## 2024-07-13 PROCEDURE — 80048 BASIC METABOLIC PNL TOTAL CA: CPT | Performed by: EMERGENCY MEDICINE

## 2024-07-13 PROCEDURE — 93005 ELECTROCARDIOGRAM TRACING: CPT | Performed by: EMERGENCY MEDICINE

## 2024-07-13 PROCEDURE — 70496 CT ANGIOGRAPHY HEAD: CPT | Mod: MG

## 2024-07-13 PROCEDURE — 85610 PROTHROMBIN TIME: CPT | Performed by: EMERGENCY MEDICINE

## 2024-07-13 PROCEDURE — 99207 PR NO CHARGE LOS: CPT | Performed by: NURSE PRACTITIONER

## 2024-07-13 PROCEDURE — 250N000009 HC RX 250: Performed by: EMERGENCY MEDICINE

## 2024-07-13 PROCEDURE — 72125 CT NECK SPINE W/O DYE: CPT | Mod: MF

## 2024-07-13 PROCEDURE — 0042T CT HEAD PERFUSION W CONTRAST: CPT

## 2024-07-13 PROCEDURE — A9585 GADOBUTROL INJECTION: HCPCS | Performed by: EMERGENCY MEDICINE

## 2024-07-13 PROCEDURE — 99285 EMERGENCY DEPT VISIT HI MDM: CPT | Mod: 25

## 2024-07-13 PROCEDURE — 250N000011 HC RX IP 250 OP 636: Performed by: EMERGENCY MEDICINE

## 2024-07-13 PROCEDURE — 82962 GLUCOSE BLOOD TEST: CPT

## 2024-07-13 PROCEDURE — 255N000002 HC RX 255 OP 636: Performed by: EMERGENCY MEDICINE

## 2024-07-13 PROCEDURE — 96374 THER/PROPH/DIAG INJ IV PUSH: CPT | Mod: 59

## 2024-07-13 RX ORDER — LORAZEPAM 2 MG/ML
1 INJECTION INTRAMUSCULAR ONCE
Status: COMPLETED | OUTPATIENT
Start: 2024-07-13 | End: 2024-07-13

## 2024-07-13 RX ORDER — GADOBUTROL 604.72 MG/ML
10 INJECTION INTRAVENOUS ONCE
Status: COMPLETED | OUTPATIENT
Start: 2024-07-13 | End: 2024-07-13

## 2024-07-13 RX ORDER — IOPAMIDOL 755 MG/ML
117 INJECTION, SOLUTION INTRAVASCULAR ONCE
Status: COMPLETED | OUTPATIENT
Start: 2024-07-13 | End: 2024-07-13

## 2024-07-13 RX ADMIN — SODIUM CHLORIDE 100 ML: 9 INJECTION, SOLUTION INTRAVENOUS at 11:52

## 2024-07-13 RX ADMIN — GADOBUTROL 10 ML: 604.72 INJECTION INTRAVENOUS at 14:35

## 2024-07-13 RX ADMIN — IOPAMIDOL 117 ML: 755 INJECTION, SOLUTION INTRAVENOUS at 11:52

## 2024-07-13 RX ADMIN — LORAZEPAM 1 MG: 2 INJECTION INTRAMUSCULAR; INTRAVENOUS at 13:48

## 2024-07-13 ASSESSMENT — ACTIVITIES OF DAILY LIVING (ADL)
ADLS_ACUITY_SCORE: 37

## 2024-07-13 NOTE — ED PROVIDER NOTES
EMERGENCY DEPARTMENT ENCOUNTER      NAME: Raghavendra Kiser  AGE: 64 year old male  YOB: 1960  MRN: [id number]  EVALUATION DATE & TIME: No admission date for patient encounter.    PCP: Cecile Naidu    ED PROVIDER: Blue Sena MD      Chief Complaint   Patient presents with    Tingling         FINAL IMPRESSION:  Stroke like symptom  Left arm paresthesia  Cervical degenerative changes    ED COURSE & MEDICAL DECISION MAKING:    Pertinent Labs & Imaging studies reviewed. (See chart for details)  64 year old male presents to the Emergency Department for evaluation of intermittent numbness in left arm.  Symptoms started yesterday afternoon.  Symptoms seem to be primarily in the hand with occasional advancement of the arm and then improvement.  Patient arrives with wife who provides additional information.  Per report patient with history of stroke in 2020 which resulted in patient being intubated for 4 days.  Patient did not have thrombolysis nor thrombectomy.  Patient also reports having previous disc replacement in his neck and that the symptoms feel more consistent with this disc issues.  No other reports of focal findings.  Patient seen in triage area due to history and presentation.  Tier 2 stroke code initiated.    11:30 AM.  Initial examination and interview performed.  11:40 AM.  Patient discussed with stroke neuroteam.  12:01 PM.  CT CTA of the head and neck is unremarkable for acute stroke, large vessel occlusion.  Will de-escalate.  Patient to proceed to MRI if CT of the neck is unremarkable  12:02 PM.  Patient discussed with stroke neurology.  If CT of the C-spine does not offer explanations for his numbness and tingling would proceed with MRI.  1:14 PM.  CT images of the C-spine with degenerative changes but no findings to explain symptomatology.  Will proceed with MRI of the head  3:01 PM.  MRI is unremarkable.  No evidence of acute stroke.  Plan will be for continued outpatient  management.  Patient already taking Plavix.  Instructed to add a baby aspirin daily.  At the conclusion of the encounter I discussed the results of all of the tests and the disposition. The questions were answered. The patient or family acknowledged understanding and was agreeable with the care plan.       MEDICATIONS GIVEN IN THE EMERGENCY:  Medications   iopamidol (ISOVUE-370) solution 117 mL (has no administration in time range)     And   sodium chloride 0.9 % bag for CT scan flush use (has no administration in time range)       NEW PRESCRIPTIONS STARTED AT TODAY'S ER VISIT  New Prescriptions    No medications on file          =================================================================    HPI    Patient information was obtained from: Patient    Use of : N/A        Raghavendra Kiser is a 64 year old male with a pertinent history of cervical and lumbar radiculopathy, hyperlipidemia, type 2 diabetes mellitus, arthrosclerosis of right coronary artery, hypertension, TIA, and CVA who presents to this ED via private car for evaluation of left arm tingling/numbness.  Patient seen in triage area.  Symptoms waxing and waning since yesterday afternoon.  Resolution of symptoms perhaps an hour prior to arrival.  However patient reports serious stroke back in 2020 resulting in intubation.  This information provided by his wife who accompanies the patient.  Patient denies any headaches.  No nausea vomiting.  Remainder review of systems otherwise negative.          REVIEW OF SYSTEMS   Review of Systems as noted above otherwise negative    PAST MEDICAL HISTORY:  Past Medical History:   Diagnosis Date    Acute ischemic left MCA stroke (H)     AMS (altered mental status)     Atherosclerosis of right carotid artery     Benign neoplasm of transverse colon     1-20-23 multiple adenomas (one an advanced adenoma);  Follow up colonoscopy in 3 years.    Carotid stenosis, bilateral     Cervical radiculopathy     Chronic  gout     Fracture of right humerus     Ganglion cyst     History of stroke without residual deficits  Oct 2020, Dec 2020    Humerus fracture     Right    Hyperlipidemia     Hypertension     Hypothyroidism     Medial epicondylitis     Peyronie's disease     Shoulder tendinitis, right     Sleep difficulties     Superficial venous thrombosis of arm     right lateral antecubital fossa    TIA (transient ischemic attack) 05/16/2015    Tinnitus     Type 2 diabetes mellitus without complication, without long-term current use of insulin (H) 11/22/2019       PAST SURGICAL HISTORY:  Past Surgical History:   Procedure Laterality Date    CERVICAL DISC SURGERY      IR LUMBAR PUNCTURE  12/25/2020    IR SPINAL PUNCTURE  12/25/2020    LAMINECTOMY LUMBAR ONE LEVEL Bilateral 3/21/2022    Procedure: Bilateral lumbar 3- lumbar 4 hemilaminectomies, medial facetectomies, foraminotomies, microdiscectomies;  Surgeon: Abena Duque MD;  Location: [facility] [place]    LAMINECTOMY LUMBAR ONE LEVEL Bilateral 6/30/2023    Procedure: lumbar 3-lumbar 4 bilateral laminectomies, medial facetectomies, foraminotomies.;  Surgeon: Abena Duque MD;  Location: [facility]    LAMINECTOMY LUMBAR TWO LEVELS Left 3/29/2022    Procedure: LUMBAR 4-LUMBAR 5 LEFT HEMILAMINECTOMY WITH EPIDURAL ABSCESS EVACUATION;  Surgeon: Abena Duque MD;  Location: [facility] [place]    PICC INSERTION - TRIPLE LUMEN  12/26/2020         PICC SINGLE LUMEN PLACEMENT  4/1/2022         TONSILLECTOMY      ZZC THROMBOENDARTECTMY NECK,NECK INCIS Right 06/08/2015    Procedure: Right Carotid Endarterectomy with Impulse Monitoring;  Surgeon: Marcellus Toth MD;  [place] [facility] [place]  Service: General           CURRENT MEDICATIONS:    allopurinol (ZYLOPRIM) 300 MG tablet  amLODIPine (NORVASC) 5 MG tablet  blood glucose (FREESTYLE LITE) test strip  blood glucose monitoring (FREESTYLE) lancets  cholecalciferol 25 MCG (1000 UT) TABS  clopidogrel (PLAVIX) 75  MG tablet  divalproex sodium extended-release (DEPAKOTE ER) 500 MG 24 hr tablet  levothyroxine (SYNTHROID/LEVOTHROID) 125 MCG tablet  losartan (COZAAR) 100 MG tablet  Pseudoephedrine-Acetaminophen (ALLEREST PO)  rosuvastatin (CRESTOR) 5 MG tablet        ALLERGIES:  No Known Allergies    FAMILY HISTORY:  Family History   Problem Relation Age of Onset    Stomach Cancer Mother     Lung Cancer Mother     Cancer Mother         Lung    Other Cancer Mother     Throat cancer Father     Lung Cancer Father     Cancer Father         Lung    Other Cancer Father     Lung Cancer Sister     Other Cancer Sister     No Known Problems Sister     Other Cancer Sister     No Known Problems Sister     No Known Problems Sister     Heart Disease Brother          of a heart attack.    Diabetes Type 1 Brother     Diabetes Brother     No Known Problems Brother     No Known Problems Son     Diabetes Brother        SOCIAL HISTORY:   Social History     Socioeconomic History    Marital status:    Tobacco Use    Smoking status: Former     Current packs/day: 0.00     Average packs/day: 2.0 packs/day for 21.1 years (42.2 ttl pk-yrs)     Types: Cigarettes     Start date: 1972     Quit date: 2/15/1993     Years since quittin.4    Smokeless tobacco: Never    Tobacco comments:     quit in    Vaping Use    Vaping status: Never Used   Substance and Sexual Activity    Alcohol use: Yes     Comment: Moderate use, once or twice a month.    Drug use: No    Sexual activity: Yes     Partners: Female   Other Topics Concern    Parent/sibling w/ CABG, MI or angioplasty before 65F 55M? No   Social History Narrative    The patient lives with family.  He is    He has 1 son   Who is 23-year-old.  Son is currently in residential  due to recurrent drug use problem.  He works for the [employer] in [employer].  Maynor Lanza MD  10/16/2018         Social Determinants of Health     Financial Resource Strain: Low Risk  (3/30/2024)     Financial Resource Strain     Within the past 12 months, have you or your family members you live with been unable to get utilities (heat, electricity) when it was really needed?: No   Food Insecurity: Low Risk  (3/30/2024)    Food Insecurity     Within the past 12 months, did you worry that your food would run out before you got money to buy more?: No     Within the past 12 months, did the food you bought just not last and you didn t have money to get more?: No   Transportation Needs: Low Risk  (3/30/2024)    Transportation Needs     Within the past 12 months, has lack of transportation kept you from medical appointments, getting your medicines, non-medical meetings or appointments, work, or from getting things that you need?: No   Interpersonal Safety: Low Risk  (4/3/2024)    Interpersonal Safety     Do you feel physically and emotionally safe where you currently live?: Yes     Within the past 12 months, have you been hit, slapped, kicked or otherwise physically hurt by someone?: No     Within the past 12 months, have you been humiliated or emotionally abused in other ways by your partner or ex-partner?: No   Housing Stability: Low Risk  (3/30/2024)    Housing Stability     Do you have housing? : Yes     Are you worried about losing your housing?: No       VITALS:  BP (!) 190/104   Pulse 76   Temp 98.2  F (36.8  C) (Temporal)   Resp 20   Wt 106.6 kg (235 lb)   SpO2 100%   BMI 32.18 kg/m      PHYSICAL EXAM      Vitals: BP (!) 190/104   Pulse 76   Temp 98.2  F (36.8  C) (Temporal)   Resp 20   Wt 106.6 kg (235 lb)   SpO2 100%   BMI 32.18 kg/m    General: Appears in no acute distress, awake, alert, interactive.  Eyes: Conjunctivae non-injected. Sclera anicteric.  HENT: Atraumatic.  Neck: Supple.  Respiratory/Chest: Respiration unlabored.  Abdomen: non distended  Musculoskeletal: Normal extremities. No edema or erythema.  Skin: Normal color. No rash or diaphoresis.  Neurologic: Face symmetric, moves all  extremities spontaneously. Speech clear.  Psychiatric: Oriented to person, place, and time. Affect appropriate.    LAB:  All pertinent labs reviewed and interpreted.  Results for orders placed or performed during the hospital encounter of 07/13/24   CTA Head Neck with Contrast     Status: None    Narrative    HEAD AND NECK CT ANGIOGRAM WITH IV CONTRAST    7/13/2024 11:59 AM CDT    INDICATION: Code Stroke to evaluate for potential thrombolysis and thrombectomy. PLEASE READ IMMEDIATELY. Left-sided weakness  TECHNIQUE: Head and neck CT angiogram with IV contrast. Noncontrast head CT followed by axial helical CT images of the head and neck vessels obtained during the arterial phase of intravenous contrast administration. Axial helical 2D reconstructed images   and multiplanar 3D MIP reconstructed images of the head and neck vessels were performed by the technologist. Dose reduction techniques were used. Vessel stenoses reported according to NASCET criteria.  CONTRAST: isovue 370 67ml  COMPARISON: Brain MRI and head and neck MRA 1/14/2022    FINDINGS:  NONCONTRAST HEAD CT: No intracranial hemorrhage, extraaxial collection, mass effect or CT evidence of acute infarct.  Minor presumed chronic small vessel ischemic changes. The ventricles and sulci are normal for age. Osseous structures are intact. The   visualized paranasal sinuses are free of significant disease. The mastoid air cells are free of significant disease. The orbits are unremarkable.    HEAD CTA: The intracranial circulation is patent without evidence for aneurysm, proximal vessel occlusion, high-grade intracranial stenosis or arteriovenous malformation.  Patent dural venous sinuses.    NECK CTA: Two vessel (bovine) arch.  Carotid arteries are patent with mild to moderate atherosclerotic change.  Less than 50% stenosis by NASCET criteria in the left carotid artery.  No hemodynamically significant stenosis by NASCET criteria in the right   carotid artery.  Patent vertebral arteries. No dissection.      Impression    CONCLUSION:  HEAD CT:  No intracranial hemorrhage, mass, or definite CT evidence of recent ischemia.    HEAD CTA:  Negative. No vessel stenosis, occlusion or aneurysm.    NECK CTA:  1.  Mild to moderate carotid artery atherosclerosis.  2.  Less than 50% narrowing of the proximal left internal carotid artery.  3.  No hemodynamically significant narrowing in the right carotid artery.  4.  Patent vertebral arteries. No dissection.    Findings were discussed with Dr. FILIPE LITTLEJOHN via telephone at 1201 hours on 7/13/2024.     CT Head Perfusion w Contrast - For Tier 2 Stroke     Status: None    Narrative    EXAM: CT HEAD PERFUSION W CONTRAST  LOCATION: [facility]  DATE/TIME: 7/13/2024 12:00 PM CDT    INDICATION: Code Stroke to evaluate for potential thrombolysis and thrombectomy. Evaluate mismatch between penumbra and core infarct. READ IMMEDIATELY. Left-sided weakness  TECHNIQUE: CT cerebral perfusion was performed utilizing a contrast bolus. Perfusion data were post processed with generation of standard perfusion maps and estimation of ischemic/infarcted volumes utilizing standard threshold values. Dose reduction   techniques were used.   CONTRAST: isovue 370 50ml  COMPARISON: Head and neck CTA 7/13/2024.    FINDINGS:   PERFUSION MAPS: Symmetrical cerebral perfusion. No focal deficits in cerebral blood flow or volume to suggest ischemia/oligemia.    RAPID ANALYSIS:  CBF<30%: 0 mL  Tmax>6sec: 0 mL  Mismatch volume: 0 mL  Mismatch ratio: none      Impression    IMPRESSION:   1.  Normal cerebral perfusion.   CT Cervical Spine w/o Contrast     Status: None    Narrative    EXAM: CT CERVICAL SPINE WITHOUT CONTRAST  LOCATION: [facility]  DATE: 07/13/2024    INDICATION: Left arm paresthesia, previous disc replaced; Neck pain; history of spine surgery; No suspected hardware failure; None of the following: New acute  radiculopathy, weakness, or worsening neck pain; No suspected cervical disc disease.  COMPARISON: None.  TECHNIQUE: Routine CT Cervical Spine without IV contrast. Multiplanar reformats. Dose reduction techniques were used.    FINDINGS:  VERTEBRA: Satisfactory cervical vertebral body height. Total disc arthroplasty C7-T1. Components are satisfactory in position. Satisfactory cervicovertebral body height with 1 mm anterior degenerative subluxation at C4-C5. Interspace narrowing C5-C6 and   C6-C7. Articular pillars are intact without facet joint space widening or disruption. Mild degenerative changes involve left mid cervical facet joints. Appropriate appearance to the airway and cervical prevertebral soft tissues. Foramen magnum is normal.   C1 ring is intact. Occipital condyles are satisfactory. No acute cervical fracture or posttraumatic subluxation. No displaced upper rib fractures.    CANAL/FORAMINA: No severe cervical spinal stenosis. Scattered levels of foraminal compromise, some moderate-severe degree on a chronic degenerative basis.    PARASPINAL: No focal fluid collections, mass, adenopathy or hematoma noted in the neck soft tissues. Superior mediastinum and thyroid are satisfactory. Lung apices are clear. Vascular calcification.      Impression    IMPRESSION:  1.  No acute cervical fracture or posttraumatic subluxation.    2.  No high-grade spinal canal stenosis. Scattered levels of foraminal compromise on a chronic degenerative basis, some moderate to severe at mid and lower cervical levels.    3.  Uncomplicated appearance to total disc arthroplasty procedural changes at C7-T1.         MR Brain w/o & w Contrast     Status: None    Narrative    EXAM: MR BRAIN W/O and W CONTRAST  LOCATION: [facility]  DATE: 7/13/2024    INDICATION: Left arm numbness  COMPARISON: CT 7/13/2024. MRI 1/14/2022.  CONTRAST: 10 mL Gadavist.  TECHNIQUE: Routine multiplanar multisequence head MRI without and  with intravenous contrast.    FINDINGS:  INTRACRANIAL CONTENTS: No acute or subacute infarct. No mass, acute hemorrhage, or extra-axial fluid collections. Scattered nonspecific T2/FLAIR hyperintensities within the cerebral white matter most consistent with mild chronic microvascular ischemic   change. Small chronic infarctions of the left parietal lobe, left basal ganglia and posterior left frontal lobe are unchanged. Mild generalized cerebral atrophy. No hydrocephalus. Normal position of the cerebellar tonsils. No abnormal parenchymal,   pachymeningeal or leptomeningeal enhancement is demonstrated. The major intracranial flow voids are unremarkable.    SELLA: No abnormality accounting for technique.    OSSEOUS STRUCTURES/SOFT TISSUES: Unremarkable marrow signal. Unremarkable extracranial soft tissues. Limited images of the upper cervical spine demonstrate no acute abnormality.      ORBITS: Prior right cataract surgery. Visualized portions of the orbits are otherwise unremarkable.     SINUSES/MASTOIDS: Mucosal thickening primarily involving the ethmoid air cells. No middle ear or mastoid effusion.       Impression    IMPRESSION:  1.  No evidence of acute infarction or other acute intracranial abnormality.  2.  Chronic ischemic changes and age-related changes as above.   Basic metabolic panel     Status: Abnormal   Result Value Ref Range    Sodium 142 135 - 145 mmol/L    Potassium 3.9 3.4 - 5.3 mmol/L    Chloride 107 98 - 107 mmol/L    Carbon Dioxide (CO2) 25 22 - 29 mmol/L    Anion Gap 10 7 - 15 mmol/L    Urea Nitrogen 18.5 8.0 - 23.0 mg/dL    Creatinine 1.07 0.67 - 1.17 mg/dL    GFR Estimate 77 >60 mL/min/1.73m2    Calcium 9.5 8.8 - 10.2 mg/dL    Glucose 163 (H) 70 - 99 mg/dL   INR     Status: Normal   Result Value Ref Range    INR 0.94 0.85 - 1.15   Partial thromboplastin time     Status: Normal   Result Value Ref Range    aPTT 27 22 - 38 Seconds   Troponin T, High Sensitivity     Status: Normal   Result Value Ref  Range    Troponin T, High Sensitivity 10 <=22 ng/L   Glucose by meter     Status: Abnormal   Result Value Ref Range    GLUCOSE BY METER POCT 197 (H) 70 - 99 mg/dL   CBC with platelets and differential     Status: None   Result Value Ref Range    WBC Count 5.5 4.0 - 11.0 10e3/uL    RBC Count 4.46 4.40 - 5.90 10e6/uL    Hemoglobin 13.9 13.3 - 17.7 g/dL    Hematocrit 42.1 40.0 - 53.0 %    MCV 94 78 - 100 fL    MCH 31.2 26.5 - 33.0 pg    MCHC 33.0 31.5 - 36.5 g/dL    RDW 14.2 10.0 - 15.0 %    Platelet Count 173 150 - 450 10e3/uL    % Neutrophils 50 %    % Lymphocytes 40 %    % Monocytes 8 %    % Eosinophils 2 %    % Basophils 1 %    % Immature Granulocytes 0 %    NRBCs per 100 WBC 0 <1 /100    Absolute Neutrophils 2.7 1.6 - 8.3 10e3/uL    Absolute Lymphocytes 2.2 0.8 - 5.3 10e3/uL    Absolute Monocytes 0.4 0.0 - 1.3 10e3/uL    Absolute Eosinophils 0.1 0.0 - 0.7 10e3/uL    Absolute Basophils 0.0 0.0 - 0.2 10e3/uL    Absolute Immature Granulocytes 0.0 <=0.4 10e3/uL    Absolute NRBCs 0.0 10e3/uL   [place] Draw     Status: None    Narrative    The following orders were created for panel order [place] Draw.  Procedure                               Abnormality         Status                     ---------                               -----------         ------                     Extra Red Top Tube[[id number]]                               Final result                 Please view results for these tests on the individual orders.   Extra Red Top Tube     Status: None   Result Value Ref Range    Hold Specimen JI    CBC with Platelets & Differential     Status: None    Narrative    The following orders were created for panel order CBC with Platelets & Differential.  Procedure                               Abnormality         Status                     ---------                               -----------         ------                     CBC with platelets and d...[[id number]]                      Final result                  Please view results for these tests on the individual orders.        RADIOLOGY:  Reviewed all pertinent imaging. Please see official radiology report.  MR Brain w/o & w Contrast    Result Date: 7/13/2024  EXAM: MR BRAIN W/O and W CONTRAST LOCATION: [facility] DATE: 7/13/2024 INDICATION: Left arm numbness COMPARISON: CT 7/13/2024. MRI 1/14/2022. CONTRAST: 10 mL Gadavist. TECHNIQUE: Routine multiplanar multisequence head MRI without and with intravenous contrast. FINDINGS: INTRACRANIAL CONTENTS: No acute or subacute infarct. No mass, acute hemorrhage, or extra-axial fluid collections. Scattered nonspecific T2/FLAIR hyperintensities within the cerebral white matter most consistent with mild chronic microvascular ischemic change. Small chronic infarctions of the left parietal lobe, left basal ganglia and posterior left frontal lobe are unchanged. Mild generalized cerebral atrophy. No hydrocephalus. Normal position of the cerebellar tonsils. No abnormal parenchymal, pachymeningeal or leptomeningeal enhancement is demonstrated. The major intracranial flow voids are unremarkable. SELLA: No abnormality accounting for technique. OSSEOUS STRUCTURES/SOFT TISSUES: Unremarkable marrow signal. Unremarkable extracranial soft tissues. Limited images of the upper cervical spine demonstrate no acute abnormality.  ORBITS: Prior right cataract surgery. Visualized portions of the orbits are otherwise unremarkable. SINUSES/MASTOIDS: Mucosal thickening primarily involving the ethmoid air cells. No middle ear or mastoid effusion.     IMPRESSION: 1.  No evidence of acute infarction or other acute intracranial abnormality. 2.  Chronic ischemic changes and age-related changes as above.    CT Cervical Spine w/o Contrast    Result Date: 7/13/2024  EXAM: CT CERVICAL SPINE WITHOUT CONTRAST LOCATION: [facility] DATE: 07/13/2024 INDICATION: Left arm paresthesia, previous disc replaced; Neck  pain; history of spine surgery; No suspected hardware failure; None of the following: New acute radiculopathy, weakness, or worsening neck pain; No suspected cervical disc disease. COMPARISON: None. TECHNIQUE: Routine CT Cervical Spine without IV contrast. Multiplanar reformats. Dose reduction techniques were used. FINDINGS: VERTEBRA: Satisfactory cervical vertebral body height. Total disc arthroplasty C7-T1. Components are satisfactory in position. Satisfactory cervicovertebral body height with 1 mm anterior degenerative subluxation at C4-C5. Interspace narrowing C5-C6 and C6-C7. Articular pillars are intact without facet joint space widening or disruption. Mild degenerative changes involve left mid cervical facet joints. Appropriate appearance to the airway and cervical prevertebral soft tissues. Foramen magnum is normal.  C1 ring is intact. Occipital condyles are satisfactory. No acute cervical fracture or posttraumatic subluxation. No displaced upper rib fractures. CANAL/FORAMINA: No severe cervical spinal stenosis. Scattered levels of foraminal compromise, some moderate-severe degree on a chronic degenerative basis. PARASPINAL: No focal fluid collections, mass, adenopathy or hematoma noted in the neck soft tissues. Superior mediastinum and thyroid are satisfactory. Lung apices are clear. Vascular calcification.     IMPRESSION: 1.  No acute cervical fracture or posttraumatic subluxation. 2.  No high-grade spinal canal stenosis. Scattered levels of foraminal compromise on a chronic degenerative basis, some moderate to severe at mid and lower cervical levels. 3.  Uncomplicated appearance to total disc arthroplasty procedural changes at C7-T1.     CT Head Perfusion w Contrast - For Tier 2 Stroke    Result Date: 7/13/2024  EXAM: CT HEAD PERFUSION W CONTRAST LOCATION: [facility] DATE/TIME: 7/13/2024 12:00 PM CDT INDICATION: Code Stroke to evaluate for potential thrombolysis and thrombectomy.  Evaluate mismatch between penumbra and core infarct. READ IMMEDIATELY. Left-sided weakness TECHNIQUE: CT cerebral perfusion was performed utilizing a contrast bolus. Perfusion data were post processed with generation of standard perfusion maps and estimation of ischemic/infarcted volumes utilizing standard threshold values. Dose reduction techniques were used. CONTRAST: isovue 370 50ml COMPARISON: Head and neck CTA 7/13/2024. FINDINGS: PERFUSION MAPS: Symmetrical cerebral perfusion. No focal deficits in cerebral blood flow or volume to suggest ischemia/oligemia. RAPID ANALYSIS: CBF<30%: 0 mL Tmax>6sec: 0 mL Mismatch volume: 0 mL Mismatch ratio: none     IMPRESSION: 1.  Normal cerebral perfusion.    CTA Head Neck with Contrast    Result Date: 7/13/2024  HEAD AND NECK CT ANGIOGRAM WITH IV CONTRAST 7/13/2024 11:59 AM CDT INDICATION: Code Stroke to evaluate for potential thrombolysis and thrombectomy. PLEASE READ IMMEDIATELY. Left-sided weakness TECHNIQUE: Head and neck CT angiogram with IV contrast. Noncontrast head CT followed by axial helical CT images of the head and neck vessels obtained during the arterial phase of intravenous contrast administration. Axial helical 2D reconstructed images and multiplanar 3D MIP reconstructed images of the head and neck vessels were performed by the technologist. Dose reduction techniques were used. Vessel stenoses reported according to NASCET criteria. CONTRAST: isovue 370 67ml COMPARISON: Brain MRI and head and neck MRA 1/14/2022 FINDINGS: NONCONTRAST HEAD CT: No intracranial hemorrhage, extraaxial collection, mass effect or CT evidence of acute infarct.  Minor presumed chronic small vessel ischemic changes. The ventricles and sulci are normal for age. Osseous structures are intact. The visualized paranasal sinuses are free of significant disease. The mastoid air cells are free of significant disease. The orbits are unremarkable. HEAD CTA: The intracranial circulation is patent  without evidence for aneurysm, proximal vessel occlusion, high-grade intracranial stenosis or arteriovenous malformation.  Patent dural venous sinuses. NECK CTA: Two vessel (bovine) arch.  Carotid arteries are patent with mild to moderate atherosclerotic change.  Less than 50% stenosis by NASCET criteria in the left carotid artery.  No hemodynamically significant stenosis by NASCET criteria in the right  carotid artery. Patent vertebral arteries. No dissection.     CONCLUSION: HEAD CT: No intracranial hemorrhage, mass, or definite CT evidence of recent ischemia. HEAD CTA: Negative. No vessel stenosis, occlusion or aneurysm. NECK CTA: 1.  Mild to moderate carotid artery atherosclerosis. 2.  Less than 50% narrowing of the proximal left internal carotid artery. 3.  No hemodynamically significant narrowing in the right carotid artery. 4.  Patent vertebral arteries. No dissection. Findings were discussed with Dr. FILIPE LITTLEJOHN via telephone at 1201 hours on 7/13/2024.     US Carotid Bilateral    Result Date: 6/18/2024  EXAM: US CAROTID BILATERAL LOCATION: [facility] DATE: 6/18/2024 INDICATION:  Cerebrovascular accident (CVA) due to thrombosis of left middle cerebral artery (H), Right sided weakness, Memory loss, History of completed stroke COMPARISON: 11/4/2022 TECHNIQUE: Duplex exam performed utilizing 2D gray-scale imaging, Doppler interrogation with color-flow and spectral waveform analysis. The percent diameter stenosis is determined using Updated Recommendations for Carotid Stenosis Interpretation Criteria  from [employer]. FINDINGS: RIGHT: Mild plaque at the bifurcation. The peak systolic velocity in the ICA is less than 180 cm/sec, consistent with less than 50% stenosis. Normal velocities in the ECA. Antegrade flow within the vertebral artery. LEFT: Mild plaque at the bifurcation. The peak systolic velocity in the ICA is less than 180 cm/sec, consistent with less than 50%  stenosis. Normal velocities in the ECA. Antegrade flow within the vertebral artery. VELOCITY CHART: CCA   Right: 48 cm/s   Left: 91 cm/s ICA   Right: 70 cm/s   Left: 131 cm/s ECA   Right: 111 cm/s   Left: 72 cm/s ICA/CCA PSV Ratio   Right: 1.1   Left: 1.4     IMPRESSION: 1.  Mild plaque formation, velocities consistent with less than 50% stenosis in the right internal carotid artery. 2.  Mild plaque formation, velocities consistent with less than 50% stenosis in the left internal carotid artery. 3.  Flow within the vertebral arteries is antegrade.      EKG:    Normal sinus rhythm.  Rate of 72 with normal QRS.  Normal ST segments.  Normal EKG.  Unchanged compared to January 5, 2023  I have independently reviewed and interpreted the EKG(s) documented above.  National Institutes of Health Stroke Scale  Exam Interval: Baseline   Score    Level of consciousness: (0)   Alert, keenly responsive    LOC questions: (0)   Answers both questions correctly    LOC commands: (0)   Performs both tasks correctly    Best gaze: (0)   Normal    Visual: (0)   No visual loss    Facial palsy: (0)   Normal symmetrical movements    Motor arm (left): (0)   No drift    Motor arm (right): (0)   No drift    Motor leg (left): (0)   No drift    Motor leg (right): (0)   No drift    Limb ataxia: (0)   Absent    Sensory: (0)   Normal- no sensory loss    Best language: (0)   Normal- no aphasia    Dysarthria: (0)   Normal    Extinction and inattention: (0)   No abnormality        Total Score:  0          I, Carmita Quintana, am serving as a scribe to document services personally performed by Blue Sena MD based on my observation and the provider's statements to me. I, Blue Sena MD, attest that Carmita Quintana is acting in a scribe capacity, has observed my performance of the services and has documented them in accordance with my direction.    Blue Sena MD  [facility] EMERGENCY DEPARTMENT  [address] [place] [address]  [id number]  [phone]        Mariano Strickland MD  07/13/24 1505

## 2024-07-13 NOTE — ED NOTES
Pt back from CT. Stroke code de-escalated. On arrival to the ED, pt reporting left arm tingling that started last night, which has now resolved. Neuro assessment completed. Dysphagia screen passed. Wife at bedside.

## 2024-07-13 NOTE — ED TRIAGE NOTES
"Patient arrives to triage by private car for evaluation of LUE tingling since yesterday \"in the afternoon\". Exact LKW time unknown. Hx of CVA and TIA. Dr. Strickland to triage for neuro eval.      Triage Assessment (Adult)       Row Name 07/13/24 1134          Triage Assessment    Airway WDL WDL        Respiratory WDL    Respiratory WDL WDL                     "

## 2024-07-15 LAB
ATRIAL RATE - MUSE: 72 BPM
DIASTOLIC BLOOD PRESSURE - MUSE: NORMAL MMHG
INTERPRETATION ECG - MUSE: NORMAL
P AXIS - MUSE: 54 DEGREES
PR INTERVAL - MUSE: 170 MS
QRS DURATION - MUSE: 86 MS
QT - MUSE: 402 MS
QTC - MUSE: 440 MS
R AXIS - MUSE: -2 DEGREES
SYSTOLIC BLOOD PRESSURE - MUSE: NORMAL MMHG
T AXIS - MUSE: 10 DEGREES
VENTRICULAR RATE- MUSE: 72 BPM

## 2024-09-25 DIAGNOSIS — E11.9 TYPE 2 DIABETES MELLITUS WITHOUT COMPLICATION, WITHOUT LONG-TERM CURRENT USE OF INSULIN (H): ICD-10-CM

## 2024-09-25 RX ORDER — LANCETS 28 GAUGE
EACH MISCELLANEOUS
Qty: 100 EACH | Refills: 1 | Status: SHIPPED | OUTPATIENT
Start: 2024-09-25

## 2024-11-17 ENCOUNTER — HEALTH MAINTENANCE LETTER (OUTPATIENT)
Age: 64
End: 2024-11-17

## 2024-11-22 PROBLEM — Z96.1 PRESENCE OF INTRAOCULAR LENS: Status: ACTIVE | Noted: 2024-11-22

## 2024-11-22 PROBLEM — M25.619: Status: RESOLVED | Noted: 2020-11-12 | Resolved: 2024-11-22

## 2024-11-22 PROBLEM — H25.813 COMBINED FORMS OF AGE-RELATED CATARACT, BILATERAL: Status: ACTIVE | Noted: 2024-11-22

## 2024-12-18 DIAGNOSIS — E11.9 TYPE 2 DIABETES MELLITUS WITHOUT COMPLICATION, WITHOUT LONG-TERM CURRENT USE OF INSULIN (H): ICD-10-CM

## 2024-12-19 RX ORDER — LANCETS 28 GAUGE
EACH MISCELLANEOUS
Qty: 200 EACH | Refills: 2 | Status: SHIPPED | OUTPATIENT
Start: 2024-12-19

## 2025-02-12 DIAGNOSIS — R44.3 HALLUCINATIONS: ICD-10-CM

## 2025-02-12 DIAGNOSIS — Z86.73 HISTORY OF COMPLETED STROKE: ICD-10-CM

## 2025-02-12 RX ORDER — DIVALPROEX SODIUM 500 MG/1
500 TABLET, FILM COATED, EXTENDED RELEASE ORAL AT BEDTIME
Qty: 90 TABLET | Refills: 1 | Status: SHIPPED | OUTPATIENT
Start: 2025-02-12

## 2025-02-12 NOTE — TELEPHONE ENCOUNTER
Refill request for: divalproex sodium extended-release (DEPAKOTE ER) 500 MG 24 hr tablet    Directions: Take 1 tablet (500 mg) by mouth at bedtime     LOV: 05/20/24  NOV: 05/20/25    Pt of Dr. Cuevas. He is out of the office until 2/17/25.. Can you refill in his absence?    90 day supply with 1 refills Medication T'd for review and signature    Latricia Kohler LPN on 2/12/2025 at 2:00 PM

## 2025-03-27 DIAGNOSIS — Z86.73 HISTORY OF COMPLETED STROKE: ICD-10-CM

## 2025-03-27 DIAGNOSIS — R44.3 HALLUCINATIONS: ICD-10-CM

## 2025-03-27 RX ORDER — ROSUVASTATIN CALCIUM 5 MG/1
5 TABLET, COATED ORAL DAILY
Qty: 90 TABLET | Refills: 1 | Status: SHIPPED | OUTPATIENT
Start: 2025-03-27

## 2025-03-31 DIAGNOSIS — I10 ESSENTIAL HYPERTENSION: ICD-10-CM

## 2025-03-31 RX ORDER — AMLODIPINE BESYLATE 5 MG/1
5 TABLET ORAL DAILY
Qty: 90 TABLET | Refills: 0 | Status: SHIPPED | OUTPATIENT
Start: 2025-03-31

## 2025-04-04 DIAGNOSIS — I10 ESSENTIAL HYPERTENSION: ICD-10-CM

## 2025-04-07 RX ORDER — LOSARTAN POTASSIUM 100 MG/1
100 TABLET ORAL DAILY
Qty: 90 TABLET | Refills: 0 | Status: SHIPPED | OUTPATIENT
Start: 2025-04-07

## 2025-04-15 DIAGNOSIS — Z86.73 HISTORY OF COMPLETED STROKE: ICD-10-CM

## 2025-04-15 RX ORDER — CLOPIDOGREL BISULFATE 75 MG/1
75 TABLET ORAL DAILY
Qty: 90 TABLET | Refills: 3 | Status: SHIPPED | OUTPATIENT
Start: 2025-04-15

## 2025-04-15 NOTE — TELEPHONE ENCOUNTER
Prescription approved per [facility] Refill Protocol.    Gwen Gutierres RN, BSN  [facility]

## 2025-05-19 NOTE — PROGRESS NOTES
In person evaluation    HPI  12/25/2020, in person hospital visit  Previous care by  Dr. Enrike Ayala and Dr. Mamadou Knutson  11/2/2022, in person visit  5/12/2023, in person visit  5/20/2024, in person visit  5/20/2025, in person visit      65-year-old evaluated neurologically for:  Left MCA stroke, December 2020  Left internal capsule stroke October 2020  Right endarterectomy due to ulcerated plaque June 2015    May 2025 visit    Patient in the ER 7/13/2020 for tingling of his left arm intermittent.  Patient was already on Plavix they added 81 mg of aspirin  For short amount of time but he is not sure how long he actually took it  No further TIA symptoms      Carotid Doppler 6/18/2024  A.  Right ICA peak systolic velocity 70 cm/s, less than 50% stenosis  B.  Left ICA peak systolic velocity 131 cm/s, less than 50% stenosis  C.  Antegrade flow in the vertebral arteries.    HEAD CT: 7/13/2024 see official report  No intracranial hemorrhage, mass, or definite CT evidence of recent ischemia.  HEAD CTA: 7/13/2024  1.  Negative.  2.  No vessel stenosis, occlusion or aneurysm.  NECK CTA: 7/13/2024  1.  Mild to moderate carotid artery atherosclerosis.  2.  Less than 50% narrowing of the proximal left internal carotid artery.  3.  No hemodynamically significant narrowing in the right carotid artery.  4.  Patent vertebral arteries.   5.  No dissection.  CT head perfusion 7/13/2024  1.  Normal cerebral perfusion  CT cervical spine 1/13/2024 see official report  1.  No acute cervical fracture or posttraumatic subluxation.  2.  No high-grade spinal canal stenosis.   3.  Scattered levels of foraminal compromise on a chronic degenerative basis, some moderate to severe at mid and lower cervical levels.  4.  Uncomplicated appearance to total disc arthroplasty procedural changes at C7-T1.  MRI head 7/13/2024 see official report  1.  No evidence of acute infarction or other acute intracranial abnormality.  2.  Chronic ischemic changes and  "age-related changes.       Chronic infarct left parietal lobe/left basal ganglion/left posterior frontal lobe  3.  Mild generalized atrophy  4.  No hydrocephalus      Patient continues to have issues with his speech from his previous left hemisphere stroke  Plavix 75 mg once per day  Crestor 5 mg once per day  Depakote  mg once at nighttime    Current exam that was actually stable  He has a little bit of trouble with word finding or gets overwhelmed specially if there is a lot of commotion a lot of people talking or if he is fatigued  This is his baseline        May 2024 visit review:  Does have low back pain radicular to both legs follows at the spine center  Patient had lumbar spine surgery June 30, 2023  Does have some mood changes from his left hemisphere stroke  Has some difficulty with aphasia  Minimal right hand difficulties  He has been on the Depakote though for a long time for his \"hallucinations\" which we thought might be seizure that occurred after his stroke that was very cortical in nature  He is on low-dose Depakote 500 mg extended release once per night            A.  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  No recent infarct, intracranial mass, abnormal enhancement or evidence of intracranial hemorrhage.  2.  There are several small chronic infarcts in the left parietal lobe, posterior left frontal lobe and upper left basal ganglia.  3.  Underlying mild volume loss and presumed chronic small vessel ischemic changes.  HEAD " MRA: 1/14/2022  1.  Normal MRA [place] [facility] [place].  NECK MRA: 1/14/2022  1.  No hemodynamically significant stenosis in either proximal internal carotid artery.  2.  Vertebral arteries are patent through the neck and into the head.        B.  History of lumbar radiculopathy/lumbar spinal stenosis        Epidural abscess lumbar region March 2022        Status post L4-L3 hemilaminectomy and durotomy 3/21/2022        Reviewed neurosurgery notes 3/28/2023 (status post bilateral lumbar 3-4 hemilaminectomies/medial facetectomies/foraminotomies                                                                             Microdiscectomies/small durotomy on the right Dr. Duque 3/21/2022)      C.  Difficulty with memory        Slums score of 17 out of 30 when he had a stroke with agitation and hallucinations (12/2020)         Continue Depakote 500 mg once at nighttime for mood         Is on metformin check B12 level        MoCA   11/2/2022,        27+1 = 28 out of 30  5/20/2024,       28+1 = 29 out of 30      Past medical history  CVA left MCA December 2020  CVA October 2020, right-sided weakness (left posterior limb internal capsule)  Left ICA 50% stenosis  Right endarterectomy due to ulcerated plaque June 2015  Covid 19, October 2020  Diabetes  Hyperlipidemia and hypertension  Cervical radiculopathy  Hypothyroid  Gout        Habits  Past smoker quit 27 years ago  Does drink alcohol      Family history  Father with cancer  Mother with cancer  Sister with cancer  Brother with diabetes and heart disease      Work-up  MRI scan brain 12/27/2020 Limited study  A.  Scattered infarcts in the left MCA territory  B.  Infarct burden increased compared to 12/25/2020  C.  Subacute infarct posterior limb left internal capsule stable  D.  No hemorrhagic transformation  MRI scan 12/25/2020 see official report left hemisphere stroke old evolving left internal capsule stroke  CT scan head 12/25/2020 see official report left posterior internal  capsule stroke no hemorrhage  CTA intracranial vessels no significant stenosis  CTA of the neck  A.  Left ICA 60-70% stenosis  B.  Right ICA less than 50% stenosis  C.  Moderate stenosis right vert  EEG December 25, 2020 theta slowing of the background consistent with diffuse cerebral dysfunction  Echo 60% ejection fraction, left atrium normal size  EKG normal sinus rhythm  Urine tox screen on admission positive for benzodiazepines given by EMS   COVID-19 negative  Slums score 17 out of 30  CSF WBC 3/1, RBC 19/62, glucose 78, cultures no growth  CSF HSV negative, cryptococcal antigen negative     HEAD MRI: 1/14/2022  1.  No recent infarct, intracranial mass, abnormal enhancement or evidence of intracranial hemorrhage.  2.  There are several small chronic infarcts in the left parietal lobe, posterior left frontal lobe and upper left basal ganglia.  3.  Underlying mild volume loss and presumed chronic small vessel ischemic changes.  HEAD MRA: 1/14/2022  1.  Normal MRA [place] [facility] [place].  NECK MRA: 1/14/2022  1.  No hemodynamically significant stenosis in either proximal internal carotid artery.  2.  Vertebral arteries are patent through the neck and into the head.  B12 level 397, (11/2/2022)  Carotid ultrasound 11/4/2022  A.  Right ICA peak systolic velocity 98 cm/s, no significant stenosis  B.  Left ICA peak systolic velocity 133 cm/s no significant stenosis  C.  Antegrade flow in vertebral arteries    MRI lumbar spine 2/15/2023  1.  Compared to most recent MRI, previously demonstrated rim-enhancing left posterolateral epidural fluid collection extending from L4 through S1 has resolved.        There is significantly decreased secondary spinal canal stenosis at the L4-L5 and L5-S1 levels, as described in official report.  2.  Unchanged severe spinal canal stenosis at L3-L4. Lesser degrees of spinal canal stenosis elsewhere.  3.  Varying degrees of multilevel neural foraminal stenosis, not significantly changed.  4.   Multilevel degenerative and postoperative changes, as described. Please see the body of the report for additional details.  5.  Small rim-enhancing fluid collection in the subcutaneous tissues overlying the L4-L5 level, probably representing a small postoperative seroma,        although infection cannot be completely excluded by imaging features alone. Recommend clinical correlation.  Carotid Doppler 6/18/2024  A.  Right ICA peak systolic velocity 70 cm/s, less than 50% stenosis  B.  Left ICA peak systolic velocity 131 cm/s, less than 50% stenosis  C.  Antegrade flow in the vertebral arteries.  HEAD CT: 7/13/2024 see official report  No intracranial hemorrhage, mass, or definite CT evidence of recent ischemia.  HEAD CTA: 7/13/2024  1.  Negative.  2.  No vessel stenosis, occlusion or aneurysm.  NECK CTA: 7/13/2024  1.  Mild to moderate carotid artery atherosclerosis.  2.  Less than 50% narrowing of the proximal left internal carotid artery.  3.  No hemodynamically significant narrowing in the right carotid artery.  4.  Patent vertebral arteries.   5.  No dissection.  CT head perfusion 7/13/2024  1.  Normal cerebral perfusion  CT cervical spine 1/13/2024 see official report  1.  No acute cervical fracture or posttraumatic subluxation.  2.  No high-grade spinal canal stenosis.   3.  Scattered levels of foraminal compromise on a chronic degenerative basis, some moderate to severe at mid and lower cervical levels.  4.  Uncomplicated appearance to total disc arthroplasty procedural changes at C7-T1.  MRI head 7/13/2024 see official report  1.  No evidence of acute infarction or other acute intracranial abnormality.  2.  Chronic ischemic changes and age-related changes.       Chronic infarct left parietal lobe/left basal ganglion/left posterior frontal lobe  3.  Mild generalized atrophy  4.  No hydrocephalus      Laboratory data review                    7/14/2022      10/2022      11/2/2022     1/5/2023    4/2024        "11/2024  Depakote      32.1                                                                     20.2  NA/K                                143/4.2                            144/4.0     141/4.5  BUN/Cr                            10.3/0.89                          9.3/0.92   11.6/1.04  GLU                                  98                                   101           140  AST                                   25                                   19             20  WBC/HGB                       4.9/13.0                                            4.8/14.2  PLTs                               191,000                                             182,000    HDL/LDL                          24/12                                                                    23/43  HGBA1C                             5.7                                                6.2                6.2  TSH                                   5.03                                  2.68                            12.9                         B12                                                          397    Slums score 17 out of 30 (12/31/2020)        MoCA   11/2/2022,        27+1 = 28 out of 30  5/20/2024,       28+1 = 29 out of 30        Exam  Review of systems  No headache no chest pain no shortness of breath  No nausea no vomiting no diarrhea no fever chills  No diplopia no dysarthria  No visual changes    Complains of maybe a slight difference in his right side arm and leg  Once in a while might have a little bit of dysphagia but is very subtle  Once in a while stumbles over a word  Sometimes has trouble with pulling a word out that he should remember    Sleeps okay  No \"hallucinations\"    Otherwise review of systems negative        Exam  Blood pressure 125/75, pulse 67  Alert orient x3  Lungs clear  Heart rate regular  Abdomen soft  Symmetrical pulses  No edema the feet  Straight leg raising sign negative      Neurologic exam  Alert orient " x3  Normal prosody speech  Normal naming  Normal comprehension  Normal repetition  No significant aphasia (once in a while seems to have hesitancy of speech)  No neglect        MoCA   11/2/2022,        27+1 = 28 out of 30  5/20/2024,       28+1 = 29 out of 30    Cranials 2 through 12  No ophthalmoplegia  No nystagmus  Face symmetrical  Tongue twisters good  Visual fields intact    Upper extremity  Very subtle mild interning of the right arm  Left arm normal    Lower extremities  Toe tapping and a little bit slower on the right than the left subtle  Test and reported right over left  Iliopsoas 5/5  Hamstring 5/5  Quadriceps 5/5  Anterior tibial 5/5  Posterior tibial 5/5      Gait  Normal        Assessment/plan      1.  December 2020 left MCA stroke with confusion and agitation       October 2020 left internal capsule small vessel ischemic stroke during COVID infection       Right endarterectomy due to ulcerated plaque June 2015    Carotid Doppler 6/18/2024  A.  Right ICA peak systolic velocity 70 cm/s, less than 50% stenosis  B.  Left ICA peak systolic velocity 131 cm/s, less than 50% stenosis  C.  Antegrade flow in the vertebral arteries.    2.  Risk factors for stroke       Hypertension/hyperlipidemia/multiple strokes/past smoker    3.  Significant visual hallucinations and confusion with left MCA stroke December 2020 treated with Depakote    4.  March 2022 left lumbar surgery with epidural abscess       Reviewed MRI lumbar spine       Reviewed neurosurgery notes 3/28/2023 (status post bilateral lumbar 3-4 hemilaminectomies/medial facetectomies/foraminotomies                                                                       Microdiscectomies/small durotomy on the right Dr. Duque 3/21/2022)       Lumbar spine surgery June 30, 2023      5.  Memory difficulty       Checked B12 patient is on metformin       B12 level 397, (11/2/2022)        MoCA   11/2/2022,        27+1 = 28 out of 30  5/20/2024,       28+1 = 29  out of 30          Recommend/plan    Diagnosis  Left MCA stroke  Left internal capsule stroke      Patient continues to have issues with his speech from his previous left hemisphere stroke  Plavix 75 mg once per day  Crestor 5 mg once per day  Depakote  mg once at nighttime    Current exam that was actually stable  He has a little bit of trouble with word finding or gets overwhelmed specially if there is a lot of commotion a lot of people talking or if he is fatigued  This is his baseline    Risk factor reduction per primary MD    Some trouble with sleeping, flashing lights in his vision at night as some parietal-occipital junction changes on ischemia we  added Depakote at nighttime to help this  Depakote  mg nightly slept a lot better    Follow-up in 1 year  Total care time today 30 minutes  The longitudinal plan of care for the diagnosis(es)/condition(s) as documented were addressed during this visit. Due to the added complexity in care, I will continue to support Mauri in the subsequent management and with ongoing continuity of care.        As part of visit today  Reviewed ER visit July 2024  Reviewed multiple scans July 2024  Reviewed carotid Dopplers  Reviewed laboratory data  Reviewed primary MD note 11/22/2024 (hypertriglyceridemia)          Addendum 6/19/2024  Carotid Doppler 6/18/2024  A.  Right ICA peak systolic velocity 70 cm/s, less than 50% stenosis  B.  Left ICA peak systolic velocity 131 cm/s, less than 50% stenosis  C.  Antegrade flow in the vertebral arteries.

## 2025-05-20 ENCOUNTER — OFFICE VISIT (OUTPATIENT)
Dept: NEUROLOGY | Facility: CLINIC | Age: 65
End: 2025-05-20
Payer: MEDICARE

## 2025-05-20 VITALS
WEIGHT: 240.1 LBS | BODY MASS INDEX: 32.88 KG/M2 | SYSTOLIC BLOOD PRESSURE: 125 MMHG | HEART RATE: 67 BPM | DIASTOLIC BLOOD PRESSURE: 75 MMHG

## 2025-05-20 DIAGNOSIS — Z86.73 HISTORY OF COMPLETED STROKE: ICD-10-CM

## 2025-05-20 DIAGNOSIS — R44.3 HALLUCINATIONS: ICD-10-CM

## 2025-05-20 DIAGNOSIS — R41.3 MEMORY LOSS: ICD-10-CM

## 2025-05-20 DIAGNOSIS — I63.312 CEREBROVASCULAR ACCIDENT (CVA) DUE TO THROMBOSIS OF LEFT MIDDLE CEREBRAL ARTERY (H): Primary | ICD-10-CM

## 2025-05-20 PROCEDURE — 3074F SYST BP LT 130 MM HG: CPT | Performed by: PSYCHIATRY & NEUROLOGY

## 2025-05-20 PROCEDURE — 99214 OFFICE O/P EST MOD 30 MIN: CPT | Performed by: PSYCHIATRY & NEUROLOGY

## 2025-05-20 PROCEDURE — 3078F DIAST BP <80 MM HG: CPT | Performed by: PSYCHIATRY & NEUROLOGY

## 2025-05-20 PROCEDURE — G2211 COMPLEX E/M VISIT ADD ON: HCPCS | Performed by: PSYCHIATRY & NEUROLOGY

## 2025-05-20 RX ORDER — DIVALPROEX SODIUM 500 MG/1
500 TABLET, FILM COATED, EXTENDED RELEASE ORAL AT BEDTIME
Qty: 90 TABLET | Refills: 3 | Status: SHIPPED | OUTPATIENT
Start: 2025-05-20

## 2025-05-20 RX ORDER — CLOPIDOGREL BISULFATE 75 MG/1
75 TABLET ORAL DAILY
Qty: 90 TABLET | Refills: 3 | Status: SHIPPED | OUTPATIENT
Start: 2025-05-20

## 2025-05-20 NOTE — LETTER
5/20/2025      Raghavendra Kiser  [address] [place] [address]      Dear Colleague,    Thank you for referring your patient, Raghavendra Kiser, to the [facility] [place]. Please see a copy of my visit note below.    In person evaluation    HPI  12/25/2020, in person hospital visit  Previous care by  Dr. Enrike Ayala and Dr. Mamadou Knutson  11/2/2022, in person visit  5/12/2023, in person visit  5/20/2024, in person visit  5/20/2025, in person visit      65-year-old evaluated neurologically for:  Left MCA stroke, December 2020  Left internal capsule stroke October 2020  Right endarterectomy due to ulcerated plaque June 2015    May 2025 visit    Patient in the ER 7/13/2020 for tingling of his left arm intermittent.  Patient was already on Plavix they added 81 mg of aspirin  For short amount of time but he is not sure how long he actually took it  No further TIA symptoms      Carotid Doppler 6/18/2024  A.  Right ICA peak systolic velocity 70 cm/s, less than 50% stenosis  B.  Left ICA peak systolic velocity 131 cm/s, less than 50% stenosis  C.  Antegrade flow in the vertebral arteries.    HEAD CT: 7/13/2024 see official report  No intracranial hemorrhage, mass, or definite CT evidence of recent ischemia.  HEAD CTA: 7/13/2024  1.  Negative.  2.  No vessel stenosis, occlusion or aneurysm.  NECK CTA: 7/13/2024  1.  Mild to moderate carotid artery atherosclerosis.  2.  Less than 50% narrowing of the proximal left internal carotid artery.  3.  No hemodynamically significant narrowing in the right carotid artery.  4.  Patent vertebral arteries.   5.  No dissection.  CT head perfusion 7/13/2024  1.  Normal cerebral perfusion  CT cervical spine 1/13/2024 see official report  1.  No acute cervical fracture or posttraumatic subluxation.  2.  No high-grade spinal canal stenosis.   3.  Scattered levels of foraminal compromise on a chronic degenerative basis, some moderate to severe at mid and lower  "cervical levels.  4.  Uncomplicated appearance to total disc arthroplasty procedural changes at C7-T1.  MRI head 7/13/2024 see official report  1.  No evidence of acute infarction or other acute intracranial abnormality.  2.  Chronic ischemic changes and age-related changes.       Chronic infarct left parietal lobe/left basal ganglion/left posterior frontal lobe  3.  Mild generalized atrophy  4.  No hydrocephalus      Patient continues to have issues with his speech from his previous left hemisphere stroke  Plavix 75 mg once per day  Crestor 5 mg once per day  Depakote  mg once at nighttime    Current exam that was actually stable  He has a little bit of trouble with word finding or gets overwhelmed specially if there is a lot of commotion a lot of people talking or if he is fatigued  This is his baseline        May 2024 visit review:  Does have low back pain radicular to both legs follows at the spine center  Patient had lumbar spine surgery June 30, 2023  Does have some mood changes from his left hemisphere stroke  Has some difficulty with aphasia  Minimal right hand difficulties  He has been on the Depakote though for a long time for his \"hallucinations\" which we thought might be seizure that occurred after his stroke that was very cortical in nature  He is on low-dose Depakote 500 mg extended release once per night            A.  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  No recent infarct, intracranial mass, abnormal enhancement or " evidence of intracranial hemorrhage.  2.  There are several small chronic infarcts in the left parietal lobe, posterior left frontal lobe and upper left basal ganglia.  3.  Underlying mild volume loss and presumed chronic small vessel ischemic changes.  HEAD MRA: 1/14/2022  1.  Normal MRA [facility] [facility] [place].  NECK MRA: 1/14/2022  1.  No hemodynamically significant stenosis in either proximal internal carotid artery.  2.  Vertebral arteries are patent through the neck and into the head.        B.  History of lumbar radiculopathy/lumbar spinal stenosis        Epidural abscess lumbar region March 2022        Status post L4-L3 hemilaminectomy and durotomy 3/21/2022        Reviewed neurosurgery notes 3/28/2023 (status post bilateral lumbar 3-4 hemilaminectomies/medial facetectomies/foraminotomies                                                                             Microdiscectomies/small durotomy on the right Dr. Duque 3/21/2022)      C.  Difficulty with memory        Slums score of 17 out of 30 when he had a stroke with agitation and hallucinations (12/2020)         Continue Depakote 500 mg once at nighttime for mood         Is on metformin check B12 level        MoCA   11/2/2022,        27+1 = 28 out of 30  5/20/2024,       28+1 = 29 out of 30      Past medical history  CVA left MCA December 2020  CVA October 2020, right-sided weakness (left posterior limb internal capsule)  Left ICA 50% stenosis  Right endarterectomy due to ulcerated plaque June 2015  Covid 19, October 2020  Diabetes  Hyperlipidemia and hypertension  Cervical radiculopathy  Hypothyroid  Gout        Habits  Past smoker quit 27 years ago  Does drink alcohol      Family history  Father with cancer  Mother with cancer  Sister with cancer  Brother with diabetes and heart disease      Work-up  MRI scan brain 12/27/2020 Limited study  A.  Scattered infarcts in the left MCA territory  B.  Infarct burden increased compared to 12/25/2020  C.  Subacute  infarct posterior limb left internal capsule stable  D.  No hemorrhagic transformation  MRI scan 12/25/2020 see official report left hemisphere stroke old evolving left internal capsule stroke  CT scan head 12/25/2020 see official report left posterior internal capsule stroke no hemorrhage  CTA intracranial vessels no significant stenosis  CTA of the neck  A.  Left ICA 60-70% stenosis  B.  Right ICA less than 50% stenosis  C.  Moderate stenosis right vert  EEG December 25, 2020 theta slowing of the background consistent with diffuse cerebral dysfunction  Echo 60% ejection fraction, left atrium normal size  EKG normal sinus rhythm  Urine tox screen on admission positive for benzodiazepines given by EMS   COVID-19 negative  Slums score 17 out of 30  CSF WBC 3/1, RBC 19/62, glucose 78, cultures no growth  CSF HSV negative, cryptococcal antigen negative     HEAD MRI: 1/14/2022  1.  No recent infarct, intracranial mass, abnormal enhancement or evidence of intracranial hemorrhage.  2.  There are several small chronic infarcts in the left parietal lobe, posterior left frontal lobe and upper left basal ganglia.  3.  Underlying mild volume loss and presumed chronic small vessel ischemic changes.  HEAD MRA: 1/14/2022  1.  Normal MRA [place] [facility] [place].  NECK MRA: 1/14/2022  1.  No hemodynamically significant stenosis in either proximal internal carotid artery.  2.  Vertebral arteries are patent through the neck and into the head.  B12 level 397, (11/2/2022)  Carotid ultrasound 11/4/2022  A.  Right ICA peak systolic velocity 98 cm/s, no significant stenosis  B.  Left ICA peak systolic velocity 133 cm/s no significant stenosis  C.  Antegrade flow in vertebral arteries    MRI lumbar spine 2/15/2023  1.  Compared to most recent MRI, previously demonstrated rim-enhancing left posterolateral epidural fluid collection extending from L4 through S1 has resolved.        There is significantly decreased secondary spinal canal stenosis at  the L4-L5 and L5-S1 levels, as described in official report.  2.  Unchanged severe spinal canal stenosis at L3-L4. Lesser degrees of spinal canal stenosis elsewhere.  3.  Varying degrees of multilevel neural foraminal stenosis, not significantly changed.  4.  Multilevel degenerative and postoperative changes, as described. Please see the body of the report for additional details.  5.  Small rim-enhancing fluid collection in the subcutaneous tissues overlying the L4-L5 level, probably representing a small postoperative seroma,        although infection cannot be completely excluded by imaging features alone. Recommend clinical correlation.  Carotid Doppler 6/18/2024  A.  Right ICA peak systolic velocity 70 cm/s, less than 50% stenosis  B.  Left ICA peak systolic velocity 131 cm/s, less than 50% stenosis  C.  Antegrade flow in the vertebral arteries.  HEAD CT: 7/13/2024 see official report  No intracranial hemorrhage, mass, or definite CT evidence of recent ischemia.  HEAD CTA: 7/13/2024  1.  Negative.  2.  No vessel stenosis, occlusion or aneurysm.  NECK CTA: 7/13/2024  1.  Mild to moderate carotid artery atherosclerosis.  2.  Less than 50% narrowing of the proximal left internal carotid artery.  3.  No hemodynamically significant narrowing in the right carotid artery.  4.  Patent vertebral arteries.   5.  No dissection.  CT head perfusion 7/13/2024  1.  Normal cerebral perfusion  CT cervical spine 1/13/2024 see official report  1.  No acute cervical fracture or posttraumatic subluxation.  2.  No high-grade spinal canal stenosis.   3.  Scattered levels of foraminal compromise on a chronic degenerative basis, some moderate to severe at mid and lower cervical levels.  4.  Uncomplicated appearance to total disc arthroplasty procedural changes at C7-T1.  MRI head 7/13/2024 see official report  1.  No evidence of acute infarction or other acute intracranial abnormality.  2.  Chronic ischemic changes and age-related  changes.       Chronic infarct left parietal lobe/left basal ganglion/left posterior frontal lobe  3.  Mild generalized atrophy  4.  No hydrocephalus      Laboratory data review                    7/14/2022      10/2022      11/2/2022     1/5/2023    4/2024       11/2024  Depakote      32.1                                                                     20.2  NA/K                                143/4.2                            144/4.0     141/4.5  BUN/Cr                            10.3/0.89                          9.3/0.92   11.6/1.04  GLU                                  98                                   101           140  AST                                   25                                   19             20  WBC/HGB                       4.9/13.0                                            4.8/14.2  PLTs                               191,000                                             182,000    HDL/LDL                          24/12                                                                    23/43  HGBA1C                             5.7                                                6.2                6.2  TSH                                   5.03                                  2.68                            12.9                         B12                                                          397    Slums score 17 out of 30 (12/31/2020)        MoCA   11/2/2022,        27+1 = 28 out of 30  5/20/2024,       28+1 = 29 out of 30        Exam  Review of systems  No headache no chest pain no shortness of breath  No nausea no vomiting no diarrhea no fever chills  No diplopia no dysarthria  No visual changes    Complains of maybe a slight difference in his right side arm and leg  Once in a while might have a little bit of dysphagia but is very subtle  Once in a while stumbles over a word  Sometimes has trouble with pulling a word out that he should remember    Sleeps okay  No  "\"hallucinations\"    Otherwise review of systems negative        Exam  Blood pressure 125/75, pulse 67  Alert orient x3  Lungs clear  Heart rate regular  Abdomen soft  Symmetrical pulses  No edema the feet  Straight leg raising sign negative      Neurologic exam  Alert orient x3  Normal prosody speech  Normal naming  Normal comprehension  Normal repetition  No significant aphasia (once in a while seems to have hesitancy of speech)  No neglect        MoCA   11/2/2022,        27+1 = 28 out of 30  5/20/2024,       28+1 = 29 out of 30    Cranials 2 through 12  No ophthalmoplegia  No nystagmus  Face symmetrical  Tongue twisters good  Visual fields intact    Upper extremity  Very subtle mild interning of the right arm  Left arm normal    Lower extremities  Toe tapping and a little bit slower on the right than the left subtle  Test and reported right over left  Iliopsoas 5/5  Hamstring 5/5  Quadriceps 5/5  Anterior tibial 5/5  Posterior tibial 5/5      Gait  Normal        Assessment/plan      1.  December 2020 left MCA stroke with confusion and agitation       October 2020 left internal capsule small vessel ischemic stroke during COVID infection       Right endarterectomy due to ulcerated plaque June 2015    Carotid Doppler 6/18/2024  A.  Right ICA peak systolic velocity 70 cm/s, less than 50% stenosis  B.  Left ICA peak systolic velocity 131 cm/s, less than 50% stenosis  C.  Antegrade flow in the vertebral arteries.    2.  Risk factors for stroke       Hypertension/hyperlipidemia/multiple strokes/past smoker    3.  Significant visual hallucinations and confusion with left MCA stroke December 2020 treated with Depakote    4.  March 2022 left lumbar surgery with epidural abscess       Reviewed MRI lumbar spine       Reviewed neurosurgery notes 3/28/2023 (status post bilateral lumbar 3-4 hemilaminectomies/medial facetectomies/foraminotomies                                                                       " Microdiscectomies/small durotomy on the right Dr. Duque 3/21/2022)       Lumbar spine surgery June 30, 2023      5.  Memory difficulty       Checked B12 patient is on metformin       B12 level 397, (11/2/2022)        MoCA   11/2/2022,        27+1 = 28 out of 30  5/20/2024,       28+1 = 29 out of 30          Recommend/plan    Diagnosis  Left MCA stroke  Left internal capsule stroke      Patient continues to have issues with his speech from his previous left hemisphere stroke  Plavix 75 mg once per day  Crestor 5 mg once per day  Depakote  mg once at nighttime    Current exam that was actually stable  He has a little bit of trouble with word finding or gets overwhelmed specially if there is a lot of commotion a lot of people talking or if he is fatigued  This is his baseline    Risk factor reduction per primary MD    Some trouble with sleeping, flashing lights in his vision at night as some parietal-occipital junction changes on ischemia we  added Depakote at nighttime to help this  Depakote  mg nightly slept a lot better    Follow-up in 1 year  Total care time today 30 minutes  The longitudinal plan of care for the diagnosis(es)/condition(s) as documented were addressed during this visit. Due to the added complexity in care, I will continue to support Mauri in the subsequent management and with ongoing continuity of care.        As part of visit today  Reviewed ER visit July 2024  Reviewed multiple scans July 2024  Reviewed carotid Dopplers  Reviewed laboratory data  Reviewed primary MD note 11/22/2024 (hypertriglyceridemia)          Addendum 6/19/2024  Carotid Doppler 6/18/2024  A.  Right ICA peak systolic velocity 70 cm/s, less than 50% stenosis  B.  Left ICA peak systolic velocity 131 cm/s, less than 50% stenosis  C.  Antegrade flow in the vertebral arteries.          Again, thank you for allowing me to participate in the care of your patient.        Sincerely,        jules Ceuvas,  MD    Electronically signed

## 2025-05-20 NOTE — NURSING NOTE
"Raghavendra Kiser is a 65 year old male who presents for:  Chief Complaint   Patient presents with    Follow Up     Still having issues with his speech.         Initial Vitals:  /75   Pulse 67   Wt 108.9 kg (240 lb 1.6 oz)   BMI 32.88 kg/m   Estimated body mass index is 32.88 kg/m  as calculated from the following:    Height as of 11/22/24: 1.82 m (5' 11.65\").    Weight as of this encounter: 108.9 kg (240 lb 1.6 oz).. Body surface area is 2.35 meters squared. BP completed using cuff size: wrist cuff    Torsten V. Tay  "

## 2025-06-01 ENCOUNTER — HEALTH MAINTENANCE LETTER (OUTPATIENT)
Age: 65
End: 2025-06-01

## 2025-06-03 NOTE — PROGRESS NOTES
Mauri Kiser is status post Exploration of prior left lumbar 3-lumbar 4 hemilaminectomy space with lumbar 3-lumbar 5 left hemilaminectomy for epidural abscess evacuation on 03/29/2022 with Dr. Duque.  Preoperatively presented with worsening new left leg pain.  Last seen on 4/14/2022 for wound check.  Today he returns in follow up. He is accompanied by his spouse. He is pleased with his outcome - back aches at times, no radicular symptoms. Not taking pain meds, but Gabapentin and Robaxin as prescribed. Gait an balance are normal.    Surgical wound WNL - CDI, no signs of infection or skin breakdown.  Incision well-healed: good skin approximation, no redness or visible/palpable edema, no tenderness to palpation.  PT. AF, denies fever, chills or sweats.  Pt. reports that the symptoms are improved from pre-op.    Kate Shelton, RN, CNRN       New order sent

## 2025-06-30 DIAGNOSIS — I10 ESSENTIAL HYPERTENSION: ICD-10-CM

## 2025-07-01 RX ORDER — LOSARTAN POTASSIUM 100 MG/1
100 TABLET ORAL DAILY
Qty: 90 TABLET | Refills: 0 | Status: SHIPPED | OUTPATIENT
Start: 2025-07-01

## (undated) DEVICE — NEEDLE SPINAL DISP 22GA X 3.5" QUINCKE 333320

## (undated) DEVICE — WRAP LUMBAR COMPRESS COLD THERAPY 4632P

## (undated) DEVICE — TUBING SUCTION MEDI-VAC 1/4"X20' N620A

## (undated) DEVICE — GLOVE UNDER INDICATOR PI SZ 7.0 LF 41670

## (undated) DEVICE — SU MONOCRYL+ 4-0 18IN PS2 UND MCP496G

## (undated) DEVICE — ESU ELEC BLADE 2.75" COATED/INSULATED E1455

## (undated) DEVICE — POSITIONER ARM CRADLE LAMINECTOMY DISP

## (undated) DEVICE — SOL NACL 0.9% IRRIG 1000ML BOTTLE 2F7124

## (undated) DEVICE — CUSHION INSERT LG PRONE VIEW JACKSON TABLE

## (undated) DEVICE — SPONGE NEURO 1/2X1/2 WECK 200100

## (undated) DEVICE — TOOL DISSECT MIDAS MR8 14CM MATCH HEAD 3MM MR8-14MH30

## (undated) DEVICE — PACK COLD 6 X 10 1-HOUR 0814-0610

## (undated) DEVICE — MARKER SURG SKIN STRL 77734

## (undated) DEVICE — SUCTION MANIFOLD NEPTUNE 2 SYS 1 PORT 702-025-000

## (undated) DEVICE — ALCOHOL ISOPROPYL 4 OZ 70% IA7004

## (undated) DEVICE — DRAPE STERI 23X17 NON STERILE 1010NSD

## (undated) DEVICE — BLADE KNIFE SURG 11 371111

## (undated) DEVICE — STPL SKIN 35W 6.9MM  PXW35

## (undated) DEVICE — DRSG PRIMAPORE 03 1/8X6" 66000318

## (undated) DEVICE — PITCHER STERILE 1000ML  SSK9004A

## (undated) DEVICE — SOL WATER IRRIG 1000ML BOTTLE 2F7114

## (undated) DEVICE — DECANTER VIAL 2006S

## (undated) DEVICE — Device

## (undated) DEVICE — DURASEAL SPINE SEALANT SYSTEM 3ML

## (undated) DEVICE — SU VICRYL+ 0 8-18 CT1/CR UND VCP840D

## (undated) DEVICE — TRAY PREP DRY SKIN SCRUB 067

## (undated) DEVICE — RX SURGIFLO HEMOSTATIC MATRIX 8ML 2991

## (undated) DEVICE — SU ETHILON 2-0 FS 18" 664G

## (undated) DEVICE — GLOVE UNDER INDICATOR PI SZ 6.5 LF 41665

## (undated) DEVICE — DRSG PRIMAPORE 04X11 3/4"

## (undated) DEVICE — PLATE GROUNDING ADULT W/CORD 9165L

## (undated) DEVICE — GLOVE BIOGEL PI ULTRATOUCH G SZ 6.5 42165

## (undated) DEVICE — DRESSING PRIMAPORE 4 X 3-1/8 66000317

## (undated) DEVICE — PREP DYNA-HEX 4% CHG SCRUB 4OZ BOTTLE MDS098710

## (undated) DEVICE — RX SURGIFLO HEMOSTATIC MATRIX W/THROMBIN 8ML 2994

## (undated) DEVICE — GOWN IMPERVIOUS BREATHABLE SMART LG 89015

## (undated) DEVICE — SUTURE VICRYL+ 2-0 18 CT1/CR VLT VCP839D

## (undated) DEVICE — GLOVE BIOGEL PI SZ 7.0 40870

## (undated) DEVICE — CUSTOM PACK LUMBAR FUSION SNE5BLFHEA

## (undated) DEVICE — DRAPE STERI TOWEL LG 1010

## (undated) DEVICE — ESU PENCIL SMOKE EVAC W/ROCKER SWITCH 0703-047-000

## (undated) DEVICE — PREP SCRUB SOL EXIDINE 4% CHG 4OZ 29002-404

## (undated) DEVICE — GLOVE BIOGEL PI SZ 7.5 40875

## (undated) DEVICE — DRAIN RESERVOIR 100ML JP 0070740

## (undated) DEVICE — DRAIN FLAT 10MM FULL PERF SIL 0070440

## (undated) DEVICE — SU ETHILON 3-0 PS-2 18" 1669H

## (undated) DEVICE — KIT SEALER DURASEAL EXACT SP HYDROGEL 5ML SGL USE 20-6520

## (undated) DEVICE — SYRINGE EAR/ULCER 3 OZ MEDICHOICE

## (undated) DEVICE — DRSG DRAIN 4X4" 7086

## (undated) RX ORDER — PHENYLEPHRINE HYDROCHLORIDE 10 MG/ML
INJECTION INTRAVENOUS
Status: DISPENSED
Start: 2022-03-29

## (undated) RX ORDER — FENTANYL CITRATE-0.9 % NACL/PF 10 MCG/ML
PLASTIC BAG, INJECTION (ML) INTRAVENOUS
Status: DISPENSED
Start: 2022-03-21

## (undated) RX ORDER — KETAMINE HYDROCHLORIDE 10 MG/ML
INJECTION INTRAMUSCULAR; INTRAVENOUS
Status: DISPENSED
Start: 2022-03-29

## (undated) RX ORDER — FENTANYL CITRATE 50 UG/ML
INJECTION, SOLUTION INTRAMUSCULAR; INTRAVENOUS
Status: DISPENSED
Start: 2022-03-21

## (undated) RX ORDER — DEXAMETHASONE SODIUM PHOSPHATE 10 MG/ML
INJECTION INTRAMUSCULAR; INTRAVENOUS
Status: DISPENSED
Start: 2022-03-29

## (undated) RX ORDER — DEXAMETHASONE SODIUM PHOSPHATE 10 MG/ML
INJECTION INTRAMUSCULAR; INTRAVENOUS
Status: DISPENSED
Start: 2022-03-21

## (undated) RX ORDER — LIDOCAINE HYDROCHLORIDE AND EPINEPHRINE 10; 10 MG/ML; UG/ML
INJECTION, SOLUTION INFILTRATION; PERINEURAL
Status: DISPENSED
Start: 2022-03-29

## (undated) RX ORDER — ONDANSETRON 2 MG/ML
INJECTION INTRAMUSCULAR; INTRAVENOUS
Status: DISPENSED
Start: 2022-03-29

## (undated) RX ORDER — EPHEDRINE SULFATE 50 MG/ML
INJECTION, SOLUTION INTRAMUSCULAR; INTRAVENOUS; SUBCUTANEOUS
Status: DISPENSED
Start: 2022-03-29

## (undated) RX ORDER — BACITRACIN ZINC 500 [USP'U]/G
OINTMENT TOPICAL
Status: DISPENSED
Start: 2022-03-21

## (undated) RX ORDER — KETAMINE HYDROCHLORIDE 10 MG/ML
INJECTION INTRAMUSCULAR; INTRAVENOUS
Status: DISPENSED
Start: 2022-03-21

## (undated) RX ORDER — PROPOFOL 10 MG/ML
INJECTION, EMULSION INTRAVENOUS
Status: DISPENSED
Start: 2022-03-29

## (undated) RX ORDER — DEXAMETHASONE SODIUM PHOSPHATE 10 MG/ML
INJECTION, EMULSION INTRAMUSCULAR; INTRAVENOUS
Status: DISPENSED
Start: 2023-06-30

## (undated) RX ORDER — GLYCOPYRROLATE 0.2 MG/ML
INJECTION, SOLUTION INTRAMUSCULAR; INTRAVENOUS
Status: DISPENSED
Start: 2023-06-30

## (undated) RX ORDER — LIDOCAINE HYDROCHLORIDE 10 MG/ML
INJECTION, SOLUTION EPIDURAL; INFILTRATION; INTRACAUDAL; PERINEURAL
Status: DISPENSED
Start: 2023-06-30

## (undated) RX ORDER — EPHEDRINE SULFATE 50 MG/ML
INJECTION, SOLUTION INTRAMUSCULAR; INTRAVENOUS; SUBCUTANEOUS
Status: DISPENSED
Start: 2023-06-30

## (undated) RX ORDER — FENTANYL CITRATE-0.9 % NACL/PF 10 MCG/ML
PLASTIC BAG, INJECTION (ML) INTRAVENOUS
Status: DISPENSED
Start: 2022-03-29

## (undated) RX ORDER — FENTANYL CITRATE 50 UG/ML
INJECTION, SOLUTION INTRAMUSCULAR; INTRAVENOUS
Status: DISPENSED
Start: 2022-03-29

## (undated) RX ORDER — LIDOCAINE HYDROCHLORIDE AND EPINEPHRINE 10; 10 MG/ML; UG/ML
INJECTION, SOLUTION INFILTRATION; PERINEURAL
Status: DISPENSED
Start: 2022-03-21

## (undated) RX ORDER — PROPOFOL 10 MG/ML
INJECTION, EMULSION INTRAVENOUS
Status: DISPENSED
Start: 2023-06-30

## (undated) RX ORDER — BACITRACIN ZINC 500 [USP'U]/G
OINTMENT TOPICAL
Status: DISPENSED
Start: 2022-03-29

## (undated) RX ORDER — ONDANSETRON 2 MG/ML
INJECTION INTRAMUSCULAR; INTRAVENOUS
Status: DISPENSED
Start: 2023-06-30

## (undated) RX ORDER — FENTANYL CITRATE 50 UG/ML
INJECTION, SOLUTION INTRAMUSCULAR; INTRAVENOUS
Status: DISPENSED
Start: 2023-06-30